# Patient Record
Sex: FEMALE | Race: BLACK OR AFRICAN AMERICAN | NOT HISPANIC OR LATINO | Employment: OTHER | URBAN - METROPOLITAN AREA
[De-identification: names, ages, dates, MRNs, and addresses within clinical notes are randomized per-mention and may not be internally consistent; named-entity substitution may affect disease eponyms.]

---

## 2017-01-26 DIAGNOSIS — F41.9 ANXIETY: ICD-10-CM

## 2017-01-26 RX ORDER — SERTRALINE HYDROCHLORIDE 25 MG/1
TABLET, FILM COATED ORAL
Qty: 30 TABLET | Refills: 9 | Status: SHIPPED | OUTPATIENT
Start: 2017-01-26 | End: 2017-05-11

## 2017-02-01 ENCOUNTER — TELEPHONE (OUTPATIENT)
Dept: NEUROLOGY | Facility: CLINIC | Age: 72
End: 2017-02-01

## 2017-02-01 NOTE — TELEPHONE ENCOUNTER
----- Message from Zoe Bray sent at 2/1/2017 11:51 AM CST -----  Contact: Amy  Medication is not working for patient, she would like someone to call her. donepezil (ARICEPT) 5 MG tablet    She was not sure of the strength of medication     Please call: [phone]

## 2017-02-01 NOTE — TELEPHONE ENCOUNTER
Spoke with patient's daughter. She stated that the patient took the Aricept 5mg for about 4 weeks and they took her off of it because it wasn't working and she had diarrhea with it.     Is there something else that she can take for her memory or does she need to make a follow up?

## 2017-02-02 RX ORDER — MEMANTINE HYDROCHLORIDE 5 MG-10 MG
KIT ORAL
Qty: 1 TABLET | Refills: 0 | Status: SHIPPED | OUTPATIENT
Start: 2017-02-02 | End: 2017-05-22

## 2017-02-02 NOTE — TELEPHONE ENCOUNTER
Patient's daughter notified of medication being sent to the pharmacy and they will call with an update in a few weeks.

## 2017-02-02 NOTE — TELEPHONE ENCOUNTER
I can order a Namenda starter pack.  She just needs to follow the instruction on the starter pack.  Side effects, could be constipation, dizziness, or confusion. Side effects are not common   Sent to [facility].  Please ask her to let us know if she tolerates medication so I can order maintenance dose after she finishes starter pack.

## 2017-02-09 DIAGNOSIS — N32.81 OVERACTIVE BLADDER: ICD-10-CM

## 2017-02-09 RX ORDER — OXYBUTYNIN CHLORIDE 5 MG/1
TABLET ORAL
Qty: 90 TABLET | Refills: 0 | Status: SHIPPED | OUTPATIENT
Start: 2017-02-09 | End: 2017-05-11 | Stop reason: SDUPTHER

## 2017-02-27 DIAGNOSIS — I10 ESSENTIAL HYPERTENSION: ICD-10-CM

## 2017-02-27 RX ORDER — CLONIDINE HYDROCHLORIDE 0.1 MG/1
0.2 TABLET ORAL NIGHTLY
Qty: 60 TABLET | Refills: 0 | Status: SHIPPED | OUTPATIENT
Start: 2017-02-27 | End: 2017-04-15 | Stop reason: SDUPTHER

## 2017-03-01 ENCOUNTER — HOSPITAL ENCOUNTER (OUTPATIENT)
Dept: RADIOLOGY | Facility: HOSPITAL | Age: 72
Discharge: HOME | End: 2017-03-01
Attending: INTERNAL MEDICINE
Payer: MEDICARE

## 2017-03-01 DIAGNOSIS — Z12.31 SCREENING MAMMOGRAM FOR HIGH-RISK PATIENT: ICD-10-CM

## 2017-03-01 PROCEDURE — 77067 SCR MAMMO BI INCL CAD: CPT | Mod: 26,,, | Performed by: RADIOLOGY

## 2017-03-01 PROCEDURE — 77063 BREAST TOMOSYNTHESIS BI: CPT | Mod: 26,,, | Performed by: RADIOLOGY

## 2017-03-01 PROCEDURE — 77067 SCR MAMMO BI INCL CAD: CPT | Mod: TC

## 2017-03-25 DIAGNOSIS — F41.9 ANXIETY: ICD-10-CM

## 2017-03-26 RX ORDER — ESCITALOPRAM OXALATE 10 MG/1
TABLET ORAL
Qty: 30 TABLET | Refills: 8 | Status: SHIPPED | OUTPATIENT
Start: 2017-03-26 | End: 2017-05-26 | Stop reason: SDUPTHER

## 2017-04-01 DIAGNOSIS — I63.9 CEREBRAL INFARCTION: ICD-10-CM

## 2017-04-02 RX ORDER — CLOPIDOGREL BISULFATE 75 MG/1
TABLET ORAL
Qty: 30 TABLET | Refills: 5 | Status: SHIPPED | OUTPATIENT
Start: 2017-04-02 | End: 2017-05-26 | Stop reason: SDUPTHER

## 2017-04-15 DIAGNOSIS — I10 ESSENTIAL HYPERTENSION: ICD-10-CM

## 2017-04-17 RX ORDER — CLONIDINE HYDROCHLORIDE 0.1 MG/1
TABLET ORAL
Qty: 60 TABLET | Refills: 9 | Status: SHIPPED | OUTPATIENT
Start: 2017-04-17 | End: 2017-05-26 | Stop reason: SDUPTHER

## 2017-05-06 ENCOUNTER — DOCUMENTATION ONLY (OUTPATIENT)
Dept: INTERNAL MEDICINE | Facility: CLINIC | Age: 72
End: 2017-05-06

## 2017-05-06 ENCOUNTER — TELEPHONE (OUTPATIENT)
Dept: INTERNAL MEDICINE | Facility: CLINIC | Age: 72
End: 2017-05-06

## 2017-05-06 NOTE — TELEPHONE ENCOUNTER
Patient has orders that were entered last visit to be done prior to her visit this Thursday. I do not see that she has scheduled her blood work. Please call patient to schedule prior to her visit.

## 2017-05-06 NOTE — PROGRESS NOTES
Pre-Visit Review & Summary:    72 y/o female with history of HTN, HPL, Pre-DM, S/P CVA 9/2013 (Left Basal Ganglia with residual RUE Weakness, Dysarthria, Aphasia) has a scheduled appt 5/11/17 for her Annual PE.    At last visit, 10/6/16, her daughter had noted some change in her mentation recently. She was sometimes unaware of her surroundings but always recognizes her family. The daughter denied change in speech, change in chronic dysarthria, or gait. No new motor weakness.     Since her last visit, 10/6/16, she was seen by Dr. ALAINA Oswald in Vascular Neurology 10/19/16 for follow up of these cognitive issues. Her family was also complaining of recurrent frontal headaches. She was referred to Dr. Rodriguez and Optometry. Patient did not return for follow up in 4/2017 as recommended and did not follow up with Optometry or H/AClinic with Dr. Rodriguez.     At last visit, 10/6/16, BP was 146/68 but patient had forgotten to take Clonidine 0.2 mg. Blood pressure according to daughter was well controlled at home.     At last visit, Zoloft 25 mg/day had been discontinued by the family because they felt anxiety was not improved. They requested an alternative medication and Lexapro 10 mg/day was added.        PMH:  1. CVA 9/2013           S/P Left Basal Ganglia CVA with Right-sided Hemiparesis, Dysarthria, and Aphasia           Plavix 75 mg/day    2. HTN           Amlodipine 5 mg BID             Clonidine 0.2 mg hs    3. DDD L/S with Mild Spinal Stenosis           ESI - 2011, and 8/2012    4. Vit D Deficiency           Cholecalciferol 2,000 units/day           Vit D Level (2/2016): 41    5. Chronic Hypokalemia           KCL 20 meq/day    6. Chronic Mild Anemia - Anemia of Chronic Disease          Normal Folate, MMA (7/2013)            Normal Fe studies, Ferritin, B12 (2012)    7. Overactive Bladder          Ditropan 5 mg once daily (decreased b/c Urinary Retention 4/2016)    8. Hyperlipidemia          Lipitor 80 mg hs    9. Chronic  Constipation          High Fiber Diet; Metamucil; Miralax    10. Pre-DM           Hgb A1c (2/2016) - 5.9    11. Small Right Meningioma    12. Family Hx of Breast CA    13. Anxiety            Lexapro 10 mg/day      Meds:          Amlodipine 5 mg BID          Atorvastatin 80 mg hs          Clonidine 0.2 mg hs          Cholecalciferol 2,000 units/day          Ditropan 5 mg daily          Norco 5-325 mg prn - rarely takes          KCL 20 meq/day          Lexapro 10 mg/day          Metamucil          Miralax          Plavix 75 mg/day          Sertraline 25 mg/day          Tramadol 50 mg prn - rarely takes      Allergies:          Lisinopril          Lovenox          PCN    Surgical Hx:          Hysterectomy          Bladder Sling          Right Rotator Cuff Repair          Hemorrhoidectomy          Breast Bx          (B) Cataract Extractions (10/2014 and 11/2014)    Social Hx:          Former Smoker - Quit 1990          ETOH: none          Low Salt diet                Preventive Health Screen and Recent Lab:          Annual PE: 1/8/2015          CBC (9/2016): normal          CMP (9/2016) - Glu-125          TSH (2/16) - 0.667          Lipids (2/2016) - Chol-153, TG-102, HDL-64, LDL-68          Vit D (2/2016) - 41          BMD (3/2016) - Osteopenia T Score (-)1.3          Mammogram (3/2017) - normal          Colonoscopy (2009) - reportedly normal          GYN Exam (10/2011) - S/P Hysterectomy          Eye Exam (3/2016)          Immunizations:                 Flu- 2/2016                 Tdp - 10/2016                 Pneumovax - 12/2013                 Prevnar 13 - 2/2016                 Zostavax - 7/2013          Other Lab:                 Hgb A 1c (2/2016): 5.9                 Anemia Workup (6/2012): normal S. Fe, Ferritin, B12, Folate                 MRI L/S (6/2012): DJD, mild Spinal Stenosis                 EMG - LE (8/2013): normal                 CXR (9/2013): normal                 2 D Echo (9/2013): diastolic  Dysfunction, EF 60%, mild LAE                 Carotid US (9/2013): < 39 % Carotid Artery Stenosis                 MRI Brain (9/2013): c/w Acute Left Basal Ganglia CVA                                                 Volume Loss c/w chronic microvascular disease                                                 Small right Meningioma above sphenoid sinus roof                 EEG (9/2013) - no focal site for Sz activity                 30 Day Event Monitor (9/2013): negative  IMP:  1. Annual PE  2. HTN  3. S/P CVA  4. HPL  5. Vit D Deficiency  6. Chronic Hypokalemia  7. Pre-DM  8. DDD L/S Spine  9. Overactive Bladder  10. Small R-Meningioma  11. Chronic Constipation  12. Anxiety  13. Chronic H/As    Plan:  1. MRI Brain (Chronic H/As, Hx of Meningioma)  2. Follow up with Dr. Oswald (Vascular Neurology)  3. Referral for eye exam

## 2017-05-09 ENCOUNTER — LAB VISIT (OUTPATIENT)
Dept: LAB | Facility: HOSPITAL | Age: 72
End: 2017-05-09
Attending: INTERNAL MEDICINE
Payer: MEDICARE

## 2017-05-09 DIAGNOSIS — R73.03 PRE-DIABETES: ICD-10-CM

## 2017-05-09 DIAGNOSIS — I10 ESSENTIAL HYPERTENSION: ICD-10-CM

## 2017-05-09 DIAGNOSIS — E78.5 HYPERLIPIDEMIA, UNSPECIFIED: ICD-10-CM

## 2017-05-09 DIAGNOSIS — E55.9 VITAMIN D DEFICIENCY DISEASE: ICD-10-CM

## 2017-05-09 LAB
25(OH)D3+25(OH)D2 SERPL-MCNC: 48 NG/ML
ALBUMIN SERPL BCP-MCNC: 3.4 G/DL
ALP SERPL-CCNC: 66 U/L
ALT SERPL W/O P-5'-P-CCNC: 11 U/L
ANION GAP SERPL CALC-SCNC: 12 MMOL/L
AST SERPL-CCNC: 20 U/L
BASOPHILS # BLD AUTO: 0.03 K/UL
BASOPHILS NFR BLD: 0.5 %
BILIRUB SERPL-MCNC: 0.7 MG/DL
BUN SERPL-MCNC: 16 MG/DL
CALCIUM SERPL-MCNC: 9.3 MG/DL
CHLORIDE SERPL-SCNC: 104 MMOL/L
CHOLEST/HDLC SERPL: 1.9 {RATIO}
CO2 SERPL-SCNC: 25 MMOL/L
CREAT SERPL-MCNC: 1 MG/DL
DIFFERENTIAL METHOD: ABNORMAL
EOSINOPHIL # BLD AUTO: 0.2 K/UL
EOSINOPHIL NFR BLD: 3.1 %
ERYTHROCYTE [DISTWIDTH] IN BLOOD BY AUTOMATED COUNT: 15.9 %
EST. GFR  (AFRICAN AMERICAN): >60 ML/MIN/1.73 M^2
EST. GFR  (NON AFRICAN AMERICAN): 56.4 ML/MIN/1.73 M^2
GLUCOSE SERPL-MCNC: 101 MG/DL
HCT VFR BLD AUTO: 36.9 %
HDL/CHOLESTEROL RATIO: 52.6 %
HDLC SERPL-MCNC: 152 MG/DL
HDLC SERPL-MCNC: 80 MG/DL
HGB BLD-MCNC: 11.8 G/DL
LDLC SERPL CALC-MCNC: 61.8 MG/DL
LYMPHOCYTES # BLD AUTO: 2.6 K/UL
LYMPHOCYTES NFR BLD: 40.3 %
MCH RBC QN AUTO: 27.7 PG
MCHC RBC AUTO-ENTMCNC: 32 %
MCV RBC AUTO: 87 FL
MONOCYTES # BLD AUTO: 0.5 K/UL
MONOCYTES NFR BLD: 7.1 %
NEUTROPHILS # BLD AUTO: 3.2 K/UL
NEUTROPHILS NFR BLD: 48.8 %
NONHDLC SERPL-MCNC: 72 MG/DL
PLATELET # BLD AUTO: 202 K/UL
PMV BLD AUTO: 11 FL
POTASSIUM SERPL-SCNC: 3.8 MMOL/L
PROT SERPL-MCNC: 7.6 G/DL
RBC # BLD AUTO: 4.26 M/UL
SODIUM SERPL-SCNC: 141 MMOL/L
TRIGL SERPL-MCNC: 51 MG/DL
WBC # BLD AUTO: 6.48 K/UL

## 2017-05-09 PROCEDURE — 83036 HEMOGLOBIN GLYCOSYLATED A1C: CPT

## 2017-05-09 PROCEDURE — 82306 VITAMIN D 25 HYDROXY: CPT

## 2017-05-09 PROCEDURE — 85025 COMPLETE CBC W/AUTO DIFF WBC: CPT

## 2017-05-09 PROCEDURE — 36415 COLL VENOUS BLD VENIPUNCTURE: CPT

## 2017-05-09 PROCEDURE — 80053 COMPREHEN METABOLIC PANEL: CPT

## 2017-05-09 PROCEDURE — 80061 LIPID PANEL: CPT

## 2017-05-10 LAB
ESTIMATED AVG GLUCOSE: 126 MG/DL
HBA1C MFR BLD HPLC: 6 %

## 2017-05-11 ENCOUNTER — OFFICE VISIT (OUTPATIENT)
Dept: INTERNAL MEDICINE | Facility: CLINIC | Age: 72
End: 2017-05-11
Payer: COMMERCIAL

## 2017-05-11 VITALS
HEART RATE: 62 BPM | HEIGHT: 66 IN | BODY MASS INDEX: 24.41 KG/M2 | WEIGHT: 151.88 LBS | DIASTOLIC BLOOD PRESSURE: 60 MMHG | SYSTOLIC BLOOD PRESSURE: 138 MMHG

## 2017-05-11 DIAGNOSIS — M51.36 DDD (DEGENERATIVE DISC DISEASE), LUMBAR: ICD-10-CM

## 2017-05-11 DIAGNOSIS — I69.320 APHASIA AS LATE EFFECT OF CEREBROVASCULAR ACCIDENT: ICD-10-CM

## 2017-05-11 DIAGNOSIS — I67.89 CEREBRAL MICROVASCULAR DISEASE: ICD-10-CM

## 2017-05-11 DIAGNOSIS — R73.03 PRE-DIABETES: ICD-10-CM

## 2017-05-11 DIAGNOSIS — E55.9 VITAMIN D DEFICIENCY DISEASE: ICD-10-CM

## 2017-05-11 DIAGNOSIS — F41.9 ANXIETY: ICD-10-CM

## 2017-05-11 DIAGNOSIS — E87.6 CHRONIC HYPOKALEMIA: ICD-10-CM

## 2017-05-11 DIAGNOSIS — Z00.00 ANNUAL PHYSICAL EXAM: Primary | ICD-10-CM

## 2017-05-11 DIAGNOSIS — I69.30 HISTORY OF CVA WITH RESIDUAL DEFICIT: ICD-10-CM

## 2017-05-11 DIAGNOSIS — H53.8 BLURRED VISION, BILATERAL: ICD-10-CM

## 2017-05-11 DIAGNOSIS — I10 ESSENTIAL HYPERTENSION: ICD-10-CM

## 2017-05-11 DIAGNOSIS — N32.81 OVERACTIVE BLADDER: ICD-10-CM

## 2017-05-11 DIAGNOSIS — D32.9 MENINGIOMA: ICD-10-CM

## 2017-05-11 DIAGNOSIS — G31.84 MILD COGNITIVE IMPAIRMENT: ICD-10-CM

## 2017-05-11 DIAGNOSIS — L60.8 ONYCHOMADESIS OF TOENAIL: ICD-10-CM

## 2017-05-11 DIAGNOSIS — E78.5 HYPERLIPIDEMIA, UNSPECIFIED HYPERLIPIDEMIA TYPE: ICD-10-CM

## 2017-05-11 DIAGNOSIS — R26.81 GAIT INSTABILITY: ICD-10-CM

## 2017-05-11 DIAGNOSIS — G89.29 CHRONIC INTRACTABLE HEADACHE, UNSPECIFIED HEADACHE TYPE: ICD-10-CM

## 2017-05-11 DIAGNOSIS — R51.9 CHRONIC INTRACTABLE HEADACHE, UNSPECIFIED HEADACHE TYPE: ICD-10-CM

## 2017-05-11 DIAGNOSIS — K59.09 OTHER CONSTIPATION: ICD-10-CM

## 2017-05-11 PROCEDURE — 99999 PR PBB SHADOW E&M-EST. PATIENT-LVL V: CPT | Mod: PBBFAC,,, | Performed by: INTERNAL MEDICINE

## 2017-05-11 PROCEDURE — 99397 PER PM REEVAL EST PAT 65+ YR: CPT | Mod: S$GLB,,, | Performed by: INTERNAL MEDICINE

## 2017-05-11 PROCEDURE — 99215 OFFICE O/P EST HI 40 MIN: CPT | Mod: PBBFAC | Performed by: INTERNAL MEDICINE

## 2017-05-11 RX ORDER — OXYBUTYNIN CHLORIDE 5 MG/1
5 TABLET ORAL DAILY
Qty: 90 TABLET | Refills: 3 | Status: SHIPPED | OUTPATIENT
Start: 2017-05-11 | End: 2017-08-08 | Stop reason: SDUPTHER

## 2017-05-11 NOTE — PROGRESS NOTES
Subjective:       Patient ID: Emilia Melgoza is a 71 y.o. female.    Chief Complaint: No chief complaint on file.    HPI Comments: 72 y/o female with history of HTN, HPL, Pre-DM, S/P CVA 9/2013 (Left Basal Ganglia with residual RUE Weakness, Dysarthria, Aphasia) comes in for her Annual PE.    At last visit, 10/6/16, her daughter had noted some changes in her mentation. She was sometimes unaware of her surroundings but always recognized her family. The daughter denied change in speech, change in chronic dysarthria, or gait. No new motor weakness.     Since her last visit, 10/6/16, she was seen by Dr. ALAINA Oswald in Vascular Neurology 10/19/16 for follow up of these cognitive issues. Her family was also complaining of recurrent frontal headaches. She was referred to Dr. Rodriguez and Optometry. Patient did not return for follow up with Dr. Oswald in 4/2017 as recommended and did not follow up with Optometry or H/A Clinic with Dr. Rodriguez. She notes daily chronic frontal throbbing headaches, lasting 2 hours and relieved with Abigail ASA. She denies associated nausea, vomiting. She has chronically blurred vision and has lost her glasses.    At last visit, 10/6/16, BP was 146/68 but patient had forgotten to take Clonidine 0.2 mg. Blood pressure at home according to daughter runs 119-150/60s. She occasionaly misses her BP medications.    At last visit, Zoloft 25 mg/day had been discontinued by the family because they felt anxiety was not improved. They requested an alternative medication and Lexapro 10 mg/day was added. She is doing well on this dose        PMH:  1. CVA 9/2013           S/P Left Basal Ganglia CVA with Right-sided Hemiparesis, Dysarthria, and Aphasia           Plavix 75 mg/day    2. HTN           Amlodipine 5 mg BID             Clonidine 0.2 mg hs    3. DDD L/S with Mild Spinal Stenosis           ESI - 2011, and 8/2012    4. Vit D Deficiency           Cholecalciferol 2,000 units/day    5. Chronic Hypokalemia            KCL 20 meq/day    6. Chronic Mild Anemia - Anemia of Chronic Disease          Normal Folate, MMA (7/2013)            Normal Fe studies, Ferritin, B12 (2012)    7. Overactive Bladder          Ditropan 5 mg once daily (decreased b/c Urinary Retention 4/2016)    8. Hyperlipidemia          Lipitor 80 mg hs    9. Chronic Constipation          High Fiber Diet; Metamucil; Miralax    10. Pre-DM    11. Small Right Meningioma    12. Family Hx of Breast CA    13. Anxiety            Lexapro 10 mg/day      Meds:          Amlodipine 5 mg BID          Atorvastatin 80 mg hs          Clonidine 0.2 mg hs          Cholecalciferol 1,000 units/day          Ditropan 5 mg daily          KCL 20 meq/day          Lexapro 10 mg/day          Metamucil daily          Miralax prn          Plavix 75 mg/day          Tramadol 50 mg prn - rarely takes       Allergies:          Lisinopril          Lovenox          PCN    Surgical Hx:          Hysterectomy          Bladder Sling          Right Rotator Cuff Repair          Hemorrhoidectomy          Breast Bx          (B) Cataract Extractions (10/2014 and 11/2014)    Social Hx:          Former Smoker - Quit 1990          ETOH: none          Low Salt diet                Preventive Health Screen and Recent Lab:          Annual PE: 5/11/17          CBC (5/2017): H/H-11.8/36.9          CMP (5/2017): alb-3.4          TSH (2/2016) - 0.667          Lipids (5/2017) - Chol-152, TG-51, HDL-80, LDL-61          Hgb A 1c (2/2016): 5.9                          (5/2017): 6.0          Vit D (5/2017) - 48          BMD (3/2016) - Osteopenia T Score (-)1.3          Mammogram (3/2017) - normal          Colonoscopy (2009) - reportedly normal          GYN Exam (10/2011) - S/P Hysterectomy          Eye Exam (3/2016)          Immunizations:                 Flu- 2/2016                 Tdp - 10/2016                 Pneumovax - 12/2013                 Prevnar 13 - 2/2016                 Zostavax - 7/2013           Other Lab:                 Anemia Workup (6/2012): normal S. Fe, Ferritin, B12, Folate                 MRI L/S (6/2012): DJD, mild Spinal Stenosis                 EMG - LE (8/2013): normal                 CXR (9/2013): normal                 2 D Echo (9/2013): diastolic Dysfunction, EF 60%, mild LAE                 Carotid US (9/2013): < 39 % Carotid Artery Stenosis                 MRI Brain (9/2013): c/w Acute Left Basal Ganglia CVA                                                 Volume Loss c/w chronic microvascular disease                                                 Small right Meningioma above sphenoid sinus roof                 EEG (9/2013) - no focal site for Sz activity                 30 Day Event Monitor (9/2013): negative        Review of Systems   Constitutional: Negative for appetite change and unexpected weight change.   HENT: Negative for trouble swallowing.    Eyes: Positive for visual disturbance.   Respiratory: Negative for cough, chest tightness and shortness of breath.    Cardiovascular: Negative for chest pain, palpitations and leg swelling.   Gastrointestinal: Negative for abdominal pain, blood in stool, constipation, diarrhea, nausea and vomiting.   Genitourinary: Negative for dysuria and hematuria.   Musculoskeletal: Negative.    Neurological: Positive for headaches. Negative for dizziness, syncope and light-headedness.   Hematological: Negative.    Psychiatric/Behavioral: Negative for dysphoric mood.       Objective:      Physical Exam   Constitutional: She is oriented to person, place, and time. She appears well-developed and well-nourished. No distress.   HENT:   Head: Normocephalic.   Right Ear: External ear normal.   Left Ear: External ear normal.   Mouth/Throat: Oropharynx is clear and moist.   Eyes: Conjunctivae are normal. No scleral icterus.   Neck: Neck supple. No JVD present. No thyromegaly present.   Cardiovascular: Normal rate and regular rhythm.  Exam reveals no gallop.    No  murmur heard.  Pulmonary/Chest: Effort normal and breath sounds normal. She has no wheezes. She has no rales.   Abdominal: Soft. She exhibits no mass. There is no tenderness.   Musculoskeletal: She exhibits no edema.   Lymphadenopathy:     She has no cervical adenopathy.   Neurological: She is alert and oriented to person, place, and time.   Dysarthria, aphasia, RUE weakness 4/5, right facial assymmetry   Skin: Skin is warm and dry. She is not diaphoretic.   Psychiatric: She has a normal mood and affect.       Assessment:   1. Annual PE  2. HTN  3. S/P CVA  4. HPL  5. Vit D Deficiency  6. Chronic Hypokalemia  7. Pre-DM  8. DDD L/S Spine  9. Overactive Bladder  10. Small R-Meningioma  11. Chronic Constipation  12. Anxiety  13. Chronic H/As    Plan:   1. MRI Brain (Chronic H/As, Hx of Meningioma)  2. Follow up with Dr. Oswald (Vascular Neurology)  3. Referral for Eye Exam, Dr. Rodriguez (Chronic Headaches as previously ordered by Dr. Oswald)  4. RTC in 6 months for follow up

## 2017-05-11 NOTE — MR AVS SNAPSHOT
[facility]  [address]  [facility] [address]  Phone: [phone]  Fax: [phone]                  Emilia Melgoza   2017 9:20 AM   Office Visit    Description:  Female : 1945   Provider:  Tessa Jefferson MD   Department:  [facility]           Reason for Visit     Annual Exam           Diagnoses this Visit        Comments    Annual physical exam    -  Primary     Overactive bladder         Vitamin D deficiency disease         Hyperlipidemia, unspecified hyperlipidemia type         Aphasia as late effect of cerebrovascular accident         Cerebral microvascular disease         Pre-diabetes         Essential hypertension         Meningioma         Other constipation         Gait instability         History of CVA with residual deficit         Chronic hypokalemia         Anxiety         DDD (degenerative disc disease), lumbar         Chronic intractable headache, unspecified headache type         Blurred vision, bilateral         Mild cognitive impairment         Onychomadesis of toenail                To Do List           Goals (5 Years of Data)     None      Follow-Up and Disposition     Return in about 6 months (around 2017) for for follow up.    Follow-up and Disposition History       These Medications        Disp Refills Start End    oxybutynin (DITROPAN) 5 MG Tab 90 tablet 3 2017     Take 1 tablet (5 mg total) by mouth once daily. - Oral    Pharmacy: [address] Ph #: [phone]         [facility]     [facility] On Call Nurse Care Line -  Assistance  Unless otherwise directed by your provider, please contact [facility], our nurse care line that is available for / assistance.     Registered nurses in the [facility] provide: appointment scheduling, clinical advisement, health education, and other advisory services.  Call: [phone]  (toll free)               Medications           Message regarding Medications     Verify the changes and/or additions to your medication regime listed below are the same as discussed with your clinician today.  If any of these changes or additions are incorrect, please notify your healthcare provider.        CHANGE how you are taking these medications     Start Taking Instead of    oxybutynin (DITROPAN) 5 MG Tab oxybutynin (DITROPAN) 5 MG Tab    Dosage:  Take 1 tablet (5 mg total) by mouth once daily. Dosage:  TAKE ONE TABLET BY MOUTH ONCE DAILY    Reason for Change:  Reorder       STOP taking these medications     sertraline (ZOLOFT) 25 MG tablet TAKE ONE TABLET BY MOUTH ONCE DAILY           Verify that the below list of medications is an accurate representation of the medications you are currently taking.  If none reported, the list may be blank. If incorrect, please contact your healthcare provider. Carry this list with you in case of emergency.           Current Medications     amlodipine (NORVASC) 5 MG tablet Take 1 tablet (5 mg total) by mouth 2 (two) times daily.    atorvastatin (LIPITOR) 80 MG tablet TAKE ONE TABLET BY MOUTH ONCE DAILY    cholecalciferol, vitamin D3, 1,000 unit capsule Take 1 capsule (1,000 Units total) by mouth once daily.    cloNIDine (CATAPRES) 0.1 MG tablet TAKE TWO TABLETS BY MOUTH ONCE DAILY IN THE EVENING    clopidogrel (PLAVIX) 75 mg tablet TAKE ONE TABLET BY MOUTH ONCE DAILY    escitalopram oxalate (LEXAPRO) 10 MG tablet TAKE ONE TABLET BY MOUTH ONCE DAILY    oxybutynin (DITROPAN) 5 MG Tab Take 1 tablet (5 mg total) by mouth once daily.    potassium chloride SA (K-DUR,KLOR-CON) 20 MEQ tablet Take 1 tablet (20 mEq total) by mouth 2 (two) times daily.    tramadol (ULTRAM) 50 mg tablet Take 1/2 tablet as needed for headache    memantine (NAMENDA TITRATION PACK) tablet pack Follow package directions.           Clinical Reference Information           Your Vitals Were     BP Pulse Height  "Weight BMI    138/60 62 5' 6" (1.676 m) 68.9 kg (151 lb 14.4 oz) 24.52 kg/m2      Blood Pressure          Most Recent Value    BP  138/60      Allergies as of 5/11/2017     Lovenox [Enoxaparin]    Lisinopril    Penicillins      Immunizations Administered on Date of Encounter - 5/11/2017     None      Orders Placed During Today's Visit      Normal Orders This Visit    Ambulatory consult to Podiatry     Ambulatory referral to Neurology     Ambulatory referral to Neurology     Ambulatory referral to Ophthalmology     Future Labs/Procedures Expected by Expires    MRI Brain Without Contrast  5/11/2017 5/11/2018      Language Assistance Services     ATTENTION: Language assistance services are available, free of charge. Please call [phone].      ATENCIÓN: Si delontela rola, tiene a lawrence disposición servicios gratuitos de asistencia lingüística. Llame al [phone].     CHÚ Ý: N?u b?n nói Ti?ng Vi?t, có các d?ch v? h? tr? ngôn ng? mi?n phí dành cho b?n. G?i s? [phone].         [facility] complies with applicable Federal civil rights laws and does not discriminate on the basis of race, color, national origin, age, disability, or sex.        "

## 2017-05-22 ENCOUNTER — OFFICE VISIT (OUTPATIENT)
Dept: NEUROLOGY | Facility: CLINIC | Age: 72
End: 2017-05-22
Payer: COMMERCIAL

## 2017-05-22 VITALS
WEIGHT: 153.25 LBS | DIASTOLIC BLOOD PRESSURE: 65 MMHG | HEART RATE: 73 BPM | SYSTOLIC BLOOD PRESSURE: 136 MMHG | BODY MASS INDEX: 24.63 KG/M2 | HEIGHT: 66 IN

## 2017-05-22 DIAGNOSIS — I67.2 CEREBRAL ARTERIOSCLEROSIS: ICD-10-CM

## 2017-05-22 DIAGNOSIS — G31.84 MCI (MILD COGNITIVE IMPAIRMENT): Primary | ICD-10-CM

## 2017-05-22 DIAGNOSIS — I69.920 APHASIA, LATE EFFECT OF CEREBROVASCULAR DISEASE: ICD-10-CM

## 2017-05-22 DIAGNOSIS — I69.351 HEMIPARESIS AFFECTING RIGHT SIDE AS LATE EFFECT OF CEREBROVASCULAR ACCIDENT: ICD-10-CM

## 2017-05-22 PROCEDURE — 99213 OFFICE O/P EST LOW 20 MIN: CPT | Mod: PBBFAC | Performed by: PSYCHIATRY & NEUROLOGY

## 2017-05-22 PROCEDURE — 99999 PR PBB SHADOW E&M-EST. PATIENT-LVL III: CPT | Mod: PBBFAC,,, | Performed by: PSYCHIATRY & NEUROLOGY

## 2017-05-22 PROCEDURE — 99215 OFFICE O/P EST HI 40 MIN: CPT | Mod: S$GLB,,, | Performed by: PSYCHIATRY & NEUROLOGY

## 2017-05-22 RX ORDER — MEMANTINE HYDROCHLORIDE 10 MG/1
10 TABLET ORAL DAILY
Qty: 30 TABLET | Refills: 5 | Status: SHIPPED | OUTPATIENT
Start: 2017-05-22 | End: 2017-05-29 | Stop reason: SDUPTHER

## 2017-05-22 NOTE — PROGRESS NOTES
Subjective:       Patient ID: Emilia Melgoza is a 71 y.o. female.    Chief Complaint:  Memory Loss      History of Present Illness  HPI   This is a 71-year-old female who was referred for evaluation of memory difficulties.  She was accompanied by her daughter.  It is of note that she has been seen for her memory difficulties by Anastasiya Oswald NP, at the stroke clinic.  She was last seen there in October 2016.  The patient has had a history of a stroke in September 2013 at which time she was noted to have right-sided weakness and dysarthria.  A couple of weeks after that admission she had recurrence of the same symptoms and was readmitted to the hospital.  She was discharged home with no change in medications and with improvement to her prior baseline.  She has been otherwise medically stable. The patient was started on Namenda which she never continued.    Since then there has been no further strokelike symptoms however she has had gradual development of cognitive issues.  When seen by the neurology clinic in October 2016, her family was managing her medications, finances and meal preparation.  The patient does live with her  and a niece.  In addition, another daughter lives next door.  The patient does not drive and has significant residual speech difficulty related to an expressive aphasia.  She has minimal residual right upper extremity weakness and clumsiness but is able to ambulate without assistance.  She is able to take care of her day-to-day needs including personal hygiene on her own.       Review of Systems  Review of Systems   HENT: Negative for hearing loss.    Eyes: Negative.  Negative for visual disturbance.   Respiratory: Negative.  Negative for shortness of breath.    Cardiovascular: Negative.  Negative for chest pain and palpitations.   Gastrointestinal: Negative.    Genitourinary: Negative.    Musculoskeletal: Negative.  Negative for back pain, gait problem and neck pain.   Skin:  Negative.    Neurological: Positive for speech difficulty, weakness and headaches. Negative for tremors, seizures, syncope and numbness.   Psychiatric/Behavioral: Positive for decreased concentration. Negative for confusion.       Objective:      Neurologic Exam     Mental Status   Oriented to person.   Oriented to place.   Disoriented to time. Disoriented to year and date. Oriented to month, day and season.   Follows 3 step commands.   Attention: normal. Concentration: normal.   Speech: (The patient has significant expressive aphasia but has no comprehension difficulties and is able to follow three-step commands without difficulty.  Memory examination is somewhat limited because of her aphasia.)  Level of consciousness: alert  Knowledge: good.   Able to name object. Able to read. Able to repeat. Normal comprehension.     Cranial Nerves     CN II   Visual fields full to confrontation.     CN III, IV, VI   Pupils are equal, round, and reactive to light.  Extraocular motions are normal.     CN V   Facial sensation intact.     CN VII   Right facial weakness: central (Flattening of the right nasolabial fold.)  Left facial weakness: none  Right taste: normal  Left taste: normal    CN VIII   CN VIII normal.     CN IX, X   CN IX normal.   CN X normal.     CN XI   CN XI normal.     CN XII   CN XII normal.     Motor Exam   Muscle bulk: normal  Overall muscle tone: normal    Strength   Strength 5/5 except as noted. Minimal weakness of the right  with minimal right pronator drift.  Recall was normal in the lower extremities though there is clumsiness of fine motor movements of the right lower extremity distally.     Sensory Exam   Light touch normal.   Proprioception normal.   Pinprick normal.     Gait, Coordination, and Reflexes     Gait  Gait: normal    Coordination   Romberg: negative  Finger to nose coordination: normal    Tremor   Resting tremor: absent  Intention tremor: absent  Action tremor: absent    Reflexes    Right brachioradialis: 2+  Left brachioradialis: 2+  Right biceps: 2+  Left biceps: 2+  Right triceps: 2+  Left triceps: 2+  Right patellar: 2+  Left patellar: 2+  Right achilles: 2+  Left achilles: 2+  Right plantar: normal  Left plantar: normalDTRs slightly brisker on the right as compared to the left       Physical Exam   Constitutional: She appears well-developed and well-nourished.   HENT:   Head: Normocephalic and atraumatic.   Eyes: EOM are normal. Pupils are equal, round, and reactive to light.   Neck: Normal range of motion. Neck supple. Carotid bruit is not present.   Neurological: She has a normal Finger-Nose-Finger Test and a normal Romberg Test. Gait normal.   Reflex Scores:       Tricep reflexes are 2+ on the right side and 2+ on the left side.       Bicep reflexes are 2+ on the right side and 2+ on the left side.       Brachioradialis reflexes are 2+ on the right side and 2+ on the left side.       Patellar reflexes are 2+ on the right side and 2+ on the left side.       Achilles reflexes are 2+ on the right side and 2+ on the left side.  Vitals reviewed.        Assessment:        1. MCI (mild cognitive impairment)    2. Cerebral arteriosclerosis    3. Hemiparesis affecting right side as late effect of cerebrovascular accident    4. Aphasia, late effect of cerebrovascular disease            Plan:          Discussed with daughter and patient.   Her memory difficulties are most likely a combination of residual effect from the stroke , primarily related to her aphasia and are most likely vascular in origin. Advised  restartng Nameda at 10 mg daily in the morning.   Control of vascular risk factors.   Follow-up in 6 months if stable.

## 2017-05-22 NOTE — PATIENT INSTRUCTIONS
Discussed with daughter and patient.   Her memory difficulties are most likely a combination of residual effect from the stroke , primarily related to her aphasia and are most likely vascular in origin. Advised  restartng Nameda at 10 mg daily in the morning.   Control of vascular risk factors.

## 2017-05-24 ENCOUNTER — OFFICE VISIT (OUTPATIENT)
Dept: PODIATRY | Facility: CLINIC | Age: 72
End: 2017-05-24
Payer: COMMERCIAL

## 2017-05-24 VITALS — WEIGHT: 153 LBS | HEIGHT: 66 IN | BODY MASS INDEX: 24.59 KG/M2

## 2017-05-24 DIAGNOSIS — M79.675 PAIN AROUND TOENAIL, LEFT FOOT: ICD-10-CM

## 2017-05-24 DIAGNOSIS — B35.1 ONYCHOMYCOSIS DUE TO DERMATOPHYTE: Primary | ICD-10-CM

## 2017-05-24 PROCEDURE — 99203 OFFICE O/P NEW LOW 30 MIN: CPT | Mod: S$GLB,,, | Performed by: PODIATRIST

## 2017-05-24 PROCEDURE — 99212 OFFICE O/P EST SF 10 MIN: CPT | Mod: PBBFAC,PO | Performed by: PODIATRIST

## 2017-05-24 PROCEDURE — 99999 PR PBB SHADOW E&M-EST. PATIENT-LVL II: CPT | Mod: PBBFAC,,, | Performed by: PODIATRIST

## 2017-05-24 NOTE — LETTER
May 24, 2017      Tessa Jefferson MD  [address]  [place] [address]           [facility]  [address]  [place] [address]  Phone: [phone]          Patient: Emilia Melgoza   MR Number: [id number]   YOB: 1945   Date of Visit: 5/24/2017       Dear Dr. Tessa Jefferson:    Thank you for referring Emilia Melgoza to me for evaluation. Attached you will find relevant portions of my assessment and plan of care.    If you have questions, please do not hesitate to call me. I look forward to following Emilia Melgoza along with you.    Sincerely,    Juan Jose uS, YAEL    Enclosure  CC:  No Recipients    If you would like to receive this communication electronically, please contact [email or web address] or [phone] to request more information on [facility] access.    For providers and/or their staff who would like to refer a patient to [facility], please contact us through our one-stop-shop provider referral line, [facility], at [phone].    If you feel you have received this communication in error or would no longer like to receive these types of communications, please e-mail [email or web address]

## 2017-05-24 NOTE — PROGRESS NOTES
Subjective:      Patient ID: Emilia Melgoza is a 71 y.o. female.    Chief Complaint: Nail Problem (nail fungus pain in second nail on left foot Pcp Dr. Jefferson 05/11/2017)    Emilia is a 71 y.o. female who presents to the clinic complaining of thick and discolored toenails on the left foot. Emilia is inquiring about treatment options. Difficult for her to trim herself due to pain and thickness of nail. Has never seen foot doctor before. Inquiring about having nails trimmed today. Left 2nd toe is the main one that is bothering her however all nails are difficult for her to trim due to previous history of cerebral microvascular disease and stroke. Present with her daughter today.     Past Medical History:   Diagnosis Date    Allergy     Asthma     DDD (degenerative disc disease), lumbar     Chronic LBP and intermittent RLE radicular pain x 10 yrs    Hypertension     Overactive bladder     Senile cataract, unspecified - Both Eyes 6/11/2013    Stroke        Past Surgical History:   Procedure Laterality Date    BREAST BIOPSY      CATARACT EXTRACTION W/  INTRAOCULAR LENS IMPLANT Right 10/13/2014    Dr Crystal    CATARACT EXTRACTION W/  INTRAOCULAR LENS IMPLANT Left 11/10/2014    Dr Crystal    HEMORRHOID SURGERY      HYSTERECTOMY      INCONTINENCE SURGERY      Right Rotator Cuff Repair         Family History   Problem Relation Age of Onset    Hypertension Mother     Breast cancer Mother     Dementia Mother     Colon cancer Father     Hypertension Father     Rhinitis Daughter     Rhinitis Daughter     No Known Problems Sister     No Known Problems Brother     No Known Problems Maternal Aunt     No Known Problems Maternal Uncle     No Known Problems Paternal Aunt     No Known Problems Paternal Uncle     No Known Problems Maternal Grandmother     No Known Problems Maternal Grandfather     No Known Problems Paternal Grandmother     No Known Problems Paternal Grandfather     Amblyopia Neg Hx      Blindness Neg Hx     Cancer Neg Hx     Cataracts Neg Hx     Diabetes Neg Hx     Glaucoma Neg Hx     Macular degeneration Neg Hx     Retinal detachment Neg Hx     Strabismus Neg Hx     Stroke Neg Hx     Thyroid disease Neg Hx        Social History     Social History    Marital status:      Spouse name: N/A    Number of children: N/A    Years of education: N/A     Social History Main Topics    Smoking status: Former Smoker     Quit date: 1/1/1992    Smokeless tobacco: Never Used    Alcohol use No    Drug use: No    Sexual activity: Not Currently     Other Topics Concern    None     Social History Narrative    Retired       Current Outpatient Prescriptions   Medication Sig Dispense Refill    amlodipine (NORVASC) 5 MG tablet Take 1 tablet (5 mg total) by mouth 2 (two) times daily. 180 tablet 3    atorvastatin (LIPITOR) 80 MG tablet TAKE ONE TABLET BY MOUTH ONCE DAILY 90 tablet 3    cholecalciferol, vitamin D3, 1,000 unit capsule Take 1 capsule (1,000 Units total) by mouth once daily. 90 capsule 3    cloNIDine (CATAPRES) 0.1 MG tablet TAKE TWO TABLETS BY MOUTH ONCE DAILY IN THE EVENING 60 tablet 9    clopidogrel (PLAVIX) 75 mg tablet TAKE ONE TABLET BY MOUTH ONCE DAILY 30 tablet 5    escitalopram oxalate (LEXAPRO) 10 MG tablet TAKE ONE TABLET BY MOUTH ONCE DAILY 30 tablet 8    memantine (NAMENDA) 10 MG Tab Take 1 tablet (10 mg total) by mouth once daily. 30 tablet 5    oxybutynin (DITROPAN) 5 MG Tab Take 1 tablet (5 mg total) by mouth once daily. 90 tablet 3    potassium chloride SA (K-DUR,KLOR-CON) 20 MEQ tablet Take 1 tablet (20 mEq total) by mouth 2 (two) times daily. 90 tablet 3    tramadol (ULTRAM) 50 mg tablet Take 1/2 tablet as needed for headache 30 tablet 0     No current facility-administered medications for this visit.        Review of patient's allergies indicates:   Allergen Reactions    Lovenox [enoxaparin] Other (See Comments)     Slurred speech per patient     Lisinopril Other (See Comments)     Cough    Penicillins            Review of Systems   Unable to perform ROS: mental status change           Objective:      Physical Exam   Constitutional: She is oriented to person, place, and time. She appears well-developed and well-nourished.   HENT:   Head: Normocephalic.   Cardiovascular: Intact distal pulses.    Pulses:       Dorsalis pedis pulses are 2+ on the right side, and 2+ on the left side.        Posterior tibial pulses are 1+ on the right side, and 1+ on the left side.   CRT < 3 sec to tips of toes. No edema noted to b/l LE. No vericosities noted to b/l LEs.      Pulmonary/Chest: No respiratory distress.   Musculoskeletal:   Gastrocnemius equinus noted to b/l ankles with decreased DF noted on exam. MMT 5/5 in DF/PF/Inv/Ev resistance with no reproduction of pain in any direction. Passive range of motion of ankle and pedal joints is painless. Adequate pedal joint ROM.   Mild pain with palpation of left 2nd digit toenail.      Neurological: She is alert and oriented to person, place, and time. She has normal strength.   Unable to assess protective or vibratory sensation however she is sensitive to direct touch.      Skin: Skin is warm, dry and intact. No erythema.   No open lesions, lacerations or wounds noted. L 2nd toenail is thickened, elongated, discolored yellow/brown with subungual debris and brittleness. Remaining nails are normotrophic but elongated to R 1-5 and L 1,3-5. Interdigital spaces clean, dry and intact b/l. No erythema noted to b/l foot. Skin texture thin, atrophic, dry. Pedal hair decerased. Skin normal temperature b/l foot.        Psychiatric: She has a normal mood and affect. Her behavior is normal. Judgment and thought content normal.   Vitals reviewed.            Assessment:       Encounter Diagnoses   Name Primary?    Onychomycosis due to dermatophyte - Left Foot Yes    Pain around toenail, left foot          Plan:       Emilia was seen today  for nail problem.    Diagnoses and all orders for this visit:    Onychomycosis due to dermatophyte - Left Foot    Pain around toenail, left foot      I counseled the patient on her conditions, their implications and medical management.        - Shoe inspection. Patient instructed on proper foot hygeine. We discussed wearing proper shoe gear, daily foot inspections, never walking without protective shoe gear, never putting sharp instruments to feet, routine podiatric nail visits every 2-3 months.      - With patient's permission, nails were aggressively reduced and debrided x 10 to their soft tissue attachment mechanically and with electric , removing all offending nail and debris. Patient relates relief following the procedure. She will continue to monitor the areas daily, inspect her feet, wear protective shoe gear when ambulatory, moisturizer to maintain skin integrity and follow in this office in approximately 2-3 months, sooner p.r.n.    - Discussed regular and routine moisturizer to skin of both feet to help improve dry skin. Advised to apply twice daily until resolution of symptoms. Avoid between toes.     RTC every 2-3 months for routine nail trimming under Proc B. Patients family member is aware that routine trimming of nails/calluses will not be covered service per insurance and patient will fall under Proc B if this service is desired. Patients daughter verbalized understanding of this. She will RTC every 2-3 months or as needed for Proc B.

## 2017-05-25 RX ORDER — AMLODIPINE BESYLATE 5 MG/1
TABLET ORAL
Qty: 180 TABLET | Refills: 0 | Status: SHIPPED | OUTPATIENT
Start: 2017-05-25 | End: 2017-08-08 | Stop reason: SDUPTHER

## 2017-05-26 ENCOUNTER — HOSPITAL ENCOUNTER (OUTPATIENT)
Dept: RADIOLOGY | Facility: HOSPITAL | Age: 72
Discharge: HOME | End: 2017-05-26
Attending: INTERNAL MEDICINE
Payer: COMMERCIAL

## 2017-05-26 DIAGNOSIS — D32.9 MENINGIOMA: ICD-10-CM

## 2017-05-26 DIAGNOSIS — H53.8 BLURRED VISION, BILATERAL: ICD-10-CM

## 2017-05-26 DIAGNOSIS — F41.9 ANXIETY: ICD-10-CM

## 2017-05-26 DIAGNOSIS — I69.320 APHASIA AS LATE EFFECT OF CEREBROVASCULAR ACCIDENT: ICD-10-CM

## 2017-05-26 DIAGNOSIS — I63.9 CEREBRAL INFARCTION: ICD-10-CM

## 2017-05-26 DIAGNOSIS — N32.81 OVERACTIVE BLADDER: ICD-10-CM

## 2017-05-26 DIAGNOSIS — G31.84 MILD COGNITIVE IMPAIRMENT: ICD-10-CM

## 2017-05-26 DIAGNOSIS — I10 ESSENTIAL HYPERTENSION: ICD-10-CM

## 2017-05-26 DIAGNOSIS — I67.89 CEREBRAL MICROVASCULAR DISEASE: ICD-10-CM

## 2017-05-26 PROCEDURE — 70551 MRI BRAIN STEM W/O DYE: CPT | Mod: TC

## 2017-05-26 PROCEDURE — 70551 MRI BRAIN STEM W/O DYE: CPT | Mod: 26,,, | Performed by: RADIOLOGY

## 2017-05-26 RX ORDER — CLONIDINE HYDROCHLORIDE 0.1 MG/1
TABLET ORAL
Qty: 180 TABLET | Refills: 0 | Status: SHIPPED | OUTPATIENT
Start: 2017-05-26 | End: 2018-04-05 | Stop reason: SDUPTHER

## 2017-05-26 RX ORDER — CLOPIDOGREL BISULFATE 75 MG/1
75 TABLET ORAL DAILY
Qty: 90 TABLET | Refills: 0 | Status: SHIPPED | OUTPATIENT
Start: 2017-05-26 | End: 2017-12-01

## 2017-05-26 RX ORDER — ESCITALOPRAM OXALATE 10 MG/1
10 TABLET ORAL DAILY
Qty: 90 TABLET | Refills: 0 | Status: SHIPPED | OUTPATIENT
Start: 2017-05-26 | End: 2018-04-11 | Stop reason: SDUPTHER

## 2017-05-29 DIAGNOSIS — G31.84 MCI (MILD COGNITIVE IMPAIRMENT): ICD-10-CM

## 2017-05-29 RX ORDER — MEMANTINE HYDROCHLORIDE 10 MG/1
10 TABLET ORAL DAILY
Qty: 90 TABLET | Refills: 1 | Status: SHIPPED | OUTPATIENT
Start: 2017-05-29 | End: 2017-12-01

## 2017-08-08 DIAGNOSIS — N32.81 OVERACTIVE BLADDER: ICD-10-CM

## 2017-08-08 RX ORDER — AMLODIPINE BESYLATE 5 MG/1
TABLET ORAL
Qty: 180 TABLET | Refills: 3 | Status: SHIPPED | OUTPATIENT
Start: 2017-08-08 | End: 2018-07-05 | Stop reason: SDUPTHER

## 2017-08-08 RX ORDER — OXYBUTYNIN CHLORIDE 5 MG/1
5 TABLET ORAL DAILY
Qty: 90 TABLET | Refills: 3 | Status: SHIPPED | OUTPATIENT
Start: 2017-08-08 | End: 2017-09-26 | Stop reason: SDUPTHER

## 2017-08-08 NOTE — TELEPHONE ENCOUNTER
----- Message from Melanie Sánchez sent at 8/8/2017  9:52 AM CDT -----  Contact: self/[phone]  Patient called in regards needing refill for oxybutynin (DITROPAN) 5 MG Tab. Please call and advise. Please call and advise.         Thank you!!!

## 2017-08-08 NOTE — TELEPHONE ENCOUNTER
Patient's daughter is requesting a refill. States they have been having to up her dose of Ditropan. She's been having a lot more accidents lately. Please advise they would like to increase the does.

## 2017-08-08 NOTE — TELEPHONE ENCOUNTER
I do not think it is a good idea to increase her Ditropan dose as she had a problem with urinary retention with the higher dose. If she would like, we can refer her to Uro GYN for evaluation and recommendations.

## 2017-08-09 NOTE — TELEPHONE ENCOUNTER
Spoke to patients daughter advised of Dr. Jefferson's recommendations. They'd like to see the uro gyn.

## 2017-09-01 ENCOUNTER — OFFICE VISIT (OUTPATIENT)
Dept: PODIATRY | Facility: CLINIC | Age: 72
End: 2017-09-01
Payer: MEDICARE

## 2017-09-01 VITALS — WEIGHT: 153 LBS | BODY MASS INDEX: 24.59 KG/M2 | HEIGHT: 66 IN

## 2017-09-01 DIAGNOSIS — Z41.1 ENCOUNTER FOR COSMETIC PROCEDURE: ICD-10-CM

## 2017-09-01 DIAGNOSIS — B35.1 ONYCHOMYCOSIS DUE TO DERMATOPHYTE: Primary | ICD-10-CM

## 2017-09-01 PROCEDURE — 17999 UNLISTD PX SKN MUC MEMB SUBQ: CPT | Mod: CSM,,, | Performed by: PODIATRIST

## 2017-09-01 PROCEDURE — 99499 UNLISTED E&M SERVICE: CPT | Mod: CSM,,, | Performed by: PODIATRIST

## 2017-09-01 PROCEDURE — 99212 OFFICE O/P EST SF 10 MIN: CPT | Mod: PBBFAC,PO | Performed by: PODIATRIST

## 2017-09-01 PROCEDURE — 99999 PR PBB SHADOW E&M-EST. PATIENT-LVL II: CPT | Mod: PBBFAC,,, | Performed by: PODIATRIST

## 2017-09-01 NOTE — PROGRESS NOTES
Patient presents to the clinic for non-covered routine foot care. Patient is not a high risk foot care patient. Patient understands this is not typically a covered service and patient is aware of responsibility of payment. Pedal pulses are palpable. No know risk factors requiring routine foot care. Nails are elongated and thickened on feet. Diagnosis is onychauxis. Nails were reduced and trimmed bilaterally. Patient tolerated well.     RTC 2-3 months for Proc B , sooner PRN

## 2017-09-15 ENCOUNTER — OFFICE VISIT (OUTPATIENT)
Dept: FAMILY MEDICINE | Facility: CLINIC | Age: 72
End: 2017-09-15
Payer: COMMERCIAL

## 2017-09-15 VITALS
BODY MASS INDEX: 24.59 KG/M2 | OXYGEN SATURATION: 97 % | DIASTOLIC BLOOD PRESSURE: 60 MMHG | HEIGHT: 66 IN | WEIGHT: 153 LBS | SYSTOLIC BLOOD PRESSURE: 120 MMHG | HEART RATE: 85 BPM | TEMPERATURE: 99 F

## 2017-09-15 DIAGNOSIS — H10.11 ALLERGIC CONJUNCTIVITIS, RIGHT: Primary | ICD-10-CM

## 2017-09-15 DIAGNOSIS — E78.5 HYPERLIPIDEMIA, UNSPECIFIED HYPERLIPIDEMIA TYPE: ICD-10-CM

## 2017-09-15 DIAGNOSIS — I69.351 HEMIPARESIS AFFECTING RIGHT SIDE AS LATE EFFECT OF CEREBROVASCULAR ACCIDENT: ICD-10-CM

## 2017-09-15 PROCEDURE — 99214 OFFICE O/P EST MOD 30 MIN: CPT | Mod: PBBFAC,PO | Performed by: NURSE PRACTITIONER

## 2017-09-15 PROCEDURE — 99999 PR PBB SHADOW E&M-EST. PATIENT-LVL IV: CPT | Mod: PBBFAC,,, | Performed by: NURSE PRACTITIONER

## 2017-09-15 PROCEDURE — 1125F AMNT PAIN NOTED PAIN PRSNT: CPT | Mod: S$GLB,,, | Performed by: NURSE PRACTITIONER

## 2017-09-15 PROCEDURE — 3074F SYST BP LT 130 MM HG: CPT | Mod: S$GLB,,, | Performed by: NURSE PRACTITIONER

## 2017-09-15 PROCEDURE — 3078F DIAST BP <80 MM HG: CPT | Mod: S$GLB,,, | Performed by: NURSE PRACTITIONER

## 2017-09-15 PROCEDURE — 1159F MED LIST DOCD IN RCRD: CPT | Mod: S$GLB,,, | Performed by: NURSE PRACTITIONER

## 2017-09-15 PROCEDURE — 99214 OFFICE O/P EST MOD 30 MIN: CPT | Mod: S$GLB,,, | Performed by: NURSE PRACTITIONER

## 2017-09-15 NOTE — PROGRESS NOTES
Subjective:       Patient ID: Emilia Melgoza is a 72 y.o. female.    Chief Complaint: Eye Pain (right eye, started this morning)    72-year-old female presents to the clinic today with her sister with complaint of irritation to right eye, redness to right eye with discomfort.  She states that started this morning.  She denies any known drainage.  She denies any difficulty with vision.  She states that right eye is very sensitive to light.  She denies any fever, chills, sore throat, sinus congestion, ear pain, coughing, wheezing, shortness breath, abdominal pain, nausea, vomiting, or diarrhea.  She is aphasic with some right upper sided hemiparesis due to previous stroke.      Past Medical History:   Diagnosis Date    Allergy     Asthma     DDD (degenerative disc disease), lumbar     Chronic LBP and intermittent RLE radicular pain x 10 yrs    Hypertension     Overactive bladder     Senile cataract, unspecified - Both Eyes 6/11/2013    Stroke      Past Surgical History:   Procedure Laterality Date    BREAST BIOPSY      CATARACT EXTRACTION W/  INTRAOCULAR LENS IMPLANT Right 10/13/2014    Dr Crystal    CATARACT EXTRACTION W/  INTRAOCULAR LENS IMPLANT Left 11/10/2014    Dr Crystal    HEMORRHOID SURGERY      HYSTERECTOMY      INCONTINENCE SURGERY      Right Rotator Cuff Repair        reports that she quit smoking about 25 years ago. She has never used smokeless tobacco. She reports that she does not drink alcohol or use drugs.  Review of Systems   Constitutional: Negative for chills and fever.   HENT: Negative for congestion, ear discharge, ear pain, postnasal drip, rhinorrhea, sinus pain, sinus pressure, sneezing and sore throat.    Eyes: Positive for pain, redness and itching. Negative for discharge and visual disturbance.   Respiratory: Negative for cough, shortness of breath and wheezing.    Cardiovascular: Negative for chest pain, palpitations and leg swelling.   Gastrointestinal: Negative for abdominal  pain, diarrhea, nausea and vomiting.   Musculoskeletal: Negative for gait problem.       Objective:      Physical Exam   Constitutional: She is oriented to person, place, and time. She appears well-developed and well-nourished. No distress.   HENT:   Head: Normocephalic and atraumatic.   Right Ear: External ear normal.   Left Ear: External ear normal.   Nose: Nose normal.   Mouth/Throat: Oropharynx is clear and moist.   Eyes: EOM are normal. Pupils are equal, round, and reactive to light. Right eye exhibits no discharge. Left eye exhibits no discharge. No scleral icterus.   Some redness noted to right conjunctiva of right eye no drainage noted    Neck: Normal range of motion. Neck supple.   Cardiovascular: Normal rate, regular rhythm and normal heart sounds.  Exam reveals no gallop and no friction rub.    No murmur heard.  Pulmonary/Chest: Effort normal and breath sounds normal. No respiratory distress. She has no wheezes. She has no rales.   Abdominal: Soft. Bowel sounds are normal. There is no tenderness.   Musculoskeletal: Normal range of motion. She exhibits no edema.   Lymphadenopathy:     She has no cervical adenopathy.   Neurological: She is alert and oriented to person, place, and time.   Skin: Skin is warm and dry. She is not diaphoretic.   Psychiatric: She has a normal mood and affect.       Assessment:       1. Allergic conjunctivitis, right    2. Hyperlipidemia, unspecified hyperlipidemia type    3. Hemiparesis affecting right side as late effect of cerebrovascular accident        Plan:         Allergic conjunctivitis, right  - Zaditor or Alaway as needed    Hyperlipidemia, unspecified hyperlipidemia type  - The current medical regimen is effective;  continue present plan and medications.    Hemiparesis affecting right side as late effect of cerebrovascular accident  - stable wants to restart speech therapy will discuss with her PCP

## 2017-09-26 ENCOUNTER — OFFICE VISIT (OUTPATIENT)
Dept: UROLOGY | Facility: CLINIC | Age: 72
End: 2017-09-26
Payer: COMMERCIAL

## 2017-09-26 VITALS — BODY MASS INDEX: 26.04 KG/M2 | HEIGHT: 65 IN | WEIGHT: 156.31 LBS

## 2017-09-26 DIAGNOSIS — R35.1 NOCTURIA MORE THAN TWICE PER NIGHT: ICD-10-CM

## 2017-09-26 DIAGNOSIS — R33.9 INCOMPLETE EMPTYING OF BLADDER: ICD-10-CM

## 2017-09-26 DIAGNOSIS — N32.81 OVERACTIVE BLADDER: Primary | ICD-10-CM

## 2017-09-26 PROCEDURE — 99999 PR PBB SHADOW E&M-EST. PATIENT-LVL III: CPT | Mod: PBBFAC,,, | Performed by: UROLOGY

## 2017-09-26 PROCEDURE — 1126F AMNT PAIN NOTED NONE PRSNT: CPT | Mod: S$GLB,,, | Performed by: UROLOGY

## 2017-09-26 PROCEDURE — 99213 OFFICE O/P EST LOW 20 MIN: CPT | Mod: PBBFAC | Performed by: UROLOGY

## 2017-09-26 PROCEDURE — 99213 OFFICE O/P EST LOW 20 MIN: CPT | Mod: S$GLB,,, | Performed by: UROLOGY

## 2017-09-26 PROCEDURE — 1159F MED LIST DOCD IN RCRD: CPT | Mod: S$GLB,,, | Performed by: UROLOGY

## 2017-09-26 RX ORDER — OXYBUTYNIN CHLORIDE 5 MG/1
10 TABLET ORAL 2 TIMES DAILY
Qty: 360 TABLET | Refills: 3 | Status: SHIPPED | OUTPATIENT
Start: 2017-09-26 | End: 2018-06-04 | Stop reason: SDUPTHER

## 2017-09-26 NOTE — PROGRESS NOTES
Subjective:       Patient ID: Emilia Melgoza is a 72 y.o. female who was last seen in this office Visit date not found    Chief Complaint:   Chief Complaint   Patient presents with    Follow-up     pt here today for overactive bladder        Overactive Bladder  She is currently taking Oxybutynin 10 mg BID.  She has had episodes of urinary retention in the past.  Her frequency was well controlled with Oxybutynin 5 mg BID but then she started leaking again so they increased it to 10 mg BID.  She seems to be better but she runs out of the medication.  She denies hematuria, dysuria, or fever.  She had a stroke in 2014.    ACTIVE MEDICAL ISSUES:  Patient Active Problem List   Diagnosis    DDD (degenerative disc disease), lumbar    Overactive bladder    Vitamin D deficiency disease    Hyperlipidemia    Cerebral microvascular disease    Aphasia, late effect of cerebrovascular disease    Pre-diabetes    Essential hypertension    Meningioma    Constipation    Gait instability    Hemiparesis affecting right side as late effect of cerebrovascular accident    Chronic hypokalemia    Anxiety    MCI (mild cognitive impairment)       ALLERGIES AND MEDICATIONS: updated and reviewed.  Review of patient's allergies indicates:   Allergen Reactions    Lovenox [enoxaparin] Other (See Comments)     Slurred speech per patient    Lisinopril Other (See Comments)     Cough    Penicillins      Current Outpatient Prescriptions   Medication Sig    amlodipine (NORVASC) 5 MG tablet TAKE ONE TABLET BY MOUTH TWICE DAILY    atorvastatin (LIPITOR) 80 MG tablet TAKE ONE TABLET BY MOUTH ONCE DAILY    cholecalciferol, vitamin D3, 1,000 unit capsule Take 1 capsule (1,000 Units total) by mouth once daily.    cloNIDine (CATAPRES) 0.1 MG tablet TAKE TWO TABLETS BY MOUTH ONCE DAILY IN THE EVENING    clopidogrel (PLAVIX) 75 mg tablet Take 1 tablet (75 mg total) by mouth once daily.    escitalopram oxalate (LEXAPRO) 10 MG tablet  "Take 1 tablet (10 mg total) by mouth once daily.    memantine (NAMENDA) 10 MG Tab Take 1 tablet (10 mg total) by mouth once daily.    oxybutynin (DITROPAN) 5 MG Tab Take 2 tablets (10 mg total) by mouth 2 (two) times daily.    potassium chloride SA (K-DUR,KLOR-CON) 20 MEQ tablet Take 1 tablet (20 mEq total) by mouth 2 (two) times daily.    tramadol (ULTRAM) 50 mg tablet Take 1/2 tablet as needed for headache     No current facility-administered medications for this visit.        Review of Systems   Constitutional: Negative for activity change, fatigue, fever and unexpected weight change.   Eyes: Negative for redness and visual disturbance.   Respiratory: Negative for chest tightness and shortness of breath.    Cardiovascular: Negative for chest pain and leg swelling.   Gastrointestinal: Negative for abdominal distention, abdominal pain, constipation, diarrhea, nausea and vomiting.   Genitourinary: Positive for frequency and urgency. Negative for difficulty urinating, dysuria, flank pain, hematuria, pelvic pain and vaginal bleeding.   Musculoskeletal: Negative for arthralgias and joint swelling.   Neurological: Negative for dizziness, weakness and headaches.   Psychiatric/Behavioral: Negative for confusion. The patient is not nervous/anxious.    All other systems reviewed and are negative.      Objective:      Vitals:    09/26/17 1434   Weight: 70.9 kg (156 lb 4.9 oz)   Height: 5' 5" (1.651 m)     Physical Exam   Nursing note and vitals reviewed.  Constitutional: She is oriented to person, place, and time. She appears well-developed.   HENT:   Head: Normocephalic.   Eyes: Conjunctivae are normal.   Neck: Normal range of motion. No tracheal deviation present. No thyromegaly present.   Cardiovascular: Normal rate, normal heart sounds and normal pulses.    Pulmonary/Chest: Effort normal and breath sounds normal. No respiratory distress. She has no wheezes.   Abdominal: Soft. She exhibits no distension and no mass. " There is no hepatosplenomegaly. There is no tenderness. There is no rebound, no guarding and no CVA tenderness. No hernia.   Musculoskeletal: Normal range of motion. She exhibits no edema or tenderness.   Lymphadenopathy:     She has no cervical adenopathy.   Neurological: She is alert and oriented to person, place, and time.   Skin: Skin is warm and dry. No rash noted. No erythema.     Psychiatric: She has a normal mood and affect. Her behavior is normal. Judgment and thought content normal.       Urine dipstick shows negative for all components.  Micro exam: negative for WBC's or RBC's.    Assessment:       1. Overactive bladder    2. Nocturia more than twice per night    3. Incomplete emptying of bladder          Plan:       1. Overactive bladder  She may need urodynamics  - oxybutynin (DITROPAN) 5 MG Tab; Take 2 tablets (10 mg total) by mouth 2 (two) times daily.  Dispense: 360 tablet; Refill: 3  - US Retroperitoneal Complete (Kidney and; Future    2. Nocturia more than twice per night    - US Retroperitoneal Complete (Kidney and; Future    3. Incomplete emptying of bladder  Check BARBARA            Return in about 6 weeks (around 11/7/2017) for Follow up, Review X-ray.

## 2017-10-13 RX ORDER — ATORVASTATIN CALCIUM 80 MG/1
TABLET, FILM COATED ORAL
Qty: 90 TABLET | Refills: 2 | Status: SHIPPED | OUTPATIENT
Start: 2017-10-13 | End: 2018-07-05 | Stop reason: SDUPTHER

## 2017-11-05 DIAGNOSIS — I63.9 CEREBRAL INFARCTION: ICD-10-CM

## 2017-11-06 RX ORDER — CLOPIDOGREL BISULFATE 75 MG/1
TABLET ORAL
Qty: 90 TABLET | Refills: 0 | Status: SHIPPED | OUTPATIENT
Start: 2017-11-06 | End: 2017-12-01

## 2017-11-06 NOTE — TELEPHONE ENCOUNTER
This is a Dr. Jefferson patient, I okayed her prescription one time.  She needs to establish with a new doctor, please contact her to let him know.  Thank you

## 2017-11-07 ENCOUNTER — HOSPITAL ENCOUNTER (OUTPATIENT)
Dept: RADIOLOGY | Facility: HOSPITAL | Age: 72
Discharge: HOME | End: 2017-11-07
Attending: UROLOGY
Payer: MEDICARE

## 2017-11-07 DIAGNOSIS — N32.81 OVERACTIVE BLADDER: ICD-10-CM

## 2017-11-07 DIAGNOSIS — R35.1 NOCTURIA MORE THAN TWICE PER NIGHT: ICD-10-CM

## 2017-11-07 PROCEDURE — 76770 US EXAM ABDO BACK WALL COMP: CPT | Mod: 26,,, | Performed by: RADIOLOGY

## 2017-11-07 PROCEDURE — 76770 US EXAM ABDO BACK WALL COMP: CPT | Mod: TC

## 2017-11-09 ENCOUNTER — HOSPITAL ENCOUNTER (EMERGENCY)
Facility: OTHER | Age: 72
Discharge: HOME | End: 2017-11-09
Attending: EMERGENCY MEDICINE
Payer: MEDICARE

## 2017-11-09 VITALS
HEIGHT: 65 IN | SYSTOLIC BLOOD PRESSURE: 151 MMHG | WEIGHT: 150 LBS | RESPIRATION RATE: 16 BRPM | OXYGEN SATURATION: 98 % | BODY MASS INDEX: 24.99 KG/M2 | DIASTOLIC BLOOD PRESSURE: 61 MMHG | TEMPERATURE: 98 F | HEART RATE: 69 BPM

## 2017-11-09 DIAGNOSIS — K59.00 CONSTIPATION: ICD-10-CM

## 2017-11-09 LAB
BILIRUBIN, POC UA: NEGATIVE
BLOOD, POC UA: NEGATIVE
CLARITY, POC UA: ABNORMAL
COLOR, POC UA: ABNORMAL
GLUCOSE, POC UA: NEGATIVE
KETONES, POC UA: NEGATIVE
LEUKOCYTE EST, POC UA: NEGATIVE
NITRITE, POC UA: NEGATIVE
PH UR STRIP: 5.5 [PH]
PROTEIN, POC UA: NEGATIVE
SPECIFIC GRAVITY, POC UA: 1.01
UROBILINOGEN, POC UA: 0.2 E.U./DL

## 2017-11-09 PROCEDURE — 81003 URINALYSIS AUTO W/O SCOPE: CPT

## 2017-11-09 PROCEDURE — 96372 THER/PROPH/DIAG INJ SC/IM: CPT

## 2017-11-09 PROCEDURE — 99283 EMERGENCY DEPT VISIT LOW MDM: CPT | Mod: 25

## 2017-11-09 PROCEDURE — 63600175 PHARM REV CODE 636 W HCPCS: Performed by: EMERGENCY MEDICINE

## 2017-11-09 RX ORDER — LACTULOSE 10 G/15ML
30 SOLUTION ORAL DAILY
Qty: 1350 ML | Refills: 0 | Status: SHIPPED | OUTPATIENT
Start: 2017-11-09 | End: 2017-11-19

## 2017-11-09 RX ORDER — DICYCLOMINE HYDROCHLORIDE 10 MG/ML
20 INJECTION INTRAMUSCULAR
Status: COMPLETED | OUTPATIENT
Start: 2017-11-09 | End: 2017-11-09

## 2017-11-09 RX ADMIN — DICYCLOMINE HYDROCHLORIDE 20 MG: 20 INJECTION, SOLUTION INTRAMUSCULAR at 07:11

## 2017-11-09 NOTE — ED PROVIDER NOTES
"Encounter Date: 11/9/2017       History     Chief Complaint   Patient presents with    Abdominal Pain     Family states, " She is having abdominal pain since yesterday."     Chief complaint: Abdominal pain  Patient was brought in by her family member who says that the patient has been complaining of abdominal pain and thinks she is constipated.  This been ongoing since yesterday.  Patient has had a stroke and is unable to answer questions.  She has not been vomiting.      The history is provided by a relative. The history is limited by the condition of the patient (Stroke with dysarthria).     Review of patient's allergies indicates:   Allergen Reactions    Lovenox [enoxaparin] Other (See Comments)     Slurred speech per patient    Lisinopril Other (See Comments)     Cough    Penicillins      Past Medical History:   Diagnosis Date    Allergy     Asthma     DDD (degenerative disc disease), lumbar     Chronic LBP and intermittent RLE radicular pain x 10 yrs    Hypertension     Overactive bladder     Senile cataract, unspecified - Both Eyes 6/11/2013    Stroke      Past Surgical History:   Procedure Laterality Date    BREAST BIOPSY      CATARACT EXTRACTION W/  INTRAOCULAR LENS IMPLANT Right 10/13/2014    Dr Crystal    CATARACT EXTRACTION W/  INTRAOCULAR LENS IMPLANT Left 11/10/2014    Dr Crystal    HEMORRHOID SURGERY      HYSTERECTOMY      INCONTINENCE SURGERY      Right Rotator Cuff Repair       Family History   Problem Relation Age of Onset    Hypertension Mother     Breast cancer Mother     Dementia Mother     Colon cancer Father     Hypertension Father     Rhinitis Daughter     Rhinitis Daughter     No Known Problems Sister     No Known Problems Brother     No Known Problems Maternal Aunt     No Known Problems Maternal Uncle     No Known Problems Paternal Aunt     No Known Problems Paternal Uncle     No Known Problems Maternal Grandmother     No Known Problems Maternal Grandfather     No " Known Problems Paternal Grandmother     No Known Problems Paternal Grandfather     Amblyopia Neg Hx     Blindness Neg Hx     Cancer Neg Hx     Cataracts Neg Hx     Diabetes Neg Hx     Glaucoma Neg Hx     Macular degeneration Neg Hx     Retinal detachment Neg Hx     Strabismus Neg Hx     Stroke Neg Hx     Thyroid disease Neg Hx      Social History   Substance Use Topics    Smoking status: Former Smoker     Quit date: 1/1/1992    Smokeless tobacco: Never Used    Alcohol use No     Review of Systems   Reason unable to perform ROS: Severe dysarthria secondary to a stroke.       Physical Exam     Initial Vitals [11/09/17 1650]   BP Pulse Resp Temp SpO2   (!) 157/72 78 16 98.8 °F (37.1 °C) 99 %      MAP       100.33         Physical Exam    Nursing note and vitals reviewed.  Constitutional: She appears well-developed and well-nourished.   HENT:   Head: Normocephalic and atraumatic.   Eyes: Conjunctivae and EOM are normal. Pupils are equal, round, and reactive to light.   Neck: Normal range of motion. Neck supple.   Cardiovascular: Normal rate and regular rhythm.   Pulmonary/Chest: Breath sounds normal.   Abdominal: Soft. Bowel sounds are normal. There is no tenderness. There is no rebound and no guarding.   Musculoskeletal: Normal range of motion.   Neurological: She is alert.   Right-sided weakness   Skin: Skin is warm and dry.   Psychiatric: She has a normal mood and affect.         ED Course   Procedures  Labs Reviewed   POCT URINALYSIS W/O SCOPE             Medical Decision Making:   Initial Assessment:   72-year-old who complains of abdominal pain.  Patient's family member says that she thinks that she is constipated.  Patient's abdomen is soft and nontender and she is in no distress  ED Management:  Urinalysis and abdominal x-ray will be done.  Urinalysis is negative for infection.  Patient does have a moderate amount of gas and stool without evidence of obstruction.  She was treated with Bentyl IM in  the ED and will be discharged on lactulose.  She appears well                   ED Course      Clinical Impression:   The encounter diagnosis was Constipation.                           Doreen Rodgers MD  11/09/17 1913

## 2017-11-10 NOTE — ED NOTES
In and out cath completed per FRANCY Rodgers. Sterile technique observed. Aprox 100cc clear yellow urine returned and u/a collected

## 2017-11-10 NOTE — ED NOTES
Assumed care of pt at this time, report received from Kip Stephenson. All questions answered. Pt lying in position of comfort in room, denies distress. Bedside monitor on for safety, will continue to monitor

## 2017-11-22 ENCOUNTER — OFFICE VISIT (OUTPATIENT)
Dept: NEUROLOGY | Facility: CLINIC | Age: 72
End: 2017-11-22
Payer: MEDICARE

## 2017-11-22 VITALS
BODY MASS INDEX: 27.29 KG/M2 | HEIGHT: 65 IN | WEIGHT: 163.81 LBS | HEART RATE: 72 BPM | DIASTOLIC BLOOD PRESSURE: 62 MMHG | SYSTOLIC BLOOD PRESSURE: 133 MMHG

## 2017-11-22 DIAGNOSIS — R73.03 PRE-DIABETES: ICD-10-CM

## 2017-11-22 DIAGNOSIS — I69.351 HEMIPARESIS AFFECTING RIGHT SIDE AS LATE EFFECT OF CEREBROVASCULAR ACCIDENT: ICD-10-CM

## 2017-11-22 DIAGNOSIS — G31.84 MCI (MILD COGNITIVE IMPAIRMENT): ICD-10-CM

## 2017-11-22 DIAGNOSIS — I69.920 APHASIA, LATE EFFECT OF CEREBROVASCULAR DISEASE: Primary | ICD-10-CM

## 2017-11-22 DIAGNOSIS — I10 ESSENTIAL HYPERTENSION: ICD-10-CM

## 2017-11-22 DIAGNOSIS — I67.89 CEREBRAL MICROVASCULAR DISEASE: ICD-10-CM

## 2017-11-22 PROCEDURE — 99213 OFFICE O/P EST LOW 20 MIN: CPT | Mod: PBBFAC | Performed by: PSYCHIATRY & NEUROLOGY

## 2017-11-22 PROCEDURE — 99999 PR PBB SHADOW E&M-EST. PATIENT-LVL III: CPT | Mod: PBBFAC,,, | Performed by: PSYCHIATRY & NEUROLOGY

## 2017-11-22 PROCEDURE — 99214 OFFICE O/P EST MOD 30 MIN: CPT | Mod: S$GLB,,, | Performed by: PSYCHIATRY & NEUROLOGY

## 2017-11-22 RX ORDER — LACTULOSE 10 G/15ML
SOLUTION ORAL; RECTAL
Status: ON HOLD | COMMUNITY
Start: 2017-11-09 | End: 2020-01-04 | Stop reason: HOSPADM

## 2017-11-22 NOTE — PATIENT INSTRUCTIONS
Discussed with daughter and patient.   Her memory difficulties are most likely a combination of residual effect from the stroke , primarily related to her aphasia and are most likely vascular in origin.  Continue 10 mg daily in the morning.   Control of vascular risk factors.  Advised reading aloud and writing exercises that would overall her language problems.  She can do this with her grandkids.

## 2017-11-22 NOTE — PROGRESS NOTES
Subjective:       Patient ID: Emilia Melgoza is a 72 y.o. female.    Chief Complaint:  Memory Loss      History of Present Illness  HPI  This is a 72-year-old female who was referred for evaluation of memory difficulties.  She was accompanied by her daughter.  It is of note that she has been seen for her memory difficulties by Anastasiya Oswald NP, at the stroke clinic.  The patient has had a history of a stroke in September 2013 at which time she was noted to have right-sided weakness and dysarthria.  A couple of weeks after that admission she had recurrence of the same symptoms and was readmitted to the hospital.  She was discharged home with no change in medications and with improvement to her prior baseline.  She has been otherwise medically stable.      Since then there has been no further strokelike symptoms however she has had gradual development of cognitive issues.  When seen by the neurology clinic in October 2016, her family was managing her medications, finances and meal preparation.  The patient does live with her  and a niece.  In addition, another daughter lives next door.  The patient does not drive and has significant residual speech difficulty related to an expressive aphasia.  She has minimal residual right upper extremity clumsiness but is able to ambulate without assistance.  She is able to take care of her day-to-day needs including personal hygiene on her own.  She is presently on Namenda 10 mg daily in the morning.  Overall she has been stable with no progression.       Review of Systems  Review of Systems   HENT: Negative for hearing loss.    Eyes: Negative.  Negative for visual disturbance.   Respiratory: Negative.  Negative for shortness of breath.    Cardiovascular: Negative.  Negative for chest pain and palpitations.   Gastrointestinal: Negative.    Genitourinary: Negative.    Musculoskeletal: Negative.  Negative for back pain, gait problem and neck pain.   Skin: Negative.     Neurological: Positive for speech difficulty, weakness and headaches. Negative for tremors, seizures, syncope and numbness.   Psychiatric/Behavioral: Positive for decreased concentration. Negative for confusion.       Objective:      Neurologic Exam     Mental Status   Oriented to person.   Oriented to place.   Disoriented to time. Disoriented to year and date. Oriented to month, day and season.   Follows 3 step commands.   Attention: normal. Concentration: normal.   Speech: (The patient has significant expressive aphasia but has no comprehension difficulties and is able to follow three-step commands without difficulty.  Memory examination is somewhat limited because of her aphasia.)  Level of consciousness: alert  Knowledge: good.   Able to name object. Able to read. Able to repeat. Normal comprehension.     Cranial Nerves     CN II   Visual fields full to confrontation.     CN III, IV, VI   Pupils are equal, round, and reactive to light.  Extraocular motions are normal.     CN V   Facial sensation intact.     CN VII   Right facial weakness: central (Flattening of the right nasolabial fold.)  Left facial weakness: none  Right taste: normal  Left taste: normal    CN VIII   CN VIII normal.     CN IX, X   CN IX normal.   CN X normal.     CN XI   CN XI normal.     CN XII   CN XII normal.     Motor Exam   Muscle bulk: normal  Overall muscle tone: normal    Strength   Strength 5/5 except as noted. Minimal weakness of the right  with minimal right pronator drift.  Recall was normal in the lower extremities though there is clumsiness of fine motor movements of the right lower extremity distally.     Sensory Exam   Light touch normal.   Proprioception normal.   Pinprick normal.     Gait, Coordination, and Reflexes     Gait  Gait: normal    Coordination   Romberg: negative  Finger to nose coordination: normal    Tremor   Resting tremor: absent  Intention tremor: absent  Action tremor: absent    Reflexes   Right  brachioradialis: 2+  Left brachioradialis: 2+  Right biceps: 2+  Left biceps: 2+  Right triceps: 2+  Left triceps: 2+  Right patellar: 2+  Left patellar: 2+  Right achilles: 2+  Left achilles: 2+  Right plantar: normal  Left plantar: normalDTRs slightly brisker on the right as compared to the left       Physical Exam   Constitutional: She appears well-developed and well-nourished.   HENT:   Head: Normocephalic and atraumatic.   Eyes: EOM are normal. Pupils are equal, round, and reactive to light.   Neck: Normal range of motion. Neck supple. Carotid bruit is not present.   Neurological: She has a normal Finger-Nose-Finger Test and a normal Romberg Test. Gait normal.   Reflex Scores:       Tricep reflexes are 2+ on the right side and 2+ on the left side.       Bicep reflexes are 2+ on the right side and 2+ on the left side.       Brachioradialis reflexes are 2+ on the right side and 2+ on the left side.       Patellar reflexes are 2+ on the right side and 2+ on the left side.       Achilles reflexes are 2+ on the right side and 2+ on the left side.  Vitals reviewed.        Assessment:        1. Aphasia, late effect of cerebrovascular disease    2. Hemiparesis affecting right side as late effect of cerebrovascular accident    3. MCI (mild cognitive impairment)    4. Essential hypertension    5. Pre-diabetes    6. Cerebral microvascular disease            Plan:          Discussed with daughter and patient.   Her memory difficulties are most likely a combination of residual effect from the stroke , primarily related to her aphasia and are most likely vascular in origin.  Continue 10 mg daily in the morning.   Control of vascular risk factors.  Advised reading aloud and writing exercises that would overall her language problems.  She can do this with her grandkids.  Follow-up in 6 months if stable.

## 2017-11-30 ENCOUNTER — OFFICE VISIT (OUTPATIENT)
Dept: UROLOGY | Facility: CLINIC | Age: 72
End: 2017-11-30
Payer: COMMERCIAL

## 2017-11-30 VITALS
HEART RATE: 62 BPM | SYSTOLIC BLOOD PRESSURE: 122 MMHG | HEIGHT: 65 IN | WEIGHT: 163 LBS | DIASTOLIC BLOOD PRESSURE: 72 MMHG | BODY MASS INDEX: 27.16 KG/M2

## 2017-11-30 DIAGNOSIS — R39.15 URINARY URGENCY: ICD-10-CM

## 2017-11-30 DIAGNOSIS — R35.1 NOCTURIA MORE THAN TWICE PER NIGHT: ICD-10-CM

## 2017-11-30 DIAGNOSIS — R35.0 URINARY FREQUENCY: Primary | ICD-10-CM

## 2017-11-30 LAB
BILIRUB SERPL-MCNC: NORMAL MG/DL
BLOOD URINE, POC: NORMAL
COLOR, POC UA: YELLOW
GLUCOSE UR QL STRIP: NORMAL
KETONES UR QL STRIP: NORMAL
LEUKOCYTE ESTERASE URINE, POC: NORMAL
NITRITE, POC UA: NORMAL
PH, POC UA: 6
PROTEIN, POC: NORMAL
SPECIFIC GRAVITY, POC UA: 1000
UROBILINOGEN, POC UA: NORMAL

## 2017-11-30 PROCEDURE — 81001 URINALYSIS AUTO W/SCOPE: CPT | Mod: PBBFAC | Performed by: UROLOGY

## 2017-11-30 PROCEDURE — 99213 OFFICE O/P EST LOW 20 MIN: CPT | Mod: S$GLB,,, | Performed by: UROLOGY

## 2017-11-30 PROCEDURE — 99213 OFFICE O/P EST LOW 20 MIN: CPT | Mod: PBBFAC | Performed by: UROLOGY

## 2017-11-30 PROCEDURE — 99999 PR PBB SHADOW E&M-EST. PATIENT-LVL III: CPT | Mod: PBBFAC,,, | Performed by: UROLOGY

## 2017-11-30 NOTE — PROGRESS NOTES
Subjective:       Patient ID: Emilia Melgoza is a 72 y.o. female who was last seen in this office 9/26/2017    Chief Complaint:   Chief Complaint   Patient presents with    Follow-up     6 week f/u with ultrasound        Overactive Bladder  She is currently taking Oxybutynin 10 mg BID.  She has had episodes of urinary retention in the past.  Her frequency was well controlled with Oxybutynin 5 mg BID but then she started leaking again so they increased it to 10 mg BID.  She seems to be better but she runs out of the medication.  She denies hematuria, dysuria, or fever.  She had a stroke in 2014.    She is back with an ultrasound to ensure she is emptying okay with 10 mg BID.    ACTIVE MEDICAL ISSUES:  Patient Active Problem List   Diagnosis    DDD (degenerative disc disease), lumbar    Overactive bladder    Vitamin D deficiency disease    Hyperlipidemia    Cerebral microvascular disease    Aphasia, late effect of cerebrovascular disease    Pre-diabetes    Essential hypertension    Meningioma    Constipation    Gait instability    Hemiparesis affecting right side as late effect of cerebrovascular accident    Chronic hypokalemia    Anxiety    MCI (mild cognitive impairment)       ALLERGIES AND MEDICATIONS: updated and reviewed.  Review of patient's allergies indicates:   Allergen Reactions    Lovenox [enoxaparin] Other (See Comments)     Slurred speech per patient    Lisinopril Other (See Comments)     Cough    Penicillins      Current Outpatient Prescriptions   Medication Sig    amlodipine (NORVASC) 5 MG tablet TAKE ONE TABLET BY MOUTH TWICE DAILY    atorvastatin (LIPITOR) 80 MG tablet TAKE ONE TABLET BY MOUTH ONCE DAILY    cholecalciferol, vitamin D3, 1,000 unit capsule Take 1 capsule (1,000 Units total) by mouth once daily.    cloNIDine (CATAPRES) 0.1 MG tablet TAKE TWO TABLETS BY MOUTH ONCE DAILY IN THE EVENING    clopidogrel (PLAVIX) 75 mg tablet Take 1 tablet (75 mg total) by mouth  "once daily.    clopidogrel (PLAVIX) 75 mg tablet TAKE ONE TABLET BY MOUTH ONCE DAILY    escitalopram oxalate (LEXAPRO) 10 MG tablet Take 1 tablet (10 mg total) by mouth once daily.    GENERLAC 10 gram/15 mL solution     oxybutynin (DITROPAN) 5 MG Tab Take 2 tablets (10 mg total) by mouth 2 (two) times daily.    potassium chloride SA (K-DUR,KLOR-CON) 20 MEQ tablet Take 1 tablet (20 mEq total) by mouth 2 (two) times daily.    tramadol (ULTRAM) 50 mg tablet Take 1/2 tablet as needed for headache    memantine (NAMENDA) 10 MG Tab Take 1 tablet (10 mg total) by mouth once daily.     No current facility-administered medications for this visit.        Review of Systems   Constitutional: Negative for activity change, fatigue, fever and unexpected weight change.   Eyes: Negative for redness and visual disturbance.   Respiratory: Negative for chest tightness and shortness of breath.    Cardiovascular: Negative for chest pain and leg swelling.   Gastrointestinal: Negative for abdominal distention, abdominal pain, constipation, diarrhea, nausea and vomiting.   Genitourinary: Negative for difficulty urinating, dysuria, flank pain, frequency, hematuria, pelvic pain, urgency and vaginal bleeding.   Musculoskeletal: Negative for arthralgias and joint swelling.   Neurological: Negative for dizziness, weakness and headaches.   Psychiatric/Behavioral: Negative for confusion. The patient is not nervous/anxious.    All other systems reviewed and are negative.      Objective:      Vitals:    11/30/17 1158   BP: 122/72   Pulse: 62   Weight: 73.9 kg (163 lb)   Height: 5' 5" (1.651 m)     Physical Exam   Nursing note and vitals reviewed.  Constitutional: She is oriented to person, place, and time. She appears well-developed.   HENT:   Head: Normocephalic.   Eyes: Conjunctivae are normal.   Neck: Normal range of motion. No tracheal deviation present. No thyromegaly present.   Cardiovascular: Normal rate, normal heart sounds and normal " pulses.    Pulmonary/Chest: Effort normal and breath sounds normal. No respiratory distress. She has no wheezes.   Abdominal: Soft. She exhibits no distension and no mass. There is no hepatosplenomegaly. There is no tenderness. There is no rebound, no guarding and no CVA tenderness. No hernia.   Musculoskeletal: Normal range of motion. She exhibits no edema or tenderness.   Lymphadenopathy:     She has no cervical adenopathy.   Neurological: She is alert and oriented to person, place, and time.   Skin: Skin is warm and dry. No rash noted. No erythema.     Psychiatric: She has a normal mood and affect. Her behavior is normal. Judgment and thought content normal.       Urine dipstick shows negative for all components.  Micro exam: negative for WBC's or RBC's.      US Retroperitoneal Complete (Kidney and   Status: Final result   MyChart Results Release     MyChart Status: Active Results Release   PACS Images     Show images for US Retroperitoneal Complete (Kidney and   Reviewed By Cathy Becker MD on 11/7/2017 14:40   External Result Report     External Result Report   Narrative     Time of Procedure: 11/07/17 13:16:41  Accession # [id number]    Reason for study: Overactive bladder     Comparison: None.    Technique: Routine renal ultrasound was performed.    Findings: The kidneys are normal in size measuring 9.7 cm on the right and 9.2 cm on the left. There is good corticomedullary differentiation and normal cortical thickness. No solid renal masses, nephrolithiasis, or hydronephrosis.   Resistive indices are normal and as follow: 0.77 on the right and 0.75 on the left. The urinary bladder demonstrates pre-void bladder volume of 54 mL's.  Patient unable to void despite multiple attempts..   Impression         * No focal abnormality.      Electronically signed by: Dr. Stanley Ruiz MD  Date: 11/07/17  Time: 14:35           Assessment:       1. Urinary frequency    2. Nocturia more than twice per night    3.  Urinary urgency          Plan:       1. Urinary frequency    - POCT urinalysis, dipstick or tablet reag    2. Nocturia more than twice per night      3. Urinary urgency  I will get her a new PCP since her other one retired.    - Ambulatory Referral to Internal Medicine            Return in about 6 months (around 5/30/2018) for Follow up.

## 2017-12-01 ENCOUNTER — LAB VISIT (OUTPATIENT)
Dept: LAB | Facility: HOSPITAL | Age: 72
End: 2017-12-01
Attending: FAMILY MEDICINE
Payer: MEDICARE

## 2017-12-01 ENCOUNTER — OFFICE VISIT (OUTPATIENT)
Dept: FAMILY MEDICINE | Facility: CLINIC | Age: 72
End: 2017-12-01
Payer: MEDICARE

## 2017-12-01 ENCOUNTER — OFFICE VISIT (OUTPATIENT)
Dept: PODIATRY | Facility: CLINIC | Age: 72
End: 2017-12-01
Payer: MEDICARE

## 2017-12-01 VITALS
TEMPERATURE: 98 F | DIASTOLIC BLOOD PRESSURE: 68 MMHG | RESPIRATION RATE: 17 BRPM | OXYGEN SATURATION: 98 % | BODY MASS INDEX: 27.22 KG/M2 | HEIGHT: 65 IN | WEIGHT: 163.38 LBS | HEART RATE: 68 BPM | SYSTOLIC BLOOD PRESSURE: 124 MMHG

## 2017-12-01 DIAGNOSIS — Z23 FLU VACCINE NEED: ICD-10-CM

## 2017-12-01 DIAGNOSIS — L60.3 ONYCHODYSTROPHY: Primary | ICD-10-CM

## 2017-12-01 DIAGNOSIS — R73.03 PRE-DIABETES: ICD-10-CM

## 2017-12-01 DIAGNOSIS — I69.351 HEMIPARESIS AFFECTING RIGHT SIDE AS LATE EFFECT OF CEREBROVASCULAR ACCIDENT: ICD-10-CM

## 2017-12-01 DIAGNOSIS — I10 ESSENTIAL HYPERTENSION: Primary | ICD-10-CM

## 2017-12-01 DIAGNOSIS — G31.84 MCI (MILD COGNITIVE IMPAIRMENT): ICD-10-CM

## 2017-12-01 DIAGNOSIS — I69.920 APHASIA, LATE EFFECT OF CEREBROVASCULAR DISEASE: ICD-10-CM

## 2017-12-01 DIAGNOSIS — E55.9 VITAMIN D DEFICIENCY DISEASE: ICD-10-CM

## 2017-12-01 DIAGNOSIS — M51.36 DDD (DEGENERATIVE DISC DISEASE), LUMBAR: ICD-10-CM

## 2017-12-01 DIAGNOSIS — N32.81 OVERACTIVE BLADDER: ICD-10-CM

## 2017-12-01 DIAGNOSIS — E87.6 CHRONIC HYPOKALEMIA: ICD-10-CM

## 2017-12-01 LAB
ESTIMATED AVG GLUCOSE: 117 MG/DL
HBA1C MFR BLD HPLC: 5.7 %

## 2017-12-01 PROCEDURE — 99213 OFFICE O/P EST LOW 20 MIN: CPT | Mod: PBBFAC,PN,25 | Performed by: FAMILY MEDICINE

## 2017-12-01 PROCEDURE — G0008 ADMIN INFLUENZA VIRUS VAC: HCPCS | Mod: PBBFAC,PN

## 2017-12-01 PROCEDURE — 36415 COLL VENOUS BLD VENIPUNCTURE: CPT | Mod: PN

## 2017-12-01 PROCEDURE — 99215 OFFICE O/P EST HI 40 MIN: CPT | Mod: 25,S$GLB,, | Performed by: FAMILY MEDICINE

## 2017-12-01 PROCEDURE — 99999 PR PBB SHADOW E&M-EST. PATIENT-LVL III: CPT | Mod: PBBFAC,,, | Performed by: FAMILY MEDICINE

## 2017-12-01 PROCEDURE — 99499 UNLISTED E&M SERVICE: CPT | Mod: CSM,,, | Performed by: PODIATRIST

## 2017-12-01 PROCEDURE — 83036 HEMOGLOBIN GLYCOSYLATED A1C: CPT

## 2017-12-01 PROCEDURE — 17999 UNLISTD PX SKN MUC MEMB SUBQ: CPT | Mod: CSM,,, | Performed by: PODIATRIST

## 2017-12-01 NOTE — PATIENT INSTRUCTIONS
Understanding Carbohydrates    A car needs the right type of fuel to run. And you need the right kind of food to function. To keep your energy level up, your body needs food that has carbohydrates. But carbohydrates raise blood sugar levels higher and faster than other kinds of food. Your dietitian will work with you to figure out the amount of carbohydrates you need.  Starches  Starches are found in grains, some vegetables, and beans. Grain products include bread, pasta, cereal, and tortillas. Starchy vegetables include potatoes, peas, corn, lima beans, yams, and squash. Kidney beans, reina beans, black beans, garbanzo beans, and lentils also contain starches.  Sugars  Sugars are found naturally in many foods. Or sugar can be added. Foods that contain natural sugar include fruits and fruit juices, dairy products, honey, and molasses. Added sugars are found in most desserts, processed foods, candy, regular soda, and fruit drinks. These are very helpful for treating low blood sugar, or hypoglycemia. They provide sugar quickly. Try to keep at least 15 to 20 grams of these simple sugars with you at all times. Eat this if you begin to have low blood sugar symptoms.  Fiber  Fiber comes from plant foods. Most fiber isnt digested by the body. Instead of raising blood sugar levels like other carbohydrates, it actually stops blood sugar from rising too fast. Fiber is found in fruits, vegetables, whole grains, beans, peas, and many nuts.  Carb counting  Keep track of the amount of carbohydrates you eat. This can help you keep the right balance of physical activity and medicine. The amount of carbohydrates needed will vary for each person. It depends on many things such as your health, the medicines you take, and how active you are. Your healthcare team will help you figure out the right amount of carbohydrates for you. You may start with around 45 to 60 grams of carbohydrate per meal, depending on your situation. Carb  counting is a system that helps you keep track of the carbohydrates you eat at each meal.  Carbohydrates come from a variety of foods. These include grains, starchy vegetables, fruit, milk, beans, and snack foods. You can either count carbohydrate grams or carbohydrate servings. When you count carbohydrate servings, 1 carbohydrate serving = 15 grams of carbohydrates.  Here are some examples of foods containing about 15 grams of carbohydrates (1 serving of carbohydrates):  · 1/2 cup of canned or frozen fruit  · A small piece of fresh fruit (4 ounces)  · 1 slice of bread  · 1/2 cup of oatmeal  · 1/3 cup of rice  · 4 to 6 crackers  · 1/2 English muffin  · 1/2 cup of black beans  · 1/4 of a large baked potato (3 ounces)  · 2/3 cup of plain fat-free yogurt  · 1 cup of soup  · 1/2 cup of casserole  · 6 chicken nuggets  · 2-inch-square brownie or cake without frosting  · 2 small cookies  · 1/2 cup of ice cream or sherbet  Carb counting is easier when food labels are available. Look at the label to see how many grams of total carbohydrates the food contains. Then you can figure out how much you should eat.  Two very important lines to look at on the label are the serving size and the total carbohydrate amount. Here are some tips for using food labels to count your carbohydrate intake:  · Check the serving size. The information on the label is based on that serving size. If you eat more than the listed serving size, you may have to double or triple the other information on the label.   · Check the total grams of carbohydrates. Total carbohydrate from the label includes sugar, starch, and fiber. Be sure to use the total carbohydrate number and not sugar alone.  · Know how many grams of carbohydrates you can have.  Be familiar with the matching portion sizes.  · Compare labels. Compare the labels of different products, looking at serving sizes and total carbohydrates to find the products that work best for you.   · Don't  forget protein and fat. With all the focus on carb counting, it might be easy to forget protein and fat in your meals. Don't forget to include sources of protein and healthy fat to balance your meals.  Its also important to be consistent with the amount and time you eat when taking a fixed dose of diabetes medicine. Work with your healthcare provider or dietitian if you need additional help. He or she can help you keep track of your carbohydrate intake. He or she can also help you figure out how many grams of carbohydrates you should have.  Date Last Reviewed: 7/1/2016  © 9593-8615 [employer] [address] [place] [address], [place] [address]. All rights reserved. This information is not intended as a substitute for professional medical care. Always follow your healthcare professional's instructions.

## 2017-12-01 NOTE — PROGRESS NOTES
Patient presents to the clinic for non-covered routine foot care. Patient is not a high risk foot care patient. Patient understands this is not typically a covered service and patient is aware of responsibility of payment. Pedal pulses are palpable. No know risk factors requiring routine foot care. Nails are elongated and thickened on feet. Diagnosis is onychodystrophy. Nails were reduced and trimmed bilaterally. Patient tolerated well.     RTC 2-3 months for Proc B , sooner PRN

## 2017-12-01 NOTE — PROGRESS NOTES
Chief Complaint   Patient presents with    Establish Care       HPI    Emilia Melgoza is 72 y.o. female. The primary encounter diagnosis was Essential hypertension. Diagnoses of Overactive bladder, Chronic hypokalemia, Vitamin D deficiency disease, Pre-diabetes, DDD (degenerative disc disease), lumbar, Aphasia, late effect of cerebrovascular disease, Hemiparesis affecting right side as late effect of cerebrovascular accident, MCI (mild cognitive impairment), and Flu vaccine need were also pertinent to this visit.    72 year old female with multiple medical diagnosis as documented above.  Patient previously seeing Dr. Tessa Jefferson. Patient's daughter accompanies her and gives information regarding medications and medical issues.    Patient has history of CVA with hemiparesis and aphasia.  She has developed some cognitive impairment as a result.  Patient's daughter are her caregivers.  They deny behavioral issues related to this.  She has mild right sided weakness, but no disruption to most of her daily activities.    Patient and daughter were previously unaware of Pre-Diabetes diagnosis.  She does admit to eating sweets frequently. They acknowledge that this can be improved.      Otherwise she is compliant with all medications for control of her HTN, HLD, Hypokalemia, and OAB.  She does admit to chronic low back pain due to DDD that has caused some constipation in the past.  She was recently prescribed Lactulose that has resolved this issue.    Review of Systems   Constitutional: Negative for activity change.   HENT: Negative for congestion.    Respiratory: Negative for shortness of breath.    Cardiovascular: Negative for chest pain.   Gastrointestinal: Negative for abdominal pain.   Genitourinary: Negative for dysuria.   Musculoskeletal: Positive for back pain and gait problem.   Skin: Negative for rash.   Neurological: Positive for speech difficulty and weakness. Negative for dizziness.    Psychiatric/Behavioral: Negative for agitation, dysphoric mood and suicidal ideas.   All other systems reviewed and are negative.      Past Medical History:   Diagnosis Date    Allergy     Asthma     DDD (degenerative disc disease), lumbar     Chronic LBP and intermittent RLE radicular pain x 10 yrs    Hypertension     Overactive bladder     Senile cataract, unspecified - Both Eyes 6/11/2013    Stroke      Past Surgical History:   Procedure Laterality Date    BREAST BIOPSY      CATARACT EXTRACTION W/  INTRAOCULAR LENS IMPLANT Right 10/13/2014    Dr Crystal    CATARACT EXTRACTION W/  INTRAOCULAR LENS IMPLANT Left 11/10/2014    Dr Crystal    HEMORRHOID SURGERY      HYSTERECTOMY      INCONTINENCE SURGERY      Right Rotator Cuff Repair       Family History   Problem Relation Age of Onset    Hypertension Mother     Breast cancer Mother     Dementia Mother     Colon cancer Father     Hypertension Father     Rhinitis Daughter     Rhinitis Daughter     No Known Problems Sister     No Known Problems Brother     No Known Problems Maternal Aunt     No Known Problems Maternal Uncle     No Known Problems Paternal Aunt     No Known Problems Paternal Uncle     No Known Problems Maternal Grandmother     No Known Problems Maternal Grandfather     No Known Problems Paternal Grandmother     No Known Problems Paternal Grandfather     Amblyopia Neg Hx     Blindness Neg Hx     Cancer Neg Hx     Cataracts Neg Hx     Diabetes Neg Hx     Glaucoma Neg Hx     Macular degeneration Neg Hx     Retinal detachment Neg Hx     Strabismus Neg Hx     Stroke Neg Hx     Thyroid disease Neg Hx       reports that she quit smoking about 25 years ago. She has never used smokeless tobacco. She reports that she does not drink alcohol or use drugs.  Review of patient's allergies indicates:   Allergen Reactions    Lovenox [enoxaparin] Other (See Comments)     Slurred speech per patient    Lisinopril Other (See Comments)  "    Cough    Penicillins          Current Outpatient Prescriptions:     amlodipine (NORVASC) 5 MG tablet, TAKE ONE TABLET BY MOUTH TWICE DAILY, Disp: 180 tablet, Rfl: 3    atorvastatin (LIPITOR) 80 MG tablet, TAKE ONE TABLET BY MOUTH ONCE DAILY, Disp: 90 tablet, Rfl: 2    cholecalciferol, vitamin D3, 1,000 unit capsule, Take 1 capsule (1,000 Units total) by mouth once daily., Disp: 90 capsule, Rfl: 3    cloNIDine (CATAPRES) 0.1 MG tablet, TAKE TWO TABLETS BY MOUTH ONCE DAILY IN THE EVENING, Disp: 180 tablet, Rfl: 0    escitalopram oxalate (LEXAPRO) 10 MG tablet, Take 1 tablet (10 mg total) by mouth once daily., Disp: 90 tablet, Rfl: 0    GENERLAC 10 gram/15 mL solution, , Disp: , Rfl:     memantine (NAMENDA) 10 MG Tab, Take 1 tablet (10 mg total) by mouth once daily., Disp: 90 tablet, Rfl: 1    oxybutynin (DITROPAN) 5 MG Tab, Take 2 tablets (10 mg total) by mouth 2 (two) times daily., Disp: 360 tablet, Rfl: 3    potassium chloride SA (K-DUR,KLOR-CON) 20 MEQ tablet, Take 1 tablet (20 mEq total) by mouth 2 (two) times daily., Disp: 90 tablet, Rfl: 3    tramadol (ULTRAM) 50 mg tablet, Take 1/2 tablet as needed for headache, Disp: 30 tablet, Rfl: 0        Blood pressure 124/68, pulse 68, temperature 97.6 °F (36.4 °C), temperature source Oral, resp. rate 17, height 5' 5" (1.651 m), weight 74.1 kg (163 lb 5.8 oz), SpO2 98 %.    Physical Exam   Constitutional: She is oriented to person, place, and time. Vital signs are normal. She appears well-developed.   HENT:   Right Ear: Hearing normal.   Left Ear: Hearing normal.   Mouth/Throat: Normal dentition.   Cardiovascular: Normal heart sounds and intact distal pulses.    Pulmonary/Chest: Effort normal and breath sounds normal.   Abdominal: Soft. Bowel sounds are normal. There is no tenderness.   Musculoskeletal:   Antalgic gait. No decreased ROM at all 4 major joints. Right upper extremity held adducted and flexed at elbow. No contracute.   Neurological: She is " oriented to person, place, and time. She has normal strength. No sensory deficit.   Skin: Skin is intact. No rash noted.   Psychiatric: She has a normal mood and affect. Cognition and memory are impaired. She is noncommunicative.       Office Visit on 11/30/2017   Component Date Value Ref Range Status    Color, UA 11/30/2017 yellow   Final    Spec Grav UA 11/30/2017 1000   Final    pH, UA 11/30/2017 6   Final    WBC, UA 11/30/2017 neg   Final    Nitrite, UA 11/30/2017 neg   Final    Protein 11/30/2017 neg   Final    Glucose, UA 11/30/2017 neg   Final    Ketones, UA 11/30/2017 neg   Final    Urobilinogen, UA 11/30/2017 neg   Final    Bilirubin 11/30/2017 neg   Final    Blood, UA 11/30/2017 neg   Final   Admission on 11/09/2017, Discharged on 11/09/2017   Component Date Value Ref Range Status    Glucose, UA 11/09/2017 Negative*  Final    Bilirubin, UA 11/09/2017 Negative*  Final    Ketones, UA 11/09/2017 Negative*  Final    Spec Grav UA 11/09/2017 1.010   Final    Blood, UA 11/09/2017 Negative*  Final    PH, UA 11/09/2017 5.5   Final    Protein, UA 11/09/2017 Negative*  Final    Urobilinogen, UA 11/09/2017 0.2  E.U./dL Final    Nitrite, UA 11/09/2017 Negative*  Final    Leukocytes, UA 11/09/2017 Negative*  Final    Color, UA 11/09/2017 Light yellow   Final    Clarity, UA 11/09/2017 Slightly Cloudy   Final   ]    ASSESSMENT:    1. Essential hypertension    2. Overactive bladder    3. Chronic hypokalemia    4. Vitamin D deficiency disease    5. Pre-diabetes    6. DDD (degenerative disc disease), lumbar    7. Aphasia, late effect of cerebrovascular disease    8. Hemiparesis affecting right side as late effect of cerebrovascular accident    9. MCI (mild cognitive impairment)    10. Flu vaccine need        Emilia was seen today for establish care.    Diagnoses and all orders for this visit:    Essential hypertension   -Stable. Continue current medication regimen.  No refills needed at this  time.    Overactive bladder   -Stable.  Followed by Urology.  Continue Oxybutynin.      Chronic hypokalemia   -Stable.  No refills needed on oral potassium. Maintain regular diet, minimizing amount of carbohydrate servings.    Vitamin D deficiency disease   -Stable.  Continue maintenance vitamin D.  Previous vitamin D level within normal limits.    Pre-diabetes  -     Hemoglobin A1c; Future  - Stable. Previous A1c 6%.  Patient and daughter counseled on dietary changes and different types of carbohydrates.  - Obtain repeat A1c today and q 6 months.    DDD (degenerative disc disease), lumbar   -Stable. On Tramadol.  Patient and daughter counseled on bowel regimen to prevent constipation.   -Stool softeners daily. Miralax or Generlac as needed for constipation for 3 or more days.    Aphasia, late effect of cerebrovascular disease   -Stable.  Previously evaluated by speech therapy.  No swallowing difficulty.     Hemiparesis affecting right side as late effect of cerebrovascular accident   -Stable.  Previously evaluated by physical therapy.  No change in ability to perform daily activities.    MCI (mild cognitive impairment)   -Worsening.  No behavorial changes noted, however memory has continued to decrease.   -Patient and daughter counseled on Vascular Dementia.  Continue Lexapro and Namenda.    Flu vaccine need  -     Influenza - High Dose (65+) (PF) (IM)      A total of 40 minutes was spent with the patient during this encounter. More than 50% of the encounter was spent counseling the patient regarding treatment options, expected outcomes, and coordination of care.      FOLLOW UP: Return in about 6 months (around 6/1/2018) for Physical with Labs.

## 2017-12-01 NOTE — PROGRESS NOTES
(9:05 A) - Influenza  Vaccine was given IM in the left deltoid. Tolerated well.instructed to remain in the clinic for 15 mins after admin for obervation.

## 2018-04-03 DIAGNOSIS — I63.9 CEREBRAL INFARCTION: ICD-10-CM

## 2018-04-03 DIAGNOSIS — F41.9 ANXIETY: ICD-10-CM

## 2018-04-03 DIAGNOSIS — G31.84 MCI (MILD COGNITIVE IMPAIRMENT): ICD-10-CM

## 2018-04-03 DIAGNOSIS — I10 ESSENTIAL HYPERTENSION: ICD-10-CM

## 2018-04-04 ENCOUNTER — TELEPHONE (OUTPATIENT)
Dept: ALLERGY | Facility: CLINIC | Age: 73
End: 2018-04-04

## 2018-04-04 RX ORDER — MEMANTINE HYDROCHLORIDE 10 MG/1
TABLET ORAL
Qty: 90 TABLET | Refills: 1 | Status: SHIPPED | OUTPATIENT
Start: 2018-04-04 | End: 2018-07-05 | Stop reason: SDUPTHER

## 2018-04-04 RX ORDER — ESCITALOPRAM OXALATE 10 MG/1
TABLET ORAL
Qty: 90 TABLET | Refills: 0 | OUTPATIENT
Start: 2018-04-04

## 2018-04-04 RX ORDER — CLONIDINE HYDROCHLORIDE 0.1 MG/1
TABLET ORAL
Qty: 180 TABLET | Refills: 0 | OUTPATIENT
Start: 2018-04-04

## 2018-04-04 NOTE — TELEPHONE ENCOUNTER
spoke with pt's daughter and scheduled a Same Day appt she understood that this appt would only be for medication refills and she would have to schedule a different appt for pt to [facility] with a new PCP

## 2018-04-04 NOTE — TELEPHONE ENCOUNTER
----- Message from Berna Alcazar sent at 4/4/2018 12:28 PM CDT -----  Contact: [facility] [phone]  Pharmacy is calling to clarify an RX.  RX name:  cloNIDine (CATAPRES) 0.1 MG tablet  What do they need to clarify:  Why this medication was denied.  Comments:

## 2018-04-05 ENCOUNTER — OFFICE VISIT (OUTPATIENT)
Dept: INTERNAL MEDICINE | Facility: CLINIC | Age: 73
End: 2018-04-05
Payer: MEDICARE

## 2018-04-05 ENCOUNTER — TELEPHONE (OUTPATIENT)
Dept: INTERNAL MEDICINE | Facility: CLINIC | Age: 73
End: 2018-04-05

## 2018-04-05 VITALS
SYSTOLIC BLOOD PRESSURE: 118 MMHG | WEIGHT: 172.81 LBS | HEART RATE: 69 BPM | BODY MASS INDEX: 27.77 KG/M2 | HEIGHT: 66 IN | DIASTOLIC BLOOD PRESSURE: 50 MMHG | OXYGEN SATURATION: 99 %

## 2018-04-05 DIAGNOSIS — I10 ESSENTIAL HYPERTENSION: ICD-10-CM

## 2018-04-05 PROCEDURE — 99215 OFFICE O/P EST HI 40 MIN: CPT | Mod: PBBFAC | Performed by: NURSE PRACTITIONER

## 2018-04-05 PROCEDURE — 99213 OFFICE O/P EST LOW 20 MIN: CPT | Mod: S$GLB,,, | Performed by: NURSE PRACTITIONER

## 2018-04-05 PROCEDURE — 99999 PR PBB SHADOW E&M-EST. PATIENT-LVL V: CPT | Mod: PBBFAC,,, | Performed by: NURSE PRACTITIONER

## 2018-04-05 RX ORDER — CLONIDINE HYDROCHLORIDE 0.1 MG/1
TABLET ORAL
Qty: 180 TABLET | Refills: 0 | Status: SHIPPED | OUTPATIENT
Start: 2018-04-05 | End: 2018-07-05 | Stop reason: SDUPTHER

## 2018-04-05 NOTE — PATIENT INSTRUCTIONS
Follow up with Dr Dudley in June for physical and labs as discussed in December    Continue current medications

## 2018-04-05 NOTE — TELEPHONE ENCOUNTER
----- Message from Jeremías Solomon sent at 4/5/2018 10:12 AM CDT -----  Contact: Olga/[facility]/[phone]  Type: Rx    Name of medication(s): escitalopram oxalate (LEXAPRO) 10 MG tablet,cloNIDine (CATAPRES) 0.1 MG tablet,clopidogrel (PLAVIX) 75 mg tablet     Is this a refill? New rx? refill    Who prescribed medication?     Pharmacy Name, Phone, & Location: [address] [place]    Comments:  Pharmacy is calling to get a refill on the pt's medications above. Please advise.        Thanks

## 2018-04-05 NOTE — TELEPHONE ENCOUNTER
----- Message from Jeremías Solomon sent at 4/5/2018 10:12 AM CDT -----  Contact: Olga/[facility]/[phone]  Type: Rx    Name of medication(s): escitalopram oxalate (LEXAPRO) 10 MG tablet,cloNIDine (CATAPRES) 0.1 MG tablet,clopidogrel (PLAVIX) 75 mg tablet     Is this a refill? New rx? refill    Who prescribed medication?     Pharmacy Name, Phone, & Location: [address] [place]    Comments:  Pharmacy is calling to get a refill on the pt's medications above. Please advise.        Thanks

## 2018-04-05 NOTE — PROGRESS NOTES
Subjective:       Patient ID: Emilia Melgoza is a 72 y.o. female.    Chief Complaint: Medication Refill    Pt here with daughter for urgent appointment.  Pt is aphasic 2/2 CVA.  Daughter states that her mom needs a RF on Clonidine.  No other complaints or concerns.       Medication Refill   Pertinent negatives include no chest pain or coughing.     Past Medical History:   Diagnosis Date    Allergy     Asthma     DDD (degenerative disc disease), lumbar     Chronic LBP and intermittent RLE radicular pain x 10 yrs    Hypertension     Overactive bladder     Senile cataract, unspecified - Both Eyes 6/11/2013    Stroke      Past Surgical History:   Procedure Laterality Date    BREAST BIOPSY      CATARACT EXTRACTION W/  INTRAOCULAR LENS IMPLANT Right 10/13/2014    Dr Crystal    CATARACT EXTRACTION W/  INTRAOCULAR LENS IMPLANT Left 11/10/2014    Dr Crystal    HEMORRHOID SURGERY      HYSTERECTOMY      INCONTINENCE SURGERY      Right Rotator Cuff Repair       Social History     Social History Narrative    Retired     Family History   Problem Relation Age of Onset    Hypertension Mother     Breast cancer Mother     Dementia Mother     Colon cancer Father     Hypertension Father     Rhinitis Daughter     Rhinitis Daughter     No Known Problems Sister     No Known Problems Brother     No Known Problems Maternal Aunt     No Known Problems Maternal Uncle     No Known Problems Paternal Aunt     No Known Problems Paternal Uncle     No Known Problems Maternal Grandmother     No Known Problems Maternal Grandfather     No Known Problems Paternal Grandmother     No Known Problems Paternal Grandfather     Amblyopia Neg Hx     Blindness Neg Hx     Cancer Neg Hx     Cataracts Neg Hx     Diabetes Neg Hx     Glaucoma Neg Hx     Macular degeneration Neg Hx     Retinal detachment Neg Hx     Strabismus Neg Hx     Stroke Neg Hx     Thyroid disease Neg Hx      Vitals:    04/05/18 1008 04/05/18 1041   BP:  "(!) 118/50    Pulse: 64 69   SpO2: (!) 90% 99%   Weight: 78.4 kg (172 lb 13.5 oz)    Height: 5' 6" (1.676 m)    PainSc: 0-No pain      Outpatient Encounter Prescriptions as of 4/5/2018   Medication Sig Dispense Refill    amlodipine (NORVASC) 5 MG tablet TAKE ONE TABLET BY MOUTH TWICE DAILY 180 tablet 3    atorvastatin (LIPITOR) 80 MG tablet TAKE ONE TABLET BY MOUTH ONCE DAILY 90 tablet 2    cholecalciferol, vitamin D3, 1,000 unit capsule Take 1 capsule (1,000 Units total) by mouth once daily. 90 capsule 3    cloNIDine (CATAPRES) 0.1 MG tablet TAKE TWO TABLETS BY MOUTH ONCE DAILY IN THE EVENING 180 tablet 0    escitalopram oxalate (LEXAPRO) 10 MG tablet Take 1 tablet (10 mg total) by mouth once daily. 90 tablet 0    GENERLAC 10 gram/15 mL solution       memantine (NAMENDA) 10 MG Tab TAKE ONE TABLET BY MOUTH ONCE DAILY 90 tablet 1    oxybutynin (DITROPAN) 5 MG Tab Take 2 tablets (10 mg total) by mouth 2 (two) times daily. 360 tablet 3    potassium chloride SA (K-DUR,KLOR-CON) 20 MEQ tablet Take 1 tablet (20 mEq total) by mouth 2 (two) times daily. 90 tablet 3    tramadol (ULTRAM) 50 mg tablet Take 1/2 tablet as needed for headache 30 tablet 0    [DISCONTINUED] cloNIDine (CATAPRES) 0.1 MG tablet TAKE TWO TABLETS BY MOUTH ONCE DAILY IN THE EVENING 180 tablet 0     No facility-administered encounter medications on file as of 4/5/2018.      Wt Readings from Last 3 Encounters:   04/05/18 78.4 kg (172 lb 13.5 oz)   12/01/17 74.1 kg (163 lb 5.8 oz)   11/30/17 73.9 kg (163 lb)     Last 3 sets of Vitals    Vitals - 1 value per visit 11/30/2017 12/1/2017 4/5/2018   SYSTOLIC 122 124 118   DIASTOLIC 72 68 50   PULSE 62 68 69   TEMPERATURE - 97.6 -   RESPIRATIONS - 17 -   SPO2 - 98 99   Weight (lb) 163 163.36 172.84   Weight (kg) 73.936 74.1 78.4   HEIGHT 5' 5" 5' 5" 5' 6"   BODY MASS INDEX 27.12 27.18 27.9   VISIT REPORT - - -   Pain Score  0 0 0   Some recent data might be hidden     No results found for: CBC  Review " of Systems   Constitutional: Negative for activity change and appetite change.   Respiratory: Negative for cough and shortness of breath.    Cardiovascular: Negative for chest pain.   Psychiatric/Behavioral: Negative for sleep disturbance.       Objective:      Physical Exam   Constitutional: She appears well-developed and well-nourished. No distress.   Eyes: No scleral icterus.   Cardiovascular: Normal rate and regular rhythm.    Pulmonary/Chest: Effort normal and breath sounds normal. No respiratory distress. She has no wheezes. She has no rales. She exhibits no tenderness.   Neurological: She is alert.   Skin: Skin is warm and dry. Capillary refill takes less than 2 seconds. No rash noted. She is not diaphoretic. No erythema. No pallor.   Nursing note and vitals reviewed.      Assessment:       1. Essential hypertension        Plan:       Emilia was seen today for medication refill.    Diagnoses and all orders for this visit:    Essential hypertension  -     cloNIDine (CATAPRES) 0.1 MG tablet; TAKE TWO TABLETS BY MOUTH ONCE DAILY IN THE EVENING      Patient Instructions   Follow up with Dr Dudley in June for physical and labs as discussed in December    Continue current medications

## 2018-04-06 RX ORDER — CLOPIDOGREL BISULFATE 75 MG/1
TABLET ORAL
Qty: 90 TABLET | Refills: 0 | OUTPATIENT
Start: 2018-04-06

## 2018-04-11 ENCOUNTER — TELEPHONE (OUTPATIENT)
Dept: FAMILY MEDICINE | Facility: CLINIC | Age: 73
End: 2018-04-11

## 2018-04-11 DIAGNOSIS — F41.9 ANXIETY: ICD-10-CM

## 2018-04-11 RX ORDER — ESCITALOPRAM OXALATE 10 MG/1
TABLET ORAL
Qty: 30 TABLET | Refills: 0 | Status: SHIPPED | OUTPATIENT
Start: 2018-04-11 | End: 2018-07-05 | Stop reason: SDUPTHER

## 2018-04-11 NOTE — TELEPHONE ENCOUNTER
Please contact patient and inform that she needs to establish care with a PCP to receive further medication refills.  She is not an established patient with me.

## 2018-05-16 DIAGNOSIS — I63.9 CEREBRAL INFARCTION: ICD-10-CM

## 2018-05-17 RX ORDER — CLOPIDOGREL BISULFATE 75 MG/1
TABLET ORAL
Qty: 90 TABLET | Refills: 0 | Status: SHIPPED | OUTPATIENT
Start: 2018-05-17 | End: 2018-08-20 | Stop reason: SDUPTHER

## 2018-05-17 NOTE — TELEPHONE ENCOUNTER
This is a Dr. Jefferson patient, I okayed her prescription one time.  She needs to establish with a new doctor, please contact her to let her know.  Thank you

## 2018-05-22 ENCOUNTER — OFFICE VISIT (OUTPATIENT)
Dept: NEUROLOGY | Facility: CLINIC | Age: 73
End: 2018-05-22
Payer: COMMERCIAL

## 2018-05-22 VITALS
WEIGHT: 174.19 LBS | HEART RATE: 89 BPM | HEIGHT: 66 IN | BODY MASS INDEX: 27.99 KG/M2 | DIASTOLIC BLOOD PRESSURE: 72 MMHG | SYSTOLIC BLOOD PRESSURE: 133 MMHG

## 2018-05-22 DIAGNOSIS — I67.89 CEREBRAL MICROVASCULAR DISEASE: ICD-10-CM

## 2018-05-22 DIAGNOSIS — I69.351 HEMIPARESIS AFFECTING RIGHT SIDE AS LATE EFFECT OF CEREBROVASCULAR ACCIDENT: ICD-10-CM

## 2018-05-22 DIAGNOSIS — E78.5 HYPERLIPIDEMIA, UNSPECIFIED HYPERLIPIDEMIA TYPE: ICD-10-CM

## 2018-05-22 DIAGNOSIS — I69.920 APHASIA, LATE EFFECT OF CEREBROVASCULAR DISEASE: Primary | ICD-10-CM

## 2018-05-22 DIAGNOSIS — R73.03 PRE-DIABETES: ICD-10-CM

## 2018-05-22 DIAGNOSIS — I10 ESSENTIAL HYPERTENSION: ICD-10-CM

## 2018-05-22 DIAGNOSIS — G31.84 MCI (MILD COGNITIVE IMPAIRMENT): ICD-10-CM

## 2018-05-22 PROCEDURE — 99214 OFFICE O/P EST MOD 30 MIN: CPT | Mod: S$GLB,,, | Performed by: PSYCHIATRY & NEUROLOGY

## 2018-05-22 PROCEDURE — 99213 OFFICE O/P EST LOW 20 MIN: CPT | Mod: PBBFAC | Performed by: PSYCHIATRY & NEUROLOGY

## 2018-05-22 PROCEDURE — 99999 PR PBB SHADOW E&M-EST. PATIENT-LVL III: CPT | Mod: PBBFAC,,, | Performed by: PSYCHIATRY & NEUROLOGY

## 2018-05-22 NOTE — PROGRESS NOTES
Subjective:       Patient ID: Emilia Melgoza is a 72 y.o. female.    Chief Complaint:  Aphasia      History of Present Illness  HPI  This is a 72-year-old female who was referred for evaluation of memory difficulties.  She was accompanied by her daughter.  It is of note that she has been seen for her memory difficulties by Anastasiya Owsald NP, at the stroke clinic.  The patient has had a history of a stroke in September 2013 at which time she was noted to have right-sided weakness and dysarthria.  A couple of weeks after that admission she had recurrence of the same symptoms and was readmitted to the hospital.  She was discharged home with no change in medications and with improvement to her prior baseline.  She has been otherwise medically stable.      Since then there has been no further strokelike symptoms however she has had gradual development of cognitive issues.  When seen by the neurology clinic in October 2016, her family was managing her medications, finances and meal preparation.  The patient does live with her  and a niece.  In addition, another daughter lives next door.  The patient does not drive and has significant residual speech difficulty related to an expressive aphasia.  She has minimal residual right upper extremity clumsiness but is able to ambulate without assistance.  She is able to take care of her day-to-day needs including personal hygiene on her own.  She is presently on Namenda 10 mg daily in the morning.  Overall she has been stable with no progression.       Review of Systems  Review of Systems   HENT: Negative for hearing loss.    Eyes: Negative.  Negative for visual disturbance.   Respiratory: Negative.  Negative for shortness of breath.    Cardiovascular: Negative.  Negative for chest pain and palpitations.   Gastrointestinal: Negative.    Genitourinary: Negative.    Musculoskeletal: Negative.  Negative for back pain, gait problem and neck pain.   Skin: Negative.     Neurological: Positive for speech difficulty, weakness and headaches. Negative for tremors, seizures, syncope and numbness.   Psychiatric/Behavioral: Positive for decreased concentration. Negative for confusion.       Objective:      Neurologic Exam     Mental Status   Oriented to person.   Oriented to place.   Disoriented to time. Disoriented to year and date. Oriented to month, day and season.   Follows 3 step commands.   Attention: normal. Concentration: normal.   Speech: (The patient has significant expressive aphasia but has no comprehension difficulties and is able to follow three-step commands without difficulty.  Memory examination is somewhat limited because of her aphasia.)  Level of consciousness: alert  Knowledge: good.   Able to name object. Able to read. Able to repeat. Normal comprehension.     Cranial Nerves     CN II   Visual fields full to confrontation.     CN III, IV, VI   Pupils are equal, round, and reactive to light.  Extraocular motions are normal.     CN V   Facial sensation intact.     CN VII   Right facial weakness: central (Flattening of the right nasolabial fold.)  Left facial weakness: none  Right taste: normal  Left taste: normal    CN VIII   CN VIII normal.     CN IX, X   CN IX normal.   CN X normal.     CN XI   CN XI normal.     CN XII   CN XII normal.     Motor Exam   Muscle bulk: normal  Overall muscle tone: normal    Strength   Strength 5/5 except as noted. Minimal weakness of the right  with minimal right pronator drift.  Recall was normal in the lower extremities though there is clumsiness of fine motor movements of the right lower extremity distally.     Sensory Exam   Light touch normal.   Proprioception normal.   Pinprick normal.     Gait, Coordination, and Reflexes     Gait  Gait: normal    Coordination   Romberg: negative  Finger to nose coordination: normal    Tremor   Resting tremor: absent  Intention tremor: absent  Action tremor: absent    Reflexes   Right  brachioradialis: 2+  Left brachioradialis: 2+  Right biceps: 2+  Left biceps: 2+  Right triceps: 2+  Left triceps: 2+  Right patellar: 2+  Left patellar: 2+  Right achilles: 2+  Left achilles: 2+  Right plantar: normal  Left plantar: normalDTRs slightly brisker on the right as compared to the left       Physical Exam   Constitutional: She appears well-developed and well-nourished.   HENT:   Head: Normocephalic and atraumatic.   Eyes: EOM are normal. Pupils are equal, round, and reactive to light.   Neck: Normal range of motion. Neck supple. Carotid bruit is not present.   Neurological: She has a normal Finger-Nose-Finger Test and a normal Romberg Test. Gait normal.   Reflex Scores:       Tricep reflexes are 2+ on the right side and 2+ on the left side.       Bicep reflexes are 2+ on the right side and 2+ on the left side.       Brachioradialis reflexes are 2+ on the right side and 2+ on the left side.       Patellar reflexes are 2+ on the right side and 2+ on the left side.       Achilles reflexes are 2+ on the right side and 2+ on the left side.  Vitals reviewed.        Assessment:        1. Aphasia, late effect of cerebrovascular disease    2. MCI (mild cognitive impairment)    3. Cerebral microvascular disease    4. Hemiparesis affecting right side as late effect of cerebrovascular accident    5. Essential hypertension    6. Pre-diabetes    7. Hyperlipidemia, unspecified hyperlipidemia type            Plan:          Discussed with daughter and patient.   Her memory difficulties are most likely a combination of residual effect from the stroke , primarily related to her aphasia and are most likely vascular in origin.  Continue Namenda 10 mg daily in the morning.  She has been doing well with no significant progression and has been speaking a little better. She now ambulates without assistance. Control of vascular risk factors.  Advised reading aloud and writing exercises that would overall her language problems.   She can do this with her grandkids.  Follow-up in year if stable.

## 2018-06-04 ENCOUNTER — OFFICE VISIT (OUTPATIENT)
Dept: UROLOGY | Facility: CLINIC | Age: 73
End: 2018-06-04
Payer: MEDICARE

## 2018-06-04 VITALS
SYSTOLIC BLOOD PRESSURE: 120 MMHG | HEART RATE: 68 BPM | HEIGHT: 66 IN | BODY MASS INDEX: 27.77 KG/M2 | WEIGHT: 172.81 LBS | DIASTOLIC BLOOD PRESSURE: 70 MMHG

## 2018-06-04 DIAGNOSIS — R35.1 NOCTURIA MORE THAN TWICE PER NIGHT: ICD-10-CM

## 2018-06-04 DIAGNOSIS — N32.81 OVERACTIVE BLADDER: Primary | ICD-10-CM

## 2018-06-04 DIAGNOSIS — R35.0 URINARY FREQUENCY: ICD-10-CM

## 2018-06-04 LAB
BILIRUB SERPL-MCNC: NORMAL MG/DL
BLOOD URINE, POC: NORMAL
COLOR, POC UA: YELLOW
GLUCOSE UR QL STRIP: NORMAL
KETONES UR QL STRIP: NORMAL
LEUKOCYTE ESTERASE URINE, POC: NORMAL
NITRITE, POC UA: NORMAL
PH, POC UA: 6
PROTEIN, POC: NORMAL
SPECIFIC GRAVITY, POC UA: 1030
UROBILINOGEN, POC UA: NORMAL

## 2018-06-04 PROCEDURE — 81001 URINALYSIS AUTO W/SCOPE: CPT | Mod: PBBFAC | Performed by: UROLOGY

## 2018-06-04 PROCEDURE — 99999 PR PBB SHADOW E&M-EST. PATIENT-LVL III: CPT | Mod: PBBFAC,,, | Performed by: UROLOGY

## 2018-06-04 PROCEDURE — 99213 OFFICE O/P EST LOW 20 MIN: CPT | Mod: S$GLB,,, | Performed by: UROLOGY

## 2018-06-04 PROCEDURE — 99213 OFFICE O/P EST LOW 20 MIN: CPT | Mod: PBBFAC | Performed by: UROLOGY

## 2018-06-04 RX ORDER — OXYBUTYNIN CHLORIDE 5 MG/1
10 TABLET ORAL 2 TIMES DAILY
Qty: 360 TABLET | Refills: 3 | Status: SHIPPED | OUTPATIENT
Start: 2018-06-04 | End: 2019-01-31 | Stop reason: SDUPTHER

## 2018-06-04 NOTE — PROGRESS NOTES
Subjective:       Patient ID: Emilia Melogza is a 72 y.o. female who was last seen in this office Visit date not found    Chief Complaint:   Chief Complaint   Patient presents with    Urinary Frequency     6 month f/u        Overactive Bladder  She is currently taking Oxybutynin 10 mg BID.  She has had episodes of urinary retention in the past.  Her frequency was well controlled with Oxybutynin 10 mg BID.  he denies hematuria, dysuria, or fever.  She had a stroke in 2014.    An ultrasound varified she is emptying okay with 10 mg BID.  ACTIVE MEDICAL ISSUES:  Patient Active Problem List   Diagnosis    DDD (degenerative disc disease), lumbar    Overactive bladder    Vitamin D deficiency disease    Hyperlipidemia    Cerebral microvascular disease    Aphasia, late effect of cerebrovascular disease    Pre-diabetes    Essential hypertension    Meningioma    Constipation    Gait instability    Hemiparesis affecting right side as late effect of cerebrovascular accident    Chronic hypokalemia    Anxiety    MCI (mild cognitive impairment)       ALLERGIES AND MEDICATIONS: updated and reviewed.  Review of patient's allergies indicates:   Allergen Reactions    Lovenox [enoxaparin] Other (See Comments)     Slurred speech per patient    Lisinopril Other (See Comments)     Cough    Penicillins      Current Outpatient Prescriptions   Medication Sig    amlodipine (NORVASC) 5 MG tablet TAKE ONE TABLET BY MOUTH TWICE DAILY    atorvastatin (LIPITOR) 80 MG tablet TAKE ONE TABLET BY MOUTH ONCE DAILY    cholecalciferol, vitamin D3, 1,000 unit capsule Take 1 capsule (1,000 Units total) by mouth once daily.    cloNIDine (CATAPRES) 0.1 MG tablet TAKE TWO TABLETS BY MOUTH ONCE DAILY IN THE EVENING    clopidogrel (PLAVIX) 75 mg tablet TAKE ONE TABLET BY MOUTH ONCE DAILY    escitalopram oxalate (LEXAPRO) 10 MG tablet TAKE ONE TABLET BY MOUTH ONCE DAILY    GENERLAC 10 gram/15 mL solution     memantine (NAMENDA) 10  "MG Tab TAKE ONE TABLET BY MOUTH ONCE DAILY    oxybutynin (DITROPAN) 5 MG Tab Take 2 tablets (10 mg total) by mouth 2 (two) times daily.    potassium chloride SA (K-DUR,KLOR-CON) 20 MEQ tablet Take 1 tablet (20 mEq total) by mouth 2 (two) times daily.    tramadol (ULTRAM) 50 mg tablet Take 1/2 tablet as needed for headache     No current facility-administered medications for this visit.        Review of Systems   Constitutional: Negative for activity change, fatigue, fever and unexpected weight change.   Eyes: Negative for redness and visual disturbance.   Respiratory: Negative for chest tightness and shortness of breath.    Cardiovascular: Negative for chest pain and leg swelling.   Gastrointestinal: Negative for abdominal distention, abdominal pain, constipation, diarrhea, nausea and vomiting.   Genitourinary: Negative for difficulty urinating, dysuria, flank pain, frequency, hematuria, pelvic pain, urgency and vaginal bleeding.   Musculoskeletal: Negative for arthralgias and joint swelling.   Neurological: Negative for dizziness, weakness and headaches.   Psychiatric/Behavioral: Negative for confusion. The patient is not nervous/anxious.    All other systems reviewed and are negative.      Objective:      Vitals:    06/04/18 1610   BP: 120/70   Pulse: 68   Weight: 78.4 kg (172 lb 13.5 oz)   Height: 5' 6" (1.676 m)     Physical Exam   Nursing note and vitals reviewed.  Constitutional: She is oriented to person, place, and time. She appears well-developed.   HENT:   Head: Normocephalic.   Eyes: Conjunctivae are normal.   Neck: Normal range of motion. No tracheal deviation present. No thyromegaly present.   Cardiovascular: Normal rate, normal heart sounds and normal pulses.    Pulmonary/Chest: Effort normal and breath sounds normal. No respiratory distress. She has no wheezes.   Abdominal: Soft. She exhibits no distension and no mass. There is no hepatosplenomegaly. There is no tenderness. There is no rebound, no " guarding and no CVA tenderness. No hernia.   Musculoskeletal: Normal range of motion. She exhibits no edema or tenderness.   Lymphadenopathy:     She has no cervical adenopathy.   Neurological: She is alert and oriented to person, place, and time.   Skin: Skin is warm and dry. No rash noted. No erythema.     Psychiatric: She has a normal mood and affect. Her behavior is normal. Judgment and thought content normal.       Urine dipstick shows negative for all components.  Micro exam: negative for WBC's or RBC's.    Assessment:       1. Overactive bladder    2. Nocturia more than twice per night    3. Urinary frequency          Plan:       1. Overactive bladder    - oxybutynin (DITROPAN) 5 MG Tab; Take 2 tablets (10 mg total) by mouth 2 (two) times daily.  Dispense: 360 tablet; Refill: 3  - POCT urinalysis, dipstick or tablet reag    2. Nocturia more than twice per night  Limit evening fluids    3. Urinary frequency  As above            Follow-up in about 1 year (around 6/4/2019) for Follow up.

## 2018-06-14 ENCOUNTER — TELEPHONE (OUTPATIENT)
Dept: INTERNAL MEDICINE | Facility: CLINIC | Age: 73
End: 2018-06-14

## 2018-06-14 DIAGNOSIS — Z12.31 ENCOUNTER FOR SCREENING MAMMOGRAM FOR BREAST CANCER: Primary | ICD-10-CM

## 2018-06-14 NOTE — TELEPHONE ENCOUNTER
LVM instructing patient to call the clinic to schedule her mammogram. Orders are placed.    *Patient needs mammogram scheduled

## 2018-06-14 NOTE — TELEPHONE ENCOUNTER
----- Message from Berna Dominguez sent at 6/14/2018  1:27 PM CDT -----  Contact: daughter  Pt daughter calling to schedule a mammogram. Please call [phone].

## 2018-07-05 DIAGNOSIS — G31.84 MCI (MILD COGNITIVE IMPAIRMENT): ICD-10-CM

## 2018-07-05 DIAGNOSIS — F41.9 ANXIETY: ICD-10-CM

## 2018-07-05 DIAGNOSIS — I10 ESSENTIAL HYPERTENSION: ICD-10-CM

## 2018-07-05 RX ORDER — ESCITALOPRAM OXALATE 10 MG/1
10 TABLET ORAL DAILY
Qty: 90 TABLET | Refills: 1 | Status: SHIPPED | OUTPATIENT
Start: 2018-07-05 | End: 2019-01-31 | Stop reason: SDUPTHER

## 2018-07-05 RX ORDER — MEMANTINE HYDROCHLORIDE 10 MG/1
10 TABLET ORAL DAILY
Qty: 90 TABLET | Refills: 1 | Status: SHIPPED | OUTPATIENT
Start: 2018-07-05 | End: 2019-01-31 | Stop reason: SDUPTHER

## 2018-07-05 RX ORDER — CLONIDINE HYDROCHLORIDE 0.1 MG/1
TABLET ORAL
Qty: 180 TABLET | Refills: 1 | Status: SHIPPED | OUTPATIENT
Start: 2018-07-05 | End: 2019-01-31 | Stop reason: SDUPTHER

## 2018-07-05 RX ORDER — AMLODIPINE BESYLATE 5 MG/1
TABLET ORAL
Qty: 180 TABLET | Refills: 3 | Status: SHIPPED | OUTPATIENT
Start: 2018-07-05 | End: 2019-01-31 | Stop reason: SDUPTHER

## 2018-07-05 RX ORDER — ATORVASTATIN CALCIUM 80 MG/1
80 TABLET, FILM COATED ORAL DAILY
Qty: 90 TABLET | Refills: 3 | Status: SHIPPED | OUTPATIENT
Start: 2018-07-05 | End: 2019-01-31 | Stop reason: SDUPTHER

## 2018-07-05 NOTE — TELEPHONE ENCOUNTER
----- Message from Berna Dominguez sent at 7/5/2018  9:44 AM CDT -----  Contact: daughter/[phone]  cloNIDine (CATAPRES) 0.1 MG tablet  memantine (NAMENDA) 10 MG Tab  atorvastatin (LIPITOR) 80 MG tablet  amlodipine (NORVASC) 5 MG tablet  escitalopram oxalate (LEXAPRO) 10 MG tablet    Roger calling to request a refill of all pt medications to be sent to [facility]. Please call [phone].    LOV 12/1/2017  NOV 7/13/2018

## 2018-07-06 ENCOUNTER — HOSPITAL ENCOUNTER (EMERGENCY)
Facility: HOSPITAL | Age: 73
Discharge: HOME | End: 2018-07-06
Attending: INTERNAL MEDICINE
Payer: COMMERCIAL

## 2018-07-06 VITALS
HEART RATE: 62 BPM | HEIGHT: 66 IN | RESPIRATION RATE: 16 BRPM | TEMPERATURE: 99 F | SYSTOLIC BLOOD PRESSURE: 146 MMHG | DIASTOLIC BLOOD PRESSURE: 72 MMHG | OXYGEN SATURATION: 99 % | WEIGHT: 170 LBS | BODY MASS INDEX: 27.32 KG/M2

## 2018-07-06 DIAGNOSIS — R09.1 PLEURISY: Primary | ICD-10-CM

## 2018-07-06 DIAGNOSIS — R07.9 CHEST PAIN: ICD-10-CM

## 2018-07-06 LAB
ALBUMIN SERPL-MCNC: 3.2 G/DL (ref 3.3–5.5)
ALP SERPL-CCNC: 80 U/L (ref 42–141)
BILIRUB SERPL-MCNC: 0.5 MG/DL (ref 0.2–1.6)
BUN SERPL-MCNC: 13 MG/DL (ref 7–22)
CALCIUM SERPL-MCNC: 9.6 MG/DL (ref 8–10.3)
CHLORIDE SERPL-SCNC: 102 MMOL/L (ref 98–108)
CREAT SERPL-MCNC: 1.1 MG/DL (ref 0.6–1.2)
GLUCOSE SERPL-MCNC: 100 MG/DL (ref 73–118)
POC ALT (SGPT): 23 U/L (ref 10–47)
POC AST (SGOT): 39 U/L (ref 11–38)
POC CARDIAC TROPONIN I: 0 NG/ML
POC PTINR: 1.1 (ref 0.9–1.2)
POC PTWBT: 12.9 SEC (ref 9.7–14.3)
POC TCO2: 29 MMOL/L (ref 18–33)
POTASSIUM BLD-SCNC: 4.3 MMOL/L (ref 3.6–5.1)
PROTEIN, POC: 8 G/DL (ref 6.4–8.1)
SAMPLE: NORMAL
SAMPLE: NORMAL
SODIUM BLD-SCNC: 147 MMOL/L (ref 128–145)

## 2018-07-06 PROCEDURE — 85610 PROTHROMBIN TIME: CPT

## 2018-07-06 PROCEDURE — 25000003 PHARM REV CODE 250: Performed by: INTERNAL MEDICINE

## 2018-07-06 PROCEDURE — 99284 EMERGENCY DEPT VISIT MOD MDM: CPT | Mod: 25

## 2018-07-06 PROCEDURE — 93010 ELECTROCARDIOGRAM REPORT: CPT | Mod: ,,, | Performed by: INTERNAL MEDICINE

## 2018-07-06 PROCEDURE — 84484 ASSAY OF TROPONIN QUANT: CPT

## 2018-07-06 PROCEDURE — 93005 ELECTROCARDIOGRAM TRACING: CPT

## 2018-07-06 PROCEDURE — 80053 COMPREHEN METABOLIC PANEL: CPT

## 2018-07-06 PROCEDURE — 85025 COMPLETE CBC W/AUTO DIFF WBC: CPT

## 2018-07-06 RX ORDER — ASPIRIN 325 MG
325 TABLET ORAL
Status: COMPLETED | OUTPATIENT
Start: 2018-07-06 | End: 2018-07-06

## 2018-07-06 RX ORDER — IBUPROFEN 800 MG/1
800 TABLET ORAL EVERY 8 HOURS PRN
Qty: 30 TABLET | Refills: 0 | Status: SHIPPED | OUTPATIENT
Start: 2018-07-06 | End: 2018-07-06 | Stop reason: CLARIF

## 2018-07-06 RX ADMIN — ASPIRIN 325 MG ORAL TABLET 325 MG: 325 PILL ORAL at 08:07

## 2018-07-07 NOTE — ED PROVIDER NOTES
Encounter Date: 7/6/2018    SCRIBE #1 NOTE: I, Vika Madrigal, am scribing for, and in the presence of,  Dr. Mitchell. I have scribed the entire note.       History     Chief Complaint   Patient presents with    Chest Pain     pt c/o midsternal chest pain x2 days,      This is a 72 y.o female who presents with mid sternal chest pain for 3 days. Relative notes she had a stroke in 2014. HPI is limited due to patient's condition.       The history is provided by the patient and a relative. The history is limited by the condition of the patient.     Review of patient's allergies indicates:   Allergen Reactions    Lovenox [enoxaparin] Other (See Comments)     Slurred speech per patient    Lisinopril Other (See Comments)     Cough    Penicillins      Past Medical History:   Diagnosis Date    Allergy     Asthma     DDD (degenerative disc disease), lumbar     Chronic LBP and intermittent RLE radicular pain x 10 yrs    Hypertension     Overactive bladder     Senile cataract, unspecified - Both Eyes 6/11/2013    Stroke      Past Surgical History:   Procedure Laterality Date    BREAST BIOPSY      CATARACT EXTRACTION W/  INTRAOCULAR LENS IMPLANT Right 10/13/2014    Dr Crystal    CATARACT EXTRACTION W/  INTRAOCULAR LENS IMPLANT Left 11/10/2014    Dr Crystal    HEMORRHOID SURGERY      HYSTERECTOMY      INCONTINENCE SURGERY      Right Rotator Cuff Repair       Family History   Problem Relation Age of Onset    Hypertension Mother     Breast cancer Mother     Dementia Mother     Colon cancer Father     Hypertension Father     Rhinitis Daughter     Rhinitis Daughter     No Known Problems Sister     No Known Problems Brother     No Known Problems Maternal Aunt     No Known Problems Maternal Uncle     No Known Problems Paternal Aunt     No Known Problems Paternal Uncle     No Known Problems Maternal Grandmother     No Known Problems Maternal Grandfather     No Known Problems Paternal Grandmother     No Known  Problems Paternal Grandfather     Amblyopia Neg Hx     Blindness Neg Hx     Cancer Neg Hx     Cataracts Neg Hx     Diabetes Neg Hx     Glaucoma Neg Hx     Macular degeneration Neg Hx     Retinal detachment Neg Hx     Strabismus Neg Hx     Stroke Neg Hx     Thyroid disease Neg Hx      Social History   Substance Use Topics    Smoking status: Former Smoker     Quit date: 1/1/1992    Smokeless tobacco: Never Used    Alcohol use No     Review of Systems   Unable to perform ROS: Acuity of condition   Cardiovascular: Positive for chest pain.   All other systems reviewed and are negative.      Physical Exam     Initial Vitals   BP Pulse Resp Temp SpO2   07/06/18 1931 07/06/18 1925 07/06/18 1925 07/06/18 1925 07/06/18 1925   (!) 146/62 62 16 98.5 °F (36.9 °C) 99 %      MAP       --                Physical Exam    Nursing note and vitals reviewed.  Constitutional: She appears well-developed and well-nourished. She is not diaphoretic. No distress.   HENT:   Head: Normocephalic and atraumatic.   Eyes: Conjunctivae and EOM are normal. Pupils are equal, round, and reactive to light.   Cardiovascular: Normal rate, regular rhythm, normal heart sounds and intact distal pulses. Exam reveals no gallop, no friction rub and no decreased pulses.    No murmur heard.  Pulses:       Carotid pulses are 2+ on the right side, and 2+ on the left side.       Dorsalis pedis pulses are 2+ on the right side, and 2+ on the left side.   Pulmonary/Chest: Breath sounds normal. No respiratory distress. She has no wheezes. She has no rhonchi. She has no rales. She exhibits no tenderness.   Abdominal: Soft. Bowel sounds are normal. She exhibits no distension.   Neurological: She is alert and oriented to person, place, and time. She has normal strength.   5/5 strength for all upper and lower extremities. right upper extremity contraction. Right facial muscle weakness.   Skin: Skin is warm and dry.         ED Course   Procedures  Labs Reviewed    POCT CMP - Abnormal; Notable for the following:        Result Value    Albumin, POC 3.2 (*)     AST (SGOT), POC 39 (*)     POC Sodium 147 (*)     All other components within normal limits   TROPONIN ISTAT   POCT CBC   POCT CMP   POCT PROTIME-INR   POCT TROPONIN   ISTAT PROCEDURE          Imaging Results          X-Ray Chest PA And Lateral (Final result)  Result time 07/06/18 20:04:44    Final result by Soy Carmona MD (07/06/18 20:04:44)                 Impression:      No detrimental change or radiographic acute intrathoracic process seen.      Electronically signed by: Soy Carmona MD  Date:    07/06/2018  Time:    20:04             Narrative:    EXAMINATION:  XR CHEST PA AND LATERAL    CLINICAL HISTORY:  Chest Pain;    TECHNIQUE:  PA and lateral views of the chest were performed.    COMPARISON:  Chest radiograph 09/07/2013    FINDINGS:  The cardiomediastinal silhouette and hilar regions are unchanged.  Stable prominence of the right paratracheal stripe.  The bilateral lungs are symmetrically well expanded without large consolidation or new focal opacity.  No pleural effusion or pneumothorax.  Included osseous structures appear stable without acute or destructive process seen.                                 Medical Decision Making:   Clinical Tests:   Lab Tests: Ordered and Reviewed  Radiological Study: Ordered and Reviewed  Medical Tests: Ordered and Reviewed            Scribe Attestation:   Scribe #1: I performed the above scribed service and the documentation accurately describes the services I performed. I attest to the accuracy of the note.        This document was produced by a scribe under my direction and in my presence. I agree with the content of the note and have made any necessary edits.     Dr. Mitchell    07/18/2018 5:41 AM          Clinical Impression:     1. Pleurisy    2. Chest pain      Disposition:   Disposition: Discharged  Condition: Stable                        Johnny Mitchell,  MD  07/18/18 0541

## 2018-07-13 ENCOUNTER — OFFICE VISIT (OUTPATIENT)
Dept: FAMILY MEDICINE | Facility: CLINIC | Age: 73
End: 2018-07-13
Payer: COMMERCIAL

## 2018-07-13 VITALS
HEIGHT: 66 IN | BODY MASS INDEX: 28.16 KG/M2 | WEIGHT: 175.25 LBS | HEART RATE: 69 BPM | SYSTOLIC BLOOD PRESSURE: 124 MMHG | DIASTOLIC BLOOD PRESSURE: 76 MMHG | OXYGEN SATURATION: 98 % | TEMPERATURE: 98 F

## 2018-07-13 DIAGNOSIS — I69.351 HEMIPARESIS AFFECTING RIGHT SIDE AS LATE EFFECT OF CEREBROVASCULAR ACCIDENT: Primary | ICD-10-CM

## 2018-07-13 DIAGNOSIS — I69.920 APHASIA, LATE EFFECT OF CEREBROVASCULAR DISEASE: ICD-10-CM

## 2018-07-13 DIAGNOSIS — G31.84 MCI (MILD COGNITIVE IMPAIRMENT): ICD-10-CM

## 2018-07-13 PROCEDURE — 99214 OFFICE O/P EST MOD 30 MIN: CPT | Mod: S$GLB,,, | Performed by: FAMILY MEDICINE

## 2018-07-13 PROCEDURE — 99999 PR PBB SHADOW E&M-EST. PATIENT-LVL V: CPT | Mod: PBBFAC,,, | Performed by: FAMILY MEDICINE

## 2018-07-13 PROCEDURE — 99215 OFFICE O/P EST HI 40 MIN: CPT | Mod: PBBFAC,PN | Performed by: FAMILY MEDICINE

## 2018-07-13 RX ORDER — IBUPROFEN 800 MG/1
TABLET ORAL
Status: ON HOLD | COMMUNITY
Start: 2018-07-07 | End: 2020-01-04 | Stop reason: HOSPADM

## 2018-07-13 NOTE — PROGRESS NOTES
Chief Complaint   Patient presents with    Follow-up     referral to PT       HPI    Emilia Melgoza is 72 y.o. female. The primary encounter diagnosis was Hemiparesis affecting right side as late effect of cerebrovascular accident. Diagnoses of MCI (mild cognitive impairment) and Aphasia, late effect of cerebrovascular disease were also pertinent to this visit.    72-year-old female with Aphasia and hemiparesis comes to clinic accompanied by her daughter who would like to discuss her mother's function.  Patient's daughter reports recent visit with neurologist who informed that patient's condition is likely due to cognitive deficit due to age as well as previous CVA.    Patient's daughter request referral to physical therapy and speech therapy.  She also inquires about resources regarding date/time care for mother.  There have been no behavioral changes observed by the family.    Review of Systems   Constitutional: Positive for unexpected weight change (Weight gain due to inactivity). Negative for activity change, chills, diaphoresis, fatigue and fever.   HENT: Negative for sore throat, trouble swallowing and voice change.    Respiratory: Negative for shortness of breath.    Cardiovascular: Negative for chest pain.   Musculoskeletal: Positive for arthralgias and gait problem. Negative for joint swelling.   Neurological: Positive for facial asymmetry ( chronic.  No changes) and speech difficulty. Negative for seizures and syncope.   Psychiatric/Behavioral: Positive for dysphoric mood. Negative for agitation, behavioral problems, decreased concentration, self-injury, sleep disturbance and suicidal ideas. The patient is not nervous/anxious.            Current Outpatient Prescriptions:     amLODIPine (NORVASC) 5 MG tablet, TAKE ONE TABLET BY MOUTH TWICE DAILY, Disp: 180 tablet, Rfl: 3    cloNIDine (CATAPRES) 0.1 MG tablet, TAKE TWO TABLETS BY MOUTH ONCE DAILY IN THE EVENING, Disp: 180 tablet, Rfl: 1     "clopidogrel (PLAVIX) 75 mg tablet, TAKE ONE TABLET BY MOUTH ONCE DAILY, Disp: 90 tablet, Rfl: 0    escitalopram oxalate (LEXAPRO) 10 MG tablet, Take 1 tablet (10 mg total) by mouth once daily., Disp: 90 tablet, Rfl: 1    ibuprofen (ADVIL,MOTRIN) 800 MG tablet, , Disp: , Rfl:     memantine (NAMENDA) 10 MG Tab, Take 1 tablet (10 mg total) by mouth once daily., Disp: 90 tablet, Rfl: 1    oxybutynin (DITROPAN) 5 MG Tab, Take 2 tablets (10 mg total) by mouth 2 (two) times daily., Disp: 360 tablet, Rfl: 3    potassium chloride SA (K-DUR,KLOR-CON) 20 MEQ tablet, Take 1 tablet (20 mEq total) by mouth 2 (two) times daily., Disp: 90 tablet, Rfl: 3    tramadol (ULTRAM) 50 mg tablet, Take 1/2 tablet as needed for headache, Disp: 30 tablet, Rfl: 0    atorvastatin (LIPITOR) 80 MG tablet, Take 1 tablet (80 mg total) by mouth once daily., Disp: 90 tablet, Rfl: 3    cholecalciferol, vitamin D3, 1,000 unit capsule, Take 1 capsule (1,000 Units total) by mouth once daily., Disp: 90 capsule, Rfl: 3    GENERLAC 10 gram/15 mL solution, , Disp: , Rfl:       Blood pressure 124/76, pulse 69, temperature 97.5 °F (36.4 °C), temperature source Oral, height 5' 6" (1.676 m), weight 79.5 kg (175 lb 4.3 oz), SpO2 98 %.    Physical Exam   Constitutional: Vital signs are normal. She appears well-developed and well-nourished. She does not appear ill. No distress.   Cardiovascular: Normal heart sounds.    No murmur heard.  Pulmonary/Chest: Effort normal and breath sounds normal.   Psychiatric: She has a normal mood and affect. Her behavior is normal. Her mood appears not anxious. Her affect is not blunt and not labile. She is not agitated, not withdrawn and not combative. She does not exhibit a depressed mood. She is noncommunicative (Due to expressive aphasia). She is attentive.       Admission on 07/06/2018, Discharged on 07/06/2018   Component Date Value Ref Range Status    POC PTWBT 07/06/2018 12.9  9.7 - 14.3 sec Final    POC PTINR " 07/06/2018 1.1  0.9 - 1.2 Final    Sample 07/06/2018 UNK   Final    Albumin, POC 07/06/2018 3.2* 3.3 - 5.5 g/dL Final    Alkaline Phosphatase, POC 07/06/2018 80  42 - 141 U/L Final    ALT (SGPT), POC 07/06/2018 23  10 - 47 U/L Final    AST (SGOT), POC 07/06/2018 39* 11 - 38 U/L Final    POC BUN 07/06/2018 13  7 - 22 mg/dL Final    Calcium, POC 07/06/2018 9.6  8.0 - 10.3 mg/dL Final    POC Chloride 07/06/2018 102  98 - 108 mmol/L Final    POC Creatinine 07/06/2018 1.1  0.6 - 1.2 mg/dL Final    POC Glucose 07/06/2018 100  73 - 118 mg/dL Final    POC Potassium 07/06/2018 4.3  3.6 - 5.1 mmol/L Final    POC Sodium 07/06/2018 147* 128 - 145 mmol/L Final    Bilirubin 07/06/2018 0.5  0.2 - 1.6 mg/dL Final    POC TCO2 07/06/2018 29  18 - 33 mmol/L Final    Protein 07/06/2018 8.0  6.4 - 8.1 g/dL Final    POC Cardiac Troponin I 07/06/2018 0.00  <0.09 ng/mL Final    Sample 07/06/2018 UNK   Final   Office Visit on 06/04/2018   Component Date Value Ref Range Status    Color, UA 06/04/2018 yellow   Final    Spec Grav UA 06/04/2018 1030   Final    pH, UA 06/04/2018 6   Final    WBC, UA 06/04/2018 neg   Final    Nitrite, UA 06/04/2018 neg   Final    Protein 06/04/2018 neg   Final    Glucose, UA 06/04/2018 neg   Final    Ketones, UA 06/04/2018 neg   Final    Urobilinogen, UA 06/04/2018 neg   Final    Bilirubin 06/04/2018 neg   Final    Blood, UA 06/04/2018 neg   Final   ]    Assessment:    1. Hemiparesis affecting right side as late effect of cerebrovascular accident    2. MCI (mild cognitive impairment)    3. Aphasia, late effect of cerebrovascular disease          Emilia was seen today for follow-up.    Diagnoses and all orders for this visit:    Hemiparesis affecting right side as late effect of cerebrovascular accident  -     Ambulatory Referral to Occupational Therapy  -     Ambulatory referral to Social Work  - stable.  Referral placed to occupational therapy for reassessment of right  hemiparesis.  - social work referral placed for daytime care resources and other assistance.    MCI (mild cognitive impairment)  -     Ambulatory Referral to Occupational Therapy  -     Ambulatory referral to Social Work  -     Ambulatory referral to Speech Therapy  - stable.  Neurology documentation reviewed with patient and family.  Occupational therapy and speech therapy placed.  Social work referral placed for assistance with resources.  - discussed with patient and family expected stages of cognitive impairment due to age.  Educational resources regarding dementia/Alzheimer's provided (Kartik Giron)    Aphasia, late effect of cerebrovascular disease  -     Ambulatory referral to Social Work  -     Ambulatory referral to Speech Therapy  - stable.  Speech therapy referral placed.    Other orders  -     Cancel: Ambulatory Referral to Physical/Occupational Therapy    A total of 35 minutes was spent with the patient during this encounter. More than 50% of the encounter was spent counseling the patient regarding treatment options, expected outcomes, and coordination of care.        FOLLOW UP: Follow-up in about 3 months (around 10/13/2018) for Follow up.

## 2018-07-13 NOTE — PATIENT INSTRUCTIONS
For Caregivers: Self-Care After Stroke  After a stroke, people can regain a sense of power by helping to take care of themselves. Many can learn ways to manage a lack of bowel and bladder control. And using impaired arms and legs to bathe and dress helps regain muscle strength. In fact, daily use of affected hands often helps bring back function. As the function improves, the person should get as much encouragement as possible to use the affected side as part of an effective recovery.     A long-handled grasper and Velcro shoe closures make dressing easier. An occupational therapist can help your loved one master the technique.   Bladder and bowel problems  A stroke may affect your loved ones bladder and bowel control. The problem may seem worse if he or she cant walk to the bathroom alone or ask for help. Nurses can help you both find a way to manage the problem. They may suggest:  · Taking the person to the toilet at set times. You might try every 2 hours to 3 hours.  · Providing a bedside commode for use at night.  · Using absorbent briefs.  · Refraining from drinking fluids or alcohol late in the evening.  Bathing and dressing  With the help of occupational therapists, people who have had a stroke can learn new ways to bathe and dress. Your loved one may be taught to:  · Test water temperature with a hand or foot that was not affected by the stroke.  · Use grab bars, a shower seat, a hand-held shower, and a long-handled brush.  · Dress while sitting, starting with the affected side or limb.  · Wear shirts with snaps or Velcro closures. Use zippers with loops attached to them. Sweat pants or skirts with elastic waistbands may also be suggested.  Date Last Reviewed: 6/15/2015  © 2151-2546 [employer]. [address] [place] [address], [place] [address]. All rights reserved. This information is not intended as a substitute for professional medical care. Always follow your healthcare professional's  instructions.        Stroke: Resources and Support     In some cases, health care providers can make home visits.    After being released from rehab, your loved one may need ongoing therapy or nursing care. Talk with a  or  about planning for care and local sources of support.  Planning for home care  · A nurse may come and check his or her blood pressure.  · A physical therapist may help with exercises. The therapist will often show him or her and family members certain exercises that can be done without supervision.  · Speech and occupational therapists can help the whole family communicate and handle tasks of daily living better.  Adult day care  You may be afraid to leave your loved one alone. Adult day care facilities can provide supervision if you need time away during the day. They also give your loved one a chance to be with other people.  Other resources  You can also check your phone book and the internet for other resources. Try the following listings:  · Churches and synagogues  · Recreation centers  · Adult   ·   · Support groups  · Online stroke support communities  · National Stroke Association  497.200.1179   · American Stroke Association  358.201.5258   · [employer]  [phone]   Date Last Reviewed: 6/8/2015  © 8674-1899 [employer]. [address] [place] [address], [place] [address]. All rights reserved. This information is not intended as a substitute for professional medical care. Always follow your healthcare professional's instructions.

## 2018-07-27 ENCOUNTER — CLINICAL SUPPORT (OUTPATIENT)
Dept: REHABILITATION | Facility: HOSPITAL | Age: 73
End: 2018-07-27
Attending: FAMILY MEDICINE
Payer: MEDICARE

## 2018-07-27 DIAGNOSIS — R27.8 COORDINATION IMPAIRMENT: ICD-10-CM

## 2018-07-27 DIAGNOSIS — I69.998 WEAKNESS AS LATE EFFECT OF CEREBROVASCULAR ACCIDENT (CVA): ICD-10-CM

## 2018-07-27 DIAGNOSIS — R53.1 WEAKNESS AS LATE EFFECT OF CEREBROVASCULAR ACCIDENT (CVA): ICD-10-CM

## 2018-07-27 PROCEDURE — G8988 SELF CARE GOAL STATUS: HCPCS | Mod: CK,PN

## 2018-07-27 PROCEDURE — G8987 SELF CARE CURRENT STATUS: HCPCS | Mod: CK,PN

## 2018-07-27 PROCEDURE — 97166 OT EVAL MOD COMPLEX 45 MIN: CPT | Mod: PN

## 2018-07-27 PROCEDURE — 97140 MANUAL THERAPY 1/> REGIONS: CPT | Mod: PN

## 2018-07-27 NOTE — PLAN OF CARE
"  Occupational Therapy Evaluation - Hand, Wrist, and/or Elbow Condition     Date: 2018  Name: Emilia Melgoza  YOB: 1945  Chart Number: [id number]  Referring Physician: Raisa Dduley MD  Diagnosis:   1. Weakness as late effect of cerebrovascular accident (CVA)     2. Coordination impairment       ICD 10:   I69.351 (ICD-10-CM) - Hemiparesis affecting right side as late effect of cerebrovascular accident   G31.84 (ICD-10-CM) - MCI (mild cognitive impairment)       Involved Side: right  Hand Dominance: ambidextrous   Date of Onset:   Date of Injury/Surgery:  RTC repair right shoulder   Surgical Procedure: No recent surgery's   Mechanism of Injury: Stroke  Additional Info: Pt is accompanied by daughter who provided PMH  Date of Return to MD:   Past Medical History/Comorbidities:   Past Medical History:   Diagnosis Date    Allergy     Asthma     DDD (degenerative disc disease), lumbar     Chronic LBP and intermittent RLE radicular pain x 10 yrs    Hypertension     Overactive bladder     Senile cataract, unspecified - Both Eyes 2013    Stroke        Prior Functional Status: Prior to stroke full. Current status limited  Occupation: retired  Employer: retired   Job Duties/Responsibilities: NA  Current Work Status: NA  Home Environment: Pt lives with  she has five children who all take care of mother  Leisure/Social Activities: Playing computer, going outside   Cultural/Spiritual: no barriers  Barriers to Learning: significant aphasia, gait difficulty, praxis   Hearing/Vision Loss: [facility]     Visit #: . Visits  on 18.    Subjective   "Pt indicates she wants to work on the right arm"  Pain Report (severity and location)  Current: 0 out of 10  With activity: 0 out of 10   Report of Functional Deficits/Chief Complaints: Pt reports no pain    Objective   Observation: pt has guarded posture with right UE  Elbow ROM. Measured in degrees.  Date 2018   "    Left Right     Elbow Ext/Flex WNL WNL         Wrist ROM. Measured in degrees.  Date 7/27/2018 7/27/2018      Left Right     Supination/Pronation 85/85 60/80     Wrist ext/flex WNL 15/65     Wrist RD/UD WNL No AROM available          Hand ROM. Pt has reasonable fine motor skills in right hand. She was able to maintain control of a pen on paper after setup assistance.    In hand manipulation skills such as translation and oppositions to digits 2-5 are not present. Dynamic tripod present with control at shoulder level    9 hole peg 2 min with support form left hand     Strength (Dyanmometer) and Pinch Strength (Pinch Gauge)  Measured in pounds.  Date 7/27/2018 7/27/2018          Left Right     Rung II 55 40     Arzate Pinch 14 9     3pt Pinch 11 6     2pt Pinch 8 6         Sensation: grossly intact      Manual Muscle Test  Date 7/27/2018 7/27/2018      Left Right     Wrist Extension  5/5 4+/5     Wrist Flexion 5/5 5/5     Radial Deviation 5/5 4+/5     Ulnar Deviation 5/5 4+/5     Supination 5/5 4+/5     Pronation 5/5 5/5     EPL 5/5 5/5     FLP 5/5 5/5     OP 5/5 5/5     APL 5/5 5/5     Elbow Flexion/Flexion 5/5 5/5           Range of Motion: AROM    Range of Motion Right Shoulder:                                                    AROM/PROM Left right   Date:     Shoulder Flexion  (175) 155 90   Shoulder Abduction (175) 140 60   Shoulder Extension (60) 45 NT   Shoulder External Rotation   @ 90 Abd  (90) 55 40   Shoulder Internal Rotation  (spine level) 45 NT   Horiz. Shoulder Adduction (45) 40 NT                       ROM Comments:  GH stiffness and poor scapular mobility + favoring left shoulder    STRENGTH:    right   Date:    Scaption 4-/5   Biceps 4/5   Deltoid 4+/5   Flexion 4/5   Extension NT   Abduction -4/5   ER 4/5   IR 4+/5   Scapular Depression   4/5   Scapular Retraction  4/5   Scapular elevation 4+/5   Pectoral Strength  4/5        Cervical Mobility/Strength Grossly intact  Scapular mobility poor:  hypertrophied in upper trap. Difficulty with scapular retraction/protraction on right. Elevation fair. No winging indicating shortened serratus and rhomboids.    Functional Limitation Reporting   Tool: FOTO  Category: self care  Visit DATE SCORE Current  G-Code Goal  G-Code   Intake 7/27/2018 41/100 CK 42 CK   5TH       10TH       Discharge            Treatment   PROM stretching to GH joint in supine on high low table to pt patricia.   Towel slide stretch to GH joint hand to Loosen GH joint into flexion  Issued and reviewed the above treatment to be completed by patient as HEP 3-5x/day.     Charges   Start Time: 8:15  End Time: 9:05  Total Time: 50  Initial eval-93909 moderate   Fluidotherapy-54018    Paraffin-56583    Moist Hot Pack-37015    Ultrasound-46780    Therapeutic Exercise-43233    Therapeutic Activity-15221    Manual Therapy-97140 X 8 min       Assessment   Patient is a 72 y.o. year old female with a diagnosis of CVA with right side affected. Limitations affecting independence with the patient's normal, daily routine include taking care of her , performing overhead tasks above 90 degrees with both hands to include taking jars from the tops of cabinets.       Profile and History Assessment of Occupational Performance Level of Clinical Decision Making Complexity Score   Occupational Profile:   Emilia Melgoza is a 72 y.o. female who lives with their spouse and is currently home-maker.     Emilia Melgoza has difficulty with  grooming and dressing  housework/household chores  affecting his/her daily functional abilities. His/her main goal for therapy is get more use out of right arm.     Comorbidities:   Please see PMH    Medical and Therapy History Review:   Expanded               Performance Deficits    Physical:  Joint Mobility  Joint Stability  Edema  Control of Voluntary Movement   Strength  Pinch Strength  Proprioception Functions  Muscle Tone  Postural Control    Cognitive:  No  Deficits    Psychosocial:    Routines  Family Support     Clinical Decision Making:  Low    Assessment Process:  Problem-Focused Assessments    Body Structures:  Related to movement    Body Functions:  Musculoskeletal Functions  Movement Functions  Skin Functions  Sensory Functions  Neuromuscular Functions  Cardiovascular Functions  Mental Functions  Voice/Speech Functions    Modification/Need for Assistance:  Yes to demonstrate activities visually     Intervention Selection:   Several,   Treatment Options       low  Based on PMHX, co morbidities , data from assessments and functional level of assistance required with task and clinical presentation directly impacting function.       Goals       Goals to be met in 4 weeks: (8/27/18)  1) Initiate Hep   2) Pt to increase AROM of wrist with supination and pronation and GH flexion and abduction by 5-10 degrees by 4 weeks.  3) Pt to increase / pinch strength by 5 pounds by 4 weeks.  4) Pt to decrease pain to less than or equal to 3/10 by 4 weeks.   5) Patient will be able to achieve less than or equal to 30% on the FOTO, demonstrating overall improved functional ability with upper extremity.      Goals to be met by discharge:  1) Independent with HEP  2) Pt to increase AROM of wrist with supination and pronation and GH flexion and abduction by 20 degrees by 4 weeks.  3) Pt to increase strength by 10 pounds or WNL as compared to RUE by d/c.    4) Patient will be able to achieve less than or equal to 20% on the FOTO, demonstrating overall improved functional ability with upper extremity.       Plan     Initiate skilled occupational therapy services 1x/week for 8-12 weeks from 7/27/2018 to 9/27/18.    Treatment interventions to include:  Heat modalities (49291 or 22925 or 96946)  Cold modalities (39781)  Pain management modalities (43483)  Scar management (46787 or 46617)  Edema control techniques (23791)  Manual therapy (50921)  Splinting (pending L code or 55064 or  32632)  Therapeutic exercises (53084)  Therapeutic activities (06155)  ADL training (42843 or 38212 or 92901)  Work simulation/Work conditioning (44539 or 39508)  Astym and/or IASTM (54526)  Strengthening and Endurance training (04849 or 39910 or 65994 or 36048)  Kinesiotaping (87423)  HEP instruction (38319)   NMES 26452 or (13913)  Patient/Caregiver instruction (28284)    Therapist: ANGEL Feliz, OTR/L     I certify the need for these services furnished under this plan of treatment and while under my care    ____________________________________                         __________________  Physician/Referring Practitioner                                               Date of Signature

## 2018-08-01 ENCOUNTER — HOSPITAL ENCOUNTER (OUTPATIENT)
Dept: RADIOLOGY | Facility: HOSPITAL | Age: 73
Discharge: HOME | End: 2018-08-01
Attending: FAMILY MEDICINE
Payer: COMMERCIAL

## 2018-08-01 ENCOUNTER — CLINICAL SUPPORT (OUTPATIENT)
Dept: REHABILITATION | Facility: HOSPITAL | Age: 73
End: 2018-08-01
Attending: FAMILY MEDICINE
Payer: MEDICARE

## 2018-08-01 DIAGNOSIS — R47.01 BROCA'S APHASIA: ICD-10-CM

## 2018-08-01 DIAGNOSIS — Z12.31 ENCOUNTER FOR SCREENING MAMMOGRAM FOR BREAST CANCER: ICD-10-CM

## 2018-08-01 PROCEDURE — 77063 BREAST TOMOSYNTHESIS BI: CPT | Mod: 26,,, | Performed by: RADIOLOGY

## 2018-08-01 PROCEDURE — G9160 LANG COMP GOAL STATUS: HCPCS | Mod: CI,PN

## 2018-08-01 PROCEDURE — G9159 LANG COMP CURRENT STATUS: HCPCS | Mod: CJ,PN

## 2018-08-01 PROCEDURE — 92523 SPEECH SOUND LANG COMPREHEN: CPT | Mod: PN

## 2018-08-01 PROCEDURE — 77067 SCR MAMMO BI INCL CAD: CPT | Mod: TC,PO

## 2018-08-01 PROCEDURE — 77067 SCR MAMMO BI INCL CAD: CPT | Mod: 26,,, | Performed by: RADIOLOGY

## 2018-08-01 NOTE — PLAN OF CARE
"Outpatient Neurological Rehabilitation  Speech and Language Therapy Evaluation    Date: 8/1/2018   Start Time:  1415  Stop Time:  1500    Name: Emilia Melgoza   MRN: [id number]    Therapy Diagnosis:   Encounter Diagnosis   Name Primary?    Broca's aphasia      Physician: Raisa Dudley MD  Physician Orders: ST eval and viral  Medical Diagnosis from Referral: CVA    Visit #/Visits authorized: 1/ 20  Date of Evaluation:  08/01/2018  Insurance Authorization Period: 07/13/2018-12/31/2018  Plan of Care Expiration:    08/01/2018-09/26/2018     Procedure Min.   Speech/Language Evaluation  45   Total Minutes: 45  Total Untimed Units: 1  Charges Billed/# of units: 1    Precautions:Standard and Fall  Subjective   Date of Onset: 1700-3520 per pt's daughter  History of Current Condition:   Pt's daughter reports her mother had 2 strokes back in [id number]. She stated the first one just made her "speech slurred" and "the second one made it worse." She stated she has severe expressive language impairments, however, her comprehension is good. Her daughter reported stated "some things come out really good and others we can't understand her." Her daughter also stated one of the pt's doctors mentioned an onset of dementia beginning.  Past Medical History: Emilia Melgoza  has a past medical history of Allergy; Asthma; DDD (degenerative disc disease), lumbar; Hypertension; Overactive bladder; Senile cataract, unspecified - Both Eyes (6/11/2013); and Stroke.  Emilia Melgoza  has a past surgical history that includes Hysterectomy; Incontinence surgery; Right Rotator Cuff Repair; Hemorrhoid surgery; Cataract extraction w/  intraocular lens implant (Right, 10/13/2014); Cataract extraction w/  intraocular lens implant (Left, 11/10/2014); Oophorectomy; and Breast biopsy (Left).  Medical Hx and Allergies:  Emilia has a current medication list which includes the following prescription(s): amlodipine, atorvastatin, cholecalciferol " (vitamin d3), clonidine, clopidogrel, escitalopram oxalate, generlac, ibuprofen, memantine, oxybutynin, potassium chloride sa, and tramadol.   Review of patient's allergies indicates:   Allergen Reactions    Lovenox [enoxaparin] Other (See Comments)     Slurred speech per patient    Lisinopril Other (See Comments)     Cough    Penicillins      Imaging: MRI scan and X-ray X-Ray 07/06/2018: The cardiomediastinal silhouette and hilar regions are unchanged.  Stable prominence of the right paratracheal stripe.  The bilateral lungs are symmetrically well expanded without large consolidation or new focal opacity.  No pleural effusion or pneumothorax.  Included osseous structures appear stable without acute or destructive process seen.  MRI /5/11/2017: There is no restricted diffusion to indicate acute infarct.    Periventricular white matter hyperintensity identified with areas of previous infarction distribution LEFT middle cerebral artery, deep LEFT periventricular white matter region, and RIGHT frontal region, appearance of which is not changed from 2013.  Baseline underlying chronic microvascular ischemic change also identified.  There is no hemorrhage or extra-axial collection.  The ventricles are normal in size and configuration.  The pituitary, brainstem, and cerebellum are unremarkable.  The major intracranial flow voids are patent.  MRI 09/10/2013: 9/10 Repeat MRI BRAIN: Acute infarction within the left corona radiata and basal ganglia; Age-appropriate generalized cerebral volume loss with moderate chronic microvascular ischemic disease; Homogenously enhancing extra-axial dural based lesion over the roof of the sphenoid sinus, most consistent with a small meningioma.    Prior Therapy:  OP ST in 2013 with [facility] per pt's daughter  Social History:  Pt lives with their family  Prior Level of Function: Independent  Current Level of Function: Assistance -  With ADLs  Pain:    "0/10  Pain Location / Description: n/a  Nutrition:  Regular diet, thin liquids  Patient Therapy Goals:  "speech"  Objective   Western Aphasia Battery - Revised (WAB-R) was administered to evaluate the patient's receptive and expressive language function.The purpose stated in the manual for the WAB-R is to determine the presence, severity, and type of aphasia; measure the patient's level of performance to provide a baseline for detecting change over time; provide a comprehensive assessment of the patients language assests and deficits in order to guide treatment and management; infer the location and etiology of the lesion causing the aphasia.  The following results were revealed:     Spontaneous Speech Score: 10 / 20  Auditory Comprehension Score: 9.15 / 10  Repetition Score:   7.3 /10  Naming and Word Finding Score: 7.2/ 10  Aphasia Quotient (AQ):   67.3 / 100  Aphasia Classification: Mild Broca's Aphasia    Description: Ms. Akers receptive language skills are mildly impaired c/b difficulty with complex y/n questions and moderate-complex commands. Pt was able to answer simple-moderate level y/n questions and follow simple commands with 100% acc. She completed auditory word recognition tasks with 97% acc (58/60). Pt's expressive language skills are moderately impaired. She completed repetition task with 73% acc. She was able to repeat monosyllabic words with no paraphasias, however, at the phrase and sentence level an increase in phonemic paraphasias were present. Pt completed confrontational naming with 85% acc. She requires minimal cues (phonemic), however, exhibited phonemic paraphasias which she would self-correct majority of the time (fife for knife). Ms. Melgoza demonstrated the ability to name 3 items in a concrete category given mod cues and 1 minute. She was able to complete closed ended sentences with 100% acc. Responsive speech tasks were completed with 80% acc. During spontaneous speech, pt was noted " to demonstrate dysarthria c/b by slurred speech, slow speech, and inconsistent errors. She also exhibits possible characteristics of apraxia of speech such as, inconsistent errors, mild facial grimacing, and halting speech. Phonemic paraphasias were consistent in Ms. Nieto's speech, however, she was aware of her mistakes and able to self-correct at times. Limited intelligible spontaneous speech was noted. Intelligibility increased when repeating SLP.    Functional Communication Measure (FCM):   Severity Modifier for Medicare G-Code:  Spoken Language Comprehension  Current status: FCM:  LEVEL 5: The individual is able to understand communication in structured conversations with both familiar and unfamiliar communication partners. The individual occasionally requires minimal cueing to understand more complex sentences/messages. The individual occasionally initiates the use of compensatory strategies when encountering difficulty.   -  CJ CJ at least 20% < 40% impaired, limited or restricted  Projected status:  FCM:   LEVEL 6: The individual is able to understand communication in most activities, but some limitations in comprehension are still apparent in vocational, avocational, and social activities. The individual rarely requires minimal cueing to understand complex sentences. The individual usually uses compensatory strategies when encountering difficulty.   -  CI CI at least 1% but less than 20% impaired, limited or restricted  Discharge status:  FCM:   n/a      Spoken Language Expression  Current status: FCM:  LEVEL 3 The communication partner must assume responsibility for structuring thecommunication exchange, and with consistent and moderate cueing, the individual canproduce words and phrases that are appropriate and meaningful in context.   -  CL CL at least 60% < 80% impaired, limited or restricted  Projected status:  FCM:   LEVEL 4: The individual is successfully able to initiate  communication using spoken language in simple, structured conversations in routine daily activities with familiar communication partners. The individual usually requires moderate cueing, but is able to demonstrate use of simple sentences (i.e., semantics, syntax, and morphology) and rarely uses complex sentences/messages.   -  CK CK at least 40% < 60% impaired, limited or restricted  Discharge status:  FCM:  n/a      Assessment   Emilia presents to [facility] s/p medical diagnosis of  CVA in 2013.  Demonstrates impairments including limitations as described in the problem list. she presents with mild broca's aphasia c/b mild receptive language impairments and moderate expressive language impairments. Positive prognostic factors include family support. Negative prognostic factors include length of time since incident.Barriers to progress include none at this time.  Patient will benefit from skilled, outpatient neurological rehabilitation speech therapy.    Rehab Potential: fair  Pt's spiritual, cultural and educational needs considered and pt agreeable to plan of care and goals.    Short Term Goals (4 weeks):   1. Pt will answer complex y/n questions with 90% acc ind'ly.  2. Pt will complete moderate level commands with 90% acc given min cues.   3. Pt will repeat 3-4 word phrases with 90% acc given min cues.   4. Pt will recall 3/4 motor speech strategies ind'ly.  5. Pt will participate in the Combined Aphasia and Apraxia of Speech Treatment (CAAST) with 3 verbs per session to improve verbal fluency and speech intelligibility.   6. Pt will name 10 items in a concrete category given 1 minute and min cues.  7. Pt will answer questions following a visual prompt with 80% acc given mod cues.   8. Pt will complete assessment of reading and writing with WAB-R Part 2.     Long Term Goals (8 weeks):   1. Pt will develop functional cognitive-linguistic based skills and utilize compensatory  strategies to communicate wants and needs effectively to different conversational partners, maintain safety and participate socially in functional living environment.      Plan   Plan of Care Certification: 8/1/2018  to 09/26/2018  Recommended Treatment Plan:  Patient will participate in the [facility] neurological rehabilitation program for speech therapy 2 times per week to address her  Communication and Motor Speech deficits, to educate patient and their family, and to participate in a home exercise program.    Other Recommendations: Follow up with PCP      Therapist's Name:   XOCHITL Blanco, CCC-SLP  Speech-Language Pathologist  Date: 8/1/2018

## 2018-08-02 PROBLEM — R47.01 BROCA'S APHASIA: Status: ACTIVE | Noted: 2018-08-02

## 2018-08-03 ENCOUNTER — CLINICAL SUPPORT (OUTPATIENT)
Dept: REHABILITATION | Facility: HOSPITAL | Age: 73
End: 2018-08-03
Attending: FAMILY MEDICINE
Payer: MEDICARE

## 2018-08-03 PROCEDURE — 97112 NEUROMUSCULAR REEDUCATION: CPT | Mod: PN

## 2018-08-03 PROCEDURE — 97110 THERAPEUTIC EXERCISES: CPT | Mod: PN

## 2018-08-03 PROCEDURE — 97140 MANUAL THERAPY 1/> REGIONS: CPT | Mod: PN

## 2018-08-03 NOTE — PROGRESS NOTES
Occupational Therapy Daily Note - Hand, Wrist, and/or Elbow Condition      Date: 8/3/18  Name: Emilia Melgoza  YOB: 1945  Chart Number: [id number]  Referring Physician: Raisa Dudley MD  Diagnosis:   1. Weakness as late effect of cerebrovascular accident (CVA)      2. Coordination impairment         ICD 10:   I69.351 (ICD-10-CM) - Hemiparesis affecting right side as late effect of cerebrovascular accident   G31.84 (ICD-10-CM) - MCI (mild cognitive impairment)         Involved Side: right  Hand Dominance: ambidextrous   Date of Onset:   Date of Injury/Surgery:  RTC repair right shoulder   Surgical Procedure: No recent surgery's   Mechanism of Injury: Stroke  Additional Info: Pt is accompanied by daughter who provided PMH  Date of Return to MD:    Prior Functional Status: Prior to stroke full. Current status limited  Occupation: retired  Employer: retired   Job Duties/Responsibilities: NA  Current Work Status: NA  Home Environment: Pt lives with  she has five children who all take care of mother  Leisure/Social Activities: Playing computer, going outside   Cultural/Spiritual: no barriers  Barriers to Learning: significant aphasia, gait difficulty, praxis   Hearing/Vision Loss: WNL      Visit #: . Visits  on 18.     Subjective     Pt nods in confirmation that she has been doing her stretches     Pain Report (severity and location)  Current: 0 out of 10  With activity: 0 out of 10   Report of Functional Deficits/Chief Complaints: Pt reports no pain     Objective   Observation: pt has guarded posture with right UE      Treatment   PROM stretching to GH joint in supine on high low table to pt patricia.   Scapular mobilizations to patient patricia. Supine  Scapular retractions x 20 red thera band  Wand overhead 2 lb x 20    Towel slide stretch to GH joint hand to Loosen GH joint into flexion  Lifting 2 lb ball to hoop and drop with involved UE  Corner stretch x 20 second hold x  2  Red thera-band external rotation in seated, chest press seated, horizontal abduction adduction x 10 each     Charges   Start Time: 7:00 am   End Time: 8:40 am  Total Time: 40    Initial eval-64425    Fluidotherapy-42500     Paraffin-85386     Moist Hot Pack-97010 X 8    Ultrasound-26672     Therapeutic Exercise-97110  x17   Therapeutic Activity-82370     Manual Therapy-97140 X 15 min         Assessment   Patient is a 72 y.o. year old female with a diagnosis of CVA with right side affected. The patient continues to have hypertrophied upper trap. Body mechanics and cueing addressed with manual therapy and patient assist. Functional use of the left UE demonstrated ability to reach to eye level holding a 2 lb weight with good grasp and release pattern following cueing and demonstration of reverse tenodesis. Therapy services recommended to continue at this time.       Plan      Cont. skilled occupational therapy services 1x/week for 8-12 weeks from 7/27/2018 to 9/27/18.   ADD fine motor activities next session.        Therapist: ANGEL Feliz, OTR/L      I certify the need for these services furnished under this plan of treatment and while under my care

## 2018-08-15 ENCOUNTER — CLINICAL SUPPORT (OUTPATIENT)
Dept: REHABILITATION | Facility: HOSPITAL | Age: 73
End: 2018-08-15
Attending: FAMILY MEDICINE
Payer: MEDICARE

## 2018-08-15 DIAGNOSIS — R47.01 BROCA'S APHASIA: Primary | ICD-10-CM

## 2018-08-15 PROCEDURE — 92507 TX SP LANG VOICE COMM INDIV: CPT | Mod: PN

## 2018-08-15 NOTE — PROGRESS NOTES
"Outpatient Neurological Rehabilitation   Speech and Language Therapy Daily Note  Date:  8/15/2018     Start Time:  1104  Stop Time:  1145    Name: Emilia Melgoza   MRN: [id number]   Therapy Diagnosis: No diagnosis found.   Physician: Raisa Dudley MD  Physician Orders: ST eval and treat  Medical Diagnosis from Referral: CVA     Visit #/Visits authorized: 1/ 20  Date of Evaluation:  08/01/2018  Insurance Authorization Period: 07/13/2018-12/31/2018  Plan of Care Expiration:    08/01/2018-09/26/2018                Procedure Min.   Speech/Language Evaluation  41   Total Minutes: 41  Total Untimed Units: 1  Charges Billed/# of units: 1     Precautions:Standard and Fall    Subjective:   Pt reports: "I am doing fine."   She  was compliant to home exercise program.   Response to previous treatment: "fine fine"   Pain Scale:  0/10 on VAS currently.   Pain Location: n/a  Objective:     UNTIMED  Procedure Min.   Speech- Language- Voice Therapy    41   Total Minutes: 41  Total Timed Units: 0  Total Untimed Units: 1  Charges Billed/# of units: 1    Short Term Goals: (4 weeks) Current Progress:   1. Pt will answer complex y/n questions with 90% acc ind'ly. WAB-R reading and writing portions completed   2. Pt will complete moderate level commands with 90% acc given min cues.  WAB-R reading and writing portions completed   3. Pt will repeat 3-4 word phrases with 90% acc given min cues.  WAB-R reading and writing portions completed   4. Pt will recall 3/4 motor speech strategies ind'ly. WAB-R reading and writing portions completed   5. Pt will participate in the Combined Aphasia and Apraxia of Speech Treatment (CAAST) with 3 verbs per session to improve verbal fluency and speech intelligibility.  WAB-R reading and writing portions completed   6. Pt will name 10 items in a concrete category given 1 minute and min cues. WAB-R reading and writing portions completed   7. Pt will answer questions following a visual prompt with 80% " acc given mod cues.  WAB-R reading and writing portions completed   8. 8. Pt will complete assessment of reading and writing with WAB-R Part 2.  Pt completed WAB-R reading and writing portions today. She demonstrated mild reading deficits c/b difficultly with complex reading material. Her writing skills were judged to be severely impaired c/b limited legibility and difficulty with spelling and copying words. Pt became increasingly frustrated during writing tasks.        Patient Education/Response:   Pt educated on assessing reading and writing skills and POC. Pt verbalized understanding.    Written Home Exercises Provided: no.  Exercises were reviewed and Emilia was able to demonstrate them prior to the end of the session.  Emilia demonstrated fair  understanding of the education provided.     See EMR under Patient Instructions for exercises provided at next session..  Assessment:   Emilia is progressing well towards her goals.  Pt completed WAB-R reading and writing portions today. She demonstrated mild reading deficits c/b difficultly with complex reading material. Her writing skills were judged to be severely impaired c/b limited legibility and difficulty with spelling and copying words. Pt became increasingly frustrated during writing tasks. Current goals remain appropriate. Goals to be updated as necessary.   Pt prognosis is Fair. Pt will continue to benefit from skilled outpatient speech and language therapy to address the deficits listed in the problem list on initial evaluation, provide pt/family education and to maximize pt's level of independence in the home and community environment.     Medical necessity is demonstrated by the following IMPAIRMENTS:  Utilize compensatory strategies to communicate wants and needs effectively to different conversational partners, maintain safety and participate socially in functional living environment.   Barriers to Therapy: transportation  Pt's spiritual, cultural and  educational needs considered and pt agreeable to plan of care and goals.    Functional Communication Measure (FCM):   Severity Modifier for Medicare G-Code:  Spoken Language Comprehension  Current status: FCM:  LEVEL 5: The individual is able to understand communication in structured conversations with both familiar and unfamiliar communication partners. The individual occasionally requires minimal cueing to understand more complex sentences/messages. The individual occasionally initiates the use of compensatory strategies when encountering difficulty.   -  CJ CJ at least 20% < 40% impaired, limited or restricted  Projected status:  FCM:   LEVEL 6: The individual is able to understand communication in most activities, but some limitations in comprehension are still apparent in vocational, avocational, and social activities. The individual rarely requires minimal cueing to understand complex sentences. The individual usually uses compensatory strategies when encountering difficulty.   -  CI CI at least 1% but less than 20% impaired, limited or restricted  Discharge status:  FCM:   n/a        Spoken Language Expression  Current status: FCM:  LEVEL 3 The communication partner must assume responsibility for structuring thecommunication exchange, and with consistent and moderate cueing, the individual canproduce words and phrases that are appropriate and meaningful in context.   -  CL CL at least 60% < 80% impaired, limited or restricted  Projected status:  FCM:   LEVEL 4: The individual is successfully able to initiate communication using spoken language in simple, structured conversations in routine daily activities with familiar communication partners. The individual usually requires moderate cueing, but is able to demonstrate use of simple sentences (i.e., semantics, syntax, and morphology) and rarely uses complex sentences/messages.   -  CK CK at least 40% < 60% impaired, limited or  restricted  Discharge status:  FCM:  n/a  Plan:   Continue POC with focus on expressive and receptive language skills.    SONU Blanco., CCC-SLP  Speech-Language Pathologist  8/15/2018

## 2018-08-17 ENCOUNTER — CLINICAL SUPPORT (OUTPATIENT)
Dept: REHABILITATION | Facility: HOSPITAL | Age: 73
End: 2018-08-17
Attending: FAMILY MEDICINE
Payer: MEDICARE

## 2018-08-17 DIAGNOSIS — R47.01 BROCA'S APHASIA: ICD-10-CM

## 2018-08-17 PROCEDURE — 97140 MANUAL THERAPY 1/> REGIONS: CPT | Mod: PN

## 2018-08-17 PROCEDURE — 97110 THERAPEUTIC EXERCISES: CPT | Mod: PN,59

## 2018-08-17 PROCEDURE — 92507 TX SP LANG VOICE COMM INDIV: CPT | Mod: PN

## 2018-08-17 NOTE — PROGRESS NOTES
Occupational Therapy Daily Note - Hand, Wrist, and/or Elbow Condition      Date: 2018    Name: Emilia Melgoza  YOB: 1945  Chart Number: [id number]  Referring Physician: Raisa Dudley MD  Diagnosis:   1. Weakness as late effect of cerebrovascular accident (CVA)      2. Coordination impairment         ICD 10:   I69.351 (ICD-10-CM) - Hemiparesis affecting right side as late effect of cerebrovascular accident   G31.84 (ICD-10-CM) - MCI (mild cognitive impairment)         Involved Side: right  Hand Dominance: ambidextrous   Date of Onset:   Date of Injury/Surgery:  RTC repair right shoulder   Surgical Procedure: No recent surgery's   Mechanism of Injury: Stroke  Additional Info: Pt is accompanied by daughter who provided PMH  Date of Return to MD:    Prior Functional Status: Prior to stroke full. Current status limited  Occupation: retired  Employer: retired   Job Duties/Responsibilities: NA  Current Work Status: NA  Home Environment: Pt lives with  she has five children who all take care of mother  Leisure/Social Activities: Playing computer, going outside   Cultural/Spiritual: no barriers  Barriers to Learning: significant aphasia, gait difficulty, praxis   Hearing/Vision Loss: WNL      Visit #: . Visits  on 18.     Subjective     Pt nods to confirm she is feeling well    Pain Report (severity and location)  Current: 0 out of 10  With activity: 0 out of 10   Report of Functional Deficits/Chief Complaints: Pt reports no pain     Objective   Observation: pt has guarded posture with right UE      Treatment   PROM stretching to GH joint in supine on high low table to pt patricia.   Side lying external rotation against gravity. +scapular mobilizations Grade 2  Scapular mobilizations to patient patricia. Supine  Scapular retractions x 20 red thera band  Wand overhead 2 lb x 20    Towel slide stretch to GH joint hand to Loosen GH joint into flexion  Lifting 2 lb ball to hoop  and drop with involved UE  Corner stretch x 20 second hold x 2  Arm extensions with green thera-band  Reaching overhead into shelf x 20 with weighs 2-5 lbs   Big red ball rolling on table for GH flexion stretch x 3min  Red thera-band external rotation in seated, chest press seated, horizontal abduction adduction x 10 each   Charges   Start Time: 10:30 am   End Time: 11:10 am  Total Time: 40 group     Initial eval-[id number]    Fluidotherapy-[id number]     Paraffin-[id number]     Moist Hot Pack-[id number]    Ultrasound-18926     Therapeutic Exercise-97110  x25   Therapeutic Activity-20343     Manual Therapy-97140 X 15 min         Assessment   Patient is a 72 y.o. year old female with a diagnosis of CVA with right side affected. Pt remains protracted and elevated in the scapulae. Mobilization cont. To be well patricia. Overall pt has good functionall reach but continues to require cueing to utilize right. Therapy services recommended to continue at this time.       Plan      Cont. skilled occupational therapy services 1x/week for 8-12 weeks from 7/27/2018 to 9/27/18.   ADD fine motor activities next session.        Therapist: ANGEL Feliz, OTR/L      I certify the need for these services furnished under this plan of treatment and while under my care

## 2018-08-17 NOTE — PROGRESS NOTES
"Outpatient Neurological Rehabilitation   Speech and Language Therapy Daily Note  Date:  8/17/2018     Start Time:  905  Stop Time:  945     Name: Emilia Melgoza   MRN: [id number]   Therapy Diagnosis:   Encounter Diagnosis   Name Primary?    Broca's aphasia       Physician: Raisa Dudley MD  Physician Orders: ST eval and treat  Medical Diagnosis from Referral: CVA     Visit #/Visits authorized: 3/ 20  Date of Evaluation:  08/01/2018  Insurance Authorization Period: 07/13/2018-12/31/2018  Plan of Care Expiration:    08/01/2018-09/26/2018  Precautions:Standard and Fall    Subjective:   Pt reports: "yes indeed". Pt reports that she sleeps all day long  She  was compliant to home exercise program.   Response to previous treatment: "oh lord"   Pain Scale:  0/10 on VAS currently.   Pain Location: n/a  Objective:   UNTIMED  Procedure Min.   Speech- Language- Voice Therapy    40   Total Minutes: 40  Total Timed Units: 0  Total Untimed Units: 1  Charges Billed/# of units: 1    Short Term Goals: (4 weeks) Current Progress:   1. Pt will answer complex y/n questions with 90% acc ind'ly. 3 unit y/n with 80% acc with post it notes with yes and no visible, 90% acc using post it notes and with phonemic cues.    2. Pt will complete moderate level commands with 90% acc given min cues.  2 unit commands with 100% acc indly  3 unit commands with 100% acc indly   Multiunit commands with 70% acc indly - particular difficulty with if/then commands.    3. Pt will repeat 3-4 word phrases with 90% acc given min cues.  Pt repeated 3-4 word phrases with 80% acc  Given consistent cues to "open mouth wide" for improved intelligibility.     4. Pt will recall 3/4 motor speech strategies ind'ly. Introduced "open mouth" strategy for improved articulation. Will reviewed additional strategies next session.    5. Pt will participate in the Combined Aphasia and Apraxia of Speech Treatment (CAAST) with 3 verbs per session to improve verbal fluency " and speech intelligibility.  See below   6. Pt will name 10 items in a concrete category given 1 minute and min cues. Not formally addressed     7. Pt will answer questions following a visual prompt with 80% acc given mod cues.  Not formally addressed       Combined Aphasia and Apraxia of Speech Treatment (CAAST) Protocol: target verb:  is  · Step 1: pt described action picture using a 6 word utterance  · Step 2: pt identified appropriate part of sentence of generated utterance for 0/6 words in utterance  · Step 3: pt answered wh- question to expand original utterance with maximum verbal cues producing a 11 word utterance.  · Step 4: SLP reinforced patient's production from Step 3 and printed response in sentence frame  · Step 5: Pt repeated combined production of Step 1 and 3 with errors; errors were not corrected with visual cues on written stimuli; and repeated sentence x3 with maximum integral stimulation  · Step 6: patient described action picture using a 4 word utterance.     Combined Aphasia and Apraxia of Speech Treatment (CAAST) Protocol: target verb:  Vacuum   · Step 1: pt described action picture using a 3 word utterance  · Step 2: pt identified appropriate part of sentence of generated utterance for 1/3 words in utterance  · Step 3: pt answered wh- question to expand original utterance with moderate verbal cues producing a 8 word utterance.  · Step 4: SLP reinforced patient's production from Step 3 and printed response in sentence frame  · Step 5: Pt repeated combined production of Step 1 and 3 with errors; errors were not corrected with visual cues on written stimuli; and repeated sentence x3 with moderate integral stimulation     Combined Aphasia and Apraxia of Speech Treatment (CAAST) Protocol: target verb:  looked  · Step 1: pt described action picture using a 5 word utterance  · Step 2: pt identified appropriate part of sentence of generated utterance for 5/5 words in utterance  · Step 3: pt  "answered wh- question to expand original utterance with moderate verbal cues producing a 6 word utterance.  · Step 4: SLP reinforced patient's production from Step 3 and printed response in sentence frame  · Step 5: Pt repeated combined production of Step 1 and 3 with errors; errors were not corrected with visual cues on written stimuli; and repeated sentence x3 with minimum integral stimulation  · Step 6: patient described action picture using a 5 word utterance.      Goals Met:   8. Pt will complete assessment of reading and writing with WAB-R Part 2. Goal Met 8/15/18 / Discontinue   Patient Education/Response:   "open mouth" strategy for motor speech reviewed. Pt verbalized understanding.    Written Home Exercises Provided: no.  Assessment:   Emilia is progressing well towards her goals.  Pt intelligibility increased with cues to "open mouth". Current goals remain appropriate. Goals to be updated as necessary.   Pt prognosis is Fair. Pt will continue to benefit from skilled outpatient speech and language therapy to address the deficits listed in the problem list on initial evaluation, provide pt/family education and to maximize pt's level of independence in the home and community environment.     Medical necessity is demonstrated by the following IMPAIRMENTS:  Utilize compensatory strategies to communicate wants and needs effectively to different conversational partners, maintain safety and participate socially in functional living environment.   Barriers to Therapy: transportation  Pt's spiritual, cultural and educational needs considered and pt agreeable to plan of care and goals.    Functional Communication Measure (FCM):   Severity Modifier for Medicare G-Code:  Spoken Language Comprehension  Current status: FCM:  LEVEL 5: The individual is able to understand communication in structured conversations with both familiar and unfamiliar communication partners. The individual occasionally requires minimal cueing " to understand more complex sentences/messages. The individual occasionally initiates the use of compensatory strategies when encountering difficulty.   -  CJ CJ at least 20% < 40% impaired, limited or restricted  Projected status:  FCM:   LEVEL 6: The individual is able to understand communication in most activities, but some limitations in comprehension are still apparent in vocational, avocational, and social activities. The individual rarely requires minimal cueing to understand complex sentences. The individual usually uses compensatory strategies when encountering difficulty.   -  CI CI at least 1% but less than 20% impaired, limited or restricted  Discharge status:  FCM:   n/a        Spoken Language Expression  Current status: FCM:  LEVEL 3 The communication partner must assume responsibility for structuring thecommunication exchange, and with consistent and moderate cueing, the individual canproduce words and phrases that are appropriate and meaningful in context.   -  CL CL at least 60% < 80% impaired, limited or restricted  Projected status:  FCM:   LEVEL 4: The individual is successfully able to initiate communication using spoken language in simple, structured conversations in routine daily activities with familiar communication partners. The individual usually requires moderate cueing, but is able to demonstrate use of simple sentences (i.e., semantics, syntax, and morphology) and rarely uses complex sentences/messages.   -  CK CK at least 40% < 60% impaired, limited or restricted  Discharge status:  FCM:  n/a  Plan:   Continue POC with focus on expressive and receptive language skills.    ELLEN Caruso, CCC-SLP  Speech Language Pathologist   8/17/2018

## 2018-08-20 DIAGNOSIS — I63.9 CEREBRAL INFARCTION: ICD-10-CM

## 2018-08-20 RX ORDER — CLOPIDOGREL BISULFATE 75 MG/1
TABLET ORAL
Qty: 90 TABLET | Refills: 3 | Status: SHIPPED | OUTPATIENT
Start: 2018-08-20 | End: 2019-01-31 | Stop reason: SDUPTHER

## 2018-08-22 ENCOUNTER — CLINICAL SUPPORT (OUTPATIENT)
Dept: REHABILITATION | Facility: HOSPITAL | Age: 73
End: 2018-08-22
Attending: FAMILY MEDICINE
Payer: MEDICARE

## 2018-08-22 DIAGNOSIS — R47.01 BROCA'S APHASIA: Primary | ICD-10-CM

## 2018-08-22 PROCEDURE — 92507 TX SP LANG VOICE COMM INDIV: CPT | Mod: PN
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
## 2018-08-23 NOTE — PROGRESS NOTES
"Outpatient [facility]rological Rehabilitation   Speech and Language Therapy Daily Note  Date:  8/24/2018     Start Time:  1035  Stop Time:  1115     Name: Emilia Melgoza   MRN: [id number]   Therapy Diagnosis:   Encounter Diagnosis   Name Primary?    Broca's aphasia       Physician: Raisa Dudley MD  Physician Orders: ST eval and treat  Medical Diagnosis from Referral: CVA     Visit #/Visits authorized: 5/ 20  Visits cancelled: 1   Visits no show: 0   Date of Evaluation:  08/01/2018  Insurance Authorization Period: 07/13/2018-12/31/2018  Plan of Care Expiration:    08/01/2018-09/26/2018  Precautions:Standard and Fall    Subjective:   Pt reports: "absolutely nothing"   Response to previous treatment: "yes indeed"   Pain Scale:  0/10 on VAS currently.   Pain Location: n/a  Objective:   UNTIMED  Procedure Min.   Speech- Language- Voice Therapy    40   Total Minutes: 40  Total Timed Units: 0  Total Untimed Units: 1  Charges Billed/# of units: 1    Short Term Goals: (4 weeks) Current Progress:   1. Pt will answer complex y/n questions with 90% acc ind'ly. 3 unit y/n with 90% acc with post it notes with yes and no visible METx2 3 unit   2. Pt will complete moderate levl commands with 90% acc given min cues.  Pt followed commands with 70% acc indly, 90% acc  Given verbal cues   3. Pt will repeat 3-4 word phrases with 90% acc given min cues.  Not formally addressed     4. Pt will recall 3/4 motor speech strategies ind'ly. Introduced "open mouth" strategy for improved articulation.     Pt used motor speech strategies to read phrases with 44% acc indly, 100% acc given a model and verbal cues    5. Pt will participate in the Combined Aphasia and Apraxia of Speech Treatment (CAAST) with 3 verbs per session to improve verbal fluency and speech intelligibility.  Not formally addressed     6. Pt will name 10 items in a concrete category given 1 minute and min cues. 6 items given max cues    7. Pt will answer questions following a " "visual prompt with 80% acc given mod cues.  Not formally addressed        Goals Met:   8. Pt will complete assessment of reading and writing with WAB-R Part 2. Goal Met 8/15/18 / Discontinue   Patient Education/Response:   "open mouth" strategy for motor speech reviewed again with pt with other motor speech strategies. Pt verbalized understanding.    Written Home Exercises Provided: no.  Assessment:   Emilia is progressing well towards her goals.  Pt intelligibility increased with cues to "open mouth".increased utterance length in conversation when given increased time to answer.  Current goals remain appropriate. Goals to be updated as necessary.   Pt prognosis is Fair. Pt will continue to benefit from skilled outpatient speech and language therapy to address the deficits listed in the problem list on initial evaluation, provide pt/family education and to maximize pt's level of independence in the home and community environment.     Medical necessity is demonstrated by the following IMPAIRMENTS:  Utilize compensatory strategies to communicate wants and needs effectively to different conversational partners, maintain safety and participate socially in functional living environment.   Barriers to Therapy: transportation  Pt's spiritual, cultural and educational needs considered and pt agreeable to plan of care and goals.  Plan:   Continue POC with focus on expressive and receptive language skills.    ELLEN Caruso, CCC-SLP  Speech Language Pathologist   8/24/2018     "

## 2018-08-24 ENCOUNTER — CLINICAL SUPPORT (OUTPATIENT)
Dept: REHABILITATION | Facility: HOSPITAL | Age: 73
End: 2018-08-24
Attending: FAMILY MEDICINE
Payer: MEDICARE

## 2018-08-24 DIAGNOSIS — R47.01 BROCA'S APHASIA: ICD-10-CM

## 2018-08-24 PROCEDURE — 92507 TX SP LANG VOICE COMM INDIV: CPT | Mod: PN

## 2018-08-28 ENCOUNTER — TELEPHONE (OUTPATIENT)
Dept: FAMILY MEDICINE | Facility: CLINIC | Age: 73
End: 2018-08-28

## 2018-08-28 NOTE — TELEPHONE ENCOUNTER
----- Message from Raisa Dudley MD sent at 8/27/2018 11:55 AM CDT -----  Please contact patient and inform that her mammogram was normal.  She will repeat her screening mammogram in 1 year.

## 2018-08-28 NOTE — TELEPHONE ENCOUNTER
Spoke to patients daughter Julissa, relayed message from provider, daughter verbalized understanding states she will inform patient.

## 2018-08-31 ENCOUNTER — CLINICAL SUPPORT (OUTPATIENT)
Dept: REHABILITATION | Facility: HOSPITAL | Age: 73
End: 2018-08-31
Attending: FAMILY MEDICINE
Payer: MEDICARE

## 2018-08-31 DIAGNOSIS — R47.01 BROCA'S APHASIA: ICD-10-CM

## 2018-08-31 PROCEDURE — 92507 TX SP LANG VOICE COMM INDIV: CPT | Mod: PN

## 2018-08-31 PROCEDURE — 97110 THERAPEUTIC EXERCISES: CPT | Mod: PN

## 2018-08-31 PROCEDURE — 97140 MANUAL THERAPY 1/> REGIONS: CPT | Mod: PN

## 2018-08-31 PROCEDURE — 97010 HOT OR COLD PACKS THERAPY: CPT | Mod: PN

## 2018-08-31 NOTE — PROGRESS NOTES
"Outpatient Neurological Rehabilitation   Speech and Language Therapy Daily Note  Date:  8/31/2018     Start Time:  1052   Stop Time:  1116     Name: Emilia Melgoza   MRN: [id number]   Therapy Diagnosis:   Encounter Diagnosis   Name Primary?    Broca's aphasia       Physician: Raisa Dudley MD  Physician Orders: ST eval and treat  Medical Diagnosis from Referral: CVA     Visit #/Visits authorized: 6/ 20  Visits cancelled: 1   Visits no show: 0   Date of Evaluation:  08/01/2018  Insurance Authorization Period: 07/13/2018-12/31/2018  Plan of Care Expiration:    08/01/2018-09/26/2018  Precautions:Standard and Fall    Subjective:   Pt reports: with cue to speak in sentences "I did nothin". Pt arrived 20 minutes late to OT and began ST late.  Response to previous treatment: "yes indeed"   Pain Scale:  0/10 on VAS currently.   Pain Location: n/a  Objective:   UNTIMED  Procedure Min.   Speech- Language- Voice Therapy    24   Total Minutes: 24  Total Timed Units: 0  Total Untimed Units: 1  Charges Billed/# of units: 1    Short Term Goals: (4 weeks) Current Progress:   1. Pt will answer complex y/n questions with 90% acc ind'ly. 3 unit y/n with 90% acc indly METx3 3 unit  Pt answered multiunit commands with 90% acc indly.    2. Pt will complete moderate levl commands with 90% acc given min cues.  Pt followed 3 unit commands with pictures with 50% acc indly, 60% acc given a repetition, 75% acc givn a verbal cues and a repetition   3. Pt will repeat 3-4 word phrases with 90% acc given min cues.  100% acc indly.   4. Pt will recall 3/4 motor speech strategies ind'ly. Not formally addressed     5. Pt will participate in the Combined Aphasia and Apraxia of Speech Treatment (CAAST) with 3 verbs per session to improve verbal fluency and speech intelligibility.  Not formally addressed     6. Pt will name 10 items in a concrete category given 1 minute and min cues. 1 item indly, 6 items given max verbal cues   7. Pt will answer " "questions following a visual prompt with 80% acc given mod cues.  Not formally addressed        Goals Met:   8. Pt will complete assessment of reading and writing with WAB-R Part 2. Goal Met 8/15/18 / Discontinue   Patient Education/Response:   Pt performance and progress reviewed.. Pt verbalized understanding.    Written Home Exercises Provided: no.  Assessment:   Emilia is progressing well towards her goals. Pt with longer utterances following cues to "take a deep breath" and "speak in sentences"  Current goals remain appropriate. Goals to be updated as necessary.   Pt prognosis is Fair. Pt will continue to benefit from skilled outpatient speech and language therapy to address the deficits listed in the problem list on initial evaluation, provide pt/family education and to maximize pt's level of independence in the home and community environment.     Medical necessity is demonstrated by the following IMPAIRMENTS:  Utilize compensatory strategies to communicate wants and needs effectively to different conversational partners, maintain safety and participate socially in functional living environment.   Barriers to Therapy: transportation  Pt's spiritual, cultural and educational needs considered and pt agreeable to plan of care and goals.  Plan:   Continue POC with focus on expressive and receptive language skills.    ELLEN Caruso, CCC-SLP  Speech Language Pathologist   8/31/2018   "

## 2018-09-05 ENCOUNTER — CLINICAL SUPPORT (OUTPATIENT)
Dept: REHABILITATION | Facility: HOSPITAL | Age: 73
End: 2018-09-05
Attending: FAMILY MEDICINE
Payer: MEDICARE

## 2018-09-05 DIAGNOSIS — R27.8 COORDINATION IMPAIRMENT: ICD-10-CM

## 2018-09-05 DIAGNOSIS — I69.998 WEAKNESS AS LATE EFFECT OF CEREBROVASCULAR ACCIDENT (CVA): ICD-10-CM

## 2018-09-05 DIAGNOSIS — R53.1 WEAKNESS AS LATE EFFECT OF CEREBROVASCULAR ACCIDENT (CVA): ICD-10-CM

## 2018-09-05 DIAGNOSIS — R47.01 BROCA'S APHASIA: Primary | ICD-10-CM

## 2018-09-05 PROCEDURE — G8984 CARRY CURRENT STATUS: HCPCS | Mod: CL,PN

## 2018-09-05 PROCEDURE — 92507 TX SP LANG VOICE COMM INDIV: CPT | Mod: PN

## 2018-09-05 PROCEDURE — G8985 CARRY GOAL STATUS: HCPCS | Mod: CL,PN

## 2018-09-05 PROCEDURE — 97112 NEUROMUSCULAR REEDUCATION: CPT | Mod: PN

## 2018-09-05 NOTE — PROGRESS NOTES
"Outpatient Neurological Rehabilitation   Speech and Language Therapy Daily Note  Date:  9/5/2018     Start Time:  1015   Stop Time:  1100     Name: Emilia Melgoza   MRN: [id number]   Therapy Diagnosis:   No diagnosis found.   Physician: Raisa Dudley MD  Physician Orders: ST eval and treat  Medical Diagnosis from Referral: CVA     Visit #/Visits authorized: 7/ 20  Visits cancelled: 1   Visits no show: 0   Date of Evaluation:  08/01/2018  Insurance Authorization Period: 07/13/2018-12/31/2018  Plan of Care Expiration:    08/01/2018-09/26/2018  Precautions:Standard and Fall    Subjective:   Pt reports: feeling well today and no pain.  Response to previous treatment: "yes indeed"   Pain Scale:  0/10 on VAS currently.   Pain Location: n/a  Objective:   UNTIMED  Procedure Min.   Speech- Language- Voice Therapy    45   Total Minutes: 45  Total Timed Units: 0  Total Untimed Units: 1  Charges Billed/# of units: 1    Short Term Goals: (4 weeks) Current Progress:   1. Pt will answer complex y/n questions with 90% acc ind'ly. Pt answered multi-unit y/n questions with 80% acc independently and with 90% acc given mod cues.   2. Pt will complete moderate levl commands with 90% acc given min cues.  Pt followed multi-unit complex commands with 60% acc independently and with 80% acc given mod cues.    Pt followed 3 unit complex commands with 50% acc independently and with 100% given min cues.   3. Pt will repeat 3-4 word phrases with 90% acc given min cues.  5-7 word sentences: 80% acc independently and 100% acc given min cues.   4. Pt will recall 3/4 motor speech strategies ind'ly. Not formally addressed     5. Pt will participate in the Combined Aphasia and Apraxia of Speech Treatment (CAAST) with 3 verbs per session to improve verbal fluency and speech intelligibility.  Not formally addressed     6. Pt will name 10 items in a concrete category given 1 minute and min cues. Named 4 animals given a minute and additional 5 given " max verbal cues.    Named 2 foods given a minute and additional 6 given max verbal cues.   7. Pt will answer questions following a visual prompt with 80% acc given mod cues.  Not formally addressed        Goals Met:   8. Pt will complete assessment of reading and writing with WAB-R Part 2. Goal Met 8/15/18 / Discontinue   Patient Education/Response:   Pt performance, strategies, and progress reviewed. Pt verbalized understanding and agreement that speech strategies were helping.    Written Home Exercises Provided: no.  Assessment:   Emilia is progressing well towards her goals. She showed improvement in her ability to name items in a category given a minute as well as her word fluency when repeating 5-7 word sentences. Current goals remain appropriate. Goals to be updated as necessary.   Pt prognosis is Fair. Pt will continue to benefit from skilled outpatient speech and language therapy to address the deficits listed in the problem list on initial evaluation, provide pt/family education and to maximize pt's level of independence in the home and community environment.     Medical necessity is demonstrated by the following IMPAIRMENTS:  Utilize compensatory strategies to communicate wants and needs effectively to different conversational partners, maintain safety and participate socially in functional living environment.   Barriers to Therapy: transportation  Pt's spiritual, cultural and educational needs considered and pt agreeable to plan of care and goals.  Plan:   Continue POC with focus on expressive and receptive language skills.    QUIQUE Leon.   Clinician  Speech-Language Pathology  09/05/2018

## 2018-09-05 NOTE — PROGRESS NOTES
"Outpatient Neurological Rehabilitation   Speech and Language Therapy Daily Note  Date:  9/5/2018     Start Time:  1052   Stop Time:  1116     Name: Emilia Melgoza   MRN: [id number]   Therapy Diagnosis:   No diagnosis found.   Physician: Raisa Dudley MD  Physician Orders: ST eval and treat  Medical Diagnosis from Referral: CVA     Visit #/Visits authorized: 6/ 20  Visits cancelled: 1   Visits no show: 0   Date of Evaluation:  08/01/2018  Insurance Authorization Period: 07/13/2018-12/31/2018  Plan of Care Expiration:    08/01/2018-09/26/2018  Precautions:Standard and Fall    Subjective:   Pt reports: with cue to speak in sentences "I did nothin". Pt arrived 20 minutes late to OT and began ST late.  Response to previous treatment: "yes indeed"   Pain Scale:  0/10 on VAS currently.   Pain Location: n/a  Objective:   UNTIMED  Procedure Min.   Speech- Language- Voice Therapy    24   Total Minutes: 24  Total Timed Units: 0  Total Untimed Units: 1  Charges Billed/# of units: 1    Short Term Goals: (4 weeks) Current Progress:   1. Pt will answer complex y/n questions with 90% acc ind'ly. 3 unit y/n with 90% acc indly METx3 3 unit  Pt answered multiunit commands with 90% acc indly.    2. Pt will complete moderate levl commands with 90% acc given min cues.  Pt followed 3 unit commands with pictures with 50% acc indly, 60% acc given a repetition, 75% acc givn a verbal cues and a repetition   3. Pt will repeat 3-4 word phrases with 90% acc given min cues.  100% acc indly.   4. Pt will recall 3/4 motor speech strategies ind'ly. Not formally addressed     5. Pt will participate in the Combined Aphasia and Apraxia of Speech Treatment (CAAST) with 3 verbs per session to improve verbal fluency and speech intelligibility.  Not formally addressed     6. Pt will name 10 items in a concrete category given 1 minute and min cues. 1 item indly, 6 items given max verbal cues   7. Pt will answer questions following a visual prompt with " "80% acc given mod cues.  Not formally addressed        Goals Met:   8. Pt will complete assessment of reading and writing with WAB-R Part 2. Goal Met 8/15/18 / Discontinue   Patient Education/Response:   Pt performance and progress reviewed.. Pt verbalized understanding.    Written Home Exercises Provided: no.  Assessment:   Emilia is progressing well towards her goals. Pt with longer utterances following cues to "take a deep breath" and "speak in sentences"  Current goals remain appropriate. Goals to be updated as necessary.   Pt prognosis is Fair. Pt will continue to benefit from skilled outpatient speech and language therapy to address the deficits listed in the problem list on initial evaluation, provide pt/family education and to maximize pt's level of independence in the home and community environment.     Medical necessity is demonstrated by the following IMPAIRMENTS:  Utilize compensatory strategies to communicate wants and needs effectively to different conversational partners, maintain safety and participate socially in functional living environment.   Barriers to Therapy: transportation  Pt's spiritual, cultural and educational needs considered and pt agreeable to plan of care and goals.  Plan:   Continue POC with focus on expressive and receptive language skills.    ELLEN Caruso, CCC-SLP  Speech Language Pathologist   9/5/2018   "

## 2018-09-05 NOTE — PROGRESS NOTES
Occupational Therapy Daily Note - Hand, Wrist, and/or Elbow Condition      Date: 2018    Name: Emilia Melgoza  mrnr: [id number]  Referring Physician: Raisa Dudley MD  Diagnosis:   1. Weakness as late effect of cerebrovascular accident (CVA)      2. Coordination impairment         Start Time: 11:00 am   End Time: 11:45 am  Total Time: 45     ICD 10:   I69.351 (ICD-10-CM) - Hemiparesis affecting right side as late effect of cerebrovascular accident   G31.84 (ICD-10-CM) - MCI (mild cognitive impairment)     Involved Side: right  Hand Dominance: ambidextrous   Date of Onset:   Date of Injury/Surgery:  RTC repair right shoulder      Visit #: . Visits  on 18.     Subjective     Pt was not expecting to receive occupational therapy today after being stopped when she completed her speech therapy.    Pain Report (severity and location)  Current: 0 out of 10  With activity: 0 out of 10   Report of Functional Deficits/Chief Complaints: Pt reports no pain     Objective       Treatment   Stretching of right wrist prior to wgt bearing onto right hand.  Pt engaged in reaching with LUE with on/off wgt bearing onto right hand (half ball) x 26 reps  Supine active flexion and h. Adduction x 10 reps  Pt engaged in supine holding of ball for flexion x 10 reps  Supine holding ball tricep curls x 10 reps- pt with difficulty isolating elbow motion  Pt engaged in gross motor coordination with BUE using ball for bounce and catch x 10 reps    Pt performed catching of ball when bounced to her x 15 reps intermittent tip catch with right hand instead of hand  Pt attempting dribbling with her right hand with fair control.        Assessment   Modified G-Code goals  FOTO  handling  Current Score  = 25 or 75% impaired   Goal at Discharge Score 35 or 65% impaired     Score interpretation is as follows:     TEST SCORE  Modifier  Impairment Limitation Restriction    0%  CH  0 % impaired, limited or  restricted    1-19%  CI  @ least 1% but less than 20% impaired, limited or restricted    20-39%  CJ  @ least 20%<40% impaired, limited or restricted    40-59%  CK  @ least 40%<60% impaired, limited or restricted    60-79%  CL  @ least 60% <80% impaired, limited or restricted    80-99%  CM  @ least 80%<100% impaired limited or restricted    100%  CN  100% impaired, limited or restricted       Pt with difficulty with motor coordination of RUE during gross motor tasks. .Pt will continue to benefit from skilled OT intervention. Medical necessity is demonstrated by: Pt continues to be limited in functional and leisurely pursuits. Pain limits patients participation in ADL's. Pt is not able to carryout necessary vocational tasks. Patient continues to requires cues and skilled supervision to complete HEP    Plan      Cont. skilled occupational therapy services 1x/week for 8-12 weeks from 7/27/2018 to 9/27/18.   ADD fine motor activities next session.        Therapist: ARMANDO Jasso

## 2018-09-11 NOTE — PROGRESS NOTES
Occupational Therapy Daily Note - Hand, Wrist, and/or Elbow Condition      Date: 2018    Name: Emilia Melgoza  mrnr: [id number]  Referring Physician: Raisa Dudley MD  Diagnosis:   1. Weakness as late effect of cerebrovascular accident (CVA)      2. Coordination impairment         Start Time: 9:00 am   End Time: 10:00 am  Total Time: 60    ICD 10:   I69.351 (ICD-10-CM) - Hemiparesis affecting right side as late effect of cerebrovascular accident   G31.84 (ICD-10-CM) - MCI (mild cognitive impairment)     Involved Side: right  Hand Dominance: ambidextrous   Date of Onset:   Date of Injury/Surgery:  RTC repair right shoulder      Visit #: . Visits  on 18.     Subjective     Pt expressing without words that she is not having any pain.     Pain Report (severity and location)  Current: 0 out of 10  With activity: 0 out of 10   Report of Functional Deficits/Chief Complaints: Pt reports no pain     Objective       Treatment     Re-assessment as follows:     Strength (Dyanmometer) and Pinch Strength (Pinch Gauge)  Measured in pounds.  Date 2018             Left Right Right       Rung II 55 40 35      Arzate Pinch 14 9  7     3pt Pinch 11 6 9      2pt Pinch 8 6  6        Pt was engaged in sci fit with BUE - pt expressing difficulty with task, directional changes provided by therapist throughout  Pt performed UE stretching using large therapy ball in seated position x 10 reps x 2 sets  Weight bearing on large therapy ball x 10 reps holding 5 seconds x 2 sets  Stretching of right wrist x 10 reps  Arm elevation in supine holding knobby ball in hands x 10 reps-good control of ball  Supine dowel flexion performed x 10 reps  Supine active flexion  x 10 reps   strengthening with theraband flex bar- bending bar x 10 reps, twisting bar x 10 reps    Education:  Pt was provided with written instructions for supine dowel flexion,  work for squeezing soaked washcloth,  and folding of clothes/towel.  Pt was able to demonstrate exercises in clinic         Assessment     Pt with slight improvement in RUE during symmetric activities.  Pt with less difficulty with grasp and release during cup stacking.  Pt is not engaging in many activities at home.       Goals to be met in 4 weeks: (8/27/18)  1) Initiate Hep - met  2) Pt to increase AROM of wrist with supination and pronation and GH flexion and abduction by 5-10 degrees by 4 weeks.  3) Pt to increase / pinch strength by 5 pounds by 4 weeks - not met.  4) Pt to decrease pain to less than or equal to 3/10 by 4 weeks.- met pt has no pain  5) Patient will be able to achieve less than or equal to 30% on the FOTO, demonstrating overall improved functional ability with upper extremity- not met     Goals to be met by discharge:(modified)  1) Independent with HEP  2) Pt to increase AROM of wrist with supination and pronation and GH flexion and abduction by 20 degrees by 4 weeks.  3) Pt to increase strength by 10 pounds or WNL as compared to RUE by d/c.    4) Patient will be able to achieve less than or equal to 65% on the FOTO, demonstrating overall improved functional ability with upper extremity.        Modified G-Code goals  FOTO  handling  Current Score  = 25 or 75% impaired   Goal at Discharge Score 35 or 65% impaired     Score interpretation is as follows:     TEST SCORE  Modifier  Impairment Limitation Restriction    0%  CH  0 % impaired, limited or restricted    1-19%  CI  @ least 1% but less than 20% impaired, limited or restricted    20-39%  CJ  @ least 20%<40% impaired, limited or restricted    40-59%  CK  @ least 40%<60% impaired, limited or restricted    60-79%  CL  @ least 60% <80% impaired, limited or restricted    80-99%  CM  @ least 80%<100% impaired limited or restricted    100%  CN  100% impaired, limited or restricted     Pt will continue to benefit from skilled OT intervention. Medical necessity is  demonstrated by: Pt continues to be limited in functional and leisurely pursuits. Pain limits patients participation in ADL's. Pt is not able to carryout necessary vocational tasks. Patient continues to requires cues and skilled supervision to complete HEP      Plan      Cont. skilled occupational therapy services 1x/week for 8-12 weeks from 7/27/2018 to 9/27/18.

## 2018-09-12 ENCOUNTER — CLINICAL SUPPORT (OUTPATIENT)
Dept: REHABILITATION | Facility: HOSPITAL | Age: 73
End: 2018-09-12
Attending: FAMILY MEDICINE
Payer: MEDICARE

## 2018-09-12 DIAGNOSIS — R47.01 BROCA'S APHASIA: Primary | ICD-10-CM

## 2018-09-12 DIAGNOSIS — R53.1 WEAKNESS AS LATE EFFECT OF CEREBROVASCULAR ACCIDENT (CVA): ICD-10-CM

## 2018-09-12 DIAGNOSIS — R27.8 COORDINATION IMPAIRMENT: ICD-10-CM

## 2018-09-12 DIAGNOSIS — I69.998 WEAKNESS AS LATE EFFECT OF CEREBROVASCULAR ACCIDENT (CVA): ICD-10-CM

## 2018-09-12 PROCEDURE — 92507 TX SP LANG VOICE COMM INDIV: CPT | Mod: PN

## 2018-09-12 PROCEDURE — 97112 NEUROMUSCULAR REEDUCATION: CPT | Mod: PN,59

## 2018-09-12 NOTE — PATIENT INSTRUCTIONS
ROM: Flexion - Wand (Supine)        Lie on back holding wand. Raise arms over head.   Repeat _10___ times per set. Do __2__ sets per session. Do __1__ sessions per day.     https://orth.[employer]/928     Copyright © I. All rights reserved.     SQUEEZE WATER FROM A WASHCLOTH OR TOWEL OUT WITH RIGHT HAND X 10 REPS IN THE MORNING AND THE EVENING.       FOLD TOWELS AND CLOTHES USING YOUR RIGHT AND LEFT HANDS EVERY DAY

## 2018-09-12 NOTE — PROGRESS NOTES
"Outpatient [facility]   Speech and Language Therapy Daily Note  Date:  9/12/2018     Start Time:  1018  Stop Time:  1158    Name: Emilia Melgoza   MRN: [id number]   Therapy Diagnosis:   Encounter Diagnosis   Name Primary?    Broca's aphasia Yes      Physician: Raisa Dudley MD  Physician Orders: ST eval and treat  Medical Diagnosis from Referral: CVA     Visit #/Visits authorized: 8/ 20  Visits cancelled: 1   Visits no show: 0   Date of Evaluation:  08/01/2018  Insurance Authorization Period: 07/13/2018-12/31/2018  Plan of Care Expiration:    08/01/2018-09/26/2018  Precautions:Standard and Fall    Subjective:   Pt reports: feeling well today and in no pain.  Response to previous treatment: "I like it and its helping."  Pain Scale:  0/10 on VAS currently.   Pain Location: n/a  Objective:   UNTIMED  Procedure Min.   Speech- Language- Voice Therapy   40   Total Minutes: 40  Total Timed Units: 0  Total Untimed Units: 1  Charges Billed/# of units: 1    Short Term Goals: (4 weeks) Current Progress:   1. Pt will answer complex y/n questions with 90% acc ind'ly. Pt answered complex y/n questions with 80% acc indly.    2. Pt will complete moderate level 3 unit commands with 90% acc given min cues.  Not assessed during today's session.   3. Pt will repeat 5-7 word sentences with 90% acc given min cues.  Pt repeated 5-7 word sentences with 73% acc indly and 80% acc given min cues.    4. Pt will recall 3/4 motor speech strategies ind'ly. Reviewed SOS strategies with pt.    5. Pt will participate in the Combined Aphasia and Apraxia of Speech Treatment (CAAST) with 3 verbs per session to improve verbal fluency and speech intelligibility.  Not assessed during today's session.   6. Pt will name 10 items in a concrete category given 1 minute and min cues. Named 1 job given 1 minute and an additional 6 given max verbal cues.    Named 3 fruits given 1 minute and an additional 3 given max verbal cues.     7. Pt " "will answer questions following a visual prompt with 80% acc given mod cues.  Not assessed during today's session      Goals Met:   8. Pt will complete assessment of reading and writing with WAB-R Part 2. Goal Met 8/15/18 / Discontinue     Patient Education/Response:   Pt performance, strategies, and progress reviewed. Pt verbalized understanding and agreement that speech strategies were helping.    Written Home Exercises Provided: no.  Assessment:   Emilia is progressing well towards her goals. She showed improvement in her word fluency when repeating 5-7 word sentences. Pt self-monitored her speech and utilized SOS strategies during therapy. Current goals remain appropriate. Goals to be updated as necessary.   Pt prognosis is Fair. Pt will continue to benefit from skilled outpatient speech and language therapy to address the deficits listed in the problem list on initial evaluation, provide pt/family education and to maximize pt's level of independence in the home and community environment.     Medical necessity is demonstrated by the following IMPAIRMENTS:  Utilize compensatory strategies to communicate wants and needs effectively to different conversational partners, maintain safety and participate socially in functional living environment.     Barriers to Therapy: transportation  Pt's spiritual, cultural and educational needs considered and pt agreeable to plan of care and goals.  Plan:   Continue POC with focus on expressive and receptive language skills.    QUIQUE Leon.   Clinician  Speech-Language Pathology  09/12/2018    "I SONU Blanco., CCC-SLP certify that I was present in the room directing the student and service delivery and guiding them using my skilled judgement. As the co-signing therapist, I have reviewed the student's documentation, and am responsible for the treatment, assessment and plan."     XOCHITL Blanco, CCC-SLP  Speech-Language " Pathologist  09/12/2018

## 2018-09-14 ENCOUNTER — DOCUMENTATION ONLY (OUTPATIENT)
Dept: REHABILITATION | Facility: HOSPITAL | Age: 73
End: 2018-09-14

## 2018-09-19 ENCOUNTER — CLINICAL SUPPORT (OUTPATIENT)
Dept: REHABILITATION | Facility: HOSPITAL | Age: 73
End: 2018-09-19
Attending: FAMILY MEDICINE
Payer: MEDICARE

## 2018-09-19 DIAGNOSIS — R47.01 BROCA'S APHASIA: Primary | ICD-10-CM

## 2018-09-19 DIAGNOSIS — R53.1 WEAKNESS AS LATE EFFECT OF CEREBROVASCULAR ACCIDENT (CVA): ICD-10-CM

## 2018-09-19 DIAGNOSIS — I69.998 WEAKNESS AS LATE EFFECT OF CEREBROVASCULAR ACCIDENT (CVA): ICD-10-CM

## 2018-09-19 DIAGNOSIS — R27.8 COORDINATION IMPAIRMENT: ICD-10-CM

## 2018-09-19 PROCEDURE — 97110 THERAPEUTIC EXERCISES: CPT | Mod: PN

## 2018-09-19 NOTE — PROGRESS NOTES
Occupational Therapy Daily Note - Hand, Wrist, and/or Elbow Condition      Date: 2018    Name: Emilia Melgoza  mrnr: [id number]  Referring Physician: Raisa Dudley MD  Diagnosis:   1. Weakness as late effect of cerebrovascular accident (CVA)      2. Coordination impairment         Start Time:10:55  am   End Time: 11:35 am  Total Time: 40  Therapeutic ex x 40 min  ICD 10:   I69.351 (ICD-10-CM) - Hemiparesis affecting right side as late effect of cerebrovascular accident   G31.84 (ICD-10-CM) - MCI (mild cognitive impairment)     Involved Side: right  Hand Dominance: ambidextrous   Date of Onset:   Date of Injury/Surgery:  RTC repair right shoulder      Visit #: . Visits  on 18.     Subjective     Pt expressing without words that she is not having any pain.     Pain Report (severity and location)  Current: 0 out of 10  With activity: 0 out of 10   Report of Functional Deficits/Chief Complaints: Pt reports no pain     Objective       Treatment     Pt was engaged in sci fit with BUE - pt expressing difficulty with task, directional changes provided by therapist throughout  Weight bearing on half ball following a brief stretching of right wrist and fingers with composite extension.  Pt performed UE stretching using large therapy ball in seated position x 10 reps x 2 sets  Reaching with right arm from left lower quadrant to right posterior quadrant - pt with significant extension of back to reach initially, when prompted performed more trunk rotation to achieve reach- pt reported this was a difficulty task  Supine 2# dowel chest press x 10 reps  Supine 2# dowel h. Abd/adduction x 10 reps  Supine dowel flexion performed x 10 reps    Education:  Pt demonstrated her supine dowel flexion for her homework as well as wringing of washcloth and folding towels.     Assessment     Pt with reduced trunk motion during reach which improved with prompting.  Pt able to recall 2 of 3 exercises for HEP  without prompting.     Pt will continue to benefit from skilled OT intervention. Medical necessity is demonstrated by: Pt continues to be limited in functional and leisurely pursuits. Pain limits patients participation in ADL's. Pt is not able to carryout necessary vocational tasks. Patient continues to requires cues and skilled supervision to complete HEP      Plan      Cont. skilled occupational therapy services 1x/week for 8-12 weeks from 7/27/2018 to 9/27/18.

## 2018-09-24 ENCOUNTER — CLINICAL SUPPORT (OUTPATIENT)
Dept: REHABILITATION | Facility: HOSPITAL | Age: 73
End: 2018-09-24
Attending: FAMILY MEDICINE
Payer: MEDICARE

## 2018-09-24 DIAGNOSIS — R53.1 WEAKNESS AS LATE EFFECT OF CEREBROVASCULAR ACCIDENT (CVA): ICD-10-CM

## 2018-09-24 DIAGNOSIS — R47.01 BROCA'S APHASIA: Primary | ICD-10-CM

## 2018-09-24 DIAGNOSIS — I69.998 WEAKNESS AS LATE EFFECT OF CEREBROVASCULAR ACCIDENT (CVA): ICD-10-CM

## 2018-09-24 DIAGNOSIS — R27.8 COORDINATION IMPAIRMENT: ICD-10-CM

## 2018-09-24 PROCEDURE — 97110 THERAPEUTIC EXERCISES: CPT | Mod: PN

## 2018-09-24 NOTE — PROGRESS NOTES
Occupational Therapy Daily Note - Hand, Wrist, and/or Elbow Condition      Date: 2018    Name: Emilia Melgoza  mrnr: [id number]  Referring Physician: Raisa Dudley MD  Diagnosis:   1. Weakness as late effect of cerebrovascular accident (CVA)      2. Coordination impairment      3. Broca's aphasia    Start Time: 11:00   am   End Time: 12:05 pm  Total Time: 55 min  Therapeutic ex x 55 min    Involved Side: right  Hand Dominance: ambidextrous   Date of Onset:   Date of Injury/Surgery:  RTC repair right shoulder      Visit #:  20. Visits  on 18.     Subjective     Pt expressing without words that she is not having any pain. Pt expressed her youngest daughter brought her to therapy- was confirmed she was her older daughter.     Pain Report (severity and location)  Current: 0 out of 10  With activity: 0 out of 10   Report of Functional Deficits/Chief Complaints: Pt reports no pain     Objective       Treatment     Pt was engaged in sci fit with BUE - pt expressing difficulty with task, directional changes provided by therapist throughout duration  Weight bearing attempted in prone position on mat although pt unable to hold self on elbows due to back discomfort. Pt transitioned to supine position from prone  rolling to right sidelying.  Pt performed wgt bearing on right forearm and elbow pushing into slight extension holding x 5 seconds x 10 reps.  Emilia attempted to communicate something re: her left thigh.  Pt engaged in 3# dowel chest press and flexion x 2 sets of 10 reps each  Red clip 3pt pinch x 3 sets of 10 reps  Red clip key pinch x 3 sets of 10 reps  ERgo gripper with 1 red and green band x 3 sets of 10 reps  Reaching with right arm from left anterior quadrant to  right posterior quadrant -with improved balance and reach with RUE.  Pt then reached from right posterior to floor all performed x 13 reps.  Pt engaged in tricep curls( Matrix) with 20# wgt x 2 sets of 10  reps      Education:  Pt 's daughter educated re: activities she should be engaging in at home.  She expressed understanding.     Assessment     Pt with improvement in right arm reach and performance of resistive exercises..     Pt will continue to benefit from skilled OT intervention. Medical necessity is demonstrated by: Pt continues to be limited in functional and leisurely pursuits. Pain limits patients participation in ADL's. Pt is not able to carryout necessary vocational tasks. Patient continues to requires cues and skilled supervision to complete HEP      Plan      Cont. skilled occupational therapy services 1x/week for 8-12 weeks from 7/27/2018 to 9/27/18.

## 2018-09-25 NOTE — PROGRESS NOTES
"Outpatient Neurological Rehabilitation   Speech and Language Therapy Daily Note  Date:  9/26/2018     Start Time:  1013  Stop Time:  1100    Name: Emilia Melgoza   MRN: [id number]   Therapy Diagnosis:   No diagnosis found.   Physician: Raisa Dudley MD  Physician Orders: ST eval and treat  Medical Diagnosis from Referral: CVA      Visit #/Visits authorized: 9/ 20  Visits cancelled: 1   Visits no show: 0   Date of Evaluation:  08/01/2018  Insurance Authorization Period: 07/13/2018-12/31/2018  Plan of Care Expiration:    08/01/2018-09/26/2018  Precautions:Standard and Fall    Subjective:   Pt reports: feeling very good today.  Response to previous treatment: "I like it and its helping."  Pain Scale:  0/10 on VAS currently.   Pain Location: n/a  Objective:   UNTIMED  Procedure Min.   Speech- Language- Voice Therapy   47   Total Minutes: 47  Total Timed Units: 0  Total Untimed Units: 1  Charges Billed/# of units: 1    Short Term Goals: (4 weeks) Current Progress:   1. Pt will answer complex y/n questions with 90% acc ind'ly. Pt answered multi-unit y/n questions with 100% ind'ly.    Met x2   2. Pt will complete moderate level 3 unit commands with 90% acc given min cues.  Not assessed during today's session.   3. Pt will repeat 5-7 word sentences with 90% acc given min cues.  Pt repeated 5-7 word sentences with 56% acc indly and 86% acc given min verbal cues.    4. Pt will recall 2/3 motor speech strategies ind'ly. Reintroduced SOS strategies to pt because she did not remember them.     At the end of the session, pt recalled 2/3 strategies given mod cues.   5. Pt will participate in the Combined Aphasia and Apraxia of Speech Treatment (CAAST) with 3 verbs per session to improve verbal fluency and speech intelligibility.  See below.   6. Pt will name 10 items in a concrete category given 1 minute and min cues. Pt named 3 items located in the bathroom ind'ly given 1 minute and an additional 7 given mod verbal cues. "   7. Pt will answer questions following a visual prompt with 80% acc given mod cues.  Not assessed during today's session     Combined Aphasia and Apraxia of Speech Treatment (CAAST) Protocol: target verb:  Giving  · Step 1: pt described action picture using a 3 word utterance  · Step 2: pt identified appropriate part of sentence of generated utterance for 3/3 words in utterance  · Step 3: pt answered wh- question to expand original utterance with moderate verbal cues producing a 4 word utterance.  · Step 4: SLP reinforced patient's production from Step 3 and printed response in sentence frame  · Step 5: Pt repeated combined production of Step 1 and 3 with errors; errors were corrected with visual cues on written stimuli; and repeated sentence x3 with moderate artic-kinematic cues  · Step 6: patient described action picture using a 4 word utterance.        Combined Aphasia and Apraxia of Speech Treatment (CAAST) Protocol: target verb:  carrying  · Step 1: pt described action picture using a 4 word utterance  · Step 2: pt identified appropriate part of sentence of generated utterance for 3/3 words in utterance  · Step 3: pt answered wh- question to expand original utterance with minimum verbal cues producing a 5 word utterance.  · Step 4: SLP reinforced patient's production from Step 3 and printed response in sentence frame  · Step 5: Pt repeated combined production of Step 1 and 3 with errors; errors were corrected with visual cues on written stimuli; and repeated sentence x3 with moderate artic-kinematic cues  · Step 6: patient described action picture using a 5 word utterance.     Combined Aphasia and Apraxia of Speech Treatment (CAAST) Protocol: target verb:  bowling  · Step 1: pt described action picture using a 5 word utterance  · Step 2: pt identified appropriate part of sentence of generated utterance for 3/3 words in utterance  · Step 3: pt answered wh- question to expand original utterance with minimum  verbal cues producing a 6 word utterance.  · Step 4: SLP reinforced patient's production from Step 3 and printed response in sentence frame  · Step 5: Pt repeated combined production of Step 1 and 3 with errors; errors were corrected with visual cues on written stimuli; and repeated sentence x3 with minimum artic-kinematic cues  · Step 6: patient described action picture using a 6 word utterance.     Goals Met:   8. Pt will complete assessment of reading and writing with WAB-R Part 2. Goal Met 8/15/18 / Discontinue     Patient Education/Response:   Pt performance, strategies, and progress reviewed. Pt verbalized understanding and agreement that speech strategies were helping.    Written Home Exercises Provided: no.  Assessment:   Emilia is progressing well towards her goals. She showed improvement in her word fluency when repeating 5-7 word sentences. Pt self-monitored her speech and utilized SOS strategies during therapy. OME exercises were performed x1 during the session to help with pt's jaw ROM. Current goals remain appropriate. Goals to be updated as necessary.   Pt prognosis is Fair. Pt will continue to benefit from skilled outpatient speech and language therapy to address the deficits listed in the problem list on initial evaluation, provide pt/family education and to maximize pt's level of independence in the home and community environment.     Functional Communication Measure (FCM):   Severity Modifier for Medicare G-Code:  Spoken Language Comprehension  Current status: FCM:  LEVEL 5: The individual is able to understand communication in structured conversations with both familiar and unfamiliar communication partners. The individual occasionally requires minimal cueing to understand more complex sentences/messages. The individual occasionally initiates the use of compensatory strategies when encountering difficulty.   -  CJ CJ at least 20% < 40% impaired, limited or restricted  Projected status:  FCM:  "  LEVEL 6: The individual is able to understand communication in most activities, but some limitations in comprehension are still apparent in vocational, avocational, and social activities. The individual rarely requires minimal cueing to understand complex sentences. The individual usually uses compensatory strategies when encountering difficulty.   -  CI CI at least 1% but less than 20% impaired, limited or restricted  Discharge status:  FCM:   n/a        Spoken Language Expression  Current status: FCM:  LEVEL 3 The communication partner must assume responsibility for structuring thecommunication exchange, and with consistent and moderate cueing, the individual canproduce words and phrases that are appropriate and meaningful in context.   -  CL CL at least 60% < 80% impaired, limited or restricted  Projected status:  FCM:   LEVEL 4: The individual is successfully able to initiate communication using spoken language in simple, structured conversations in routine daily activities with familiar communication partners. The individual usually requires moderate cueing, but is able to demonstrate use of simple sentences (i.e., semantics, syntax, and morphology) and rarely uses complex sentences/messages.   -  CK CK at least 40% < 60% impaired, limited or restricted  Discharge status:  FCM:  n/a    Medical necessity is demonstrated by the following IMPAIRMENTS:  Utilize compensatory strategies to communicate wants and needs effectively to different conversational partners, maintain safety and participate socially in functional living environment.     Barriers to Therapy: transportation  Pt's spiritual, cultural and educational needs considered and pt agreeable to plan of care and goals.  Plan:   Continue POC with focus on expressive and receptive language skills.    QUIQUE Leon.   Clinician  Speech-Language Pathology  09/25/2018    "XOCHITL Zamarripa, CCC-SLP " "certify that I was present in the room directing the student and service delivery and guiding them using my skilled judgement. As the co-signing therapist, I have reviewed the student's documentation, and am responsible for the treatment, assessment and plan."     SONU Blanco., CCC-SLP  Speech-Language Pathologist  09/25/2018  "

## 2018-09-26 ENCOUNTER — CLINICAL SUPPORT (OUTPATIENT)
Dept: REHABILITATION | Facility: HOSPITAL | Age: 73
End: 2018-09-26
Attending: FAMILY MEDICINE
Payer: MEDICARE

## 2018-09-26 DIAGNOSIS — R47.01 BROCA'S APHASIA: Primary | ICD-10-CM

## 2018-09-26 PROCEDURE — 92507 TX SP LANG VOICE COMM INDIV: CPT | Mod: PN

## 2018-09-26 NOTE — PLAN OF CARE
Outpatient Neurological Rehabilitation Therapy  Updated POC     Date: 9/26/2018   Name: Emilia Melgoza  Clinic Number: [id number]    Therapy Diagnosis:   Encounter Diagnosis   Name Primary?    Broca's aphasia Yes     Physician: Raisa Dudley MD    Physician: Raisa Dudley MD  Physician Orders: ST eval and treat  Medical Diagnosis from Referral: CVA      Visit #/Visits authorized: 9/ 20  Visits cancelled: 1   Visits no show: 0   Date of Evaluation:  08/01/2018  Insurance Authorization Period: 07/13/2018-12/31/2018  Plan of Care Expiration:    08/01/2018-09/26/2018  New POC Certification Period:  09/26/2018-11/21/2018  G-CODE   9 /10    Precautions:Standard and Fall         Subjective     Update: Pt has improved in her ability to express herself. She continues to demonstrate characteristics of apraxia of speech including groping and inconsistent  consonant productions. When prompted or given cues, pt is able to utilize motor speech strategies to improve speech intelligibility. Her receptive language skills have also improved since beginning therapy.     Objective     Update: see follow up note dated 9/26/2018    Assessment     Update: Emilia Melgoza presents to [facility] s/p medical diagnosis of  CVA. Demonstrates impairments including limitations as described in the problem list. Positive prognostic factors include family support. Negative prognostic factors include length of time since CVA. She presents with broca's aphasia c/b mdoerate-severely impaired expressive language skills and mildly impaired receptive language skills. She also presents with apraxia of speech c/b facial groping and inconsistent  consonant productions.   Barriers to progress include trasnportation. Patient will benefit from skilled, outpatient neurological rehabilitation speech therapy.    Functional Communication Measure (FCM):   Severity Modifier for Medicare G-Code:  Spoken Language  Comprehension  Current status: FCM:  LEVEL 5: The individual is able to understand communication in structured conversations with both familiar and unfamiliar communication partners. The individual occasionally requires minimal cueing to understand more complex sentences/messages. The individual occasionally initiates the use of compensatory strategies when encountering difficulty.   -  CJ CJ at least 20% < 40% impaired, limited or restricted  Projected status:  FCM:   LEVEL 6: The individual is able to understand communication in most activities, but some limitations in comprehension are still apparent in vocational, avocational, and social activities. The individual rarely requires minimal cueing to understand complex sentences. The individual usually uses compensatory strategies when encountering difficulty.   -  CI CI at least 1% but less than 20% impaired, limited or restricted  Discharge status:  FCM:   n/a        Spoken Language Expression  Current status: FCM:  LEVEL 3 The communication partner must assume responsibility for structuring thecommunication exchange, and with consistent and moderate cueing, the individual canproduce words and phrases that are appropriate and meaningful in context.   -  CL CL at least 60% < 80% impaired, limited or restricted  Projected status:  FCM:   LEVEL 4: The individual is successfully able to initiate communication using spoken language in simple, structured conversations in routine daily activities with familiar communication partners. The individual usually requires moderate cueing, but is able to demonstrate use of simple sentences (i.e., semantics, syntax, and morphology) and rarely uses complex sentences/messages.   -  CK CK at least 40% < 60% impaired, limited or restricted  Discharge status:  FCM:  n/a  Rehab Potential: fair     Education: POC, role of SLP, motor speech strategies, scheduling/ cancellation policy and insurance limitations  was  discussed with pt. Patient and family members expressed understanding.     Previous Short Term Goals Status: 4 weeks  Short Term Goals: (4 weeks)   1. Pt will answer complex y/n questions with 90% acc ind'ly. Goal Not Met, Continue   2. Pt will complete moderate level 3 unit commands with 90% acc given min cues. Goal Not Met, Continue   3. Pt will repeat 5-7 word sentences with 90% acc given min cues. Goal Not Met, Continue   4. Pt will recall 2/3 motor speech strategies ind'ly. Goal Not Met, Continue   5. Pt will participate in the Combined Aphasia and Apraxia of Speech Treatment (CAAST) with 3 verbs per session to improve verbal fluency and speech intelligibility. Goal Not Met, Continue   6. Pt will name 10 items in a concrete category given 1 minute and min cues.Goal Not Met, Continue   7. Pt will answer questions following a visual prompt with 80% acc given mod cues. Goal Not Met, Continue         New Short Term Goals: 4 weeks  Short Term Goals: (4 weeks)   1. Pt will answer complex y/n questions with 90% acc ind'ly.   2. Pt will complete moderate level 3 unit commands with 90% acc given min cues.    3. Pt will repeat 5-7 word sentences with 90% acc given min cues.    4. Pt will recall 2/3 motor speech strategies ind'ly.   5. Pt will participate in the Combined Aphasia and Apraxia of Speech Treatment (CAAST) with 3 verbs per session to improve verbal fluency and speech intelligibility.    6. Pt will name 10 items in a concrete category given 1 minute and min cues.   7. Pt will answer questions following a visual prompt with 80% acc given mod cues.      Long Term Goals (8 weeks):   1. Pt will develop functional cognitive-linguistic based skills and utilize compensatory strategies to communicate wants and needs effectively to different conversational partners, maintain safety and participate socially in functional living environment.      Goals Previously Met:  None met at this time, however, per daily notes pt is  progressing towards goals.      Reasons for Recertification of Therapy: Pt to communicate wants and needs effectively to different conversational partners, maintain safety and participate socially in functional living environment.       Plan     Updated Certification Period: 9/26/2018 to 11/21/2018  Recommended Treatment Plan: Patient will participate in the [facility] neurological rehabilitation program for speech therapy 1 times per week to address {her  Communication and Motor Speech deficits, to educate patient and their family, and to participate in a home exercise program.     Other recommendations: Follow-up with PCP     Therapist's Name:  XOCHITL Blanco, CCC-SLP  Speech-Language Pathologist  9/26/2018      I CERTIFY THE NEED FOR THESE SERVICES FURNISHED UNDER THIS PLAN OF TREATMENT AND WHILE UNDER MY CARE    Physician's comments:     Physician's Name:

## 2018-09-28 ENCOUNTER — CLINICAL SUPPORT (OUTPATIENT)
Dept: REHABILITATION | Facility: HOSPITAL | Age: 73
End: 2018-09-28
Attending: FAMILY MEDICINE
Payer: MEDICARE

## 2018-09-28 DIAGNOSIS — R53.1 WEAKNESS AS LATE EFFECT OF CEREBROVASCULAR ACCIDENT (CVA): Primary | ICD-10-CM

## 2018-09-28 DIAGNOSIS — R27.8 COORDINATION IMPAIRMENT: ICD-10-CM

## 2018-09-28 DIAGNOSIS — I69.998 WEAKNESS AS LATE EFFECT OF CEREBROVASCULAR ACCIDENT (CVA): Primary | ICD-10-CM

## 2018-09-28 PROCEDURE — G8986 CARRY D/C STATUS: HCPCS | Mod: CK,PN

## 2018-09-28 PROCEDURE — G8985 CARRY GOAL STATUS: HCPCS | Mod: CL,PN

## 2018-09-28 PROCEDURE — 97110 THERAPEUTIC EXERCISES: CPT | Mod: PN

## 2018-09-28 NOTE — PROGRESS NOTES
Occupational Therapy progress note d/c summary      Date: 2018    Name: Emilia Melgoza  mrnr: [id number]  Referring Physician: Raisa Dudley MD  Diagnosis:   1. Weakness as late effect of cerebrovascular accident (CVA)      2. Coordination impairment         Start Time: 10:10 am   End Time: 11:10 am  Total Time: 60  Therapeutic ex 60 min  ICD 10:   I69.351 (ICD-10-CM) - Hemiparesis affecting right side as late effect of cerebrovascular accident   G31.84 (ICD-10-CM) - MCI (mild cognitive impairment)     Involved Side: right  Hand Dominance: ambidextrous   Date of Onset:   Date of Injury/Surgery:  RTC repair right shoulder      Visit #:10 of 20. Visits  on 18.     Subjective     Pt expressing without words that she is not having any pain.     Pain Report (severity and location)  Current: 0 out of 10  With activity: 0 out of 10   Report of Functional Deficits/Chief Complaints: Pt reports no pain     Objective       Treatment     Pt was engaged in sci fit with BUE - pt expressing difficulty with task, directional changes provided by therapist throughout    Re-assessment as follows:     Strength (Dyanmometer) and Pinch Strength (Pinch Gauge)  Measured in pounds.  Date 2018        Left Right Right   Right     Rung II 55 40 35  35    Arzate Pinch 14 9  7 10    3pt Pinch 11 6 9   7   2pt Pinch 8 6  6 7      AROM/PROM Left right   Date:    18   Shoulder Flexion  (175) 155 90                         95   Shoulder Abduction (175) 140 60                          70       Date 2018      Left Right     Supination/Pronation 85/85 60/80  70/85   Wrist ext/flex WNL 15/65 30/50    Wrist RD/UD WNL No AROM available         Supine 2# dowel flexion performed x 2 sets of 10 reps  Supine 2# dowel chest press x 2 sets of 10 reps  Supine active flexion  x 10 reps     strengthening with theraband flex bar- bending  bar x 10 reps, twisting bar x 10 reps    Education:  Pt was provided with written instructions for supine dowel flexion,  work for squeezing soaked washcloth, and folding of clothes/towel.  Pt was able to demonstrate exercises in clinic         Patient is a 73 y.o. female referred to Occupational Therapy by  with a referral for eval and treat.  Patient received initial evaluation on 7/27/18 and returned for 9 treatment sessions. Patient had 1 missed visits. Patient's treatment included facilitation of RUE AROM, education in HEP,  and pinch strengthening and family education on patients ability.      Assessment     Pt with changes in AROM as noted above.  Pt capable of engaging in functional tasks around the house with folding clothes, light cleaning.  Pt chooses not to engage in theses tasks refusing to listen to her children.  She has made minimal changes in AROM and strength as noted over 8 weeks.    Goals to be met in 4 weeks: (8/27/18)  1) Initiate Hep - met  2) Pt to increase AROM of wrist with supination and pronation and GH flexion and abduction by 5-10 degrees by 4 weeks- met.  3) Pt to increase / pinch strength by 5 pounds by 4 weeks - not met.  4) Pt to decrease pain to less than or equal to 3/10 by 4 weeks.- met   5) Patient will be able to achieve less than or equal to 30% on the FOTO, demonstrating overall improved functional ability with upper extremity- not met     Goals to be met by discharge:(modified)  1) Independent with HEP- met  2) Pt to increase AROM of wrist with supination and pronation and GH flexion and abduction by 20 degrees by 4 weeks - not met  3) Pt to increase strength by 10 pounds or WNL as compared to RUE by d/c - not met.    4) Patient will be able to achieve less than or equal to 65% on the FOTO, demonstrating overall improved functional ability with upper extremity- met.        Modified G-Code goals  FOTO  handling  Discharge status  = 47 or 53% impaired    Goal at Discharge Score 35 or 65% impaired     Score interpretation is as follows:     TEST SCORE  Modifier  Impairment Limitation Restriction    0%  CH  0 % impaired, limited or restricted    1-19%  CI  @ least 1% but less than 20% impaired, limited or restricted    20-39%  CJ  @ least 20%<40% impaired, limited or restricted    40-59%  CK  @ least 40%<60% impaired, limited or restricted    60-79%  CL  @ least 60% <80% impaired, limited or restricted    80-99%  CM  @ least 80%<100% impaired limited or restricted    100%  CN  100% impaired, limited or restricted         Plan      Pt will be d/c from formal occupational therapy secondary to minimal changes and expiration of plan of care. Family instructed to contact therapist with any questions that may arise.

## 2018-10-09 NOTE — PROGRESS NOTES
"Outpatient Neurological Rehabilitation   Speech and Language Therapy Daily Note  Date:  10/10/2018     Start Time:  0905  Stop Time:  0935    Name: Emilia Melgoza   MRN: [id number]   Therapy Diagnosis:   Encounter Diagnosis   Name Primary?    Broca's aphasia       Physician: Raisa Dudley MD  Physician Orders: ST eval and treat  Medical Diagnosis from Referral: CVA      Visit #/Visits authorized: 10/ 20  Visits cancelled: 1   Visits no show: 0   Date of Evaluation:  08/01/2018  Insurance Authorization Period: 07/13/2018-12/31/2018  Plan of Care Expiration:    08/01/2018-09/26/2018  Precautions:Standard and Fall    Subjective:   Pt reports: She is doing ok today. Pt late to session by 19 minutes due to traffic.   Response to previous treatment: "yes indeed"  Pain Scale:  0/10 on VAS currently.   Pain Location: n/a  Objective:   UNTIMED  Procedure Min.   Speech- Language- Voice Therapy   30   Total Minutes: 30  Total Timed Units: 0  Total Untimed Units: 1  Charges Billed/# of units: 1    Short Term Goals: (4 weeks) Current Progress:   1. Pt will answer complex y/n questions with 90% acc ind'ly. Pt answered multi-unit y/n comparison questions with 70% IND (required use of post it notes); up to 90% with modA.     Met x2 previously.   2. Pt will complete moderate level 3 unit commands with 90% acc given min cues.  Not assessed during today's session.   3. Pt will repeat 5-7 word sentences with 90% acc given min cues.  Pt repeated 5-7 word sentences with 40% acc indly and 60% acc given min verbal cues.     Pt benefited from use of mirror and visual syllabic word presentation to increase inteligibility (ie sp-eech there-a-py). Groping for /w/ sounds noted.   4. Pt will recall 2/3 motor speech strategies ind'ly. Reinforced SOS strategies as she could not recall them when discussed..     At the end of the session, pt recalled 1/3 strategies IND ("open my mouth").   5. Pt will participate in the Combined Aphasia and " Apraxia of Speech Treatment (CAAST) with 3 verbs per session to improve verbal fluency and speech intelligibility.  Not formally assessed today.   6. Pt will name 10 items in a concrete category given 1 minute and min cues. Pt named 2 items located in the kitchen ind'ly given 1 minute and an additional 10 given mod verbal cues.   7. Pt will answer questions following a visual prompt with 80% acc given mod cues.  Not assessed during today's session       Goals Met:   8. Pt will complete assessment of reading and writing with WAB-R Part 2. Goal Met 8/15/18 / Discontinue     Patient Education/Response:   Reinforced motor speech strategies for improved carryover into conversation. Pt verbalized understanding and agreement that speech strategies were helping.    Written Home Exercises Provided: no. Pt stated she still has items to work on at home.   Assessment:   Emilia is progressing well towards her goals. Decreased accuracy across goals today with decreased intelligibility in single words and sentence repetition. Pt had to frequently clarify herself during session despite consistent cues to utilize motor speech strategies. She demonstrated good awareness of clear vs unintelligible production of several words during tasks. Current goals remain appropriate. Goals to be updated as necessary.   Pt prognosis is Fair. Pt will continue to benefit from skilled outpatient speech and language therapy to address the deficits listed in the problem list on initial evaluation, provide pt/family education and to maximize pt's level of independence in the home and community environment.     Functional Communication Measure (FCM):   Severity Modifier for Medicare G-Code:  Spoken Language Comprehension  Current status: FCM:  LEVEL 5: The individual is able to understand communication in structured conversations with both familiar and unfamiliar communication partners. The individual occasionally requires minimal cueing to understand  more complex sentences/messages. The individual occasionally initiates the use of compensatory strategies when encountering difficulty.   -  CJ CJ at least 20% < 40% impaired, limited or restricted  Projected status:  FCM:   LEVEL 6: The individual is able to understand communication in most activities, but some limitations in comprehension are still apparent in vocational, avocational, and social activities. The individual rarely requires minimal cueing to understand complex sentences. The individual usually uses compensatory strategies when encountering difficulty.   -  CI CI at least 1% but less than 20% impaired, limited or restricted  Discharge status:  FCM:   n/a        Spoken Language Expression  Current status: FCM:  LEVEL 3 The communication partner must assume responsibility for structuring thecommunication exchange, and with consistent and moderate cueing, the individual canproduce words and phrases that are appropriate and meaningful in context.   -  CL CL at least 60% < 80% impaired, limited or restricted  Projected status:  FCM:   LEVEL 4: The individual is successfully able to initiate communication using spoken language in simple, structured conversations in routine daily activities with familiar communication partners. The individual usually requires moderate cueing, but is able to demonstrate use of simple sentences (i.e., semantics, syntax, and morphology) and rarely uses complex sentences/messages.   -  CK CK at least 40% < 60% impaired, limited or restricted  Discharge status:  FCM:  n/a    Medical necessity is demonstrated by the following IMPAIRMENTS:  Utilize compensatory strategies to communicate wants and needs effectively to different conversational partners, maintain safety and participate socially in functional living environment.     Barriers to Therapy: transportation  Pt's spiritual, cultural and educational needs considered and pt agreeable to plan of care and  goals.  Plan:   Continue POC with focus on motor speech.    Lolita Zacarias M.S. CCC-SLP  Speech Language Pathologist     10/10/2018

## 2018-10-10 ENCOUNTER — CLINICAL SUPPORT (OUTPATIENT)
Dept: REHABILITATION | Facility: HOSPITAL | Age: 73
End: 2018-10-10
Attending: FAMILY MEDICINE
Payer: MEDICARE

## 2018-10-10 DIAGNOSIS — R47.01 BROCA'S APHASIA: ICD-10-CM

## 2018-10-10 PROCEDURE — G9160 LANG COMP GOAL STATUS: HCPCS | Mod: CI,PN

## 2018-10-10 PROCEDURE — G9159 LANG COMP CURRENT STATUS: HCPCS | Mod: CJ,PN

## 2018-10-10 PROCEDURE — 92507 TX SP LANG VOICE COMM INDIV: CPT | Mod: PN

## 2018-10-12 ENCOUNTER — OUTPATIENT CASE MANAGEMENT (OUTPATIENT)
Dept: ADMINISTRATIVE | Facility: OTHER | Age: 73
End: 2018-10-12

## 2018-10-12 ENCOUNTER — OFFICE VISIT (OUTPATIENT)
Dept: FAMILY MEDICINE | Facility: CLINIC | Age: 73
End: 2018-10-12
Payer: MEDICARE

## 2018-10-12 ENCOUNTER — TELEPHONE (OUTPATIENT)
Dept: FAMILY MEDICINE | Facility: CLINIC | Age: 73
End: 2018-10-12

## 2018-10-12 VITALS
HEART RATE: 76 BPM | BODY MASS INDEX: 28.29 KG/M2 | OXYGEN SATURATION: 98 % | SYSTOLIC BLOOD PRESSURE: 118 MMHG | HEIGHT: 66 IN | RESPIRATION RATE: 16 BRPM | TEMPERATURE: 98 F | DIASTOLIC BLOOD PRESSURE: 70 MMHG

## 2018-10-12 DIAGNOSIS — I69.920 APHASIA, LATE EFFECT OF CEREBROVASCULAR DISEASE: Primary | ICD-10-CM

## 2018-10-12 DIAGNOSIS — R47.01 BROCA'S APHASIA: ICD-10-CM

## 2018-10-12 DIAGNOSIS — G31.84 MCI (MILD COGNITIVE IMPAIRMENT): ICD-10-CM

## 2018-10-12 DIAGNOSIS — I69.351 HEMIPARESIS AFFECTING RIGHT SIDE AS LATE EFFECT OF CEREBROVASCULAR ACCIDENT: ICD-10-CM

## 2018-10-12 DIAGNOSIS — I10 ESSENTIAL HYPERTENSION: ICD-10-CM

## 2018-10-12 DIAGNOSIS — L60.3 DYSTROPHIC NAIL: ICD-10-CM

## 2018-10-12 DIAGNOSIS — R73.03 PRE-DIABETES: ICD-10-CM

## 2018-10-12 PROCEDURE — 99999 PR PBB SHADOW E&M-EST. PATIENT-LVL IV: CPT | Mod: PBBFAC,,, | Performed by: FAMILY MEDICINE

## 2018-10-12 PROCEDURE — 99214 OFFICE O/P EST MOD 30 MIN: CPT | Mod: PBBFAC,PN | Performed by: FAMILY MEDICINE

## 2018-10-12 PROCEDURE — 99213 OFFICE O/P EST LOW 20 MIN: CPT | Mod: S$PBB,,, | Performed by: FAMILY MEDICINE

## 2018-10-12 NOTE — PROGRESS NOTES
Thank you for the referral.  Patient has been assigned to Kenia Choudhary LMSW for low risk screening for Outpatient Case Management.     Reason for referral: Aphasia, late effect of cerebrovascular disease  Hemiparesis affecting right side as late effect of cerebrovascular accident  MCI (mild cognitive impairment)  Broca's aphasia  Please assist patient's family with daytime resources for family. Needs social work evaluation.  Please contact [facility] at ext. [phone] with any questions.    Thank you,    Carmen Slaughter, [facility]  Outpatient Care Mgmt.  [phone]

## 2018-10-12 NOTE — PATIENT INSTRUCTIONS
For Caregivers: Improving Communication with Dementia Patients  Dementia makes it harder for your loved one to understand and be understood. This can be troubling for both of you. Remember, these problems are not your loved ones fault. Theyre due to the disease. These tips can help you find ways to cope.    Keep it simple  The best way to talk to your loved one is using simple words and short sentences. Be sure you have his or her attention. Stick to one subject at a time. And use a gentle, positive tone of voice. Timing is also important. Giving information for activities that are hours away may be a waste of time and energy.  Instead of saying: Its cold outside, so be sure to put on your coat and hat.  Try: Please put on your coat. Now put on your hat.   Be patient  People with dementia often lose their short-term memory. This means they may ask the same question over and over. Although it can be frustrating, do your best to remain calm. Try changing the subject or getting your loved one to focus on a new task. Or, try to understand why the question is being asked. For example, your loved one may worry about missing an appointment or being left behind.  Instead of saying: Phuong already told you--the appointment is at 2 oclock!  Try: Dont worry. Im going with you.   Use distraction  Your loved one may try to do things that are inappropriate or unsafe. At these times, a good tactic is using distraction. Try getting your loved one to focus on something new. Your loved one may then forget what he or she was doing in the first place.  Instead of saying: What are you doing? You cant go out now!  Try: Would you help me with dinner?   Try statements, not questions  Your loved one may not want to do certain activities. Instead of arguing, phrase requests as statements rather than questions. Using visual cues, such as holding a towel when its time for a bath, can also help.  Instead of saying: Do you  want to take your bath now?  Try: Its time for your bath. Let me help you get ready.   Avoid arguing about reality  People with dementia may not be able to separate past from present. If this happens, dont insist on your version of reality. It may be best to change the subject or play along to avoid added stress. Refrain from using harsh words or angry gestures. Your loved one may not understand you. But he or she can still be upset by your emotional cues. And if your loved one becomes very upset, stay calm. Try to redirect his or her attention to another activity.  Instead of saying: You cant call your father. Hes been dead for years!  Try: Lets look at some pictures of him.   Coping with changes in behavior and personality  People with dementia may have moments when they seem perfectly normal. At other times, they may act suspicious, angry, or afraid. They can also become agitated, or see things that arent there. If this happens, try to be understanding. For them, the world can be a very stressful place. However, if these changes are sudden, severe, or create safety issues, talk to a doctor. You should also mention any signs of depression. These include withdrawal, loss of interest in activities or food, extreme tiredness, and crying.  Date Last Reviewed: 7/1/2016  © 8716-4466 [employer]. [address] [place] [address], [place] [address]. All rights reserved. This information is not intended as a substitute for professional medical care. Always follow your healthcare professional's instructions.

## 2018-10-12 NOTE — PROGRESS NOTES
- Subjective


Encounter Start Date: 06/08/18


Encounter Start Time: 09:00


-: old records requested/rev





Patient seen and examined for mary.  No overnight events





- Objective


Resuscitation Status: 


 











Resuscitation Status           FULL:Full Resuscitation














MAR Reviewed: Yes


Vital Signs & Weight: 


 Vital Signs (12 hours)











  Temp Pulse Resp BP Pulse Ox


 


 06/08/18 07:09   70  19   92 L


 


 06/08/18 04:00  98.3 F    


 


 06/08/18 02:00    16  


 


 06/08/18 01:08   68   


 


 06/08/18 01:07      94 L


 


 06/08/18 00:00  98.2 F   18  


 


 06/07/18 23:05   72   101/45 L 








 Weight











Admit Weight                   600 lb


 


Weight                         376 lb 1.738 oz











 Most Recent Monitor Data











Heart Rate from ECG            73


 


NIBP                           101/49


 


NIBP BP-Mean                   60


 


Respiration from ECG           19


 


SpO2                           92














I&O: 


 











 06/07/18 06/08/18 06/09/18





 06:59 06:59 06:59


 


Intake Total 2169 1923 


 


Output Total 1480 1740 


 


Balance 689 183 











Result Diagrams: 


 06/08/18 04:40





 06/08/18 04:40


EKG Reviewed by me: Yes (nsr)





Phys Exam





- Physical Examination


Constitutional: NAD


HEENT: PERRLA, moist MMs, sclera anicteric


Neck: no JVD, supple


trach+


Respiratory: no wheezing, no rales, no rhonchi


Cardiovascular: RRR, no significant murmur, no rub


Gastrointestinal: soft, non-tender, no distention, positive bowel sounds


PEG+


chronic lymphoedeam


Lymphatic: no nodes


Psychiatric: normal affect


Skin: no rash, normal turgor





Dx/Plan


(1) Acute respiratory failure with hypoxia and hypercapnia


Code(s): J96.01 - ACUTE RESPIRATORY FAILURE WITH HYPOXIA; J96.02 - ACUTE 

RESPIRATORY FAILURE WITH HYPERCAPNIA   Status: Acute   Comment: s/p trach/PEG   





(2) Thrombocytopenia


Code(s): D69.6 - THROMBOCYTOPENIA, UNSPECIFIED   Status: Acute   





(3) UTI (urinary tract infection)


Status: Acute   





(4) Anemia, normocytic normochromic


Code(s): D64.9 - ANEMIA, UNSPECIFIED   Status: Chronic   





(5) Chronic a-fib


Code(s): I48.2 - CHRONIC ATRIAL FIBRILLATION   Status: Chronic   Comment: on 

anticoag/[place]. Now paroxysmal   





(6) Chronic diastolic CHF (congestive heart failure), NYHA class 3


Code(s): I50.32 - CHRONIC DIASTOLIC (CONGESTIVE) HEART FAILURE   Status: 

Chronic   Comment: Stable. Diuretics initially held 2/2 azotemia. Will be given 

PRN.   





(7) Morbid obesity with BMI of 60.0-69.9, adult


Code(s): E66.01 - MORBID (SEVERE) OBESITY DUE TO EXCESS CALORIES; Z68.44 - BODY 

MASS INDEX (BMI) 60.0-69.9, ADULT   Status: Chronic   





(8) MARY (obstructive sleep apnea)


Code(s): G47.33 - OBSTRUCTIVE SLEEP APNEA (ADULT) (PEDIATRIC)   Status: Chronic

   





(9) Physical deconditioning


Code(s): R53.81 - OTHER MALAISE   Status: Chronic   





(10) Pickwickian syndrome


Code(s): E66.2 - MORBID (SEVERE) OBESITY WITH ALVEOLAR HYPOVENTILATION   Status

: Chronic   Comment: s/p trach collar.    





- Plan


cont current plan of care





* if pulmonary ok, will consider transfer to imcu vs medical floor


* discharge for this pt is challenging.


* will need to use cpap


* once she is able to show ambulation, then we can consider discharge to home 

as she is not having funding.


* medication reviewed as below


* symptomatic treatment








Review of Systems





- Review of Systems


Constitutional: negative: fever, chills, sweats, weakness, malaise, other


Eyes: negative: Pain, Vision Change, Conjunctivae Inflammation, Eyelid 

Inflammation, Redness, Other


ENT: negative: Ear Pain, Ear Discharge, Nose Pain, Nose Discharge, Nose 

Congestion, Mouth Pain, Mouth Swelling, Throat Pain, Throat Swelling, Other


Respiratory: negative: Cough, Dry, Shortness of Breath, Hemoptysis, SOB with 

Excertion, Pleuritic Pain, Sputum, Wheezing


Cardiovascular: negative: chest pain, palpitations, orthopnea, paroxysmal 

nocturnal dyspnea, edema, light headedness, other


Gastrointestinal: negative: Nausea, Vomiting, Abdominal Pain, Diarrhea, 

Constipation, Melena, Hematochezia, Other


Genitourinary: negative: Dysuria, Frequency, Incontinence, Hematuria, Retention

, Other





- Medications/Allergies


Allergies/Adverse Reactions: 


 Allergies











Allergy/AdvReac Type Severity Reaction Status Date / Time


 


No Known Drug Allergies Allergy   Verified 05/06/18 14:16











Medications: 


 Current Medications





Acetaminophen (Tylenol Elixir)  1,000 mg PER TUBE Q6H PRN


   PRN Reason: Headache/Fever or Pain


   Last Admin: 06/07/18 20:58 Dose:  1,000 mg


Albuterol/Ipratropium (Duoneb)  3 ml NEB [id number] [facility]


   Last Admin: 06/08/18 07:09 Dose:  3 ml


Amiodarone HCl (Cordarone)  200 mg PO DAILY [facility]


   Last Admin: 06/08/18 09:59 Dose:  200 mg


Lipase/Protease/Amylase (Creon Dr 00013)  1 cap FS .PER PROTOCOL PRN


   PRN Reason: TUBE OCCLUSION PROTOCOL


Apixaban (Eliquis)  5 mg PO BID [facility]


   Last Admin: 06/07/18 20:48 Dose:  5 mg


Aspirin (Aspirin Chewable)  81 mg PO DAILY [facility]


   Last Admin: 06/08/18 09:59 Dose:  81 mg


Bupropion HCl (Wellbutrin)  150 mg PER TUBE BID [facility]


   Last Admin: 06/07/18 20:48 Dose:  150 mg


Clonidine (Catapres)  0.1 mg PO Q4H PRN


   PRN Reason: Systolic BP > 180


   Last Admin: 05/18/18 21:05 Dose:  0.1 mg


Furosemide (Lasix)  40 mg SLOW IVP 0600 [facility]


   Last Admin: 06/08/18 06:13 Dose:  40 mg


Furosemide (Lasix)  40 mg SLOW IVP 1700 [facility]


   Stop: 06/08/18 19:00


Hydralazine HCl (Apresoline)  10 mg SLOW IVP Q4H PRN


   PRN Reason: Systolic BP > 180


   Last Admin: 05/12/18 17:06 Dose:  10 mg


Dextrose/Water (D5w)  1,000 mls @ 50 mls/hr IV .Q20H [facility]


   Last Admin: 06/08/18 03:22 Dose:  1,000 mls


Ceftriaxone Sodium 2 gm/ (Sodium Chloride)  100 mls @ 200 mls/hr IVPB 0900 [facility]


   Last Admin: 06/08/18 09:59 Dose:  100 mls


Losartan Potassium (Cozaar)  25 mg PO DAILY [facility]


   Last Admin: 06/08/18 09:59 Dose:  25 mg


Ondansetron HCl (Zofran Odt)  4 mg PO Q6H PRN


   PRN Reason: Nausea/Vomiting


   Last Admin: 06/01/18 09:09 Dose:  4 mg


Ondansetron HCl (Zofran)  4 mg IVP Q6H PRN


   PRN Reason: Nausea/Vomiting


Pantoprazole Sodium (Protonix)  40 mg IVP DAILY [facility]


   Last Admin: 06/08/18 10:00 Dose:  40 mg


Potassium Chloride (Klor-Con)  20 meq PO QAM-WM [facility]


   Last Admin: 06/08/18 09:59 Dose:  20 meq


Sodium Bicarbonate (Bicarbonate, Sodium)  650 mg PER TUBE .PER PROTOCOL PRN


   PRN Reason: ENTERAL TUBE OCCLUSION


Sodium Chloride (Flush - Normal Saline)  10 ml IVF Q12HR [facility]


   Last Admin: 06/08/18 10:01 Dose:  10 ml


Sodium Chloride (Flush - Normal Saline)  10 ml IVF PRN PRN


   PRN Reason: Saline Flush


   Last Admin: 05/12/18 08:28 Dose:  10 ml This LMSW received a referral on the above patient.   Reason for referral:Aphasia, late effect of cerebrovascular disease   Hemiparesis affecting right side as late effect of cerebrovascular accident   MCI (mild cognitive impairment)   Broca's aphasia   Please assist patient's family with daytime resources for family. Needs social work evaluation.   Name of the community resource that was provided:[facility], [facility] , [facility], [facility], [facility], [facility] Financial Assistance  Resource given to: Patient Caregiver/Daughter Carmen via [place] Mail and Telephone      This LMSW completed assessment with patient Caregiver/Daughter Carmen. Caregiver reports patient resides with her. Caregiver reports family assist patient with ADL's, however seeking additional assistance with personal care. LMSW referred patient to [facility] to assist with personal care services. LMSW advised caregiver there is a wait list for services. Caregiver voiced understanding,.  Caregiver reports patient does wander, and is seeking additional daytime services. LMSW referred patient to the [facility] sponsored by [facility]. LMSW also mailed caregiver additional Adult Day Care Center in the area. Caregiver denied patient needs assistance with food, shelter and medication, however needs assistance with medical. LMSW mailed patient a financial assistance application to assist with outstanding balance. Caregiver reports family assist patient with transportation to and from appointments. Caregiver uncertain if patient has a POA, but will check into it. LMSW mailed all resources and provided her contact information for any additional needs.

## 2018-10-12 NOTE — PROGRESS NOTES
Chief Complaint   Patient presents with    Follow-up     3 months        HPI    Emilia Melgoza is 73 y.o. female. The primary encounter diagnosis was Aphasia, late effect of cerebrovascular disease. Diagnoses of Hemiparesis affecting right side as late effect of cerebrovascular accident, Essential hypertension, Pre-diabetes, and Dystrophic nail were also pertinent to this visit.    73 year old female with Aphasia and HTN comes to clinic for 3 month follow up.  Patient is accompanied by her daughter who gives all information regarding status of medical conditions.    Patient's daughter notes some mild improvement in speech and moderate improvements in use of left upper extremity and mobility.  They have not received information regarding /senior activities from social work.  Patients daughter also mentions dark thickened nail.  She reports trimming the nails and keeping the skin clean and moisturized.    Review of Systems   Unable to perform ROS: Dementia           Current Outpatient Medications:     amLODIPine (NORVASC) 5 MG tablet, TAKE ONE TABLET BY MOUTH TWICE DAILY, Disp: 180 tablet, Rfl: 3    atorvastatin (LIPITOR) 80 MG tablet, Take 1 tablet (80 mg total) by mouth once daily., Disp: 90 tablet, Rfl: 3    cholecalciferol, vitamin D3, 1,000 unit capsule, Take 1 capsule (1,000 Units total) by mouth once daily., Disp: 90 capsule, Rfl: 3    cloNIDine (CATAPRES) 0.1 MG tablet, TAKE TWO TABLETS BY MOUTH ONCE DAILY IN THE EVENING, Disp: 180 tablet, Rfl: 1    clopidogrel (PLAVIX) 75 mg tablet, TAKE 1 TABLET BY MOUTH ONCE DAILY, Disp: 90 tablet, Rfl: 3    escitalopram oxalate (LEXAPRO) 10 MG tablet, Take 1 tablet (10 mg total) by mouth once daily., Disp: 90 tablet, Rfl: 1    GENERLAC 10 gram/15 mL solution, , Disp: , Rfl:     ibuprofen (ADVIL,MOTRIN) 800 MG tablet, , Disp: , Rfl:     memantine (NAMENDA) 10 MG Tab, Take 1 tablet (10 mg total) by mouth once daily., Disp: 90 tablet, Rfl: 1     "oxybutynin (DITROPAN) 5 MG Tab, Take 2 tablets (10 mg total) by mouth 2 (two) times daily., Disp: 360 tablet, Rfl: 3    potassium chloride SA (K-DUR,KLOR-CON) 20 MEQ tablet, Take 1 tablet (20 mEq total) by mouth 2 (two) times daily., Disp: 90 tablet, Rfl: 3    tramadol (ULTRAM) 50 mg tablet, Take 1/2 tablet as needed for headache, Disp: 30 tablet, Rfl: 0      Blood pressure 118/70, pulse 76, temperature 98.2 °F (36.8 °C), temperature source Oral, resp. rate 16, height 5' 6" (1.676 m), SpO2 98 %.    Physical Exam   Constitutional: Vital signs are normal. She appears well-developed.   Cardiovascular: Normal heart sounds.   No murmur heard.  Pulmonary/Chest: Effort normal and breath sounds normal.   Psychiatric: She has a normal mood and affect. Her behavior is normal.       No visits with results within 3 Month(s) from this visit.   Latest known visit with results is:   Admission on 07/06/2018, Discharged on 07/06/2018   Component Date Value Ref Range Status    POC PTWBT 07/06/2018 12.9  9.7 - 14.3 sec Final    POC PTINR 07/06/2018 1.1  0.9 - 1.2 Final    Sample 07/06/2018 UNK   Final    Albumin, POC 07/06/2018 3.2* 3.3 - 5.5 g/dL Final    Alkaline Phosphatase, POC 07/06/2018 80  42 - 141 U/L Final    ALT (SGPT), POC 07/06/2018 23  10 - 47 U/L Final    AST (SGOT), POC 07/06/2018 39* 11 - 38 U/L Final    POC BUN 07/06/2018 13  7 - 22 mg/dL Final    Calcium, POC 07/06/2018 9.6  8.0 - 10.3 mg/dL Final    POC Chloride 07/06/2018 102  98 - 108 mmol/L Final    POC Creatinine 07/06/2018 1.1  0.6 - 1.2 mg/dL Final    POC Glucose 07/06/2018 100  73 - 118 mg/dL Final    POC Potassium 07/06/2018 4.3  3.6 - 5.1 mmol/L Final    POC Sodium 07/06/2018 147* 128 - 145 mmol/L Final    Bilirubin 07/06/2018 0.5  0.2 - 1.6 mg/dL Final    POC TCO2 07/06/2018 29  18 - 33 mmol/L Final    Protein 07/06/2018 8.0  6.4 - 8.1 g/dL Final    POC Cardiac Troponin I 07/06/2018 0.00  <0.09 ng/mL Final    Sample 07/06/2018 UNK   " Final   ]    Assessment:    1. Aphasia, late effect of cerebrovascular disease    2. Hemiparesis affecting right side as late effect of cerebrovascular accident    3. Essential hypertension    4. Pre-diabetes    5. Dystrophic nail          Emilia was seen today for follow-up.    Diagnoses and all orders for this visit:    Aphasia, late effect of cerebrovascular disease  -     Ambulatory referral to Social Work  - Improved. Referral to [facility] sent to assist family in obtaining resources.  - Continue speech therapy due to some improvement.    Hemiparesis affecting right side as late effect of cerebrovascular accident  -     Ambulatory referral to Social Work  - Improved. Referral to [facility] sent to assist family in obtaining resources.  - Continue PT/OT due to improvement in functional status, continue to monitor.    Essential hypertension  -     Comprehensive metabolic panel; Future  -     Lipid panel; Future  - Stable. Obtain labs for disease monitoring.    Pre-diabetes  -     Hemoglobin A1c; Future  - Stable. Obtain labs for disease monitoring.    Dystrophic nail  -     Ambulatory referral to Podiatry  - New problem. Referral to Podiatry for routine nail care.          FOLLOW UP: Follow-up in about 6 months (around 4/12/2019) for Follow up.

## 2018-10-17 ENCOUNTER — TELEPHONE (OUTPATIENT)
Dept: FAMILY MEDICINE | Facility: CLINIC | Age: 73
End: 2018-10-17

## 2018-10-17 NOTE — LETTER
October 17, 2018    Emilia Mendez Abad  [address] [facility] [address]             [address] [address]  Phone: [phone]  Fax: [phone] Dear Mrs. Melgoza:    Sorry we were unable to contact you to schedule your Podiatry appointment. Please give the referral department a call at [phone].        If you have any questions or concerns, please don't hesitate to call.    Sincerely,        Brianna Noble MA

## 2018-10-23 NOTE — PROGRESS NOTES
"Outpatient Neurological Rehabilitation   Speech and Language Therapy Daily Note  Date:  10/24/2018     Start Time:  0845  Stop Time:  0930    Name: Emilia Melgoza   MRN: [id number]   Therapy Diagnosis:   No diagnosis found.   Physician: Raisa Dudley MD  Physician Orders: ST eval and treat  Medical Diagnosis from Referral: CVA      Visit #/Visits authorized: 11/ 20  Visits cancelled: 1   Visits no show: 0   Date of Evaluation:  08/01/2018  Insurance Authorization Period: 07/13/2018-12/31/2018  Plan of Care Expiration:    08/01/2018-09/26/2018  Precautions:Standard and Fall    Subjective:   Pt reports: She is doing just fine today.   Response to previous treatment: "yes indeed"  Pain Scale:  0/10 on VAS currently.   Pain Location: n/a  Objective:   UNTIMED  Procedure Min.   Speech- Language- Voice Therapy   45   Total Minutes: 45  Total Timed Units: 0  Total Untimed Units: 1  Charges Billed/# of units: 1    Short Term Goals: (4 weeks) Current Progress:   1. Pt will answer complex y/n questions with 90% acc ind'ly. Pt answered multi-unit y/n comparison questions with 40% IND (required use of post it notes) and 86% acc given max verbal cues.     2. Pt will complete moderate level 3 unit commands with 90% acc given min cues.  Pt completed level 3 unit commands with 50% acc IND and 80% acc given mod verbal and visual cues   3. Pt will repeat 5-7 word sentences with 90% acc given min cues.  Pt repeated 5-7 word sentences with 45% acc IND and 75% acc given max verbal cues.   4. Pt will recall 3/4 motor speech strategies ind'ly. Pt recalled 1/4 strategies IND ("slow")   5. Pt will participate in the Combined Aphasia and Apraxia of Speech Treatment (CAAST) with 3 verbs per session to improve verbal fluency and speech intelligibility.  See below   6. Pt will name 10 items in a concrete category given 1 minute and min cues. Pt named 6 items in a grocery store given max verbal cues.   7. Pt will answer questions following " a visual prompt with 80% acc given mod cues.  Pt answered questions following a visual prompt with 42% acc IND and 71% acc given max verbal and visual cues.       Combined Aphasia and Apraxia of Speech Treatment (CAAST) Protocol: target verb:  falling  · Step 1: pt described action picture using a 6 word utterance  · Step 2: pt identified appropriate part of sentence of generated utterance for 0/6 words in utterance  · Step 3: pt answered wh- question to expand original utterance with maximum verbal cues producing a 10 word utterance.  · Step 4: SLP reinforced patient's production from Step 3 and printed response in sentence frame  · Step 5: Pt repeated combined production of Step 1 and 3 with errors; errors were not corrected with visual cues on written stimuli; and repeated sentence x3 with moderate artic-kinematic cues  · Step 6: patient described action picture using a 10 word utterance.     Combined Aphasia and Apraxia of Speech Treatment (CAAST) Protocol: target verb:  hitting  · Step 1: pt described action picture using a 5 word utterance  · Step 2: pt identified appropriate part of sentence of generated utterance for 3/5 words in utterance  · Step 3: pt answered wh- question to expand original utterance with minimum verbal cues producing a 6 word utterance.  · Step 4: SLP reinforced patient's production from Step 3 and printed response in sentence frame  · Step 5: Pt repeated combined production of Step 1 and 3 with errors; errors were not corrected with visual cues on written stimuli; and repeated sentence x3 with moderate artic-kinematic cues  · Step 6: patient described action picture using a 6 word utterance.     Combined Aphasia and Apraxia of Speech Treatment (CAAST) Protocol: target verb:  painting  · Step 1: pt described action picture using a 5 word utterance  · Step 2: pt identified appropriate part of sentence of generated utterance for 2/5 words in utterance  · Step 3: pt answered wh- question to  expand original utterance with minimum verbal cues producing a 6 word utterance.  · Step 4: SLP reinforced patient's production from Step 3 and printed response in sentence frame  · Step 5: Pt repeated combined production of Step 1 and 3 with errors; errors were not corrected with visual cues on written stimuli; and repeated sentence x3 with moderate artic-kinematic cues  · Step 6: patient described action picture using a 6 word utterance.     Goals Met:   8. Pt will complete assessment of reading and writing with WAB-R Part 2. Goal Met 8/15/18 / Discontinue     Patient Education/Response:   Reinforced motor speech strategies for improved carryover into conversation. Pt verbalized understanding and agreement that speech strategies were helping.    Written Home Exercises Provided: no.  Assessment:   Emilia is progressing well towards her goals. Decreased accuracy in speech intelligibility in conversations; however, she demonstrated good awareness of clear vs unintelligible production of several words during tasks. Current goals remain appropriate. Goals to be updated as necessary.   Pt prognosis is Fair. Pt will continue to benefit from skilled outpatient speech and language therapy to address the deficits listed in the problem list on initial evaluation, provide pt/family education and to maximize pt's level of independence in the home and community environment.     Functional Communication Measure (FCM):   Severity Modifier for Medicare G-Code:  Spoken Language Comprehension  Current status: FCM:  LEVEL 5: The individual is able to understand communication in structured conversations with both familiar and unfamiliar communication partners. The individual occasionally requires minimal cueing to understand more complex sentences/messages. The individual occasionally initiates the use of compensatory strategies when encountering difficulty.   -  CJ CJ at least 20% < 40% impaired, limited or restricted  Projected  status:  FCM:   LEVEL 6: The individual is able to understand communication in most activities, but some limitations in comprehension are still apparent in vocational, avocational, and social activities. The individual rarely requires minimal cueing to understand complex sentences. The individual usually uses compensatory strategies when encountering difficulty.   -  CI CI at least 1% but less than 20% impaired, limited or restricted  Discharge status:  FCM:   n/a        Spoken Language Expression  Current status: FCM:  LEVEL 3 The communication partner must assume responsibility for structuring thecommunication exchange, and with consistent and moderate cueing, the individual canproduce words and phrases that are appropriate and meaningful in context.   -  CL CL at least 60% < 80% impaired, limited or restricted  Projected status:  FCM:   LEVEL 4: The individual is successfully able to initiate communication using spoken language in simple, structured conversations in routine daily activities with familiar communication partners. The individual usually requires moderate cueing, but is able to demonstrate use of simple sentences (i.e., semantics, syntax, and morphology) and rarely uses complex sentences/messages.   -  CK CK at least 40% < 60% impaired, limited or restricted  Discharge status:  FCM:  n/a    Medical necessity is demonstrated by the following IMPAIRMENTS:  Utilize compensatory strategies to communicate wants and needs effectively to different conversational partners, maintain safety and participate socially in functional living environment.     Barriers to Therapy: transportation  Pt's spiritual, cultural and educational needs considered and pt agreeable to plan of care and goals.  Plan:   Continue POC with focus on motor speech.    JEF Leon   Clinician  Speech-Language Pathology  10/23/2018

## 2018-10-24 ENCOUNTER — CLINICAL SUPPORT (OUTPATIENT)
Dept: REHABILITATION | Facility: HOSPITAL | Age: 73
End: 2018-10-24
Attending: FAMILY MEDICINE
Payer: MEDICARE

## 2018-10-24 DIAGNOSIS — R47.01 BROCA'S APHASIA: Primary | ICD-10-CM

## 2018-10-24 PROCEDURE — 92507 TX SP LANG VOICE COMM INDIV: CPT | Mod: PN

## 2018-10-30 NOTE — PROGRESS NOTES
"Outpatient Neurological Rehabilitation   Speech and Language Therapy Daily Note  Date:  10/31/2018     Start Time:  0840  Stop Time:  0925    Name: Emilia Melgoza   MRN: [id number]   Therapy Diagnosis:   No diagnosis found.   Physician: Raisa Dudley MD  Physician Orders: ST eval and treat  Medical Diagnosis from Referral: CVA      Visit #/Visits authorized: 12/ 20  Visits cancelled: 1   Visits no show: 0   Date of Evaluation:  08/01/2018  Insurance Authorization Period: 07/13/2018-12/31/2018  Plan of Care Expiration:    08/01/2018-09/26/2018, 09/26/2018-11/21/2018  Precautions:Standard and Fall    Subjective:   Pt reports: She is doing just fine today.   Response to previous treatment: "yes indeed"  Pain Scale:  0/10 on VAS currently.   Pain Location: n/a  Objective:   UNTIMED  Procedure Min.   Speech- Language- Voice Therapy   45   Total Minutes: 45  Total Timed Units: 0  Total Untimed Units: 1  Charges Billed/# of units: 1    Short Term Goals: (4 weeks) Current Progress:   1. Pt will answer complex y/n questions with 90% acc ind'ly. Pt answered multi-unit y/n comparison questions with 70% IND (required use of post it notes) and 80% acc given mod verbal cues.     2. Pt will complete moderate level 3 unit commands with 90% acc given min cues.  Pt completed level 3 unit commands with 60% acc IND and 80% acc given mod verbal cues.   3. Pt will repeat 5-7 word sentences with 90% acc given min cues.  Pt repeated 5-7 word sentences with 45% acc IND and 80% acc given max verbal cues.   4. Pt will recall 3/4 motor speech strategies ind'ly. Pt recalled 1/4 strategies IND ("slow") and 3/4 given mod verbal cues.   5. Pt will participate in the Combined Aphasia and Apraxia of Speech Treatment (CAAST) with 3 verbs per session to improve verbal fluency and speech intelligibility.  See below   6. Pt will name 10 items in a concrete category given 1 minute and min cues. Pt named 7 items in a concrete category (fruits) given " max verbal cues.  Pt named 5 items in a concrete category (vegetables) given max verbal cues.   7. Pt will answer questions following a visual prompt with 80% acc given mod cues.  Pt answered questions following a visual prompt with 42% acc IND and 85% acc given picture stimuli.     Combined Aphasia and Apraxia of Speech Treatment (CAAST) Protocol: target verb:  playing  Step 1: pt described action picture using a 2 word utterance  Step 2: pt identified appropriate part of sentence of generated utterance for 3/3 words in utterance  Step 3: pt answered wh- question to expand original utterance with minimum verbal cues producing a 5 word utterance.  Step 4: SLP reinforced patient's production from Step 3 and printed response in sentence frame  Step 5: Pt repeated combined production of Step 1 and 3 with errors; errors were not corrected with visual cues on written stimuli; and repeated sentence x3 with minimum artic-kinematic cues  Step 6: patient described action picture using a 5 word utterance.   ·     Combined Aphasia and Apraxia of Speech Treatment (CAAST) Protocol: target verb:  playing  · Step 1: pt described action picture using a 5 word utterance  · Step 2: pt identified appropriate part of sentence of generated utterance for 3/3 words in utterance  · Step 3: pt answered wh- question to expand original utterance with minimum verbal cues producing a 5 word utterance.  · Step 4: SLP reinforced patient's production from Step 3 and printed response in sentence frame  · Step 5: Pt repeated combined production of Step 1 and 3 with errors; errors were not corrected with visual cues on written stimuli; and repeated sentence x3 with moderate artic-kinematic cues  · Step 6: patient described action picture using a 5 word utterance.     Combined Aphasia and Apraxia of Speech Treatment (CAAST) Protocol: target verb:  giving  · Step 1: pt described action picture using a 2 word utterance  · Step 2: pt identified  appropriate part of sentence of generated utterance for 2/3 words in utterance  · Step 3: pt answered wh- question to expand original utterance with minimum verbal cues producing a 5 word utterance.  · Step 4: SLP reinforced patient's production from Step 3 and printed response in sentence frame  · Step 5: Pt repeated combined production of Step 1 and 3 with errors; errors were not corrected with visual cues on written stimuli; and repeated sentence x3 with moderate artic-kinematic cues  · Step 6: patient described action picture using a 5 word utterance.     Goals Met:   8. Pt will complete assessment of reading and writing with WAB-R Part 2. Goal Met 8/15/18 / Discontinue     Patient Education/Response:   Reinforced motor speech strategies for improved carryover into conversation. Pt verbalized understanding and agreement that speech strategies were helping.    Written Home Exercises Provided: no.  Assessment:   Emilia is progressing well towards her goals. . Current goals remain appropriate. Goals to be updated as necessary.   Pt prognosis is Fair. Pt will continue to benefit from skilled outpatient speech and language therapy to address the deficits listed in the problem list on initial evaluation, provide pt/family education and to maximize pt's level of independence in the home and community environment.     Functional Communication Measure (FCM):   Severity Modifier for Medicare G-Code:  Spoken Language Comprehension  Current status: FCM:  LEVEL 5: The individual is able to understand communication in structured conversations with both familiar and unfamiliar communication partners. The individual occasionally requires minimal cueing to understand more complex sentences/messages. The individual occasionally initiates the use of compensatory strategies when encountering difficulty.   -  CJ CJ at least 20% < 40% impaired, limited or restricted  Projected status:  FCM:   LEVEL 6: The individual is able to  "understand communication in most activities, but some limitations in comprehension are still apparent in vocational, avocational, and social activities. The individual rarely requires minimal cueing to understand complex sentences. The individual usually uses compensatory strategies when encountering difficulty.   -  CI CI at least 1% but less than 20% impaired, limited or restricted  Discharge status:  FCM:   n/a        Spoken Language Expression  Current status: FCM:  LEVEL 3 The communication partner must assume responsibility for structuring thecommunication exchange, and with consistent and moderate cueing, the individual canproduce words and phrases that are appropriate and meaningful in context.   -  CL CL at least 60% < 80% impaired, limited or restricted  Projected status:  FCM:   LEVEL 4: The individual is successfully able to initiate communication using spoken language in simple, structured conversations in routine daily activities with familiar communication partners. The individual usually requires moderate cueing, but is able to demonstrate use of simple sentences (i.e., semantics, syntax, and morphology) and rarely uses complex sentences/messages.   -  CK CK at least 40% < 60% impaired, limited or restricted  Discharge status:  FCM:  n/a    Medical necessity is demonstrated by the following IMPAIRMENTS:  Utilize compensatory strategies to communicate wants and needs effectively to different conversational partners, maintain safety and participate socially in functional living environment.     Barriers to Therapy: transportation  Pt's spiritual, cultural and educational needs considered and pt agreeable to plan of care and goals.  Plan:   Continue POC with focus on motor speech.    QUIQUE Leon.   Clinician  Speech-Language Pathology  10/30/2018    "I SONU Blanco., CCC-SLP certify that I was present in the room directing the student and " "service delivery and guiding them using my skilled judgement. As the co-signing therapist, I have reviewed the student's documentation, and am responsible for the treatment, assessment and plan."     SONU Blanco., CCC-SLP  Speech-Language Pathologist  11/01/2018    "

## 2018-10-31 ENCOUNTER — CLINICAL SUPPORT (OUTPATIENT)
Dept: REHABILITATION | Facility: HOSPITAL | Age: 73
End: 2018-10-31
Attending: FAMILY MEDICINE
Payer: MEDICARE

## 2018-10-31 DIAGNOSIS — R47.01 BROCA'S APHASIA: Primary | ICD-10-CM

## 2018-10-31 PROCEDURE — 92507 TX SP LANG VOICE COMM INDIV: CPT | Mod: PN

## 2018-11-05 NOTE — PROGRESS NOTES
"Outpatient Neurological Rehabilitation   Speech and Language Therapy Daily Note  Date:  11/7/2018     Start Time:  1020  Stop Time:  1100    Name: Emilia Melgoza   MRN: [id number]   Therapy Diagnosis:   No diagnosis found.   Physician: Raisa Dudley MD  Physician Orders: ST eval and treat  Medical Diagnosis from Referral: CVA      Visit #/Visits authorized: 13/ 20  Visits cancelled: 1   Visits no show: 0   Date of Evaluation:  08/01/2018  Insurance Authorization Period: 07/13/2018-12/31/2018  Plan of Care Expiration:    08/01/2018-09/26/2018, 09/26/2018-11/21/2018  Precautions:Standard and Fall    Subjective:   Pt reports: She is doing OK even though she was sick all night.   Response to previous treatment: "it was good"  Pain Scale:  0/10 on VAS currently.   Pain Location: n/a  Objective:   UNTIMED  Procedure Min.   Speech- Language- Voice Therapy   40   Total Minutes: 40  Total Timed Units: 0  Total Untimed Units: 1  Charges Billed/# of units: 1    Short Term Goals: (4 weeks) Current Progress:   1. Pt will answer complex y/n questions with 90% acc ind'ly. Pt answered multi-unit y/n comparison questions with 53% IND (required use of post it notes) and 83% acc given max verbal cues.     2. Pt will complete moderate level 3 unit commands with 90% acc given min cues.  Pt completed level 3 unit commands with 33% acc IND and 67% acc given max verbal cues.   3. Pt will repeat 5-7 word sentences with 90% acc given min cues.  Pt repeated 5-7 word sentences with 55% acc IND and 00% acc given max verbal cues.   4. Pt will recall 3/4 motor speech strategies ind'ly.  Pt recalled 2/4 strategies IND and 3/4 given mod verbal cues.   5. Pt will participate in the Combined Aphasia and Apraxia of Speech Treatment (CAAST) with 3 verbs per session to improve verbal fluency and speech intelligibility.  Not assessed today   6. Pt will name 10 items in a concrete category given 1 minute and min cues. Pt named 2 items in a concrete " category (aniamls) an additional 9 given additional time and max verbal cues.   7. Pt will answer questions following a visual prompt with 80% acc given mod cues.  Not assessed tday     Goals Met:   8. Pt will complete assessment of reading and writing with WAB-R Part 2. Goal Met 8/15/18 / Discontinue     Patient Education/Response:   Reinforced motor speech strategies for improved carryover into conversation. Pt verbalized understanding and agreement that speech strategies were helping.    Written Home Exercises Provided: no.  Assessment:   Emilia is progressing well towards her goals. . Current goals remain appropriate. Goals to be updated as necessary.   Pt prognosis is Fair. Pt will continue to benefit from skilled outpatient speech and language therapy to address the deficits listed in the problem list on initial evaluation, provide pt/family education and to maximize pt's level of independence in the home and community environment.     Functional Communication Measure (FCM):   Severity Modifier for Medicare G-Code:  Spoken Language Comprehension  Current status: FCM:  LEVEL 5: The individual is able to understand communication in structured conversations with both familiar and unfamiliar communication partners. The individual occasionally requires minimal cueing to understand more complex sentences/messages. The individual occasionally initiates the use of compensatory strategies when encountering difficulty.   -  CJ CJ at least 20% < 40% impaired, limited or restricted  Projected status:  FCM:   LEVEL 6: The individual is able to understand communication in most activities, but some limitations in comprehension are still apparent in vocational, avocational, and social activities. The individual rarely requires minimal cueing to understand complex sentences. The individual usually uses compensatory strategies when encountering difficulty.   -  CI CI at least 1% but less than 20% impaired, limited or  "restricted  Discharge status:  FCM:   n/a        Spoken Language Expression  Current status: FCM:  LEVEL 3 The communication partner must assume responsibility for structuring thecommunication exchange, and with consistent and moderate cueing, the individual canproduce words and phrases that are appropriate and meaningful in context.   -  CL CL at least 60% < 80% impaired, limited or restricted  Projected status:  FCM:   LEVEL 4: The individual is successfully able to initiate communication using spoken language in simple, structured conversations in routine daily activities with familiar communication partners. The individual usually requires moderate cueing, but is able to demonstrate use of simple sentences (i.e., semantics, syntax, and morphology) and rarely uses complex sentences/messages.   -  CK CK at least 40% < 60% impaired, limited or restricted  Discharge status:  FCM:  n/a    Medical necessity is demonstrated by the following IMPAIRMENTS:  Utilize compensatory strategies to communicate wants and needs effectively to different conversational partners, maintain safety and participate socially in functional living environment.     Barriers to Therapy: transportation  Pt's spiritual, cultural and educational needs considered and pt agreeable to plan of care and goals.  Plan:   Continue POC with focus on motor speech.    QUIQUE Leon.   Clinician  Speech-Language Pathology  11/05/2018    "I SONU Blanco., CCC-SLP certify that I was present in the room directing the student and service delivery and guiding them using my skilled judgement. As the co-signing therapist, I have reviewed the student's documentation, and am responsible for the treatment, assessment and plan."     XOCHITL Blanco, CCC-SLP  Speech-Language Pathologist  11/05/2018  "

## 2018-11-07 ENCOUNTER — CLINICAL SUPPORT (OUTPATIENT)
Dept: REHABILITATION | Facility: HOSPITAL | Age: 73
End: 2018-11-07
Attending: FAMILY MEDICINE
Payer: MEDICARE

## 2018-11-07 DIAGNOSIS — R47.01 BROCA'S APHASIA: Primary | ICD-10-CM

## 2018-11-07 PROCEDURE — 92507 TX SP LANG VOICE COMM INDIV: CPT | Mod: PN

## 2018-11-14 ENCOUNTER — CLINICAL SUPPORT (OUTPATIENT)
Dept: REHABILITATION | Facility: HOSPITAL | Age: 73
End: 2018-11-14
Attending: FAMILY MEDICINE
Payer: MEDICARE

## 2018-11-14 DIAGNOSIS — R47.01 BROCA'S APHASIA: Primary | ICD-10-CM

## 2018-11-14 PROCEDURE — 92507 TX SP LANG VOICE COMM INDIV: CPT | Mod: PN

## 2018-11-14 NOTE — PROGRESS NOTES
"Outpatient Neurological Rehabilitation   Speech and Language Therapy Daily Note  Date:  11/14/2018     Start Time:  1015  Stop Time:  1100    Name: Emilia Melgoza   MRN: [id number]   Therapy Diagnosis:   No diagnosis found.   Physician: Raisa Dudley MD  Physician Orders: ST eval and treat  Medical Diagnosis from Referral: CVA      Visit #/Visits authorized: 14/ 20  Visits cancelled: 1   Visits no show: 0   Date of Evaluation:  08/01/2018  Insurance Authorization Period: 07/13/2018-12/31/2018  Plan of Care Expiration:    08/01/2018-09/26/2018, 09/26/2018-11/21/2018  Precautions:Standard and Fall    Subjective:   Pt reports: she is feeling well today and is enjoying the cold weather. Pt's daughter was present during today's session.  Response to previous treatment: "it was good"  Pain Scale:  0/10 on VAS currently.   Pain Location: n/a  Objective:   UNTIMED  Procedure Min.   Speech- Language- Voice Therapy   40   Total Minutes: 40  Total Timed Units: 0  Total Untimed Units: 1  Charges Billed/# of units: 1    Short Term Goals: (4 weeks) Current Progress:   1. Pt will answer complex y/n questions with 90% acc ind'ly. Not assessed today   2. Pt will complete moderate level 3 unit commands with 90% acc given min cues.  Not assessed today   3. Pt will repeat 5-7 word sentences with 90% acc given min cues.  Pt repeated 5-7 word sentences with 70% acc IND and 90% acc given mod verbal cues.   4. Pt will recall 3/4 motor speech strategies ind'ly.  Pt recalled 3/4 motor speech strategies IND.    Met x1   5. Pt will participate in the Combined Aphasia and Apraxia of Speech Treatment (CAAST) with 3 verbs per session to improve verbal fluency and speech intelligibility.  See below   6. Pt will name 10 items in a concrete category given 1 minute and min cues. Pt named 6 items in a concrete category (colors) an additional 4 given additional time and mod verbal cues.    Pt named 6 zoo animals given mod verbal cues.   7. Pt will " answer questions following a visual prompt with 80% acc given mod cues.  Not assessed today     Note: Pt named LARK object pictures with 60% acc IND and 100% acc given verbal and phonemic cues.     Goals Met:   8. Pt will complete assessment of reading and writing with WAB-R Part 2. Goal Met 8/15/18 / Discontinue     Patient Education/Response:   Reinforced motor speech strategies for improved carryover into conversation. Pt verbalized understanding and agreement that speech strategies were helping.    Written Home Exercises Provided: no.  Assessment:   Emilia is progressing well towards her goals. Pt showed increased accuracy in motor speech skills today (could be due to pt's daughter being present during the session). Current goals remain appropriate. Goals to be updated as necessary.   Pt prognosis is Fair. Pt will continue to benefit from skilled outpatient speech and language therapy to address the deficits listed in the problem list on initial evaluation, provide pt/family education and to maximize pt's level of independence in the home and community environment.     Functional Communication Measure (FCM):   Severity Modifier for Medicare G-Code:  Spoken Language Comprehension  Current status: FCM:  LEVEL 5: The individual is able to understand communication in structured conversations with both familiar and unfamiliar communication partners. The individual occasionally requires minimal cueing to understand more complex sentences/messages. The individual occasionally initiates the use of compensatory strategies when encountering difficulty.   -  CJ CJ at least 20% < 40% impaired, limited or restricted  Projected status:  FCM:   LEVEL 6: The individual is able to understand communication in most activities, but some limitations in comprehension are still apparent in vocational, avocational, and social activities. The individual rarely requires minimal cueing to understand complex sentences. The individual  "usually uses compensatory strategies when encountering difficulty.   -  CI CI at least 1% but less than 20% impaired, limited or restricted  Discharge status:  FCM:   n/a        Spoken Language Expression  Current status: FCM:  LEVEL 3 The communication partner must assume responsibility for structuring thecommunication exchange, and with consistent and moderate cueing, the individual canproduce words and phrases that are appropriate and meaningful in context.   -  CL CL at least 60% < 80% impaired, limited or restricted  Projected status:  FCM:   LEVEL 4: The individual is successfully able to initiate communication using spoken language in simple, structured conversations in routine daily activities with familiar communication partners. The individual usually requires moderate cueing, but is able to demonstrate use of simple sentences (i.e., semantics, syntax, and morphology) and rarely uses complex sentences/messages.   -  CK CK at least 40% < 60% impaired, limited or restricted  Discharge status:  FCM:  n/a    Medical necessity is demonstrated by the following IMPAIRMENTS:  Utilize compensatory strategies to communicate wants and needs effectively to different conversational partners, maintain safety and participate socially in functional living environment.     Barriers to Therapy: transportation  Pt's spiritual, cultural and educational needs considered and pt agreeable to plan of care and goals.  Plan:   Continue POC with focus on motor speech.    QUIQUE Leon.   Clinician  Speech-Language Pathology  11/14/2018    "XOCHITL Zamarripa, CCC-SLP certify that I was present in the room directing the student and service delivery and guiding them using my skilled judgement. As the co-signing therapist, I have reviewed the student's documentation, and am responsible for the treatment, assessment and plan."     SONU Blanco., " CCC-SLP  Speech-Language Pathologist  11/14/2018

## 2018-11-21 ENCOUNTER — CLINICAL SUPPORT (OUTPATIENT)
Dept: REHABILITATION | Facility: HOSPITAL | Age: 73
End: 2018-11-21
Attending: FAMILY MEDICINE
Payer: MEDICARE

## 2018-11-21 DIAGNOSIS — R47.01 BROCA'S APHASIA: Primary | ICD-10-CM

## 2018-11-21 PROCEDURE — 92507 TX SP LANG VOICE COMM INDIV: CPT | Mod: PN

## 2018-11-21 NOTE — PROGRESS NOTES
"Outpatient Neurological Rehabilitation   Speech and Language Therapy Daily Note  Date:  11/21/2018     Start Time:  1010  Stop Time:  1050    Name: Emilia Melgoza   MRN: [id number]   Therapy Diagnosis:   No diagnosis found.   Physician: Raisa Dudley MD  Physician Orders: ST eval and treat  Medical Diagnosis from Referral: CVA      Visit #/Visits authorized: 15/ 20  Visits cancelled: 1   Visits no show: 0   Date of Evaluation:  08/01/2018  Insurance Authorization Period: 07/13/2018-12/31/2018  Plan of Care Expiration:    08/01/2018-09/26/2018, 09/26/2018-11/21/2018  Precautions:Standard and Fall    Subjective:   Pt reports: she is doing well. Pt's  attended session with her today. He stated he thinks she has been using the stroke as a "crutch" and has become lazy at home due to it.   Response to previous treatment: "fine"  Pain Scale:  0/10 on VAS currently.   Pain Location: n/a  Objective:   UNTIMED  Procedure Min.   Speech- Language- Voice Therapy   40   Total Minutes: 40  Total Timed Units: 0  Total Untimed Units: 1  Charges Billed/# of units: 1    Short Term Goals: (4 weeks) Current Progress:   1. Pt will answer complex y/n questions with 90% acc ind'ly. 90% acc ind'ly   2. Pt will complete moderate level 3 unit commands with 90% acc given min cues.  75% acc ind'ly   3. Pt will repeat 5-7 word sentences with 90% acc given min cues.  Pt repeated 5-7 word sentences with 80% acc IND and 90% acc given mod verbal cues.   4. Pt will recall 3/4 motor speech strategies ind'ly.  Pt recalled 2/4 motor speech strategies IND and additional 1 given verbal cues       5. Pt will participate in the Combined Aphasia and Apraxia of Speech Treatment (CAAST) with 3 verbs per session to improve verbal fluency and speech intelligibility.  -Not assessed during today's session.   6. Pt will name 10 items in a concrete category given 1 minute and min cues. -Not assessed during today's session.     7. Pt will answer questions " "following a visual prompt with 80% acc given mod cues.  Not assessed today     Note: Pt named LARK object pictures with 67% acc IND and 100% acc given verbal and phonemic cues.     Goals Met:   8. Pt will complete assessment of reading and writing with WAB-R Part 2. Goal Met 8/15/18 / Discontinue     Patient Education/Response:   Reinforced motor speech strategies for improved carryover into conversation. Pt verbalized understanding and agreement that speech strategies were helping.    Written Home Exercises Provided: no.  Assessment:   Emilia is progressing well towards her goals. Pt continues to show progress in motor speech skills. She is improving in using "overarticulation" motor speech strategy. Pt's  was present for session today. Current goals remain appropriate. Goals to be updated as necessary.   Pt prognosis is Fair. Pt will continue to benefit from skilled outpatient speech and language therapy to address the deficits listed in the problem list on initial evaluation, provide pt/family education and to maximize pt's level of independence in the home and community environment.     Functional Communication Measure (FCM):   Severity Modifier for Medicare G-Code:  Spoken Language Comprehension  Current status: FCM:  LEVEL 5: The individual is able to understand communication in structured conversations with both familiar and unfamiliar communication partners. The individual occasionally requires minimal cueing to understand more complex sentences/messages. The individual occasionally initiates the use of compensatory strategies when encountering difficulty.   -  CJ CJ at least 20% < 40% impaired, limited or restricted  Projected status:  FCM:   LEVEL 6: The individual is able to understand communication in most activities, but some limitations in comprehension are still apparent in vocational, avocational, and social activities. The individual rarely requires minimal cueing to understand complex " sentences. The individual usually uses compensatory strategies when encountering difficulty.   -  CI CI at least 1% but less than 20% impaired, limited or restricted  Discharge status:  FCM:   n/a        Spoken Language Expression  Current status: FCM:  LEVEL 3 The communication partner must assume responsibility for structuring thecommunication exchange, and with consistent and moderate cueing, the individual canproduce words and phrases that are appropriate and meaningful in context.   -  CL CL at least 60% < 80% impaired, limited or restricted  Projected status:  FCM:   LEVEL 4: The individual is successfully able to initiate communication using spoken language in simple, structured conversations in routine daily activities with familiar communication partners. The individual usually requires moderate cueing, but is able to demonstrate use of simple sentences (i.e., semantics, syntax, and morphology) and rarely uses complex sentences/messages.   -  CK CK at least 40% < 60% impaired, limited or restricted  Discharge status:  FCM:  n/a    Medical necessity is demonstrated by the following IMPAIRMENTS:  Utilize compensatory strategies to communicate wants and needs effectively to different conversational partners, maintain safety and participate socially in functional living environment.     Barriers to Therapy: transportation  Pt's spiritual, cultural and educational needs considered and pt agreeable to plan of care and goals.  Plan:   Continue POC with focus on motor speech.      SONU Blanco., CCC-SLP  Speech-Language Pathologist  11/21/2018

## 2018-11-27 NOTE — PROGRESS NOTES
"Outpatient [facility]rological Rehabilitation   Speech and Language Therapy Daily Note  Date:  11/28/2018     Start Time:  1015  Stop Time:  1055    Name: Emilia Melgoza   MRN: [id number]   Therapy Diagnosis:   Encounter Diagnoses   Name Primary?    Broca's aphasia Yes    Dysarthria       Physician: Raisa Dudley MD  Physician Orders: ST eval and treat  Medical Diagnosis from Referral: CVA      Visit #/Visits authorized: 16/ 20  Visits cancelled: 1   Visits no show: 0   Date of Evaluation:  08/01/2018  Insurance Authorization Period: 07/13/2018-12/31/2018  Plan of Care Expiration:    08/01/2018-09/26/2018, 09/26/2018-11/21/2018, 11/27/2018-01/02/2019  Precautions:Standard and Fall    Subjective:   Pt reports: she is doing well and had a "beautiful thanksgiving."   Response to previous treatment: "I did do good"  Pain Scale:  0/10 on VAS currently.   Pain Location: n/a  Objective:   UNTIMED  Procedure Min.   Speech- Language- Voice Therapy   40   Total Minutes: 40  Total Timed Units: 0  Total Untimed Units: 1  Charges Billed/# of units: 1    Short Term Goals: (4 weeks) Current Progress:   1. Pt will answer complex y/n questions with 90% acc ind'ly.  Goal Not Met, Continue Pt answered complex y/n questions with 73% acc ind'ly, 100% acc given Jun.   2. Pt will complete moderate level 3 unit commands with 90% acc given min cues. Goal Not Met, Continue Pt completed mod level 3 unit commands with 83% acc IND, 100% acc given Jun.   3. Pt will repeat 5-7 word sentences with 90% acc given min cues. Goal Not Met, Continue Pt repeated 5-7 word sentences with 60% acc IND and 80% acc given mod verbal cues.   4. Pt will recall 3/4 motor speech strategies ind'ly. Goal Not Met, Continue Pt recalled 1/4 motor speech strategies IND and additional 2 given verbal cues.   5. Pt will participate in the Combined Aphasia and Apraxia of Speech Treatment (CAAST) with 3 verbs per session to improve verbal fluency and speech intelligibility. " Goal Not Met, Continue -Not assessed during today's session.   6. Pt will name 10 items in a concrete category given 1 minute and min cues. Goal Not Met, Continue Pt named 11 items in a concrete category (things we wear) given max verbal cues and additional time.   7. Pt will answer questions following a visual prompt with 80% acc given mod cues. Goal Not Met, Continue Pt answered questions following a visual prompt with 47% acc IND, 82% acc given max verbal and visual cues.     Goals Met:   8. Pt will complete assessment of reading and writing with WAB-R Part 2. Goal Met 8/15/18 / Discontinue     Patient Education/Response:   Reinforced motor speech strategies for improved carryover into conversation. Pt verbalized understanding and agreement that speech strategies were helping.    Written Home Exercises Provided: no.  Assessment:   Emilia is progressing well towards her goals. Pt continues to show progress in motor speech skills.Decreased accuracy in answering questions following a visual prompt and ability to use SOS strategies in conversation. Current goals remain appropriate. Goals to be updated as necessary.   Pt prognosis is Fair. Pt will continue to benefit from skilled outpatient speech and language therapy to address the deficits listed in the problem list on initial evaluation, provide pt/family education and to maximize pt's level of independence in the home and community environment.     Functional Communication Measure (FCM):   Severity Modifier for Medicare G-Code:  Spoken Language Comprehension  Current status: FCM:  LEVEL 5: The individual is able to understand communication in structured conversations with both familiar and unfamiliar communication partners. The individual occasionally requires minimal cueing to understand more complex sentences/messages. The individual occasionally initiates the use of compensatory strategies when encountering difficulty.   -  CJ CJ at least 20% < 40%  "impaired, limited or restricted  Projected status:  FCM:   LEVEL 6: The individual is able to understand communication in most activities, but some limitations in comprehension are still apparent in vocational, avocational, and social activities. The individual rarely requires minimal cueing to understand complex sentences. The individual usually uses compensatory strategies when encountering difficulty.   -  CI CI at least 1% but less than 20% impaired, limited or restricted  Discharge status:  FCM:   n/a        Spoken Language Expression  Current status: FCM:  LEVEL 3 The communication partner must assume responsibility for structuring thecommunication exchange, and with consistent and moderate cueing, the individual canproduce words and phrases that are appropriate and meaningful in context.   -  CL CL at least 60% < 80% impaired, limited or restricted  Projected status:  FCM:   LEVEL 4: The individual is successfully able to initiate communication using spoken language in simple, structured conversations in routine daily activities with familiar communication partners. The individual usually requires moderate cueing, but is able to demonstrate use of simple sentences (i.e., semantics, syntax, and morphology) and rarely uses complex sentences/messages.   -  CK CK at least 40% < 60% impaired, limited or restricted  Discharge status:  FCM:  n/a    Medical necessity is demonstrated by the following IMPAIRMENTS:  Utilize compensatory strategies to communicate wants and needs effectively to different conversational partners, maintain safety and participate socially in functional living environment.     Barriers to Therapy: transportation  Pt's spiritual, cultural and educational needs considered and pt agreeable to plan of care and goals.  Plan:   Continue POC with focus on motor speech.      QUIQUE Leon.   Clinician  Speech-Language Pathology  12/05/2018    "CAROLYN Ward " "SONU Rebolledo., CCC-SLP certify that I was present in the room directing the student and service delivery and guiding them using my skilled judgement. As the co-signing therapist, I have reviewed the student's documentation, and am responsible for the treatment, assessment and plan."     SONU Blanco., CCC-SLP  Speech-Language Pathologist  12/05/2018    "

## 2018-11-28 ENCOUNTER — CLINICAL SUPPORT (OUTPATIENT)
Dept: REHABILITATION | Facility: HOSPITAL | Age: 73
End: 2018-11-28
Attending: FAMILY MEDICINE
Payer: MEDICARE

## 2018-11-28 DIAGNOSIS — R47.1 DYSARTHRIA: ICD-10-CM

## 2018-11-28 DIAGNOSIS — R47.01 BROCA'S APHASIA: Primary | ICD-10-CM

## 2018-11-28 PROCEDURE — 92507 TX SP LANG VOICE COMM INDIV: CPT | Mod: PN

## 2018-11-28 NOTE — PLAN OF CARE
Outpatient Neurological Rehabilitation Therapy  Updated POC     Date: 11/28/2018   Name: Emilia Melgoza  Clinic Number: [id number]    Therapy Diagnosis:   Encounter Diagnoses   Name Primary?    Broca's aphasia Yes    Dysarthria      Physician: Raisa Dudley MD    Physician Orders: ST eval and treat  Medical Diagnosis from Referral: CVA      Visit #/Visits authorized: 16/ 20  Visits cancelled: 1   Visits no show: 0   Date of Evaluation:  08/01/2018  Insurance Authorization Period: 07/13/2018-12/31/2018  Plan of Care Expiration:    08/01/2018-09/26/2018, 09/26/2018-11/21/2018, 11/27/2018-01/02/2019  Precautions:Standard and Fall    New POC Certification Period:  11/28/2018-01/02/2019  G-CODE   16 /10    Precautions:Standard and Fall         Subjective     Update: Pt's overall speech clarity is much improved when utilizing motor speech strategies (SOS). Her family stated they have seen an improvement on her speech when utilizing strategies, however, they have to coax her to do so.     Objective     Update: see follow up note dated 11/28/2018    Assessment     Update: Emilia Melgoza presents to [facility] s/p medical diagnosis of  CVA. Demonstrates impairments including limitations as described in the problem list. Positive prognostic factors include family support. Negative prognostic factors include time since CVA. She presents with moderate dysarthria c/b slurred speech, imprecise consonant production and poor speech clarity..  Barriers to progress include transportation. Patient will benefit from skilled, outpatient neurological rehabilitation speech therapy.    Functional Communication Measure (FCM):   Severity Modifier for Medicare G-Code:  Spoken Language Comprehension  Current status: FCM:  LEVEL 5: The individual is able to understand communication in structured conversations with both familiar and unfamiliar communication partners. The individual occasionally requires  minimal cueing to understand more complex sentences/messages. The individual occasionally initiates the use of compensatory strategies when encountering difficulty.   -  CJ CJ at least 20% < 40% impaired, limited or restricted  Projected status:  FCM:   LEVEL 6: The individual is able to understand communication in most activities, but some limitations in comprehension are still apparent in vocational, avocational, and social activities. The individual rarely requires minimal cueing to understand complex sentences. The individual usually uses compensatory strategies when encountering difficulty.   -  CI CI at least 1% but less than 20% impaired, limited or restricted  Discharge status:  FCM:   n/a        Spoken Language Expression  Current status: FCM:  LEVEL 3 The communication partner must assume responsibility for structuring thecommunication exchange, and with consistent and moderate cueing, the individual canproduce words and phrases that are appropriate and meaningful in context.   -  CL CL at least 60% < 80% impaired, limited or restricted  Projected status:  FCM:   LEVEL 4: The individual is successfully able to initiate communication using spoken language in simple, structured conversations in routine daily activities with familiar communication partners. The individual usually requires moderate cueing, but is able to demonstrate use of simple sentences (i.e., semantics, syntax, and morphology) and rarely uses complex sentences/messages.   -  CK CK at least 40% < 60% impaired, limited or restricted  Discharge status:  FCM:  n/a    Rehab Potential: fair     Education: POC, role of SLP, motor speech strategies, scheduling/ cancellation policy and insurance limitations  was discussed with pt. Patient and family members expressed understanding.     Previous Short Term Goals Status: 4 weeks  Short Term Goals: (4 weeks) Current Progress:   1. Pt will answer complex y/n questions with 90% acc  ind'ly.  Goal Not Met, Continue Pt answered complex y/n questions with 73% acc ind'ly, 100% acc given Jun.   2. Pt will complete moderate level 3 unit commands with 90% acc given min cues. Goal Not Met, Continue Pt completed mod level 3 unit commands with 83% acc IND, 100% acc given Jun.   3. Pt will repeat 5-7 word sentences with 90% acc given min cues. Goal Not Met, Continue Pt repeated 5-7 word sentences with 60% acc IND and 80% acc given mod verbal cues.   4. Pt will recall 3/4 motor speech strategies ind'ly. Goal Not Met, Continue Pt recalled 1/4 motor speech strategies IND and additional 2 given verbal cues.   5. Pt will participate in the Combined Aphasia and Apraxia of Speech Treatment (CAAST) with 3 verbs per session to improve verbal fluency and speech intelligibility. Goal Not Met, Continue -Not assessed during today's session.   6. Pt will name 10 items in a concrete category given 1 minute and min cues. Goal Not Met, Continue Pt named 11 items in a concrete category (things we wear) given max verbal cues and additional time.   7. Pt will answer questions following a visual prompt with 80% acc given mod cues. Goal Not Met, Continue Pt answered questions following a visual prompt with 47% acc IND, 82% acc given max verbal and visual cues.        New Short Term Goals: 4 weeks  Short Term Goals: (4 weeks)   1. Pt will answer complex y/n questions with 90% acc ind'ly.     2. Pt will complete moderate level 3 unit commands with 90% acc given min cues.    3. Pt will repeat 5-7 word sentences with 90% acc given min cues.    4. Pt will recall 3/4 motor speech strategies ind'ly.    5. Pt will participate in the Combined Aphasia and Apraxia of Speech Treatment (CAAST) with 3 verbs per session to improve verbal fluency and speech intelligibility.    6. Pt will name 10 items in a concrete category given 1 minute and min cues.    7. Pt will answer questions following a visual prompt with 80% acc given mod cues.         Long Term Goal Status:  8 weeks  1. Pt will develop functional cognitive-linguistic based skills and utilize compensatory strategies to communicate wants and needs effectively to different conversational partners, maintain safety and participate socially in functional living environment.    Goals Previously Met:  None met see daily noted for progress with goals     Reasons for Recertification of Therapy: to continue ti improve speech clarity and understanding of language to function in every day living environments.     Plan     Updated Certification Period: 11/28/2018 to 12/19/2018  Recommended Treatment Plan: Patient will participate in the [facility] neurological rehabilitation program for speech therapy 1 times per week to address her  Communication and Motor Speech deficits, to educate patient and their family, and to participate in a home exercise program.     Other recommendations: Follow up with PCP     Therapist's Name:  XOCHITL Blanco, CCC-SLP  Speech-Language Pathologist  11/28/2018      I CERTIFY THE NEED FOR THESE SERVICES FURNISHED UNDER THIS PLAN OF TREATMENT AND WHILE UNDER MY CARE    Physician's comments:     Physician's Name:

## 2018-12-12 ENCOUNTER — CLINICAL SUPPORT (OUTPATIENT)
Dept: REHABILITATION | Facility: HOSPITAL | Age: 73
End: 2018-12-12
Attending: FAMILY MEDICINE
Payer: MEDICARE

## 2018-12-12 DIAGNOSIS — R47.01 BROCA'S APHASIA: ICD-10-CM

## 2018-12-12 DIAGNOSIS — R47.1 DYSARTHRIA: Primary | ICD-10-CM

## 2018-12-12 PROCEDURE — 92507 TX SP LANG VOICE COMM INDIV: CPT | Mod: PN

## 2018-12-12 NOTE — PROGRESS NOTES
"Outpatient Neurological Rehabilitation   Speech and Language Therapy Daily Note  Date:  12/12/2018     Start Time:  1015  Stop Time:  1155    Name: Emilia Melgoza   MRN: [id number]   Therapy Diagnosis:   Encounter Diagnoses   Name Primary?    Dysarthria Yes    Broca's aphasia       Physician: Raisa Dudley MD  Physician Orders: ST eval and treat  Medical Diagnosis from Referral: CVA      Visit #/Visits authorized: 17/ 20  Visits cancelled: 1   Visits no show: 0   Date of Evaluation:  08/01/2018  Insurance Authorization Period: 07/13/2018-12/31/2018  Plan of Care Expiration:    08/01/2018-09/26/2018, 09/26/2018-11/21/2018, 11/27/2018-01/02/2019  Precautions:Standard and Fall    Subjective:   Pt reports: " I was helping my  do work" When SLP asked about missing last week.  Response to previous treatment: "I did do good"  Pain Scale:  0/10 on VAS currently.   Pain Location: n/a  Objective:   UNTIMED  Procedure Min.   Speech- Language- Voice Therapy   40   Total Minutes: 40  Total Timed Units: 0  Total Untimed Units: 1  Charges Billed/# of units: 1    Short Term Goals: (4 weeks) Current Progress:   1. Pt will answer complex y/n questions with 90% acc ind'ly.  Goal Not Met, Continue Pt answered complex y/n questions with 90% acc ind'ly, 100% acc given Jun. Met x1   2. Pt will complete moderate level 3 unit commands with 90% acc given min cues. Goal Not Met, Continue Pt completed mod level 3 unit commands with 80% acc IND, 100% acc given Jun.    Complex level 3 unit commands 50% acc ind'ly, 80% acc given max cues   3. Pt will repeat 5-7 word sentences with 90% acc given min cues. Goal Not Met, Continue Pt repeated 5-7 word sentences with 53% acc given visual prompt of mirror and 100% acc given max verbal cues.   4. Pt will recall 3/4 motor speech strategies ind'ly. Goal Not Met, Continue Pt recalled 2/4 motor speech strategies IND and additional 2 given verbal cues.   5. Pt will participate in the Combined " Aphasia and Apraxia of Speech Treatment (CAAST) with 3 verbs per session to improve verbal fluency and speech intelligibility. Goal Not Met, Continue -Not assessed during today's session.   6. Pt will name 10 items in a concrete category given 1 minute and min cues. Goal Not Met, Continue -Not assessed during today's session.     7. Pt will answer questions following a visual prompt with 80% acc given mod cues. Goal Not Met, Continue -Not assessed during today's session.       Goals Met:   8. Pt will complete assessment of reading and writing with WAB-R Part 2. Goal Met 8/15/18 / Discontinue     Patient Education/Response:   Reinforced motor speech strategies for improved carryover into conversation. Pt verbalized understanding and agreement that speech strategies were helping.    Written Home Exercises Provided: no.  Assessment:   Emilia is progressing well towards her goals. Pt improved speech clarity and utilization of over-articulation strategy when mirror provided. Decreased accuracy in answering questions following a visual prompt and ability to use SOS strategies in conversation. Current goals remain appropriate. Goals to be updated as necessary.   Pt prognosis is Fair. Pt will continue to benefit from skilled outpatient speech and language therapy to address the deficits listed in the problem list on initial evaluation, provide pt/family education and to maximize pt's level of independence in the home and community environment.     Functional Communication Measure (FCM):   Severity Modifier for Medicare G-Code:  Spoken Language Comprehension  Current status: FCM:  LEVEL 5: The individual is able to understand communication in structured conversations with both familiar and unfamiliar communication partners. The individual occasionally requires minimal cueing to understand more complex sentences/messages. The individual occasionally initiates the use of compensatory strategies when encountering difficulty.    -  CJ CJ at least 20% < 40% impaired, limited or restricted  Projected status:  FCM:   LEVEL 6: The individual is able to understand communication in most activities, but some limitations in comprehension are still apparent in vocational, avocational, and social activities. The individual rarely requires minimal cueing to understand complex sentences. The individual usually uses compensatory strategies when encountering difficulty.   -  CI CI at least 1% but less than 20% impaired, limited or restricted  Discharge status:  FCM:   n/a        Spoken Language Expression  Current status: FCM:  LEVEL 3 The communication partner must assume responsibility for structuring thecommunication exchange, and with consistent and moderate cueing, the individual canproduce words and phrases that are appropriate and meaningful in context.   -  CL CL at least 60% < 80% impaired, limited or restricted  Projected status:  FCM:   LEVEL 4: The individual is successfully able to initiate communication using spoken language in simple, structured conversations in routine daily activities with familiar communication partners. The individual usually requires moderate cueing, but is able to demonstrate use of simple sentences (i.e., semantics, syntax, and morphology) and rarely uses complex sentences/messages.   -  CK CK at least 40% < 60% impaired, limited or restricted  Discharge status:  FCM:  n/a    Medical necessity is demonstrated by the following IMPAIRMENTS:  Utilize compensatory strategies to communicate wants and needs effectively to different conversational partners, maintain safety and participate socially in functional living environment.     Barriers to Therapy: transportation  Pt's spiritual, cultural and educational needs considered and pt agreeable to plan of care and goals.  Plan:   Continue POC with focus on motor speech.        SONU Blanco., CCC-SLP  Speech-Language  Pathologist  12/12/2018

## 2018-12-19 ENCOUNTER — CLINICAL SUPPORT (OUTPATIENT)
Dept: REHABILITATION | Facility: HOSPITAL | Age: 73
End: 2018-12-19
Attending: FAMILY MEDICINE
Payer: MEDICARE

## 2018-12-19 DIAGNOSIS — R47.01 BROCA'S APHASIA: Primary | ICD-10-CM

## 2018-12-19 DIAGNOSIS — R47.1 DYSARTHRIA: ICD-10-CM

## 2018-12-19 PROCEDURE — 92507 TX SP LANG VOICE COMM INDIV: CPT | Mod: PN

## 2018-12-19 NOTE — PROGRESS NOTES
"Outpatient Neurological Rehabilitation   Speech and Language Therapy Daily Note  Date:  12/19/2018     Start Time:  0955  Stop Time:  1040    Name: Emilia Melgoza   MRN: [id number]   Therapy Diagnosis:   Encounter Diagnoses   Name Primary?    Broca's aphasia Yes    Dysarthria       Physician: Raisa Dudley MD  Physician Orders: ST eval and treat  Medical Diagnosis from Referral: CVA      Visit #/Visits authorized: 18/ 20  Visits cancelled: 1   Visits no show: 0   Date of Evaluation:  08/01/2018  Insurance Authorization Period: 07/13/2018-12/31/2018  Plan of Care Expiration:    08/01/2018-09/26/2018, 09/26/2018-11/21/2018, 11/27/2018-01/02/2019  Precautions:Standard and Fall    Subjective:   Pt reports: nothing new has been going on. She reports family is saying her speech is getting better.   Response to previous treatment: "I did do good"  Pain Scale:  0/10 on VAS currently.   Pain Location: n/a  Objective:   UNTIMED  Procedure Min.   Speech- Language- Voice Therapy   40   Total Minutes: 40  Total Timed Units: 0  Total Untimed Units: 1  Charges Billed/# of units: 1    Short Term Goals: (4 weeks) Current Progress:   1. Pt will answer complex y/n questions with 90% acc ind'ly.   Pt answered complex y/n questions with 90% acc ind'ly, 100% acc given Jun. Met x2 Goal Met, Discontinue    2. Pt will complete moderate level 3 unit commands with 90% acc given min cues. Goal Not Met, Continue Pt completed mod level 3 unit commands with 90% acc IND, 100% acc given Jun. Met x1    Complex level 3 unit commands 50% acc ind'ly, 80% acc given max cues   3. Pt will repeat 5-7 word sentences with 90% acc given min cues. Goal Not Met, Continue Pt repeated 5-7 word sentences with 55% acc given visual prompt of mirror and 100% acc given max verbal cues.   4. Pt will recall 3/4 motor speech strategies ind'ly. Goal Not Met, Continue Pt recalled 2/4 motor speech strategies IND and additional 2 given verbal cues.   5. Pt will " participate in the Combined Aphasia and Apraxia of Speech Treatment (CAAST) with 3 verbs per session to improve verbal fluency and speech intelligibility. Goal Not Met, Continue -Not assessed during today's session.   6. Pt will name 10 items in a concrete category given 1 minute and min cues. Goal Not Met, Continue -Not assessed during today's session.     7. Pt will answer questions following a visual prompt with 80% acc given mod cues. Goal Not Met, Continue -Not assessed during today's session.       Goals Met:   8. Pt will complete assessment of reading and writing with WAB-R Part 2. Goal Met 8/15/18 / Discontinue   1. Pt will answer complex y/n questions with 90% acc ind'ly.    Patient Education/Response:   Reinforced motor speech strategies for improved carryover into conversation. Pt verbalized understanding and agreement that speech strategies were helping.    Written Home Exercises Provided: no.  Assessment:   Emilia is progressing well towards her goals. Pt continues to require max prompts to utilize motor speech strategies. Pt and her daughter were educated that she has 2 visits remaining before d/c. Both verbalized understanding. Current goals remain appropriate. Goals to be updated as necessary.   Pt prognosis is Fair. Pt will continue to benefit from skilled outpatient speech and language therapy to address the deficits listed in the problem list on initial evaluation, provide pt/family education and to maximize pt's level of independence in the home and community environment.     Functional Communication Measure (FCM):   Severity Modifier for Medicare G-Code:  Spoken Language Comprehension  Current status: FCM:  LEVEL 5: The individual is able to understand communication in structured conversations with both familiar and unfamiliar communication partners. The individual occasionally requires minimal cueing to understand more complex sentences/messages. The individual occasionally initiates the use of  compensatory strategies when encountering difficulty.   -  CJ CJ at least 20% < 40% impaired, limited or restricted  Projected status:  FCM:   LEVEL 6: The individual is able to understand communication in most activities, but some limitations in comprehension are still apparent in vocational, avocational, and social activities. The individual rarely requires minimal cueing to understand complex sentences. The individual usually uses compensatory strategies when encountering difficulty.   -  CI CI at least 1% but less than 20% impaired, limited or restricted  Discharge status:  FCM:   n/a        Spoken Language Expression  Current status: FCM:  LEVEL 3 The communication partner must assume responsibility for structuring thecommunication exchange, and with consistent and moderate cueing, the individual canproduce words and phrases that are appropriate and meaningful in context.   -  CL CL at least 60% < 80% impaired, limited or restricted  Projected status:  FCM:   LEVEL 4: The individual is successfully able to initiate communication using spoken language in simple, structured conversations in routine daily activities with familiar communication partners. The individual usually requires moderate cueing, but is able to demonstrate use of simple sentences (i.e., semantics, syntax, and morphology) and rarely uses complex sentences/messages.   -  CK CK at least 40% < 60% impaired, limited or restricted  Discharge status:  FCM:  n/a    Medical necessity is demonstrated by the following IMPAIRMENTS:  Utilize compensatory strategies to communicate wants and needs effectively to different conversational partners, maintain safety and participate socially in functional living environment.     Barriers to Therapy: transportation  Pt's spiritual, cultural and educational needs considered and pt agreeable to plan of care and goals.  Plan:   Continue POC with focus on motor speech.        Shant Ward  XOCHITL Rebolledo, [facility]-SLP  Speech-Language Pathologist  12/19/2018

## 2018-12-26 ENCOUNTER — CLINICAL SUPPORT (OUTPATIENT)
Dept: REHABILITATION | Facility: HOSPITAL | Age: 73
End: 2018-12-26
Attending: FAMILY MEDICINE
Payer: MEDICARE

## 2018-12-26 DIAGNOSIS — R47.01 BROCA'S APHASIA: ICD-10-CM

## 2018-12-26 DIAGNOSIS — R47.1 DYSARTHRIA: ICD-10-CM

## 2018-12-26 PROCEDURE — 92507 TX SP LANG VOICE COMM INDIV: CPT | Mod: PN

## 2018-12-26 NOTE — PROGRESS NOTES
"Outpatient Neurological Rehabilitation   Speech and Language Therapy Daily Note  Date:  12/26/2018     Start Time:  1315  Stop Time:  1358    Name: Emiila Melgoza   MRN: [id number]   Therapy Diagnosis:   No diagnosis found.   Physician: Raisa Dudley MD  Physician Orders: ST eval and treat  Medical Diagnosis from Referral: CVA      Visit #/Visits authorized: 19/ 20  Visits cancelled: 1   Visits no show: 0   Date of Evaluation:  08/01/2018  Insurance Authorization Period: 07/13/2018-12/31/2018  Plan of Care Expiration:    08/01/2018-09/26/2018, 09/26/2018-11/21/2018, 11/27/2018-01/02/2019  Precautions:Standard and Fall    Subjective:   Pt reports: She is doing well  Response to previous treatment: "Yes indeed"  Pain Scale:  0/10 on VAS currently.   Pain Location: n/a  Objective:   UNTIMED  Procedure Min.   Speech- Language- Voice Therapy   43   Total Minutes: 43  Total Timed Units: 0  Total Untimed Units: 1  Charges Billed/# of units: 1    Short Term Goals: (4 weeks) Current Progress:   2. Pt will complete moderate level 3 unit commands with 90% acc given min cues. Goal Not Met, Continue Pt completed mod level 3 unit commands with 100% acc IND. Met x2    Complex level 3 unit commands 80% acc ind'ly, 100% acc given mod cues   3. Pt will repeat 5-7 word sentences with 90% acc given min cues. Goal Not Met, Continue Pt repeated 5-7 word sentences with 55% acc given visual prompt of mirror and 70% acc given max verbal cues.   4. Pt will recall 3/4 motor speech strategies ind'ly. Goal Not Met, Continue Pt recalled 0/4 motor speech strategies IND and up to 2 given verbal cues.   5. Pt will participate in the Combined Aphasia and Apraxia of Speech Treatment (CAAST) with 3 verbs per session to improve verbal fluency and speech intelligibility. Goal Not Met, Continue -Not assessed during today's session.   6. Pt will name 10 items in a concrete category given 1 minute and min cues. Goal Not Met, Continue Pt named 0 items " "ind'ly (things in a school and fruit) and up to 10 with maxA     7. Pt will answer questions following a visual prompt with 80% acc given mod cues. Goal Not Met, Continue Pt answered "wh" questions about a 10+ word sentence with 60% acc ind'ly and up to 80% with one repetition.        Goals Met:   8. Pt will complete assessment of reading and writing with WAB-R Part 2. Goal Met 8/15/18 / Discontinue   1. Pt will answer complex y/n questions with 90% acc ind'ly.    Patient Education/Response:   Reinforced motor speech strategies for improved carryover into conversation. Pt verbalized understanding and agreement that speech strategies were helping.    Written Home Exercises Provided: no.  Assessment:   Emilia is progressing well towards her goals. She demonstrates use of clear speech strategies in structured tasks with mod cues, but no carryover into conversation resulting in unintelligible speech.  Current goals remain appropriate. Goals to be updated as necessary.   Pt prognosis is Fair. Pt will continue to benefit from skilled outpatient speech and language therapy to address the deficits listed in the problem list on initial evaluation, provide pt/family education and to maximize pt's level of independence in the home and community environment.     Functional Communication Measure (FCM):   Severity Modifier for Medicare G-Code:  Spoken Language Comprehension  Current status: FCM:  LEVEL 5: The individual is able to understand communication in structured conversations with both familiar and unfamiliar communication partners. The individual occasionally requires minimal cueing to understand more complex sentences/messages. The individual occasionally initiates the use of compensatory strategies when encountering difficulty.   -  CJ CJ at least 20% < 40% impaired, limited or restricted  Projected status:  FCM:   LEVEL 6: The individual is able to understand communication in most activities, but some limitations " in comprehension are still apparent in vocational, avocational, and social activities. The individual rarely requires minimal cueing to understand complex sentences. The individual usually uses compensatory strategies when encountering difficulty.   -  CI CI at least 1% but less than 20% impaired, limited or restricted  Discharge status:  FCM:   n/a        Spoken Language Expression  Current status: FCM:  LEVEL 3 The communication partner must assume responsibility for structuring thecommunication exchange, and with consistent and moderate cueing, the individual canproduce words and phrases that are appropriate and meaningful in context.   -  CL CL at least 60% < 80% impaired, limited or restricted  Projected status:  FCM:   LEVEL 4: The individual is successfully able to initiate communication using spoken language in simple, structured conversations in routine daily activities with familiar communication partners. The individual usually requires moderate cueing, but is able to demonstrate use of simple sentences (i.e., semantics, syntax, and morphology) and rarely uses complex sentences/messages.   -  CK CK at least 40% < 60% impaired, limited or restricted  Discharge status:  FCM:  n/a    Medical necessity is demonstrated by the following IMPAIRMENTS:  Utilize compensatory strategies to communicate wants and needs effectively to different conversational partners, maintain safety and participate socially in functional living environment.     Barriers to Therapy: transportation  Pt's spiritual, cultural and educational needs considered and pt agreeable to plan of care and goals.  Plan:   Continue POC with focus on motor speech.    Lolita Zacarias M.S. CCC-SLP  Speech Language Pathologist   12/26/2018

## 2019-01-02 ENCOUNTER — CLINICAL SUPPORT (OUTPATIENT)
Dept: REHABILITATION | Facility: HOSPITAL | Age: 74
End: 2019-01-02
Attending: FAMILY MEDICINE
Payer: MEDICARE

## 2019-01-02 DIAGNOSIS — R47.01 BROCA'S APHASIA: ICD-10-CM

## 2019-01-02 DIAGNOSIS — R47.1 DYSARTHRIA: Primary | ICD-10-CM

## 2019-01-02 PROCEDURE — 92507 TX SP LANG VOICE COMM INDIV: CPT | Mod: PN

## 2019-01-02 NOTE — PROGRESS NOTES
"Outpatient Neurological Rehabilitation   Speech and Language Therapy Daily Note  Date:  1/2/2019     Start Time:  0930  Stop Time:  1015    Name: Emilia Melgoza   MRN: [id number]   Therapy Diagnosis:   Encounter Diagnoses   Name Primary?    Dysarthria Yes    Broca's aphasia       Physician: Raisa Dudley MD  Physician Orders: ST eval and treat  Medical Diagnosis from Referral: CVA      Visit #/Visits authorized: 20/ 20  Visits cancelled: 1   Visits no show: 0   Date of Evaluation:  08/01/2018  Insurance Authorization Period: 07/13/2018-12/31/2018  Plan of Care Expiration:    08/01/2018-09/26/2018, 09/26/2018-11/21/2018, 11/27/2018-01/02/2019  Precautions:Standard and Fall    Subjective:   Pt reports: she had a good holiday.  Response to previous treatment: "Yes indeed"  Pain Scale:  0/10 on VAS currently.   Pain Location: n/a  Objective:   UNTIMED  Procedure Min.   Speech- Language- Voice Therapy   45   Total Minutes: 45  Total Timed Units: 0  Total Untimed Units: 1  Charges Billed/# of units: 1    Short Term Goals: (4 weeks) Current Progress:   2. Pt will complete moderate level 3 unit commands with 90% acc given min cues.  Goal Met 12/26/2018   3. Pt will repeat 5-7 word sentences with 90% acc given min cues. Goal Not Met, Discontinue Reassessment of WAB completed today   4. Pt will recall 3/4 motor speech strategies ind'ly. Goal Not Met, Discontinue Reassessment of WAB completed today   5. Pt will participate in the Combined Aphasia and Apraxia of Speech Treatment (CAAST) with 3 verbs per session to improve verbal fluency and speech intelligibility. Goal Not Met, Discontinue Reassessment of WAB completed today   6. Pt will name 10 items in a concrete category given 1 minute and min cues. Goal Not Met, Discontinue Reassessment of WAB completed today     7. Pt will answer questions following a visual prompt with 80% acc given mod cues. Goal Not Met, Disontinue Reassessment of WAB completed today     Western " Aphasia Battery - Revised (WAB-R) was administered to evaluate the patient's receptive and expressive language function. The following results were revealed:    Initial  Discharge   Spontaneous Speech Score 10 13   Auditory Comprehension Score 9.15 8.65   Repetition Score 7.3 9.1   Naming and Word Finding Score 7.2 7.5   Aphasia Quotient (AQ) 67.3 74.5   Aphasia Classification Mild Broca's Aphasia Transcortical Motor Aphasia       Pt with noted improvement sin repetition and spontaneous speech skills compared to initial evaluation. Pt's auditory comprehension skills were declined since intial visit. Difficulty with complex commands were noted. Severe word finding deficits persists along with slurred speech and imprecise consonant production.      Goals Met:   8. Pt will complete assessment of reading and writing with WAB-R Part 2. Goal Met 8/15/18 / Discontinue   1. Pt will answer complex y/n questions with 90% acc ind'ly.  2. Pt will complete moderate level 3 unit commands with 90% acc given min cues.     Patient Education/Response:   Reinforced motor speech strategies for improved carryover into conversation. Pt verbalized understanding and agreement that speech strategies were helping.    Written Home Exercises Provided: no.  Assessment:   Pt to be d/c from skilled ST services at this time. Please reconsult if change in patient status occurs.     Functional Communication Measure (FCM):   Severity Modifier for Medicare G-Code:  Spoken Language Comprehension  Current status: FCM:  LEVEL 5: The individual is able to understand communication in structured conversations with both familiar and unfamiliar communication partners. The individual occasionally requires minimal cueing to understand more complex sentences/messages. The individual occasionally initiates the use of compensatory strategies when encountering difficulty.   -  CJ CJ at least 20% < 40% impaired, limited or restricted  Projected status:  FCM:    LEVEL 6: The individual is able to understand communication in most activities, but some limitations in comprehension are still apparent in vocational, avocational, and social activities. The individual rarely requires minimal cueing to understand complex sentences. The individual usually uses compensatory strategies when encountering difficulty.   -  CI CI at least 1% but less than 20% impaired, limited or restricted  Discharge status:  FCM:    LEVEL 5: The individual is able to understand communication in structured conversations with both familiar and unfamiliar communication partners. The individual occasionally requires minimal cueing to understand more complex sentences/messages. The individual occasionally initiates the use of compensatory strategies when encountering difficulty.   -  CJ CJ at least 20% < 40% impaired, limited or restricted     Spoken Language Expression  Current status: FCM:  LEVEL 3 The communication partner must assume responsibility for structuring thecommunication exchange, and with consistent and moderate cueing, the individual canproduce words and phrases that are appropriate and meaningful in context.   -  CL CL at least 60% < 80% impaired, limited or restricted  Projected status:  FCM:   LEVEL 4: The individual is successfully able to initiate communication using spoken language in simple, structured conversations in routine daily activities with familiar communication partners. The individual usually requires moderate cueing, but is able to demonstrate use of simple sentences (i.e., semantics, syntax, and morphology) and rarely uses complex sentences/messages.   -  CK CK at least 40% < 60% impaired, limited or restricted  Discharge status:  FCM:  LEVEL 4: The individual is successfully able to initiate communication using spoken language in simple, structured conversations in routine daily activities with familiar communication partners. The individual usually  requires moderate cueing, but is able to demonstrate use of simple sentences (i.e., semantics, syntax, and morphology) and rarely uses complex sentences/messages.   -  CK CK at least 40% < 60% impaired, limited or restricted      Barriers to Therapy: transportation  Pt's spiritual, cultural and educational needs considered and pt agreeable to plan of care and goals.  Plan:   Continue POC with focus on motor speech.    SONU Blanco., CCC-SLP  Speech-Language Pathologist  01/02/2019

## 2019-01-02 NOTE — PLAN OF CARE
Outpatient Therapy Discharge Summary     Name: Emilia Mendez Cotton  Clinic Number: [id number]    Therapy Diagnosis:   Encounter Diagnoses   Name Primary?    Dysarthria Yes    Broca's aphasia      Physician: Raisa Dudley MD    Physician Orders: ST eval and treat  Medical Diagnosis: CVA  Evaluation Date: 08/01/2018      Date of Last visit: 01/02/2019  Total Visits Received: 20  Cancelled Visits: 1  No Show Visits: 0    Assessment    Goals:   Short Term Goals: (4 weeks)   2. Pt will complete moderate level 3 unit commands with 90% acc given min cues. Goal Met 12/26/2018   3. Pt will repeat 5-7 word sentences with 90% acc given min cues. Goal Not Met, Discontinue   4. Pt will recall 3/4 motor speech strategies ind'ly. Goal Not Met, Discontinue   5. Pt will participate in the Combined Aphasia and Apraxia of Speech Treatment (CAAST) with 3 verbs per session to improve verbal fluency and speech intelligibility. Goal Not Met, Discontinue   6. Pt will name 10 items in a concrete category given 1 minute and min cues. Goal Not Met, Discontinue   7. Pt will answer questions following a visual prompt with 80% acc given mod cues. Goal Not Met, Disontinue         Discharge reason: Patient has reached the maximum rehab potential for the present time    Plan   This patient is discharged from Speech Therapy    XOCHITL Blanco, CCC-SLP  Speech-Language Pathologist  01/02/2019

## 2019-01-31 ENCOUNTER — OFFICE VISIT (OUTPATIENT)
Dept: FAMILY MEDICINE | Facility: CLINIC | Age: 74
End: 2019-01-31
Payer: MEDICARE

## 2019-01-31 VITALS
WEIGHT: 175.94 LBS | HEART RATE: 76 BPM | DIASTOLIC BLOOD PRESSURE: 66 MMHG | SYSTOLIC BLOOD PRESSURE: 120 MMHG | TEMPERATURE: 98 F | BODY MASS INDEX: 28.27 KG/M2 | HEIGHT: 66 IN | OXYGEN SATURATION: 98 %

## 2019-01-31 DIAGNOSIS — G31.84 MCI (MILD COGNITIVE IMPAIRMENT): ICD-10-CM

## 2019-01-31 DIAGNOSIS — I10 ESSENTIAL HYPERTENSION: ICD-10-CM

## 2019-01-31 DIAGNOSIS — I63.9 CEREBRAL INFARCTION: ICD-10-CM

## 2019-01-31 DIAGNOSIS — F41.9 ANXIETY: ICD-10-CM

## 2019-01-31 DIAGNOSIS — N30.00 ACUTE CYSTITIS WITHOUT HEMATURIA: Primary | ICD-10-CM

## 2019-01-31 DIAGNOSIS — J06.9 UPPER RESPIRATORY TRACT INFECTION, UNSPECIFIED TYPE: ICD-10-CM

## 2019-01-31 DIAGNOSIS — N32.81 OVERACTIVE BLADDER: ICD-10-CM

## 2019-01-31 LAB
BILIRUB SERPL-MCNC: NEGATIVE MG/DL
BLOOD URINE, POC: NEGATIVE
COLOR, POC UA: YELLOW
GLUCOSE UR QL STRIP: NORMAL
KETONES UR QL STRIP: NEGATIVE
LEUKOCYTE ESTERASE URINE, POC: NORMAL
NITRITE, POC UA: NEGATIVE
PH, POC UA: 5
PROTEIN, POC: NORMAL
SPECIFIC GRAVITY, POC UA: 1.02
UROBILINOGEN, POC UA: NORMAL

## 2019-01-31 PROCEDURE — 87088 URINE BACTERIA CULTURE: CPT

## 2019-01-31 PROCEDURE — 87086 URINE CULTURE/COLONY COUNT: CPT

## 2019-01-31 PROCEDURE — 81002 URINALYSIS NONAUTO W/O SCOPE: CPT | Mod: PBBFAC,PN | Performed by: FAMILY MEDICINE

## 2019-01-31 PROCEDURE — 99214 PR OFFICE/OUTPT VISIT, EST, LEVL IV, 30-39 MIN: ICD-10-PCS | Mod: S$PBB,,, | Performed by: FAMILY MEDICINE

## 2019-01-31 PROCEDURE — 99214 OFFICE O/P EST MOD 30 MIN: CPT | Mod: S$PBB,,, | Performed by: FAMILY MEDICINE

## 2019-01-31 PROCEDURE — 87186 SC STD MICRODIL/AGAR DIL: CPT

## 2019-01-31 PROCEDURE — 99213 OFFICE O/P EST LOW 20 MIN: CPT | Mod: PBBFAC,PN | Performed by: FAMILY MEDICINE

## 2019-01-31 PROCEDURE — 87077 CULTURE AEROBIC IDENTIFY: CPT

## 2019-01-31 PROCEDURE — 99999 PR PBB SHADOW E&M-EST. PATIENT-LVL III: ICD-10-PCS | Mod: PBBFAC,,, | Performed by: FAMILY MEDICINE

## 2019-01-31 PROCEDURE — 99999 PR PBB SHADOW E&M-EST. PATIENT-LVL III: CPT | Mod: PBBFAC,,, | Performed by: FAMILY MEDICINE

## 2019-01-31 RX ORDER — ATORVASTATIN CALCIUM 80 MG/1
80 TABLET, FILM COATED ORAL DAILY
Qty: 90 TABLET | Refills: 3 | Status: CANCELLED | OUTPATIENT
Start: 2019-01-31

## 2019-01-31 RX ORDER — AMLODIPINE BESYLATE 5 MG/1
TABLET ORAL
Qty: 180 TABLET | Refills: 3 | Status: CANCELLED | OUTPATIENT
Start: 2019-01-31

## 2019-01-31 RX ORDER — BENZONATATE 100 MG/1
100 CAPSULE ORAL 3 TIMES DAILY PRN
Qty: 30 CAPSULE | Refills: 0 | Status: SHIPPED | OUTPATIENT
Start: 2019-01-31 | End: 2019-02-10

## 2019-01-31 RX ORDER — ESCITALOPRAM OXALATE 10 MG/1
10 TABLET ORAL DAILY
Qty: 90 TABLET | Refills: 1 | Status: SHIPPED | OUTPATIENT
Start: 2019-01-31 | End: 2019-09-10 | Stop reason: SDUPTHER

## 2019-01-31 RX ORDER — AMLODIPINE BESYLATE 5 MG/1
TABLET ORAL
Qty: 180 TABLET | Refills: 3 | Status: SHIPPED | OUTPATIENT
Start: 2019-01-31 | End: 2019-09-10 | Stop reason: SDUPTHER

## 2019-01-31 RX ORDER — CLOPIDOGREL BISULFATE 75 MG/1
75 TABLET ORAL DAILY
Qty: 90 TABLET | Refills: 3 | Status: SHIPPED | OUTPATIENT
Start: 2019-01-31 | End: 2019-09-10 | Stop reason: SDUPTHER

## 2019-01-31 RX ORDER — CLONIDINE HYDROCHLORIDE 0.1 MG/1
TABLET ORAL
Qty: 180 TABLET | Refills: 1 | Status: SHIPPED | OUTPATIENT
Start: 2019-01-31 | End: 2019-08-19 | Stop reason: SDUPTHER

## 2019-01-31 RX ORDER — FLUTICASONE PROPIONATE 50 MCG
1 SPRAY, SUSPENSION (ML) NASAL DAILY
Qty: 1 BOTTLE | Refills: 0 | Status: SHIPPED | OUTPATIENT
Start: 2019-01-31 | End: 2019-09-10 | Stop reason: SDUPTHER

## 2019-01-31 RX ORDER — MINERAL OIL
180 ENEMA (ML) RECTAL DAILY
Qty: 30 TABLET | Refills: 0 | Status: ON HOLD | OUTPATIENT
Start: 2019-01-31 | End: 2020-01-04 | Stop reason: HOSPADM

## 2019-01-31 RX ORDER — CLONIDINE HYDROCHLORIDE 0.1 MG/1
TABLET ORAL
Qty: 180 TABLET | Refills: 1 | Status: CANCELLED | OUTPATIENT
Start: 2019-01-31

## 2019-01-31 RX ORDER — MEMANTINE HYDROCHLORIDE 10 MG/1
10 TABLET ORAL DAILY
Qty: 90 TABLET | Refills: 1 | Status: SHIPPED | OUTPATIENT
Start: 2019-01-31 | End: 2019-09-10 | Stop reason: SDUPTHER

## 2019-01-31 RX ORDER — OXYBUTYNIN CHLORIDE 5 MG/1
10 TABLET ORAL 2 TIMES DAILY
Qty: 360 TABLET | Refills: 3 | Status: CANCELLED | OUTPATIENT
Start: 2019-01-31 | End: 2020-01-26

## 2019-01-31 RX ORDER — FLUCONAZOLE 150 MG/1
150 TABLET ORAL DAILY
Qty: 1 TABLET | Refills: 0 | Status: SHIPPED | OUTPATIENT
Start: 2019-01-31 | End: 2019-02-01

## 2019-01-31 RX ORDER — ATORVASTATIN CALCIUM 80 MG/1
80 TABLET, FILM COATED ORAL DAILY
Qty: 90 TABLET | Refills: 3 | Status: SHIPPED | OUTPATIENT
Start: 2019-01-31 | End: 2019-09-10 | Stop reason: SDUPTHER

## 2019-01-31 RX ORDER — SULFAMETHOXAZOLE AND TRIMETHOPRIM 800; 160 MG/1; MG/1
1 TABLET ORAL 2 TIMES DAILY
Qty: 10 TABLET | Refills: 0 | Status: SHIPPED | OUTPATIENT
Start: 2019-01-31 | End: 2019-02-05

## 2019-01-31 RX ORDER — OXYBUTYNIN CHLORIDE 5 MG/1
10 TABLET ORAL 2 TIMES DAILY
Qty: 360 TABLET | Refills: 3 | Status: SHIPPED | OUTPATIENT
Start: 2019-01-31 | End: 2019-09-10 | Stop reason: SDUPTHER

## 2019-01-31 NOTE — PROGRESS NOTES
Subjective:       Patient ID: Emilia Melgoza is a 73 y.o. female.    Chief Complaint: Headache; Cough; Nasal Congestion; and Sore Throat    HPI   72 yo female presents for multiple complaints. Patient has aphasia 2/2 to previous CVA. Hx was giving by her daughter. Patient may have a bladder infection. She has increased urgency and frequency. Urine has an odor as well. Denies any f/c or AMS.    Her other complaint is of a uri x 3 days. Endorses congestion, cough, and sore throat. Has not tried any medications yet.    Pt needs her refills as well.    Review of Systems   Constitutional: Negative.    HENT: Positive for congestion, postnasal drip, rhinorrhea and sore throat.    Respiratory: Positive for cough.    Cardiovascular: Negative.    Gastrointestinal: Negative.    Genitourinary: Negative.    Musculoskeletal: Negative.    Neurological: Negative.    Psychiatric/Behavioral: Negative.         Past Medical History:   Diagnosis Date    Allergy     Asthma     DDD (degenerative disc disease), lumbar     Chronic LBP and intermittent RLE radicular pain x 10 yrs    Hypertension     Overactive bladder     Senile cataract, unspecified - Both Eyes 6/11/2013    Stroke      Past Surgical History:   Procedure Laterality Date    BREAST BIOPSY Left     CATARACT EXTRACTION W/  INTRAOCULAR LENS IMPLANT Right 10/13/2014    Dr Crystal    CATARACT EXTRACTION W/  INTRAOCULAR LENS IMPLANT Left 11/10/2014    Dr Crystal    HEMORRHOID SURGERY      HYSTERECTOMY      INCONTINENCE SURGERY      INSERTION-INTRAOCULAR LENS (IOL) Left 11/10/2014    [facility]    INSERTION-INTRAOCULAR LENS (IOL) Right 10/13/2014    [facility]    OOPHORECTOMY      PHACOEMULSIFICATION-ASPIRATION-CATARACT Left 11/10/2014    [facility]    PHACOEMULSIFICATION-ASPIRATION-CATARACT Right 10/13/2014    [facility]    Right Rotator Cuff  Repair       Family History   Problem Relation Age of Onset    Hypertension Mother     Breast cancer Mother     Dementia Mother     Colon cancer Father     Hypertension Father     Rhinitis Daughter     Rhinitis Daughter     No Known Problems Sister     No Known Problems Brother     No Known Problems Maternal Aunt     No Known Problems Maternal Uncle     No Known Problems Paternal Aunt     No Known Problems Paternal Uncle     No Known Problems Maternal Grandmother     No Known Problems Maternal Grandfather     No Known Problems Paternal Grandmother     No Known Problems Paternal Grandfather     Amblyopia Neg Hx     Blindness Neg Hx     Cancer Neg Hx     Cataracts Neg Hx     Diabetes Neg Hx     Glaucoma Neg Hx     Macular degeneration Neg Hx     Retinal detachment Neg Hx     Strabismus Neg Hx     Stroke Neg Hx     Thyroid disease Neg Hx      Social History     Socioeconomic History    Marital status:      Spouse name: None    Number of children: None    Years of education: None    Highest education level: None   Social Needs    Financial resource strain: None    Food insecurity - worry: None    Food insecurity - inability: None    Transportation needs - medical: None    Transportation needs - non-medical: None   Occupational History    None   Tobacco Use    Smoking status: Former Smoker     Last attempt to quit: 1992     Years since quittin.1    Smokeless tobacco: Never Used   Substance and Sexual Activity    Alcohol use: No     Alcohol/week: 0.0 oz    Drug use: No    Sexual activity: Not Currently   Other Topics Concern    None   Social History Narrative    Retired        Objective:      Vitals:    19 1023   BP: 120/66   Pulse: 76   Temp: 98.2 °F (36.8 °C)     Physical Exam   Constitutional: She is oriented to person, place, and time. No distress.   HENT:   Head: Normocephalic and atraumatic.   Nose: Rhinorrhea present. Right sinus exhibits frontal  sinus tenderness. Left sinus exhibits frontal sinus tenderness.   Mouth/Throat: Posterior oropharyngeal erythema present.   Eyes: Conjunctivae are normal. Pupils are equal, round, and reactive to light.   Neck: Neck supple.   Cardiovascular: Normal rate, regular rhythm and normal heart sounds. Exam reveals no gallop and no friction rub.   No murmur heard.  Pulmonary/Chest: Effort normal and breath sounds normal. She has no wheezes. She has no rales.   Abdominal: Soft. Bowel sounds are normal. There is no tenderness.   Lymphadenopathy:     She has no cervical adenopathy.   Neurological: She is alert and oriented to person, place, and time.   Skin: Skin is warm and dry.   Psychiatric: She has a normal mood and affect. Her behavior is normal. Judgment and thought content normal.        Assessment:       1. Acute cystitis without hematuria    2. Upper respiratory tract infection, unspecified type    3. Essential hypertension    4. Overactive bladder    5. Cerebral infarction    6. Anxiety    7. MCI (mild cognitive impairment)        Plan:       Acute cystitis without hematuria  - Dip shows leuk and protein  -     POCT URINE DIPSTICK WITHOUT MICROSCOPE  -     Urine culture  -     sulfamethoxazole-trimethoprim 800-160mg (BACTRIM DS) 800-160 mg Tab; Take 1 tablet by mouth 2 (two) times daily. for 5 days  Dispense: 10 tablet; Refill: 0  -     fluconazole (DIFLUCAN) 150 MG Tab; Take 1 tablet (150 mg total) by mouth once daily. for 1 day  Dispense: 1 tablet; Refill: 0    Upper respiratory tract infection, unspecified type  -     fluticasone (FLONASE) 50 mcg/actuation nasal spray; 1 spray (50 mcg total) by Each Nare route once daily.  Dispense: 1 Bottle; Refill: 0  -     fexofenadine (ALLEGRA) 180 MG tablet; Take 1 tablet (180 mg total) by mouth once daily.  Dispense: 30 tablet; Refill: 0  -     benzonatate (TESSALON) 100 MG capsule; Take 1 capsule (100 mg total) by mouth 3 (three) times daily as needed.  Dispense: 30 capsule;  Refill: 0    Essential hypertension  -     amLODIPine (NORVASC) 5 MG tablet; TAKE ONE TABLET BY MOUTH TWICE DAILY  Dispense: 180 tablet; Refill: 3  -     cloNIDine (CATAPRES) 0.1 MG tablet; TAKE TWO TABLETS BY MOUTH ONCE DAILY IN THE EVENING  Dispense: 180 tablet; Refill: 1    Overactive bladder  -     oxybutynin (DITROPAN) 5 MG Tab; Take 2 tablets (10 mg total) by mouth 2 (two) times daily.  Dispense: 360 tablet; Refill: 3    Cerebral infarction  -     atorvastatin (LIPITOR) 80 MG tablet; Take 1 tablet (80 mg total) by mouth once daily.  Dispense: 90 tablet; Refill: 3  -     clopidogrel (PLAVIX) 75 mg tablet; Take 1 tablet (75 mg total) by mouth once daily.  Dispense: 90 tablet; Refill: 3    Anxiety  -     escitalopram oxalate (LEXAPRO) 10 MG tablet; Take 1 tablet (10 mg total) by mouth once daily.  Dispense: 90 tablet; Refill: 1    MCI (mild cognitive impairment)  -     memantine (NAMENDA) 10 MG Tab; Take 1 tablet (10 mg total) by mouth once daily.  Dispense: 90 tablet; Refill: 1      Follow-up if symptoms worsen or fail to improve.            Royce Doshi MD  1/31/2019 11:30 AM

## 2019-02-02 LAB — BACTERIA UR CULT: NORMAL

## 2019-06-27 ENCOUNTER — OFFICE VISIT (OUTPATIENT)
Dept: URGENT CARE | Facility: CLINIC | Age: 74
End: 2019-06-27
Payer: MEDICARE

## 2019-06-27 VITALS
RESPIRATION RATE: 18 BRPM | OXYGEN SATURATION: 100 % | DIASTOLIC BLOOD PRESSURE: 67 MMHG | SYSTOLIC BLOOD PRESSURE: 121 MMHG | TEMPERATURE: 98 F | HEART RATE: 72 BPM

## 2019-06-27 DIAGNOSIS — R05.9 COUGH: ICD-10-CM

## 2019-06-27 DIAGNOSIS — K59.00 CONSTIPATION, UNSPECIFIED CONSTIPATION TYPE: ICD-10-CM

## 2019-06-27 DIAGNOSIS — J01.00 ACUTE MAXILLARY SINUSITIS, RECURRENCE NOT SPECIFIED: Primary | ICD-10-CM

## 2019-06-27 PROCEDURE — 99214 OFFICE O/P EST MOD 30 MIN: CPT | Mod: S$GLB,,, | Performed by: PHYSICIAN ASSISTANT

## 2019-06-27 PROCEDURE — 99214 PR OFFICE/OUTPT VISIT, EST, LEVL IV, 30-39 MIN: ICD-10-PCS | Mod: S$GLB,,, | Performed by: PHYSICIAN ASSISTANT

## 2019-06-27 RX ORDER — AZELASTINE 1 MG/ML
1 SPRAY, METERED NASAL 2 TIMES DAILY
Qty: 30 ML | Refills: 0 | Status: ON HOLD | OUTPATIENT
Start: 2019-06-27 | End: 2020-01-04 | Stop reason: HOSPADM

## 2019-06-27 RX ORDER — BENZONATATE 200 MG/1
200 CAPSULE ORAL 3 TIMES DAILY PRN
Qty: 30 CAPSULE | Refills: 0 | Status: SHIPPED | OUTPATIENT
Start: 2019-06-27 | End: 2019-07-07

## 2019-06-27 RX ORDER — DOXYCYCLINE 100 MG/1
100 CAPSULE ORAL 2 TIMES DAILY
Qty: 14 CAPSULE | Refills: 0 | Status: SHIPPED | OUTPATIENT
Start: 2019-06-27 | End: 2019-07-04

## 2019-06-27 RX ORDER — POLYETHYLENE GLYCOL 3350 17 G/17G
17 POWDER, FOR SOLUTION ORAL DAILY
Qty: 85 G | Refills: 0 | Status: SHIPPED | OUTPATIENT
Start: 2019-06-27 | End: 2019-07-02

## 2019-06-27 NOTE — PATIENT INSTRUCTIONS
- Rest.    - Drink plenty of fluids.    - Tylenol or Ibuprofen as directed as needed for fever/pain. Avoid tylenol if you have a history of liver disease. Do not take ibuprofen if you have a history of GI bleeding, kidney disease, or if you take blood thinners.     - You can take over-the-counter claritin, zyrtec, allegra, or xyzal as directed. These are antihistamines that can help with runny nose, nasal congestion, sneezing, and helps to dry up post-nasal drip, which usually causes sore throat and cough.   - If you do NOT have high blood pressure, you may use a decongestant form (D)  of this medication or if you do not take the D form, you can take sudafed  (pseudoephedrine) over the counter, which is a decongestant.    - You can use Flonase (fluticasone) nasal spray as directed for sinus congestion and postnasal drip. This is a steroid nasal spray that works locally over time to decrease the inflammation in your nose/sinuses and help with allergic symptoms. This is not an quick- relief spray like afrin, but it works well if used daily.  Discontinue if you develop nose bleed  - use nasal saline prior to Flonase.    - Use Ocean Spray Nasal Saline 1-3 puffs each nostril every 2-3 hours then blow out onto tissue. This is to irrigate the nasal passage way to clear the sinus openings. Use until sinus problem resolved.    -azelastine spray is a nasal antihistamine spray that helps with sinus issues and post nasal drip.     - you can take plain Mucinex (guaifenesin) 1200 mg twice a day to help loosen mucous    - Take the tessalon (benzonatate) as needed as prescribed for cough     - warm tea with honey can help with cough. Honey is a natural cough suppressant.    - you can take the prescribed polyethylene glycol to regulate bowel pattern, you must drink lots of water with this medication, which will help with constipation.    - Follow up with your PCP or specialty clinic as directed in the next 1-2 weeks if not improved  or as needed.  You can call [phone] to schedule an appointment with the appropriate provider.      - Go to the ER if you develop new or worsening symptoms.     - You must understand that you have received an Urgent Care treatment only and that you may be released before all of your medical problems are known or treated.   - You, the patient, will arrange for follow up care as instructed.   - If your condition worsens or fails to improve we recommend that you receive another evaluation at the ER immediately or contact your PCP to discuss your concerns or return here.           Constipation (Adult)  Constipation means that you have bowel movements that are less frequent than usual. Stools often become very hard and difficult to pass.  Constipation is very common. At some point in life it affects almost everyone. Since everyone's bowel habits are different, what is constipation to one person may not be to another. Your healthcare provider may do tests to diagnose constipation. It depends on what he or she finds when evaluating you.    Symptoms of constipation include:  · Abdominal pain  · Bloating  · Vomiting  · Painful bowel movements  · Itching, swelling, bleeding, or pain around the anus  Causes  Constipation can have many causes. These include:  · Diet low in fiber  · Too much dairy  · Not drinking enough liquids  · Lack of exercise or physical activity. This is especially true for older adults.  · Changes in lifestyle or daily routine, including pregnancy, aging, work, and travel  · Frequent use or misuse of laxatives  · Ignoring the urge to have a bowel movement or delaying it until later  · Medicines, such as certain prescription pain medicines, iron supplements, antacids, certain antidepressants, and calcium supplements  · Diseases like irritable bowel syndrome, bowel obstructions, stroke, diabetes, thyroid disease, Parkinson disease, hemorrhoids, and colon cancer  Complications  Potential complications  of constipation can include:  · Hemorrhoids  · Rectal bleeding from hemorrhoids or anal fissures (skin tears)  · Hernias  · Dependency on laxatives  · Chronic constipation  · Fecal impaction  · Bowel obstruction or perforation  Home care  All treatment should be done after talking with your healthcare provider. This is especially true if you have another medical problems, are taking prescription medicines, or are an older adult. Treatment most often involves lifestyle changes. You may also need medicines. Your healthcare provider will tell you which will work best for you. Follow the advice below to help avoid this problem in the future.  Lifestyle changes  These lifestyle changes can help prevent constipation:  · Diet. Eat a high-fiber diet, with fresh fruit and vegetables, and reduce dairy intake, meats, and processed foods  · Fluids. It's important to get enough fluids each day. Drink plenty of water when you eat more fiber. If you are on diet that limits the amount of fluid you can have, talk about this with your healthcare provider.  · Regular exercise. Check with your healthcare provider first.  Medications  Take any medicines as directed. Some laxatives are safe to use only every now and then. Others can be taken on a regular basis. Talk with your doctor or pharmacist if you have questions.  Prescription pain medicines can cause constipation. If you are taking this kind of medicine, ask your healthcare provider if you should also take a stool softener.  Medicines you may take to treat constipation include:  · Fiber supplements  · Stool softeners  · Laxatives  · Enemas  · Rectal suppositories  Follow-up care  Follow up with your healthcare provider if symptoms don't get better in the next few days. You may need to have more tests or see a specialist.  Call 911  Call 911 if any of these occur:  · Trouble breathing  · Stiff, rigid abdomen that is severely painful to touch  · Confusion  · Fainting or loss of  consciousness  · Rapid heart rate  · Chest pain  When to seek medical advice  Call your healthcare provider right away if any of these occur:  · Fever over 100.4°F (38°C)  · Failure to resume normal bowel movements  · Pain in your abdomen or back gets worse  · Nausea or vomiting  · Swelling in your abdomen  · Blood in the stool  · Black, tarry stool  · Involuntary weight loss  · Weakness  Date Last Reviewed: 12/30/2015 © 2000-2017 [employer]. [address] [place] [address], [place] [address]. All rights reserved. This information is not intended as a substitute for professional medical care. Always follow your healthcare professional's instructions.        Acute Sinusitis    Acute sinusitis is irritation and swelling of the sinuses. It is usually caused by a viral infection after a common cold. Your doctor can help you find relief.  What is acute sinusitis?  Sinuses are air-filled spaces in the skull behind the face. They are kept moist and clean by a lining of mucosa. Things such as pollen, smoke, and chemical fumes can irritate the mucosa. It can then swell up. As a response to irritation, the mucosa makes more mucus and other fluids. Tiny hairlike cilia cover the mucosa. Cilia help carry mucus toward the opening of the sinus. Too much mucus may cause the cilia to stop working. This blocks the sinus opening. A buildup of fluid in the sinuses then causes pain and pressure. It can also encourage bacteria to grow in the sinuses.  Common symptoms of acute sinusitis  You may have:  · Facial soreness pain  · Headache  · Fever  · Fluid draining in the back of the throat (postnasal drip)  · Congestion  · Drainage that is thick and colored, instead of clear  · Cough  Diagnosing acute sinusitis  Your doctor will ask about your symptoms and health history. He or she will look at your ear, nose, and throat. You usually won't need to have X-rays taken.    The doctor may take a sample of mucus to check for bacteria. If  you have sinusitis that keeps coming back, you may need imaging tests such as X-rays or CAT scans. This will help your doctor check for a structural problem that may be causing the infection.  Treating acute sinusitis  Treatment is aimed at unblocking the sinus opening and helping the cilia work again. You may need to take antihistamine and decongestant medicine. These can reduce inflammation and decrease the amount of fluid your sinuses make. If you have a bacterial infection, you will need to take antibiotic medicine for 10 to 14 days. Take this medicine until it is gone, even if you feel better.  Date Last Reviewed: 10/1/2016  © 8260-6757 [employer] [address] [place] [address], [place] [address]. All rights reserved. This information is not intended as a substitute for professional medical care. Always follow your healthcare professional's instructions.

## 2019-06-27 NOTE — PROGRESS NOTES
Subjective:       Patient ID: Emilia Melgoza is a 73 y.o. female.    Vitals:  temperature is 98.3 °F (36.8 °C). Her blood pressure is 121/67 and her pulse is 72. Her respiration is 18 and oxygen saturation is 100%.     Chief Complaint: Sinus Problem    Patient presents for worsening sinus pressure, sinus pain, congestion for the past week and a half.  Associated symptoms include postnasal drip with cough.  Patient is accompanied today with her daughter.  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  Her daughter so states that she thinks that she is constipated because she thinks that her stomach has been hurting.  She states that she suffers with constipation chronically, patient cannot express when her last bowel movement was.  Daughter states that patient has been feeling sick and more run down because of the URI symptoms.    Sinus Problem   This is a new problem. The current episode started in the past 7 days. The problem has been gradually worsening since onset. Associated symptoms include congestion, coughing and sinus pressure. Pertinent negatives include no chills, diaphoresis, ear pain, headaches, neck pain, shortness of breath or sore throat. Past treatments include nothing. The treatment provided no relief.       Constitution: Negative for chills, sweating, fatigue and fever.   HENT: Positive for congestion, sinus pain and sinus pressure. Negative for ear pain, postnasal drip, sore throat, trouble swallowing and voice change.    Neck: Negative for neck pain, neck stiffness and painful lymph nodes.   Cardiovascular: Negative for chest pain, palpitations and sob on exertion.   Eyes: Negative for eye redness.   Respiratory: Positive for cough. Negative for chest tightness, sputum production, bloody sputum, COPD, shortness of breath, stridor, wheezing and asthma.    Gastrointestinal: Positive for abdominal pain and constipation. Negative for abdominal trauma, abdominal  bloating, nausea, vomiting, diarrhea, rectal bleeding and rectal pain.   Musculoskeletal: Negative for muscle ache.   Skin: Negative for rash.   Allergic/Immunologic: Negative for seasonal allergies and asthma.   Neurological: Positive for speech difficulty (Chronic). Negative for dizziness, history of vertigo, light-headedness, passing out, facial drooping, loss of balance, headaches, history of migraines, disorientation, altered mental status, loss of consciousness, numbness, tingling, seizures and tremors.   Hematologic/Lymphatic: Negative for swollen lymph nodes.   Psychiatric/Behavioral: Negative for altered mental status and disorientation.       Objective:      Physical Exam   Constitutional: She is oriented to person, place, and time. She appears well-developed and well-nourished. She is cooperative.  Non-toxic appearance. She does not appear ill. No distress.   Patient sitting comfortably in no acute distress.  Nontoxic appearing   HENT:   Head: Normocephalic and atraumatic.   Right Ear: Hearing, tympanic membrane, external ear and ear canal normal.   Left Ear: Hearing, tympanic membrane, external ear and ear canal normal.   Nose: Mucosal edema present. No rhinorrhea or nasal deformity. No epistaxis. Right sinus exhibits maxillary sinus tenderness. Right sinus exhibits no frontal sinus tenderness. Left sinus exhibits maxillary sinus tenderness. Left sinus exhibits no frontal sinus tenderness.   Mouth/Throat: Uvula is midline, oropharynx is clear and moist and mucous membranes are normal. No trismus in the jaw. Abnormal dentition. No uvula swelling. No posterior oropharyngeal erythema. Tonsils are 1+ on the right. Tonsils are 1+ on the left. No tonsillar exudate.   Eyes: Conjunctivae and lids are normal. No scleral icterus.   Sclera clear bilat   Neck: Trachea normal, full passive range of motion without pain and phonation normal. Neck supple.   Cardiovascular: Normal rate, regular rhythm, normal heart  sounds, intact distal pulses and normal pulses.   Pulmonary/Chest: Effort normal and breath sounds normal. No accessory muscle usage or stridor. No tachypnea and no bradypnea. No respiratory distress. She has no decreased breath sounds. She has no wheezes. She has no rhonchi. She has no rales.   Abdominal: Soft. Normal appearance and bowel sounds are normal. She exhibits no distension, no abdominal bruit, no pulsatile midline mass and no mass. There is no tenderness. There is no rigidity, no rebound, no guarding, no CVA tenderness, no tenderness at McBurney's point and negative Schafer's sign.   Soft, nontender without rigidity or guarding.   Musculoskeletal: Normal range of motion. She exhibits no edema or deformity.   Lymphadenopathy:        Head (right side): No submandibular, no preauricular and no posterior auricular adenopathy present.        Head (left side): No submandibular, no preauricular and no posterior auricular adenopathy present.     She has no cervical adenopathy.   Neurological: She is alert and oriented to person, place, and time. She has normal strength. She displays no tremor. She exhibits normal muscle tone. She displays no seizure activity. Coordination and gait normal.   Difficult to assess, residual deficits from past CVA and marked cerebral microvascular disease. Marked aphasia on exam.    Skin: Skin is warm, dry and intact. She is not diaphoretic. No pallor.   Psychiatric: She has a normal mood and affect. Her speech is normal and behavior is normal. Judgment and thought content normal. Cognition and memory are normal.   Nursing note and vitals reviewed.      Assessment:       1. Acute maxillary sinusitis, recurrence not specified    2. Cough    3. Constipation, unspecified constipation type        Plan:         Acute maxillary sinusitis, recurrence not specified  -     azelastine (ASTELIN) 137 mcg (0.1 %) nasal spray; 1 spray (137 mcg total) by Nasal route 2 (two) times daily.  Dispense:  30 mL; Refill: 0  -     doxycycline (VIBRAMYCIN) 100 MG Cap; Take 1 capsule (100 mg total) by mouth 2 (two) times daily. for 7 days  Dispense: 14 capsule; Refill: 0    Cough  -     benzonatate (TESSALON) 200 MG capsule; Take 1 capsule (200 mg total) by mouth 3 (three) times daily as needed for Cough.  Dispense: 30 capsule; Refill: 0    Constipation, unspecified constipation type  -     polyethylene glycol (GLYCOLAX) 17 gram/dose powder; Take 17 g by mouth once daily. for 5 days  Dispense: 85 g; Refill: 0      Patient Instructions   - Rest.    - Drink plenty of fluids.    - Tylenol or Ibuprofen as directed as needed for fever/pain. Avoid tylenol if you have a history of liver disease. Do not take ibuprofen if you have a history of GI bleeding, kidney disease, or if you take blood thinners.     - You can take over-the-counter claritin, zyrtec, allegra, or xyzal as directed. These are antihistamines that can help with runny nose, nasal congestion, sneezing, and helps to dry up post-nasal drip, which usually causes sore throat and cough.   - If you do NOT have high blood pressure, you may use a decongestant form (D)  of this medication or if you do not take the D form, you can take sudafed  (pseudoephedrine) over the counter, which is a decongestant.    - You can use Flonase (fluticasone) nasal spray as directed for sinus congestion and postnasal drip. This is a steroid nasal spray that works locally over time to decrease the inflammation in your nose/sinuses and help with allergic symptoms. This is not an quick- relief spray like afrin, but it works well if used daily.  Discontinue if you develop nose bleed  - use nasal saline prior to Flonase.    - Use Ocean Spray Nasal Saline 1-3 puffs each nostril every 2-3 hours then blow out onto tissue. This is to irrigate the nasal passage way to clear the sinus openings. Use until sinus problem resolved.    -azelastine spray is a nasal antihistamine spray that helps with sinus  issues and post nasal drip.     - you can take plain Mucinex (guaifenesin) 1200 mg twice a day to help loosen mucous    - Take the tessalon (benzonatate) as needed as prescribed for cough     - warm tea with honey can help with cough. Honey is a natural cough suppressant.    - you can take the prescribed polyethylene glycol to regulate bowel pattern, you must drink lots of water with this medication, which will help with constipation.    - Follow up with your PCP or specialty clinic as directed in the next 1-2 weeks if not improved or as needed.  You can call [phone] to schedule an appointment with the appropriate provider.      - Go to the ER if you develop new or worsening symptoms.     - You must understand that you have received an Urgent Care treatment only and that you may be released before all of your medical problems are known or treated.   - You, the patient, will arrange for follow up care as instructed.   - If your condition worsens or fails to improve we recommend that you receive another evaluation at the ER immediately or contact your PCP to discuss your concerns or return here.           Constipation (Adult)  Constipation means that you have bowel movements that are less frequent than usual. Stools often become very hard and difficult to pass.  Constipation is very common. At some point in life it affects almost everyone. Since everyone's bowel habits are different, what is constipation to one person may not be to another. Your healthcare provider may do tests to diagnose constipation. It depends on what he or she finds when evaluating you.    Symptoms of constipation include:  · Abdominal pain  · Bloating  · Vomiting  · Painful bowel movements  · Itching, swelling, bleeding, or pain around the anus  Causes  Constipation can have many causes. These include:  · Diet low in fiber  · Too much dairy  · Not drinking enough liquids  · Lack of exercise or physical activity. This is especially true  for older adults.  · Changes in lifestyle or daily routine, including pregnancy, aging, work, and travel  · Frequent use or misuse of laxatives  · Ignoring the urge to have a bowel movement or delaying it until later  · Medicines, such as certain prescription pain medicines, iron supplements, antacids, certain antidepressants, and calcium supplements  · Diseases like irritable bowel syndrome, bowel obstructions, stroke, diabetes, thyroid disease, Parkinson disease, hemorrhoids, and colon cancer  Complications  Potential complications of constipation can include:  · Hemorrhoids  · Rectal bleeding from hemorrhoids or anal fissures (skin tears)  · Hernias  · Dependency on laxatives  · Chronic constipation  · Fecal impaction  · Bowel obstruction or perforation  Home care  All treatment should be done after talking with your healthcare provider. This is especially true if you have another medical problems, are taking prescription medicines, or are an older adult. Treatment most often involves lifestyle changes. You may also need medicines. Your healthcare provider will tell you which will work best for you. Follow the advice below to help avoid this problem in the future.  Lifestyle changes  These lifestyle changes can help prevent constipation:  · Diet. Eat a high-fiber diet, with fresh fruit and vegetables, and reduce dairy intake, meats, and processed foods  · Fluids. It's important to get enough fluids each day. Drink plenty of water when you eat more fiber. If you are on diet that limits the amount of fluid you can have, talk about this with your healthcare provider.  · Regular exercise. Check with your healthcare provider first.  Medications  Take any medicines as directed. Some laxatives are safe to use only every now and then. Others can be taken on a regular basis. Talk with your doctor or pharmacist if you have questions.  Prescription pain medicines can cause constipation. If you are taking this kind of  medicine, ask your healthcare provider if you should also take a stool softener.  Medicines you may take to treat constipation include:  · Fiber supplements  · Stool softeners  · Laxatives  · Enemas  · Rectal suppositories  Follow-up care  Follow up with your healthcare provider if symptoms don't get better in the next few days. You may need to have more tests or see a specialist.  Call 911  Call 911 if any of these occur:  · Trouble breathing  · Stiff, rigid abdomen that is severely painful to touch  · Confusion  · Fainting or loss of consciousness  · Rapid heart rate  · Chest pain  When to seek medical advice  Call your healthcare provider right away if any of these occur:  · Fever over 100.4°F (38°C)  · Failure to resume normal bowel movements  · Pain in your abdomen or back gets worse  · Nausea or vomiting  · Swelling in your abdomen  · Blood in the stool  · Black, tarry stool  · Involuntary weight loss  · Weakness  Date Last Reviewed: 12/30/2015  © 5861-3177 [email or web address]. [address] [place] [address], [place] [address]. All rights reserved. This information is not intended as a substitute for professional medical care. Always follow your healthcare professional's instructions.        Acute Sinusitis    Acute sinusitis is irritation and swelling of the sinuses. It is usually caused by a viral infection after a common cold. Your doctor can help you find relief.  What is acute sinusitis?  Sinuses are air-filled spaces in the skull behind the face. They are kept moist and clean by a lining of mucosa. Things such as pollen, smoke, and chemical fumes can irritate the mucosa. It can then swell up. As a response to irritation, the mucosa makes more mucus and other fluids. Tiny hairlike cilia cover the mucosa. Cilia help carry mucus toward the opening of the sinus. Too much mucus may cause the cilia to stop working. This blocks the sinus opening. A buildup of fluid in the sinuses then causes pain and pressure. It  can also encourage bacteria to grow in the sinuses.  Common symptoms of acute sinusitis  You may have:  · Facial soreness pain  · Headache  · Fever  · Fluid draining in the back of the throat (postnasal drip)  · Congestion  · Drainage that is thick and colored, instead of clear  · Cough  Diagnosing acute sinusitis  Your doctor will ask about your symptoms and health history. He or she will look at your ear, nose, and throat. You usually won't need to have X-rays taken.    The doctor may take a sample of mucus to check for bacteria. If you have sinusitis that keeps coming back, you may need imaging tests such as X-rays or CAT scans. This will help your doctor check for a structural problem that may be causing the infection.  Treating acute sinusitis  Treatment is aimed at unblocking the sinus opening and helping the cilia work again. You may need to take antihistamine and decongestant medicine. These can reduce inflammation and decrease the amount of fluid your sinuses make. If you have a bacterial infection, you will need to take antibiotic medicine for 10 to 14 days. Take this medicine until it is gone, even if you feel better.  Date Last Reviewed: 10/1/2016  © 9393-3055 [employer]. [address] [place] [address], [place] [address]. All rights reserved. This information is not intended as a substitute for professional medical care. Always follow your healthcare professional's instructions.

## 2019-08-19 DIAGNOSIS — I10 ESSENTIAL HYPERTENSION: ICD-10-CM

## 2019-08-21 RX ORDER — CLONIDINE HYDROCHLORIDE 0.1 MG/1
TABLET ORAL
Qty: 180 TABLET | Refills: 1 | Status: SHIPPED | OUTPATIENT
Start: 2019-08-21 | End: 2019-09-10 | Stop reason: SDUPTHER

## 2019-09-10 ENCOUNTER — OFFICE VISIT (OUTPATIENT)
Dept: FAMILY MEDICINE | Facility: CLINIC | Age: 74
End: 2019-09-10
Payer: MEDICARE

## 2019-09-10 VITALS
SYSTOLIC BLOOD PRESSURE: 124 MMHG | TEMPERATURE: 99 F | OXYGEN SATURATION: 96 % | HEIGHT: 66 IN | BODY MASS INDEX: 26.93 KG/M2 | WEIGHT: 167.56 LBS | RESPIRATION RATE: 17 BRPM | DIASTOLIC BLOOD PRESSURE: 60 MMHG | HEART RATE: 75 BPM

## 2019-09-10 DIAGNOSIS — R73.03 PRE-DIABETES: ICD-10-CM

## 2019-09-10 DIAGNOSIS — I69.920 APHASIA, LATE EFFECT OF CEREBROVASCULAR DISEASE: Primary | ICD-10-CM

## 2019-09-10 DIAGNOSIS — F41.9 ANXIETY: ICD-10-CM

## 2019-09-10 DIAGNOSIS — I10 ESSENTIAL HYPERTENSION: ICD-10-CM

## 2019-09-10 DIAGNOSIS — R47.01 BROCA'S APHASIA: ICD-10-CM

## 2019-09-10 DIAGNOSIS — I63.9 CEREBRAL INFARCTION, UNSPECIFIED MECHANISM: ICD-10-CM

## 2019-09-10 DIAGNOSIS — R53.83 FATIGUE, UNSPECIFIED TYPE: ICD-10-CM

## 2019-09-10 DIAGNOSIS — E78.5 HYPERLIPIDEMIA, UNSPECIFIED HYPERLIPIDEMIA TYPE: ICD-10-CM

## 2019-09-10 DIAGNOSIS — Z00.00 HEALTHCARE MAINTENANCE: ICD-10-CM

## 2019-09-10 DIAGNOSIS — J06.9 UPPER RESPIRATORY TRACT INFECTION, UNSPECIFIED TYPE: ICD-10-CM

## 2019-09-10 DIAGNOSIS — D32.9 MENINGIOMA: ICD-10-CM

## 2019-09-10 DIAGNOSIS — Z12.31 ENCOUNTER FOR SCREENING MAMMOGRAM FOR MALIGNANT NEOPLASM OF BREAST: ICD-10-CM

## 2019-09-10 DIAGNOSIS — N32.81 OVERACTIVE BLADDER: ICD-10-CM

## 2019-09-10 DIAGNOSIS — I69.351 HEMIPARESIS AFFECTING RIGHT SIDE AS LATE EFFECT OF CEREBROVASCULAR ACCIDENT: ICD-10-CM

## 2019-09-10 DIAGNOSIS — G31.84 MCI (MILD COGNITIVE IMPAIRMENT): ICD-10-CM

## 2019-09-10 PROCEDURE — 99214 PR OFFICE/OUTPT VISIT, EST, LEVL IV, 30-39 MIN: ICD-10-PCS | Mod: S$PBB,,, | Performed by: INTERNAL MEDICINE

## 2019-09-10 PROCEDURE — 99214 OFFICE O/P EST MOD 30 MIN: CPT | Mod: S$PBB,,, | Performed by: INTERNAL MEDICINE

## 2019-09-10 PROCEDURE — 99214 OFFICE O/P EST MOD 30 MIN: CPT | Mod: PBBFAC,PN | Performed by: INTERNAL MEDICINE

## 2019-09-10 PROCEDURE — 99999 PR PBB SHADOW E&M-EST. PATIENT-LVL IV: ICD-10-PCS | Mod: PBBFAC,,, | Performed by: INTERNAL MEDICINE

## 2019-09-10 PROCEDURE — 99999 PR PBB SHADOW E&M-EST. PATIENT-LVL IV: CPT | Mod: PBBFAC,,, | Performed by: INTERNAL MEDICINE

## 2019-09-10 RX ORDER — MEMANTINE HYDROCHLORIDE 10 MG/1
10 TABLET ORAL DAILY
Qty: 90 TABLET | Refills: 1 | Status: SHIPPED | OUTPATIENT
Start: 2019-09-10 | End: 2019-11-05

## 2019-09-10 RX ORDER — CLONIDINE HYDROCHLORIDE 0.1 MG/1
TABLET ORAL
Qty: 180 TABLET | Refills: 1 | Status: ON HOLD | OUTPATIENT
Start: 2019-09-10 | End: 2020-01-04 | Stop reason: HOSPADM

## 2019-09-10 RX ORDER — ATORVASTATIN CALCIUM 80 MG/1
80 TABLET, FILM COATED ORAL DAILY
Qty: 90 TABLET | Refills: 3 | Status: ON HOLD | OUTPATIENT
Start: 2019-09-10 | End: 2020-01-04 | Stop reason: HOSPADM

## 2019-09-10 RX ORDER — OXYBUTYNIN CHLORIDE 5 MG/1
10 TABLET ORAL 2 TIMES DAILY
Qty: 360 TABLET | Refills: 3 | Status: SHIPPED | OUTPATIENT
Start: 2019-09-10 | End: 2019-12-15

## 2019-09-10 RX ORDER — CEPHALEXIN 500 MG/1
CAPSULE ORAL
COMMUNITY
Start: 2019-08-09 | End: 2019-12-15

## 2019-09-10 RX ORDER — POLYETHYLENE GLYCOL 3350 17 G/17G
POWDER, FOR SOLUTION ORAL
Refills: 0 | Status: ON HOLD | COMMUNITY
Start: 2019-06-27 | End: 2020-01-04 | Stop reason: HOSPADM

## 2019-09-10 RX ORDER — ESCITALOPRAM OXALATE 10 MG/1
10 TABLET ORAL DAILY
Qty: 90 TABLET | Refills: 1 | Status: SHIPPED | OUTPATIENT
Start: 2019-09-10 | End: 2019-12-15

## 2019-09-10 RX ORDER — FLUTICASONE PROPIONATE 50 MCG
1 SPRAY, SUSPENSION (ML) NASAL DAILY
Qty: 1 BOTTLE | Refills: 0 | Status: ON HOLD | OUTPATIENT
Start: 2019-09-10 | End: 2020-01-04 | Stop reason: HOSPADM

## 2019-09-10 RX ORDER — AMLODIPINE BESYLATE 5 MG/1
TABLET ORAL
Qty: 180 TABLET | Refills: 3 | Status: SHIPPED | OUTPATIENT
Start: 2019-09-10 | End: 2019-12-15

## 2019-09-10 RX ORDER — CLOPIDOGREL BISULFATE 75 MG/1
75 TABLET ORAL DAILY
Qty: 90 TABLET | Refills: 3 | Status: ON HOLD | OUTPATIENT
Start: 2019-09-10 | End: 2020-01-04 | Stop reason: HOSPADM

## 2019-09-10 NOTE — PROGRESS NOTES
HISTORY OF PRESENT ILLNESS:  Emilia Melgoza is a 74 y.o. female with aphasia and right hemiparesis 2/2 prior stroke, HTN, lumbar DDD who presents to the clinic today for Establish Care    Daughter Ryan is here with patient today.    Patient is overall without significant complaints today.    Dysarthria, h/o CVA  Did speech therapy in the last year and some improvement.  No new complaints  She had been seeing Dr. Gaston Haines with last visit in May 2018.    Overactive bladder  Follows with Dr. Becker.  Doing ok with current meds.  Due for follow up with last visit June 2018.    PAST MEDICAL HISTORY:  Past Medical History:   Diagnosis Date    Allergy     Asthma     DDD (degenerative disc disease), lumbar     Chronic LBP and intermittent RLE radicular pain x 10 yrs    Hypertension     Overactive bladder     Senile cataract, unspecified - Both Eyes 6/11/2013    Stroke        PAST SURGICAL HISTORY:  Past Surgical History:   Procedure Laterality Date    BREAST BIOPSY Left     CATARACT EXTRACTION W/  INTRAOCULAR LENS IMPLANT Right 10/13/2014    Dr Crystal    CATARACT EXTRACTION W/  INTRAOCULAR LENS IMPLANT Left 11/10/2014    Dr Crystal    HEMORRHOID SURGERY      HYSTERECTOMY      INCONTINENCE SURGERY      INSERTION-INTRAOCULAR LENS (IOL) Left 11/10/2014    [facility]    INSERTION-INTRAOCULAR LENS (IOL) Right 10/13/2014    [facility]    OOPHORECTOMY      PHACOEMULSIFICATION-ASPIRATION-CATARACT Left 11/10/2014    [facility]    PHACOEMULSIFICATION-ASPIRATION-CATARACT Right 10/13/2014    [facility]    Right Rotator Cuff Repair         SOCIAL HISTORY:  Social History     Socioeconomic History    Marital status:      Spouse name: Not on file    Number of children: Not on file    Years of education: Not on file    Highest education level: Not on file   Occupational History     Not on file   Social Needs    Financial resource strain: Not on file    Food insecurity:     Worry: Not on file     Inability: Not on file    Transportation needs:     Medical: Not on file     Non-medical: Not on file   Tobacco Use    Smoking status: Former Smoker     Last attempt to quit: 1992     Years since quittin.7    Smokeless tobacco: Never Used   Substance and Sexual Activity    Alcohol use: No     Alcohol/week: 0.0 oz    Drug use: No    Sexual activity: Not Currently   Lifestyle    Physical activity:     Days per week: Not on file     Minutes per session: Not on file    Stress: Not on file   Relationships    Social connections:     Talks on phone: Not on file     Gets together: Not on file     Attends Gnosticism service: Not on file     Active member of club or organization: Not on file     Attends meetings of clubs or organizations: Not on file     Relationship status: Not on file   Other Topics Concern    Not on file   Social History Narrative    Retired       FAMILY HISTORY:  Family History   Problem Relation Age of Onset    Hypertension Mother     Breast cancer Mother     Dementia Mother     Colon cancer Father     Hypertension Father     Rhinitis Daughter     Rhinitis Daughter     No Known Problems Sister     No Known Problems Brother     No Known Problems Maternal Aunt     No Known Problems Maternal Uncle     No Known Problems Paternal Aunt     No Known Problems Paternal Uncle     No Known Problems Maternal Grandmother     No Known Problems Maternal Grandfather     No Known Problems Paternal Grandmother     No Known Problems Paternal Grandfather     Amblyopia Neg Hx     Blindness Neg Hx     Cancer Neg Hx     Cataracts Neg Hx     Diabetes Neg Hx     Glaucoma Neg Hx     Macular degeneration Neg Hx     Retinal detachment Neg Hx     Strabismus Neg Hx     Stroke Neg Hx     Thyroid disease Neg Hx        ALLERGIES AND MEDICATIONS: updated and  reviewed.  Review of patient's allergies indicates:   Allergen Reactions    Lovenox [enoxaparin] Other (See Comments)     Slurred speech per patient    Lisinopril Other (See Comments)     Cough    Penicillins      Medication List with Changes/Refills   Current Medications    AMLODIPINE (NORVASC) 5 MG TABLET    TAKE ONE TABLET BY MOUTH TWICE DAILY    ATORVASTATIN (LIPITOR) 80 MG TABLET    Take 1 tablet (80 mg total) by mouth once daily.    AZELASTINE (ASTELIN) 137 MCG (0.1 %) NASAL SPRAY    1 spray (137 mcg total) by Nasal route 2 (two) times daily.    CHOLECALCIFEROL, VITAMIN D3, 1,000 UNIT CAPSULE    Take 1 capsule (1,000 Units total) by mouth once daily.    CLONIDINE (CATAPRES) 0.1 MG TABLET    TAKE 2 TABLETS BY MOUTH ONCE DAILY IN THE EVENING    CLOPIDOGREL (PLAVIX) 75 MG TABLET    Take 1 tablet (75 mg total) by mouth once daily.    ESCITALOPRAM OXALATE (LEXAPRO) 10 MG TABLET    Take 1 tablet (10 mg total) by mouth once daily.    FEXOFENADINE (ALLEGRA) 180 MG TABLET    Take 1 tablet (180 mg total) by mouth once daily.    FLUTICASONE (FLONASE) 50 MCG/ACTUATION NASAL SPRAY    1 spray (50 mcg total) by Each Nare route once daily.    GENERLAC 10 GRAM/15 ML SOLUTION        IBUPROFEN (ADVIL,MOTRIN) 800 MG TABLET        MEMANTINE (NAMENDA) 10 MG TAB    Take 1 tablet (10 mg total) by mouth once daily.    OXYBUTYNIN (DITROPAN) 5 MG TAB    Take 2 tablets (10 mg total) by mouth 2 (two) times daily.    POTASSIUM CHLORIDE SA (K-DUR,KLOR-CON) 20 MEQ TABLET    Take 1 tablet (20 mEq total) by mouth 2 (two) times daily.    TRAMADOL (ULTRAM) 50 MG TABLET    Take 1/2 tablet as needed for headache          CARE TEAM:  Patient Care Team:  Jerson Price MD as PCP - General (Internal Medicine)  MATILDE Ambriz (Inactive) as PCP - MSSP Attributed         REVIEW OF SYSTEMS:  Review of Systems   Constitutional: Negative for fatigue and fever.   HENT: Negative for congestion and postnasal drip.    Eyes: Negative for  photophobia and visual disturbance.   Respiratory: Negative for cough and shortness of breath.    Cardiovascular: Negative for chest pain and palpitations.   Gastrointestinal: Negative for nausea and vomiting.   Musculoskeletal: Negative for back pain and neck pain.   Neurological: Positive for speech difficulty. Negative for headaches.   Psychiatric/Behavioral: Negative for dysphoric mood and sleep disturbance.         PHYSICAL EXAM:   Vitals:    09/10/19 1013   BP: 124/60   Pulse: 75   Resp: 17   Temp: 98.6 °F (37 °C)             Body mass index is 27.04 kg/m².     General appearance - alert, well appearing, and in no distress and overweight  Mental status - normal mood, behavior, speech, dress, motor activity, and thought processes  Eyes - sclera anicteric, left eye normal, right eye normal  Chest - clear to auscultation, no wheezes, rales or rhonchi, symmetric air entry  Heart - normal rate and regular rhythm  Musculoskeletal - no joint tenderness, deformity or swelling  Extremities - peripheral pulses normal, no pedal edema, no clubbing or cyanosis  Skin - normal coloration and turgor, no rashes, no suspicious skin lesions noted      ASSESSMENT AND PLAN:  1. Aphasia, late effect of cerebrovascular disease  2. Broca's aphasia  3. Cerebral infarction, unspecified mechanism  - Ambulatory referral to Neurology  - clopidogrel (PLAVIX) 75 mg tablet; Take 1 tablet (75 mg total) by mouth once daily.  Dispense: 90 tablet; Refill: 3  - atorvastatin (LIPITOR) 80 MG tablet; Take 1 tablet (80 mg total) by mouth once daily.  Dispense: 90 tablet; Refill: 3    4. MCI (mild cognitive impairment)  - memantine (NAMENDA) 10 MG Tab; Take 1 tablet (10 mg total) by mouth once daily.  Dispense: 90 tablet; Refill: 1    5. Meningioma  - no acute issues, follow up neurology    6. Hemiparesis affecting right side as late effect of cerebrovascular accident  - Ambulatory referral to Neurology    7. Hyperlipidemia, unspecified hyperlipidemia  type  - Lipid panel; Future     8. Essential hypertension  - cloNIDine (CATAPRES) 0.1 MG tablet; TAKE 2 TABLETS BY MOUTH ONCE DAILY IN THE EVENING  Dispense: 180 tablet; Refill: 1  - amLODIPine (NORVASC) 5 MG tablet; TAKE ONE TABLET BY MOUTH TWICE DAILY  Dispense: 180 tablet; Refill: 3    9. Pre-diabetes  - Hemoglobin A1c; Future    10. Healthcare maintenance  - Mammo Digital Screening Bilat; Future  - Hemoglobin A1c; Future  - Comprehensive metabolic panel; Future  - Lipid panel; Future  - TSH; Future  - Ambulatory referral to Neurology  - Case request GI: COLONOSCOPY    11. Encounter for screening mammogram for malignant neoplasm of breast   - Mammo Digital Screening Bilat; Future    12. Fatigue, unspecified type   - TSH; Future    13. Overactive bladder  - oxybutynin (DITROPAN) 5 MG Tab; Take 2 tablets (10 mg total) by mouth 2 (two) times daily.  Dispense: 360 tablet; Refill: 3    14. Upper respiratory tract infection, unspecified type  - fluticasone propionate (FLONASE) 50 mcg/actuation nasal spray; 1 spray (50 mcg total) by Each Nostril route once daily.  Dispense: 1 Bottle; Refill: 0    15. Anxiety  - escitalopram oxalate (LEXAPRO) 10 MG tablet; Take 1 tablet (10 mg total) by mouth once daily.  Dispense: 90 tablet; Refill: 1         Follow up 3 months or sooner as needed.

## 2019-09-14 ENCOUNTER — LAB VISIT (OUTPATIENT)
Dept: LAB | Facility: HOSPITAL | Age: 74
End: 2019-09-14
Attending: INTERNAL MEDICINE
Payer: MEDICARE

## 2019-09-14 DIAGNOSIS — R73.03 PRE-DIABETES: ICD-10-CM

## 2019-09-14 DIAGNOSIS — R53.83 FATIGUE, UNSPECIFIED TYPE: ICD-10-CM

## 2019-09-14 DIAGNOSIS — Z00.00 HEALTHCARE MAINTENANCE: ICD-10-CM

## 2019-09-14 DIAGNOSIS — E78.5 HYPERLIPIDEMIA, UNSPECIFIED HYPERLIPIDEMIA TYPE: ICD-10-CM

## 2019-09-14 LAB
ALBUMIN SERPL BCP-MCNC: 3.7 G/DL (ref 3.5–5.2)
ALP SERPL-CCNC: 95 U/L (ref 55–135)
ALT SERPL W/O P-5'-P-CCNC: 13 U/L (ref 10–44)
ANION GAP SERPL CALC-SCNC: 11 MMOL/L (ref 8–16)
AST SERPL-CCNC: 20 U/L (ref 10–40)
BILIRUB SERPL-MCNC: 0.6 MG/DL (ref 0.1–1)
BUN SERPL-MCNC: 17 MG/DL (ref 8–23)
CALCIUM SERPL-MCNC: 10 MG/DL (ref 8.7–10.5)
CHLORIDE SERPL-SCNC: 105 MMOL/L (ref 95–110)
CHOLEST SERPL-MCNC: 164 MG/DL (ref 120–199)
CHOLEST/HDLC SERPL: 2.2 {RATIO} (ref 2–5)
CO2 SERPL-SCNC: 26 MMOL/L (ref 23–29)
CREAT SERPL-MCNC: 1.1 MG/DL (ref 0.5–1.4)
EST. GFR  (AFRICAN AMERICAN): 57 ML/MIN/1.73 M^2
EST. GFR  (NON AFRICAN AMERICAN): 49 ML/MIN/1.73 M^2
ESTIMATED AVG GLUCOSE: 120 MG/DL (ref 68–131)
GLUCOSE SERPL-MCNC: 118 MG/DL (ref 70–110)
HBA1C MFR BLD HPLC: 5.8 % (ref 4–5.6)
HDLC SERPL-MCNC: 73 MG/DL (ref 40–75)
HDLC SERPL: 44.5 % (ref 20–50)
LDLC SERPL CALC-MCNC: 72 MG/DL (ref 63–159)
NONHDLC SERPL-MCNC: 91 MG/DL
POTASSIUM SERPL-SCNC: 4.7 MMOL/L (ref 3.5–5.1)
PROT SERPL-MCNC: 8.3 G/DL (ref 6–8.4)
SODIUM SERPL-SCNC: 142 MMOL/L (ref 136–145)
TRIGL SERPL-MCNC: 95 MG/DL (ref 30–150)
TSH SERPL DL<=0.005 MIU/L-ACNC: 0.77 UIU/ML (ref 0.4–4)

## 2019-09-14 PROCEDURE — 84443 ASSAY THYROID STIM HORMONE: CPT

## 2019-09-14 PROCEDURE — 80053 COMPREHEN METABOLIC PANEL: CPT

## 2019-09-14 PROCEDURE — 83036 HEMOGLOBIN GLYCOSYLATED A1C: CPT

## 2019-09-14 PROCEDURE — 36415 COLL VENOUS BLD VENIPUNCTURE: CPT

## 2019-09-14 PROCEDURE — 80061 LIPID PANEL: CPT

## 2019-09-18 ENCOUNTER — TELEPHONE (OUTPATIENT)
Dept: ADMINISTRATIVE | Facility: HOSPITAL | Age: 74
End: 2019-09-18

## 2019-09-18 NOTE — TELEPHONE ENCOUNTER
Patient's sister has answered both attempts, yesterday and today. Patient's sister states she notified patient to call us. I will mail a letter to contact the clinic to schedule her Neuro referral

## 2019-09-23 DIAGNOSIS — Z12.11 COLON CANCER SCREENING: Primary | ICD-10-CM

## 2019-09-25 ENCOUNTER — HOSPITAL ENCOUNTER (OUTPATIENT)
Dept: RADIOLOGY | Facility: HOSPITAL | Age: 74
Discharge: HOME | End: 2019-09-25
Attending: INTERNAL MEDICINE
Payer: MEDICARE

## 2019-09-25 VITALS — BODY MASS INDEX: 26.84 KG/M2 | HEIGHT: 66 IN | WEIGHT: 167 LBS

## 2019-09-25 DIAGNOSIS — Z12.31 ENCOUNTER FOR SCREENING MAMMOGRAM FOR MALIGNANT NEOPLASM OF BREAST: ICD-10-CM

## 2019-09-25 DIAGNOSIS — Z00.00 HEALTHCARE MAINTENANCE: ICD-10-CM

## 2019-09-25 PROCEDURE — 77063 BREAST TOMOSYNTHESIS BI: CPT | Mod: 26,,, | Performed by: RADIOLOGY

## 2019-09-25 PROCEDURE — 77067 SCR MAMMO BI INCL CAD: CPT | Mod: 26,,, | Performed by: RADIOLOGY

## 2019-09-25 PROCEDURE — 77067 SCR MAMMO BI INCL CAD: CPT | Mod: TC

## 2019-09-25 PROCEDURE — 77067 MAMMO DIGITAL SCREENING BILAT WITH TOMOSYNTHESIS_CAD: ICD-10-PCS | Mod: 26,,, | Performed by: RADIOLOGY

## 2019-09-25 PROCEDURE — 77063 MAMMO DIGITAL SCREENING BILAT WITH TOMOSYNTHESIS_CAD: ICD-10-PCS | Mod: 26,,, | Performed by: RADIOLOGY

## 2019-10-31 ENCOUNTER — PATIENT OUTREACH (OUTPATIENT)
Dept: ADMINISTRATIVE | Facility: OTHER | Age: 74
End: 2019-10-31

## 2019-11-05 ENCOUNTER — OFFICE VISIT (OUTPATIENT)
Dept: NEUROLOGY | Facility: CLINIC | Age: 74
End: 2019-11-05
Payer: MEDICARE

## 2019-11-05 VITALS
HEIGHT: 66 IN | BODY MASS INDEX: 25.66 KG/M2 | HEART RATE: 106 BPM | SYSTOLIC BLOOD PRESSURE: 131 MMHG | WEIGHT: 159.63 LBS | DIASTOLIC BLOOD PRESSURE: 60 MMHG

## 2019-11-05 DIAGNOSIS — Z86.018 HISTORY OF MENINGIOMA: ICD-10-CM

## 2019-11-05 DIAGNOSIS — Z86.73 HISTORY OF STROKE: Primary | ICD-10-CM

## 2019-11-05 DIAGNOSIS — R47.01 APHASIA: ICD-10-CM

## 2019-11-05 PROCEDURE — 99215 OFFICE O/P EST HI 40 MIN: CPT | Mod: S$PBB,,, | Performed by: PSYCHIATRY & NEUROLOGY

## 2019-11-05 PROCEDURE — 99999 PR PBB SHADOW E&M-EST. PATIENT-LVL IV: ICD-10-PCS | Mod: PBBFAC,,, | Performed by: PSYCHIATRY & NEUROLOGY

## 2019-11-05 PROCEDURE — 99215 PR OFFICE/OUTPT VISIT, EST, LEVL V, 40-54 MIN: ICD-10-PCS | Mod: S$PBB,,, | Performed by: PSYCHIATRY & NEUROLOGY

## 2019-11-05 PROCEDURE — 99999 PR PBB SHADOW E&M-EST. PATIENT-LVL IV: CPT | Mod: PBBFAC,,, | Performed by: PSYCHIATRY & NEUROLOGY

## 2019-11-05 PROCEDURE — 99214 OFFICE O/P EST MOD 30 MIN: CPT | Mod: PBBFAC | Performed by: PSYCHIATRY & NEUROLOGY

## 2019-11-05 NOTE — PROGRESS NOTES
"Neurology Consult Note    Chief Complaint: follow up for history of stroke, previously following with Dr. Haines    HPI:   Emilia Melgoza is a 74 y.o. female with medical conditions as outlined below who presents to establish care for history of stroke. She is accompanied to the visit by her daughter who aided in giving history. Her daughter states in 2013, patient had a stroke which resulted in aphasia and right sided weakness. Her daughter states prior to that, her mother had no problems with memory. She states she takes namenda 10mg at bedtime approximately 3 times a week, because she feels patient acts "weird" on it. She does not feel this had made a difference in her comprehension or memory. Patient needs help with dressing herself due to weakness. She is able to feed herself. She perseverates, but can say one to two words. She can follow some simple commands. She needs help with most all other ADLs. She had an MRI brain in 2013 which showed a small extradural meningioma. Patient's daughter denies patient having a history of seizures or episodes of loss of consciousness. She is on plavix and lipitor for stroke prevention. Daughter has no further complaints.    Past Medical History:  Past Medical History:   Diagnosis Date    Allergy     Asthma     DDD (degenerative disc disease), lumbar     Chronic LBP and intermittent RLE radicular pain x 10 yrs    Hypertension     Overactive bladder     Senile cataract, unspecified - Both Eyes 6/11/2013    Stroke        Past Surgical History:  Past Surgical History:   Procedure Laterality Date    BREAST BIOPSY Left     CATARACT EXTRACTION W/  INTRAOCULAR LENS IMPLANT Right 10/13/2014    Dr Crystal    CATARACT EXTRACTION W/  INTRAOCULAR LENS IMPLANT Left 11/10/2014    Dr Crystal    HEMORRHOID SURGERY      HYSTERECTOMY      INCONTINENCE SURGERY      OOPHORECTOMY      Right Rotator Cuff Repair         Social History:  Social History     Socioeconomic History    " Marital status:      Spouse name: Not on file    Number of children: Not on file    Years of education: Not on file    Highest education level: Not on file   Occupational History    Not on file   Social Needs    Financial resource strain: Not on file    Food insecurity:     Worry: Not on file     Inability: Not on file    Transportation needs:     Medical: Not on file     Non-medical: Not on file   Tobacco Use    Smoking status: Former Smoker     Last attempt to quit: 1992     Years since quittin.8    Smokeless tobacco: Never Used   Substance and Sexual Activity    Alcohol use: No     Alcohol/week: 0.0 standard drinks    Drug use: No    Sexual activity: Not Currently   Lifestyle    Physical activity:     Days per week: Not on file     Minutes per session: Not on file    Stress: Not on file   Relationships    Social connections:     Talks on phone: Not on file     Gets together: Not on file     Attends [employer] service: Not on file     Active member of club or organization: Not on file     Attends meetings of clubs or organizations: Not on file     Relationship status: Not on file   Other Topics Concern    Not on file   Social History Narrative    Retired       Family History:  Family History   Problem Relation Age of Onset    Hypertension Mother     Breast cancer Mother     Dementia Mother     Colon cancer Father     Hypertension Father     Rhinitis Daughter     Rhinitis Daughter     No Known Problems Sister     No Known Problems Brother     No Known Problems Maternal Aunt     No Known Problems Maternal Uncle     No Known Problems Paternal Aunt     No Known Problems Paternal Uncle     No Known Problems Maternal Grandmother     No Known Problems Maternal Grandfather     No Known Problems Paternal Grandmother     No Known Problems Paternal Grandfather     Amblyopia Neg Hx     Blindness Neg Hx     Cancer Neg Hx     Cataracts Neg Hx     Diabetes Neg Hx      Glaucoma Neg Hx     Macular degeneration Neg Hx     Retinal detachment Neg Hx     Strabismus Neg Hx     Stroke Neg Hx     Thyroid disease Neg Hx        Medications:  Current Outpatient Medications   Medication Sig Dispense Refill    amLODIPine (NORVASC) 5 MG tablet TAKE ONE TABLET BY MOUTH TWICE DAILY 180 tablet 3    atorvastatin (LIPITOR) 80 MG tablet Take 1 tablet (80 mg total) by mouth once daily. 90 tablet 3    cephALEXin (KEFLEX) 500 MG capsule       cholecalciferol, vitamin D3, 1,000 unit capsule Take 1 capsule (1,000 Units total) by mouth once daily. 90 capsule 3    cloNIDine (CATAPRES) 0.1 MG tablet TAKE 2 TABLETS BY MOUTH ONCE DAILY IN THE EVENING 180 tablet 1    clopidogrel (PLAVIX) 75 mg tablet Take 1 tablet (75 mg total) by mouth once daily. 90 tablet 3    escitalopram oxalate (LEXAPRO) 10 MG tablet Take 1 tablet (10 mg total) by mouth once daily. 90 tablet 1    fluticasone propionate (FLONASE) 50 mcg/actuation nasal spray 1 spray (50 mcg total) by Each Nostril route once daily. 1 Bottle 0    GENERLAC 10 gram/15 mL solution       ibuprofen (ADVIL,MOTRIN) 800 MG tablet       memantine (NAMENDA) 10 MG Tab Take 1 tablet (10 mg total) by mouth once daily. 90 tablet 1    oxybutynin (DITROPAN) 5 MG Tab Take 2 tablets (10 mg total) by mouth 2 (two) times daily. 360 tablet 3    polyethylene glycol (GLYCOLAX) 17 gram PwPk DISSOLVE 17 GRAMS OF POWDER (1 PACK) IN WATER & DRINK ONCE DAILY FOR 5 DAYS  0    potassium chloride SA (K-DUR,KLOR-CON) 20 MEQ tablet Take 1 tablet (20 mEq total) by mouth 2 (two) times daily. 90 tablet 3    tramadol (ULTRAM) 50 mg tablet Take 1/2 tablet as needed for headache 30 tablet 0    azelastine (ASTELIN) 137 mcg (0.1 %) nasal spray 1 spray (137 mcg total) by Nasal route 2 (two) times daily. 30 mL 0    fexofenadine (ALLEGRA) 180 MG tablet Take 1 tablet (180 mg total) by mouth once daily. 30 tablet 0     No current facility-administered medications for this visit.         Allergies:  Review of patient's allergies indicates:   Allergen Reactions    Enoxaparin Other (See Comments) and Hives     Slurred speech per patient  Slurred speech per patient    Lisinopril Other (See Comments)     Cough    Penicillins Rash       ROS:  A 12 point review of system was negative aside from pertinent positives and negatives as outlined above.    Physical Exam  Vitals:    11/05/19 1101   BP: 131/60   Pulse: 106       General: well nourished, well developed  Eyes: no scleral icterus   Nose: nasal turbinates intact  Neck: supple, ROM intact  Skin: no rash or ecchymosis  Joints: no swelling or erythema  Cardiac: regular rate and rhythm  Lungs: clear to auscultation bilaterally    Neuro:  Mental status: awake, alert and oriented to person only, she is aware we are in a hospital, she perseverates and is able to follow some simple commands, some simple naming intact, says one to two words  CN: PERRL, EOMI, VFF, V1-V3 sensation intact, right facial droop, hearing grossly intact, tongue midline  Motor:   RUE 4/5  RLE 4/5  LUE 5/5  LLE 5/5      Reflexes: 3+ right ankle jerk, 3+ right knee jerk, 3+ right biceps, otherwise 2+ throughout, fatigable clonus on right, no clonus on left  Sensory: intact to light touch throughout  Coordination: patient unable to follow command for this  Gait: steady    Prior Imaging/Labs:  Reviewed      Assessment and Plan:    74 y.o. female with hx of meningioma on MRI brain in 2013 and history of LMCA infarct in 2013 with residual mixed aphasia and right hemiparesis. It is unclear if patient has an overlying mild dementia as comprehension was also affected by stroke. As patient has not had benefit with namenda and daughter feels she is not tolerating this well, I have advised her to stop namenda.    1. History of meningioma  - MRI Brain W WO Contrast in 3 months to reeval size  - Creatinine, serum; Future    2. History of stroke  - Ambulatory Referral to Speech Therapy  C/w  plavix and statin  Patient and daughter were made aware of the importance of good glucose, cholesterol and blood pressure control for stroke prevention.  Patient and daughter were educated on signs and symptoms of stroke and were advised to go to the ED immediately if patient experiences any.    3. Aphasia  - Ambulatory Referral to Speech Therapy            Patient and daughter were advised to notify me for worsening symptoms. I will see patient back in 3 months after MRI or sooner if necessary.     Thank you for this consultation.    Belgica Lucas DO  [facility]  [address] [facility] [address]  [place] [id number] [phone]

## 2019-11-20 ENCOUNTER — DOCUMENTATION ONLY (OUTPATIENT)
Dept: NEUROLOGY | Facility: CLINIC | Age: 74
End: 2019-11-20

## 2019-11-20 NOTE — PROGRESS NOTES
From: Jerson Price MD  Sent: 11/19/2019   4:28 PM CST  To: Belgica Lucas, DO    Just wanted to check with you on our mutual patient Ms Emilia Melgoza.  She is due for colonoscopy - given her prior h/o stroke, can we hold her Plavix for 5 days prior to the procedure?    Thanks for your help.    Jerson Price MD        If she can be switched to aspirin 81mg daily instead for those 5 days, that would be ideal.  However, if not, I think it is okay to hold for the procedure as long as you explain to her and her family that there is a small risk of stroke when holding the plavix and they are in agreement.    Thanks,  Belgica Lucas DO

## 2019-12-06 RX ORDER — CIPROFLOXACIN 250 MG/1
250 TABLET, FILM COATED ORAL EVERY 12 HOURS
Refills: 0 | COMMUNITY
Start: 2019-09-21 | End: 2019-12-10 | Stop reason: ALTCHOICE

## 2019-12-10 ENCOUNTER — OFFICE VISIT (OUTPATIENT)
Dept: FAMILY MEDICINE | Facility: CLINIC | Age: 74
End: 2019-12-10
Payer: MEDICARE

## 2019-12-10 ENCOUNTER — PATIENT MESSAGE (OUTPATIENT)
Dept: FAMILY MEDICINE | Facility: CLINIC | Age: 74
End: 2019-12-10

## 2019-12-10 ENCOUNTER — TELEPHONE (OUTPATIENT)
Dept: FAMILY MEDICINE | Facility: CLINIC | Age: 74
End: 2019-12-10

## 2019-12-10 VITALS
WEIGHT: 149.69 LBS | BODY MASS INDEX: 24.06 KG/M2 | HEIGHT: 66 IN | RESPIRATION RATE: 17 BRPM | SYSTOLIC BLOOD PRESSURE: 120 MMHG | TEMPERATURE: 99 F | OXYGEN SATURATION: 96 % | HEART RATE: 113 BPM | DIASTOLIC BLOOD PRESSURE: 60 MMHG

## 2019-12-10 DIAGNOSIS — I10 ESSENTIAL HYPERTENSION: ICD-10-CM

## 2019-12-10 DIAGNOSIS — E78.5 HYPERLIPIDEMIA, UNSPECIFIED HYPERLIPIDEMIA TYPE: ICD-10-CM

## 2019-12-10 DIAGNOSIS — Z78.0 ASYMPTOMATIC MENOPAUSAL STATE: ICD-10-CM

## 2019-12-10 DIAGNOSIS — R53.83 LETHARGY: ICD-10-CM

## 2019-12-10 DIAGNOSIS — M25.511 RIGHT SHOULDER PAIN, UNSPECIFIED CHRONICITY: ICD-10-CM

## 2019-12-10 DIAGNOSIS — R47.01 BROCA'S APHASIA: ICD-10-CM

## 2019-12-10 DIAGNOSIS — I69.351 HEMIPARESIS AFFECTING RIGHT SIDE AS LATE EFFECT OF CEREBROVASCULAR ACCIDENT: ICD-10-CM

## 2019-12-10 DIAGNOSIS — R73.03 PRE-DIABETES: ICD-10-CM

## 2019-12-10 DIAGNOSIS — I69.920 APHASIA, LATE EFFECT OF CEREBROVASCULAR DISEASE: ICD-10-CM

## 2019-12-10 DIAGNOSIS — N30.00 ACUTE CYSTITIS WITHOUT HEMATURIA: Primary | ICD-10-CM

## 2019-12-10 DIAGNOSIS — E55.9 VITAMIN D DEFICIENCY: ICD-10-CM

## 2019-12-10 DIAGNOSIS — Z00.00 HEALTHCARE MAINTENANCE: ICD-10-CM

## 2019-12-10 DIAGNOSIS — R00.0 TACHYCARDIA: ICD-10-CM

## 2019-12-10 DIAGNOSIS — M51.36 DDD (DEGENERATIVE DISC DISEASE), LUMBAR: ICD-10-CM

## 2019-12-10 DIAGNOSIS — Z74.09 MOBILITY IMPAIRED: ICD-10-CM

## 2019-12-10 DIAGNOSIS — G31.84 MCI (MILD COGNITIVE IMPAIRMENT): ICD-10-CM

## 2019-12-10 LAB
BILIRUB SERPL-MCNC: NORMAL MG/DL
BLOOD URINE, POC: NORMAL
COLOR, POC UA: YELLOW
GLUCOSE UR QL STRIP: NORMAL
KETONES UR QL STRIP: NORMAL
LEUKOCYTE ESTERASE URINE, POC: NORMAL
NITRITE, POC UA: NEGATIVE
PH, POC UA: 5
PROTEIN, POC: NEGATIVE
SPECIFIC GRAVITY, POC UA: 1.03
UROBILINOGEN, POC UA: NORMAL

## 2019-12-10 PROCEDURE — 1125F PR PAIN SEVERITY QUANTIFIED, PAIN PRESENT: ICD-10-PCS | Mod: ,,, | Performed by: INTERNAL MEDICINE

## 2019-12-10 PROCEDURE — 99214 PR OFFICE/OUTPT VISIT, EST, LEVL IV, 30-39 MIN: ICD-10-PCS | Mod: S$PBB,,, | Performed by: INTERNAL MEDICINE

## 2019-12-10 PROCEDURE — 87086 URINE CULTURE/COLONY COUNT: CPT

## 2019-12-10 PROCEDURE — 99214 OFFICE O/P EST MOD 30 MIN: CPT | Mod: PBBFAC,PN | Performed by: INTERNAL MEDICINE

## 2019-12-10 PROCEDURE — 99999 PR PBB SHADOW E&M-EST. PATIENT-LVL IV: CPT | Mod: PBBFAC,,, | Performed by: INTERNAL MEDICINE

## 2019-12-10 PROCEDURE — 1159F MED LIST DOCD IN RCRD: CPT | Mod: ,,, | Performed by: INTERNAL MEDICINE

## 2019-12-10 PROCEDURE — 99214 OFFICE O/P EST MOD 30 MIN: CPT | Mod: S$PBB,,, | Performed by: INTERNAL MEDICINE

## 2019-12-10 PROCEDURE — 1159F PR MEDICATION LIST DOCUMENTED IN MEDICAL RECORD: ICD-10-PCS | Mod: ,,, | Performed by: INTERNAL MEDICINE

## 2019-12-10 PROCEDURE — 87088 URINE BACTERIA CULTURE: CPT

## 2019-12-10 PROCEDURE — 1125F AMNT PAIN NOTED PAIN PRSNT: CPT | Mod: ,,, | Performed by: INTERNAL MEDICINE

## 2019-12-10 PROCEDURE — 99999 PR PBB SHADOW E&M-EST. PATIENT-LVL IV: ICD-10-PCS | Mod: PBBFAC,,, | Performed by: INTERNAL MEDICINE

## 2019-12-10 PROCEDURE — 87077 CULTURE AEROBIC IDENTIFY: CPT

## 2019-12-10 PROCEDURE — 81001 URINALYSIS AUTO W/SCOPE: CPT | Mod: PBBFAC,PN | Performed by: INTERNAL MEDICINE

## 2019-12-10 PROCEDURE — 87186 SC STD MICRODIL/AGAR DIL: CPT

## 2019-12-10 RX ORDER — NITROFURANTOIN 25; 75 MG/1; MG/1
100 CAPSULE ORAL 2 TIMES DAILY
Qty: 10 CAPSULE | Refills: 0 | Status: SHIPPED | OUTPATIENT
Start: 2019-12-10 | End: 2019-12-15

## 2019-12-10 NOTE — TELEPHONE ENCOUNTER
I spoke to the pt daughter and advised of message below. She voices understanding and will  prescription.

## 2019-12-10 NOTE — PROGRESS NOTES
HISTORY OF PRESENT ILLNESS:  Emilia Melgoza is a 74 y.o. female who presents to the clinic today for Follow-up (3 month); Fall; and Sinus congestion    LOV 9/10/19.  Seen with daughter today.     with her in the day time.  Daughter notes in last 2-3 weeks, she does not want to get out of the bed, requiring more help with basic acitvities like getting up from the toilet.  Note that her right arm hurts.  She had a fall last night and hit her right side of body and head on a coffee table.  No LOC during episode.    Taking Zyrtec for sinus congestion.    PAST MEDICAL HISTORY:  Past Medical History:   Diagnosis Date    Allergy     Asthma     DDD (degenerative disc disease), lumbar     Chronic LBP and intermittent RLE radicular pain x 10 yrs    Hypertension     Overactive bladder     Senile cataract, unspecified - Both Eyes 2013    Stroke        PAST SURGICAL HISTORY:  Past Surgical History:   Procedure Laterality Date    BREAST BIOPSY Left     CATARACT EXTRACTION W/  INTRAOCULAR LENS IMPLANT Right 10/13/2014    Dr Crystal    CATARACT EXTRACTION W/  INTRAOCULAR LENS IMPLANT Left 11/10/2014    Dr Crystal    HEMORRHOID SURGERY      HYSTERECTOMY      INCONTINENCE SURGERY      OOPHORECTOMY      Right Rotator Cuff Repair         SOCIAL HISTORY:  Social History     Socioeconomic History    Marital status:      Spouse name: Not on file    Number of children: Not on file    Years of education: Not on file    Highest education level: Not on file   Occupational History    Not on file   Social Needs    Financial resource strain: Not on file    Food insecurity:     Worry: Not on file     Inability: Not on file    Transportation needs:     Medical: Not on file     Non-medical: Not on file   Tobacco Use    Smoking status: Former Smoker     Last attempt to quit: 1992     Years since quittin.9    Smokeless tobacco: Never Used   Substance and Sexual Activity    Alcohol use: No      Alcohol/week: 0.0 standard drinks    Drug use: No    Sexual activity: Not Currently   Lifestyle    Physical activity:     Days per week: Not on file     Minutes per session: Not on file    Stress: Not on file   Relationships    Social connections:     Talks on phone: Not on file     Gets together: Not on file     Attends [employer] service: Not on file     Active member of club or organization: Not on file     Attends meetings of clubs or organizations: Not on file     Relationship status: Not on file   Other Topics Concern    Not on file   Social History Narrative    Retired       FAMILY HISTORY:  Family History   Problem Relation Age of Onset    Hypertension Mother     Breast cancer Mother     Dementia Mother     Colon cancer Father     Hypertension Father     Rhinitis Daughter     Rhinitis Daughter     No Known Problems Sister     No Known Problems Brother     No Known Problems Maternal Aunt     No Known Problems Maternal Uncle     No Known Problems Paternal Aunt     No Known Problems Paternal Uncle     No Known Problems Maternal Grandmother     No Known Problems Maternal Grandfather     No Known Problems Paternal Grandmother     No Known Problems Paternal Grandfather     Amblyopia Neg Hx     Blindness Neg Hx     Cancer Neg Hx     Cataracts Neg Hx     Diabetes Neg Hx     Glaucoma Neg Hx     Macular degeneration Neg Hx     Retinal detachment Neg Hx     Strabismus Neg Hx     Stroke Neg Hx     Thyroid disease Neg Hx        ALLERGIES AND MEDICATIONS: updated and reviewed.  Review of patient's allergies indicates:   Allergen Reactions    Enoxaparin Other (See Comments) and Hives     Slurred speech per patient  Slurred speech per patient    Lisinopril Other (See Comments)     Cough    Penicillins Rash     Medication List with Changes/Refills   Current Medications    AMLODIPINE (NORVASC) 5 MG TABLET    TAKE ONE TABLET BY MOUTH TWICE DAILY    ATORVASTATIN (LIPITOR) 80 MG TABLET    Take  1 tablet (80 mg total) by mouth once daily.    AZELASTINE (ASTELIN) 137 MCG (0.1 %) NASAL SPRAY    1 spray (137 mcg total) by Nasal route 2 (two) times daily.    CEPHALEXIN (KEFLEX) 500 MG CAPSULE        CHOLECALCIFEROL, VITAMIN D3, 1,000 UNIT CAPSULE    Take 1 capsule (1,000 Units total) by mouth once daily.    CIPROFLOXACIN HCL (CIPRO) 250 MG TABLET    Take 250 mg by mouth every 12 (twelve) hours. FOR 3 DAYS    CLONIDINE (CATAPRES) 0.1 MG TABLET    TAKE 2 TABLETS BY MOUTH ONCE DAILY IN THE EVENING    CLOPIDOGREL (PLAVIX) 75 MG TABLET    Take 1 tablet (75 mg total) by mouth once daily.    ESCITALOPRAM OXALATE (LEXAPRO) 10 MG TABLET    Take 1 tablet (10 mg total) by mouth once daily.    FEXOFENADINE (ALLEGRA) 180 MG TABLET    Take 1 tablet (180 mg total) by mouth once daily.    FLUTICASONE PROPIONATE (FLONASE) 50 MCG/ACTUATION NASAL SPRAY    1 spray (50 mcg total) by Each Nostril route once daily.    GENERLAC 10 GRAM/15 ML SOLUTION        IBUPROFEN (ADVIL,MOTRIN) 800 MG TABLET        OXYBUTYNIN (DITROPAN) 5 MG TAB    Take 2 tablets (10 mg total) by mouth 2 (two) times daily.    POLYETHYLENE GLYCOL (GLYCOLAX) 17 GRAM PWPK    DISSOLVE 17 GRAMS OF POWDER (1 PACK) IN WATER & DRINK ONCE DAILY FOR 5 DAYS    POTASSIUM CHLORIDE SA (K-DUR,KLOR-CON) 20 MEQ TABLET    Take 1 tablet (20 mEq total) by mouth 2 (two) times daily.    TRAMADOL (ULTRAM) 50 MG TABLET    Take 1/2 tablet as needed for headache          CARE TEAM:  Patient Care Team:  Jerson Price MD as PCP - General (Internal Medicine)  MATILDE Ambriz as PCP - [facility] [place]  Abe Franco MA as Care Coordinator         REVIEW OF SYSTEMS:  Review of Systems   Constitutional: Positive for fatigue. Negative for chills and fever.   HENT: Positive for congestion and postnasal drip.    Eyes: Negative for photophobia and visual disturbance.   Respiratory: Negative for cough and shortness of breath.    Cardiovascular: Negative for chest pain and  palpitations.   Gastrointestinal: Negative for abdominal pain, nausea and vomiting.   Genitourinary: Negative for dysuria and frequency.   Musculoskeletal: Positive for gait problem. Arthralgias: right shoulder pain.   Neurological: Negative for syncope, light-headedness and headaches.   Psychiatric/Behavioral: Negative for dysphoric mood. The patient is not nervous/anxious.          PHYSICAL EXAM:   Vitals:    12/10/19 1327   BP: 120/60   Pulse: (!) 113   Resp: 17   Temp: 98.6 °F (37 °C)             Body mass index is 24.16 kg/m².     General appearance - alert, well appearing, and in no distress and normal appearing weight  Mental status - normal mood, behavior, speech, dress, motor activity, and thought processes  Eyes - sclera anicteric, left eye normal, right eye normal  Ears - bilateral TM's and external ear canals normal  Mouth - mucous membranes moist, pharynx normal without lesions  Chest - clear to auscultation, no wheezes, rales or rhonchi, symmetric air entry  Heart - normal rate and regular rhythm, no murmurs noted  Abdomen - soft, nontender, nondistended, no masses or organomegaly  no CVA tenderness; no ecchymoses to abdominal wall; noted of left flank increased fat distribution compared to right  Extremities - no pedal edema noted, intact peripheral pulses      ASSESSMENT AND PLAN:  1. Acute cystitis without hematuria  - nitrofurantoin, macrocrystal-monohydrate, (MACROBID) 100 MG capsule; Take 1 capsule (100 mg total) by mouth 2 (two) times daily. for 5 days  Dispense: 10 capsule; Refill: 0    2. Aphasia, late effect of cerebrovascular disease  - Ambulatory referral to Home Health    3. Hemiparesis affecting right side as late effect of cerebrovascular accident  - Ambulatory referral to Home Health    4. MCI (mild cognitive impairment)  - Ambulatory referral to Home Health    5. Broca's aphasia  - Ambulatory referral to Home Health    6. DDD (degenerative disc disease), lumbar  - Ambulatory referral  to Home Health    7. Hyperlipidemia, unspecified hyperlipidemia type  - Fecal Immunochemical Test (iFOBT); Future  - Hemoglobin A1c; Future  - Lipid panel; Future  - Comprehensive metabolic panel; Future  - DXA Bone Density Spine And Hip; Future    8. Essential hypertension  - Fecal Immunochemical Test (iFOBT); Future  - Hemoglobin A1c; Future  - Lipid panel; Future  - Comprehensive metabolic panel; Future  - DXA Bone Density Spine And Hip; Future    9. Pre-diabetes  - Fecal Immunochemical Test (iFOBT); Future  - Hemoglobin A1c; Future  - Lipid panel; Future  - Comprehensive metabolic panel; Future  - DXA Bone Density Spine And Hip; Future    10. Right shoulder pain, unspecified chronicity  - X-ray Shoulder 2 or More Views Right; Future    11. Lethargy  - POCT URINE DIPSTICK WITH MICROSCOPE, AUTOMATED  - Urine culture    12. Tachycardia  - EKG 12-lead    13. Mobility impaired  - Ambulatory referral to Home Health    14. Vitamin D deficiency  - Vitamin D; Future    15. Asymptomatic menopausal state   - DXA Bone Density Spine And Hip; Future    16. Healthcare maintenance  - Fecal Immunochemical Test (iFOBT); Future  - Hemoglobin A1c; Future  - Lipid panel; Future  - Comprehensive metabolic panel; Future  - DXA Bone Density Spine And Hip; Future           Follow up 3 months or sooner as needed.

## 2019-12-10 NOTE — TELEPHONE ENCOUNTER
----- Message from Jerson Price MD sent at 12/10/2019  3:10 PM CST -----  Please call patient or daughter to inform that evidence of white cells in urine - plan to treat for possible urinary tract infection with nitrofurantoin twice daily for 5 days.  Urine culture sent and prescription sent to pharmacy - start tonight.

## 2019-12-13 LAB — BACTERIA UR CULT: ABNORMAL

## 2019-12-16 PROBLEM — Z00.00 HEALTHCARE MAINTENANCE: Status: RESOLVED | Noted: 2019-09-10 | Resolved: 2019-12-16

## 2019-12-17 ENCOUNTER — HOSPITAL ENCOUNTER (INPATIENT)
Facility: HOSPITAL | Age: 74
LOS: 17 days | Discharge: NURSING, REHAB OR LONG-TERM CARE | DRG: 004 | End: 2020-01-04
Attending: EMERGENCY MEDICINE | Admitting: HOSPITALIST
Payer: MEDICARE

## 2019-12-17 ENCOUNTER — OFFICE VISIT (OUTPATIENT)
Dept: FAMILY MEDICINE | Facility: CLINIC | Age: 74
DRG: 004 | End: 2019-12-17
Payer: MEDICARE

## 2019-12-17 VITALS
HEART RATE: 64 BPM | DIASTOLIC BLOOD PRESSURE: 80 MMHG | RESPIRATION RATE: 17 BRPM | OXYGEN SATURATION: 98 % | BODY MASS INDEX: 24.05 KG/M2 | TEMPERATURE: 98 F | SYSTOLIC BLOOD PRESSURE: 140 MMHG | HEIGHT: 66 IN

## 2019-12-17 DIAGNOSIS — J96.01 ACUTE RESPIRATORY FAILURE WITH HYPOXIA: ICD-10-CM

## 2019-12-17 DIAGNOSIS — E86.0 DEHYDRATION: ICD-10-CM

## 2019-12-17 DIAGNOSIS — R13.10 DYSPHAGIA, UNSPECIFIED TYPE: Primary | ICD-10-CM

## 2019-12-17 DIAGNOSIS — R13.10 DYSPHAGIA: ICD-10-CM

## 2019-12-17 DIAGNOSIS — R57.9 SHOCK: ICD-10-CM

## 2019-12-17 DIAGNOSIS — I67.89 CEREBRAL MICROVASCULAR DISEASE: ICD-10-CM

## 2019-12-17 DIAGNOSIS — I63.539 ACUTE ISCHEMIC MULTIFOCAL POSTERIOR CIRCULATION STROKE, UNSPECIFIED LATERALITY: ICD-10-CM

## 2019-12-17 DIAGNOSIS — E87.0 HYPERNATREMIA: ICD-10-CM

## 2019-12-17 DIAGNOSIS — Z51.5 PALLIATIVE CARE ENCOUNTER: ICD-10-CM

## 2019-12-17 DIAGNOSIS — I63.9 STROKE: ICD-10-CM

## 2019-12-17 DIAGNOSIS — I69.920 APHASIA, LATE EFFECT OF CEREBROVASCULAR DISEASE: ICD-10-CM

## 2019-12-17 DIAGNOSIS — R79.89 ELEVATED TROPONIN: ICD-10-CM

## 2019-12-17 DIAGNOSIS — Z74.09 MOBILITY IMPAIRED: ICD-10-CM

## 2019-12-17 DIAGNOSIS — G93.40 ACUTE ENCEPHALOPATHY: ICD-10-CM

## 2019-12-17 DIAGNOSIS — R41.82 ALTERED MENTAL STATUS, UNSPECIFIED ALTERED MENTAL STATUS TYPE: Primary | ICD-10-CM

## 2019-12-17 DIAGNOSIS — E87.6 HYPOKALEMIA: ICD-10-CM

## 2019-12-17 PROBLEM — R47.01 BROCA'S APHASIA: Chronic | Status: ACTIVE | Noted: 2018-08-02

## 2019-12-17 LAB
ALBUMIN SERPL BCP-MCNC: 3.9 G/DL (ref 3.5–5.2)
ALP SERPL-CCNC: 75 U/L (ref 55–135)
ALT SERPL W/O P-5'-P-CCNC: 47 U/L (ref 10–44)
ANION GAP SERPL CALC-SCNC: 15 MMOL/L (ref 8–16)
AST SERPL-CCNC: 91 U/L (ref 10–40)
BACTERIA #/AREA URNS HPF: ABNORMAL /HPF
BASOPHILS # BLD AUTO: 0.01 K/UL (ref 0–0.2)
BASOPHILS NFR BLD: 0.1 % (ref 0–1.9)
BILIRUB SERPL-MCNC: 1.1 MG/DL (ref 0.1–1)
BILIRUB UR QL STRIP: NEGATIVE
BNP SERPL-MCNC: 17 PG/ML (ref 0–99)
BUN SERPL-MCNC: 21 MG/DL (ref 8–23)
CALCIUM SERPL-MCNC: 10.4 MG/DL (ref 8.7–10.5)
CHLORIDE SERPL-SCNC: 106 MMOL/L (ref 95–110)
CK SERPL-CCNC: 535 U/L (ref 20–180)
CLARITY UR: CLEAR
CO2 SERPL-SCNC: 27 MMOL/L (ref 23–29)
COLOR UR: YELLOW
CREAT SERPL-MCNC: 1.1 MG/DL (ref 0.5–1.4)
DIFFERENTIAL METHOD: ABNORMAL
EOSINOPHIL # BLD AUTO: 0 K/UL (ref 0–0.5)
EOSINOPHIL NFR BLD: 0 % (ref 0–8)
ERYTHROCYTE [DISTWIDTH] IN BLOOD BY AUTOMATED COUNT: 19 % (ref 11.5–14.5)
EST. GFR  (AFRICAN AMERICAN): 57 ML/MIN/1.73 M^2
EST. GFR  (NON AFRICAN AMERICAN): 49 ML/MIN/1.73 M^2
GLUCOSE SERPL-MCNC: 119 MG/DL (ref 70–110)
GLUCOSE UR QL STRIP: NEGATIVE
HCT VFR BLD AUTO: 34.3 % (ref 37–48.5)
HGB BLD-MCNC: 10.9 G/DL (ref 12–16)
HGB UR QL STRIP: ABNORMAL
HYALINE CASTS #/AREA URNS LPF: 4 /LPF
IMM GRANULOCYTES # BLD AUTO: 0.06 K/UL (ref 0–0.04)
IMM GRANULOCYTES NFR BLD AUTO: 0.6 % (ref 0–0.5)
KETONES UR QL STRIP: ABNORMAL
LEUKOCYTE ESTERASE UR QL STRIP: NEGATIVE
LYMPHOCYTES # BLD AUTO: 0.6 K/UL (ref 1–4.8)
LYMPHOCYTES NFR BLD: 5.6 % (ref 18–48)
MCH RBC QN AUTO: 28.2 PG (ref 27–31)
MCHC RBC AUTO-ENTMCNC: 31.8 G/DL (ref 32–36)
MCV RBC AUTO: 89 FL (ref 82–98)
MICROSCOPIC COMMENT: ABNORMAL
MONOCYTES # BLD AUTO: 0.6 K/UL (ref 0.3–1)
MONOCYTES NFR BLD: 5.3 % (ref 4–15)
NEUTROPHILS # BLD AUTO: 9.2 K/UL (ref 1.8–7.7)
NEUTROPHILS NFR BLD: 88.4 % (ref 38–73)
NITRITE UR QL STRIP: NEGATIVE
NRBC BLD-RTO: 0 /100 WBC
PH UR STRIP: 5 [PH] (ref 5–8)
PLATELET # BLD AUTO: 272 K/UL (ref 150–350)
PMV BLD AUTO: 12.1 FL (ref 9.2–12.9)
POTASSIUM SERPL-SCNC: 3 MMOL/L (ref 3.5–5.1)
PROT SERPL-MCNC: 8.6 G/DL (ref 6–8.4)
PROT UR QL STRIP: NEGATIVE
RBC # BLD AUTO: 3.86 M/UL (ref 4–5.4)
RBC #/AREA URNS HPF: 3 /HPF (ref 0–4)
SODIUM SERPL-SCNC: 148 MMOL/L (ref 136–145)
SP GR UR STRIP: 1.01 (ref 1–1.03)
TROPONIN I SERPL DL<=0.01 NG/ML-MCNC: 0.06 NG/ML (ref 0–0.03)
URN SPEC COLLECT METH UR: ABNORMAL
UROBILINOGEN UR STRIP-ACNC: NEGATIVE EU/DL
WBC # BLD AUTO: 10.36 K/UL (ref 3.9–12.7)
WBC #/AREA URNS HPF: 12 /HPF (ref 0–5)
WBC CASTS #/AREA URNS LPF: 2 /LPF

## 2019-12-17 PROCEDURE — 25500020 PHARM REV CODE 255: Performed by: EMERGENCY MEDICINE

## 2019-12-17 PROCEDURE — G0378 HOSPITAL OBSERVATION PER HR: HCPCS

## 2019-12-17 PROCEDURE — P9612 CATHETERIZE FOR URINE SPEC: HCPCS

## 2019-12-17 PROCEDURE — 96361 HYDRATE IV INFUSION ADD-ON: CPT

## 2019-12-17 PROCEDURE — 87077 CULTURE AEROBIC IDENTIFY: CPT

## 2019-12-17 PROCEDURE — 25000003 PHARM REV CODE 250: Performed by: EMERGENCY MEDICINE

## 2019-12-17 PROCEDURE — 87086 URINE CULTURE/COLONY COUNT: CPT

## 2019-12-17 PROCEDURE — 63600175 PHARM REV CODE 636 W HCPCS: Performed by: EMERGENCY MEDICINE

## 2019-12-17 PROCEDURE — 99214 OFFICE O/P EST MOD 30 MIN: CPT | Mod: PBBFAC,25,PN | Performed by: INTERNAL MEDICINE

## 2019-12-17 PROCEDURE — 87088 URINE BACTERIA CULTURE: CPT

## 2019-12-17 PROCEDURE — 99214 OFFICE O/P EST MOD 30 MIN: CPT | Mod: S$PBB,,, | Performed by: INTERNAL MEDICINE

## 2019-12-17 PROCEDURE — 82550 ASSAY OF CK (CPK): CPT

## 2019-12-17 PROCEDURE — 99214 PR OFFICE/OUTPT VISIT, EST, LEVL IV, 30-39 MIN: ICD-10-PCS | Mod: S$PBB,,, | Performed by: INTERNAL MEDICINE

## 2019-12-17 PROCEDURE — 87186 SC STD MICRODIL/AGAR DIL: CPT

## 2019-12-17 PROCEDURE — 81000 URINALYSIS NONAUTO W/SCOPE: CPT

## 2019-12-17 PROCEDURE — 99999 PR PBB SHADOW E&M-EST. PATIENT-LVL IV: ICD-10-PCS | Mod: PBBFAC,,, | Performed by: INTERNAL MEDICINE

## 2019-12-17 PROCEDURE — 96375 TX/PRO/DX INJ NEW DRUG ADDON: CPT

## 2019-12-17 PROCEDURE — 85025 COMPLETE CBC W/AUTO DIFF WBC: CPT

## 2019-12-17 PROCEDURE — 80053 COMPREHEN METABOLIC PANEL: CPT

## 2019-12-17 PROCEDURE — 83880 ASSAY OF NATRIURETIC PEPTIDE: CPT

## 2019-12-17 PROCEDURE — 96365 THER/PROPH/DIAG IV INF INIT: CPT

## 2019-12-17 PROCEDURE — 93010 ELECTROCARDIOGRAM REPORT: CPT | Mod: ,,, | Performed by: INTERNAL MEDICINE

## 2019-12-17 PROCEDURE — 99999 PR PBB SHADOW E&M-EST. PATIENT-LVL IV: CPT | Mod: PBBFAC,,, | Performed by: INTERNAL MEDICINE

## 2019-12-17 PROCEDURE — 99285 EMERGENCY DEPT VISIT HI MDM: CPT | Mod: 25,27

## 2019-12-17 PROCEDURE — 93010 EKG 12-LEAD: ICD-10-PCS | Mod: ,,, | Performed by: INTERNAL MEDICINE

## 2019-12-17 PROCEDURE — 96367 TX/PROPH/DG ADDL SEQ IV INF: CPT

## 2019-12-17 PROCEDURE — 84484 ASSAY OF TROPONIN QUANT: CPT

## 2019-12-17 PROCEDURE — 93005 ELECTROCARDIOGRAM TRACING: CPT

## 2019-12-17 RX ORDER — ESCITALOPRAM OXALATE 10 MG/1
10 TABLET ORAL DAILY
Status: DISCONTINUED | OUTPATIENT
Start: 2019-12-18 | End: 2019-12-26

## 2019-12-17 RX ORDER — HALOPERIDOL 5 MG/ML
2.5 INJECTION INTRAMUSCULAR
Status: COMPLETED | OUTPATIENT
Start: 2019-12-17 | End: 2019-12-17

## 2019-12-17 RX ORDER — HALOPERIDOL 5 MG/ML
1 INJECTION INTRAMUSCULAR EVERY 6 HOURS PRN
Status: COMPLETED | OUTPATIENT
Start: 2019-12-17 | End: 2019-12-21

## 2019-12-17 RX ORDER — SODIUM CHLORIDE 0.9 % (FLUSH) 0.9 %
10 SYRINGE (ML) INJECTION
Status: DISCONTINUED | OUTPATIENT
Start: 2019-12-17 | End: 2020-01-04 | Stop reason: HOSPADM

## 2019-12-17 RX ORDER — MAGNESIUM SULFATE 1 G/100ML
1 INJECTION INTRAVENOUS
Status: COMPLETED | OUTPATIENT
Start: 2019-12-17 | End: 2019-12-17

## 2019-12-17 RX ORDER — DEXTROSE MONOHYDRATE, SODIUM CHLORIDE, AND POTASSIUM CHLORIDE 50; 1.49; 4.5 G/1000ML; G/1000ML; G/1000ML
INJECTION, SOLUTION INTRAVENOUS CONTINUOUS
Status: DISCONTINUED | OUTPATIENT
Start: 2019-12-17 | End: 2019-12-20

## 2019-12-17 RX ORDER — CLONIDINE 0.1 MG/24H
1 PATCH, EXTENDED RELEASE TRANSDERMAL
Status: DISCONTINUED | OUTPATIENT
Start: 2019-12-18 | End: 2019-12-18

## 2019-12-17 RX ORDER — MEMANTINE HYDROCHLORIDE 10 MG/1
10 TABLET ORAL DAILY
Refills: 1 | Status: ON HOLD | COMMUNITY
Start: 2019-12-10 | End: 2020-01-04 | Stop reason: HOSPADM

## 2019-12-17 RX ORDER — POTASSIUM CHLORIDE 7.45 MG/ML
10 INJECTION INTRAVENOUS ONCE
Status: COMPLETED | OUTPATIENT
Start: 2019-12-17 | End: 2019-12-17

## 2019-12-17 RX ORDER — MEMANTINE HYDROCHLORIDE 5 MG/1
10 TABLET ORAL DAILY
Status: DISCONTINUED | OUTPATIENT
Start: 2019-12-18 | End: 2019-12-26

## 2019-12-17 RX ORDER — LABETALOL HYDROCHLORIDE 5 MG/ML
10 INJECTION, SOLUTION INTRAVENOUS
Status: COMPLETED | OUTPATIENT
Start: 2019-12-17 | End: 2019-12-17

## 2019-12-17 RX ORDER — CLINDAMYCIN HYDROCHLORIDE 150 MG/1
CAPSULE ORAL
Refills: 0 | Status: ON HOLD | COMMUNITY
Start: 2019-12-12 | End: 2020-01-04 | Stop reason: HOSPADM

## 2019-12-17 RX ORDER — SODIUM CHLORIDE 9 MG/ML
1000 INJECTION, SOLUTION INTRAVENOUS
Status: COMPLETED | OUTPATIENT
Start: 2019-12-17 | End: 2019-12-18

## 2019-12-17 RX ADMIN — HALOPERIDOL LACTATE 2.5 MG: 5 INJECTION, SOLUTION INTRAMUSCULAR at 07:12

## 2019-12-17 RX ADMIN — SODIUM CHLORIDE 1000 ML: 0.9 INJECTION, SOLUTION INTRAVENOUS at 10:12

## 2019-12-17 RX ADMIN — POTASSIUM CHLORIDE 10 MEQ: 7.46 INJECTION, SOLUTION INTRAVENOUS at 05:12

## 2019-12-17 RX ADMIN — SODIUM CHLORIDE 1000 ML: 0.9 INJECTION, SOLUTION INTRAVENOUS at 05:12

## 2019-12-17 RX ADMIN — LABETALOL HYDROCHLORIDE 10 MG: 5 INJECTION INTRAVENOUS at 05:12

## 2019-12-17 RX ADMIN — CEFTRIAXONE 1 G: 1 INJECTION, SOLUTION INTRAVENOUS at 08:12

## 2019-12-17 RX ADMIN — SODIUM CHLORIDE 1000 ML: 0.9 INJECTION, SOLUTION INTRAVENOUS at 04:12

## 2019-12-17 RX ADMIN — IOHEXOL 70 ML: 350 INJECTION, SOLUTION INTRAVENOUS at 10:12

## 2019-12-17 RX ADMIN — MAGNESIUM SULFATE 1 G: 1 INJECTION INTRAVENOUS at 07:12

## 2019-12-17 NOTE — ED PROVIDER NOTES
Encounter Date: 12/17/2019  SORT:   75 y/o female with history of CVA and aphasia brought by daughter for evaluation of 1 week history of decreased PO intake. Denies vomiting. Reports the pt is holding saliva currently and seems as if she cannot get anything down. Was treated with Abx for UTI which she is not currently taking and she is not taking her other medications either. SIMEON Merida PA-C   SCRIBE #1 NOTE: I, Susan Guevara, am scribing for, and in the presence of,  Enrrique Faulkner MD. I have scribed the following portions of the note - Other sections scribed: HPI,ROS,PE.       History     Chief Complaint   Patient presents with    dx utit, not eating, drinking     pt dx with uti from pcp, daughter states pt not eating, drinking, not taking abx; was seen at pcp today and send here     CC: Decreased po intake     HPI:  74 y.o. female with a PMHx of CVA and aphasia presenting with daughter for evaluation of decreased PO intake for one week. Daughter reports pt was diagnosed with UTI one week ago by primary care physician and was treated with antibiotics. Daughter notes pt refuses to take antibiotics or any other medications. Daughter reports associated tremors today. Daughter denies fever. Daughter notes pt will fall every time she gets off bed. Daughter denies prior history of similar symptoms.     The history is provided by a relative. History limited by: patient is non verbal  No  was used.     Review of patient's allergies indicates:   Allergen Reactions    Enoxaparin Other (See Comments) and Hives     Slurred speech per patient  Slurred speech per patient    Lisinopril Other (See Comments)     Cough    Penicillins Rash     Past Medical History:   Diagnosis Date    Allergy     Asthma     DDD (degenerative disc disease), lumbar     Chronic LBP and intermittent RLE radicular pain x 10 yrs    Hypertension     Overactive bladder     Senile cataract, unspecified - Both Eyes 6/11/2013     Stroke      Past Surgical History:   Procedure Laterality Date    BREAST BIOPSY Left     CATARACT EXTRACTION W/  INTRAOCULAR LENS IMPLANT Right 10/13/2014    Dr Crystal    CATARACT EXTRACTION W/  INTRAOCULAR LENS IMPLANT Left 11/10/2014    Dr Crystal    HEMORRHOID SURGERY      HYSTERECTOMY      INCONTINENCE SURGERY      OOPHORECTOMY      Right Rotator Cuff Repair       Family History   Problem Relation Age of Onset    Hypertension Mother     Breast cancer Mother     Dementia Mother     Colon cancer Father     Hypertension Father     Rhinitis Daughter     Rhinitis Daughter     No Known Problems Sister     No Known Problems Brother     No Known Problems Maternal Aunt     No Known Problems Maternal Uncle     No Known Problems Paternal Aunt     No Known Problems Paternal Uncle     No Known Problems Maternal Grandmother     No Known Problems Maternal Grandfather     No Known Problems Paternal Grandmother     No Known Problems Paternal Grandfather     Amblyopia Neg Hx     Blindness Neg Hx     Cancer Neg Hx     Cataracts Neg Hx     Diabetes Neg Hx     Glaucoma Neg Hx     Macular degeneration Neg Hx     Retinal detachment Neg Hx     Strabismus Neg Hx     Stroke Neg Hx     Thyroid disease Neg Hx      Social History     Tobacco Use    Smoking status: Former Smoker     Last attempt to quit: 1992     Years since quittin.9    Smokeless tobacco: Never Used   Substance Use Topics    Alcohol use: No     Alcohol/week: 0.0 standard drinks    Drug use: No     Review of Systems   Unable to perform ROS: Patient nonverbal   Constitutional: Positive for activity change and appetite change. Negative for fever.   Neurological: Positive for tremors.       Physical Exam     Initial Vitals   BP Pulse Resp Temp SpO2   19 1509 19 1509 19 1509 19 1802 19 1509   (!) 145/77 (!) 117 20 98.1 °F (36.7 °C) 100 %      MAP       --                Physical Exam    Vitals  reviewed.  Constitutional: She appears well-developed. She appears ill. She appears distressed.   Ill apparent.    HENT:   Head: Normocephalic and atraumatic.   Right Ear: External ear normal.   Left Ear: External ear normal.   Tacky mucous membranes.   Eyes: EOM are normal. Pupils are equal, round, and reactive to light.   Neck: Normal range of motion. Neck supple.   Cardiovascular: Regular rhythm, normal heart sounds and intact distal pulses.   Pulmonary/Chest: Breath sounds normal. No respiratory distress. She has no wheezes. She has no rhonchi. She has no rales. She exhibits no tenderness.   Abdominal: Soft. Bowel sounds are normal. There is no tenderness.   Musculoskeletal: Normal range of motion. She exhibits no edema or tenderness.   Neurological: She is alert.   Confused. Intermittently following commands.   Skin: Skin is warm.         ED Course   Procedures  Labs Reviewed   CBC W/ AUTO DIFFERENTIAL - Abnormal; Notable for the following components:       Result Value    RBC 3.86 (*)     Hemoglobin 10.9 (*)     Hematocrit 34.3 (*)     Mean Corpuscular Hemoglobin Conc 31.8 (*)     RDW 19.0 (*)     Immature Granulocytes 0.6 (*)     Gran # (ANC) 9.2 (*)     Immature Grans (Abs) 0.06 (*)     Lymph # 0.6 (*)     Gran% 88.4 (*)     Lymph% 5.6 (*)     All other components within normal limits   COMPREHENSIVE METABOLIC PANEL - Abnormal; Notable for the following components:    Sodium 148 (*)     Potassium 3.0 (*)     Glucose 119 (*)     Total Protein 8.6 (*)     Total Bilirubin 1.1 (*)     AST 91 (*)     ALT 47 (*)     eGFR if  57 (*)     eGFR if non  49 (*)     All other components within normal limits   TROPONIN I - Abnormal; Notable for the following components:    Troponin I 0.059 (*)     All other components within normal limits   CK - Abnormal; Notable for the following components:     (*)     All other components within normal limits   URINALYSIS, REFLEX TO URINE CULTURE  - Abnormal; Notable for the following components:    Ketones, UA 1+ (*)     Occult Blood UA 1+ (*)     All other components within normal limits    Narrative:     Preferred Collection Type->Urine, Clean Catch   URINALYSIS MICROSCOPIC - Abnormal; Notable for the following components:    WBC, UA 12 (*)     Hyaline Casts, UA 4 (*)     Wbc Casts, Ua 2 (*)     All other components within normal limits    Narrative:     Preferred Collection Type->Urine, Clean Catch   CULTURE, URINE   B-TYPE NATRIURETIC PEPTIDE   TROPONIN I     EKG Readings: (Independently Interpreted)   Initial Reading: No STEMI. Previous EKG: Compared with most recent EKG Ectopy: No Ectopy. Clinical Impression: Sinus Tachycardia   No ischemic changes noted when compared to prior, nonspecific ST/T-wave changes noted.       Imaging Results          CT Soft Tissue Neck With Contrast (Final result)  Result time 12/17/19 23:01:50    Final result by Petey Tirado MD (12/17/19 23:01:50)                 Impression:      Asymmetric soft tissue swelling and edema involving the right anterior mandibular region which could reflect infectious process or cellulitis.  No organized focal fluid collections or rim enhancing abscess seen.      Electronically signed by: Petey Tirado MD  Date:    12/17/2019  Time:    23:01             Narrative:    EXAMINATION:  CT SOFT TISSUE NECK WITH CONTRAST    CLINICAL HISTORY:  Sore throat/stridor, epiglottis or tonsillitis suspected;recent dental extraction, trismus, concern for soft tissue infection;    TECHNIQUE:  Low dose axial images as well as sagittal and coronal reconstructions were performed from the skull base to the clavicles following the intravenous administration of 75 mL of Omnipaque 350.    COMPARISON:  None    FINDINGS:  Multiple missing and broken dentition are seen as well as multiple dental caries.  Asymmetric soft tissue swelling and edema is seen involving the right anterior mandibular region.  No organized  focal fluid collection or rim enhancing abscess seen.  No evidence of peritonsillar abscess or retropharyngeal abscess.  Epiglottis is normal in thickness.  No evidence of prevertebral soft tissue swelling.    Visualized portion of the brain shows no significant abnormalities.  Visualized paranasal sinuses and mastoid air cells are essentially clear.  No acute osseous abnormality identified.    Parotid glands and submandibular glands are normal and symmetric in size.  Several small hypodense subcentimeter thyroid nodules and calcifications are seen.  Airway is patent.  Visualized lung apices are clear.                               X-Ray Chest AP Portable (Final result)  Result time 12/17/19 17:26:03    Final result by Petey Tirado MD (12/17/19 17:26:03)                 Impression:      No acute cardiopulmonary process identified.      Electronically signed by: Petey Tirado MD  Date:    12/17/2019  Time:    17:26             Narrative:    EXAMINATION:  XR CHEST AP PORTABLE    CLINICAL HISTORY:  Dysphagia, unspecified    TECHNIQUE:  Single frontal view of the chest was performed.    COMPARISON:  12/15/2019.    FINDINGS:  Patient is rotated toward the right.  Cardiac silhouette is stable in size.  Lungs are symmetrically expanded.  No evidence of focal consolidative process, pneumothorax, or significant effusion.  No acute osseous abnormality identified.                               CT Head Without Contrast (Final result)  Result time 12/17/19 16:14:06    Final result by Soy Carmona MD (12/17/19 16:14:06)                 Impression:      No acute large vascular territory infarct or intracranial hemorrhage identified.    Grossly stable chronic findings as above.      Electronically signed by: Soy Carmona MD  Date:    12/17/2019  Time:    16:14             Narrative:    EXAMINATION:  CT HEAD WITHOUT CONTRAST    CLINICAL HISTORY:  Confusion/delirium, altered LOC, unexplained;    TECHNIQUE:  Low dose axial CT  images obtained throughout the head without intravenous contrast. Sagittal and coronal reconstructions were performed.    COMPARISON:  Head CT most recent 12/15/2019 and MRI brain 05/26/2017    FINDINGS:  Patient is rotated and tilted within the scanner.  Patient motion with beam hardening and streak artifact initially limited evaluation which resolved with repeat scanning.    Intracranial compartment:    Generalized cerebral volume loss.  Grossly stable distribution of patchy hypoattenuation of the bilateral subcortical and periventricular white matter consistent with chronic small vessel ischemic change.  Posterior left frontal lobe encephalomalacia extending to the corona radiata, anterior limb internal capsule and basal ganglia and thalamus on the left, suggesting left MCA territory remote infarct, unchanged.  No definite new focal areas of abnormal parenchymal attenuation.  Skull base atherosclerotic vascular calcifications noted.  No extra-axial blood or fluid collections.    No parenchymal mass, hemorrhage, edema or acute major vascular distribution infarct.    Skull/extracranial contents (limited evaluation): No fracture. Mastoid air cells and paranasal sinuses are essentially clear.                                 Medical Decision Making:   Clinical Tests:   Lab Tests: Ordered and Reviewed  Radiological Study: Ordered and Reviewed  Medical Tests: Reviewed and Ordered            Scribe Attestation:   Scribe #1: I performed the above scribed service and the documentation accurately describes the services I performed. I attest to the accuracy of the note.                 MDM:    74 y.o.female with PMHx as noted above presents with altered mental status, inability to tolerate PO, confusion. Physical exam remarkable for ill appearing, mildly distressed female, with tacky mucous membranes, abdomen soft, nt/nd, intermittenly following commands, not responding appropriately, alert, not oriented, moving BUE with  ease.  ED workup remarkable for CT head - unremarkable, CT neck showing soft tissue prominence of right anterior mandibular region reflecting infectious process/cellulitis, EKG - sinus tachycardia, no ischemic changes compared to prior, no STEMI, CMP: Na - 148, AST/ALT - 91/47, K - 3.0, t.bili - 1.1, trop - 0.059, CPK - 535, CBC: Hgb - 10.9, u/a - WBC - 12, no bacteria present, trop - 0.059, CXR - unremarkable.  Pt presentation consistent with altered mental status, after discussion with the observation team will add in CT neck with recent history of dental extraction and patient not able to tolerate oral antibiotics.  Patient with possible partially treated urinary tract infection, UC pending, will give 1 dose of IV Rocephin here.  Tachycardia resolving after 2 L IV fluids, started on maintenance IV fluids.  Given small dose of Haldol for agitation while in ED with improvement thereafter.  Discussed with patient and family further about observation in the hospital overnight with the potential for further workup for her reported dysphagia.  GI and speech as well as palliative care consult placed.  At this time given patient's history, physical exam, and ED workup do not suspect acute CVA/TIA, ICH, HTN emergency, ARF/JOEL, intoxication, MI/ACS, aortic dissection, or any further malignant cause.  Family and patient in understanding of plan.  Patient transferred to floor improved and stable.          Clinical Impression:       ICD-10-CM ICD-9-CM   1. Altered mental status, unspecified altered mental status type R41.82 780.97   2. Dysphagia R13.10 787.20   3. Hypernatremia E87.0 276.0   4. Hypokalemia E87.6 276.8   5. Elevated troponin R79.89 790.6   6. Dehydration E86.0 276.51             I, Enrrique Faulkner M.D., personally performed the services described in this documentation. All medical record entries made by the scribe were at my direction and in my presence. I have reviewed the chart and agree that the record  reflects my personal performance and is accurate and complete.                   Enrrique Faulkner MD  12/17/19 [id number]

## 2019-12-17 NOTE — ED TRIAGE NOTES
Pt presents to ED with daughter c/o AMS. Daughter states pt is unable to eat or drink and has not been taking her medications. Pt is nonverbal at this time, moaning, unable to answer questions. Generalized weakness. Recently dx with UTI and given abx, daughter states pt has not been taking abx. Per daughter, pt is verbal and ambulatory at baseline.

## 2019-12-17 NOTE — PROGRESS NOTES
HISTORY OF PRESENT ILLNESS:  Emilia Melgoza is a 74 y.o. female who presents to the clinic today for Follow-up and Dental Pain    Seen in ED 12/15/19 for difficulty swallowing.    Dental problem  Going to [facility] on [address] - daughter reports planning to have extractions.    Difficulty swallowing  Daughter reports trouble administering medicines.  She has not taken any meds since leaving ED on 12/15/19.  Has not taken her nitrofurantoin for UTI that was prescribed from last week's appt on 12/10/19.  Urine culture showed growth of E. Coli.  Daughter was given rx for liquid nitrofurantoin but has not been able to find pharmacy to fill it yet.  Daughter reports she is not eating or drinking much of anything since Thursday of last week.    PAST MEDICAL HISTORY:  Past Medical History:   Diagnosis Date    Allergy     Asthma     DDD (degenerative disc disease), lumbar     Chronic LBP and intermittent RLE radicular pain x 10 yrs    Hypertension     Overactive bladder     Senile cataract, unspecified - Both Eyes 6/11/2013    Stroke        PAST SURGICAL HISTORY:  Past Surgical History:   Procedure Laterality Date    BREAST BIOPSY Left     CATARACT EXTRACTION W/  INTRAOCULAR LENS IMPLANT Right 10/13/2014    Dr Crystal    CATARACT EXTRACTION W/  INTRAOCULAR LENS IMPLANT Left 11/10/2014    Dr Crystal    HEMORRHOID SURGERY      HYSTERECTOMY      INCONTINENCE SURGERY      OOPHORECTOMY      Right Rotator Cuff Repair         SOCIAL HISTORY:  Social History     Socioeconomic History    Marital status:      Spouse name: Not on file    Number of children: Not on file    Years of education: Not on file    Highest education level: Not on file   Occupational History    Not on file   Social Needs    Financial resource strain: Not on file    Food insecurity:     Worry: Not on file     Inability: Not on file    Transportation needs:     Medical: Not on file     Non-medical: Not on file   Tobacco  Use    Smoking status: Former Smoker     Last attempt to quit: 1992     Years since quittin.9    Smokeless tobacco: Never Used   Substance and Sexual Activity    Alcohol use: No     Alcohol/week: 0.0 standard drinks    Drug use: No    Sexual activity: Not Currently   Lifestyle    Physical activity:     Days per week: Not on file     Minutes per session: Not on file    Stress: Not on file   Relationships    Social connections:     Talks on phone: Not on file     Gets together: Not on file     Attends [employer] service: Not on file     Active member of club or organization: Not on file     Attends meetings of clubs or organizations: Not on file     Relationship status: Not on file   Other Topics Concern    Not on file   Social History Narrative    Retired       FAMILY HISTORY:  Family History   Problem Relation Age of Onset    Hypertension Mother     Breast cancer Mother     Dementia Mother     Colon cancer Father     Hypertension Father     Rhinitis Daughter     Rhinitis Daughter     No Known Problems Sister     No Known Problems Brother     No Known Problems Maternal Aunt     No Known Problems Maternal Uncle     No Known Problems Paternal Aunt     No Known Problems Paternal Uncle     No Known Problems Maternal Grandmother     No Known Problems Maternal Grandfather     No Known Problems Paternal Grandmother     No Known Problems Paternal Grandfather     Amblyopia Neg Hx     Blindness Neg Hx     Cancer Neg Hx     Cataracts Neg Hx     Diabetes Neg Hx     Glaucoma Neg Hx     Macular degeneration Neg Hx     Retinal detachment Neg Hx     Strabismus Neg Hx     Stroke Neg Hx     Thyroid disease Neg Hx        ALLERGIES AND MEDICATIONS: updated and reviewed.  Review of patient's allergies indicates:   Allergen Reactions    Enoxaparin Other (See Comments) and Hives     Slurred speech per patient  Slurred speech per patient    Lisinopril Other (See Comments)     Cough     Penicillins Rash     Medication List with Changes/Refills   Current Medications    AMLODIPINE (NORVASC) 1 MG/ML SUSP    Take 5 mLs (5 mg total) by mouth once daily.    ATORVASTATIN (LIPITOR) 80 MG TABLET    Take 1 tablet (80 mg total) by mouth once daily.    AZELASTINE (ASTELIN) 137 MCG (0.1 %) NASAL SPRAY    1 spray (137 mcg total) by Nasal route 2 (two) times daily.    CHOLECALCIFEROL, VITAMIN D3, 1,000 UNIT CAPSULE    Take 1 capsule (1,000 Units total) by mouth once daily.    CLONIDINE (CATAPRES) 0.1 MG TABLET    TAKE 2 TABLETS BY MOUTH ONCE DAILY IN THE EVENING    CLOPIDOGREL (PLAVIX) 75 MG TABLET    Take 1 tablet (75 mg total) by mouth once daily.    ESCITALOPRAM OXALATE (LEXAPRO) 5 MG/5 ML SOLN    Take 10 mLs (10 mg total) by mouth once daily.    FEXOFENADINE (ALLEGRA) 180 MG TABLET    Take 1 tablet (180 mg total) by mouth once daily.    FLUTICASONE PROPIONATE (FLONASE) 50 MCG/ACTUATION NASAL SPRAY    1 spray (50 mcg total) by Each Nostril route once daily.    GENERLAC 10 GRAM/15 ML SOLUTION        IBUPROFEN (ADVIL,MOTRIN) 800 MG TABLET        NITROFURANTOIN (FURADANTIN) 25 MG/5 ML SUSP    Take 20 mLs (100 mg total) by mouth every 12 (twelve) hours. for 5 days    OXYBUTYNIN (DITROPAN) 5 MG/5 ML SYRUP    Take 10 mLs (10 mg total) by mouth 2 (two) times daily.    POLYETHYLENE GLYCOL (GLYCOLAX) 17 GRAM PWPK    DISSOLVE 17 GRAMS OF POWDER (1 PACK) IN WATER & DRINK ONCE DAILY FOR 5 DAYS    TRAMADOL (ULTRAM) 50 MG TABLET    Take 1/2 tablet as needed for headache          CARE TEAM:  Patient Care Team:  Jerson Priec MD as PCP - General (Internal Medicine)  [facility]  Abe Franco MA as Care Coordinator         REVIEW OF SYSTEMS:  Review of Systems   Unable to perform ROS: Other   Patient with aphasia      PHYSICAL EXAM:   Vitals:    12/17/19 1404   BP: (!) 140/80   Pulse: 64   Resp: 17   Temp: 98.2 °F (36.8 °C)             Body mass index is 24.05 kg/m².     General  appearance - alert, well appearing, and in no distress and normal appearing weight  Mental status - normal mood, behavior, speech, dress, motor activity, and thought processes  Eyes - sclera anicteric, left eye normal, right eye normal  Mouth - dental hygiene poor  Chest - clear to auscultation, no wheezes, rales or rhonchi, symmetric air entry  Heart - normal rate and regular rhythm  Extremities - no pedal edema noted, intact peripheral pulses      ASSESSMENT AND PLAN:  1. Dysphagia, unspecified type  - Ambulatory referral to Speech Therapy  - Fl Modified Barium Swallow Speech; Future  - SLP video swallow; Future  - SUBSEQUENT HOME HEALTH ORDERS    2. Aphasia, late effect of cerebrovascular disease  - SUBSEQUENT HOME HEALTH ORDERS    3. Cerebral microvascular disease  - SUBSEQUENT HOME HEALTH ORDERS    4. Mobility impaired  - SUBSEQUENT HOME HEALTH ORDERS    5. Dehydration  - Comprehensive metabolic panel; Future     Concern for no PO intake for several days, including medications (antibiotics for UTI, blood pressure medication and Plavix).  Concern of alteration from patient's normal baseline despite h/o prior stroke - needs hospitalization to coordinate a work up as well as administer necessary medications.  Possibly UTI may be contributing to her current status vs dehydration or other metabolic abnormalities.  May need repeat MRI imaging of the brain and consider SLP evaluation while hospitalized.  Patient has Home Health arranged to be initiated beginning of January.    I spoke with ED staff and notified them the daughter will be bringing the patient for evaluation of admission to hospital for above care.      Follow up 2 weeks or sooner as needed.

## 2019-12-18 PROBLEM — K12.2 CELLULITIS OF MOUTH: Status: ACTIVE | Noted: 2019-12-18

## 2019-12-18 PROBLEM — K83.8 COMMON BILE DUCT DILATATION: Status: ACTIVE | Noted: 2019-12-18

## 2019-12-18 LAB
ALBUMIN SERPL BCP-MCNC: 3.4 G/DL (ref 3.5–5.2)
ALP SERPL-CCNC: 73 U/L (ref 55–135)
ALT SERPL W/O P-5'-P-CCNC: 41 U/L (ref 10–44)
AMMONIA PLAS-SCNC: 27 UMOL/L (ref 10–50)
ANION GAP SERPL CALC-SCNC: 12 MMOL/L (ref 8–16)
ANION GAP SERPL CALC-SCNC: 12 MMOL/L (ref 8–16)
AST SERPL-CCNC: 96 U/L (ref 10–40)
BASOPHILS # BLD AUTO: 0 K/UL (ref 0–0.2)
BASOPHILS NFR BLD: 0 % (ref 0–1.9)
BILIRUB SERPL-MCNC: 0.8 MG/DL (ref 0.1–1)
BUN SERPL-MCNC: 13 MG/DL (ref 8–23)
BUN SERPL-MCNC: 15 MG/DL (ref 8–23)
CALCIUM SERPL-MCNC: 9.2 MG/DL (ref 8.7–10.5)
CALCIUM SERPL-MCNC: 9.4 MG/DL (ref 8.7–10.5)
CHLORIDE SERPL-SCNC: 112 MMOL/L (ref 95–110)
CHLORIDE SERPL-SCNC: 112 MMOL/L (ref 95–110)
CO2 SERPL-SCNC: 25 MMOL/L (ref 23–29)
CO2 SERPL-SCNC: 25 MMOL/L (ref 23–29)
CREAT SERPL-MCNC: 0.8 MG/DL (ref 0.5–1.4)
CREAT SERPL-MCNC: 0.8 MG/DL (ref 0.5–1.4)
DIFFERENTIAL METHOD: ABNORMAL
EOSINOPHIL # BLD AUTO: 0 K/UL (ref 0–0.5)
EOSINOPHIL NFR BLD: 0.1 % (ref 0–8)
ERYTHROCYTE [DISTWIDTH] IN BLOOD BY AUTOMATED COUNT: 18.8 % (ref 11.5–14.5)
EST. GFR  (AFRICAN AMERICAN): >60 ML/MIN/1.73 M^2
EST. GFR  (AFRICAN AMERICAN): >60 ML/MIN/1.73 M^2
EST. GFR  (NON AFRICAN AMERICAN): >60 ML/MIN/1.73 M^2
EST. GFR  (NON AFRICAN AMERICAN): >60 ML/MIN/1.73 M^2
GLUCOSE SERPL-MCNC: 109 MG/DL (ref 70–110)
GLUCOSE SERPL-MCNC: 112 MG/DL (ref 70–110)
HCT VFR BLD AUTO: 31.5 % (ref 37–48.5)
HGB BLD-MCNC: 10 G/DL (ref 12–16)
IMM GRANULOCYTES # BLD AUTO: 0.06 K/UL (ref 0–0.04)
IMM GRANULOCYTES NFR BLD AUTO: 0.6 % (ref 0–0.5)
LYMPHOCYTES # BLD AUTO: 1.2 K/UL (ref 1–4.8)
LYMPHOCYTES NFR BLD: 11.8 % (ref 18–48)
MCH RBC QN AUTO: 28.1 PG (ref 27–31)
MCHC RBC AUTO-ENTMCNC: 31.7 G/DL (ref 32–36)
MCV RBC AUTO: 89 FL (ref 82–98)
MONOCYTES # BLD AUTO: 0.9 K/UL (ref 0.3–1)
MONOCYTES NFR BLD: 8.5 % (ref 4–15)
NEUTROPHILS # BLD AUTO: 8 K/UL (ref 1.8–7.7)
NEUTROPHILS NFR BLD: 79 % (ref 38–73)
NRBC BLD-RTO: 0 /100 WBC
PLATELET # BLD AUTO: 226 K/UL (ref 150–350)
PMV BLD AUTO: 12.2 FL (ref 9.2–12.9)
POCT GLUCOSE: 109 MG/DL (ref 70–110)
POTASSIUM SERPL-SCNC: 3.2 MMOL/L (ref 3.5–5.1)
POTASSIUM SERPL-SCNC: 3.3 MMOL/L (ref 3.5–5.1)
PROT SERPL-MCNC: 7.6 G/DL (ref 6–8.4)
RBC # BLD AUTO: 3.56 M/UL (ref 4–5.4)
SODIUM SERPL-SCNC: 149 MMOL/L (ref 136–145)
SODIUM SERPL-SCNC: 149 MMOL/L (ref 136–145)
TROPONIN I SERPL DL<=0.01 NG/ML-MCNC: 0.08 NG/ML (ref 0–0.03)
TROPONIN I SERPL DL<=0.01 NG/ML-MCNC: 0.09 NG/ML (ref 0–0.03)
WBC # BLD AUTO: 10.16 K/UL (ref 3.9–12.7)

## 2019-12-18 PROCEDURE — 82140 ASSAY OF AMMONIA: CPT

## 2019-12-18 PROCEDURE — 99222 1ST HOSP IP/OBS MODERATE 55: CPT | Mod: ,,, | Performed by: NURSE PRACTITIONER

## 2019-12-18 PROCEDURE — 25500020 PHARM REV CODE 255: Performed by: HOSPITALIST

## 2019-12-18 PROCEDURE — 84484 ASSAY OF TROPONIN QUANT: CPT

## 2019-12-18 PROCEDURE — 96366 THER/PROPH/DIAG IV INF ADDON: CPT

## 2019-12-18 PROCEDURE — 92610 EVALUATE SWALLOWING FUNCTION: CPT

## 2019-12-18 PROCEDURE — 80053 COMPREHEN METABOLIC PANEL: CPT

## 2019-12-18 PROCEDURE — 36415 COLL VENOUS BLD VENIPUNCTURE: CPT

## 2019-12-18 PROCEDURE — 11000001 HC ACUTE MED/SURG PRIVATE ROOM

## 2019-12-18 PROCEDURE — 99222 PR INITIAL HOSPITAL CARE,LEVL II: ICD-10-PCS | Mod: ,,, | Performed by: NURSE PRACTITIONER

## 2019-12-18 PROCEDURE — 63600175 PHARM REV CODE 636 W HCPCS: Performed by: NURSE PRACTITIONER

## 2019-12-18 PROCEDURE — 80048 BASIC METABOLIC PNL TOTAL CA: CPT

## 2019-12-18 PROCEDURE — 25000003 PHARM REV CODE 250: Performed by: INTERNAL MEDICINE

## 2019-12-18 PROCEDURE — G8996 SWALLOW CURRENT STATUS: HCPCS | Mod: CN

## 2019-12-18 PROCEDURE — 85025 COMPLETE CBC W/AUTO DIFF WBC: CPT

## 2019-12-18 PROCEDURE — 96368 THER/DIAG CONCURRENT INF: CPT

## 2019-12-18 PROCEDURE — 97535 SELF CARE MNGMENT TRAINING: CPT

## 2019-12-18 PROCEDURE — 96367 TX/PROPH/DG ADDL SEQ IV INF: CPT

## 2019-12-18 PROCEDURE — 25000003 PHARM REV CODE 250: Performed by: HOSPITALIST

## 2019-12-18 PROCEDURE — 84484 ASSAY OF TROPONIN QUANT: CPT | Mod: 91

## 2019-12-18 RX ORDER — CLONIDINE 0.2 MG/24H
1 PATCH, EXTENDED RELEASE TRANSDERMAL
Status: DISCONTINUED | OUTPATIENT
Start: 2019-12-18 | End: 2019-12-19

## 2019-12-18 RX ORDER — LEVOFLOXACIN 5 MG/ML
750 INJECTION, SOLUTION INTRAVENOUS
Status: DISCONTINUED | OUTPATIENT
Start: 2019-12-18 | End: 2019-12-25

## 2019-12-18 RX ORDER — CLONIDINE 0.1 MG/24H
1 PATCH, EXTENDED RELEASE TRANSDERMAL
Status: DISCONTINUED | OUTPATIENT
Start: 2019-12-18 | End: 2019-12-18

## 2019-12-18 RX ORDER — CLINDAMYCIN PHOSPHATE 150 MG/ML
600 INJECTION, SOLUTION INTRAVENOUS
Status: DISCONTINUED | OUTPATIENT
Start: 2019-12-18 | End: 2019-12-18

## 2019-12-18 RX ORDER — HYDRALAZINE HYDROCHLORIDE 20 MG/ML
5 INJECTION INTRAMUSCULAR; INTRAVENOUS EVERY 12 HOURS PRN
Status: DISCONTINUED | OUTPATIENT
Start: 2019-12-18 | End: 2019-12-19

## 2019-12-18 RX ORDER — CIPROFLOXACIN 2 MG/ML
400 INJECTION, SOLUTION INTRAVENOUS
Status: DISCONTINUED | OUTPATIENT
Start: 2019-12-18 | End: 2019-12-18

## 2019-12-18 RX ORDER — POTASSIUM CHLORIDE 7.45 MG/ML
10 INJECTION INTRAVENOUS
Status: COMPLETED | OUTPATIENT
Start: 2019-12-18 | End: 2019-12-18

## 2019-12-18 RX ORDER — CLINDAMYCIN PHOSPHATE 600 MG/50ML
600 INJECTION, SOLUTION INTRAVENOUS
Status: DISCONTINUED | OUTPATIENT
Start: 2019-12-18 | End: 2019-12-25

## 2019-12-18 RX ADMIN — LEVOFLOXACIN 750 MG: 750 INJECTION, SOLUTION INTRAVENOUS at 06:12

## 2019-12-18 RX ADMIN — IOHEXOL 80 ML: 350 INJECTION, SOLUTION INTRAVENOUS at 04:12

## 2019-12-18 RX ADMIN — CLONIDINE 1 PATCH: 0.1 PATCH TRANSDERMAL at 12:12

## 2019-12-18 RX ADMIN — POTASSIUM CHLORIDE 10 MEQ: 7.46 INJECTION, SOLUTION INTRAVENOUS at 08:12

## 2019-12-18 RX ADMIN — CIPROFLOXACIN 400 MG: 2 INJECTION, SOLUTION INTRAVENOUS at 10:12

## 2019-12-18 RX ADMIN — POTASSIUM CHLORIDE, DEXTROSE MONOHYDRATE AND SODIUM CHLORIDE: 150; 5; 450 INJECTION, SOLUTION INTRAVENOUS at 12:12

## 2019-12-18 RX ADMIN — POTASSIUM CHLORIDE, DEXTROSE MONOHYDRATE AND SODIUM CHLORIDE: 150; 5; 450 INJECTION, SOLUTION INTRAVENOUS at 02:12

## 2019-12-18 RX ADMIN — POTASSIUM CHLORIDE 10 MEQ: 7.46 INJECTION, SOLUTION INTRAVENOUS at 09:12

## 2019-12-18 NOTE — HPI
74 y.o. 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  She is also not taking her medications.  She appears to be more confused than usual.  Had a recent tooth extraction by the dentist and was unable to take prescribed clindamycin.  Daughter reports patient has indicated mouth pain. Also recently prescribed Macrobid for UTI and was unable to complete.  Daughter denies that the patient has had a fever.  History further limited secondary to the patient's condition.  In the ED, she was found to be tachycardic with hypernatremia, hypokalemia, mild elevation of CPK and troponin.  Urinalysis, CT head, chest x-ray, and EKG were all unremarkable for any acute abnormality.  She appears clinically dehydrated.  Placed in observation for further evaluation and treatment.

## 2019-12-18 NOTE — ASSESSMENT & PLAN NOTE
Likely secondary to malnutrition and dehydration from poor p.o. intake, continue supportive care with IV fluids.  -SLP consult  -GI consult

## 2019-12-18 NOTE — H&P
[facility] Hospital Medicine  History & Physical    Patient Name: Emilia Melgoza  MRN: [id number]  Admission Date: 12/17/2019  Attending Physician: Enrrique Faulkner MD   Primary Care Provider: Jerson Price MD         Patient information was obtained from patient, relative(s), past medical records and ER records.     Subjective:     Principal Problem:Acute encephalopathy    Chief Complaint:   Chief Complaint   Patient presents with    dx utit, not eating, drinking     pt dx with uti from pcp, daughter states pt not eating, drinking, not taking abx; was seen at pcp today and send here        HPI: 74 y.o. 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  She is also not taking her medications.  She appears to be more confused than usual.  Had a recent tooth extraction by the dentist and was unable to take prescribed clindamycin.  Daughter reports patient has indicated mouth pain. Also recently prescribed Macrobid for UTI and was unable to complete.  Daughter denies that the patient has had a fever.  History further limited secondary to the patient's condition.  In the ED, she was found to be tachycardic with hypernatremia, hypokalemia, mild elevation of CPK and troponin.  Urinalysis, CT head, chest x-ray, and EKG were all unremarkable for any acute abnormality.  She appears clinically dehydrated.  Placed in observation for further evaluation and treatment.    Past Medical History:   Diagnosis Date    Allergy     Asthma     DDD (degenerative disc disease), lumbar     Chronic LBP and intermittent RLE radicular pain x 10 yrs    Hypertension     Overactive bladder      Senile cataract, unspecified - Both Eyes 6/11/2013    Stroke        Past Surgical History:   Procedure Laterality Date    BREAST BIOPSY Left     CATARACT EXTRACTION W/  INTRAOCULAR LENS IMPLANT Right 10/13/2014    Dr Crystal    CATARACT EXTRACTION W/  INTRAOCULAR LENS IMPLANT Left 11/10/2014    Dr Crystal    HEMORRHOID SURGERY      HYSTERECTOMY      INCONTINENCE SURGERY      OOPHORECTOMY      Right Rotator Cuff Repair         Review of patient's allergies indicates:   Allergen Reactions    Enoxaparin Other (See Comments) and Hives     Slurred speech per patient  Slurred speech per patient    Lisinopril Other (See Comments)     Cough    Penicillins Rash       No current facility-administered medications on file prior to encounter.      Current Outpatient Medications on File Prior to Encounter   Medication Sig    amlodipine (NORVASC) 1 mg/mL Susp Take 5 mLs (5 mg total) by mouth once daily.    atorvastatin (LIPITOR) 80 MG tablet Take 1 tablet (80 mg total) by mouth once daily.    azelastine (ASTELIN) 137 mcg (0.1 %) nasal spray 1 spray (137 mcg total) by Nasal route 2 (two) times daily.    cholecalciferol, vitamin D3, 1,000 unit capsule Take 1 capsule (1,000 Units total) by mouth once daily.    clindamycin (CLEOCIN) 150 MG capsule TAKE 1 CAPSULE BY MOUTH EVERY 6 HOURS UNTIL COMPLETE    cloNIDine (CATAPRES) 0.1 MG tablet TAKE 2 TABLETS BY MOUTH ONCE DAILY IN THE EVENING    clopidogrel (PLAVIX) 75 mg tablet Take 1 tablet (75 mg total) by mouth once daily.    escitalopram oxalate (LEXAPRO) 5 mg/5 mL Soln Take 10 mLs (10 mg total) by mouth once daily.    fexofenadine (ALLEGRA) 180 MG tablet Take 1 tablet (180 mg total) by mouth once daily.    fluticasone propionate (FLONASE) 50 mcg/actuation nasal spray 1 spray (50 mcg total) by Each Nostril route once daily.    GENERLAC 10 gram/15 mL solution     ibuprofen (ADVIL,MOTRIN) 800 MG tablet     memantine (NAMENDA) 10 MG Tab Take 10 mg by mouth once  daily.    nitrofurantoin (FURADANTIN) 25 mg/5 mL Susp Take 20 mLs (100 mg total) by mouth every 12 (twelve) hours. for 5 days    oxybutynin (DITROPAN) 5 mg/5 mL syrup Take 10 mLs (10 mg total) by mouth 2 (two) times daily.    polyethylene glycol (GLYCOLAX) 17 gram PwPk DISSOLVE 17 GRAMS OF POWDER (1 PACK) IN WATER & DRINK ONCE DAILY FOR 5 DAYS    tramadol (ULTRAM) 50 mg tablet Take 1/2 tablet as needed for headache     Family History     Problem Relation (Age of Onset)    Breast cancer Mother    Colon cancer Father    Dementia Mother    Hypertension Mother, Father    No Known Problems Sister, Brother, Maternal Aunt, Maternal Uncle, Paternal Aunt, Paternal Uncle, Maternal Grandmother, Maternal Grandfather, Paternal Grandmother, Paternal Grandfather    Rhinitis Daughter, Daughter        Tobacco Use    Smoking status: Former Smoker     Last attempt to quit: 1992     Years since quittin.9    Smokeless tobacco: Never Used   Substance and Sexual Activity    Alcohol use: No     Alcohol/week: 0.0 standard drinks    Drug use: No    Sexual activity: Not Currently     Review of Systems   Unable to perform ROS: Mental status change   Constitutional: Positive for activity change, appetite change and fatigue. Negative for fever.   HENT: Positive for dental problem and trouble swallowing.    Respiratory: Negative for cough.    Cardiovascular: Negative for leg swelling.   Gastrointestinal: Negative for diarrhea, nausea and vomiting.   Skin: Negative for pallor, rash and wound.   Neurological: Positive for weakness.   Psychiatric/Behavioral: Positive for confusion and decreased concentration.     Objective:     Vital Signs (Most Recent):  Temp: 98 °F (36.7 °C) (19)  Pulse: 101 (19)  Resp: 20 (19)  BP: (!) 170/88 (19)  SpO2: 98 % (19) Vital Signs (24h Range):  Temp:  [98 °F (36.7 °C)-98.2 °F (36.8 °C)] 98 °F (36.7 °C)  Pulse:  [] 101  Resp:  [15-20]  20  SpO2:  [97 %-100 %] 98 %  BP: (140-195)/(72-94) 170/88     Weight: 67.6 kg (149 lb)  Body mass index is 24.79 kg/m².    Physical Exam   Constitutional: She appears well-developed and well-nourished. No distress.   HENT:   Head: Normocephalic and atraumatic.   Right Ear: External ear normal.   Left Ear: External ear normal.   Nose: Nose normal.   Mouth/Throat: Oropharynx is clear and moist. Mucous membranes are dry.   Uncooperative with examination of mouth.  Limited exam reveals poor dentition.   Eyes: Pupils are equal, round, and reactive to light. Conjunctivae and EOM are normal.   Neck: Normal range of motion. Neck supple.   Cardiovascular: Regular rhythm and intact distal pulses. Tachycardia present.   Pulmonary/Chest: Effort normal and breath sounds normal. No respiratory distress. She has no wheezes.   Abdominal: Soft. Bowel sounds are normal. She exhibits no distension. There is no tenderness.   No palpable hepatomegaly or splenomegaly    Musculoskeletal: Normal range of motion. She exhibits no edema or tenderness.   Neurological: She is alert. GCS eye subscore is 4. GCS verbal subscore is 1. GCS motor subscore is 6.   Skin: Skin is warm and dry.   Psychiatric: Thought content normal. Her mood appears anxious. She is noncommunicative.   Nursing note and vitals reviewed.        CRANIAL NERVES     CN III, IV, VI   Pupils are equal, round, and reactive to light.  Extraocular motions are normal.        Significant Labs: All pertinent labs within the past 24 hours have been reviewed.    Significant Imaging: I have reviewed all pertinent imaging results/findings within the past 24 hours.    Assessment/Plan:     * Acute encephalopathy  Likely secondary to malnutrition and dehydration from poor p.o. intake, continue supportive care with IV fluids.  -SLP consult  -GI consult    Hypokalemia  Likely secondary to poor p.o. intake, unable to tolerate p.o., check Mag, replace IV, BMP in a.m..    Hypernatremia  Likely  secondary to hypovolemia, continue IV fluids.  BMP in a.m.    Mobility impaired  Roughly at baseline, no acute issue    Broca's aphasia  Nonverbal at baseline    Hemiparesis affecting right side as late effect of cerebrovascular accident  At baseline, no acute issue    Essential hypertension  Unable to tolerate p.o. medications at this time, monitor blood pressure, adjust as needed.    Hyperlipidemia  Unable to take p.o. medications at this time, hold statin      VTE Risk Mitigation (From admission, onward)    None        Francisco Tee Jr., APRN, [facility]-BC  Hospitalist - Department of Hospital Medicine  [facility] [address] [place] [address]  Office #: [phone]; Pager #: [phone]

## 2019-12-18 NOTE — PT/OT/SLP EVAL
Speech Language Pathology Evaluation  Bedside Swallow    Patient Name:  Emilia Melgoza   MRN:  [id number]  Admitting Diagnosis: Acute encephalopathy    Recommendations:                 General Recommendations:  Dysphagia therapy and Speech language evaluation  Diet recommendations:  NPO, NPO   Aspiration Precautions: Alternate means of nutrition/hydration and Frequent oral care    General Precautions: Standard, aspiration, aphasia, fall, NPO  Communication strategies:  yes/no questions only    History:     Past Medical History:   Diagnosis Date    Allergy     DDD (degenerative disc disease), lumbar     Chronic LBP and intermittent RLE radicular pain x 10 yrs    Hypertension     Overactive bladder     Senile cataract, unspecified - Both Eyes 6/11/2013    Stroke     right sided weakness       Past Surgical History:   Procedure Laterality Date    bladder mesh      BREAST BIOPSY Left     CATARACT EXTRACTION W/  INTRAOCULAR LENS IMPLANT Right 10/13/2014    Dr Crystal    CATARACT EXTRACTION W/  INTRAOCULAR LENS IMPLANT Left 11/10/2014    Dr Crystal    HEMORRHOID SURGERY      HYSTERECTOMY      INCONTINENCE SURGERY      OOPHORECTOMY      Right Rotator Cuff Repair         Social History: Patient lives with daughter.    Chest X-Rays: 12/17/19 Lungs are symmetrically expanded.  No evidence of focal consolidative process, pneumothorax, or significant effusion.  No acute osseous abnormality identified.    Prior diet: soft foods, thin liquids, recent hx of pt refusal to eat and take meds per daughter report    Subjective     Pt's sister present for swallow eval. Spoke with pt's daughter on phone who reported pt has been refusing to eat and take meds recently. Daughter also stated pt needs to have teeth extracted but will require general anesthesia in order to be still and compliant with procedure.    Patient goals: Pt unable to state. Pt primarily non-verbal, with occasional unintelligible  chon.    Pain/Comfort:  · Pain Rating 1: 0/10    Objective:     Oral Musculature Evaluation  · Oral Musculature: unable to assess due to poor participation/comprehension  · Dentition: scattered dentition, teeth in poor condition  · Secretion Management: problems swallowing secretions, oral holding, right corner drooling  · Oral Musculature Comments: Pt refused to open mouth for oral mech exam. Per chart, pt's dentition are in poor condition    Bedside Swallow Eval:   Pt repositioned in bed prior to introducing PO trials.  Consistencies Assessed:  · Thin liquids via spoon x2 trials     Oral Phase:   · Anterior loss from right corner  · Drooling  · Poor oral acceptance    Pharyngeal Phase:   · No swallow initiation observed    Compensatory Strategies  · None    Treatment: ST will provide ongoing assessment of pt's swallow  Education provided to both daughter and sister regarding swallow eval results and aspiration precautions.    Assessment:     Emilia Melgoza is a 74 y.o. female with a dx of Acute encephalopathy. Pt with poor oral acceptance, inability to swallow own secretions, and absent swallow response to thin liquids. Rec: NPO and non-oral nutrition.    Goals:   Multidisciplinary Problems     SLP Goals        Problem: SLP Goal    Goal Priority Disciplines Outcome   SLP Goal    Medium SLP Ongoing, Progressing   Description:  ST. Pt will participate in ongoing swallow eval to determine least restrictive diet without overt s/s of aspiration.                    Plan:     · Patient to be seen:  3 x/week, 4 x/week   · Plan of Care expires:     · Plan of Care reviewed with:  patient, sibling, daughter   · SLP Follow-Up:     yes     Discharge recommendations:  other (see comments)(TBD)   Barriers to Discharge:  Level of Skilled Assistance Needed     Time Tracking:     SLP Treatment Date:   19  Speech Start Time:  1120  Speech Stop Time:  1144     Speech Total Time (min):  24 min    Billable Minutes:  Eval Swallow and Oral Function 14 min  and Seld Care/Home Management Training 10 min    Jodie Samuel CCC-SLP  12/18/2019

## 2019-12-18 NOTE — PROGRESS NOTES
12/17/19 2356   Vital Signs   BP (!) 186/82     Patients blood pressure noted elevated as seen above. Dr. Simon notified. Nurse asked whether the order for clonidine patch could be changed to now in which MD stated the order would be changed. Will administer and monitor for effectiveness. Patient stable and will continue to be monitored.

## 2019-12-18 NOTE — SUBJECTIVE & OBJECTIVE
Past Medical History:   Diagnosis Date    Allergy     DDD (degenerative disc disease), lumbar     Chronic LBP and intermittent RLE radicular pain x 10 yrs    Hypertension     Overactive bladder     Senile cataract, unspecified - Both Eyes 6/11/2013    Stroke     right sided weakness       Past Surgical History:   Procedure Laterality Date    bladder mesh      BREAST BIOPSY Left     CATARACT EXTRACTION W/  INTRAOCULAR LENS IMPLANT Right 10/13/2014    Dr Crystal    CATARACT EXTRACTION W/  INTRAOCULAR LENS IMPLANT Left 11/10/2014    Dr Crystal    HEMORRHOID SURGERY      HYSTERECTOMY      INCONTINENCE SURGERY      OOPHORECTOMY      Right Rotator Cuff Repair         Review of patient's allergies indicates:   Allergen Reactions    Enoxaparin Other (See Comments) and Hives     Slurred speech per patient  Slurred speech per patient    Lisinopril Other (See Comments)     Cough    Penicillins Rash       No current facility-administered medications on file prior to encounter.      Current Outpatient Medications on File Prior to Encounter   Medication Sig    amlodipine (NORVASC) 1 mg/mL Susp Take 5 mLs (5 mg total) by mouth once daily.    atorvastatin (LIPITOR) 80 MG tablet Take 1 tablet (80 mg total) by mouth once daily.    azelastine (ASTELIN) 137 mcg (0.1 %) nasal spray 1 spray (137 mcg total) by Nasal route 2 (two) times daily.    cholecalciferol, vitamin D3, 1,000 unit capsule Take 1 capsule (1,000 Units total) by mouth once daily.    clindamycin (CLEOCIN) 150 MG capsule TAKE 1 CAPSULE BY MOUTH EVERY 6 HOURS UNTIL COMPLETE    cloNIDine (CATAPRES) 0.1 MG tablet TAKE 2 TABLETS BY MOUTH ONCE DAILY IN THE EVENING    clopidogrel (PLAVIX) 75 mg tablet Take 1 tablet (75 mg total) by mouth once daily.    escitalopram oxalate (LEXAPRO) 5 mg/5 mL Soln Take 10 mLs (10 mg total) by mouth once daily.    fexofenadine (ALLEGRA) 180 MG tablet Take 1 tablet (180 mg total) by mouth once daily.    fluticasone propionate  (FLONASE) 50 mcg/actuation nasal spray 1 spray (50 mcg total) by Each Nostril route once daily.    GENERLAC 10 gram/15 mL solution     ibuprofen (ADVIL,MOTRIN) 800 MG tablet     memantine (NAMENDA) 10 MG Tab Take 10 mg by mouth once daily.    nitrofurantoin (FURADANTIN) 25 mg/5 mL Susp Take 20 mLs (100 mg total) by mouth every 12 (twelve) hours. for 5 days    oxybutynin (DITROPAN) 5 mg/5 mL syrup Take 10 mLs (10 mg total) by mouth 2 (two) times daily.    polyethylene glycol (GLYCOLAX) 17 gram PwPk DISSOLVE 17 GRAMS OF POWDER (1 PACK) IN WATER & DRINK ONCE DAILY FOR 5 DAYS    tramadol (ULTRAM) 50 mg tablet Take 1/2 tablet as needed for headache     Family History     Problem Relation (Age of Onset)    Breast cancer Mother    Colon cancer Father    Dementia Mother    Hypertension Mother, Father    No Known Problems Sister, Brother, Maternal Aunt, Maternal Uncle, Paternal Aunt, Paternal Uncle, Maternal Grandmother, Maternal Grandfather, Paternal Grandmother, Paternal Grandfather    Rhinitis Daughter, Daughter        Tobacco Use    Smoking status: Former Smoker     Last attempt to quit: 1992     Years since quittin.9    Smokeless tobacco: Never Used   Substance and Sexual Activity    Alcohol use: No     Alcohol/week: 0.0 standard drinks    Drug use: No    Sexual activity: Not Currently     Review of Systems   Unable to perform ROS: Mental status change   Constitutional: Positive for activity change, appetite change and fatigue. Negative for fever.   HENT: Positive for dental problem and trouble swallowing.    Respiratory: Negative for cough.    Cardiovascular: Negative for leg swelling.   Gastrointestinal: Negative for diarrhea, nausea and vomiting.   Skin: Negative for pallor, rash and wound.   Neurological: Positive for weakness.   Psychiatric/Behavioral: Positive for confusion and decreased concentration.     Objective:     Vital Signs (Most Recent):  Temp: 98.4 °F (36.9 °C) (19  1133)  Pulse: 98 (12/18/19 1133)  Resp: 18 (12/18/19 1133)  BP: (!) 162/88 (12/18/19 1209)  SpO2: 99 % (12/18/19 1133) Vital Signs (24h Range):  Temp:  [98 °F (36.7 °C)-99.4 °F (37.4 °C)] 98.4 °F (36.9 °C)  Pulse:  [] 98  Resp:  [15-20] 18  SpO2:  [97 %-100 %] 99 %  BP: (153-195)/(72-94) 162/88     Weight: 67.7 kg (149 lb 4 oz)  Body mass index is 24.84 kg/m².    Physical Exam   Constitutional: She appears well-developed and well-nourished. No distress.   HENT:   Head: Normocephalic and atraumatic.   Right Ear: External ear normal.   Left Ear: External ear normal.   Mouth/Throat: Mucous membranes are dry.   Uncooperative with examination of mouth.  Limited exam reveals poor dentition.   Eyes:   L eye lazy drooping; unclear chronicity   Neck: Normal range of motion. Neck supple.   Cardiovascular: Regular rhythm and intact distal pulses. Tachycardia present.   Pulmonary/Chest: Effort normal and breath sounds normal. No respiratory distress. She has no wheezes.   Abdominal: Soft. Bowel sounds are normal. She exhibits no distension. There is no tenderness.   No palpable hepatomegaly or splenomegaly    Musculoskeletal: She exhibits no edema or tenderness.   R hand contracture   Neurological: She is alert. GCS eye subscore is 4. GCS verbal subscore is 1. GCS motor subscore is 6.   Awake, groaning non-verbal; anxious; restless   Skin: Skin is warm and dry.   Psychiatric: Thought content normal. Her mood appears anxious. She is noncommunicative.   Nursing note and vitals reviewed.          Significant Labs: All pertinent labs within the past 24 hours have been reviewed.    Significant Imaging: I have reviewed all pertinent imaging results/findings within the past 24 hours.

## 2019-12-18 NOTE — NURSING TRANSFER
Nursing Transfer Note      12/18/2019     Transfer From: ED To: 330B    Transfer via stretcher    Transfer with cardiac monitoring    Transported by ED tech    Medicines sent: none    Chart send with patient: Yes    Notified: daughters at bedside    Patient reassessed at 2350 on December 17, 2019    Upon arrival to floor patient pulled from stretcher to bed. Patient cleaned up and made comfortable in bed. Cardiac monitoring applied. Vital signs obtained and found in flow sheets with complete patient assessment. Skin dry and intact with a 20g LAC PIv noted saline locked. No non-verbal signs of pain and patient in NAD. Gastroenterology and Palliative care consulted. Plan to continue IV fluids and await further input from consulted providers in the am. Patient and daughters updated on plan of care and verbalized understanding. Call light in reach and daughter instructed to inform the nurse if anything is needed. Patient stable and will continue to be monitored.

## 2019-12-18 NOTE — ASSESSMENT & PLAN NOTE
Unable to tolerate p.o. medications at this time, monitor blood pressure, adjust as needed.    12/18/19  Failed SLP - have IV hydralazine prn SBP >170 while NPO  Reassess when taking orals

## 2019-12-18 NOTE — ASSESSMENT & PLAN NOTE
Likely secondary to poor p.o. intake, unable to tolerate p.o., check Mag, replace IV, BMP in a.m..

## 2019-12-18 NOTE — PLAN OF CARE
Problem: SLP Goal  Goal: SLP Goal  Description  ST. Pt will participate in ongoing swallow eval to determine least restrictive diet without overt s/s of aspiration.   Outcome: Ongoing, Progressing     19 ST; swallow eval completed. Pt unable to initiate swallow with liquids or pureed. Right anterior spillage observed, as well as holding contents in oral cavity. Rec: NPO. Pt unsafe for PO nutrition at this time. ST will follow pt and provide ongoing assessment of swallow.

## 2019-12-18 NOTE — NURSING
Notified Francisco Tee NP regarding pt blood pressure and stated that pt BP is stable as is, but will adjust patch

## 2019-12-18 NOTE — PLAN OF CARE
"TN met with pt and pt's family, sister, Flory, at bedside. TN explained her role in Care Management. Pt voiced understanding. TN inquired about HELP AT HOME. Pt's sister, Flory, stated that she will have daughter, Carmen, at home to help for support. TN voiced understanding. TN inquired about responsibilities when it comes to  MANAGING HER HEALTH at home and what it entails. Pt inquired about details. TN informed pt of RESPONSIBILITIES of:    1. Follow up appointments  2. Getting Prescriptions filled  3. Taking medications as prescribed.     Pt voiced understanding.  TN explained "My Health Packet" blue folder and the pink and green tabs that are on the folder as well. Pt voiced understanding.     Pt's pharmacy:   [facility] [address] [place] [address] [address] [facility] [address] [address]  Phone: [phone] Fax: [phone]    Pt's preference for appointments: no preference       12/18/19 1425   Discharge Assessment   Assessment Type Discharge Planning Assessment   Confirmed/corrected address and phone number on facesheet? Yes   Assessment information obtained from? Other  (Pt's sister, Flory)   Communicated expected length of stay with patient/caregiver no   Prior to hospitilization cognitive status: Unable to Assess  (Flory stated pt just stopped walking recently.)   Prior to hospitalization functional status: Needs Assistance;Assistive Equipment   Current cognitive status: Unable to Assess  (Acute encephalopathy)   Current Functional Status: Assistive Equipment;Needs Assistance   Lives With child(amaya), adult  (Daughter, Carmen)   Able to Return to Prior Arrangements other (see comments)  (To Be Determined)   Who are your caregiver(s) and their phone number(s)?   (Carmen Hathaway Amadou, [phone])   Patient's perception of discharge disposition other (comments)  (Unable to Assess.)   Readmission Within the Last 30 Days no previous admission in last 30 days   Patient currently " being followed by outpatient case management? No   Patient currently receives any other outside agency services? No   Equipment Currently Used at Home rollator  (pt wa using someone else's rollator)   Do you have any problems affording any of your prescribed medications? No   Is the patient taking medications as prescribed? yes   Does the patient have transportation home? Yes   Transportation Anticipated family or friend will provide   Does the patient receive services at the [facility]? No   Discharge Plan A Home with family   DME Needed Upon Discharge  other (see comments)  (TBD)   Patient/Family in Agreement with Plan unable to assess

## 2019-12-18 NOTE — CONSULTS
"Chief Complaint:  "She is confused." - nurse    HPI:  The patient is a 74 year old woman with a history of HTN, CVA with right sided weakness, aphasia, meningioma, gait instability, and anxiety presenting with altered mental status.  As she is aphasic, history was obtained through the chart and nurse.  The patient has apparently been altered recently in which she has not been able to eat or drink.  In the last few weeks, she had a tooth extraction and did not take the prescribed clindamycin.  She also did not complete macrobid for a urinary tract infection.  There does not appear to be a history of cirrhosis.  Further information is unable to be obtained.    Past Medical History:   Diagnosis Date    Allergy     DDD (degenerative disc disease), lumbar     Chronic LBP and intermittent RLE radicular pain x 10 yrs    Hypertension     Overactive bladder     Senile cataract, unspecified - Both Eyes 6/11/2013    Stroke     right sided weakness     Past Surgical History:   Procedure Laterality Date    bladder mesh      BREAST BIOPSY Left     CATARACT EXTRACTION W/  INTRAOCULAR LENS IMPLANT Right 10/13/2014    Dr Crystal    CATARACT EXTRACTION W/  INTRAOCULAR LENS IMPLANT Left 11/10/2014    Dr Crystal    HEMORRHOID SURGERY      HYSTERECTOMY      INCONTINENCE SURGERY      OOPHORECTOMY      Right Rotator Cuff Repair       Family History   Problem Relation Age of Onset    Hypertension Mother     Breast cancer Mother     Dementia Mother     Colon cancer Father     Hypertension Father     Rhinitis Daughter     Rhinitis Daughter     No Known Problems Sister     No Known Problems Brother     No Known Problems Maternal Aunt     No Known Problems Maternal Uncle     No Known Problems Paternal Aunt     No Known Problems Paternal Uncle     No Known Problems Maternal Grandmother     No Known Problems Maternal Grandfather     No Known Problems Paternal Grandmother     No Known Problems Paternal Grandfather     " Amblyopia Neg Hx     Blindness Neg Hx     Cancer Neg Hx     Cataracts Neg Hx     Diabetes Neg Hx     Glaucoma Neg Hx     Macular degeneration Neg Hx     Retinal detachment Neg Hx     Strabismus Neg Hx     Stroke Neg Hx     Thyroid disease Neg Hx      Social History     Socioeconomic History    Marital status:      Spouse name: Not on file    Number of children: Not on file    Years of education: Not on file    Highest education level: Not on file   Occupational History    Not on file   Social Needs    Financial resource strain: Not on file    Food insecurity:     Worry: Not on file     Inability: Not on file    Transportation needs:     Medical: Not on file     Non-medical: Not on file   Tobacco Use    Smoking status: Former Smoker     Last attempt to quit: 1992     Years since quittin.9    Smokeless tobacco: Never Used   Substance and Sexual Activity    Alcohol use: No     Alcohol/week: 0.0 standard drinks    Drug use: No    Sexual activity: Not Currently   Lifestyle    Physical activity:     Days per week: Not on file     Minutes per session: Not on file    Stress: Not on file   Relationships    Social connections:     Talks on phone: Not on file     Gets together: Not on file     Attends Taoism service: Not on file     Active member of club or organization: Not on file     Attends meetings of clubs or organizations: Not on file     Relationship status: Not on file   Other Topics Concern    Not on file   Social History Narrative    Retired      cloNIDine 0.1 mg/24 hr td ptwk  1 patch Transdermal Q7 Days    escitalopram oxalate  10 mg Oral Daily    memantine  10 mg Oral Daily     Review of patient's allergies indicates:   Allergen Reactions    Enoxaparin Other (See Comments) and Hives     Slurred speech per patient  Slurred speech per patient    Lisinopril Other (See Comments)     Cough    Penicillins Rash     ROS:  Unable to be obtained.    Vitals:    19  2231 12/17/19 2356 12/18/19 0040 12/18/19 0336   BP: (!) 178/78 (!) 186/82 (!) 176/80 (!) 168/73   BP Location:  Right arm Right arm Right arm   Patient Position:  Lying  Lying   Pulse: 96 99  103   Resp: 15 19  17   Temp:  99.4 °F (37.4 °C)  98.5 °F (36.9 °C)   TempSrc:  Axillary  Axillary   SpO2: 98% 99%  99%   Weight:  66.1 kg (145 lb 11.6 oz)  67.7 kg (149 lb 4 oz)   Height:         P.E.:  GEN: A x O x 3, NAD  SKIN: No jaundice  HEENT: EOMI, PERRL, anicteric sclera  CV: RRR, no M/R/G  Chest: CTA B  Abdomen: soft, NTND, normoactive BS  Ext: No C/C/E.  2+ dorsalis pedis pulses B  Neuro: No asterixes or tremors.  CN II-XII intact  Musculoskeletal: 5/5 strength bilaterally    Labs:  Recent Results (from the past 336 hour(s))   CBC auto differential    Collection Time: 12/18/19  3:50 AM   Result Value Ref Range    WBC 10.16 3.90 - 12.70 K/uL    Hemoglobin 10.0 (L) 12.0 - 16.0 g/dL    Hematocrit 31.5 (L) 37.0 - 48.5 %    Platelets 226 150 - 350 K/uL   CBC auto differential    Collection Time: 12/17/19  3:44 PM   Result Value Ref Range    WBC 10.36 3.90 - 12.70 K/uL    Hemoglobin 10.9 (L) 12.0 - 16.0 g/dL    Hematocrit 34.3 (L) 37.0 - 48.5 %    Platelets 272 150 - 350 K/uL   CBC auto differential    Collection Time: 12/15/19 11:49 AM   Result Value Ref Range    WBC 9.41 3.90 - 12.70 K/uL    Hemoglobin 10.8 (L) 12.0 - 16.0 g/dL    Hematocrit 34.0 (L) 37.0 - 48.5 %    Platelets 238 150 - 350 K/uL     CMP  Sodium   Date Value Ref Range Status   12/18/2019 149 (H) 136 - 145 mmol/L Final     Potassium   Date Value Ref Range Status   12/18/2019 3.3 (L) 3.5 - 5.1 mmol/L Final     Chloride   Date Value Ref Range Status   12/18/2019 112 (H) 95 - 110 mmol/L Final     CO2   Date Value Ref Range Status   12/18/2019 25 23 - 29 mmol/L Final     Glucose   Date Value Ref Range Status   12/18/2019 112 (H) 70 - 110 mg/dL Final     BUN, Bld   Date Value Ref Range Status   12/18/2019 15 8 - 23 mg/dL Final     Creatinine   Date Value Ref  Range Status   12/18/2019 0.8 0.5 - 1.4 mg/dL Final     Calcium   Date Value Ref Range Status   12/18/2019 9.2 8.7 - 10.5 mg/dL Final     Total Protein   Date Value Ref Range Status   12/18/2019 7.6 6.0 - 8.4 g/dL Final     Albumin   Date Value Ref Range Status   12/18/2019 3.4 (L) 3.5 - 5.2 g/dL Final     Total Bilirubin   Date Value Ref Range Status   12/18/2019 0.8 0.1 - 1.0 mg/dL Final     Comment:     For infants and newborns, interpretation of results should be based  on gestational age, weight and in agreement with clinical  observations.  Premature Infant recommended reference ranges:  Up to 24 hours.............<8.0 mg/dL  Up to 48 hours............<12.0 mg/dL  3-5 days..................<15.0 mg/dL  6-29 days.................<15.0 mg/dL       Alkaline Phosphatase   Date Value Ref Range Status   12/18/2019 73 55 - 135 U/L Final     AST   Date Value Ref Range Status   12/18/2019 96 (H) 10 - 40 U/L Final     ALT   Date Value Ref Range Status   12/18/2019 41 10 - 44 U/L Final     Anion Gap   Date Value Ref Range Status   12/18/2019 12 8 - 16 mmol/L Final     eGFR if    Date Value Ref Range Status   12/18/2019 >60 >60 mL/min/1.73 m^2 Final     eGFR if non    Date Value Ref Range Status   12/18/2019 >60 >60 mL/min/1.73 m^2 Final     Comment:     Calculation used to obtain the estimated glomerular filtration  rate (eGFR) is the CKD-EPI equation.          No results for input(s): PT, INR, APTT in the last 24 hours.    CT of Head:    No acute large vascular territory infarct or intracranial hemorrhage identified.    Grossly stable chronic findings as above.      CT of Soft Tissue of Neck:    Asymmetric soft tissue swelling and edema involving the right anterior mandibular region which could reflect infectious process or cellulitis.  No organized focal fluid collections or rim enhancing abscess seen.    A/P:  The patient is a 74 year old woman with a history of HTN, CVA with right  sided weakness, aphasia, meningioma, gait instability, and anxiety presenting with altered mental status.  1.  Altered Mental Status - the patient may be altered, however, this does not appear to be from hepatic encephalopathy as she does not have biochemical evidence of cirrhosis.  Her baseline status is unclear as she had a stroke with residual deficits.  Her daughter went home to sleep, so she has not been contacted.  An ammonia level and ultrasound will be checked.  She will be monitored briefly.    Thank you for this consult.

## 2019-12-18 NOTE — ASSESSMENT & PLAN NOTE
IV abx above will likely cover UTI - culture NGTD - monitor closely if positive will follow ID and sensitivity

## 2019-12-18 NOTE — HOSPITAL COURSE
Ms Melgoza presented with acute encephalopathy. Workup significant for soft tissue swelling and edema involving the right anterior mandibular region suspicious for cellulitis, hypernatremia (148) . Patient also noted to have significant dysphagia for which she was placed NPO due to high risk for aspiration. Also aphasic and significantly deconditioned requiring moderate assistance from physical and occupational therapists. Was initiated on empiric IV antibiotics. CT brain with no acute pathology, just old infarct to left frontal lobe extending to basal ganglia and meningioma (unchanged). CTA brain showed no vessel occlusion. NGT placed for enteral nutrition. Hypernatremia resolved. PT/OT/ST continued. Palliative care was consulted to address goals of care, mainly for PEG placement. Patient, by nodding yes or no, agreed with PEG tube placement. Gastroenterology consulted for PEG. Labwork showed elevated total bilirubin and AST >> ALT. INR also elevated and, per family, patient is not on anticoagulation at home, just clopidogrel for old stroke. Antiplatelets and chemical DVT prophylaxis held. Vitamin K given. INR corrected. Total bilirubin normalized and enzymes improved. Overnight 12/25-12/26, patient experienced hypoxia and worsening encephalopathy. Also reports of transient hypotension earlier that had resolved. Rapid response called. Placed on supplemental O2 but continued to decline therefore transferred to ICU and intubated. Patient was kept off sedation as she was minimally responsive. Neurology and pulmonology consulted. EEG obtained. Showed severe generalized slowing suggesting severe diffuse or multifocal cerebral dysfunction. No epileptiform activity or seizures appreciated. MRI of brain obtained. This showed extensive multifocal acute infarctions in bilateral parietooccipital lobes and other areas of the brain. Patient started on aspirin overnight. No statin as LDL 72 four months ago. Blood pressure  maintained, feeding continued via NGT, prophylactic heparin SQ, and ventilator management. Echocardiogram ordered. Showed diastolic dysfunction but preserved EF and no thrombus. No arrhythmias appreciated on cardiac monitoring. Patient would open eyes and move extremities but unsure if purposeful. Respiratory effort improved at small increments with each SBT but not enough to safely extubate, in addition to neuro deficits and inability to determine if she can manage her own secretions. Extended ventilator support expected. General surgery consulted for Trach/PEG and SW consulted for LTAC.     12/31- plan for Trach 1/2 1/2- trach placed, pt moving left arm more and requiring restraints; SW notified of LTAC dc planning; tighter BP control   await PT/OT     1/4- pt spiked fever, updated family that resp, urine and blood cultures were sent. Started on empiric abx. Pt has bed at [facility] and will plan to dc there today. Facility aware of fever and pending cultures. Pt resumed on all stroke prevention meds via peg tube that includes asa, plavix and statin.

## 2019-12-18 NOTE — PROGRESS NOTES
[facility] Medicine  Progress Note    Patient Name: Emilia Melgoza  MRN: [id number]  Patient Class: IP- Inpatient   Admission Date: 12/17/2019  Length of Stay: 0 days  Attending Physician: Ariana Sofia MD  Primary Care Provider: Jerson Price MD        Subjective:     Principal Problem:Acute encephalopathy        HPI:  74 y.o. 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  She is also not taking her medications.  She appears to be more confused than usual.  Had a recent tooth extraction by the dentist and was unable to take prescribed clindamycin.  Daughter reports patient has indicated mouth pain. Also recently prescribed Macrobid for UTI and was unable to complete.  Daughter denies that the patient has had a fever.  History further limited secondary to the patient's condition.  In the ED, she was found to be tachycardic with hypernatremia, hypokalemia, mild elevation of CPK and troponin.  Urinalysis, CT head, chest x-ray, and EKG were all unremarkable for any acute abnormality.  She appears clinically dehydrated.  Placed in observation for further evaluation and treatment.    Overview/Hospital Course:  74 y.o.  AAF with history of stroke (2013 - residual L sided paralysis/paresthesia and aphasia) and vascular dementia, as well as HTN. She presented with her daughter who reports that shortly after thanksgiving her mother who was usually ambulatory with assist, could eat her food and feed herself once food was prepped, stopped eating, started hoarding medications, and was less attentive. She gradually became weaker whereas she could not stand.  According to her daughter who is at the bedside she assumed that the change in mental status was due to tooth-ache bottom left and right therefore she scheduled dentist appointment. She had BL lower oral extraction on Thursday 12/12/19 was ordered clindamycin but has not been able to get the patient to take the medication. She denies fever, chills, melena, hematochezia, report of HA or CP, diaphoresis; sick contacts, long trips, or trauma.    Patient was found to have CXR no acute cardiopulmonary process, CT head showed no acute large infarct ---   Pertinient positives  -Abnormal urine WBC = 12, urine culture NGTD,   -CT soft tissue neck showed soft tissue swelling and edema involving the R anterior mandibular region suspicious for cellulitis  -US abdomen shows common bile duct dilated 1.2 cm - no intrahepatic biliary dilatation    Past Medical History:   Diagnosis Date    Allergy     DDD (degenerative disc disease), lumbar     Chronic LBP and intermittent RLE radicular pain x 10 yrs    Hypertension     Overactive bladder     Senile cataract, unspecified - Both Eyes 6/11/2013    Stroke     right sided weakness       Past Surgical History:   Procedure Laterality Date    bladder mesh      BREAST BIOPSY Left     CATARACT EXTRACTION W/  INTRAOCULAR LENS IMPLANT Right 10/13/2014    Dr Crystal    CATARACT EXTRACTION W/  INTRAOCULAR LENS IMPLANT Left 11/10/2014    Dr Crystal    HEMORRHOID SURGERY      HYSTERECTOMY      INCONTINENCE SURGERY      OOPHORECTOMY      Right Rotator Cuff Repair         Review of patient's allergies indicates:   Allergen Reactions    Enoxaparin Other (See Comments) and Hives     Slurred speech per patient  Slurred speech per patient    Lisinopril Other (See Comments)     Cough    Penicillins Rash       No current facility-administered medications on file prior to encounter.      Current Outpatient Medications on File Prior to Encounter   Medication Sig    amlodipine (NORVASC) 1 mg/mL  Susp Take 5 mLs (5 mg total) by mouth once daily.    atorvastatin (LIPITOR) 80 MG tablet Take 1 tablet (80 mg total) by mouth once daily.    azelastine (ASTELIN) 137 mcg (0.1 %) nasal spray 1 spray (137 mcg total) by Nasal route 2 (two) times daily.    cholecalciferol, vitamin D3, 1,000 unit capsule Take 1 capsule (1,000 Units total) by mouth once daily.    clindamycin (CLEOCIN) 150 MG capsule TAKE 1 CAPSULE BY MOUTH EVERY 6 HOURS UNTIL COMPLETE    cloNIDine (CATAPRES) 0.1 MG tablet TAKE 2 TABLETS BY MOUTH ONCE DAILY IN THE EVENING    clopidogrel (PLAVIX) 75 mg tablet Take 1 tablet (75 mg total) by mouth once daily.    escitalopram oxalate (LEXAPRO) 5 mg/5 mL Soln Take 10 mLs (10 mg total) by mouth once daily.    fexofenadine (ALLEGRA) 180 MG tablet Take 1 tablet (180 mg total) by mouth once daily.    fluticasone propionate (FLONASE) 50 mcg/actuation nasal spray 1 spray (50 mcg total) by Each Nostril route once daily.    GENERLAC 10 gram/15 mL solution     ibuprofen (ADVIL,MOTRIN) 800 MG tablet     memantine (NAMENDA) 10 MG Tab Take 10 mg by mouth once daily.    nitrofurantoin (FURADANTIN) 25 mg/5 mL Susp Take 20 mLs (100 mg total) by mouth every 12 (twelve) hours. for 5 days    oxybutynin (DITROPAN) 5 mg/5 mL syrup Take 10 mLs (10 mg total) by mouth 2 (two) times daily.    polyethylene glycol (GLYCOLAX) 17 gram PwPk DISSOLVE 17 GRAMS OF POWDER (1 PACK) IN WATER & DRINK ONCE DAILY FOR 5 DAYS    tramadol (ULTRAM) 50 mg tablet Take 1/2 tablet as needed for headache     Family History     Problem Relation (Age of Onset)    Breast cancer Mother    Colon cancer Father    Dementia Mother    Hypertension Mother, Father    No Known Problems Sister, Brother, Maternal Aunt, Maternal Uncle, Paternal Aunt, Paternal Uncle, Maternal Grandmother, Maternal Grandfather, Paternal Grandmother, Paternal Grandfather    Rhinitis Daughter, Daughter        Tobacco Use    Smoking status: Former Smoker     Last attempt to  quit: 1992     Years since quittin.9    Smokeless tobacco: Never Used   Substance and Sexual Activity    Alcohol use: No     Alcohol/week: 0.0 standard drinks    Drug use: No    Sexual activity: Not Currently     Review of Systems   Unable to perform ROS: Mental status change   Constitutional: Positive for activity change, appetite change and fatigue. Negative for fever.   HENT: Positive for dental problem and trouble swallowing.    Respiratory: Negative for cough.    Cardiovascular: Negative for leg swelling.   Gastrointestinal: Negative for diarrhea, nausea and vomiting.   Skin: Negative for pallor, rash and wound.   Neurological: Positive for weakness.   Psychiatric/Behavioral: Positive for confusion and decreased concentration.     Objective:     Vital Signs (Most Recent):  Temp: 98.4 °F (36.9 °C) (19 1133)  Pulse: 98 (19 1133)  Resp: 18 (19 1133)  BP: (!) 162/88 (19 1209)  SpO2: 99 % (19 1133) Vital Signs (24h Range):  Temp:  [98 °F (36.7 °C)-99.4 °F (37.4 °C)] 98.4 °F (36.9 °C)  Pulse:  [] 98  Resp:  [15-20] 18  SpO2:  [97 %-100 %] 99 %  BP: (153-195)/(72-94) 162/88     Weight: 67.7 kg (149 lb 4 oz)  Body mass index is 24.84 kg/m².    Physical Exam   Constitutional: She appears well-developed and well-nourished. No distress.   HENT:   Head: Normocephalic and atraumatic.   Right Ear: External ear normal.   Left Ear: External ear normal.   Mouth/Throat: Mucous membranes are dry.   Uncooperative with examination of mouth.  Limited exam reveals poor dentition.   Eyes:   L eye lazy drooping; unclear chronicity   Neck: Normal range of motion. Neck supple.   Cardiovascular: Regular rhythm and intact distal pulses. Tachycardia present.   Pulmonary/Chest: Effort normal and breath sounds normal. No respiratory distress. She has no wheezes.   Abdominal: Soft. Bowel sounds are normal. She exhibits no distension. There is no tenderness.   No palpable hepatomegaly or  splenomegaly    Musculoskeletal: She exhibits no edema or tenderness.   R hand contracture   Neurological: She is alert. GCS eye subscore is 4. GCS verbal subscore is 1. GCS motor subscore is 6.   Awake, groaning non-verbal; anxious; restless   Skin: Skin is warm and dry.   Psychiatric: Thought content normal. Her mood appears anxious. She is noncommunicative.   Nursing note and vitals reviewed.          Significant Labs: All pertinent labs within the past 24 hours have been reviewed.    Significant Imaging: I have reviewed all pertinent imaging results/findings within the past 24 hours.      Assessment/Plan:      * Acute encephalopathy  12/17/19  Likely secondary to malnutrition and dehydration from poor p.o. intake, continue supportive care with IV fluids.  -SLP consult  -GI consult    12/18/19  Patient with continued altered mental status with unclear etiology ---WORKUP IN PROGRESS - daughter at the bedside - baseline unclear has some underlying dementia and progressive dementia is in the DD; although probably contributory doubt it is the sole culprit of patient's symptoms due to acute decline in status --- daughter reports a more active ambulatory patient at baseline - she had Abnormal urine WBC = 12, urine culture NGTD, CT soft tissue neck showed soft tissue swelling and edema involving the R anterior mandibular region suspicious for cellulitis --US abdomen shows common bile duct dilated 1.2 cm - no intrahepatic biliary dilatation. GI consulted who did not think that the US abdomen finding was pertinent to patient's current altered mental status. Ammonia level normal  -CT soft tissue did show findings concerning for cellulitis at the site of the one of her oral extractions - will start antibiotics UpToDate recommends clindamycin plus Levaquin for PCN allergic patients  -Monitor closely  -seen by SLP who recommended NPO status for now  -IV abx above will likely cover UTI - culture NGTD - monitor  closely  -Neurology consulted  -CTa head to rule out stroke      Cellulitis of mouth  See #1 AMS  CT soft tissue neck showed soft tissue swelling and edema involving the R anterior mandibular region -findings concerning for cellulitis at the site of the one of her oral extractions - will start antibiotics UpToDate recommends clindamycin plus Levaquin for PCN allergic patients        Common bile duct dilatation  As shown on US - consulted and seen by GI who does not think this is the origin of the patient's AMS - ammonia level normal, - GI following      Acute cystitis without hematuria  IV abx above will likely cover UTI - culture NGTD - monitor closely if positive will follow ID and sensitivity    Essential hypertension  Unable to tolerate p.o. medications at this time, monitor blood pressure, adjust as needed.    12/18/19  Failed SLP - have IV hydralazine prn SBP >170 while NPO  Reassess when taking orals    Hypernatremia  Likely secondary to hypovolemia, continue IV fluids.  BMP in a.m.    12/18/19  Patient with hypernatremia; hypokalemia; natremia does not appear to be normalizing but ka has improved  Bmp now  Adjust fluids according to new BMP values  cmp in am    Hemiparesis affecting right side as late effect of cerebrovascular accident  At baseline, no acute issue    Hypokalemia  Likely secondary to poor p.o. intake, unable to tolerate p.o., check Mag, replace IV, BMP in a.m..    Mobility impaired  Roughly at baseline, no acute issue    Broca's aphasia  Nonverbal at baseline    Hyperlipidemia  Unable to take p.o. medications at this time, hold statin  Allergy to lovenox  VTE Risk Mitigation (From admission, onward)         Ordered     IP VTE HIGH RISK PATIENT  Once      12/17/19 2235     Place sequential compression device  Until discontinued      12/17/19 2235                      APRIL Rosado, FNP-C  Hospitalist - Department of Hospital Medicine  [facility]  [address],  [place] [address]  Office [phone]; Pager [phone]

## 2019-12-18 NOTE — CONSULTS
Met with patient. No family in room. She is alert and oriented. She is able to let me know she understands me,nods appropriately and able to follow commands. She's able to let me know she has 3 daughters by show of fingers and when asked if anyone has POA she nod yes.  She was able to let me know her mouth hurts.  She was able to let me know with nodding and trying to express verbally that she was feeding herself and getting around at home and that she felt she had good QOL.  I discussed her current status, and asked if she were not able to regain her swallowing and take nutrition by mouth would she ever consider a feeding tube surgically placed for her to take nutrition and she nods yes. This is not unreasonable if she is able to return to her previous functional status but will need to continue conversations and make sure patient has capacity to fully understand if this ever became an option.  I will also try to connect with her daughters to start conversations.     > 50% of 50 mins spent in face to face encounter, review of documentation, symptom mangement and coordination of care

## 2019-12-18 NOTE — ASSESSMENT & PLAN NOTE
As shown on US - consulted and seen by GI who does not think this is the origin of the patient's AMS - ammonia level normal, - GI following

## 2019-12-18 NOTE — ASSESSMENT & PLAN NOTE
See #1 AMS  CT soft tissue neck showed soft tissue swelling and edema involving the R anterior mandibular region -findings concerning for cellulitis at the site of the one of her oral extractions - will start antibiotics UpToDate recommends clindamycin plus Levaquin for PCN allergic patients

## 2019-12-18 NOTE — ASSESSMENT & PLAN NOTE
12/17/19  Likely secondary to malnutrition and dehydration from poor p.o. intake, continue supportive care with IV fluids.  -SLP consult  -GI consult    12/18/19  Patient with continued altered mental status with unclear etiology ---WORKUP IN PROGRESS - daughter at the bedside - baseline unclear has some underlying dementia and progressive dementia is in the DD; although probably contributory doubt it is the sole culprit of patient's symptoms due to acute decline in status --- daughter reports a more active ambulatory patient at baseline - she had Abnormal urine WBC = 12, urine culture NGTD, CT soft tissue neck showed soft tissue swelling and edema involving the R anterior mandibular region suspicious for cellulitis --US abdomen shows common bile duct dilated 1.2 cm - no intrahepatic biliary dilatation. GI consulted who did not think that the US abdomen finding was pertinent to patient's current altered mental status. Ammonia level normal  -CT soft tissue did show findings concerning for cellulitis at the site of the one of her oral extractions - will start antibiotics UpToDate recommends clindamycin plus Levaquin for PCN allergic patients  -Monitor closely  -seen by SLP who recommended NPO status for now  -IV abx above will likely cover UTI - culture NGTD - monitor closely  -Neurology consulted  -CTa head to rule out stroke

## 2019-12-18 NOTE — SUBJECTIVE & OBJECTIVE
Past Medical History:   Diagnosis Date    Allergy     Asthma     DDD (degenerative disc disease), lumbar     Chronic LBP and intermittent RLE radicular pain x 10 yrs    Hypertension     Overactive bladder     Senile cataract, unspecified - Both Eyes 6/11/2013    Stroke        Past Surgical History:   Procedure Laterality Date    BREAST BIOPSY Left     CATARACT EXTRACTION W/  INTRAOCULAR LENS IMPLANT Right 10/13/2014    Dr Crystal    CATARACT EXTRACTION W/  INTRAOCULAR LENS IMPLANT Left 11/10/2014    Dr Crystal    HEMORRHOID SURGERY      HYSTERECTOMY      INCONTINENCE SURGERY      OOPHORECTOMY      Right Rotator Cuff Repair         Review of patient's allergies indicates:   Allergen Reactions    Enoxaparin Other (See Comments) and Hives     Slurred speech per patient  Slurred speech per patient    Lisinopril Other (See Comments)     Cough    Penicillins Rash       No current facility-administered medications on file prior to encounter.      Current Outpatient Medications on File Prior to Encounter   Medication Sig    amlodipine (NORVASC) 1 mg/mL Susp Take 5 mLs (5 mg total) by mouth once daily.    atorvastatin (LIPITOR) 80 MG tablet Take 1 tablet (80 mg total) by mouth once daily.    azelastine (ASTELIN) 137 mcg (0.1 %) nasal spray 1 spray (137 mcg total) by Nasal route 2 (two) times daily.    cholecalciferol, vitamin D3, 1,000 unit capsule Take 1 capsule (1,000 Units total) by mouth once daily.    clindamycin (CLEOCIN) 150 MG capsule TAKE 1 CAPSULE BY MOUTH EVERY 6 HOURS UNTIL COMPLETE    cloNIDine (CATAPRES) 0.1 MG tablet TAKE 2 TABLETS BY MOUTH ONCE DAILY IN THE EVENING    clopidogrel (PLAVIX) 75 mg tablet Take 1 tablet (75 mg total) by mouth once daily.    escitalopram oxalate (LEXAPRO) 5 mg/5 mL Soln Take 10 mLs (10 mg total) by mouth once daily.    fexofenadine (ALLEGRA) 180 MG tablet Take 1 tablet (180 mg total) by mouth once daily.    fluticasone propionate (FLONASE) 50 mcg/actuation  nasal spray 1 spray (50 mcg total) by Each Nostril route once daily.    GENERLAC 10 gram/15 mL solution     ibuprofen (ADVIL,MOTRIN) 800 MG tablet     memantine (NAMENDA) 10 MG Tab Take 10 mg by mouth once daily.    nitrofurantoin (FURADANTIN) 25 mg/5 mL Susp Take 20 mLs (100 mg total) by mouth every 12 (twelve) hours. for 5 days    oxybutynin (DITROPAN) 5 mg/5 mL syrup Take 10 mLs (10 mg total) by mouth 2 (two) times daily.    polyethylene glycol (GLYCOLAX) 17 gram PwPk DISSOLVE 17 GRAMS OF POWDER (1 PACK) IN WATER & DRINK ONCE DAILY FOR 5 DAYS    tramadol (ULTRAM) 50 mg tablet Take 1/2 tablet as needed for headache     Family History     Problem Relation (Age of Onset)    Breast cancer Mother    Colon cancer Father    Dementia Mother    Hypertension Mother, Father    No Known Problems Sister, Brother, Maternal Aunt, Maternal Uncle, Paternal Aunt, Paternal Uncle, Maternal Grandmother, Maternal Grandfather, Paternal Grandmother, Paternal Grandfather    Rhinitis Daughter, Daughter        Tobacco Use    Smoking status: Former Smoker     Last attempt to quit: 1992     Years since quittin.9    Smokeless tobacco: Never Used   Substance and Sexual Activity    Alcohol use: No     Alcohol/week: 0.0 standard drinks    Drug use: No    Sexual activity: Not Currently     Review of Systems   Unable to perform ROS: Mental status change   Constitutional: Positive for activity change, appetite change and fatigue. Negative for fever.   HENT: Positive for dental problem and trouble swallowing.    Respiratory: Negative for cough.    Cardiovascular: Negative for leg swelling.   Gastrointestinal: Negative for diarrhea, nausea and vomiting.   Skin: Negative for pallor, rash and wound.   Neurological: Positive for weakness.   Psychiatric/Behavioral: Positive for confusion and decreased concentration.     Objective:     Vital Signs (Most Recent):  Temp: 98 °F (36.7 °C) (19)  Pulse: 101 (19)  Resp:  20 (12/17/19 2131)  BP: (!) 170/88 (12/17/19 2131)  SpO2: 98 % (12/17/19 2131) Vital Signs (24h Range):  Temp:  [98 °F (36.7 °C)-98.2 °F (36.8 °C)] 98 °F (36.7 °C)  Pulse:  [] 101  Resp:  [15-20] 20  SpO2:  [97 %-100 %] 98 %  BP: (140-195)/(72-94) 170/88     Weight: 67.6 kg (149 lb)  Body mass index is 24.79 kg/m².    Physical Exam   Constitutional: She appears well-developed and well-nourished. No distress.   HENT:   Head: Normocephalic and atraumatic.   Right Ear: External ear normal.   Left Ear: External ear normal.   Nose: Nose normal.   Mouth/Throat: Oropharynx is clear and moist. Mucous membranes are dry.   Uncooperative with examination of mouth.  Limited exam reveals poor dentition.   Eyes: Pupils are equal, round, and reactive to light. Conjunctivae and EOM are normal.   Neck: Normal range of motion. Neck supple.   Cardiovascular: Regular rhythm and intact distal pulses. Tachycardia present.   Pulmonary/Chest: Effort normal and breath sounds normal. No respiratory distress. She has no wheezes.   Abdominal: Soft. Bowel sounds are normal. She exhibits no distension. There is no tenderness.   No palpable hepatomegaly or splenomegaly    Musculoskeletal: Normal range of motion. She exhibits no edema or tenderness.   Neurological: She is alert. GCS eye subscore is 4. GCS verbal subscore is 1. GCS motor subscore is 6.   Skin: Skin is warm and dry.   Psychiatric: Thought content normal. Her mood appears anxious. She is noncommunicative.   Nursing note and vitals reviewed.        CRANIAL NERVES     CN III, IV, VI   Pupils are equal, round, and reactive to light.  Extraocular motions are normal.        Significant Labs: All pertinent labs within the past 24 hours have been reviewed.    Significant Imaging: I have reviewed all pertinent imaging results/findings within the past 24 hours.

## 2019-12-18 NOTE — PLAN OF CARE
Problem: Fall Injury Risk  Goal: Absence of Fall and Fall-Related Injury  Intervention: Identify and Manage Contributors to Fall Injury Risk  Flowsheets (Taken 12/18/2019 0550)  Self-Care Promotion: independence encouraged  Medication Review/Management: medications reviewed  Intervention: Promote Injury-Free Environment  Flowsheets (Taken 12/18/2019 0550)  Safety Promotion/Fall Prevention: bed alarm set; lighting adjusted; medications reviewed; room near unit station  Environmental Safety Modification: lighting adjusted; clutter free environment maintained; room organization consistent; room near unit station     Problem: Adult Inpatient Plan of Care  Goal: Plan of Care Review  Flowsheets (Taken 12/18/2019 0550)  Plan of Care Reviewed With: patient; daughter     Problem: Skin Injury Risk Increased  Goal: Skin Health and Integrity  Intervention: Optimize Skin Protection  Flowsheets (Taken 12/18/2019 0550)  Head of Bed (HOB): HOB at 30-45 degrees     Problem: Thought Process Alteration  Goal: Optimal Thought Clarity  Intervention: Minimize Safety Risk and Altered Thought  Flowsheets (Taken 12/18/2019 0550)  Reorientation Measures: calendar in view; clock in view  Sensory Stimulation Regulation: lighting decreased; music/television provided for relaxation  Safety/Security Measures: bed alarm set     Patient remained free of injury throughout the shift. BP noted elevated and treated with scheduled BP medications with improvement noted. No non-verbal signs of pain or distress noted. Neurology, Gastroenterology, and Palliative care consulted. Plan to monitor and manage blood pressure, continue IV fluids, and await further recommendation from the consulted providers. Patients daughters updated on plan of care and verbalized understanding. Call light in reach and bed alarm maintained for patient safety. Patient stable and will continue to be monitored.

## 2019-12-18 NOTE — ASSESSMENT & PLAN NOTE
Likely secondary to hypovolemia, continue IV fluids.  BMP in a.m.    12/18/19  Patient with hypernatremia; hypokalemia; natremia does not appear to be normalizing but ka has improved  Bmp now  Adjust fluids according to new BMP values  cmp in am

## 2019-12-19 PROBLEM — Z51.5 PALLIATIVE CARE ENCOUNTER: Status: ACTIVE | Noted: 2019-09-10

## 2019-12-19 PROBLEM — R62.7 FAILURE TO THRIVE IN ADULT: Status: ACTIVE | Noted: 2019-12-19

## 2019-12-19 LAB
ALBUMIN SERPL BCP-MCNC: 3.2 G/DL (ref 3.5–5.2)
ALP SERPL-CCNC: 62 U/L (ref 55–135)
ALT SERPL W/O P-5'-P-CCNC: 54 U/L (ref 10–44)
ANION GAP SERPL CALC-SCNC: 9 MMOL/L (ref 8–16)
AST SERPL-CCNC: 123 U/L (ref 10–40)
BASOPHILS # BLD AUTO: 0.01 K/UL (ref 0–0.2)
BASOPHILS NFR BLD: 0.1 % (ref 0–1.9)
BILIRUB SERPL-MCNC: 1.1 MG/DL (ref 0.1–1)
BUN SERPL-MCNC: 8 MG/DL (ref 8–23)
CALCIUM SERPL-MCNC: 9 MG/DL (ref 8.7–10.5)
CHLORIDE SERPL-SCNC: 107 MMOL/L (ref 95–110)
CO2 SERPL-SCNC: 28 MMOL/L (ref 23–29)
CREAT SERPL-MCNC: 0.7 MG/DL (ref 0.5–1.4)
DIFFERENTIAL METHOD: ABNORMAL
EOSINOPHIL # BLD AUTO: 0 K/UL (ref 0–0.5)
EOSINOPHIL NFR BLD: 0 % (ref 0–8)
ERYTHROCYTE [DISTWIDTH] IN BLOOD BY AUTOMATED COUNT: 18.6 % (ref 11.5–14.5)
EST. GFR  (AFRICAN AMERICAN): >60 ML/MIN/1.73 M^2
EST. GFR  (NON AFRICAN AMERICAN): >60 ML/MIN/1.73 M^2
GLUCOSE SERPL-MCNC: 95 MG/DL (ref 70–110)
HCT VFR BLD AUTO: 32.7 % (ref 37–48.5)
HGB BLD-MCNC: 10.3 G/DL (ref 12–16)
IMM GRANULOCYTES # BLD AUTO: 0.1 K/UL (ref 0–0.04)
IMM GRANULOCYTES NFR BLD AUTO: 1 % (ref 0–0.5)
LYMPHOCYTES # BLD AUTO: 1.1 K/UL (ref 1–4.8)
LYMPHOCYTES NFR BLD: 11.4 % (ref 18–48)
MAGNESIUM SERPL-MCNC: 1.6 MG/DL (ref 1.6–2.6)
MCH RBC QN AUTO: 27.6 PG (ref 27–31)
MCHC RBC AUTO-ENTMCNC: 31.5 G/DL (ref 32–36)
MCV RBC AUTO: 88 FL (ref 82–98)
MONOCYTES # BLD AUTO: 0.8 K/UL (ref 0.3–1)
MONOCYTES NFR BLD: 8.5 % (ref 4–15)
NEUTROPHILS # BLD AUTO: 7.8 K/UL (ref 1.8–7.7)
NEUTROPHILS NFR BLD: 79 % (ref 38–73)
NRBC BLD-RTO: 0 /100 WBC
PLATELET # BLD AUTO: 219 K/UL (ref 150–350)
PMV BLD AUTO: 11.6 FL (ref 9.2–12.9)
POTASSIUM SERPL-SCNC: 3.1 MMOL/L (ref 3.5–5.1)
PROT SERPL-MCNC: 7.2 G/DL (ref 6–8.4)
RBC # BLD AUTO: 3.73 M/UL (ref 4–5.4)
SODIUM SERPL-SCNC: 144 MMOL/L (ref 136–145)
WBC # BLD AUTO: 9.92 K/UL (ref 3.9–12.7)

## 2019-12-19 PROCEDURE — 83735 ASSAY OF MAGNESIUM: CPT

## 2019-12-19 PROCEDURE — 92526 ORAL FUNCTION THERAPY: CPT

## 2019-12-19 PROCEDURE — 80053 COMPREHEN METABOLIC PANEL: CPT

## 2019-12-19 PROCEDURE — S0077 INJECTION, CLINDAMYCIN PHOSP: HCPCS | Performed by: NURSE PRACTITIONER

## 2019-12-19 PROCEDURE — 63600175 PHARM REV CODE 636 W HCPCS: Performed by: NURSE PRACTITIONER

## 2019-12-19 PROCEDURE — 36415 COLL VENOUS BLD VENIPUNCTURE: CPT

## 2019-12-19 PROCEDURE — 85025 COMPLETE CBC W/AUTO DIFF WBC: CPT

## 2019-12-19 PROCEDURE — 25000003 PHARM REV CODE 250: Performed by: NURSE PRACTITIONER

## 2019-12-19 PROCEDURE — 63600175 PHARM REV CODE 636 W HCPCS: Performed by: FAMILY MEDICINE

## 2019-12-19 PROCEDURE — 25000003 PHARM REV CODE 250: Performed by: HOSPITALIST

## 2019-12-19 PROCEDURE — 11000001 HC ACUTE MED/SURG PRIVATE ROOM

## 2019-12-19 RX ORDER — HYDRALAZINE HYDROCHLORIDE 20 MG/ML
5 INJECTION INTRAMUSCULAR; INTRAVENOUS EVERY 6 HOURS PRN
Status: DISCONTINUED | OUTPATIENT
Start: 2019-12-19 | End: 2019-12-19

## 2019-12-19 RX ORDER — HYDRALAZINE HYDROCHLORIDE 20 MG/ML
5 INJECTION INTRAMUSCULAR; INTRAVENOUS EVERY 6 HOURS PRN
Status: DISCONTINUED | OUTPATIENT
Start: 2019-12-19 | End: 2019-12-26

## 2019-12-19 RX ORDER — CLONIDINE 0.3 MG/24H
1 PATCH, EXTENDED RELEASE TRANSDERMAL
Status: DISCONTINUED | OUTPATIENT
Start: 2019-12-20 | End: 2019-12-26

## 2019-12-19 RX ORDER — POTASSIUM CHLORIDE 7.45 MG/ML
10 INJECTION INTRAVENOUS
Status: COMPLETED | OUTPATIENT
Start: 2019-12-19 | End: 2019-12-19

## 2019-12-19 RX ADMIN — CLINDAMYCIN IN 5 PERCENT DEXTROSE 600 MG: 12 INJECTION, SOLUTION INTRAVENOUS at 12:12

## 2019-12-19 RX ADMIN — LEVOFLOXACIN 750 MG: 750 INJECTION, SOLUTION INTRAVENOUS at 10:12

## 2019-12-19 RX ADMIN — POTASSIUM CHLORIDE, DEXTROSE MONOHYDRATE AND SODIUM CHLORIDE: 150; 5; 450 INJECTION, SOLUTION INTRAVENOUS at 11:12

## 2019-12-19 RX ADMIN — POTASSIUM CHLORIDE 10 MEQ: 10 INJECTION, SOLUTION INTRAVENOUS at 04:12

## 2019-12-19 RX ADMIN — POTASSIUM CHLORIDE 10 MEQ: 10 INJECTION, SOLUTION INTRAVENOUS at 03:12

## 2019-12-19 RX ADMIN — CLINDAMYCIN IN 5 PERCENT DEXTROSE 600 MG: 12 INJECTION, SOLUTION INTRAVENOUS at 05:12

## 2019-12-19 RX ADMIN — CLINDAMYCIN IN 5 PERCENT DEXTROSE 600 MG: 12 INJECTION, SOLUTION INTRAVENOUS at 08:12

## 2019-12-19 RX ADMIN — POTASSIUM CHLORIDE, DEXTROSE MONOHYDRATE AND SODIUM CHLORIDE: 150; 5; 450 INJECTION, SOLUTION INTRAVENOUS at 05:12

## 2019-12-19 NOTE — PLAN OF CARE
SW supervisor attempted to meet with pt. and family at bedside this morning, but pt. Was alone in room.  Pt. Was AAOx1-2, s/p CVA.  Pt. exhibited garbled speech, and could not articulate verbally.    HERMINIO called pt's dtr: Julissa Choudhary ([phone]), this afternoon to obtain pt's case details, preferences, and help at home. Pt. Is  to , Andrés Melgoza, who reportedly has multiple medical issues and a R BKA.  reported her sister, Carmen Tobar, resides with pt's   and pt. Pt. reportedly needed assistance with care prior to this admission, as pt. has a previous CVA w/R hemiparesis which affected pt's ability to write and use he primary R hand. Pt. Was placed on Plavix, and recently changed PCP's, per .  HERMINIO and  discussed pt's current medical condition as it related to pt's ability to return home.   reported due to all five of pt's children working they would be unable to provide 24 hour care for pt. At home.  also reported her father requires assistance at home due to his multiple medical issues. HERMINIO verified pt. does not have a MPOA or LW currently. HERMINIO explained to  that she and her siblings needed to discuss pt's deteriorated medical condition, and possible need for SNF vs. senior care NHP post SNF.  Pt's dtr. stated she will speak with her siblings re:d/c planning.         12/19/19 5930   Discharge Assessment   Assessment Type Discharge Planning Assessment   Confirmed/corrected address and phone number on facesheet? Yes   Assessment information obtained from? Caregiver   Prior to hospitilization cognitive status: Alert/Oriented   Prior to hospitalization functional status: Needs Assistance   Current cognitive status: Unable to Assess   Current Functional Status: Completely Dependent   Lives With spouse;child(amaya), adult   Able to Return to Prior Arrangements other (see comments)   Is patient able to care for self after discharge? No   Who are your  caregiver(s) and their phone number(s)? Dtrs:Carmen Tobar ([phone]) lives w/pt. and    Patient's perception of discharge disposition other (comments)   Readmission Within the Last 30 Days no previous admission in last 30 days   Patient currently being followed by outpatient case management? No   Patient currently receives any other outside agency services? No   Equipment Currently Used at Home none   Do you have any problems affording any of your prescribed medications? No   Is the patient taking medications as prescribed? yes   Does the patient have transportation home? Yes   Transportation Anticipated family or friend will provide   Does the patient receive services at the [facility]? No   Discharge Plan A Home with family   Discharge Plan B New Nursing Home placement - [facility] care facility   Patient/Family in Agreement with Plan other (see comments)       Preferred Pharmacy:    [facility]   [address] [place] [address]. [phone]  [phone]

## 2019-12-19 NOTE — PLAN OF CARE
Problem: SLP Goal  Goal: SLP Goal  Description  ST. Pt will participate in ongoing swallow eval to determine least restrictive diet without overt s/s of aspiration.   Outcome: Ongoing, Not Progressing   Recommend patient remain NPO, excellent and frequent oral care, and consider alternative means of nutrition/hydration/medication. ST will continue to follow pending progress.   SONU Beck., CCC-SLP  2019

## 2019-12-19 NOTE — PLAN OF CARE
Problem: Fall Injury Risk  Goal: Absence of Fall and Fall-Related Injury  Outcome: Ongoing, Progressing     Problem: Infection  Goal: Infection Symptom Resolution  Outcome: Ongoing, Progressing     Problem: Adult Inpatient Plan of Care  Goal: Plan of Care Review  Outcome: Ongoing, Progressing  Goal: Patient-Specific Goal (Individualization)  Outcome: Ongoing, Progressing  Goal: Absence of Hospital-Acquired Illness or Injury  Outcome: Ongoing, Progressing  Goal: Optimal Comfort and Wellbeing  Outcome: Ongoing, Progressing  Goal: Readiness for Transition of Care  Outcome: Ongoing, Progressing  Goal: Rounds/Family Conference  Outcome: Ongoing, Progressing     Problem: Coping Ineffective  Goal: Effective Coping  Outcome: Ongoing, Progressing     Problem: Skin Injury Risk Increased  Goal: Skin Health and Integrity  Outcome: Ongoing, Progressing     Problem: Thought Process Alteration  Goal: Optimal Thought Clarity  Outcome: Ongoing, Progressing    Pt VS remained stable and free of falls this shift. Pt received IVF for hydration and dose of Cipro, and Levoflaxcin. Pt became AAOx1 and continued to attempt to get OOB throughout shift. Family updated on plan of care and pt rested comfortably at bedside

## 2019-12-19 NOTE — NURSING
1932 Patient arrived on the unit accompanied by family members. Introductions made and family orientated to the room, the bed control and the pillow speaker. Patient placed on tele box #[id number].Primary D51/2NS with 20meq K hanging. Secondary Levofloxacin 750mg infusing at 100ml/h. SCDs applied to BLE.   2000 K rider 10 meq up. Primary IV fluids D5 1/2NS with 20 meq K stop during K rider adm.  2100 K rider 10 meq up.  2200 Primary IV fluids D5 1/2NS with 20 meq K restarted.  0500 Patient restless attempting to get oob. Patient offered the chance to get up and sit in a recliner chair. Patient accepted. Obtained a recliner to assist the patient into. Pt partially assisted with sitting on the side of the bed. Patient then changed he mind and laid back down.  Family member left and the bed alarm was increased to level 2. Patient remained restless. Asked the patient if the SCDs were bothering her. She shook her head yes. Explained the reason for the SCDs and then removed them.   0600 Patient resting quietly in bed.

## 2019-12-19 NOTE — PT/OT/SLP PROGRESS
Speech Language Pathology Treatment    Patient Name:  Emilia Melgoza   MRN:  [id number]   W401/W401 A    Admitting Diagnosis: Acute encephalopathy    Recommendations:                 General Recommendations:  ongoing swallowing assessment  Diet recommendations:  NPO, Liquid Diet Level: NPO   Aspiration Precautions: Alternate means of nutrition/hydration, Frequent oral care and Strict aspiration precautions   General Precautions: Standard, aspiration, aphasia, fall  Communication strategies:  yes/no questions only, provide increased time to answer and go to room if call light pushed; Aphasia from old stroke    Subjective     Patient awake with daughter present in room.     Objective:     Has the patient been evaluated by SLP for swallowing?   Yes  Keep patient NPO? Yes   Current Respiratory Status: room air      Patient seen for an ongoing swallowing assessment. Patient noted to cough up and spit out mucous. SLP set up oral suctioning and oral care provided with oral swabs and mouthwash. Patient with hyperactive gag reflex with oral swabs when touching hard palate or tongue. No gagging noted with oral suctioning. Patient turning head away from oral swabs and po trials requiring encouragement to accept. SLP questioned patient if it is patient for her to swallow. Patient possibly responded 'yes indeed,' however, patient with baseline aphasia, dementia, and cognitive-linguistic impairments from old stroke. SLP attempted an ice chip trial x1. Ice chip and saliva noted to fall out of right side of oral cavity. Remaining liquid suctioning from oral cavity. No pharyngeal swallow elicited. Po trials discontinued. SLP provided family education on SLP recommendations, SLP role, s/s and risks of aspiration, possible consideration of alternative means of nutrition/hydration/medication. All questions addressed. Patient's daughter verbalized understanding of all discussed.     Assessment:     Emilia Melgoza is a 74 y.o.  female with an SLP diagnosis of Aphasia, Dysphagia and Cognitive-Linguistic Impairment.  ST will continue to follow pending progress and acceptance of po trials.     Goals:   Multidisciplinary Problems     SLP Goals        Problem: SLP Goal    Goal Priority Disciplines Outcome   SLP Goal    Medium SLP Ongoing, Not Progressing   Description:  ST. Pt will participate in ongoing swallow eval to determine least restrictive diet without overt s/s of aspiration.                    Plan:     · Patient to be seen:  3 x/week, 4 x/week   · Plan of Care expires:     · Plan of Care reviewed with:  patient, daughter   · SLP Follow-Up:  Yes       Discharge recommendations:  (pending progress)   Barriers to Discharge:  no means of nutrition/hydration/medication    Time Tracking:     SLP Treatment Date:   19  Speech Start Time:  805  Speech Stop Time:  828     Speech Total Time (min):  23 min    Billable Minutes: Treatment Swallowing Dysfunction 23    ELLEN Garcia, CCC-SLP   2019

## 2019-12-19 NOTE — PROGRESS NOTES
GI    US without evidence of cirrhosis. Plat count > 200.  NH3 nl.  Do not feel that her AMS is due to HE.      CBD 1.2cm on US with no evidence of cholelithiasis.  No leukocytosis.  Bili 1.1. Doubt this is of any clinical significance but will trend bilirubin briefly and consider MRCP if trending up.      PEG may be needed in near future.

## 2019-12-19 NOTE — SUBJECTIVE & OBJECTIVE
Interval History: daughter at bedside. Says patient is still not at baseline. Not ambulating, eating or able to communicate as she once was. Interested in rehab placement.     Review of Systems   Unable to perform ROS: Patient nonverbal     Objective:     Vital Signs (Most Recent):  Temp: 98.3 °F (36.8 °C) (12/19/19 1152)  Pulse: 101 (12/19/19 1152)  Resp: 18 (12/19/19 1152)  BP: (!) 188/90 (12/19/19 1152)  SpO2: 100 % (12/19/19 1152) Vital Signs (24h Range):  Temp:  [98.2 °F (36.8 °C)-100.3 °F (37.9 °C)] 98.3 °F (36.8 °C)  Pulse:  [] 101  Resp:  [16-20] 18  SpO2:  [98 %-100 %] 100 %  BP: (135-202)/(79-97) 188/90     Weight: 66 kg (145 lb 8.1 oz)  Body mass index is 24.21 kg/m².    Intake/Output Summary (Last 24 hours) at 12/19/2019 1447  Last data filed at 12/19/2019 0811  Gross per 24 hour   Intake 0 ml   Output --   Net 0 ml      Physical Exam   Constitutional: She appears well-developed and well-nourished. No distress.   HENT:   Head: Normocephalic and atraumatic.   Eyes: Conjunctivae are normal.   Neck: Neck supple.   Cardiovascular: Normal rate, regular rhythm and normal heart sounds.   Pulmonary/Chest: Effort normal.   Breath sounds mildly coarse bilaterally   Abdominal: Soft. Bowel sounds are normal. She exhibits no distension.   Musculoskeletal: She exhibits no edema.   Neurological: She is alert.   Nursing note and vitals reviewed.      Significant Labs:   BMP:   Recent Labs   Lab 12/19/19  0602   GLU 95      K 3.1*      CO2 28   BUN 8   CREATININE 0.7   CALCIUM 9.0   MG 1.6     CBC:   Recent Labs   Lab 12/17/19  1544 12/18/19  0350 12/19/19  0602   WBC 10.36 10.16 9.92   HGB 10.9* 10.0* 10.3*   HCT 34.3* 31.5* 32.7*    226 219       Significant Imaging: I have reviewed all pertinent imaging results/findings within the past 24 hours.

## 2019-12-19 NOTE — ASSESSMENT & PLAN NOTE
Uncontrolled as she is unable to take PO meds due to dysphagia/ failure to thrive. Will increase clonidine patch from 0.2 to 0.3 mg weekly. Continue IV hydralazine prn.

## 2019-12-20 LAB
ALBUMIN SERPL BCP-MCNC: 3.2 G/DL (ref 3.5–5.2)
ALP SERPL-CCNC: 61 U/L (ref 55–135)
ALT SERPL W/O P-5'-P-CCNC: 65 U/L (ref 10–44)
ANION GAP SERPL CALC-SCNC: 10 MMOL/L (ref 8–16)
AST SERPL-CCNC: 142 U/L (ref 10–40)
BACTERIA UR CULT: ABNORMAL
BASOPHILS # BLD AUTO: 0.01 K/UL (ref 0–0.2)
BASOPHILS NFR BLD: 0.1 % (ref 0–1.9)
BILIRUB SERPL-MCNC: 1.3 MG/DL (ref 0.1–1)
BUN SERPL-MCNC: 10 MG/DL (ref 8–23)
CALCIUM SERPL-MCNC: 8.7 MG/DL (ref 8.7–10.5)
CHLORIDE SERPL-SCNC: 102 MMOL/L (ref 95–110)
CO2 SERPL-SCNC: 26 MMOL/L (ref 23–29)
CREAT SERPL-MCNC: 0.7 MG/DL (ref 0.5–1.4)
DIFFERENTIAL METHOD: ABNORMAL
EOSINOPHIL # BLD AUTO: 0 K/UL (ref 0–0.5)
EOSINOPHIL NFR BLD: 0.1 % (ref 0–8)
ERYTHROCYTE [DISTWIDTH] IN BLOOD BY AUTOMATED COUNT: 18.4 % (ref 11.5–14.5)
EST. GFR  (AFRICAN AMERICAN): >60 ML/MIN/1.73 M^2
EST. GFR  (NON AFRICAN AMERICAN): >60 ML/MIN/1.73 M^2
GLUCOSE SERPL-MCNC: 91 MG/DL (ref 70–110)
HCT VFR BLD AUTO: 33.3 % (ref 37–48.5)
HGB BLD-MCNC: 11 G/DL (ref 12–16)
IMM GRANULOCYTES # BLD AUTO: 0.1 K/UL (ref 0–0.04)
IMM GRANULOCYTES NFR BLD AUTO: 1.3 % (ref 0–0.5)
LYMPHOCYTES # BLD AUTO: 1.1 K/UL (ref 1–4.8)
LYMPHOCYTES NFR BLD: 14.2 % (ref 18–48)
MCH RBC QN AUTO: 28.1 PG (ref 27–31)
MCHC RBC AUTO-ENTMCNC: 33 G/DL (ref 32–36)
MCV RBC AUTO: 85 FL (ref 82–98)
MONOCYTES # BLD AUTO: 0.7 K/UL (ref 0.3–1)
MONOCYTES NFR BLD: 8.4 % (ref 4–15)
NEUTROPHILS # BLD AUTO: 5.9 K/UL (ref 1.8–7.7)
NEUTROPHILS NFR BLD: 75.9 % (ref 38–73)
NRBC BLD-RTO: 0 /100 WBC
PLATELET # BLD AUTO: 183 K/UL (ref 150–350)
PMV BLD AUTO: 9.9 FL (ref 9.2–12.9)
POTASSIUM SERPL-SCNC: 2.8 MMOL/L (ref 3.5–5.1)
PROT SERPL-MCNC: 6.9 G/DL (ref 6–8.4)
RBC # BLD AUTO: 3.92 M/UL (ref 4–5.4)
SODIUM SERPL-SCNC: 138 MMOL/L (ref 136–145)
WBC # BLD AUTO: 7.76 K/UL (ref 3.9–12.7)

## 2019-12-20 PROCEDURE — 11000001 HC ACUTE MED/SURG PRIVATE ROOM

## 2019-12-20 PROCEDURE — 85025 COMPLETE CBC W/AUTO DIFF WBC: CPT

## 2019-12-20 PROCEDURE — 97535 SELF CARE MNGMENT TRAINING: CPT

## 2019-12-20 PROCEDURE — 99223 PR INITIAL HOSPITAL CARE,LEVL III: ICD-10-PCS | Mod: ,,, | Performed by: PSYCHIATRY & NEUROLOGY

## 2019-12-20 PROCEDURE — 25000003 PHARM REV CODE 250: Performed by: FAMILY MEDICINE

## 2019-12-20 PROCEDURE — S0077 INJECTION, CLINDAMYCIN PHOSP: HCPCS | Performed by: NURSE PRACTITIONER

## 2019-12-20 PROCEDURE — 63600175 PHARM REV CODE 636 W HCPCS: Performed by: HOSPITALIST

## 2019-12-20 PROCEDURE — 80053 COMPREHEN METABOLIC PANEL: CPT

## 2019-12-20 PROCEDURE — 36415 COLL VENOUS BLD VENIPUNCTURE: CPT

## 2019-12-20 PROCEDURE — 92526 ORAL FUNCTION THERAPY: CPT

## 2019-12-20 PROCEDURE — 25000003 PHARM REV CODE 250: Performed by: NURSE PRACTITIONER

## 2019-12-20 PROCEDURE — 99223 1ST HOSP IP/OBS HIGH 75: CPT | Mod: ,,, | Performed by: PSYCHIATRY & NEUROLOGY

## 2019-12-20 PROCEDURE — 63600175 PHARM REV CODE 636 W HCPCS: Performed by: FAMILY MEDICINE

## 2019-12-20 RX ORDER — HYDROXYZINE 50 MG/ML
50 INJECTION, SOLUTION INTRAMUSCULAR EVERY 6 HOURS PRN
Status: COMPLETED | OUTPATIENT
Start: 2019-12-20 | End: 2019-12-21

## 2019-12-20 RX ORDER — DEXTROSE MONOHYDRATE, SODIUM CHLORIDE, AND POTASSIUM CHLORIDE 50; 2.98; 4.5 G/1000ML; G/1000ML; G/1000ML
INJECTION, SOLUTION INTRAVENOUS CONTINUOUS
Status: DISCONTINUED | OUTPATIENT
Start: 2019-12-20 | End: 2019-12-23

## 2019-12-20 RX ORDER — POTASSIUM CHLORIDE 7.45 MG/ML
10 INJECTION INTRAVENOUS
Status: COMPLETED | OUTPATIENT
Start: 2019-12-20 | End: 2019-12-20

## 2019-12-20 RX ADMIN — DEXTROSE MONOHYDRATE, SODIUM CHLORIDE, AND POTASSIUM CHLORIDE: 50; 4.5; 2.98 INJECTION, SOLUTION INTRAVENOUS at 05:12

## 2019-12-20 RX ADMIN — POTASSIUM CHLORIDE 10 MEQ: 7.46 INJECTION, SOLUTION INTRAVENOUS at 06:12

## 2019-12-20 RX ADMIN — CLINDAMYCIN IN 5 PERCENT DEXTROSE 600 MG: 12 INJECTION, SOLUTION INTRAVENOUS at 04:12

## 2019-12-20 RX ADMIN — POTASSIUM CHLORIDE 10 MEQ: 7.46 INJECTION, SOLUTION INTRAVENOUS at 09:12

## 2019-12-20 RX ADMIN — POTASSIUM CHLORIDE 10 MEQ: 7.46 INJECTION, SOLUTION INTRAVENOUS at 05:12

## 2019-12-20 RX ADMIN — CLONIDINE 1 PATCH: 0.3 PATCH TRANSDERMAL at 09:12

## 2019-12-20 RX ADMIN — CLINDAMYCIN IN 5 PERCENT DEXTROSE 600 MG: 12 INJECTION, SOLUTION INTRAVENOUS at 09:12

## 2019-12-20 RX ADMIN — HALOPERIDOL LACTATE 1 MG: 5 INJECTION, SOLUTION INTRAMUSCULAR at 12:12

## 2019-12-20 RX ADMIN — CLINDAMYCIN IN 5 PERCENT DEXTROSE 600 MG: 12 INJECTION, SOLUTION INTRAVENOUS at 02:12

## 2019-12-20 RX ADMIN — POTASSIUM CHLORIDE 10 MEQ: 7.46 INJECTION, SOLUTION INTRAVENOUS at 08:12

## 2019-12-20 NOTE — ASSESSMENT & PLAN NOTE
12/17/19  Likely secondary to malnutrition and dehydration from poor p.o. intake, continue supportive care with IV fluids.  -SLP consult  -GI consult    12/18/19  Patient with continued altered mental status with unclear etiology ---WORKUP IN PROGRESS - daughter at the bedside - baseline unclear has some underlying dementia and progressive dementia is in the DD; although probably contributory doubt it is the sole culprit of patient's symptoms due to acute decline in status --- daughter reports a more active ambulatory patient at baseline - she had Abnormal urine WBC = 12, urine culture NGTD, CT soft tissue neck showed soft tissue swelling and edema involving the R anterior mandibular region suspicious for cellulitis --US abdomen shows common bile duct dilated 1.2 cm - no intrahepatic biliary dilatation. GI consulted who did not think that the US abdomen finding was pertinent to patient's current altered mental status. Ammonia level normal  -CT soft tissue did show findings concerning for cellulitis at the site of the one of her oral extractions - will start antibiotics UpToDate recommends clindamycin plus Levaquin for PCN allergic patients  -Monitor closely  -seen by SLP who recommended NPO status for now  -IV abx above will likely cover UTI - culture NGTD - monitor closely  -Neurology consulted  -CTa head to rule out stroke    12/19- work up benign. Awaiting neurology.

## 2019-12-20 NOTE — HPI
73 yo woman with pmhx of meningioma and L MCA infarct in 2013 which 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 At baseline, patient is able to say one to two words, but needs help with most all ADLs. No family currently at bedside. As per HPI, daughter reports patient being more confused than usual. She had been on antibiotics for UTI, but did not complete course as patient has not been eating or drinking for 2 weeks. She also had a recent tooth extraction and has indicated mouth pain. She was found to be hypernatremic, hypokalemic and tachycardic in the ED.

## 2019-12-20 NOTE — ASSESSMENT & PLAN NOTE
No change. Will need PEG and [facility] placement vs home with home health if no improvement in 24 to 48 hrs. Will await neurological work up and discuss options with family.

## 2019-12-20 NOTE — SUBJECTIVE & OBJECTIVE
Interval History: daughter at bedside. No change in patient status.     Review of Systems   Unable to perform ROS: Patient nonverbal     Objective:     Vital Signs (Most Recent):  Temp: 97.5 °F (36.4 °C) (12/20/19 1129)  Pulse: 96 (12/20/19 1129)  Resp: 17 (12/20/19 1129)  BP: (!) 152/81 (12/20/19 1129)  SpO2: 100 % (12/20/19 1129) Vital Signs (24h Range):  Temp:  [97.3 °F (36.3 °C)-98.8 °F (37.1 °C)] 97.5 °F (36.4 °C)  Pulse:  [] 96  Resp:  [16-20] 17  SpO2:  [97 %-100 %] 100 %  BP: (152-197)/(72-96) 152/81     Weight: 66 kg (145 lb 8.1 oz)  Body mass index is 24.21 kg/m².  No intake or output data in the 24 hours ending 12/20/19 1545   Physical Exam   Constitutional: She appears well-developed and well-nourished. No distress.   HENT:   Head: Normocephalic and atraumatic.   Eyes: Conjunctivae are normal.   Neck: Neck supple.   Cardiovascular: Normal rate, regular rhythm and normal heart sounds.   Pulmonary/Chest: Effort normal.   Breath sounds mildly coarse bilaterally   Abdominal: Soft. Bowel sounds are normal. She exhibits no distension.   Musculoskeletal: She exhibits no edema.   Neurological: She is alert.   Nursing note and vitals reviewed.      Significant Labs:   BMP:   Recent Labs   Lab 12/19/19  0602 12/20/19  0432   GLU 95 91    138   K 3.1* 2.8*    102   CO2 28 26   BUN 8 10   CREATININE 0.7 0.7   CALCIUM 9.0 8.7   MG 1.6  --      CBC:   Recent Labs   Lab 12/19/19  0602 12/20/19  0432   WBC 9.92 7.76   HGB 10.3* 11.0*   HCT 32.7* 33.3*    183       Significant Imaging: I have reviewed all pertinent imaging results/findings within the past 24 hours.

## 2019-12-20 NOTE — ASSESSMENT & PLAN NOTE
Unlikely  CT soft tissue neck showed soft tissue swelling and edema involving the R anterior mandibular region -findings concerning for cellulitis at the site of the one of her oral extractions - this is likely inflammation secondary to her recent oral procedure. We will complete her course of abx.

## 2019-12-20 NOTE — PROGRESS NOTES
[facility] Hospital Medicine  Progress Note    Patient Name: Emilia Melgoza  MRN: [id number]  Patient Class: IP- Inpatient   Admission Date: 12/17/2019  Length of Stay: 2 days  Attending Physician: Tamika Calvin MD  Primary Care Provider: Jerson Price MD      Subjective:     Principal Problem:Failure to thrive in adult      HPI:  74 y.o. 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  She is also not taking her medications.  She appears to be more confused than usual.  Had a recent tooth extraction by the dentist and was unable to take prescribed clindamycin.  Daughter reports patient has indicated mouth pain. Also recently prescribed Macrobid for UTI and was unable to complete.  Daughter denies that the patient has had a fever.  History further limited secondary to the patient's condition.  In the ED, she was found to be tachycardic with hypernatremia, hypokalemia, mild elevation of CPK and troponin.  Urinalysis, CT head, chest x-ray, and EKG were all unremarkable for any acute abnormality.  She appears clinically dehydrated.  Placed in observation for further evaluation and treatment.    Overview/Hospital Course:  74 y.o.  AAF with history of stroke (2013 - residual L sided paralysis/paresthesia and aphasia) and vascular dementia, as well as HTN. She presented with her daughter who reports that shortly after thanksgiving her mother who was usually ambulatory with assist, could eat her food and feed herself once food was prepped, stopped eating, started hoarding medications, and was less attentive. She gradually became weaker whereas she could not stand.  According to her daughter who is at the bedside she assumed that the change in mental status was due to tooth-ache bottom left and right therefore she scheduled dentist appointment. She had BL lower oral extraction on Thursday 12/12/19 was ordered clindamycin but has not been able to get the patient to take the medication. She denies fever, chills, melena, hematochezia, report of HA or CP, diaphoresis; sick contacts, long trips, or trauma.    Patient was found to have CXR no acute cardiopulmonary process, CT head showed no acute large infarct ---   Pertinient positives  -Abnormal urine WBC = 12, urine culture NGTD,   -CT soft tissue neck showed soft tissue swelling and edema involving the R anterior mandibular region suspicious for cellulitis  -US abdomen shows common bile duct dilated 1.2 cm - no intrahepatic biliary dilatation    Interval History: daughter at bedside. No change in patient status.     Review of Systems   Unable to perform ROS: Patient nonverbal     Objective:     Vital Signs (Most Recent):  Temp: 97.5 °F (36.4 °C) (12/20/19 1129)  Pulse: 96 (12/20/19 1129)  Resp: 17 (12/20/19 1129)  BP: (!) 152/81 (12/20/19 1129)  SpO2: 100 % (12/20/19 1129) Vital Signs (24h Range):  Temp:  [97.3 °F (36.3 °C)-98.8 °F (37.1 °C)] 97.5 °F (36.4 °C)  Pulse:  [] 96  Resp:  [16-20] 17  SpO2:  [97 %-100 %] 100 %  BP: (152-197)/(72-96) 152/81     Weight: 66 kg (145 lb 8.1 oz)  Body mass index is 24.21 kg/m².  No intake or output data in the 24 hours ending 12/20/19 1545   Physical Exam   Constitutional: She appears well-developed and well-nourished. No distress.   HENT:   Head: Normocephalic and atraumatic.   Eyes: Conjunctivae are normal.   Neck: Neck supple.   Cardiovascular: Normal rate, regular rhythm and normal heart sounds.   Pulmonary/Chest: Effort normal.   Breath sounds mildly coarse bilaterally   Abdominal: Soft. Bowel sounds are normal. She exhibits no distension.   Musculoskeletal: She exhibits no edema.    Neurological: She is alert.   Nursing note and vitals reviewed.      Significant Labs:   BMP:   Recent Labs   Lab 12/19/19  0602 12/20/19  0432   GLU 95 91    138   K 3.1* 2.8*    102   CO2 28 26   BUN 8 10   CREATININE 0.7 0.7   CALCIUM 9.0 8.7   MG 1.6  --      CBC:   Recent Labs   Lab 12/19/19  0602 12/20/19  0432   WBC 9.92 7.76   HGB 10.3* 11.0*   HCT 32.7* 33.3*    183       Significant Imaging: I have reviewed all pertinent imaging results/findings within the past 24 hours.      Assessment/Plan:      * Failure to thrive in adult  No change. Will need PEG and [facility] placement vs home with home health if no improvement in 24 to 48 hrs. Will await neurological work up and discuss options with family.      Common bile duct dilatation  Benign. She has been evaluated by GI.    Cellulitis of mouth  Unlikely. Complete course of Clindamycin.     Hypokalemia  Worsening.   Likely secondary to poor p.o. Intake.  Increased K+ in IVFs. Continue K+ riders.     Acute encephalopathy  Discussed with Dr. Umaña. Will get MRI and make sure her underlying electrolyte abnormalities are corrected. Further recs afterwards.     Mobility impaired   PT/ OT ordered.     Acute cystitis without hematuria  Secondary to E.Coli. Low colony count. Continue Levaquin.    Broca's aphasia  Nonverbal at baseline    Hemiparesis affecting right side as late effect of cerebrovascular accident  At baseline, no acute issue. Fall precautions.     Essential hypertension  Improved. Continue clonidine patch 0.3 mg weekly. Continue IV hydralazine prn.     Chronic diastolic congestive heart failure  Stable. Monitor for fluid overload.      VTE Risk Mitigation (From admission, onward)         Ordered     IP VTE HIGH RISK PATIENT  Once      12/17/19 2235     Place sequential compression device  Until discontinued      12/17/19 2235                Tamika Calvin MD  Department of Hospital Medicine   [facility]

## 2019-12-20 NOTE — SUBJECTIVE & OBJECTIVE
Past Medical History:   Diagnosis Date    Allergy     DDD (degenerative disc disease), lumbar     Chronic LBP and intermittent RLE radicular pain x 10 yrs    Hypertension     Overactive bladder     Senile cataract, unspecified - Both Eyes 6/11/2013    Stroke     right sided weakness       Past Surgical History:   Procedure Laterality Date    bladder mesh      BREAST BIOPSY Left     CATARACT EXTRACTION W/  INTRAOCULAR LENS IMPLANT Right 10/13/2014    Dr Crystal    CATARACT EXTRACTION W/  INTRAOCULAR LENS IMPLANT Left 11/10/2014    Dr Crystal    HEMORRHOID SURGERY      HYSTERECTOMY      INCONTINENCE SURGERY      OOPHORECTOMY      Right Rotator Cuff Repair         Review of patient's allergies indicates:   Allergen Reactions    Enoxaparin Other (See Comments) and Hives     Slurred speech per patient  Slurred speech per patient    Lisinopril Other (See Comments)     Cough    Penicillins Rash       Current Neurological Medications:     No current facility-administered medications on file prior to encounter.      Current Outpatient Medications on File Prior to Encounter   Medication Sig    amlodipine (NORVASC) 1 mg/mL Susp Take 5 mLs (5 mg total) by mouth once daily.    atorvastatin (LIPITOR) 80 MG tablet Take 1 tablet (80 mg total) by mouth once daily.    azelastine (ASTELIN) 137 mcg (0.1 %) nasal spray 1 spray (137 mcg total) by Nasal route 2 (two) times daily.    cholecalciferol, vitamin D3, 1,000 unit capsule Take 1 capsule (1,000 Units total) by mouth once daily.    clindamycin (CLEOCIN) 150 MG capsule TAKE 1 CAPSULE BY MOUTH EVERY 6 HOURS UNTIL COMPLETE    cloNIDine (CATAPRES) 0.1 MG tablet TAKE 2 TABLETS BY MOUTH ONCE DAILY IN THE EVENING    clopidogrel (PLAVIX) 75 mg tablet Take 1 tablet (75 mg total) by mouth once daily.    escitalopram oxalate (LEXAPRO) 5 mg/5 mL Soln Take 10 mLs (10 mg total) by mouth once daily.    fexofenadine (ALLEGRA) 180 MG tablet Take 1 tablet (180 mg total) by mouth  once daily.    fluticasone propionate (FLONASE) 50 mcg/actuation nasal spray 1 spray (50 mcg total) by Each Nostril route once daily.    GENERLAC 10 gram/15 mL solution     ibuprofen (ADVIL,MOTRIN) 800 MG tablet     memantine (NAMENDA) 10 MG Tab Take 10 mg by mouth once daily.    nitrofurantoin (FURADANTIN) 25 mg/5 mL Susp Take 20 mLs (100 mg total) by mouth every 12 (twelve) hours. for 5 days    oxybutynin (DITROPAN) 5 mg/5 mL syrup Take 10 mLs (10 mg total) by mouth 2 (two) times daily.    polyethylene glycol (GLYCOLAX) 17 gram PwPk DISSOLVE 17 GRAMS OF POWDER (1 PACK) IN WATER & DRINK ONCE DAILY FOR 5 DAYS    tramadol (ULTRAM) 50 mg tablet Take 1/2 tablet as needed for headache     Family History     Problem Relation (Age of Onset)    Breast cancer Mother    Colon cancer Father    Dementia Mother    Hypertension Mother, Father    No Known Problems Sister, Brother, Maternal Aunt, Maternal Uncle, Paternal Aunt, Paternal Uncle, Maternal Grandmother, Maternal Grandfather, Paternal Grandmother, Paternal Grandfather    Rhinitis Daughter, Daughter        Tobacco Use    Smoking status: Former Smoker     Last attempt to quit: 1992     Years since quittin.9    Smokeless tobacco: Never Used   Substance and Sexual Activity    Alcohol use: No     Alcohol/week: 0.0 standard drinks    Drug use: No    Sexual activity: Not Currently     Review of Systems - unable to perform as patient aphasic  Objective:     Vital Signs (Most Recent):  Temp: 97.5 °F (36.4 °C) (19 1129)  Pulse: 96 (19 1129)  Resp: 17 (19 1129)  BP: (!) 152/81 (19 1129)  SpO2: 100 % (19 1129) Vital Signs (24h Range):  Temp:  [97.3 °F (36.3 °C)-98.8 °F (37.1 °C)] 97.5 °F (36.4 °C)  Pulse:  [] 96  Resp:  [16-20] 17  SpO2:  [97 %-100 %] 100 %  BP: (152-197)/(72-96) 152/81     Weight: 66 kg (145 lb 8.1 oz)  Body mass index is 24.21 kg/m².    Physical Exam  Mental status: awake, alert, currently nonverbal,  follows some simple commands. Exam limited as patient unable to follow all commands  CN: PERRL, moves eyes in all directions, right facial droop  Motor:   RUE moves at least 3/5   RLE moves at least 2/5, externally rotated  LUE moves at least 4/5  LLE moves at least 3/5        Reflexes: 3+ right ankle jerk, 3+ right knee jerk, 3+ right biceps, otherwise 2+ throughout  Sensory: withdraws to painful stimuli x 4 extremities  Coordination: patient unable to follow command for this       Significant Labs: Reviewed    Significant Imaging: Reviewed

## 2019-12-20 NOTE — ASSESSMENT & PLAN NOTE
Discussed with Dr. Umaña. Will get MRI and make sure her underlying electrolyte abnormalities are corrected. Further recs afterwards.

## 2019-12-20 NOTE — PROGRESS NOTES
[facility] Hospital Medicine  Progress Note    Patient Name: Emilia Melgoza  MRN: [id number]  Patient Class: IP- Inpatient   Admission Date: 12/17/2019  Length of Stay: 1 days  Attending Physician: Tamika Calvin MD  Primary Care Provider: Jerson Price MD      Subjective:     Principal Problem:Failure to thrive in adult      HPI:  74 y.o. 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  She is also not taking her medications.  She appears to be more confused than usual.  Had a recent tooth extraction by the dentist and was unable to take prescribed clindamycin.  Daughter reports patient has indicated mouth pain. Also recently prescribed Macrobid for UTI and was unable to complete.  Daughter denies that the patient has had a fever.  History further limited secondary to the patient's condition.  In the ED, she was found to be tachycardic with hypernatremia, hypokalemia, mild elevation of CPK and troponin.  Urinalysis, CT head, chest x-ray, and EKG were all unremarkable for any acute abnormality.  She appears clinically dehydrated.  Placed in observation for further evaluation and treatment.    Overview/Hospital Course:  74 y.o.  AAF with history of stroke (2013 - residual L sided paralysis/paresthesia and aphasia) and vascular dementia, as well as HTN. She presented with her daughter who reports that shortly after thanksgiving her mother who was usually ambulatory with assist, could eat her food and feed herself once food was prepped, stopped eating, started hoarding medications, and was less attentive. She gradually became weaker whereas she could not stand.  According to her daughter who is at the bedside she assumed that the change in mental status was due to tooth-ache bottom left and right therefore she scheduled dentist appointment. She had BL lower oral extraction on Thursday 12/12/19 was ordered clindamycin but has not been able to get the patient to take the medication. She denies fever, chills, melena, hematochezia, report of HA or CP, diaphoresis; sick contacts, long trips, or trauma.    Patient was found to have CXR no acute cardiopulmonary process, CT head showed no acute large infarct ---   Pertinient positives  -Abnormal urine WBC = 12, urine culture NGTD,   -CT soft tissue neck showed soft tissue swelling and edema involving the R anterior mandibular region suspicious for cellulitis  -US abdomen shows common bile duct dilated 1.2 cm - no intrahepatic biliary dilatation    Interval History: daughter at bedside. Says patient is still not at baseline. Not ambulating, eating or able to communicate as she once was. Interested in rehab placement.     Review of Systems   Unable to perform ROS: Patient nonverbal     Objective:     Vital Signs (Most Recent):  Temp: 98.3 °F (36.8 °C) (12/19/19 1152)  Pulse: 101 (12/19/19 1152)  Resp: 18 (12/19/19 1152)  BP: (!) 188/90 (12/19/19 1152)  SpO2: 100 % (12/19/19 1152) Vital Signs (24h Range):  Temp:  [98.2 °F (36.8 °C)-100.3 °F (37.9 °C)] 98.3 °F (36.8 °C)  Pulse:  [] 101  Resp:  [16-20] 18  SpO2:  [98 %-100 %] 100 %  BP: (135-202)/(79-97) 188/90     Weight: 66 kg (145 lb 8.1 oz)  Body mass index is 24.21 kg/m².    Intake/Output Summary (Last 24 hours) at 12/19/2019 1447  Last data filed at 12/19/2019 0811  Gross per 24 hour   Intake 0 ml   Output --   Net 0 ml      Physical Exam   Constitutional: She appears well-developed and well-nourished. No distress.   HENT:   Head: Normocephalic and atraumatic.   Eyes: Conjunctivae are normal.   Neck: Neck supple.   Cardiovascular: Normal rate, regular rhythm and normal heart  sounds.   Pulmonary/Chest: Effort normal.   Breath sounds mildly coarse bilaterally   Abdominal: Soft. Bowel sounds are normal. She exhibits no distension.   Musculoskeletal: She exhibits no edema.   Neurological: She is alert.   Nursing note and vitals reviewed.      Significant Labs:   BMP:   Recent Labs   Lab 12/19/19  0602   GLU 95      K 3.1*      CO2 28   BUN 8   CREATININE 0.7   CALCIUM 9.0   MG 1.6     CBC:   Recent Labs   Lab 12/17/19  1544 12/18/19  0350 12/19/19  0602   WBC 10.36 10.16 9.92   HGB 10.9* 10.0* 10.3*   HCT 34.3* 31.5* 32.7*    226 219       Significant Imaging: I have reviewed all pertinent imaging results/findings within the past 24 hours.      Assessment/Plan:      * Failure to thrive in adult  CM consulted for SNF placment. Nutrition consulted for possible need of parenteral nutrition.      Common bile duct dilatation  Benign. She has been evaluated by GI.      Cellulitis of mouth  Unlikely  CT soft tissue neck showed soft tissue swelling and edema involving the R anterior mandibular region -findings concerning for cellulitis at the site of the one of her oral extractions - this is likely inflammation secondary to her recent oral procedure. We will complete her course of abx.       Hypokalemia  Likely secondary to poor p.o. Intake. IV replacement with IVFs and prn.       Acute encephalopathy  12/17/19  Likely secondary to malnutrition and dehydration from poor p.o. intake, continue supportive care with IV fluids.  -SLP consult  -GI consult    12/18/19  Patient with continued altered mental status with unclear etiology ---WORKUP IN PROGRESS - daughter at the bedside - baseline unclear has some underlying dementia and progressive dementia is in the DD; although probably contributory doubt it is the sole culprit of patient's symptoms due to acute decline in status --- daughter reports a more active ambulatory patient at baseline - she had Abnormal urine WBC = 12, urine  culture NGTD, CT soft tissue neck showed soft tissue swelling and edema involving the R anterior mandibular region suspicious for cellulitis --US abdomen shows common bile duct dilated 1.2 cm - no intrahepatic biliary dilatation. GI consulted who did not think that the US abdomen finding was pertinent to patient's current altered mental status. Ammonia level normal  -CT soft tissue did show findings concerning for cellulitis at the site of the one of her oral extractions - will start antibiotics UpToDate recommends clindamycin plus Levaquin for PCN allergic patients  -Monitor closely  -seen by SLP who recommended NPO status for now  -IV abx above will likely cover UTI - culture NGTD - monitor closely  -Neurology consulted  -CTa head to rule out stroke    12/19- work up benign. Awaiting neurology.    Mobility impaired  Family says she has declined. PT/ OT ordered.     Acute cystitis without hematuria  Secondary to E.Coli. Low colony count. F/u sensitivities.       Broca's aphasia  Nonverbal at baseline    Hemiparesis affecting right side as late effect of cerebrovascular accident  At baseline, no acute issue. Fall precautions.     Essential hypertension  Uncontrolled as she is unable to take PO meds due to dysphagia/ failure to thrive. Will increase clonidine patch from 0.2 to 0.3 mg weekly. Continue IV hydralazine prn.     Chronic diastolic congestive heart failure  Stable. Monitor for fluid overload.        VTE Risk Mitigation (From admission, onward)         Ordered     IP VTE HIGH RISK PATIENT  Once      12/17/19 2235     Place sequential compression device  Until discontinued      12/17/19 2235                Tamika Calvin MD  Department of Hospital Medicine   [facility]

## 2019-12-20 NOTE — ASSESSMENT & PLAN NOTE
C/w plavix and statin when patient able to swallow  Can give aspirin per rectum until patient able to swallow

## 2019-12-20 NOTE — ASSESSMENT & PLAN NOTE
Likely due to toxic metabolic encephalopathy  R/o infectious causes  Recommend MRI brain with and without contrast

## 2019-12-21 PROBLEM — K83.8 COMMON BILE DUCT DILATATION: Status: RESOLVED | Noted: 2019-12-18 | Resolved: 2019-12-21

## 2019-12-21 LAB
ALBUMIN SERPL BCP-MCNC: 3.1 G/DL (ref 3.5–5.2)
ALP SERPL-CCNC: 58 U/L (ref 55–135)
ALT SERPL W/O P-5'-P-CCNC: 70 U/L (ref 10–44)
ANION GAP SERPL CALC-SCNC: 11 MMOL/L (ref 8–16)
AST SERPL-CCNC: 153 U/L (ref 10–40)
BASOPHILS # BLD AUTO: 0.01 K/UL (ref 0–0.2)
BASOPHILS NFR BLD: 0.1 % (ref 0–1.9)
BILIRUB SERPL-MCNC: 1.2 MG/DL (ref 0.1–1)
BUN SERPL-MCNC: 14 MG/DL (ref 8–23)
CALCIUM SERPL-MCNC: 8.4 MG/DL (ref 8.7–10.5)
CHLORIDE SERPL-SCNC: 103 MMOL/L (ref 95–110)
CO2 SERPL-SCNC: 23 MMOL/L (ref 23–29)
CREAT SERPL-MCNC: 0.8 MG/DL (ref 0.5–1.4)
DIFFERENTIAL METHOD: ABNORMAL
EOSINOPHIL # BLD AUTO: 0 K/UL (ref 0–0.5)
EOSINOPHIL NFR BLD: 0 % (ref 0–8)
ERYTHROCYTE [DISTWIDTH] IN BLOOD BY AUTOMATED COUNT: 17.7 % (ref 11.5–14.5)
EST. GFR  (AFRICAN AMERICAN): >60 ML/MIN/1.73 M^2
EST. GFR  (NON AFRICAN AMERICAN): >60 ML/MIN/1.73 M^2
GLUCOSE SERPL-MCNC: 74 MG/DL (ref 70–110)
HCT VFR BLD AUTO: 30.9 % (ref 37–48.5)
HGB BLD-MCNC: 10.4 G/DL (ref 12–16)
IMM GRANULOCYTES # BLD AUTO: 0.09 K/UL (ref 0–0.04)
IMM GRANULOCYTES NFR BLD AUTO: 1 % (ref 0–0.5)
LYMPHOCYTES # BLD AUTO: 1.1 K/UL (ref 1–4.8)
LYMPHOCYTES NFR BLD: 12.8 % (ref 18–48)
MCH RBC QN AUTO: 28.3 PG (ref 27–31)
MCHC RBC AUTO-ENTMCNC: 33.7 G/DL (ref 32–36)
MCV RBC AUTO: 84 FL (ref 82–98)
MONOCYTES # BLD AUTO: 0.7 K/UL (ref 0.3–1)
MONOCYTES NFR BLD: 7.8 % (ref 4–15)
NEUTROPHILS # BLD AUTO: 6.9 K/UL (ref 1.8–7.7)
NEUTROPHILS NFR BLD: 78.3 % (ref 38–73)
NRBC BLD-RTO: 0 /100 WBC
PLATELET # BLD AUTO: 168 K/UL (ref 150–350)
PMV BLD AUTO: 11 FL (ref 9.2–12.9)
POTASSIUM SERPL-SCNC: 3.4 MMOL/L (ref 3.5–5.1)
PROT SERPL-MCNC: 6.5 G/DL (ref 6–8.4)
RBC # BLD AUTO: 3.68 M/UL (ref 4–5.4)
SODIUM SERPL-SCNC: 137 MMOL/L (ref 136–145)
WBC # BLD AUTO: 8.84 K/UL (ref 3.9–12.7)

## 2019-12-21 PROCEDURE — 11000001 HC ACUTE MED/SURG PRIVATE ROOM

## 2019-12-21 PROCEDURE — 99233 SBSQ HOSP IP/OBS HIGH 50: CPT | Mod: ,,, | Performed by: PSYCHIATRY & NEUROLOGY

## 2019-12-21 PROCEDURE — 63600175 PHARM REV CODE 636 W HCPCS: Performed by: HOSPITALIST

## 2019-12-21 PROCEDURE — 99233 PR SUBSEQUENT HOSPITAL CARE,LEVL III: ICD-10-PCS | Mod: ,,, | Performed by: PSYCHIATRY & NEUROLOGY

## 2019-12-21 PROCEDURE — 25000003 PHARM REV CODE 250: Performed by: FAMILY MEDICINE

## 2019-12-21 PROCEDURE — 63600175 PHARM REV CODE 636 W HCPCS: Performed by: FAMILY MEDICINE

## 2019-12-21 PROCEDURE — S0077 INJECTION, CLINDAMYCIN PHOSP: HCPCS | Performed by: NURSE PRACTITIONER

## 2019-12-21 PROCEDURE — 97167 OT EVAL HIGH COMPLEX 60 MIN: CPT

## 2019-12-21 PROCEDURE — 80053 COMPREHEN METABOLIC PANEL: CPT

## 2019-12-21 PROCEDURE — 97162 PT EVAL MOD COMPLEX 30 MIN: CPT

## 2019-12-21 PROCEDURE — 25000003 PHARM REV CODE 250: Performed by: NURSE PRACTITIONER

## 2019-12-21 PROCEDURE — 63600175 PHARM REV CODE 636 W HCPCS: Performed by: NURSE PRACTITIONER

## 2019-12-21 PROCEDURE — 85025 COMPLETE CBC W/AUTO DIFF WBC: CPT

## 2019-12-21 PROCEDURE — 36415 COLL VENOUS BLD VENIPUNCTURE: CPT

## 2019-12-21 RX ORDER — FLUCONAZOLE 2 MG/ML
100 INJECTION, SOLUTION INTRAVENOUS ONCE
Status: COMPLETED | OUTPATIENT
Start: 2019-12-21 | End: 2019-12-21

## 2019-12-21 RX ORDER — PANTOPRAZOLE SODIUM 40 MG/10ML
40 INJECTION, POWDER, LYOPHILIZED, FOR SOLUTION INTRAVENOUS DAILY
Status: DISCONTINUED | OUTPATIENT
Start: 2019-12-22 | End: 2019-12-26

## 2019-12-21 RX ORDER — MAGNESIUM SULFATE 1 G/100ML
1 INJECTION INTRAVENOUS ONCE
Status: COMPLETED | OUTPATIENT
Start: 2019-12-21 | End: 2019-12-21

## 2019-12-21 RX ORDER — METOPROLOL TARTRATE 1 MG/ML
2.5 INJECTION, SOLUTION INTRAVENOUS EVERY 6 HOURS
Status: DISCONTINUED | OUTPATIENT
Start: 2019-12-21 | End: 2019-12-23

## 2019-12-21 RX ORDER — FLUCONAZOLE 150 MG/1
150 TABLET ORAL
Status: DISCONTINUED | OUTPATIENT
Start: 2019-12-21 | End: 2019-12-26

## 2019-12-21 RX ORDER — DIPHENHYDRAMINE HYDROCHLORIDE 50 MG/ML
12.5 INJECTION INTRAMUSCULAR; INTRAVENOUS EVERY 6 HOURS PRN
Status: DISCONTINUED | OUTPATIENT
Start: 2019-12-21 | End: 2019-12-26

## 2019-12-21 RX ADMIN — HYDROXYZINE HYDROCHLORIDE 50 MG: 50 INJECTION, SOLUTION INTRAMUSCULAR at 12:12

## 2019-12-21 RX ADMIN — CLINDAMYCIN IN 5 PERCENT DEXTROSE 600 MG: 12 INJECTION, SOLUTION INTRAVENOUS at 09:12

## 2019-12-21 RX ADMIN — MAGNESIUM SULFATE 1 G: 1 INJECTION INTRAVENOUS at 02:12

## 2019-12-21 RX ADMIN — METOPROLOL TARTRATE 2.5 MG: 5 INJECTION, SOLUTION INTRAVENOUS at 07:12

## 2019-12-21 RX ADMIN — DEXTROSE MONOHYDRATE, SODIUM CHLORIDE, AND POTASSIUM CHLORIDE: 50; 4.5; 2.98 INJECTION, SOLUTION INTRAVENOUS at 10:12

## 2019-12-21 RX ADMIN — HALOPERIDOL LACTATE 1 MG: 5 INJECTION, SOLUTION INTRAMUSCULAR at 09:12

## 2019-12-21 RX ADMIN — DEXTROSE MONOHYDRATE, SODIUM CHLORIDE, AND POTASSIUM CHLORIDE: 50; 4.5; 2.98 INJECTION, SOLUTION INTRAVENOUS at 12:12

## 2019-12-21 RX ADMIN — CLINDAMYCIN IN 5 PERCENT DEXTROSE 600 MG: 12 INJECTION, SOLUTION INTRAVENOUS at 12:12

## 2019-12-21 RX ADMIN — FLUCONAZOLE 100 MG: 2 INJECTION INTRAVENOUS at 01:12

## 2019-12-21 RX ADMIN — DIPHENHYDRAMINE HYDROCHLORIDE 12.5 MG: 50 INJECTION, SOLUTION INTRAMUSCULAR; INTRAVENOUS at 04:12

## 2019-12-21 RX ADMIN — HYDROXYZINE HYDROCHLORIDE 50 MG: 50 INJECTION, SOLUTION INTRAMUSCULAR at 10:12

## 2019-12-21 RX ADMIN — DIPHENHYDRAMINE HYDROCHLORIDE 12.5 MG: 50 INJECTION, SOLUTION INTRAMUSCULAR; INTRAVENOUS at 11:12

## 2019-12-21 RX ADMIN — CLINDAMYCIN IN 5 PERCENT DEXTROSE 600 MG: 12 INJECTION, SOLUTION INTRAVENOUS at 04:12

## 2019-12-21 RX ADMIN — LEVOFLOXACIN 750 MG: 750 INJECTION, SOLUTION INTRAVENOUS at 03:12

## 2019-12-21 NOTE — PT/OT/SLP PROGRESS
Speech Language Pathology Treatment    Patient Name:  Emilia Melgoza   MRN:  [id number]  Admitting Diagnosis: Failure to thrive in adult    Recommendations:                 General Recommendations:  Dysphagia therapy  Diet recommendations:  NPO, Liquid Diet Level: NPO   Aspiration Precautions: Alternate means of nutrition/hydration, Frequent oral care and Strict aspiration  precautions   General Precautions: Strict, aspiration, aphasia, fall  Communication strategies:  yes/no questions only, provide increased time to answer and   go to room if call light pushed;     Subjective     Pt in bed with eyes closed. Her family was present at bedside. Sternum rub performed; pt was responsive and demonstrated meager participation.   Objective:     Has the patient been evaluated by SLP for swallowing?    Yes   Keep patient NPO? Yes   Current Respiratory Status: room air      Pt aroused with light sternum rub, HOB adjusted to approx 90. Pt's mouth was swabbed x2 with ice cold water and x1 with lemon glycerin to clear oral cavity of bacteria. Following oral swab use, pt had audible wet vocal quality. She was given verbal cue to swallow, but made no attempts to swallow secretions from swabs, suction provided to clear. Family educated on importance of providing pt support with oral hygiene to decrease bacteria in oral cavity.     ST recs continuing NPO at this time and perhaps consideration be given to alternate feeding method. ST will follow for diet progression as tolerated by pt.      Assessment:     Emilia Melgoza is a 74 y.o. female with a medical diagnosis of failure to thrive. She presents with Aphasia, Dysphagia and Cognitive-Linguistic Impairment.  ST will continue to follow pending progress and acceptance of po trials.     Goals:   Multidisciplinary Problems     SLP Goals        Problem: SLP Goal    Goal Priority Disciplines Outcome   SLP Goal    Medium SLP Ongoing, Not Progressing   Description:  ST. Pt will  participate in ongoing swallow eval to determine least restrictive diet without overt s/s of aspiration.                    Plan:     · Patient to be seen:  3 x/week, 4 x/week   · Plan of Care expires:     · Plan of Care reviewed with:  patient, daughter   · SLP Follow-Up:  Yes       Discharge recommendations:  (pending progress)   Barriers to Discharge: no means of nutrition/hydration/medication    Time Tracking:     SLP Treatment Date:   12/20/19  Speech Start Time:  1715  Speech Stop Time:  1735     Speech Total Time (min):  20 min    Billable Minutes: Treatment Swallowing Dysfunction 20min    Sasha Arzate CF-SLP  12/20/2019

## 2019-12-21 NOTE — PROGRESS NOTES
[facility] Hospital Medicine  Progress Note    Patient Name: Emilia Melgoza  MRN: [id number]  Patient Class: IP- Inpatient   Admission Date: 12/17/2019  Length of Stay: 3 days  Attending Physician: Tamika Calvin MD  Primary Care Provider: Jerson Price MD      Subjective:     Principal Problem:Failure to thrive in adult    HPI:  74 y.o. 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  She is also not taking her medications.  She appears to be more confused than usual.  Had a recent tooth extraction by the dentist and was unable to take prescribed clindamycin.  Daughter reports patient has indicated mouth pain. Also recently prescribed Macrobid for UTI and was unable to complete.  Daughter denies that the patient has had a fever.  History further limited secondary to the patient's condition.  In the ED, she was found to be tachycardic with hypernatremia, hypokalemia, mild elevation of CPK and troponin.  Urinalysis, CT head, chest x-ray, and EKG were all unremarkable for any acute abnormality.  She appears clinically dehydrated.  Placed in observation for further evaluation and treatment.    Overview/Hospital Course:  74 y.o.  AAF with history of stroke (2013 - residual L sided paralysis/paresthesia and aphasia) and vascular dementia, as well as HTN. She presented with her daughter who reports that shortly after thanksgiving her mother who was usually ambulatory with assist, could eat her food and feed herself once food was prepped, stopped eating, started hoarding medications, and was less attentive. She gradually became weaker whereas she could not stand.  According to her daughter who is at the bedside she assumed that the change in mental status was due to tooth-ache bottom left and right therefore she scheduled dentist appointment. She had BL lower oral extraction on Thursday 12/12/19 was ordered clindamycin but has not been able to get the patient to take the medication. She denies fever, chills, melena, hematochezia, report of HA or CP, diaphoresis; sick contacts, long trips, or trauma.    Patient was found to have CXR no acute cardiopulmonary process, CT head showed no acute large infarct ---   Pertinient positives  -Abnormal urine WBC = 12, urine culture NGTD,   -CT soft tissue neck showed soft tissue swelling and edema involving the R anterior mandibular region suspicious for cellulitis  -US abdomen shows common bile duct dilated 1.2 cm - no intrahepatic biliary dilatation    Interval History: Son, daughter and sister at bedside. Her daughter feels she is understanding her environment better. She started having itching last night.     Review of Systems   Unable to perform ROS: Patient nonverbal     Objective:     Vital Signs (Most Recent):  Temp: 98.6 °F (37 °C) (12/21/19 1237)  Pulse: 103 (12/21/19 1237)  Resp: 18 (12/21/19 1237)  BP: (!) 165/84 (12/21/19 1237)  SpO2: 97 % (12/21/19 1237) Vital Signs (24h Range):  Temp:  [98.3 °F (36.8 °C)-99.1 °F (37.3 °C)] 98.6 °F (37 °C)  Pulse:  [] 103  Resp:  [17-22] 18  SpO2:  [94 %-100 %] 97 %  BP: (145-181)/(78-84) 165/84     Weight: 66 kg (145 lb 8.1 oz)  Body mass index is 24.21 kg/m².    Intake/Output Summary (Last 24 hours) at 12/21/2019 1244  Last data filed at 12/21/2019 0445  Gross per 24 hour   Intake 700 ml   Output 1 ml   Net 699 ml      Physical Exam   Constitutional: She appears well-developed and well-nourished. No distress.   HENT:   Head: Normocephalic and atraumatic.   Eyes: Conjunctivae are normal.   Neck: Neck supple.   Cardiovascular: Normal rate, regular rhythm and normal heart sounds.    Pulmonary/Chest: Effort normal.   Abdominal: Soft. Bowel sounds are normal. She exhibits no distension.   Musculoskeletal: She exhibits no edema.   Neurological: She is alert.   Psychiatric: She is hyperactive. She is noncommunicative.   Nursing note and vitals reviewed.      Significant Labs:   BMP:   Recent Labs   Lab 12/21/19  0455   GLU 74      K 3.4*      CO2 23   BUN 14   CREATININE 0.8   CALCIUM 8.4*     CBC:   Recent Labs   Lab 12/20/19  0432 12/21/19  0455   WBC 7.76 8.84   HGB 11.0* 10.4*   HCT 33.3* 30.9*    168       Significant Imaging: I have reviewed all pertinent imaging results/findings within the past 24 hours.      Assessment/Plan:      * Failure to thrive in adult  No change. Will need PEG and prison placement vs home with home health if no improvement in 24 to 48 hrs. Will await neurological work up and discuss options with family. Will also get barium swallow to assess capacity prior to PEG placement. PPI also ordered.       Cellulitis of mouth  Unlikely. Complete course of Clindamycin day 4.     Hypokalemia  Improved. Continue K+ riders prn.     Acute encephalopathy  Discussed with Dr. Umaña. Will get CT head today. Still plan on getting MRI- likely Monday due to the weekend schedule. Further recs afterwards.     Mobility impaired   PT/ OT ordered.     Acute cystitis without hematuria  Secondary to E.Coli. Low colony count. Continue Levaquin day 4.    Broca's aphasia  Nonverbal at baseline    Hemiparesis affecting right side as late effect of cerebrovascular accident  At baseline, no acute issue. Fall precautions.     Essential hypertension  Improved, but uncontrolled. Continue clonidine patch 0.3 mg weekly. Will schedule IV Lopressor. Continue IV hydralazine prn.      Chronic diastolic congestive heart failure  Stable. Monitor for fluid overload.      VTE Risk Mitigation (From admission, onward)         Ordered     IP VTE HIGH RISK PATIENT  Once      12/17/19 4194      Place sequential compression device  Until discontinued      12/17/19 0461                      Tamika Calvin MD  Department of Hospital Medicine   [facility]

## 2019-12-21 NOTE — CONSULTS
"  [facility]  Adult Nutrition  Consult Note    SUMMARY     Recommendations    1. Diet advancement per SLP, Cardiac restriction    2. If unable to advance diet within 48 hrs rec NGT for TF vs. PEG for LT needs:  - Isosource 1.5 initiated @ 10  ml/hr and advanced 10 ml q 4 hrs to goal rate of 40 ml/hr.   -Fluid flushes: 175 ml QID.   -TF to provide: 1440 kcal, 65g pro, 733 ml fluid.   -For LT needs:  4 cans/day bolus.      Goals: Initiate nutrition within 72 hrs  Nutrition Goal Status: new  Communication of RD Recs: reviewed with RN(POC)    Reason for Assessment    Reason For Assessment: consult  Diagnosis: (FTT)  Relevant Medical History: CVA, aphasia  Interdisciplinary Rounds: did not attend    General Information Comments: Family reports pt with decreased appetite over past few months, but worsened over past 2 weeks. Pt noted to have mouth infection, now dx with toxic metabolic encephalopathy. NPO x 3 days; NPO per SLP. Neurology following. NFPE : adequate fat mass, age appropriate LBM losses. Team having discussion r/t PEG.     Nutrition Discharge Planning: Too soon to determine    Nutrition Risk Screen    Nutrition Risk Screen: dysphagia or difficulty swallowing    Nutrition/Diet History    Spiritual, Cultural Beliefs, Hindu Practices, Values that Affect Care: no(Jain)  Factors Affecting Nutritional Intake: NPO, difficulty/impaired swallowing    Anthropometrics    Temp: 98.3 °F (36.8 °C)  Height Method: Stated  Height: 5' 5" (165.1 cm)  Height (inches): 65 in  Weight Method: Bed Scale  Weight: 66 kg (145 lb 8.1 oz)  Weight (lb): 145.51 lb  Ideal Body Weight (IBW), Female: 125 lb  % Ideal Body Weight, Female (lb): 116.41 %  BMI (Calculated): 24.2  BMI Grade: 18.5-24.9 - normal  Weight Loss: unintentional  Usual Body Weight (UBW), k.8 kg(2019)  % Usual Body Weight: 87.25  % Weight Change From Usual Weight: -12.93 %       Lab/Procedures/Meds    Pertinent Labs Reviewed: " reviewed  Pertinent Labs Comments: k 2.8  Pertinent Medications Reviewed: reviewed  Pertinent Medications Comments: abx, IVF    Estimated/Assessed Needs    Weight Used For Calorie Calculations: 66 kg (145 lb 8.1 oz)  Energy Calorie Requirements (kcal): 1450 kcal (x 1.25)  Energy Need Method: Doddridge-St Jeor  Protein Requirements: 66-80g  Weight Used For Protein Calculations: 66 kg (145 lb 8.1 oz)     Estimated Fluid Requirement Method: RDA Method  RDA Method (mL): 1450       Nutrition Prescription Ordered    Current Diet Order: NPO    Evaluation of Received Nutrient/Fluid Intake    IV Fluid (mL): 100(ml/hr 5% dextrose)  I/O: reviewed  Energy Calories Required: not meeting needs  Protein Required: not meeting needs  Fluid Required: (per MD)    % Meal Intake: NPO    Nutrition Risk    Level of Risk/Frequency of Follow-up: (2 x week)     Assessment and Plan    Nutrition Problem  Inadequate energy intake    Related to (etiology):   Dysphagia    Signs and Symptoms (as evidenced by):   SLP rec NPO    Interventions  Collaboration with providers    Nutrition Diagnosis Status:   New           Monitor and Evaluation    Food and Nutrient Intake: energy intake  Food and Nutrient Adminstration: diet order, enteral and parenteral nutrition administration  Anthropometric Measurements: weight, weight change  Biochemical Data, Medical Tests and Procedures: electrolyte and renal panel  Nutrition-Focused Physical Findings: overall appearance     Malnutrition Assessment       Subcutaneous Fat Loss (Final Summary): well nourished  Muscle Loss Evaluation (Final Summary): well nourished    Severe Weight Loss (Malnutrition): greater than 7.5% in 3 months(13% in 3 months)    Nutrition Follow-Up    RD Follow-up?: Yes

## 2019-12-21 NOTE — NURSING
Hydroxyzine dose provided at this time after pharmacy just delivered to unit. Will assess med effectiveness. Patients diaper remains clean. Fall precautions maintained.

## 2019-12-21 NOTE — PLAN OF CARE
Attempted further evaluation of swallow function for identification of least restrictive diet. Unable to assess with solid consistencies or liquids.

## 2019-12-21 NOTE — NURSING
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 Resp rate even, unlabored on RA. VSS. Patient in NAD. 3rd KCL rider infusing at this time out of the 4 that was previously ordered. Patient turned to side. Patient and family updated on plan of care. 3/4 rails up. Bed alarm on. Call light in reach with fall precautions maintained.

## 2019-12-21 NOTE — ASSESSMENT & PLAN NOTE
Discussed with Dr. Umaña. Will get CT head today. Still plan on getting MRI- likely Monday due to the weekend schedule. Further recs afterwards.

## 2019-12-21 NOTE — ASSESSMENT & PLAN NOTE
Improved, but uncontrolled. Continue clonidine patch 0.3 mg weekly. Will schedule IV Lopressor. Continue IV hydralazine prn.

## 2019-12-21 NOTE — PLAN OF CARE
Problem: Fall Injury Risk  Goal: Absence of Fall and Fall-Related Injury  Outcome: Ongoing, Progressing     Problem: Infection  Goal: Infection Symptom Resolution  Outcome: Ongoing, Progressing     Problem: Adult Inpatient Plan of Care  Goal: Plan of Care Review  Outcome: Ongoing, Progressing  Goal: Patient-Specific Goal (Individualization)  Outcome: Ongoing, Progressing  Goal: Absence of Hospital-Acquired Illness or Injury  Outcome: Ongoing, Progressing  Goal: Optimal Comfort and Wellbeing  Outcome: Ongoing, Progressing  Goal: Readiness for Transition of Care  Outcome: Ongoing, Progressing  Goal: Rounds/Family Conference  Outcome: Ongoing, Progressing     Problem: Coping Ineffective  Goal: Effective Coping  Outcome: Ongoing, Progressing     Problem: Skin Injury Risk Increased  Goal: Skin Health and Integrity  Outcome: Ongoing, Progressing     Problem: Thought Process Alteration  Goal: Optimal Thought Clarity  Outcome: Ongoing, Progressing    Patient and family educated on plan of care early in shift to encourage participation in care. Bedside hourly rounding implemented to ensure patient safety, comfort, pain control/relief. Fall precautions maintained including 3/4 rails up, bed alarm on, call light and personal belongings within reach

## 2019-12-21 NOTE — SUBJECTIVE & OBJECTIVE
Interval History: Son, daughter and sister at bedside. Her daughter feels she is understanding her environment better. She started having itching last night.     Review of Systems   Unable to perform ROS: Patient nonverbal     Objective:     Vital Signs (Most Recent):  Temp: 98.6 °F (37 °C) (12/21/19 1237)  Pulse: 103 (12/21/19 1237)  Resp: 18 (12/21/19 1237)  BP: (!) 165/84 (12/21/19 1237)  SpO2: 97 % (12/21/19 1237) Vital Signs (24h Range):  Temp:  [98.3 °F (36.8 °C)-99.1 °F (37.3 °C)] 98.6 °F (37 °C)  Pulse:  [] 103  Resp:  [17-22] 18  SpO2:  [94 %-100 %] 97 %  BP: (145-181)/(78-84) 165/84     Weight: 66 kg (145 lb 8.1 oz)  Body mass index is 24.21 kg/m².    Intake/Output Summary (Last 24 hours) at 12/21/2019 1244  Last data filed at 12/21/2019 0445  Gross per 24 hour   Intake 700 ml   Output 1 ml   Net 699 ml      Physical Exam   Constitutional: She appears well-developed and well-nourished. No distress.   HENT:   Head: Normocephalic and atraumatic.   Eyes: Conjunctivae are normal.   Neck: Neck supple.   Cardiovascular: Normal rate, regular rhythm and normal heart sounds.   Pulmonary/Chest: Effort normal.   Abdominal: Soft. Bowel sounds are normal. She exhibits no distension.   Musculoskeletal: She exhibits no edema.   Neurological: She is alert.   Psychiatric: She is hyperactive. She is noncommunicative.   Nursing note and vitals reviewed.      Significant Labs:   BMP:   Recent Labs   Lab 12/21/19  0455   GLU 74      K 3.4*      CO2 23   BUN 14   CREATININE 0.8   CALCIUM 8.4*     CBC:   Recent Labs   Lab 12/20/19  0432 12/21/19  0455   WBC 7.76 8.84   HGB 11.0* 10.4*   HCT 33.3* 30.9*    168       Significant Imaging: I have reviewed all pertinent imaging results/findings within the past 24 hours.

## 2019-12-21 NOTE — PLAN OF CARE
Recommendations     1. Diet advancement per SLP, Cardiac restriction    2. If unable to advance diet within 48 hrs rec NGT for TF vs. PEG for LT needs:  - Isosource 1.5 initiated @ 10  ml/hr and advanced 10 ml q 4 hrs to goal rate of 40 ml/hr.   -Fluid flushes: 175 ml QID.   -TF to provide: 1440 kcal, 65g pro, 733 ml fluid.   -For LT needs:  4 cans/day bolus.       Goals: Initiate nutrition within 72 hrs  Nutrition Goal Status: new  Communication of RD Recs: reviewed with RN(POC)

## 2019-12-21 NOTE — PLAN OF CARE
Problem: Occupational Therapy Goal  Goal: Occupational Therapy Goal  Description  Goals to be met by: 01/04/20     Patient will increase functional independence with ADLs by performing:    UE Dressing with Maximum Assistance.  Grooming while seated with Moderate Assistance.  Sitting at edge of bed x10 minutes with Moderate Assistance.  Rolling to Bilateral with Moderate Assistance.   Supine to sit with Moderate Assistance.  Upper extremity exercise program x7 reps per handout, with assistance as needed.  Pt will visually scan for 3 out 5 ADL objects with 50% verbal cueing in order to increase active participation with ADLs.      Outcome: Ongoing, Progressing  Pt tolerated sitting EOB with total A for approx. 15 min. Despite max encouragement from many family members, pt was resistive to any ROM with max A to hold her L hand to avoid scratching herself.

## 2019-12-21 NOTE — PT/OT/SLP PROGRESS
Occupational Therapy      Patient Name:  Emilia Melgoza   MRN:  [id number]    Patient not seen today secondary to Other (Comment)(Pt off the floor for medical testing (CT scan)). Will follow-up later as able.    Marquita Virk OT  12/21/2019

## 2019-12-21 NOTE — PT/OT/SLP PROGRESS
Physical Therapy      Patient Name:  Emilia Melgoza   MRN:  [id number]    Patient not seen at this time for PT eval secondary to Unavailable (pt off unit for medical procedures). Will follow-up as able.    Camelia Gardner, PT

## 2019-12-21 NOTE — NURSING
Patient having new onset itching to bilateral legs. Dr Luna made aware and orders obtained for hydroxyzine. Also aware that patient pulled IV out and unable to restablish IV access after charge RN and patient's RN attempted at placement. Awaiting Kadie, nursing supervisor to attempt IV access

## 2019-12-21 NOTE — ASSESSMENT & PLAN NOTE
No change. Will need PEG and long term placement vs home with home health if no improvement in 24 to 48 hrs. Will await neurological work up and discuss options with family. Will also get barium swallow to assess capacity prior to PEG placement.

## 2019-12-21 NOTE — PT/OT/SLP EVAL
"Physical Therapy Evaluation    Patient Name:  Emilia Melgoza   MRN:  [id number]    Recommendations:     Discharge Recommendations:  nursing facility, skilled   Discharge Equipment Recommendations: wheelchair, bedside commode   Barriers to discharge home: Decreased caregiver support and Pt with decreased cognition and mobility.    Assessment:     Emilia Melgoza is a 74 y.o. female admitted with a medical diagnosis of Failure to thrive in adult.  She presents with the following impairments/functional limitations:  weakness, impaired self care skills, impaired functional mobilty, impaired balance, impaired cognition, decreased coordination, decreased lower extremity function, decreased upper extremity function, decreased safety awareness.    Rehab Prognosis: Fair; patient would benefit from acute skilled PT services to address these deficits and reach maximum level of function.    Recent Surgery: * No surgery found *      Plan:     During this hospitalization, patient to be seen 5 x/week to address the identified rehab impairments via gait training, therapeutic activities, therapeutic exercises and progress toward the following goals:    · Plan of Care Expires:  01/04/20    Subjective     Chief Complaint: N/A  Patient/Family Comments/goals: Family reported that pt is usually a funny lady, able to say "yes" and "no".    Pain/Comfort:  · Pain Rating 1: 0/10    Living Environment:  Pt lives with spouse (who has a BKA) and daughter (works) in a SS house with no concerns.   Prior to admission, patients level of function was ambulatory with rollator.  Pt was standing to take a shower.  Equipment used at home: rollator, shower chair.  Upon discharge, patient will have assistance from children.    Objective:     Communicated with nurse Yeung prior to session.  Patient found HOB elevated with peripheral IV, SCD, telemetry  upon PT entry to room.    General Precautions: Standard, fall, aphasia   Orthopedic " Precautions:N/A   Braces: N/A     Exams:  · Cognitive Exam:  Patient with h/o aphasia, unable to answer questions.  Pt not responding to her name being called.   · Gross Motor Coordination:  impaired  · Postural Exam:  Patient presented with the following abnormalities:    · -       No postural abnormalities identified  · Skin Integrity/Edema:      · -       Skin integrity: Visible skin intact  · -       Edema: None noted BLE  · BLE ROM: PROM WFL  · BLE Strength: unable to assess 2* pt with AMS and unable to follow commands; Pt keeping B ankle in PF with toes flexed in bed.      Functional Mobility:  Pt found alert with eyes opened with constant movement of LUE pulling on the gown and scratching her chest.  Family reported that pt has been constantly scratching herself, nurse Esperanza is present to administer IV benadryl.  Upon sitting EOB, pt tightly closed her eyes.  Multiple family members present and attempting to engage pt, however pt did not respond to their effort.  Pt unable to follow any simple commands to actively participate in therapy.    · Bed Mobility:     · Scooting: dependence and of 2 persons  · Bridging: dependence and of 2 persons  · Supine to Sit: dependence and of 2 persons  · Sit to Supine: dependence and of 2 persons  · Balance: Pt with poor static sit balance.  Pt was mod-max A to maintain sit balance, able to briefly maintain sit balance before LOB to the L side and requiring total assistance to bring trunk to midline.        Therapeutic Activities and Exercises:  Family educated on acute skilled PT services/goals.      AM-PAC 6 CLICK MOBILITY  Total Score:8     Patient left HOB elevated and BLE elevated on pillow with all lines intact, call button in reach, bed alarm on and nurse Esperanza and multiple family members present.  SCD placed.      GOALS:   Multidisciplinary Problems     Physical Therapy Goals        Problem: Physical Therapy Goal    Goal Priority Disciplines Outcome Goal Variances  Interventions   Physical Therapy Goal     PT, PT/OT Ongoing, Progressing     Description:  Goals to be met by: 20     Patient will increase functional independence with mobility by performin. Supine to sit with Moderate Assistance  2. Sit to supine with Moderate Assistance  3. Rolling to Left and Right with Moderate Assistance  4. Sitting at edge of bed x30 minutes with Minimal Assistance  5. Lower extremity exercise program x10 reps per handout, with assistance as needed                      History:     Past Medical History:   Diagnosis Date    Allergy     DDD (degenerative disc disease), lumbar     Chronic LBP and intermittent RLE radicular pain x 10 yrs    Hypertension     Overactive bladder     Senile cataract, unspecified - Both Eyes 2013    Stroke     right sided weakness       Past Surgical History:   Procedure Laterality Date    bladder mesh      BREAST BIOPSY Left     CATARACT EXTRACTION W/  INTRAOCULAR LENS IMPLANT Right 10/13/2014    Dr Crystal    CATARACT EXTRACTION W/  INTRAOCULAR LENS IMPLANT Left 11/10/2014    Dr Crystal    HEMORRHOID SURGERY      HYSTERECTOMY      INCONTINENCE SURGERY      OOPHORECTOMY      Right Rotator Cuff Repair         Time Tracking:     PT Received On: 19  PT Start Time: 1632     PT Stop Time: 1650  PT Total Time (min): 18 min     Billable Minutes: Evaluation 18 min co-eval with OT      Camelia Gardner, PT  2019

## 2019-12-21 NOTE — NURSING
Pharmacy made aware hydroxyzine dose remains not available for administration. Patient provided bedside bath and seems more comfortable after bath and lotion applied. Will provided prn dose once medication is available. Family member remains present at bedside

## 2019-12-21 NOTE — PT/OT/SLP EVAL
Occupational Therapy   Evaluation    Name: Emilia Melgoza  MRN: [id number]  Admitting Diagnosis:  Failure to thrive in adult      Recommendations:     Discharge Recommendations: nursing facility, skilled  Discharge Equipment Recommendations:  (TBD)  Barriers to discharge:  Decreased caregiver support    Assessment:     Emilia Melgoza is a 74 y.o. female with a medical diagnosis of Failure to thrive in adult. Performance deficits affecting function: weakness, impaired functional mobilty, impaired cognition, decreased safety awareness, impaired coordination, impaired endurance, impaired fine motor, impaired balance, decreased upper extremity function, abnormal tone, impaired self care skills, decreased lower extremity function, decreased ROM.      Pt tolerated sitting EOB with total A for approx. 15 min. Despite max encouragement from many family members, pt was resistive to any ROM with max A to hold her L hand to avoid scratching herself.     Rehab Prognosis: Fair; patient would benefit from acute skilled OT services to address these deficits and reach maximum level of function.       Plan:     Patient to be seen 5 x/week to address the above listed problems via self-care/home management, therapeutic activities, therapeutic exercises  · Plan of Care Expires: 01/04/20  · Plan of Care Reviewed with: patient, son, daughter, sibling    Subjective     Chief Complaint: pt non-verbal during session- did not appear to be in pain  Patient/Family Comments/goals: family reports constant scratching/movement of L arm; nurse present to administer Benadryl     Occupational Profile:  Living Environment: Pt lives with her  and daughter in a SS house with no steps at entry. Bathroom set-up: walk-in shower with shower chair.   Previous level of function: Pt was ambulatory with rollator with some assistance up until November; in December is when family reports that she has stopped eating and declined in health with  "inability to stand. Pt was able to feed herself with set-up, make her needs known to family/ say "yes" or "no," and  the shower to shower herself prior to December 2019.  Roles and Routines: loves to watch tv and sit on the porch outside. Per chart review, pt had 2 CVAs September and October 2013 and R rotator cuff tear. Pt received OP OT in 2016 and 2018.  Equipment Used at Home:  shower chair, rollator  Assistance upon Discharge: very limited: pt's  has R BKA with medical issues; pt has 5 adult children who all work    Pain/Comfort:  · Pain Rating 1: 0/10    Patients cultural, spiritual, Buddhism conflicts given the current situation: no    Objective:     Communicated with: nurse, Anjana, prior to session.  Patient found HOB elevated with SCD, peripheral IV upon OT entry to room.    General Precautions: Standard, aphasia, aspiration, fall, NPO   Orthopedic Precautions:N/A   Braces: N/A     Occupational Performance:    Bed Mobility:    · Patient completed Rolling/Turning to Left with  total assistance and 2 persons  · Patient completed Scooting/Bridging with total assistance and 2 persons  · Patient completed Supine to Sit with total assistance and 2 persons  · Patient completed Sit to Supine with total assistance and 2 persons    Functional Mobility/Transfers:  · Functional Mobility: Not appropriate to assess 2* poor sitting balance.     Activities of Daily Living:  · Upper Body Dressing: total assistance to adjust gown  · Lower Body Dressing: total assistance to maxwell sock     Cognitive/Visual Perceptual:  Cognitive/Psychosocial Skills:     -       Oriented to: pt unable to state her name and barely opened her eyes to her family's verbal cueing    -       Follows Commands/attention:poor participation- limited to no following commands  -       Communication: Broca's aphasia per chart review  -       Memory: Impaired STM, Impaired LTM and Poor immediate recall  -       Safety awareness/insight to " disability: impaired   -       Mood/Affect/Coping skills/emotional control: very tense with flexed limbs throughout session  Visual/Perceptual:      -unable to asses as pt sat with eyes closed most of session with limited participation      Physical Exam:  Balance:    -       seated: TOTAL A  Postural examination/scapula alignment:    -       Rounded shoulders  -       Forward head  -       Abnormal trunk flexion  Skin integrity: Visible skin intact  Edema:  no BUE edema noted  Sensation: unable to assess 2* limited participation  Dominant hand:    -       Right hand (per chart review)  Upper Extremity Range of Motion:     -       Right/Left Upper Extremity: pt was very tense throughout session, keeping arms in flexed position with inability to relax despite increased time and cueing. Some voluntary movement noted L>R UE  Upper Extremity Strength:    -       Right/Left Upper Extremity: unable to assess 2* pt not participatory with MMT; not participatory with sitting balance nor bed mobility    Strength:    -       Right Upper Extremity: able to maintain grasp to son's hand   -       Left Upper Extremity: able to maintain tight  onto OT hand to avoid scratching   Fine Motor Coordination:    -       Impaired  Left hand thumb/finger opposition skills  , Right hand thumb/finger opposition skills  , Left hand, manipulation of objects   and Right hand, manipulation of objects    Gross motor coordination:   impaired; per chart review, R side hemiparesis     AMPAC 6 Click ADL:  AMPAC Total Score: 6    Treatment & Education:  Pt and family educated on OT role/POC.    White board updated   Self-care tasks/functional mobility completed- assistance level noted above   All questions/concerns answered within OT scope of practice     Education:    Patient left HOB elevated with all lines intact, call button in reach, bed alarm on and nurseEsperanza, and many family members (daughter, daughter-in-law, son, and sister)  present    GOALS:   Multidisciplinary Problems     Occupational Therapy Goals        Problem: Occupational Therapy Goal    Goal Priority Disciplines Outcome Interventions   Occupational Therapy Goal     OT, PT/OT Ongoing, Progressing    Description:  Goals to be met by: 01/04/20     Patient will increase functional independence with ADLs by performing:    UE Dressing with Maximum Assistance.  Grooming while seated with Moderate Assistance.  Sitting at edge of bed x10 minutes with Moderate Assistance.  Rolling to Bilateral with Moderate Assistance.   Supine to sit with Moderate Assistance.  Upper extremity exercise program x7 reps per handout, with assistance as needed.  Pt will visually scan for 3 out 5 ADL objects with 50% verbal cueing in order to increase active participation with ADLs.                       History:     Past Medical History:   Diagnosis Date    Allergy     DDD (degenerative disc disease), lumbar     Chronic LBP and intermittent RLE radicular pain x 10 yrs    Hypertension     Overactive bladder     Senile cataract, unspecified - Both Eyes 6/11/2013    Stroke     right sided weakness       Past Surgical History:   Procedure Laterality Date    bladder mesh      BREAST BIOPSY Left     CATARACT EXTRACTION W/  INTRAOCULAR LENS IMPLANT Right 10/13/2014    Dr Crystal    CATARACT EXTRACTION W/  INTRAOCULAR LENS IMPLANT Left 11/10/2014    Dr Crystal    HEMORRHOID SURGERY      HYSTERECTOMY      INCONTINENCE SURGERY      OOPHORECTOMY      Right Rotator Cuff Repair         Time Tracking:     OT Date of Treatment: 12/21/19  OT Start Time: 1633  OT Stop Time: 1651  OT Total Time (min): 18 min    Billable Minutes:Evaluation 18 min (co-eval with PT)    Marquita Virk OT  12/21/2019

## 2019-12-21 NOTE — SUBJECTIVE & OBJECTIVE
Subjective:     Interval History: No acute overnight events. Spoke to family at bedside, they state patient stopped eating 2 weeks ago an is no longer talking. They state she would say a few words before. She has had a mixed aphasia affecting expression and comprehension since her stroke in 2013. Family feels that patient cannot swallow as she coughs up phlegm when they try to feed her.    Current Neurological Medications:     Current Facility-Administered Medications   Medication Dose Route Frequency Provider Last Rate Last Dose    clindamycin 600 MG/50 ML D5W 600 mg/50 mL IVPB 600 mg  600 mg Intravenous Q8H Deena Vizcaino,  mL/hr at 12/21/19 1250 600 mg at 12/21/19 1250    cloNIDine 0.3 mg/24 hr td ptwk 1 patch  1 patch Transdermal Q7 Days Tamika Calvin MD   1 patch at 12/20/19 0930    dextrose 5 % and 0.45 % NaCl with KCl 40 mEq infusion   Intravenous Continuous Tamika Calvin  mL/hr at 12/21/19 1245      escitalopram oxalate tablet 10 mg  10 mg Oral Daily Enrrique Faulkner MD        fluconazole in NaCl 100 MG/50 ML IV PGBK  100 mg Intravenous Once Tamika Calvin MD        fluconazole tablet 150 mg  150 mg Oral Q72H PRN Tamika Calvin MD        haloperidol lactate injection 1 mg  1 mg Intravenous Q6H PRN Francisco Tee Jr., NP   1 mg at 12/20/19 1213    hydrALAZINE injection 5 mg  5 mg Intravenous Q6H PRN Tamika Calvin MD        levoFLOXacin 750 mg/150 mL IVPB 750 mg  750 mg Intravenous Q24H Deena Vizcaino  mL/hr at 12/21/19 0317 750 mg at 12/21/19 0317    magnesium sulfate in dextrose IVPB (premix) 1 g  1 g Intravenous Once Tamika Calvin MD        memantine tablet 10 mg  10 mg Oral Daily Enrrique Faulkner MD        metoprolol injection 2.5 mg  2.5 mg Intravenous Q6H Tamika Calvin MD        sodium chloride 0.9% flush 10 mL  10 mL Intravenous PRN Enrrique Faulkner MD           Review of Systems - unable to obtain as patient currently  nonverbal  Objective:     Vital Signs (Most Recent):  Temp: 98.6 °F (37 °C) (12/21/19 1237)  Pulse: 103 (12/21/19 1237)  Resp: 18 (12/21/19 1237)  BP: (!) 165/84 (12/21/19 1237)  SpO2: 97 % (12/21/19 1237) Vital Signs (24h Range):  Temp:  [98.3 °F (36.8 °C)-99.1 °F (37.3 °C)] 98.6 °F (37 °C)  Pulse:  [] 103  Resp:  [17-22] 18  SpO2:  [94 %-100 %] 97 %  BP: (145-181)/(78-84) 165/84     Weight: 66 kg (145 lb 8.1 oz)  Body mass index is 24.21 kg/m².    Physical Exam  Mental status: awake, alert, currently nonverbal, currently not following commands. Exam limited as patient unable to follow commands  CN: PERRL, moves eyes in all directions, right facial droop  Motor:   Moves all extremities at least 3/5        Reflexes: 3+ right ankle jerk, 3+ right knee jerk, 3+ right biceps, otherwise 2+ throughout  Sensory: withdraws to painful stimuli x 4 extremities  Coordination: patient unable to follow command for this       Significant Labs: Reviewed    Significant Imaging: Reviewed

## 2019-12-21 NOTE — PLAN OF CARE
Problem: Physical Therapy Goal  Goal: Physical Therapy Goal  Description  Goals to be met by: 20     Patient will increase functional independence with mobility by performin. Supine to sit with Moderate Assistance  2. Sit to supine with Moderate Assistance  3. Rolling to Left and Right with Moderate Assistance  4. Sitting at edge of bed x30 minutes with Minimal Assistance  5. Lower extremity exercise program x10 reps per handout, with assistance as needed     Outcome: Ongoing, Progressing    Pt was dep of 2 persons for bed mobility, h/o CVA with aphasia.  Pt unable to follow simple commands at this time to actively participate in therapy.

## 2019-12-21 NOTE — PROGRESS NOTES
[facility] [place]  Neurology  Progress Note    Patient Name: Emilia Melgoza  MRN: [id number]  Admission Date: 12/17/2019  Hospital Length of Stay: 3 days  Code Status: Full Code   Attending Provider: Tamika Calvin MD  Primary Care Physician: Jerson Price MD   Principal Problem:Failure to thrive in adult      Subjective:     Interval History: No acute overnight events. Spoke to family at bedside, they state patient stopped eating 2 weeks ago an is no longer talking. They state she would say a few words before. She has had a mixed aphasia affecting expression and comprehension since her stroke in 2013. Family feels that patient cannot swallow as she coughs up phlegm when they try to feed her.    Current Neurological Medications:     Current Facility-Administered Medications   Medication Dose Route Frequency Provider Last Rate Last Dose    clindamycin 600 MG/50 ML D5W 600 mg/50 mL IVPB 600 mg  600 mg Intravenous Q8H NISH RosadoP 100 mL/hr at 12/21/19 1250 600 mg at 12/21/19 1250    cloNIDine 0.3 mg/24 hr td ptwk 1 patch  1 patch Transdermal Q7 Days Tamika Calvin MD   1 patch at 12/20/19 0930    dextrose 5 % and 0.45 % NaCl with KCl 40 mEq infusion   Intravenous Continuous Tamika Calvin  mL/hr at 12/21/19 1245      escitalopram oxalate tablet 10 mg  10 mg Oral Daily Enrrique Faulkner MD        fluconazole in NaCl 100 MG/50 ML IV PGBK  100 mg Intravenous Once Tamika Calvin MD        fluconazole tablet 150 mg  150 mg Oral Q72H PRN Tamika Calvin MD        haloperidol lactate injection 1 mg  1 mg Intravenous Q6H PRN Francisco Tee Jr., NP   1 mg at 12/20/19 1213    hydrALAZINE injection 5 mg  5 mg Intravenous Q6H PRN Tamkia Calvin MD        levoFLOXacin 750 mg/150 mL IVPB 750 mg  750 mg Intravenous Q24H NISH RosadoP 100 mL/hr at 12/21/19 0317 750 mg at 12/21/19 0317    magnesium sulfate in dextrose IVPB (premix) 1 g  1 g Intravenous Once  Tamika Calvin MD        memantine tablet 10 mg  10 mg Oral Daily Enrrique Faulkner MD        metoprolol injection 2.5 mg  2.5 mg Intravenous Q6H Tamika Calvin MD        sodium chloride 0.9% flush 10 mL  10 mL Intravenous PRN Enrrique Faulkner MD           Review of Systems - unable to obtain as patient currently nonverbal  Objective:     Vital Signs (Most Recent):  Temp: 98.6 °F (37 °C) (12/21/19 1237)  Pulse: 103 (12/21/19 1237)  Resp: 18 (12/21/19 1237)  BP: (!) 165/84 (12/21/19 1237)  SpO2: 97 % (12/21/19 1237) Vital Signs (24h Range):  Temp:  [98.3 °F (36.8 °C)-99.1 °F (37.3 °C)] 98.6 °F (37 °C)  Pulse:  [] 103  Resp:  [17-22] 18  SpO2:  [94 %-100 %] 97 %  BP: (145-181)/(78-84) 165/84     Weight: 66 kg (145 lb 8.1 oz)  Body mass index is 24.21 kg/m².    Physical Exam  Mental status: awake, alert, currently nonverbal, currently not following commands. Exam limited as patient unable to follow commands  CN: PERRL, moves eyes in all directions, right facial droop  Motor:   Moves all extremities at least 3/5        Reflexes: 3+ right ankle jerk, 3+ right knee jerk, 3+ right biceps, otherwise 2+ throughout  Sensory: withdraws to painful stimuli x 4 extremities  Coordination: patient unable to follow command for this       Significant Labs: Reviewed    Significant Imaging: Reviewed    Assessment and Plan:     Acute encephalopathy  As patient not following commands this morning, recommend CT head today. However, this could be due to waxing and waning mental status.  Likely due to toxic metabolic encephalopathy  R/o infectious causes  Recommend MRI brain with and without contrast  Family would like further testing for difficulty swallowing, I explained this could be due to prior stroke and dementia, but would consider MBS to r/o alternative causes    Hemiparesis affecting right side as late effect of cerebrovascular accident  C/w plavix and statin when patient able to swallow  Can give aspirin  per rectum until patient able to swallow      Plan discussed in detail with primary team  VTE Risk Mitigation (From admission, onward)         Ordered     IP VTE HIGH RISK PATIENT  Once      12/17/19 2235     Place sequential compression device  Until discontinued      12/17/19 2235                Belgica Lucas DO  Neurology  [facility]

## 2019-12-21 NOTE — ASSESSMENT & PLAN NOTE
As patient not following commands this morning, recommend CT head today. However, this could be due to waxing and waning mental status.  Likely due to toxic metabolic encephalopathy  R/o infectious causes  Recommend MRI brain with and without contrast  Family would like further testing for difficulty swallowing, I explained this could be due to prior stroke and dementia, but would consider MBS to r/o alternative causes

## 2019-12-22 PROBLEM — N17.9 AKI (ACUTE KIDNEY INJURY): Status: ACTIVE | Noted: 2019-12-22

## 2019-12-22 LAB
ALBUMIN SERPL BCP-MCNC: 2.9 G/DL (ref 3.5–5.2)
ALP SERPL-CCNC: 55 U/L (ref 55–135)
ALT SERPL W/O P-5'-P-CCNC: 68 U/L (ref 10–44)
ANION GAP SERPL CALC-SCNC: 6 MMOL/L (ref 8–16)
AST SERPL-CCNC: 152 U/L (ref 10–40)
BASOPHILS # BLD AUTO: 0.01 K/UL (ref 0–0.2)
BASOPHILS NFR BLD: 0.1 % (ref 0–1.9)
BILIRUB SERPL-MCNC: 1.1 MG/DL (ref 0.1–1)
BUN SERPL-MCNC: 11 MG/DL (ref 8–23)
CALCIUM SERPL-MCNC: 7.9 MG/DL (ref 8.7–10.5)
CHLORIDE SERPL-SCNC: 103 MMOL/L (ref 95–110)
CO2 SERPL-SCNC: 24 MMOL/L (ref 23–29)
CREAT SERPL-MCNC: 0.7 MG/DL (ref 0.5–1.4)
DIFFERENTIAL METHOD: ABNORMAL
EOSINOPHIL # BLD AUTO: 0 K/UL (ref 0–0.5)
EOSINOPHIL NFR BLD: 0.2 % (ref 0–8)
ERYTHROCYTE [DISTWIDTH] IN BLOOD BY AUTOMATED COUNT: 18.3 % (ref 11.5–14.5)
EST. GFR  (AFRICAN AMERICAN): >60 ML/MIN/1.73 M^2
EST. GFR  (NON AFRICAN AMERICAN): >60 ML/MIN/1.73 M^2
GLUCOSE SERPL-MCNC: 101 MG/DL (ref 70–110)
HCT VFR BLD AUTO: 29 % (ref 37–48.5)
HGB BLD-MCNC: 9.6 G/DL (ref 12–16)
IMM GRANULOCYTES # BLD AUTO: 0.09 K/UL (ref 0–0.04)
IMM GRANULOCYTES NFR BLD AUTO: 1.1 % (ref 0–0.5)
LYMPHOCYTES # BLD AUTO: 1.1 K/UL (ref 1–4.8)
LYMPHOCYTES NFR BLD: 12.4 % (ref 18–48)
MCH RBC QN AUTO: 28.6 PG (ref 27–31)
MCHC RBC AUTO-ENTMCNC: 33.1 G/DL (ref 32–36)
MCV RBC AUTO: 86 FL (ref 82–98)
MONOCYTES # BLD AUTO: 0.7 K/UL (ref 0.3–1)
MONOCYTES NFR BLD: 8.3 % (ref 4–15)
NEUTROPHILS # BLD AUTO: 6.6 K/UL (ref 1.8–7.7)
NEUTROPHILS NFR BLD: 77.9 % (ref 38–73)
NRBC BLD-RTO: 0 /100 WBC
PLATELET # BLD AUTO: 156 K/UL (ref 150–350)
PMV BLD AUTO: 11.1 FL (ref 9.2–12.9)
POTASSIUM SERPL-SCNC: 3.7 MMOL/L (ref 3.5–5.1)
PROT SERPL-MCNC: 6.2 G/DL (ref 6–8.4)
RBC # BLD AUTO: 3.36 M/UL (ref 4–5.4)
SODIUM SERPL-SCNC: 133 MMOL/L (ref 136–145)
WBC # BLD AUTO: 8.48 K/UL (ref 3.9–12.7)

## 2019-12-22 PROCEDURE — 63600175 PHARM REV CODE 636 W HCPCS: Performed by: NURSE PRACTITIONER

## 2019-12-22 PROCEDURE — 11000001 HC ACUTE MED/SURG PRIVATE ROOM

## 2019-12-22 PROCEDURE — 97535 SELF CARE MNGMENT TRAINING: CPT

## 2019-12-22 PROCEDURE — 25000003 PHARM REV CODE 250: Performed by: FAMILY MEDICINE

## 2019-12-22 PROCEDURE — 85025 COMPLETE CBC W/AUTO DIFF WBC: CPT

## 2019-12-22 PROCEDURE — 25000003 PHARM REV CODE 250: Performed by: NURSE PRACTITIONER

## 2019-12-22 PROCEDURE — 99232 SBSQ HOSP IP/OBS MODERATE 35: CPT | Mod: ,,, | Performed by: PSYCHIATRY & NEUROLOGY

## 2019-12-22 PROCEDURE — 63600175 PHARM REV CODE 636 W HCPCS: Performed by: FAMILY MEDICINE

## 2019-12-22 PROCEDURE — 80053 COMPREHEN METABOLIC PANEL: CPT

## 2019-12-22 PROCEDURE — 92610 EVALUATE SWALLOWING FUNCTION: CPT

## 2019-12-22 PROCEDURE — 99232 PR SUBSEQUENT HOSPITAL CARE,LEVL II: ICD-10-PCS | Mod: ,,, | Performed by: PSYCHIATRY & NEUROLOGY

## 2019-12-22 PROCEDURE — S0077 INJECTION, CLINDAMYCIN PHOSP: HCPCS | Performed by: NURSE PRACTITIONER

## 2019-12-22 PROCEDURE — 36415 COLL VENOUS BLD VENIPUNCTURE: CPT

## 2019-12-22 PROCEDURE — 63600175 PHARM REV CODE 636 W HCPCS: Performed by: HOSPITALIST

## 2019-12-22 PROCEDURE — 92507 TX SP LANG VOICE COMM INDIV: CPT

## 2019-12-22 PROCEDURE — C9113 INJ PANTOPRAZOLE SODIUM, VIA: HCPCS | Performed by: FAMILY MEDICINE

## 2019-12-22 RX ORDER — HYDROXYZINE 50 MG/ML
50 INJECTION, SOLUTION INTRAMUSCULAR EVERY 6 HOURS PRN
Status: COMPLETED | OUTPATIENT
Start: 2019-12-22 | End: 2019-12-22

## 2019-12-22 RX ADMIN — LEVOFLOXACIN 750 MG: 750 INJECTION, SOLUTION INTRAVENOUS at 03:12

## 2019-12-22 RX ADMIN — DIPHENHYDRAMINE HYDROCHLORIDE 12.5 MG: 50 INJECTION, SOLUTION INTRAMUSCULAR; INTRAVENOUS at 05:12

## 2019-12-22 RX ADMIN — DEXTROSE MONOHYDRATE, SODIUM CHLORIDE, AND POTASSIUM CHLORIDE: 50; 4.5; 2.98 INJECTION, SOLUTION INTRAVENOUS at 10:12

## 2019-12-22 RX ADMIN — DIPHENHYDRAMINE HYDROCHLORIDE 12.5 MG: 50 INJECTION, SOLUTION INTRAMUSCULAR; INTRAVENOUS at 08:12

## 2019-12-22 RX ADMIN — CLINDAMYCIN IN 5 PERCENT DEXTROSE 600 MG: 12 INJECTION, SOLUTION INTRAVENOUS at 01:12

## 2019-12-22 RX ADMIN — PANTOPRAZOLE SODIUM 40 MG: 40 INJECTION, POWDER, LYOPHILIZED, FOR SOLUTION INTRAVENOUS at 08:12

## 2019-12-22 RX ADMIN — METOPROLOL TARTRATE 2.5 MG: 5 INJECTION, SOLUTION INTRAVENOUS at 12:12

## 2019-12-22 RX ADMIN — DEXTROSE MONOHYDRATE, SODIUM CHLORIDE, AND POTASSIUM CHLORIDE: 50; 4.5; 2.98 INJECTION, SOLUTION INTRAVENOUS at 09:12

## 2019-12-22 RX ADMIN — CLINDAMYCIN IN 5 PERCENT DEXTROSE 600 MG: 12 INJECTION, SOLUTION INTRAVENOUS at 07:12

## 2019-12-22 RX ADMIN — HYDROXYZINE HYDROCHLORIDE 50 MG: 50 INJECTION, SOLUTION INTRAMUSCULAR at 05:12

## 2019-12-22 RX ADMIN — CLINDAMYCIN IN 5 PERCENT DEXTROSE 600 MG: 12 INJECTION, SOLUTION INTRAVENOUS at 03:12

## 2019-12-22 RX ADMIN — METOPROLOL TARTRATE 2.5 MG: 5 INJECTION, SOLUTION INTRAVENOUS at 05:12

## 2019-12-22 NOTE — PROGRESS NOTES
[facility] Hospital Medicine  Progress Note    Patient Name: Emilia Melgoza  MRN: [id number]  Patient Class: IP- Inpatient   Admission Date: 12/17/2019  Length of Stay: 4 days  Attending Physician: Tamika Calvin MD  Primary Care Provider: Jerson Price MD      Subjective:     Principal Problem:Failure to thrive in adult      HPI:  74 y.o. 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  She is also not taking her medications.  She appears to be more confused than usual.  Had a recent tooth extraction by the dentist and was unable to take prescribed clindamycin.  Daughter reports patient has indicated mouth pain. Also recently prescribed Macrobid for UTI and was unable to complete.  Daughter denies that the patient has had a fever.  History further limited secondary to the patient's condition.  In the ED, she was found to be tachycardic with hypernatremia, hypokalemia, mild elevation of CPK and troponin.  Urinalysis, CT head, chest x-ray, and EKG were all unremarkable for any acute abnormality.  She appears clinically dehydrated.  Placed in observation for further evaluation and treatment.    Overview/Hospital Course:  74 y.o.  AAF with history of stroke (2013 - residual L sided paralysis/paresthesia and aphasia) and vascular dementia, as well as HTN. She presented with her daughter who reports that shortly after thanksgiving her mother who was usually ambulatory with assist, could eat her food and feed herself once food was prepped, stopped eating, started hoarding medications, and was less attentive. She gradually became weaker whereas she could not stand.  According to her daughter who is at the bedside she assumed that the change in mental status was due to tooth-ache bottom left and right therefore she scheduled dentist appointment. She had BL lower oral extraction on Thursday 12/12/19 was ordered clindamycin but has not been able to get the patient to take the medication. She denies fever, chills, melena, hematochezia, report of HA or CP, diaphoresis; sick contacts, long trips, or trauma.    Patient was found to have CXR no acute cardiopulmonary process, CT head showed no acute large infarct ---   Pertinient positives  -Abnormal urine WBC = 12, urine culture NGTD,   -CT soft tissue neck showed soft tissue swelling and edema involving the R anterior mandibular region suspicious for cellulitis  -US abdomen shows common bile duct dilated 1.2 cm - no intrahepatic biliary dilatation    Interval History:  No acute events overnight. Daughter at bedside voices family would like proceed with PEG placement.     Review of Systems   Unable to perform ROS: Patient nonverbal     Objective:     Vital Signs (Most Recent):  Temp: 96.7 °F (35.9 °C) (12/22/19 0800)  Pulse: 85 (12/22/19 0839)  Resp: 18 (12/22/19 0800)  BP: 132/70 (12/22/19 0839)  SpO2: 99 % (12/22/19 0813) Vital Signs (24h Range):  Temp:  [96.7 °F (35.9 °C)-98.9 °F (37.2 °C)] 96.7 °F (35.9 °C)  Pulse:  [] 85  Resp:  [18] 18  SpO2:  [79 %-99 %] 99 %  BP: (132-174)/(70-87) 132/70     Weight: 66 kg (145 lb 8.1 oz)  Body mass index is 24.21 kg/m².    Intake/Output Summary (Last 24 hours) at 12/22/2019 1117  Last data filed at 12/21/2019 1900  Gross per 24 hour   Intake 1198.33 ml   Output --   Net 1198.33 ml      Physical Exam   Constitutional: She appears well-developed and well-nourished. No distress.   HENT:   Head: Normocephalic and atraumatic.   Eyes: Conjunctivae are normal.   Neck: Neck supple.   Cardiovascular: Normal rate, regular rhythm and normal heart sounds.   Pulmonary/Chest: Effort normal.    Abdominal: Soft. Bowel sounds are normal. She exhibits no distension.   Musculoskeletal: She exhibits no edema.   Neurological: She is alert.   Psychiatric: She is noncommunicative.   Nursing note and vitals reviewed.      Significant Labs:   BMP:   Recent Labs   Lab 12/22/19  0517      *   K 3.7      CO2 24   BUN 11   CREATININE 0.7   CALCIUM 7.9*     CBC:   Recent Labs   Lab 12/21/19  0455 12/22/19  0517   WBC 8.84 8.48   HGB 10.4* 9.6*   HCT 30.9* 29.0*    156       Significant Imaging: I have reviewed all pertinent imaging results/findings within the past 24 hours.      Assessment/Plan:      * Failure to thrive in adult  Will await neurological work up and discuss plan of care options with family. Bedside swallow will be done by nursing today with pudding to see if patient can tolerate as family member is eager to get some food in her today. Will get in touch with GI for PEG placement tomorrow.       Cellulitis of mouth  Unlikely. Complete course of Clindamycin day 5.     Hypokalemia  Resolved after IV magnesium supplementation given.     Acute encephalopathy  MRI tomorrow. Further recs afterwards.     Mobility impaired   PT/ OT to assess rehab potential.     Acute cystitis without hematuria  Secondary to E.Coli. Low colony count. Continue Levaquin day 5.    Broca's aphasia  Nonverbal at baseline    Hemiparesis affecting right side as late effect of cerebrovascular accident  At baseline, no acute issue. Fall precautions.     Essential hypertension  Stable. Continue clonidine patch 0.3 mg weekly and IV Lopressor 5 mg q6hr. Continue IV hydralazine prn.      Chronic diastolic congestive heart failure  Stable. Monitor for fluid overload.      VTE Risk Mitigation (From admission, onward)         Ordered     IP VTE HIGH RISK PATIENT  Once      12/17/19 2235     Place sequential compression device  Until discontinued      12/17/19 2235              Tamika Calvin MD  Department of  Hospital Medicine [facility]

## 2019-12-22 NOTE — PLAN OF CARE
Problem: Fall Injury Risk  Goal: Absence of Fall and Fall-Related Injury  Outcome: Ongoing, Progressing  Intervention: Identify and Manage Contributors to Fall Injury Risk  Flowsheets (Taken 12/22/2019 1637)  Self-Care Promotion: independence encouraged; BADL personal objects within reach; BADL personal routines maintained  Medication Review/Management: medications reviewed; high risk medications identified     Problem: Infection  Goal: Infection Symptom Resolution  Outcome: Ongoing, Progressing     Problem: Adult Inpatient Plan of Care  Goal: Plan of Care Review  Outcome: Ongoing, Progressing  Flowsheets (Taken 12/22/2019 1637)  Plan of Care Reviewed With: patient  Goal: Patient-Specific Goal (Individualization)  Outcome: Ongoing, Progressing  Goal: Absence of Hospital-Acquired Illness or Injury  Outcome: Ongoing, Progressing  Intervention: Identify and Manage Fall Risk  Flowsheets (Taken 12/22/2019 1637)  Safety Promotion/Fall Prevention: assistive device/personal item within reach; medications reviewed; side rails raised x 2; room near unit station; instructed to call staff for mobility; nonskid shoes/socks when out of bed  Goal: Optimal Comfort and Wellbeing  Outcome: Ongoing, Progressing  Goal: Readiness for Transition of Care  Outcome: Ongoing, Progressing  Goal: Rounds/Family Conference  Outcome: Ongoing, Progressing     Problem: Coping Ineffective  Goal: Effective Coping  Outcome: Ongoing, Progressing     Problem: Skin Injury Risk Increased  Goal: Skin Health and Integrity  Outcome: Ongoing, Progressing  Intervention: Optimize Skin Protection  Flowsheets (Taken 12/22/2019 1637)  Pressure Reduction Techniques: weight shift assistance provided  Head of Bed (HOB): HOB at 30-45 degrees     Problem: Thought Process Alteration  Goal: Optimal Thought Clarity  Outcome: Ongoing, Progressing     Problem: Fall Injury Risk  Goal: Absence of Fall and Fall-Related Injury  Outcome: Ongoing, Progressing  Intervention:  Identify and Manage Contributors to Fall Injury Risk  Flowsheets (Taken 12/22/2019 1637)  Self-Care Promotion: independence encouraged; BADL personal objects within reach; BADL personal routines maintained  Medication Review/Management: medications reviewed; high risk medications identified     Problem: Infection  Goal: Infection Symptom Resolution  Outcome: Ongoing, Progressing     Problem: Adult Inpatient Plan of Care  Goal: Plan of Care Review  Outcome: Ongoing, Progressing  Flowsheets (Taken 12/22/2019 1637)  Plan of Care Reviewed With: patient  Goal: Patient-Specific Goal (Individualization)  Outcome: Ongoing, Progressing  Goal: Absence of Hospital-Acquired Illness or Injury  Outcome: Ongoing, Progressing  Intervention: Identify and Manage Fall Risk  Flowsheets (Taken 12/22/2019 1637)  Safety Promotion/Fall Prevention: assistive device/personal item within reach; medications reviewed; side rails raised x 2; room near unit station; instructed to call staff for mobility; nonskid shoes/socks when out of bed  Goal: Optimal Comfort and Wellbeing  Outcome: Ongoing, Progressing  Goal: Readiness for Transition of Care  Outcome: Ongoing, Progressing  Goal: Rounds/Family Conference  Outcome: Ongoing, Progressing     Problem: Coping Ineffective  Goal: Effective Coping  Outcome: Ongoing, Progressing     Problem: Skin Injury Risk Increased  Goal: Skin Health and Integrity  Outcome: Ongoing, Progressing  Intervention: Optimize Skin Protection  Flowsheets (Taken 12/22/2019 1637)  Pressure Reduction Techniques: weight shift assistance provided  Head of Bed (HOB): HOB at 30-45 degrees     Problem: Thought Process Alteration  Goal: Optimal Thought Clarity  Outcome: Ongoing, Progressing     Problem: Fall Injury Risk  Goal: Absence of Fall and Fall-Related Injury  Outcome: Ongoing, Progressing  Intervention: Identify and Manage Contributors to Fall Injury Risk  Flowsheets (Taken 12/22/2019 1637)  Self-Care Promotion: independence  encouraged; BADL personal objects within reach; BADL personal routines maintained  Medication Review/Management: medications reviewed; high risk medications identified     Problem: Infection  Goal: Infection Symptom Resolution  Outcome: Ongoing, Progressing     Problem: Adult Inpatient Plan of Care  Goal: Plan of Care Review  Outcome: Ongoing, Progressing  Flowsheets (Taken 12/22/2019 1637)  Plan of Care Reviewed With: patient  Goal: Patient-Specific Goal (Individualization)  Outcome: Ongoing, Progressing  Goal: Absence of Hospital-Acquired Illness or Injury  Outcome: Ongoing, Progressing  Intervention: Identify and Manage Fall Risk  Flowsheets (Taken 12/22/2019 1637)  Safety Promotion/Fall Prevention: assistive device/personal item within reach; medications reviewed; side rails raised x 2; room near unit station; instructed to call staff for mobility; nonskid shoes/socks when out of bed  Goal: Optimal Comfort and Wellbeing  Outcome: Ongoing, Progressing  Goal: Readiness for Transition of Care  Outcome: Ongoing, Progressing  Goal: Rounds/Family Conference  Outcome: Ongoing, Progressing     Problem: Coping Ineffective  Goal: Effective Coping  Outcome: Ongoing, Progressing     Problem: Skin Injury Risk Increased  Goal: Skin Health and Integrity  Outcome: Ongoing, Progressing  Intervention: Optimize Skin Protection  Flowsheets (Taken 12/22/2019 1637)  Pressure Reduction Techniques: weight shift assistance provided  Head of Bed (HOB): HOB at 30-45 degrees     Problem: Thought Process Alteration  Goal: Optimal Thought Clarity  Outcome: Ongoing, Progressing     Problem: Fall Injury Risk  Goal: Absence of Fall and Fall-Related Injury  Outcome: Ongoing, Progressing  Intervention: Identify and Manage Contributors to Fall Injury Risk  Flowsheets (Taken 12/22/2019 1637)  Self-Care Promotion: independence encouraged; BADL personal objects within reach; BADL personal routines maintained  Medication Review/Management: medications  reviewed; high risk medications identified     Problem: Infection  Goal: Infection Symptom Resolution  Outcome: Ongoing, Progressing     Problem: Adult Inpatient Plan of Care  Goal: Plan of Care Review  Outcome: Ongoing, Progressing  Flowsheets (Taken 12/22/2019 1637)  Plan of Care Reviewed With: patient  Goal: Patient-Specific Goal (Individualization)  Outcome: Ongoing, Progressing  Goal: Absence of Hospital-Acquired Illness or Injury  Outcome: Ongoing, Progressing  Intervention: Identify and Manage Fall Risk  Flowsheets (Taken 12/22/2019 1637)  Safety Promotion/Fall Prevention: assistive device/personal item within reach; medications reviewed; side rails raised x 2; room near unit station; instructed to call staff for mobility; nonskid shoes/socks when out of bed  Goal: Optimal Comfort and Wellbeing  Outcome: Ongoing, Progressing  Goal: Readiness for Transition of Care  Outcome: Ongoing, Progressing  Goal: Rounds/Family Conference  Outcome: Ongoing, Progressing     Problem: Coping Ineffective  Goal: Effective Coping  Outcome: Ongoing, Progressing     Problem: Skin Injury Risk Increased  Goal: Skin Health and Integrity  Outcome: Ongoing, Progressing  Intervention: Optimize Skin Protection  Flowsheets (Taken 12/22/2019 1637)  Pressure Reduction Techniques: weight shift assistance provided  Head of Bed (HOB): HOB at 30-45 degrees     Problem: Thought Process Alteration  Goal: Optimal Thought Clarity  Outcome: Ongoing, Progressing     Problem: Fall Injury Risk  Goal: Absence of Fall and Fall-Related Injury  Outcome: Ongoing, Progressing  Intervention: Identify and Manage Contributors to Fall Injury Risk  Flowsheets (Taken 12/22/2019 1637)  Self-Care Promotion: independence encouraged; BADL personal objects within reach; BADL personal routines maintained  Medication Review/Management: medications reviewed; high risk medications identified     Problem: Infection  Goal: Infection Symptom Resolution  Outcome: Ongoing,  Progressing     Problem: Adult Inpatient Plan of Care  Goal: Plan of Care Review  Outcome: Ongoing, Progressing  Flowsheets (Taken 12/22/2019 1637)  Plan of Care Reviewed With: patient  Goal: Patient-Specific Goal (Individualization)  Outcome: Ongoing, Progressing  Goal: Absence of Hospital-Acquired Illness or Injury  Outcome: Ongoing, Progressing  Intervention: Identify and Manage Fall Risk  Flowsheets (Taken 12/22/2019 1637)  Safety Promotion/Fall Prevention: assistive device/personal item within reach; medications reviewed; side rails raised x 2; room near unit station; instructed to call staff for mobility; nonskid shoes/socks when out of bed  Goal: Optimal Comfort and Wellbeing  Outcome: Ongoing, Progressing  Goal: Readiness for Transition of Care  Outcome: Ongoing, Progressing  Goal: Rounds/Family Conference  Outcome: Ongoing, Progressing     Problem: Coping Ineffective  Goal: Effective Coping  Outcome: Ongoing, Progressing     Problem: Skin Injury Risk Increased  Goal: Skin Health and Integrity  Outcome: Ongoing, Progressing  Intervention: Optimize Skin Protection  Flowsheets (Taken 12/22/2019 1637)  Pressure Reduction Techniques: weight shift assistance provided  Head of Bed (HOB): HOB at 30-45 degrees     Problem: Thought Process Alteration  Goal: Optimal Thought Clarity  Outcome: Ongoing, Progressing   Iv abx remains in progress,total assist with adl's,incontinent of b/b,ion care done,reposition q2hrs,safety maintained,continue monitoring.

## 2019-12-22 NOTE — PLAN OF CARE
12/22/19 No significant improvement in swallow function, oral aversion and holding persist. ST RECS: NPO except ice for stimulation, consider alternate means of nutrition Francisca Myers, CCC-SLP

## 2019-12-22 NOTE — PLAN OF CARE
Problem: Fall Injury Risk  Goal: Absence of Fall and Fall-Related Injury  Outcome: Ongoing, Progressing  Intervention: Identify and Manage Contributors to Fall Injury Risk  Flowsheets (Taken 12/21/2019 1834)  Self-Care Promotion: independence encouraged; BADL personal objects within reach; BADL personal routines maintained  Medication Review/Management: medications reviewed; high risk medications identified     Problem: Infection  Goal: Infection Symptom Resolution  Outcome: Ongoing, Progressing     Problem: Adult Inpatient Plan of Care  Goal: Plan of Care Review  Outcome: Ongoing, Progressing  Flowsheets (Taken 12/21/2019 1834)  Plan of Care Reviewed With: patient; family  Goal: Patient-Specific Goal (Individualization)  Outcome: Ongoing, Progressing  Goal: Absence of Hospital-Acquired Illness or Injury  Outcome: Ongoing, Progressing  Intervention: Identify and Manage Fall Risk  Flowsheets (Taken 12/21/2019 1834)  Safety Promotion/Fall Prevention: assistive device/personal item within reach; side rails raised x 2; instructed to call staff for mobility; nonskid shoes/socks when out of bed; medications reviewed; high risk medications identified  Goal: Optimal Comfort and Wellbeing  Outcome: Ongoing, Progressing  Goal: Readiness for Transition of Care  Outcome: Ongoing, Progressing  Goal: Rounds/Family Conference  Outcome: Ongoing, Progressing     Problem: Coping Ineffective  Goal: Effective Coping  Outcome: Ongoing, Progressing     Problem: Skin Injury Risk Increased  Goal: Skin Health and Integrity  Outcome: Ongoing, Progressing     Problem: Thought Process Alteration  Goal: Optimal Thought Clarity  Outcome: Ongoing, Progressing   Iv abx remains in progress,no s/s adverse reaction noted,no s/s pain noted,total assist with adl's,safety maintained,continue monitoring.

## 2019-12-22 NOTE — PROGRESS NOTES
[facility] - [place], [place]  Neurology  Progress Note    Patient Name: Emilia Melgoza  MRN: [id number]  Admission Date: 12/17/2019  Hospital Length of Stay: 4 days  Code Status: Full Code   Attending Provider: Tamika Calvin MD  Primary Care Physician: Jerson Price MD   Principal Problem:Failure to thrive in adult      Subjective:     Interval History: No acute overnight events. CT head yesterday showed no acute findings    Current Neurological Medications:     Current Facility-Administered Medications   Medication Dose Route Frequency Provider Last Rate Last Dose    clindamycin 600 MG/50 ML D5W 600 mg/50 mL IVPB 600 mg  600 mg Intravenous Q8H NISH RosadoP 100 mL/hr at 12/22/19 0347 600 mg at 12/22/19 0347    cloNIDine 0.3 mg/24 hr td ptwk 1 patch  1 patch Transdermal Q7 Days Tamika Calvin MD   1 patch at 12/20/19 0930    dextrose 5 % and 0.45 % NaCl with KCl 40 mEq infusion   Intravenous Continuous Tamika Calvin  mL/hr at 12/22/19 0916      diphenhydrAMINE injection 12.5 mg  12.5 mg Intravenous Q6H PRN Tamika Calvin MD   12.5 mg at 12/22/19 0821    escitalopram oxalate tablet 10 mg  10 mg Oral Daily Enrrique Faulkner MD        fluconazole tablet 150 mg  150 mg Oral Q72H PRN Tamika Calvin MD        hydrALAZINE injection 5 mg  5 mg Intravenous Q6H PRN Tamika Calvin MD        levoFLOXacin 750 mg/150 mL IVPB 750 mg  750 mg Intravenous Q24H Deena Vizcaino  mL/hr at 12/22/19 0341 750 mg at 12/22/19 0341    memantine tablet 10 mg  10 mg Oral Daily Enrrique Faulkner MD        metoprolol injection 2.5 mg  2.5 mg Intravenous Q6H Tamika Calvin MD   2.5 mg at 12/22/19 0517    pantoprazole injection 40 mg  40 mg Intravenous Daily Tamika Calvin MD   40 mg at 12/22/19 0819    sodium chloride 0.9% flush 10 mL  10 mL Intravenous PRN Enrrique Faulkner MD           Review of Systems - unable to perform due to patient's mental status  Objective:      Vital Signs (Most Recent):  Temp: 96.7 °F (35.9 °C) (12/22/19 0800)  Pulse: 85 (12/22/19 0839)  Resp: 18 (12/22/19 0800)  BP: 132/70 (12/22/19 0839)  SpO2: 99 % (12/22/19 0813) Vital Signs (24h Range):  Temp:  [96.7 °F (35.9 °C)-98.9 °F (37.2 °C)] 96.7 °F (35.9 °C)  Pulse:  [] 85  Resp:  [18] 18  SpO2:  [79 %-99 %] 99 %  BP: (132-174)/(70-87) 132/70     Weight: 66 kg (145 lb 8.1 oz)  Body mass index is 24.21 kg/m².    Physical Exam  Mental status: awake, alert, currently nonverbal, currently not following commands. Exam limited as patient unable to follow commands  CN: PERRL, moves eyes in all directions, right facial droop  Motor:   Moves all extremities at least 3/5  Sensory: withdraws to painful stimuli x 4 extremities  Coordination: patient unable to follow command for this            Significant Labs: Reviewed    Significant Imaging: Reviewed    Assessment and Plan:     Acute encephalopathy  CT head showed no acute findings  Likely due to toxic metabolic encephalopathy  R/o infectious causes  Recommend MRI brain with and without contrast  Family would like further testing for difficulty swallowing, I explained this could be due to prior stroke and dementia, but would consider MBS to r/o alternative causes    Hemiparesis affecting right side as late effect of cerebrovascular accident  C/w plavix and statin when patient able to swallow  Can give aspirin per rectum until patient able to swallow        VTE Risk Mitigation (From admission, onward)         Ordered     IP VTE HIGH RISK PATIENT  Once      12/17/19 2235     Place sequential compression device  Until discontinued      12/17/19 2235                Belgica Lucas DO  Neurology  [facility]

## 2019-12-22 NOTE — ASSESSMENT & PLAN NOTE
CT head showed no acute findings  Likely due to toxic metabolic encephalopathy  R/o infectious causes  Recommend MRI brain with and without contrast  Family would like further testing for difficulty swallowing, I explained this could be due to prior stroke and dementia, but would consider MBS to r/o alternative causes

## 2019-12-22 NOTE — PLAN OF CARE
Problem: Fall Injury Risk  Goal: Absence of Fall and Fall-Related Injury  Outcome: Ongoing, Progressing     Problem: Infection  Goal: Infection Symptom Resolution  Outcome: Ongoing, Progressing     Problem: Adult Inpatient Plan of Care  Goal: Plan of Care Review  Outcome: Ongoing, Progressing  Goal: Patient-Specific Goal (Individualization)  Outcome: Ongoing, Progressing  Goal: Absence of Hospital-Acquired Illness or Injury  Outcome: Ongoing, Progressing  Goal: Optimal Comfort and Wellbeing  Outcome: Ongoing, Progressing  Goal: Readiness for Transition of Care  Outcome: Ongoing, Progressing  Goal: Rounds/Family Conference  Outcome: Ongoing, Progressing     Problem: Coping Ineffective  Goal: Effective Coping  Outcome: Ongoing, Progressing     Problem: Skin Injury Risk Increased  Goal: Skin Health and Integrity  Outcome: Ongoing, Progressing     Problem: Thought Process Alteration  Goal: Optimal Thought Clarity  Outcome: Ongoing, Progressing     Pt remained free from falls and injury during shift, IV abx administered per MD order, prn itch medication administered with mild relief, turned q 2, prn suction, family at bedside will continue to monitor.

## 2019-12-22 NOTE — ASSESSMENT & PLAN NOTE
Stable. Continue clonidine patch 0.3 mg weekly and IV Lopressor 5 mg q6hr. Continue IV hydralazine prn.

## 2019-12-22 NOTE — PROGRESS NOTES
Pt incontinent of urine, diaper soiled. Pt cleaned at this time. Positioned for comfort with turning wedge in use. Preventative sacral dressing remains CDI. Z-flex heel boots placed to BLE. No distress noted.

## 2019-12-22 NOTE — PT/OT/SLP EVAL
"Speech Language Pathology Evaluation  Bedside Swallow REASSESS    Patient Name:  Emilia Melgoza   MRN:  [id number]  Admitting Diagnosis: Failure to thrive in adult    Recommendations:                 General Recommendations:  Dysphagia therapy  Diet recommendations:  NPO, NPO except   Aspiration Precautions: Frequent oral care   General Precautions: Standard, aphasia, aspiration  Communication strategies:  yes/no questions only    History:     Past Medical History:   Diagnosis Date    Allergy     DDD (degenerative disc disease), lumbar     Chronic LBP and intermittent RLE radicular pain x 10 yrs    Hypertension     Overactive bladder     Senile cataract, unspecified - Both Eyes 6/11/2013    Stroke     right sided weakness       Past Surgical History:   Procedure Laterality Date    bladder mesh      BREAST BIOPSY Left     CATARACT EXTRACTION W/  INTRAOCULAR LENS IMPLANT Right 10/13/2014    Dr Crystal    CATARACT EXTRACTION W/  INTRAOCULAR LENS IMPLANT Left 11/10/2014    Dr Crystal    HEMORRHOID SURGERY      HYSTERECTOMY      INCONTINENCE SURGERY      OOPHORECTOMY      Right Rotator Cuff Repair         [facility] Course:  12/18 initial eval- "Emilia Melgoza is a 74 y.o. female with a dx of Acute encephalopathy. Pt with poor oral acceptance, inability to swallow own secretions, and absent swallow response to thin liquids. Rec: NPO and non-oral nutrition."  12/19- no significant change NPO  12/20- holding secretions NPO    Subjective   Family reporting they want PEG. Nursing and family with max encouragement for Pt to accept po.   Patient goals: per  family safe nutrition.     Pain/Comfort:  · Pain Rating 1: 0/10    Objective:     Oral Musculature Evaluation  · Oral Musculature: unable to assess due to poor participation/comprehension  · Dentition: scattered dentition, teeth in poor condition  · Secretion Management: problems swallowing secretions, oral holding, right corner drooling  · Oral Musculature " Comments: Pt refused to open mouth for oral [facility] exam. Per chart, pt's dentition are in poor condition    Bedside Swallow Eval:   Consistencies Assessed:  · Ice chips X2  · Puree attempted- Pt did not accept despite max tactile an verbal cuing.     Oral Phase:   · Oral aversion noted  · holding    Pharyngeal Phase:   · Pt not initiated pharyngeal phase of swallow    Compensatory Strategies  · None    Treatment: Pt opening eyes at times, gross oral aversion and holding     Assessment:     Emilia Melgoza is a 74 y.o. female with dx of Failure to thrive in adult she presents  with severe oral dysphagia c/b oral aversion and holding of bolus and decreased management of secretions.     Goals:   Multidisciplinary Problems     SLP Goals        Problem: SLP Goal    Goal Priority Disciplines Outcome   SLP Goal    Medium SLP Ongoing, Not Progressing   Description:  ST. Pt will participate in ongoing swallow eval to determine least restrictive diet without overt s/s of aspiration.                    Plan:     · Patient to be seen:  3 x/week, 4 x/week   · Plan of Care expires:     · Plan of Care reviewed with:  patient, daughter   · SLP Follow-Up:  Yes       Discharge recommendations:  (pending progress)   Barriers to Discharge:  Level of Skilled Assistance Needed .    Time Tracking:     SLP Treatment Date:   19  Speech Start Time:  1515  Speech Stop Time:  1540     Speech Total Time (min):  25 min    Billable Minutes: Treatment Swallowing Dysfunction 10 and [facility] Care/Home Management Training 15    Francisca Myers CCC-SLP  2019

## 2019-12-22 NOTE — ASSESSMENT & PLAN NOTE
Will await neurological work up and discuss plan of care options with family. Bedside swallow will be done by nursing today with pudding to see if patient can tolerate as family member is eager to get some food in her today. Will get in touch with GI for PEG placement tomorrow.

## 2019-12-22 NOTE — SUBJECTIVE & OBJECTIVE
Interval History:  No acute events overnight. Daughter at bedside voices family would like proceed with PEG placement.     Review of Systems   Unable to perform ROS: Patient nonverbal     Objective:     Vital Signs (Most Recent):  Temp: 96.7 °F (35.9 °C) (12/22/19 0800)  Pulse: 85 (12/22/19 0839)  Resp: 18 (12/22/19 0800)  BP: 132/70 (12/22/19 0839)  SpO2: 99 % (12/22/19 0813) Vital Signs (24h Range):  Temp:  [96.7 °F (35.9 °C)-98.9 °F (37.2 °C)] 96.7 °F (35.9 °C)  Pulse:  [] 85  Resp:  [18] 18  SpO2:  [79 %-99 %] 99 %  BP: (132-174)/(70-87) 132/70     Weight: 66 kg (145 lb 8.1 oz)  Body mass index is 24.21 kg/m².    Intake/Output Summary (Last 24 hours) at 12/22/2019 1117  Last data filed at 12/21/2019 1900  Gross per 24 hour   Intake 1198.33 ml   Output --   Net 1198.33 ml      Physical Exam   Constitutional: She appears well-developed and well-nourished. No distress.   HENT:   Head: Normocephalic and atraumatic.   Eyes: Conjunctivae are normal.   Neck: Neck supple.   Cardiovascular: Normal rate, regular rhythm and normal heart sounds.   Pulmonary/Chest: Effort normal.   Abdominal: Soft. Bowel sounds are normal. She exhibits no distension.   Musculoskeletal: She exhibits no edema.   Neurological: She is alert.   Psychiatric: She is noncommunicative.   Nursing note and vitals reviewed.      Significant Labs:   BMP:   Recent Labs   Lab 12/22/19  0517      *   K 3.7      CO2 24   BUN 11   CREATININE 0.7   CALCIUM 7.9*     CBC:   Recent Labs   Lab 12/21/19  0455 12/22/19  0517   WBC 8.84 8.48   HGB 10.4* 9.6*   HCT 30.9* 29.0*    156       Significant Imaging: I have reviewed all pertinent imaging results/findings within the past 24 hours.

## 2019-12-22 NOTE — SUBJECTIVE & OBJECTIVE
Subjective:     Interval History: No acute overnight events. CT head yesterday showed no acute findings    Current Neurological Medications:     Current Facility-Administered Medications   Medication Dose Route Frequency Provider Last Rate Last Dose    clindamycin 600 MG/50 ML D5W 600 mg/50 mL IVPB 600 mg  600 mg Intravenous Q8H Deena Vizcaino,  mL/hr at 12/22/19 0347 600 mg at 12/22/19 0347    cloNIDine 0.3 mg/24 hr td ptwk 1 patch  1 patch Transdermal Q7 Days Tamika Calvin MD   1 patch at 12/20/19 0930    dextrose 5 % and 0.45 % NaCl with KCl 40 mEq infusion   Intravenous Continuous Tamika Calvin  mL/hr at 12/22/19 0916      diphenhydrAMINE injection 12.5 mg  12.5 mg Intravenous Q6H PRN Tamika Calvin MD   12.5 mg at 12/22/19 0821    escitalopram oxalate tablet 10 mg  10 mg Oral Daily Enrrique Faulkner MD        fluconazole tablet 150 mg  150 mg Oral Q72H PRN Tamika Calvin MD        hydrALAZINE injection 5 mg  5 mg Intravenous Q6H PRN Tamika Calvin MD        levoFLOXacin 750 mg/150 mL IVPB 750 mg  750 mg Intravenous Q24H Deena Vizcaino,  mL/hr at 12/22/19 0341 750 mg at 12/22/19 0341    memantine tablet 10 mg  10 mg Oral Daily Enrrique Faulkner MD        metoprolol injection 2.5 mg  2.5 mg Intravenous Q6H Tamika Calvin MD   2.5 mg at 12/22/19 0517    pantoprazole injection 40 mg  40 mg Intravenous Daily Tamika Calvin MD   40 mg at 12/22/19 0819    sodium chloride 0.9% flush 10 mL  10 mL Intravenous PRN Enrrique Faulkner MD           Review of Systems - unable to perform due to patient's mental status  Objective:     Vital Signs (Most Recent):  Temp: 96.7 °F (35.9 °C) (12/22/19 0800)  Pulse: 85 (12/22/19 0839)  Resp: 18 (12/22/19 0800)  BP: 132/70 (12/22/19 0839)  SpO2: 99 % (12/22/19 0813) Vital Signs (24h Range):  Temp:  [96.7 °F (35.9 °C)-98.9 °F (37.2 °C)] 96.7 °F (35.9 °C)  Pulse:  [] 85  Resp:  [18] 18  SpO2:  [79 %-99 %]  99 %  BP: (132-174)/(70-87) 132/70     Weight: 66 kg (145 lb 8.1 oz)  Body mass index is 24.21 kg/m².    Physical Exam  Mental status: awake, alert, currently nonverbal, currently not following commands. Exam limited as patient unable to follow commands  CN: PERRL, moves eyes in all directions, right facial droop  Motor:   Moves all extremities at least 3/5  Sensory: withdraws to painful stimuli x 4 extremities  Coordination: patient unable to follow command for this            Significant Labs: Reviewed    Significant Imaging: Reviewed

## 2019-12-23 PROBLEM — E87.6 HYPOKALEMIA: Status: RESOLVED | Noted: 2019-12-17 | Resolved: 2019-12-23

## 2019-12-23 PROBLEM — N17.9 AKI (ACUTE KIDNEY INJURY): Status: RESOLVED | Noted: 2019-12-22 | Resolved: 2019-12-23

## 2019-12-23 LAB
ALBUMIN SERPL BCP-MCNC: 2.7 G/DL (ref 3.5–5.2)
ALP SERPL-CCNC: 58 U/L (ref 55–135)
ALT SERPL W/O P-5'-P-CCNC: 65 U/L (ref 10–44)
ANION GAP SERPL CALC-SCNC: 6 MMOL/L (ref 8–16)
AST SERPL-CCNC: 130 U/L (ref 10–40)
BASOPHILS # BLD AUTO: 0 K/UL (ref 0–0.2)
BASOPHILS NFR BLD: 0 % (ref 0–1.9)
BILIRUB SERPL-MCNC: 0.9 MG/DL (ref 0.1–1)
BUN SERPL-MCNC: 9 MG/DL (ref 8–23)
CALCIUM SERPL-MCNC: 7.8 MG/DL (ref 8.7–10.5)
CHLORIDE SERPL-SCNC: 104 MMOL/L (ref 95–110)
CO2 SERPL-SCNC: 21 MMOL/L (ref 23–29)
CREAT SERPL-MCNC: 0.7 MG/DL (ref 0.5–1.4)
DIFFERENTIAL METHOD: ABNORMAL
EOSINOPHIL # BLD AUTO: 0.1 K/UL (ref 0–0.5)
EOSINOPHIL NFR BLD: 1 % (ref 0–8)
ERYTHROCYTE [DISTWIDTH] IN BLOOD BY AUTOMATED COUNT: 18 % (ref 11.5–14.5)
EST. GFR  (AFRICAN AMERICAN): >60 ML/MIN/1.73 M^2
EST. GFR  (NON AFRICAN AMERICAN): >60 ML/MIN/1.73 M^2
GLUCOSE SERPL-MCNC: 79 MG/DL (ref 70–110)
HCT VFR BLD AUTO: 27.9 % (ref 37–48.5)
HGB BLD-MCNC: 9.2 G/DL (ref 12–16)
IMM GRANULOCYTES # BLD AUTO: 0.07 K/UL (ref 0–0.04)
IMM GRANULOCYTES NFR BLD AUTO: 1 % (ref 0–0.5)
LYMPHOCYTES # BLD AUTO: 1.5 K/UL (ref 1–4.8)
LYMPHOCYTES NFR BLD: 21.4 % (ref 18–48)
MCH RBC QN AUTO: 28 PG (ref 27–31)
MCHC RBC AUTO-ENTMCNC: 33 G/DL (ref 32–36)
MCV RBC AUTO: 85 FL (ref 82–98)
MONOCYTES # BLD AUTO: 0.7 K/UL (ref 0.3–1)
MONOCYTES NFR BLD: 10.8 % (ref 4–15)
NEUTROPHILS # BLD AUTO: 4.5 K/UL (ref 1.8–7.7)
NEUTROPHILS NFR BLD: 65.8 % (ref 38–73)
NRBC BLD-RTO: 0 /100 WBC
PLATELET # BLD AUTO: 126 K/UL (ref 150–350)
PMV BLD AUTO: ABNORMAL FL (ref 9.2–12.9)
POTASSIUM SERPL-SCNC: 4.1 MMOL/L (ref 3.5–5.1)
PROT SERPL-MCNC: 5.8 G/DL (ref 6–8.4)
RBC # BLD AUTO: 3.29 M/UL (ref 4–5.4)
SODIUM SERPL-SCNC: 131 MMOL/L (ref 136–145)
WBC # BLD AUTO: 6.83 K/UL (ref 3.9–12.7)

## 2019-12-23 PROCEDURE — 63600175 PHARM REV CODE 636 W HCPCS: Performed by: FAMILY MEDICINE

## 2019-12-23 PROCEDURE — 80053 COMPREHEN METABOLIC PANEL: CPT

## 2019-12-23 PROCEDURE — 25000003 PHARM REV CODE 250: Performed by: FAMILY MEDICINE

## 2019-12-23 PROCEDURE — C9113 INJ PANTOPRAZOLE SODIUM, VIA: HCPCS | Performed by: FAMILY MEDICINE

## 2019-12-23 PROCEDURE — 63600175 PHARM REV CODE 636 W HCPCS: Performed by: NURSE PRACTITIONER

## 2019-12-23 PROCEDURE — S0077 INJECTION, CLINDAMYCIN PHOSP: HCPCS | Performed by: NURSE PRACTITIONER

## 2019-12-23 PROCEDURE — 86580 TB INTRADERMAL TEST: CPT | Performed by: FAMILY MEDICINE

## 2019-12-23 PROCEDURE — 99233 PR SUBSEQUENT HOSPITAL CARE,LEVL III: ICD-10-PCS | Mod: ,,, | Performed by: NEUROLOGICAL SURGERY

## 2019-12-23 PROCEDURE — 99233 SBSQ HOSP IP/OBS HIGH 50: CPT | Mod: ,,, | Performed by: NEUROLOGICAL SURGERY

## 2019-12-23 PROCEDURE — 36415 COLL VENOUS BLD VENIPUNCTURE: CPT

## 2019-12-23 PROCEDURE — 11000001 HC ACUTE MED/SURG PRIVATE ROOM

## 2019-12-23 PROCEDURE — 30200315 PPD INTRADERMAL TEST REV CODE 302: Performed by: FAMILY MEDICINE

## 2019-12-23 PROCEDURE — 25000003 PHARM REV CODE 250: Performed by: NURSE PRACTITIONER

## 2019-12-23 PROCEDURE — 85025 COMPLETE CBC W/AUTO DIFF WBC: CPT

## 2019-12-23 PROCEDURE — 97530 THERAPEUTIC ACTIVITIES: CPT

## 2019-12-23 RX ORDER — METOPROLOL TARTRATE 1 MG/ML
2.5 INJECTION, SOLUTION INTRAVENOUS EVERY 6 HOURS
Status: DISCONTINUED | OUTPATIENT
Start: 2019-12-23 | End: 2019-12-25

## 2019-12-23 RX ADMIN — CLINDAMYCIN IN 5 PERCENT DEXTROSE 600 MG: 12 INJECTION, SOLUTION INTRAVENOUS at 12:12

## 2019-12-23 RX ADMIN — LEVOFLOXACIN 750 MG: 750 INJECTION, SOLUTION INTRAVENOUS at 02:12

## 2019-12-23 RX ADMIN — CLINDAMYCIN IN 5 PERCENT DEXTROSE 600 MG: 12 INJECTION, SOLUTION INTRAVENOUS at 04:12

## 2019-12-23 RX ADMIN — METOPROLOL TARTRATE 2.5 MG: 5 INJECTION, SOLUTION INTRAVENOUS at 08:12

## 2019-12-23 RX ADMIN — METOPROLOL TARTRATE 2.5 MG: 5 INJECTION, SOLUTION INTRAVENOUS at 01:12

## 2019-12-23 RX ADMIN — Medication 5 UNITS: at 03:12

## 2019-12-23 RX ADMIN — CLINDAMYCIN IN 5 PERCENT DEXTROSE 600 MG: 12 INJECTION, SOLUTION INTRAVENOUS at 09:12

## 2019-12-23 RX ADMIN — PANTOPRAZOLE SODIUM 40 MG: 40 INJECTION, POWDER, LYOPHILIZED, FOR SOLUTION INTRAVENOUS at 08:12

## 2019-12-23 RX ADMIN — METOPROLOL TARTRATE 2.5 MG: 5 INJECTION, SOLUTION INTRAVENOUS at 03:12

## 2019-12-23 NOTE — PLAN OF CARE
Recommendations     1. After PEG placement, recommend TF initiation of Isosource 1.5 @ 10 mL/hr; advance as tolerated to a goal rate of 40 mL/hr to provide 1440 cals, 65 g protein & 733 mL free fl. For maintenance fluid needs recommend 175 mL free water flush QID.  If bolus TF is desired, recommend 4 cans/day.      Goals: Initiate nutrition within 72 hrs  Nutrition Goal Status: progressing towards goal

## 2019-12-23 NOTE — PLAN OF CARE
Pt remained free from falls and injury during shift, medication administered per MD orders, oral care provided, turned q  2hrs, daughter at bedside, will continue to monitor.

## 2019-12-23 NOTE — PLAN OF CARE
Problem: Adult Inpatient Plan of Care  Goal: Plan of Care Review  Outcome: Ongoing, Progressing  Flowsheets (Taken 12/23/2019 1510)  Plan of Care Reviewed With: patient; daughter   Pt free from falls, injury or any further trauma throughout shift. Pt alert @ times, nonverbal. Continued medications as ordered. NPO. Planned for PEG placement 12/24, check PT/INR in AM. Zflex boots in use. Turn q2hr.  Pt in no distress. Will cont to monitor.

## 2019-12-23 NOTE — PLAN OF CARE
Problem: Occupational Therapy Goal  Goal: Occupational Therapy Goal  Description  Goals to be met by: 01/04/20     Patient will increase functional independence with ADLs by performing:    UE Dressing with Maximum Assistance.  Grooming while seated with Moderate Assistance.  Sitting at edge of bed x10 minutes with Moderate Assistance.  Rolling to Bilateral with Moderate Assistance.   Supine to sit with Moderate Assistance.  Upper extremity exercise program x7 reps per handout, with assistance as needed.  Pt will visually scan for 3 out 5 ADL objects with 50% verbal cueing in order to increase active participation with ADLs.      Outcome: Ongoing, Progressing    Session limited 2* decreased arousal and participation with UE AROM seated EOB. TOTAL Ax2 for bed mobility. Pt sat EOB for 20 min with TOTAL A, intermittently maintaining grasp with L hand onto side rail.

## 2019-12-23 NOTE — ASSESSMENT & PLAN NOTE
Will await neurological work up and discuss plan of care options with family. GI re consulted for PEG placement. Hopefully today.

## 2019-12-23 NOTE — PT/OT/SLP PROGRESS
Physical Therapy Treatment    Patient Name:  Emilia Melgoza   MRN:  [id number]    Recommendations:     Discharge Recommendations:  nursing facility, skilled   Discharge Equipment Recommendations: bedside commode, wheelchair   Barriers to discharge: Decreased caregiver support, decreased mobility and decreased cognition    Assessment:     Emilia Melgoza is a 74 y.o. female admitted with a medical diagnosis of Failure to thrive in adult.  She presents with the following impairments/functional limitations:  weakness, impaired endurance, impaired self care skills, impaired functional mobilty, impaired cognition, decreased safety awareness, impaired coordination, gait instability, decreased coordination, impaired balance, decreased lower extremity function, decreased ROM, decreased upper extremity function, impaired muscle length.  Pt continues to require increased assistance for BM and balance sitting EOB.  Pt requires Total A x2 for BM and Dep A to maintain sitting EOB.  Pt sat EOB for ~20 minutes.  Pt would continue to benefit from skilled PT services to address pt's deficits and improve current level of function.    Rehab Prognosis: Fair; patient would benefit from acute skilled PT services to address these deficits and reach maximum level of function.    Recent Surgery: Procedure(s) (LRB):  INSERTION, PEG TUBE (Left)      Plan:     During this hospitalization, patient to be seen 5 x/week to address the identified rehab impairments via gait training, therapeutic activities, therapeutic exercises and progress toward the following goals:    · Plan of Care Expires:  01/04/20    Subjective     Chief Complaint: pt limited verbally  Patient/Family Comments/goals: pt limited verbally  Pain/Comfort:  · Pain Rating 1: 0/10      Objective:     Communicated with pt's nurse, Sherry, prior to session.  Patient found right sidelying with peripheral IV, bed alarm, telemetry, pressure relief boots, SCD(4 rails up) upon PT entry  to room.     General Precautions: Standard, fall, aphasia, aspiration, NPO   Orthopedic Precautions:N/A   Braces: N/A     Functional Mobility:  · Bed Mobility:     · Rolling Left:  dependence  · Scooting: Total A and of 2 persons to scoot to EOB and HOB  · Supine to Sit: total assistance and of 2 persons  · Sit to Supine: total assistance and of 2 persons  · Balance: Dep A sitting EOB for ~20 minutes      AM-PAC 6 CLICK MOBILITY  Turning over in bed (including adjusting bedclothes, sheets and blankets)?: 2  Sitting down on and standing up from a chair with arms (e.g., wheelchair, bedside commode, etc.): 1  Moving from lying on back to sitting on the side of the bed?: 2  Moving to and from a bed to a chair (including a wheelchair)?: 1  Need to walk in hospital room?: 1  Climbing 3-5 steps with a railing?: 1  Basic Mobility Total Score: 8       Therapeutic Activities and Exercises:   Pt sat EOB for ~20 minutes with Dep A.  Pt falling asleep sitting EOB and unable to keep eyes open or head up despite max verbal/tactile cues.  PROM performed to BLEs sitting EOB in all available planes.  Daughter received handout for BLE PROM and educated on all exercises.  (Hip abduction/adduction, hip flexion/knee flexion, calf stretch)    Patient left L SL, pressure boots, and SCDs with all lines intact, call button in reach, bed alarm on, pt's nurse, Sherry, notified and daughter present..    GOALS:   Multidisciplinary Problems     Physical Therapy Goals        Problem: Physical Therapy Goal    Goal Priority Disciplines Outcome Goal Variances Interventions   Physical Therapy Goal     PT, PT/OT Ongoing, Progressing     Description:  Goals to be met by: 20     Patient will increase functional independence with mobility by performin. Supine to sit with Moderate Assistance  2. Sit to supine with Moderate Assistance  3. Rolling to Left and Right with Moderate Assistance  4. Sitting at edge of bed x30 minutes with Minimal  Assistance  5. Lower extremity exercise program x10 reps per handout, with assistance as needed                      Time Tracking:     PT Received On: 12/23/19  PT Start Time: 0934     PT Stop Time: 1003  PT Total Time (min): 29 min     Billable Minutes: Therapeutic Activity 14 (Co tx with OT)    Treatment Type: Treatment  PT/PTA: PTA     PTA Visit Number: 1     Norah Wright, PTA  12/23/2019

## 2019-12-23 NOTE — PLAN OF CARE
Problem: Physical Therapy Goal  Goal: Physical Therapy Goal  Description  Goals to be met by: 20     Patient will increase functional independence with mobility by performin. Supine to sit with Moderate Assistance  2. Sit to supine with Moderate Assistance  3. Rolling to Left and Right with Moderate Assistance  4. Sitting at edge of bed x30 minutes with Minimal Assistance  5. Lower extremity exercise program x10 reps per handout, with assistance as needed     Outcome: Ongoing, Progressing   Pt continues to require increased assistance for BM and balance sitting EOB.  Pt requires Total A x2 for BM and Dep A to maintain sitting EOB.  Pt sat EOB for ~20 minutes.  Pt would continue to benefit from skilled PT services to address pt's deficits and improve current level of function.

## 2019-12-23 NOTE — PT/OT/SLP PROGRESS
"Speech Language Pathology Treatment    Patient Name:  Emilia Melgoza   MRN:  [id number]  Admitting Diagnosis: Failure to thrive in adult    Recommendations:                 General Recommendations:  Dysphagia therapy  Diet recommendations:  NPO, Liquid Diet Level: NPO   Aspiration Precautions: ice for oral stim, good oral care  General Precautions: Standard, aphasia, aspiration  Communication strategies:  provide increased time to answer    Subjective   Pt remained nonverbal throughout. Daughter asking if she "failed" again  Patient goals: per daughter to get nutrition.     Pain/Comfort:  ·  0    Objective:     Has the patient been evaluated by SLP for swallowing?   Yes  Keep patient NPO? Yes(except ice)   Current Respiratory Status: room air      Pt with HOB raised soundly sleeping upon ST's entrance. Readily woke and made eye contact however lack of attention to bolus presentations via spoon persist. She did not accept spoon despite max tactile and verbal stimuli. 1/4 tsp thin liquids via spoon placed in oral cavity [place] 4 trials Pt initiated pharyngeal phase of swallow X2 and held bolus X2. Puree was used as labial stimulation, Pt did not attend. In consistent wet gurgly coughing throughout.     Assessment:     Emilia Melgoza is a 74 y.o. female with dx of Failure to thrive in adult she presents with severe oral dysphagia c/b lack of attention to bolus/bolus presentation. Swallow will only improve if mental status improves will defer to neuro for prognosis of mental status     Goals:   Multidisciplinary Problems     SLP Goals        Problem: SLP Goal    Goal Priority Disciplines Outcome   SLP Goal    Medium SLP Ongoing, Not Progressing   Description:  ST. Pt will participate in ongoing swallow eval to determine least restrictive diet without overt s/s of aspiration.                    Plan:     · Patient to be seen:  3 x/week, 4 x/week   · Plan of Care expires:     · Plan of Care reviewed with:  patient, " daughter   · SLP Follow-Up:  Yes       Discharge recommendations:  (pending progress)   Barriers to Discharge:  Level of Skilled Assistance Needed .    Time Tracking:     SLP Treatment Date:   12/22/19  Speech Start Time:  1515  Speech Stop Time:  1540     Speech Total Time (min):  25 min    Billable Minutes: Treatment Swallowing Dysfunction 25    Francisca Myers CCC-SLP  12/23/2019

## 2019-12-23 NOTE — PROGRESS NOTES
"[facility] [place]  [place] Note    SUMMARY       Recommendations    1. After PEG placement, recommend TF initiation of Isosource 1.5 @ 10 mL/hr; advance as tolerated to a goal rate of 40 mL/hr to provide 1440 cals, 65 g protein & 733 mL free fl. For maintenance fluid needs recommend 175 mL free water flush QID.  If bolus TF is desired, recommend 4 cans/day.     Goals: Initiate nutrition within 72 hrs  Nutrition Goal Status: progressing towards goal  Communication of RD Recs: (POC)    Reason for Assessment    Reason For Assessment: RD follow-up  Diagnosis: (FTT)  Relevant Medical History: CVA, vascular dementia  Interdisciplinary Rounds: did not attend    General Information Comments: Pt seen this AM asleep in bed. Family member present & reports they are in favor of PEG tube placement as pt has again failed swallow eval. Per GI notes, PEG to be placed tmrw. NFPE performed  & was wnl for her age despite recent significant wt loss  Nutrition Discharge Planning: Discharge on TF    Nutrition Risk Screen    Nutrition Risk Screen: dysphagia or difficulty swallowing    Nutrition/Diet History    Spiritual, Cultural Beliefs, Hoahaoism Practices, Values that Affect Care: no  Food Allergies: NKFA  Factors Affecting Nutritional Intake: NPO, difficulty/impaired swallowing    Anthropometrics    Temp: 98.1 °F (36.7 °C)  Height Method: Stated  Height: 5' 5" (165.1 cm)  Height (inches): 65 in  Weight Method: Bed Scale  Weight: 66 kg (145 lb 8.1 oz)  Weight (lb): 145.51 lb  Ideal Body Weight (IBW), Female: 125 lb  % Ideal Body Weight, Female (lb): 116.41 %  BMI (Calculated): 24.2  BMI Grade: 18.5-24.9 - normal  Weight Loss: unintentional  Usual Body Weight (UBW), k.8 kg(2019)  % Usual Body Weight: 87.25  % Weight Change From Usual Weight: -12.93 %       Lab/Procedures/Meds    Pertinent Labs Reviewed: reviewed  Pertinent Labs Comments: Na 131, CO2 21, Alb 2.7  Pertinent Medications Reviewed: " reviewed  Pertinent Medications Comments: pantoprazole    Estimated/Assessed Needs    Weight Used For Calorie Calculations: 66 kg (145 lb 8.1 oz)  Energy Calorie Requirements (kcal): 1450  Energy Need Method: Stanly-St Jeor(x 1.25 (PAL))     Protein Requirements: 66-79 g (1-1.2 g/kg)  Weight Used For Protein Calculations: 66 kg (145 lb 8.1 oz)    Estimated Fluid Requirement Method: RDA Method  RDA Method (mL): 1450         Nutrition Prescription Ordered    Current Diet Order: NPO    Evaluation of Received Nutrient/Fluid Intake    IV Fluid (mL): (IVF discontinued)  I/O: reviewed  Energy Calories Required: not meeting needs  Protein Required: not meeting needs  Fluid Required: (per MD)  Comments: LBM 12/17  % Intake of Estimated Energy Needs: 0 - 25 %  % Meal Intake: NPO    Nutrition Risk    Level of Risk/Frequency of Follow-up: (2 x week)     Assessment and Plan    Nutrition Problem  Inadequate energy intake     Related to (etiology):   Dysphagia     Signs and Symptoms (as evidenced by):   SLP rec NPO     Interventions  Collaboration with providers     Nutrition Diagnosis Status:   Continues       Monitor and Evaluation    Food and Nutrient Intake: energy intake, enteral nutrition intake  Food and Nutrient Adminstration: diet order, enteral and parenteral nutrition administration  Anthropometric Measurements: weight, weight change  Biochemical Data, Medical Tests and Procedures: electrolyte and renal panel  Nutrition-Focused Physical Findings: overall appearance     Malnutrition Assessment           Subcutaneous Fat Loss (Final Summary): well nourished  Muscle Loss Evaluation (Final Summary): well nourished    Severe Weight Loss (Malnutrition): greater than 7.5% in 3 months(13% in 3 months)    Nutrition Follow-Up    RD Follow-up?: Yes

## 2019-12-23 NOTE — PLAN OF CARE
HERMINIO met with patient's daughter Julissa to discuss discharge plan for patient. SW informed Julissa of recommendation for SNF placement. Julissa inquired from SW if patient would meet criteria for inpatient rehab. HERMINIO explained the difference between inpatient rehab and SNF, Julissa verbalized understanding. Julissa informed HERMINIO provided Julissa with a list of SNF facilities for medicare.[facility] nursing home compare list to assist with choosing preferences. HERMINIO will also provide Julissa with a list of inpatient rehabs. HERMINIO offered [facility] SN[facility] and inpatient rehab as preferences as well. Julissa informed HERMINIO she will have to speak with the rest of her family members before making a decision on facilities. SW explained she will need family preferences as soon as possible because patient will be ready to discharge once medically stable, Julissa verbalized understanding. HERMINIO contacted [facility] @ [phone] to complete LOCET, HERMINIO spoke to Julissa. HERMINIO faxed PASRR to [facility] @ [phone].         12/23/19 2820   Discharge Reassessment   Assessment Type Discharge Planning Reassessment   Provided patient/caregiver education on the expected discharge date and the discharge plan Yes   Do you have any problems affording any of your prescribed medications? No   Discharge Plan A Rehab   Discharge Plan B Skilled Nursing Facility   DME Needed Upon Discharge    (TBD)   Patient choice form signed by patient/caregiver Yes   Anticipated Discharge Disposition Rehab   Can the patient answer the patient profile reliably? No, cognitively impaired   How does the patient rate their overall health at the present time? Fair   Describe the patient's ability to walk at the present time. Walks with the help of equipment   How often would a person be available to care for the patient? Whenever needed   Post-Acute Status   Post-Acute Authorization Placement  (Rehab vs SNF)   Post-Acute Placement Status Awaiting Internal Medical Clearance   Patient choice form signed by patient/caregiver List from CMS  Compare

## 2019-12-23 NOTE — PROGRESS NOTES
[facility]  Gastroenterology  Progress Note    Patient Name: Emilia Melgoza  MRN: [id number]  Admission Date: 12/17/2019  Hospital Length of Stay: 5 days  Code Status: Full Code   Attending Provider: Tamika Calvin MD  Primary Care Physician: Jerson Price MD  Principal Problem: Failure to thrive in adult    Subjective:     CC= PEG tube placement     Interval History: GI re-consulted for PEG tube placement.  Patient continues to have altered mental status and has failed swallow evaluation with speech therapy who recommends NPO with alternative means for nutrition.      Review of systems:  Unable to obtain from patient due to clinical condition.      Objective:     Vital Signs (Most Recent):  Temp: 98.1 °F (36.7 °C) (12/23/19 1133)  Pulse: 77 (12/23/19 1133)  Resp: 16 (12/23/19 1133)  BP: (!) 142/65 (12/23/19 1133)  SpO2: 100 % (12/23/19 1133) Vital Signs (24h Range):  Temp:  [97.9 °F (36.6 °C)-98.8 °F (37.1 °C)] 98.1 °F (36.7 °C)  Pulse:  [69-95] 77  Resp:  [16-20] 16  SpO2:  [95 %-100 %] 100 %  BP: (123-169)/(59-87) 142/65     Physical examination:  GEN: Elderly AAF in no apparent distress   HENT: Normocephalic, anicteric sclera   Cardiovascular: Regular rate and rhythm. No murmurs appreciated.   Chest: Non-labored respirations. Breath sounds equal   Abdomen: Soft, NTND, normoactive BS  Psych: Appropriate mood and affect.   Extermities: No C/C/E. 2+ dorsalis pedis pulses bilaterally    Recent Labs   Lab 12/22/19  0517 12/23/19  0615   WBC 8.48 6.83   HGB 9.6* 9.2*   HCT 29.0* 27.9*    126*     Recent Labs   Lab 12/23/19  0615   GLU 79   CALCIUM 7.8*   ALBUMIN 2.7*   PROT 5.8*   *   K 4.1   CO2 21*      BUN 9   CREATININE 0.7   ALKPHOS 58   ALT 65*   *   BILITOT 0.9     Imaging:  CT head (12/21/19):  Impression:  No detrimental change or acute large vascular territory infarct or intracranial hemorrhage identified when compared to most recent exam of 12/18/2019.   Further evaluation/follow-up as warranted.  Grossly stable chronic findings as above.      Assessment:   74 year old female with a history of HTN, CVA with right sided weakness, aphasia, meningioma, gait instability, and anxiety presenting with altered mental status and failure to thrive.  Unclear etiology.  GI consulted for PEG tube placement.     Plan:   1.  EGD/PEG tube placement tomorrow.  2.  Anesthesia consultation.  3.  NPO.   4.  Check PT/INR in am.       Vera Waters PA-C  Gastroenterology  [facility]

## 2019-12-23 NOTE — SUBJECTIVE & OBJECTIVE
Interval History: No acute events overnight. She failed bedside swallow again yesterday. Daughter at bedside says to proceed with PEG placement.     Review of Systems   Unable to perform ROS: Patient nonverbal     Objective:     Vital Signs (Most Recent):  Temp: 98.1 °F (36.7 °C) (12/23/19 0747)  Pulse: 80 (12/23/19 0910)  Resp: 18 (12/23/19 0747)  BP: 123/76 (12/23/19 0910)  SpO2: 100 % (12/23/19 0747) Vital Signs (24h Range):  Temp:  [97.9 °F (36.6 °C)-98.8 °F (37.1 °C)] 98.1 °F (36.7 °C)  Pulse:  [69-95] 80  Resp:  [17-20] 18  SpO2:  [95 %-100 %] 100 %  BP: (123-169)/(59-87) 123/76     Weight: 66 kg (145 lb 8.1 oz)  Body mass index is 24.21 kg/m².    Intake/Output Summary (Last 24 hours) at 12/23/2019 0928  Last data filed at 12/23/2019 0800  Gross per 24 hour   Intake 1019.17 ml   Output --   Net 1019.17 ml      Physical Exam   Constitutional: She appears well-developed and well-nourished. No distress.   HENT:   Head: Normocephalic and atraumatic.   Eyes: Conjunctivae are normal.   Neck: Neck supple.   Cardiovascular: Normal rate, regular rhythm and normal heart sounds.   Pulmonary/Chest: Effort normal. No respiratory distress.   Coarse upper and lower respiratory breath sounds   Abdominal: Soft. Bowel sounds are normal. She exhibits no distension.   Musculoskeletal: She exhibits no edema.   Neurological: She is alert.   Psychiatric: She is noncommunicative.   Nursing note and vitals reviewed.      Significant Labs:   BMP:   Recent Labs   Lab 12/23/19  0615   GLU 79   *   K 4.1      CO2 21*   BUN 9   CREATININE 0.7   CALCIUM 7.8*     CBC:   Recent Labs   Lab 12/22/19  0517 12/23/19  0615   WBC 8.48 6.83   HGB 9.6* 9.2*   HCT 29.0* 27.9*    126*       Significant Imaging: I have reviewed all pertinent imaging results/findings within the past 24 hours.

## 2019-12-23 NOTE — PT/OT/SLP PROGRESS
"Occupational Therapy   Treatment    Name: Emilia Melgoza  MRN: [id number]  Admitting Diagnosis:  Failure to thrive in adult       Recommendations:     Discharge Recommendations: nursing facility, skilled  Discharge Equipment Recommendations:  (if d/c home, chandler lift, hospital bed, and wheelchair)  Barriers to discharge:  Decreased caregiver support    Assessment:     Emilia Melgoza is a 74 y.o. female with a medical diagnosis of Failure to thrive in adult. Performance deficits affecting function are weakness, impaired functional mobilty, impaired cognition, decreased safety awareness, impaired endurance, impaired fine motor, impaired balance, decreased upper extremity function, decreased lower extremity function, impaired self care skills, decreased ROM, decreased coordination.     Session limited 2* decreased arousal and participation with UE AROM seated EOB. TOTAL Ax2 for bed mobility. Pt sat EOB for 20 min with TOTAL A, intermittently maintaining grasp with L hand onto side rail.     Rehab Prognosis:  Fair; patient would benefit from acute skilled OT services to address these deficits and reach maximum level of function.       Plan:     Patient to be seen (3-5x/week) to address the above listed problems via self-care/home management, therapeutic activities, therapeutic exercises  · Plan of Care Expires: 01/04/20  · Plan of Care Reviewed with: patient, daughter    Subjective     Chief complaint: none by pt   Patient's/family comments/goals: daughter present, encouraging pt to increase arousal "she shuts her eyes when she doesn't want to do it"     Pt sat EOB with eyes closed 95% of the session- daughter present, encouraging pt to participate; dtr also played music that pt likes to increase arousal with no success. Once pt was assisted back to bed, pt opened her eyes before falling asleep.     Pain/Comfort:  · Pain Rating 1: 0/10    Objective:     Communicated with: nurseSherry, prior to session.  Patient " found HOB elevated with peripheral IV, bed alarm, pressure relief boots, SCD, telemetry upon OT entry to room.    General Precautions: Standard, aphasia, NPO, aspiration, fall   Orthopedic Precautions:N/A   Braces: N/A     Occupational Performance:     Bed Mobility:    · Patient completed Rolling/Turning to Left with  total assistance  · Patient completed Rolling/Turning to Right with total assistance  · Patient completed Scooting with total assistance and 2 persons  · Patient completed Bridging with total assistance and 2 persons  · Patient completed Supine to Sit with total assistance and 2 persons  · Patient completed Sit to Supine with total assistance and 2 persons     Functional Mobility/Transfers:  · Functional Mobility: NT d/t poor sitting balance     Activities of Daily Living:  · Upper Body Dressing: total assistance with donning back gown   · Attempted to help pt with oral suction sitting EOB, but pt was resistive; nurseSherry, notified.       [facility] 6 Click ADL: 6    Treatment & Education:  · Pt re-educated on OT role/POC.   · Importance of EOB activity with therapy   · Safety during bed mobility and sitting tolerance   · Pt tolerated sitting EOB with MAX/TOTAL A for 20 min- hand-over-hand assist with LUE to side rail, holding rail for ~1 min at a time on 3 attempts  · Attempted to position pt's hands at her side to assist with sitting balance, but pt poor at following commands 2* decreased arousal, attention, and participation  · Tactile and verbal cueing attempted to increase pt's arousal, along with music with no success.   · Sitting EOB, pt completed to following PROM:   · RUE 1x10:  · forearm pronation/supination   · elbow flexion/extension  · Attempted digits flexion/extension, but pt resistive; residual deficits to RUE from previous CVA   · LUE 1x10  · wrist flexion/extension  · Forearm supination/pronation  · Elbow flexion/extension   · Pt was resistive with shoulder ROM, allowing flexion to ~45  and stretch in ab/dduction  · Attempted digits and shoulder flexion/extension, but pt was resistive to this movement   · White board updated   · Self-care tasks/functional mobility completed- assistance level noted above   · Pt's daughter given BUE PROM exercises, OT discussion importance of hand placement and verbally cueing pt for increased participation by pt; 10-15 reps, 2x/day with HOB elevated as pt can tolerate   · All questions/concerns answered within OT scope of practice       Patient left HOB elevated with all lines intact, call button in reach, bed alarm on, nurse, Sherry, notified and daughter presentEducation:      GOALS:   Multidisciplinary Problems     Occupational Therapy Goals        Problem: Occupational Therapy Goal    Goal Priority Disciplines Outcome Interventions   Occupational Therapy Goal     OT, PT/OT Ongoing, Progressing    Description:  Goals to be met by: 01/04/20     Patient will increase functional independence with ADLs by performing:    UE Dressing with Maximum Assistance.  Grooming while seated with Moderate Assistance.  Sitting at edge of bed x10 minutes with Moderate Assistance.  Rolling to Bilateral with Moderate Assistance.   Supine to sit with Moderate Assistance.  Upper extremity exercise program x7 reps per handout, with assistance as needed.  Pt will visually scan for 3 out 5 ADL objects with 50% verbal cueing in order to increase active participation with ADLs.                       Time Tracking:     OT Date of Treatment: 12/23/19  OT Start Time: 0935  OT Stop Time: 1005  OT Total Time (min): 30 min    Billable Minutes:Therapeutic Activity 15 min   Total Time 30 min (co-treat with PTA)    Marquita Virk OT  12/23/2019

## 2019-12-23 NOTE — PROGRESS NOTES
Discussion with Dr Hennessy and family have made decision for PEG over weekend after discussions with physicians. Palliative will sign off for now. Please reconsult if any needs

## 2019-12-23 NOTE — PROGRESS NOTES
[facility] Hospital Medicine  Progress Note    Patient Name: Emilia Melgoza  MRN: [id number]  Patient Class: IP- Inpatient   Admission Date: 12/17/2019  Length of Stay: 5 days  Attending Physician: Tamika Calvin MD  Primary Care Provider: Jerson Price MD      Subjective:     Principal Problem:Failure to thrive in adult      HPI:  74 y.o. 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  She is also not taking her medications.  She appears to be more confused than usual.  Had a recent tooth extraction by the dentist and was unable to take prescribed clindamycin.  Daughter reports patient has indicated mouth pain. Also recently prescribed Macrobid for UTI and was unable to complete.  Daughter denies that the patient has had a fever.  History further limited secondary to the patient's condition.  In the ED, she was found to be tachycardic with hypernatremia, hypokalemia, mild elevation of CPK and troponin.  Urinalysis, CT head, chest x-ray, and EKG were all unremarkable for any acute abnormality.  She appears clinically dehydrated.  Placed in observation for further evaluation and treatment.    Overview/Hospital Course:  74 y.o.  AAF with history of stroke (2013 - residual L sided paralysis/paresthesia and aphasia) and vascular dementia, as well as HTN. She presented with her daughter who reports that shortly after thanksgiving her mother who was usually ambulatory with assist, could eat her food and feed herself once food was prepped, stopped eating, started hoarding medications, and was less attentive. She gradually became weaker whereas she could not stand.  According to her daughter who is at the bedside she assumed that the change in mental status was due to tooth-ache bottom left and right therefore she scheduled dentist appointment. She had BL lower oral extraction on Thursday 12/12/19 was ordered clindamycin but has not been able to get the patient to take the medication. She denies fever, chills, melena, hematochezia, report of HA or CP, diaphoresis; sick contacts, long trips, or trauma.    Patient was found to have CXR no acute cardiopulmonary process, CT head showed no acute large infarct ---   Pertinient positives  -Abnormal urine WBC = 12, urine culture NGTD,   -CT soft tissue neck showed soft tissue swelling and edema involving the R anterior mandibular region suspicious for cellulitis  -US abdomen shows common bile duct dilated 1.2 cm - no intrahepatic biliary dilatation    Interval History: No acute events overnight. She failed bedside swallow again yesterday. Daughter at bedside says to proceed with PEG placement.     Review of Systems   Unable to perform ROS: Patient nonverbal     Objective:     Vital Signs (Most Recent):  Temp: 98.1 °F (36.7 °C) (12/23/19 0747)  Pulse: 80 (12/23/19 0910)  Resp: 18 (12/23/19 0747)  BP: 123/76 (12/23/19 0910)  SpO2: 100 % (12/23/19 0747) Vital Signs (24h Range):  Temp:  [97.9 °F (36.6 °C)-98.8 °F (37.1 °C)] 98.1 °F (36.7 °C)  Pulse:  [69-95] 80  Resp:  [17-20] 18  SpO2:  [95 %-100 %] 100 %  BP: (123-169)/(59-87) 123/76     Weight: 66 kg (145 lb 8.1 oz)  Body mass index is 24.21 kg/m².    Intake/Output Summary (Last 24 hours) at 12/23/2019 0928  Last data filed at 12/23/2019 0800  Gross per 24 hour   Intake 1019.17 ml   Output --   Net 1019.17 ml      Physical Exam   Constitutional: She appears well-developed and well-nourished. No distress.   HENT:   Head: Normocephalic and atraumatic.   Eyes: Conjunctivae are normal.   Neck: Neck supple.   Cardiovascular: Normal rate, regular rhythm and normal heart sounds.    Pulmonary/Chest: Effort normal. No respiratory distress.   Coarse upper and lower respiratory breath sounds   Abdominal: Soft. Bowel sounds are normal. She exhibits no distension.   Musculoskeletal: She exhibits no edema.   Neurological: She is alert.   Psychiatric: She is noncommunicative.   Nursing note and vitals reviewed.      Significant Labs:   BMP:   Recent Labs   Lab 12/23/19 0615   GLU 79   *   K 4.1      CO2 21*   BUN 9   CREATININE 0.7   CALCIUM 7.8*     CBC:   Recent Labs   Lab 12/22/19  0517 12/23/19 0615   WBC 8.48 6.83   HGB 9.6* 9.2*   HCT 29.0* 27.9*    126*       Significant Imaging: I have reviewed all pertinent imaging results/findings within the past 24 hours.      Assessment/Plan:      * Failure to thrive in adult  Will await neurological work up and discuss plan of care options with family. GI re consulted for PEG placement. Hopefully today.      Cellulitis of mouth  Unlikely. Complete course of Clindamycin day 6.       Hypernatremia  Resolved. DC D5 as sodium continues to decrease.     Acute encephalopathy  MRI without acute findings. Further recs per Neuro.     Mobility impaired  Pt unable to follow any simple commands to actively participate in therapy.      Acute cystitis without hematuria  Secondary to E.Coli. Low colony count. Continue Levaquin day 6.    Broca's aphasia  Nonverbal at baseline    Hemiparesis affecting right side as late effect of cerebrovascular accident  At baseline, no acute issue. Fall precautions.     Essential hypertension  Stable. Continue clonidine patch 0.3 mg weekly and IV Lopressor 5 mg q6hr. Continue IV hydralazine prn.      Chronic diastolic congestive heart failure  Stable. Monitor for fluid overload.      VTE Risk Mitigation (From admission, onward)         Ordered     IP VTE HIGH RISK PATIENT  Once      12/17/19 2235     Place sequential compression device  Until discontinued      12/17/19 2235              Tamika Calvin  MD  Department of Hospital Medicine   [facility]

## 2019-12-23 NOTE — NURSING
Dr. Calvin notified of coarse breath sounds and pt has not had a BM since 12/17, no new orders given @ this time. Will cont to monitor.

## 2019-12-23 NOTE — UM SECONDARY REVIEW
[facility]    PA - Medical Necessity Issue  74 y.o.  AAF with history of stroke (2013 - residual L sided paralysis/paresthesia and aphasia) and vascular dementia, as well as HTN. She presented with her daughter who reports that shortly after thanksgiving her mother who was usually ambulatory with assist, could eat her food and feed herself once food was prepped, stopped eating, started hoarding medications, and was less attentive. She gradually became weaker whereas she could not stand. According to her daughter who is at the bedside she assumed that the change in mental status was due to tooth-ache bottom left and right therefore she scheduled dentist appointment. She had BL lower oral extraction on Thursday 12/12/19 was ordered clindamycin but has not been able to get the patient to take the medication. She denies fever, chills, melena, hematochezia, report of HA or CP, diaphoresis; sick contacts, long trips, or trauma.     Patient was found to have CXR no acute cardiopulmonary process, CT head showed no acute large infarct ---   Pertinient positives  -Abnormal urine WBC = 12, urine culture NGTD,   -CT soft tissue neck showed soft tissue swelling and edema involving the R anterior mandibular region suspicious for cellulitis  -US abdomen shows common bile duct dilated 1.2 cm - no intrahepatic biliary dilatation     Interval History: No acute events overnight. She failed bedside swallow again yesterday. Daughter at bedside says to proceed with PEG placement  {JUAN KURTZ Review Outcome:[id number]}

## 2019-12-24 PROBLEM — E87.20 METABOLIC ACIDOSIS: Status: ACTIVE | Noted: 2019-12-24

## 2019-12-24 PROBLEM — R79.1 ELEVATED INR: Status: ACTIVE | Noted: 2019-12-24

## 2019-12-24 LAB
ALBUMIN SERPL BCP-MCNC: 2.8 G/DL (ref 3.5–5.2)
ALP SERPL-CCNC: 51 U/L (ref 55–135)
ALT SERPL W/O P-5'-P-CCNC: 64 U/L (ref 10–44)
ANION GAP SERPL CALC-SCNC: 11 MMOL/L (ref 8–16)
AST SERPL-CCNC: 119 U/L (ref 10–40)
BASOPHILS # BLD AUTO: 0.01 K/UL (ref 0–0.2)
BASOPHILS NFR BLD: 0.2 % (ref 0–1.9)
BILIRUB SERPL-MCNC: 0.8 MG/DL (ref 0.1–1)
BUN SERPL-MCNC: 10 MG/DL (ref 8–23)
CALCIUM SERPL-MCNC: 8.1 MG/DL (ref 8.7–10.5)
CHLORIDE SERPL-SCNC: 103 MMOL/L (ref 95–110)
CO2 SERPL-SCNC: 18 MMOL/L (ref 23–29)
CREAT SERPL-MCNC: 0.8 MG/DL (ref 0.5–1.4)
DIFFERENTIAL METHOD: ABNORMAL
EOSINOPHIL # BLD AUTO: 0 K/UL (ref 0–0.5)
EOSINOPHIL NFR BLD: 0.7 % (ref 0–8)
ERYTHROCYTE [DISTWIDTH] IN BLOOD BY AUTOMATED COUNT: 17.7 % (ref 11.5–14.5)
EST. GFR  (AFRICAN AMERICAN): >60 ML/MIN/1.73 M^2
EST. GFR  (NON AFRICAN AMERICAN): >60 ML/MIN/1.73 M^2
GLUCOSE SERPL-MCNC: 70 MG/DL (ref 70–110)
HCT VFR BLD AUTO: 26.5 % (ref 37–48.5)
HGB BLD-MCNC: 8.8 G/DL (ref 12–16)
IMM GRANULOCYTES # BLD AUTO: 0.07 K/UL (ref 0–0.04)
IMM GRANULOCYTES NFR BLD AUTO: 1.3 % (ref 0–0.5)
INR PPP: 3.7 (ref 0.8–1.2)
INR PPP: 3.9 (ref 0.8–1.2)
LYMPHOCYTES # BLD AUTO: 1.1 K/UL (ref 1–4.8)
LYMPHOCYTES NFR BLD: 20.4 % (ref 18–48)
MCH RBC QN AUTO: 27.8 PG (ref 27–31)
MCHC RBC AUTO-ENTMCNC: 33.2 G/DL (ref 32–36)
MCV RBC AUTO: 84 FL (ref 82–98)
MONOCYTES # BLD AUTO: 0.5 K/UL (ref 0.3–1)
MONOCYTES NFR BLD: 9.7 % (ref 4–15)
NEUTROPHILS # BLD AUTO: 3.6 K/UL (ref 1.8–7.7)
NEUTROPHILS NFR BLD: 67.7 % (ref 38–73)
NRBC BLD-RTO: 0 /100 WBC
PLATELET # BLD AUTO: 120 K/UL (ref 150–350)
PMV BLD AUTO: 11.5 FL (ref 9.2–12.9)
POTASSIUM SERPL-SCNC: 3.5 MMOL/L (ref 3.5–5.1)
PROT SERPL-MCNC: 6 G/DL (ref 6–8.4)
PROTHROMBIN TIME: 39.1 SEC (ref 9–12.5)
PROTHROMBIN TIME: 40.4 SEC (ref 9–12.5)
RBC # BLD AUTO: 3.16 M/UL (ref 4–5.4)
SODIUM SERPL-SCNC: 132 MMOL/L (ref 136–145)
WBC # BLD AUTO: 5.38 K/UL (ref 3.9–12.7)

## 2019-12-24 PROCEDURE — 36415 COLL VENOUS BLD VENIPUNCTURE: CPT

## 2019-12-24 PROCEDURE — 25000003 PHARM REV CODE 250: Performed by: NURSE PRACTITIONER

## 2019-12-24 PROCEDURE — 63600175 PHARM REV CODE 636 W HCPCS: Performed by: FAMILY MEDICINE

## 2019-12-24 PROCEDURE — 85610 PROTHROMBIN TIME: CPT

## 2019-12-24 PROCEDURE — 94761 N-INVAS EAR/PLS OXIMETRY MLT: CPT

## 2019-12-24 PROCEDURE — 97530 THERAPEUTIC ACTIVITIES: CPT

## 2019-12-24 PROCEDURE — 63600175 PHARM REV CODE 636 W HCPCS: Performed by: NURSE PRACTITIONER

## 2019-12-24 PROCEDURE — S0077 INJECTION, CLINDAMYCIN PHOSP: HCPCS | Performed by: NURSE PRACTITIONER

## 2019-12-24 PROCEDURE — C9113 INJ PANTOPRAZOLE SODIUM, VIA: HCPCS | Performed by: FAMILY MEDICINE

## 2019-12-24 PROCEDURE — 11000001 HC ACUTE MED/SURG PRIVATE ROOM

## 2019-12-24 PROCEDURE — 80053 COMPREHEN METABOLIC PANEL: CPT

## 2019-12-24 PROCEDURE — 85025 COMPLETE CBC W/AUTO DIFF WBC: CPT

## 2019-12-24 PROCEDURE — 85610 PROTHROMBIN TIME: CPT | Mod: 91

## 2019-12-24 PROCEDURE — 97110 THERAPEUTIC EXERCISES: CPT

## 2019-12-24 PROCEDURE — 25000003 PHARM REV CODE 250: Performed by: FAMILY MEDICINE

## 2019-12-24 RX ORDER — FUROSEMIDE 10 MG/ML
20 INJECTION INTRAMUSCULAR; INTRAVENOUS ONCE
Status: COMPLETED | OUTPATIENT
Start: 2019-12-24 | End: 2019-12-24

## 2019-12-24 RX ADMIN — SODIUM BICARBONATE: 84 INJECTION, SOLUTION INTRAVENOUS at 05:12

## 2019-12-24 RX ADMIN — FUROSEMIDE 20 MG: 10 INJECTION, SOLUTION INTRAMUSCULAR; INTRAVENOUS at 05:12

## 2019-12-24 RX ADMIN — PANTOPRAZOLE SODIUM 40 MG: 40 INJECTION, POWDER, LYOPHILIZED, FOR SOLUTION INTRAVENOUS at 10:12

## 2019-12-24 RX ADMIN — METOPROLOL TARTRATE 2.5 MG: 5 INJECTION, SOLUTION INTRAVENOUS at 06:12

## 2019-12-24 RX ADMIN — CLINDAMYCIN IN 5 PERCENT DEXTROSE 600 MG: 12 INJECTION, SOLUTION INTRAVENOUS at 05:12

## 2019-12-24 RX ADMIN — METOPROLOL TARTRATE 2.5 MG: 5 INJECTION, SOLUTION INTRAVENOUS at 07:12

## 2019-12-24 RX ADMIN — CLINDAMYCIN IN 5 PERCENT DEXTROSE 600 MG: 12 INJECTION, SOLUTION INTRAVENOUS at 01:12

## 2019-12-24 RX ADMIN — METOPROLOL TARTRATE 2.5 MG: 5 INJECTION, SOLUTION INTRAVENOUS at 12:12

## 2019-12-24 RX ADMIN — PHYTONADIONE 1 MG: 1 INJECTION, EMULSION INTRAMUSCULAR; INTRAVENOUS; SUBCUTANEOUS at 05:12

## 2019-12-24 RX ADMIN — LEVOFLOXACIN 750 MG: 750 INJECTION, SOLUTION INTRAVENOUS at 05:12

## 2019-12-24 RX ADMIN — METOPROLOL TARTRATE 2.5 MG: 5 INJECTION, SOLUTION INTRAVENOUS at 01:12

## 2019-12-24 RX ADMIN — CLINDAMYCIN IN 5 PERCENT DEXTROSE 600 MG: 12 INJECTION, SOLUTION INTRAVENOUS at 09:12

## 2019-12-24 NOTE — PT/OT/SLP PROGRESS
Physical Therapy Treatment    Patient Name:  Emilia Melgoza   MRN:  [id number]    Recommendations:     Discharge Recommendations:  nursing facility, skilled   Discharge Equipment Recommendations: bedside commode, wheelchair   Barriers to discharge: Decreased caregiver support, decreased mobility, decreased cognition, increased assistance for BM and sitting EOB    Assessment:     Emilia Melgoza is a 74 y.o. female admitted with a medical diagnosis of Failure to thrive in adult.  She presents with the following impairments/functional limitations:  weakness, impaired endurance, impaired self care skills, impaired functional mobilty, impaired cognition, decreased safety awareness, impaired coordination, decreased coordination, impaired balance, gait instability, decreased lower extremity function, decreased ROM, decreased upper extremity function, impaired muscle length.  Pt continues to require total A x2 for BM and total A for sitting EOB.  However, improved tolerance with holding head up while sitting EOB today.  Pt able to shake head yes x2 attempts to answer yes/no questions. Pt sat EOB for ~30 minutes with Total A. Pt reached to hold HR while sitting EOB x1 trial requiring max verbal/tactile cues. Per chart, pt unable to receive PEG placement due to increased INR.  Hopefully, once pt is able to improve nutrition pt's functional status may improve. Demonstrates decreased trunk control, self care, functional mobility, balance, strength and cognition.  Pt would continue to benefit from skilled PT services to address pt's deficits and improve current level of function.    Rehab Prognosis: Fair; patient would benefit from acute skilled PT services to address these deficits and reach maximum level of function.    Recent Surgery: Procedure(s) (LRB):  INSERTION, PEG TUBE (Left)      Plan:     During this hospitalization, patient to be seen 5 x/week to address the identified rehab impairments via gait training,  therapeutic activities, therapeutic exercises and progress toward the following goals:    · Plan of Care Expires:  01/04/20    Subjective     Chief Complaint: limited verbally  Patient/Family Comments/goals: Pt limited verbally  Pain/Comfort:  · Pain Rating 1: 0/10      Objective:     Communicated with pt's nurse, Johnny, prior to session.  Patient found left sidelying with peripheral IV, bed alarm, SCD, telemetry, pressure relief boots upon PT entry to room.     General Precautions: Standard, aphasia, NPO, aspiration, fall   Orthopedic Precautions:N/A   Braces: N/A     Functional Mobility:  · Bed Mobility:     · Rolling Right: maximal assistance and of 2 persons  · Supine to Sit: total assistance and of 2 persons  · Sit to Supine: total assistance and of 2 persons  · Balance: poor      AM-PAC 6 CLICK MOBILITY  Turning over in bed (including adjusting bedclothes, sheets and blankets)?: 2  Sitting down on and standing up from a chair with arms (e.g., wheelchair, bedside commode, etc.): 1  Moving from lying on back to sitting on the side of the bed?: 2  Moving to and from a bed to a chair (including a wheelchair)?: 1  Need to walk in hospital room?: 1  Climbing 3-5 steps with a railing?: 1  Basic Mobility Total Score: 8       Therapeutic Activities and Exercises:       PROM performed to BLEs sitting EOB in all available planes.  Pt able to shake head yes x2 attempts to answer yes/no questions. Pt sat EOB for ~30 minutes with Total A. Pt reached to hold HR while sitting EOB x1 trial requiring max verbal/tactile cues. Per chart, pt unable to receive PEG placement due to increased INR.  Hopefully, once pt is able to improve nutrition pt's functional status may improve.    Patient left R sidelying, SCDs, pressure boots with all lines intact, call button in reach, bed alarm on, pt's nurse, Donna, notified and daughters present..    GOALS:   Multidisciplinary Problems     Physical Therapy Goals        Problem: Physical  Therapy Goal    Goal Priority Disciplines Outcome Goal Variances Interventions   Physical Therapy Goal     PT, PT/OT Ongoing, Progressing     Description:  Goals to be met by: 20     Patient will increase functional independence with mobility by performin. Supine to sit with Moderate Assistance  2. Sit to supine with Moderate Assistance  3. Rolling to Left and Right with Moderate Assistance  4. Sitting at edge of bed x30 minutes with Minimal Assistance  5. Lower extremity exercise program x10 reps per handout, with assistance as needed                      Time Tracking:     PT Received On: 19  PT Start Time: 941     PT Stop Time:   PT Total Time (min): 45 min  (Cotx with LAMBERT)    Billable Minutes: Therapeutic Activity 23    Treatment Type: Treatment  PT/PTA: PTA     PTA Visit Number: 2     Norah Wright PTA  2019

## 2019-12-24 NOTE — ASSESSMENT & PLAN NOTE
MRI showed old meningioma and some edema, but may be chronic. Unknown if this is contributory to mental status.

## 2019-12-24 NOTE — SUBJECTIVE & OBJECTIVE
Subjective:     Interval History: She has been slightly better according to her daughter who is at the bedside. Ms. Melgoza has been slightly more alert and has been able to interact with her more than in the previous days. She has not been able to get back to her baseline.      Current Neurological Medications:     Current Facility-Administered Medications   Medication Dose Route Frequency Provider Last Rate Last Dose    clindamycin 600 MG/50 ML D5W 600 mg/50 mL IVPB 600 mg  600 mg Intravenous Q8H Deena Vizcaino,  mL/hr at 12/23/19 2158 600 mg at 12/23/19 2158    cloNIDine 0.3 mg/24 hr td ptwk 1 patch  1 patch Transdermal Q7 Days Tamika Calvin MD   1 patch at 12/20/19 0930    diphenhydrAMINE injection 12.5 mg  12.5 mg Intravenous Q6H PRN Tamika Calvin MD   12.5 mg at 12/22/19 1752    escitalopram oxalate tablet 10 mg  10 mg Oral Daily Enrrique Faulkner MD        fluconazole tablet 150 mg  150 mg Oral Q72H PRN Tamika Calvin MD        hydrALAZINE injection 5 mg  5 mg Intravenous Q6H PRN Tamika Calvin MD        levoFLOXacin 750 mg/150 mL IVPB 750 mg  750 mg Intravenous Q24H Deena Vizcaino,  mL/hr at 12/23/19 0221 750 mg at 12/23/19 0221    memantine tablet 10 mg  10 mg Oral Daily Enrrique Faulkner MD        metoprolol injection 2.5 mg  2.5 mg Intravenous Q6H Tamika Calvin MD   2.5 mg at 12/23/19 1547    pantoprazole injection 40 mg  40 mg Intravenous Daily Tamika Calvin MD   40 mg at 12/23/19 0857    sodium chloride 0.9% flush 10 mL  10 mL Intravenous PRN Enrrique Faulkner MD           Review of Systems   Unable to perform ROS: Mental status change     Objective:     Vital Signs (Most Recent):  Temp: 97.7 °F (36.5 °C) (12/23/19 1956)  Pulse: 82 (12/23/19 1956)  Resp: 18 (12/23/19 1956)  BP: (!) 173/78 (12/23/19 1956)  SpO2: 98 % (12/23/19 1956) Vital Signs (24h Range):  Temp:  [97.5 °F (36.4 °C)-98.8 °F (37.1 °C)] 97.7 °F (36.5 °C)  Pulse:  [69-95]  82  Resp:  [16-18] 18  SpO2:  [95 %-100 %] 98 %  BP: (123-173)/() 173/78     Weight: 66 kg (145 lb 8.1 oz)  Body mass index is 24.21 kg/m².    Physical Exam   Constitutional: She appears well-developed and well-nourished.   HENT:   Head: Normocephalic and atraumatic.   Eyes: Pupils are equal, round, and reactive to light. EOM are normal.   Neck: Normal range of motion.   Cardiovascular: Normal rate and intact distal pulses.   Pulmonary/Chest: Effort normal. No apnea. No respiratory distress.   Musculoskeletal: Normal range of motion.   Neurological: She is alert. She has normal strength.   Reflex Scores:       Tricep reflexes are 2+ on the right side and 2+ on the left side.       Bicep reflexes are 2+ on the right side and 2+ on the left side.       Brachioradialis reflexes are 2+ on the right side and 2+ on the left side.       Patellar reflexes are 2+ on the right side and 2+ on the left side.       Achilles reflexes are 2+ on the right side and 2+ on the left side.  Skin: Skin is warm and dry.   Psychiatric: She has a normal mood and affect. Her speech is normal and behavior is normal. Thought content normal.   Vitals reviewed.      NEUROLOGICAL EXAMINATION:     MENTAL STATUS   Speech: speech is normal     CRANIAL NERVES     CN III, IV, VI   Pupils are equal, round, and reactive to light.  Extraocular motions are normal.     MOTOR EXAM     Strength   Strength 5/5 throughout.     REFLEXES     Reflexes   Right brachioradialis: 2+  Left brachioradialis: 2+  Right biceps: 2+  Left biceps: 2+  Right triceps: 2+  Left triceps: 2+  Right patellar: 2+  Left patellar: 2+  Right achilles: 2+  Left achilles: 2+      Significant Labs: All pertinent lab results from the past 24 hours have been reviewed.    Significant Imaging: I have reviewed and interpreted all pertinent imaging results/findings within the past 24 hours.

## 2019-12-24 NOTE — PROGRESS NOTES
[facility]  Neurology  Progress Note    Patient Name: Emilia Melgoza  MRN: [id number]  Admission Date: 12/17/2019  Hospital Length of Stay: 5 days  Code Status: Full Code   Attending Provider: Tamika Calvin MD  Primary Care Physician: Jerson Price MD   Principal Problem:Failure to thrive in adult      Subjective:     Interval History: She has been slightly better according to her daughter who is at the bedside. Ms. Melgoza has been slightly more alert and has been able to interact with her more than in the previous days. She has not been able to get back to her baseline.      Current Neurological Medications:     Current Facility-Administered Medications   Medication Dose Route Frequency Provider Last Rate Last Dose    clindamycin 600 MG/50 ML D5W 600 mg/50 mL IVPB 600 mg  600 mg Intravenous Q8H NISH RosadoP 100 mL/hr at 12/23/19 2158 600 mg at 12/23/19 2158    cloNIDine 0.3 mg/24 hr td ptwk 1 patch  1 patch Transdermal Q7 Days Tamika Calvin MD   1 patch at 12/20/19 0930    diphenhydrAMINE injection 12.5 mg  12.5 mg Intravenous Q6H PRN Tamika Calvin MD   12.5 mg at 12/22/19 1752    escitalopram oxalate tablet 10 mg  10 mg Oral Daily Enrrique Faulkner MD        fluconazole tablet 150 mg  150 mg Oral Q72H PRN Tamika Calvin MD        hydrALAZINE injection 5 mg  5 mg Intravenous Q6H PRN Tamika Calvin MD        levoFLOXacin 750 mg/150 mL IVPB 750 mg  750 mg Intravenous Q24H NISH RosadoP 100 mL/hr at 12/23/19 0221 750 mg at 12/23/19 0221    memantine tablet 10 mg  10 mg Oral Daily Enrrique Faulkner MD        metoprolol injection 2.5 mg  2.5 mg Intravenous Q6H Tamika Calvin MD   2.5 mg at 12/23/19 1547    pantoprazole injection 40 mg  40 mg Intravenous Daily Tamika Calvin MD   40 mg at 12/23/19 0857    sodium chloride 0.9% flush 10 mL  10 mL Intravenous PRN Enrrique Faulkner MD           Review of Systems   Unable to perform ROS:  Mental status change     Objective:     Vital Signs (Most Recent):  Temp: 97.7 °F (36.5 °C) (12/23/19 1956)  Pulse: 82 (12/23/19 1956)  Resp: 18 (12/23/19 1956)  BP: (!) 173/78 (12/23/19 1956)  SpO2: 98 % (12/23/19 1956) Vital Signs (24h Range):  Temp:  [97.5 °F (36.4 °C)-98.8 °F (37.1 °C)] 97.7 °F (36.5 °C)  Pulse:  [69-95] 82  Resp:  [16-18] 18  SpO2:  [95 %-100 %] 98 %  BP: (123-173)/() 173/78     Weight: 66 kg (145 lb 8.1 oz)  Body mass index is 24.21 kg/m².    Physical Exam   Constitutional: She appears well-developed and well-nourished.   HENT:   Head: Normocephalic and atraumatic.   Eyes: Pupils are equal, round, and reactive to light. EOM are normal.   Neck: Normal range of motion.   Cardiovascular: Normal rate and intact distal pulses.   Pulmonary/Chest: Effort normal. No apnea. No respiratory distress.   Musculoskeletal: Normal range of motion.   Neurological: She is alert. She has normal strength.   Reflex Scores:       Tricep reflexes are 2+ on the right side and 2+ on the left side.       Bicep reflexes are 2+ on the right side and 2+ on the left side.       Brachioradialis reflexes are 2+ on the right side and 2+ on the left side.       Patellar reflexes are 2+ on the right side and 2+ on the left side.       Achilles reflexes are 2+ on the right side and 2+ on the left side.  Skin: Skin is warm and dry.   Psychiatric: She has a normal mood and affect. Her speech is normal and behavior is normal. Thought content normal.   Vitals reviewed.      NEUROLOGICAL EXAMINATION:     MENTAL STATUS   Speech: speech is normal     CRANIAL NERVES     CN III, IV, VI   Pupils are equal, round, and reactive to light.  Extraocular motions are normal.     MOTOR EXAM     Strength   Strength 5/5 throughout.     REFLEXES     Reflexes   Right brachioradialis: 2+  Left brachioradialis: 2+  Right biceps: 2+  Left biceps: 2+  Right triceps: 2+  Left triceps: 2+  Right patellar: 2+  Left patellar: 2+  Right achilles:  2+  Left achilles: 2+      Significant Labs: All pertinent lab results from the past 24 hours have been reviewed.    Significant Imaging: I have reviewed and interpreted all pertinent imaging results/findings within the past 24 hours.    Assessment and Plan:     Acute encephalopathy  MRI showed old meningioma and some edema, but may be chronic. Unknown if this is contributory to mental status.    Hemiparesis affecting right side as late effect of cerebrovascular accident  C/w plavix and statin when patient able to swallow  Can give aspirin per rectum until patient able to swallow        VTE Risk Mitigation (From admission, onward)         Ordered     IP VTE HIGH RISK PATIENT  Once      12/17/19 2235     Place sequential compression device  Until discontinued      12/17/19 2235                Cordell Bunch MD  Neurology  [facility] [place]

## 2019-12-24 NOTE — SUBJECTIVE & OBJECTIVE
Interval History: No acute events overnight. EGD cancelled for elevated INR. Family thinks patient has abdominal pain.     Review of Systems   Unable to perform ROS: Patient nonverbal     Objective:     Vital Signs (Most Recent):  Temp: 97.8 °F (36.6 °C) (12/24/19 1122)  Pulse: 84 (12/24/19 1356)  Resp: 17 (12/24/19 1122)  BP: 128/62 (12/24/19 1356)  SpO2: 96 % (12/24/19 1356) Vital Signs (24h Range):  Temp:  [97.5 °F (36.4 °C)-98.1 °F (36.7 °C)] 97.8 °F (36.6 °C)  Pulse:  [75-91] 84  Resp:  [16-20] 17  SpO2:  [90 %-98 %] 96 %  BP: (128-173)/() 128/62     Weight: 66 kg (145 lb 8.1 oz)  Body mass index is 24.21 kg/m².    Intake/Output Summary (Last 24 hours) at 12/24/2019 1532  Last data filed at 12/24/2019 1221  Gross per 24 hour   Intake 0 ml   Output --   Net 0 ml      Physical Exam   Constitutional: She appears well-developed and well-nourished. No distress.   HENT:   Head: Normocephalic and atraumatic.   Eyes: Conjunctivae are normal.   Neck: Neck supple.   Cardiovascular: Normal rate, regular rhythm and normal heart sounds.   Pulmonary/Chest: Effort normal. No respiratory distress.   Coarse upper and lower respiratory breath sounds   Abdominal: Soft. Bowel sounds are normal. She exhibits no distension.   Musculoskeletal: She exhibits no edema.   Neurological: She is alert.   Psychiatric: She is noncommunicative.   Nursing note and vitals reviewed.      Significant Labs:   BMP:   Recent Labs   Lab 12/24/19  0509   GLU 70   *   K 3.5      CO2 18*   BUN 10   CREATININE 0.8   CALCIUM 8.1*     CBC:   Recent Labs   Lab 12/23/19  0615 12/24/19  0509   WBC 6.83 5.38   HGB 9.2* 8.8*   HCT 27.9* 26.5*   * 120*       Significant Imaging: I have reviewed all pertinent imaging results/findings within the past 24 hours.

## 2019-12-24 NOTE — PROGRESS NOTES
[facility]  Gastroenterology  Progress Note    Patient Name: Eimlia Melgoza  MRN: [id number]  Admission Date: 12/17/2019  Hospital Length of Stay: 6 days  Code Status: Full Code   Attending Provider: Tamika Calvin MD  Primary Care Physician: Jerson Price MD  Principal Problem: Failure to thrive in adult    Subjective:     CC= can swallow    Interval History:  74-year-old patient with a failed swallow recommendation, recommendation is for NPO with PEG placement.  Unfortunately the INR this morning is 3.7., does prohibiting PEG placement    Review of systems:  General: Negative for fevers, chills.  Cardiovascular:  Negative for chest pain, shortness of breath     Objective:     Vital Signs (Most Recent):  Temp: 98.1 °F (36.7 °C) (12/24/19 0724)  Pulse: 91 (12/24/19 0724)  Resp: 16 (12/24/19 0724)  BP: 133/63 (12/24/19 0724)  SpO2: 98 % (12/24/19 0724) Vital Signs (24h Range):  Temp:  [97.5 °F (36.4 °C)-98.1 °F (36.7 °C)] 98.1 °F (36.7 °C)  Pulse:  [69-91] 91  Resp:  [16-20] 16  SpO2:  [90 %-100 %] 98 %  BP: (123-173)/() 133/63     Physical examination:  GEN: Well developed, well nourished in no apparent distress   HENT: Normocephalic, anicteric sclera   Cardiovascular: Regular rate and rhythm. No murmurs appreciated.   Chest: Non-labored respirations. Breath sounds equal   Abdomen: Soft, NTND, normoactive BS  Psych: Appropriate mood and affect.   Extermities: No C/C/E. 2+ dorsalis pedis pulses bilaterally  Skin: No new visible or palpable lesions.     Labs, INR 3.7      Assessment:   74 year old female with a history of HTN, CVA with right sided weakness, aphasia, meningioma, gait instability, and anxiety presenting with altered mental status and failure to thrive.  Unclear etiology.  GI consulted for PEG tube placement , unfortunately INR is 3.7 today    Plan:     Will need to reschedule EGD and PEG tube placement.  INR will need to be under 2 for consideration, preferably  1.5    Lamin Rosado Ii, MD  Gastroenterology  [facility]

## 2019-12-24 NOTE — PLAN OF CARE
Pt remained free from falls and injury during shift, medication administered per MD orders, Turned Q 2hours, oral care provided, NPO maintained, IV abx administered per Order, Tele box 1338, daughter at bedside will continue to monitor.

## 2019-12-24 NOTE — PT/OT/SLP PROGRESS
Occupational Therapy   Treatment    Name: Emilia Melgoza  MRN: [id number]  Admitting Diagnosis:  Failure to thrive in adult       Recommendations:     Discharge Recommendations: nursing facility, skilled  Discharge Equipment Recommendations:  (if d/c home, chandler lift, hospital bed, and wheelchair)  Barriers to discharge:       Assessment:     Emilia Melgoza is a 74 y.o. female with a medical diagnosis of Failure to thrive in adult.  She presents with the following performance deficits affecting function: weakness, impaired endurance, impaired self care skills, impaired functional mobilty, gait instability, impaired balance, impaired cognition, decreased coordination, decreased lower extremity function, decreased upper extremity function, decreased safety awareness, pain, decreased ROM, impaired fine motor, impaired coordination, impaired muscle length. Pt tolerated sitting EOB ~ 30 mins with total assist for balance, able to tolerate BUE PROM with less resistive behavior today . Pt unable to receive PEG today, due to increased INR. Pt will benefit from continued OT services to address functional deficits and return to PLOF.    Rehab Prognosis:  Fair; patient would benefit from acute skilled OT services to address these deficits and reach maximum level of function.       Plan:     Patient to be seen (3-5x/week) to address the above listed problems via self-care/home management, therapeutic activities, therapeutic exercises  · Plan of Care Expires: 01/04/20  · Plan of Care Reviewed with: patient, daughter    Subjective   Pt essentially non-verbal during session.   Pain/Comfort:  · Pain Rating 1: other (see comments) Pt nodded yes to pain with RUE PROM.    Objective:     Communicated with: nurse prior to session.  Patient found HOB elevated with peripheral IV, bed alarm, SCD, telemetry, pressure relief boots, and family present upon OT entry to room.    General Precautions: Standard, fall, NPO, aspiration, aphasia    Orthopedic Precautions:N/A   Braces: N/A     Occupational Performance:     Bed Mobility:    · Patient completed Rolling/Turning to Right with maximal assistance and 2 persons (2 trials)  · Patient completed Scooting/Bridging with total assistance and 2 persons  · Patient completed Supine to Sit with total assistance and 2 persons  · Patient completed Sit to Supine with 2 persons     Activities of Daily Living:  · Upper Body Dressing: total assistance to maxwell/doff back gown  · Lower Body Dressing: dependence to maxwell/doff back gown    [facility] 6 Click ADL: 6    Treatment & Education:  -Pt tolerated sitting EOB ~ 30 minutes with total A. Pt demo difficulty following commands and in/out of alertness during session with max cues to hold head up. Patient able to reach for bed rail to assist with sitting balance with LUE x1 trial with maximal cueing. Several other attempts, however, pt unable to initiate.   -Pt able to nod head yes x2 attempts to answer yes/no questions, but overall inconsistent.   -Pt tolerated PROM to BUE x 10 reps in all available planes of motion while sitting EOB (total A for balance) RUE limited shoulder flex/ext ~60 degrees from residual deficits from previous CVA. Pt tolerated fairly and less resistive to ROM than previous session.  -Pt family present and very supportive in care. Provides pt with encouragement when needed.      Patient left left sidelying on wedge with B pressure relief boots and SCDs placed all lines intact, call button in reach, bed alarm on and nurse Teke and family presentEducation:      GOALS:   Multidisciplinary Problems     Occupational Therapy Goals        Problem: Occupational Therapy Goal    Goal Priority Disciplines Outcome Interventions   Occupational Therapy Goal     OT, PT/OT Ongoing, Progressing    Description:  Goals to be met by: 01/04/20     Patient will increase functional independence with ADLs by performing:    UE Dressing with Maximum Assistance.  Grooming  while seated with Moderate Assistance.  Sitting at edge of bed x10 minutes with Moderate Assistance.  Rolling to Bilateral with Moderate Assistance.   Supine to sit with Moderate Assistance.  Upper extremity exercise program x7 reps per handout, with assistance as needed.  Pt will visually scan for 3 out 5 ADL objects with 50% verbal cueing in order to increase active participation with ADLs.                       Time Tracking:     OT Date of Treatment: 12/24/19  OT Start Time: 0941  OT Stop Time: 1026  OT Total Time (min): 45 min    Billable Minutes:Therapeutic Exercise 22 (co-treat with PTA)    SELMA Morfin  12/24/2019

## 2019-12-24 NOTE — ASSESSMENT & PLAN NOTE
Pt unable to follow any simple commands to actively participate in therapy.    PT/OT continues to work with the patient. Family very encouraging.

## 2019-12-24 NOTE — PROGRESS NOTES
[facility] Hospital Medicine  Progress Note    Patient Name: Emilia Melgoza  MRN: [id number]  Patient Class: IP- Inpatient   Admission Date: 12/17/2019  Length of Stay: 6 days  Attending Physician: Tamika Calvin MD  Primary Care Provider: Jerson Price MD      Subjective:     Principal Problem:Failure to thrive in adult      HPI:  74 y.o. 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  She is also not taking her medications.  She appears to be more confused than usual.  Had a recent tooth extraction by the dentist and was unable to take prescribed clindamycin.  Daughter reports patient has indicated mouth pain. Also recently prescribed Macrobid for UTI and was unable to complete.  Daughter denies that the patient has had a fever.  History further limited secondary to the patient's condition.  In the ED, she was found to be tachycardic with hypernatremia, hypokalemia, mild elevation of CPK and troponin.  Urinalysis, CT head, chest x-ray, and EKG were all unremarkable for any acute abnormality.  She appears clinically dehydrated.  Placed in observation for further evaluation and treatment.    Overview/Hospital Course:  74 y.o.  AAF with history of stroke (2013 - residual L sided paralysis/paresthesia and aphasia) and vascular dementia, as well as HTN. She presented with her daughter who reports that shortly after thanksgiving her mother who was usually ambulatory with assist, could eat her food and feed herself once food was prepped, stopped eating, started hoarding medications, and was less attentive. She gradually became weaker whereas she could not stand.  According to her daughter who is at the bedside she assumed that the change in mental status was due to tooth-ache bottom left and right therefore she scheduled dentist appointment. She had BL lower oral extraction on Thursday 12/12/19 was ordered clindamycin but has not been able to get the patient to take the medication. She denies fever, chills, melena, hematochezia, report of HA or CP, diaphoresis; sick contacts, long trips, or trauma.    Patient was found to have CXR no acute cardiopulmonary process, CT head showed no acute large infarct ---   Pertinient positives  -Abnormal urine WBC = 12, urine culture NGTD,   -CT soft tissue neck showed soft tissue swelling and edema involving the R anterior mandibular region suspicious for cellulitis  -US abdomen shows common bile duct dilated 1.2 cm - no intrahepatic biliary dilatation    Interval History: No acute events overnight. EGD cancelled for elevated INR. Family thinks patient has abdominal pain.     Review of Systems   Unable to perform ROS: Patient nonverbal     Objective:     Vital Signs (Most Recent):  Temp: 97.8 °F (36.6 °C) (12/24/19 1122)  Pulse: 84 (12/24/19 1356)  Resp: 17 (12/24/19 1122)  BP: 128/62 (12/24/19 1356)  SpO2: 96 % (12/24/19 1356) Vital Signs (24h Range):  Temp:  [97.5 °F (36.4 °C)-98.1 °F (36.7 °C)] 97.8 °F (36.6 °C)  Pulse:  [75-91] 84  Resp:  [16-20] 17  SpO2:  [90 %-98 %] 96 %  BP: (128-173)/() 128/62     Weight: 66 kg (145 lb 8.1 oz)  Body mass index is 24.21 kg/m².    Intake/Output Summary (Last 24 hours) at 12/24/2019 1532  Last data filed at 12/24/2019 1221  Gross per 24 hour   Intake 0 ml   Output --   Net 0 ml      Physical Exam   Constitutional: She appears well-developed and well-nourished. No distress.   HENT:   Head: Normocephalic and atraumatic.   Eyes: Conjunctivae are normal.   Neck: Neck supple.   Cardiovascular: Normal rate, regular rhythm and normal heart sounds.   Pulmonary/Chest: Effort normal. No respiratory  distress.   Coarse upper and lower respiratory breath sounds   Abdominal: Soft. Bowel sounds are normal. She exhibits no distension.   Musculoskeletal: She exhibits no edema.   Neurological: She is alert.   Psychiatric: She is noncommunicative.   Nursing note and vitals reviewed.      Significant Labs:   BMP:   Recent Labs   Lab 12/24/19  0509   GLU 70   *   K 3.5      CO2 18*   BUN 10   CREATININE 0.8   CALCIUM 8.1*     CBC:   Recent Labs   Lab 12/23/19  0615 12/24/19  0509   WBC 6.83 5.38   HGB 9.2* 8.8*   HCT 27.9* 26.5*   * 120*       Significant Imaging: I have reviewed all pertinent imaging results/findings within the past 24 hours.      Assessment/Plan:     * Failure to thrive in adult  PEG to be placed Thursday (holiday coverage). Continue PT/ OT. Patient will be placed in SNF.     Metabolic acidosis       Mild. IV bicarb ordered.     Elevated INR  Repeat INR also elevated. LFTS have been elevated. Ammonia negative. Abd u/s negative for liver abnormality. Will check CT abd given suggested abd pain today. Will try to reverse with vitamin K, but etiology uncertain at this time.       Cellulitis of mouth  Unlikely. Complete course of Clindamycin day 7 of 7.     Acute encephalopathy  Neuro work up benign. Likely progressive disease process of CVA.     Mobility impaired  Pt unable to follow any simple commands to actively participate in therapy.    PT/OT continues to work with the patient. Family very encouraging.     Acute cystitis without hematuria  Secondary to E.Coli. Low colony count. Continue Levaquin day 7 of 7.    Broca's aphasia  Nonverbal at baseline    Hemiparesis affecting right side as late effect of cerebrovascular accident  At baseline, no acute issue. Fall precautions.     Essential hypertension  Stable. Continue clonidine patch 0.3 mg weekly and IV Lopressor 5 mg q6hr. Continue IV hydralazine prn.      Chronic diastolic congestive heart failure  Stable. Monitor for fluid  overload.      VTE Risk Mitigation (From admission, onward)         Ordered     IP VTE HIGH RISK PATIENT  Once      12/17/19 2235     Place sequential compression device  Until discontinued      12/17/19 2235              Tamika Calvin MD  Department of Hospital Medicine   [facility]

## 2019-12-24 NOTE — ASSESSMENT & PLAN NOTE
Repeat INR also elevated. LFTS have been elevated. Ammonia negative. Abd u/s negative for liver abnormality. Will check CT abd given suggested abd pain today. Will try to reverse with vitamin K, but etiology uncertain at this time.

## 2019-12-24 NOTE — PLAN OF CARE
HERMINIO met with patient's daughter Julissa again to obtain decision family made in regards to discharge. Julissa informed SW family has decided on SNF placement. Family requests HERMINIO referred patient to [facility], [facility], [facility], and [facility].                12/24/19 1435   Post-Acute Status   Post-Acute Authorization Placement  (SNF)   Post-Acute Placement Status Patient List Provided   Patient choice form signed by patient/caregiver List from [employer]

## 2019-12-24 NOTE — PLAN OF CARE
Problem: Occupational Therapy Goal  Goal: Occupational Therapy Goal  Description  Goals to be met by: 01/04/20     Patient will increase functional independence with ADLs by performing:    UE Dressing with Maximum Assistance.  Grooming while seated with Moderate Assistance.  Sitting at edge of bed x10 minutes with Moderate Assistance.  Rolling to Bilateral with Moderate Assistance.   Supine to sit with Moderate Assistance.  Upper extremity exercise program x7 reps per handout, with assistance as needed.  Pt will visually scan for 3 out 5 ADL objects with 50% verbal cueing in order to increase active participation with ADLs.      Outcome: Ongoing, Progressing   Goals ongoing. Continue OT POC as indicated.

## 2019-12-24 NOTE — HOSPITAL COURSE
She has been slightly better according to her daughter who is at the bedside. Ms. Melgoza has been slightly more alert and has been able to interact with her more than in the previous days. She has not been able to get back to her baseline.

## 2019-12-24 NOTE — PLAN OF CARE
Problem: Physical Therapy Goal  Goal: Physical Therapy Goal  Description  Goals to be met by: 20     Patient will increase functional independence with mobility by performin. Supine to sit with Moderate Assistance  2. Sit to supine with Moderate Assistance  3. Rolling to Left and Right with Moderate Assistance  4. Sitting at edge of bed x30 minutes with Minimal Assistance  5. Lower extremity exercise program x10 reps per handout, with assistance as needed     Outcome: Ongoing, Progressing   Pt continues to require total A x2 for BM and total A for sitting EOB.  However, improved tolerance with holding head up while sitting EOB today.  Pt able to shake head yes x2 attempts to answer yes/no questions. Pt sat EOB for ~30 minutes with Total A. Pt reached to hold HR while sitting EOB x1 trial requiring max verbal/tactile cues. Per chart, pt unable to receive PEG placement due to increased INR.  Hopefully, once pt is able to improve nutrition pt's functional status may improve. Demonstrates decreased trunk control, self care, functional mobility, balance, strength and cognition.  Pt would continue to benefit from skilled PT services to address pt's deficits and improve current level of function.

## 2019-12-25 LAB
ALBUMIN SERPL BCP-MCNC: 2.6 G/DL (ref 3.5–5.2)
ALP SERPL-CCNC: 52 U/L (ref 55–135)
ALT SERPL W/O P-5'-P-CCNC: 57 U/L (ref 10–44)
ANION GAP SERPL CALC-SCNC: 9 MMOL/L (ref 8–16)
AST SERPL-CCNC: 108 U/L (ref 10–40)
BASOPHILS # BLD AUTO: 0 K/UL (ref 0–0.2)
BASOPHILS NFR BLD: 0 % (ref 0–1.9)
BILIRUB SERPL-MCNC: 1 MG/DL (ref 0.1–1)
BUN SERPL-MCNC: 10 MG/DL (ref 8–23)
CALCIUM SERPL-MCNC: 7.9 MG/DL (ref 8.7–10.5)
CHLORIDE SERPL-SCNC: 98 MMOL/L (ref 95–110)
CO2 SERPL-SCNC: 26 MMOL/L (ref 23–29)
CREAT SERPL-MCNC: 1.1 MG/DL (ref 0.5–1.4)
DIFFERENTIAL METHOD: ABNORMAL
EOSINOPHIL # BLD AUTO: 0 K/UL (ref 0–0.5)
EOSINOPHIL NFR BLD: 0.7 % (ref 0–8)
ERYTHROCYTE [DISTWIDTH] IN BLOOD BY AUTOMATED COUNT: 17.7 % (ref 11.5–14.5)
EST. GFR  (AFRICAN AMERICAN): 57 ML/MIN/1.73 M^2
EST. GFR  (NON AFRICAN AMERICAN): 49 ML/MIN/1.73 M^2
GLUCOSE SERPL-MCNC: 130 MG/DL (ref 70–110)
HCT VFR BLD AUTO: 26.5 % (ref 37–48.5)
HGB BLD-MCNC: 8.9 G/DL (ref 12–16)
IMM GRANULOCYTES # BLD AUTO: 0.06 K/UL (ref 0–0.04)
IMM GRANULOCYTES NFR BLD AUTO: 1.1 % (ref 0–0.5)
INR PPP: 1.8 (ref 0.8–1.2)
LYMPHOCYTES # BLD AUTO: 1 K/UL (ref 1–4.8)
LYMPHOCYTES NFR BLD: 18.7 % (ref 18–48)
MCH RBC QN AUTO: 28.3 PG (ref 27–31)
MCHC RBC AUTO-ENTMCNC: 33.6 G/DL (ref 32–36)
MCV RBC AUTO: 84 FL (ref 82–98)
MONOCYTES # BLD AUTO: 0.7 K/UL (ref 0.3–1)
MONOCYTES NFR BLD: 12.2 % (ref 4–15)
NEUTROPHILS # BLD AUTO: 3.6 K/UL (ref 1.8–7.7)
NEUTROPHILS NFR BLD: 67.3 % (ref 38–73)
NRBC BLD-RTO: 0 /100 WBC
PLATELET # BLD AUTO: 120 K/UL (ref 150–350)
PMV BLD AUTO: ABNORMAL FL (ref 9.2–12.9)
POTASSIUM SERPL-SCNC: 2.8 MMOL/L (ref 3.5–5.1)
PROT SERPL-MCNC: 5.8 G/DL (ref 6–8.4)
PROTHROMBIN TIME: 19.1 SEC (ref 9–12.5)
RBC # BLD AUTO: 3.15 M/UL (ref 4–5.4)
SODIUM SERPL-SCNC: 133 MMOL/L (ref 136–145)
WBC # BLD AUTO: 5.39 K/UL (ref 3.9–12.7)

## 2019-12-25 PROCEDURE — 25000003 PHARM REV CODE 250: Performed by: NURSE PRACTITIONER

## 2019-12-25 PROCEDURE — 25000003 PHARM REV CODE 250: Performed by: FAMILY MEDICINE

## 2019-12-25 PROCEDURE — 80053 COMPREHEN METABOLIC PANEL: CPT

## 2019-12-25 PROCEDURE — C9113 INJ PANTOPRAZOLE SODIUM, VIA: HCPCS | Performed by: FAMILY MEDICINE

## 2019-12-25 PROCEDURE — 25000003 PHARM REV CODE 250: Performed by: EMERGENCY MEDICINE

## 2019-12-25 PROCEDURE — 36415 COLL VENOUS BLD VENIPUNCTURE: CPT

## 2019-12-25 PROCEDURE — 63600175 PHARM REV CODE 636 W HCPCS: Performed by: FAMILY MEDICINE

## 2019-12-25 PROCEDURE — 85610 PROTHROMBIN TIME: CPT

## 2019-12-25 PROCEDURE — S0077 INJECTION, CLINDAMYCIN PHOSP: HCPCS | Performed by: NURSE PRACTITIONER

## 2019-12-25 PROCEDURE — 81025 URINE PREGNANCY TEST: CPT | Performed by: INTERNAL MEDICINE

## 2019-12-25 PROCEDURE — 63600175 PHARM REV CODE 636 W HCPCS: Performed by: NURSE PRACTITIONER

## 2019-12-25 PROCEDURE — 85025 COMPLETE CBC W/AUTO DIFF WBC: CPT

## 2019-12-25 PROCEDURE — 11000001 HC ACUTE MED/SURG PRIVATE ROOM

## 2019-12-25 RX ORDER — PHYTONADIONE 10 MG/ML
1 INJECTION, EMULSION INTRAMUSCULAR; INTRAVENOUS; SUBCUTANEOUS ONCE
Status: COMPLETED | OUTPATIENT
Start: 2019-12-25 | End: 2019-12-25

## 2019-12-25 RX ORDER — POTASSIUM CHLORIDE 20 MEQ/15ML
40 SOLUTION ORAL ONCE
Status: COMPLETED | OUTPATIENT
Start: 2019-12-25 | End: 2019-12-25

## 2019-12-25 RX ORDER — SODIUM CHLORIDE 0.9 % (FLUSH) 0.9 %
10 SYRINGE (ML) INJECTION
Status: DISCONTINUED | OUTPATIENT
Start: 2019-12-25 | End: 2020-01-02

## 2019-12-25 RX ADMIN — LEVOFLOXACIN 750 MG: 750 INJECTION, SOLUTION INTRAVENOUS at 03:12

## 2019-12-25 RX ADMIN — PANTOPRAZOLE SODIUM 40 MG: 40 INJECTION, POWDER, LYOPHILIZED, FOR SOLUTION INTRAVENOUS at 08:12

## 2019-12-25 RX ADMIN — PHYTONADIONE 1 MG: 10 INJECTION, EMULSION INTRAMUSCULAR; INTRAVENOUS; SUBCUTANEOUS at 11:12

## 2019-12-25 RX ADMIN — ESCITALOPRAM OXALATE 10 MG: 10 TABLET ORAL at 11:12

## 2019-12-25 RX ADMIN — CLINDAMYCIN IN 5 PERCENT DEXTROSE 600 MG: 12 INJECTION, SOLUTION INTRAVENOUS at 04:12

## 2019-12-25 RX ADMIN — METOPROLOL TARTRATE 2.5 MG: 5 INJECTION, SOLUTION INTRAVENOUS at 12:12

## 2019-12-25 RX ADMIN — SODIUM BICARBONATE: 84 INJECTION, SOLUTION INTRAVENOUS at 11:12

## 2019-12-25 RX ADMIN — CLINDAMYCIN IN 5 PERCENT DEXTROSE 600 MG: 12 INJECTION, SOLUTION INTRAVENOUS at 11:12

## 2019-12-25 RX ADMIN — MEMANTINE HYDROCHLORIDE 10 MG: 10 TABLET ORAL at 11:12

## 2019-12-25 RX ADMIN — POTASSIUM CHLORIDE 40 MEQ: 20 SOLUTION ORAL at 11:12

## 2019-12-25 NOTE — ASSESSMENT & PLAN NOTE
Family decided to proceed with NGT for tube feeds while awaiting PEG. Patient tolerating feeds well. PEG to be placed tomorrow. Continue PT/ OT. Patient will be placed in SNF.

## 2019-12-25 NOTE — PROGRESS NOTES
Pt. Seen this AM.    Tolerating TF's via NG tube.  INR this AM = 1.8  Will plan for tentative EGD/PEG tomorrow, as should have INR < 1.5 by then.  Hold TF's after MN.  Discussed with family at bedside, who are in agreement with plan.  All questions answered.

## 2019-12-25 NOTE — PLAN OF CARE
Problem: Adult Inpatient Plan of Care  Goal: Plan of Care Review  Outcome: Ongoing, Progressing     Patient is nonverbal.  IVF initated and IV antibiotics administered as ordered.  NPO status maintained.  SCD in place.  Restraints initiated due to patient removing NGT that was placed today.  Awaiting XR results to start TF.  Incontinence care provided.  Family at bedside.  Will continue to monitor.

## 2019-12-25 NOTE — NURSING
Plan of Care:  Pt nonverbal s/p CVA.  Repositioned for comfort as tolerated.  Rt nare ngt remains intact and secured to nose and taped to gown.  Tolerating tube feedings well without any residual; goal rate maintained at 40 ml/hr.  Bilateral soft wrist restraints in use to prevent prevent pulling at NGT.  Vital signs stable; family remain at bedside for observation.

## 2019-12-25 NOTE — PROGRESS NOTES
[facility] Hospital Medicine  Progress Note    Patient Name: Emilia Melgoza  MRN: [id number]  Patient Class: IP- Inpatient   Admission Date: 12/17/2019  Length of Stay: 7 days  Attending Physician: Tamika Calvin MD  Primary Care Provider: Jerson Price MD    Subjective:     Principal Problem:Failure to thrive in adult    HPI:  74 y.o. 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  She is also not taking her medications.  She appears to be more confused than usual.  Had a recent tooth extraction by the dentist and was unable to take prescribed clindamycin.  Daughter reports patient has indicated mouth pain. Also recently prescribed Macrobid for UTI and was unable to complete.  Daughter denies that the patient has had a fever.  History further limited secondary to the patient's condition.  In the ED, she was found to be tachycardic with hypernatremia, hypokalemia, mild elevation of CPK and troponin.  Urinalysis, CT head, chest x-ray, and EKG were all unremarkable for any acute abnormality.  She appears clinically dehydrated.  Placed in observation for further evaluation and treatment.    Overview/Hospital Course:  74 y.o.  AAF with history of stroke (2013 - residual L sided paralysis/paresthesia and aphasia) and vascular dementia, as well as HTN. She presented with her daughter who reports that shortly after thanksgiving her mother who was usually ambulatory with assist, could eat her food and feed herself once food was prepped, stopped eating, started hoarding medications, and was less attentive. She gradually became weaker whereas she could not stand.  According to her daughter who is at the bedside she assumed that the change in mental status was due to tooth-ache bottom left and right therefore she scheduled dentist appointment. She had BL lower oral extraction on Thursday 12/12/19 was ordered clindamycin but has not been able to get the patient to take the medication. She denies fever, chills, melena, hematochezia, report of HA or CP, diaphoresis; sick contacts, long trips, or trauma.    Patient was found to have CXR no acute cardiopulmonary process, CT head showed no acute large infarct ---   Pertinient positives  -Abnormal urine WBC = 12, urine culture NGTD,   -CT soft tissue neck showed soft tissue swelling and edema involving the R anterior mandibular region suspicious for cellulitis  -US abdomen shows common bile duct dilated 1.2 cm - no intrahepatic biliary dilatation    Interval History: No acute events overnight.    Review of Systems   Unable to perform ROS: Patient nonverbal     Objective:     Vital Signs (Most Recent):  Temp: 97.5 °F (36.4 °C) (12/25/19 1212)  Pulse: 80 (12/25/19 1212)  Resp: 19 (12/25/19 1212)  BP: (!) 121/57 (12/25/19 1212)  SpO2: 97 % (12/25/19 1212) Vital Signs (24h Range):  Temp:  [97.5 °F (36.4 °C)-98.6 °F (37 °C)] 97.5 °F (36.4 °C)  Pulse:  [80-90] 80  Resp:  [18-19] 19  SpO2:  [90 %-97 %] 97 %  BP: ()/(54-81) 121/57     Weight: 66 kg (145 lb 8.1 oz)  Body mass index is 24.21 kg/m².    Intake/Output Summary (Last 24 hours) at 12/25/2019 1449  Last data filed at 12/25/2019 1152  Gross per 24 hour   Intake 2036.75 ml   Output --   Net 2036.75 ml      Physical Exam   Constitutional: She appears well-developed and well-nourished. No distress.   HENT:   Head: Normocephalic and atraumatic.   Eyes: Conjunctivae are normal.   Neck: Neck supple.   Cardiovascular: Normal rate, regular rhythm and normal heart sounds.   Pulmonary/Chest: Effort normal. No respiratory distress.   Abdominal: Soft. Bowel sounds are normal. She exhibits  no distension.   Musculoskeletal: She exhibits no edema.   Neurological: She is alert.   Psychiatric: She is noncommunicative.   Nursing note and vitals reviewed.      Significant Labs:   BMP:   Recent Labs   Lab 12/25/19  0404   *   *   K 2.8*   CL 98   CO2 26   BUN 10   CREATININE 1.1   CALCIUM 7.9*     CBC:   Recent Labs   Lab 12/24/19  0509 12/25/19  0404   WBC 5.38 5.39   HGB 8.8* 8.9*   HCT 26.5* 26.5*   * 120*       Significant Imaging: I have reviewed all pertinent imaging results/findings within the past 24 hours.      Assessment/Plan:      * Failure to thrive in adult  Family decided to proceed with NGT for tube feeds while awaiting PEG. Patient tolerating feeds well. PEG to be placed tomorrow. Continue PT/ OT. Patient will be placed in SNF.     Metabolic acidosis  Resolved with IV bicarb. DC drip. Monitor.     Elevated INR  Improved with vit K. Will give another dose for PEG placement tomorrow. LFTS have been elevated. Platelets are decreased. Ammonia negative. Abd u/s negative for liver abnormality. CT abd benign.     Cellulitis of mouth  Unlikely. Treated with IV Clindamycin for 7 days.     Acute encephalopathy  Neuro work up benign. Likely progressive disease process of CVA.     Mobility impaired  Pt unable to follow any simple commands to actively participate in therapy.    PT/OT continues to work with the patient. Family very encouraging.     Acute cystitis without hematuria  Secondary to E.Coli. Low colony count. Treated with 7 days of IV Levaquin.    Broca's aphasia  Nonverbal at baseline    Hemiparesis affecting right side as late effect of cerebrovascular accident  At baseline, no acute issue. Fall precautions.     Essential hypertension  Stable. Continue clonidine patch 0.3 mg weekly. Hydralazine per PEG prn.      Chronic diastolic congestive heart failure  Stable. Monitor for fluid overload.      VTE Risk Mitigation (From admission, onward)         Ordered     IP VTE HIGH  RISK PATIENT  Once      12/17/19 2235     Place sequential compression device  Until discontinued      12/17/19 2235              Tamika Calvin MD  Department of Hospital Medicine   [facility]

## 2019-12-25 NOTE — ASSESSMENT & PLAN NOTE
Improved with vit K. Will give another dose for PEG placement tomorrow. LFTS have been elevated. Platelets are decreased. Ammonia negative. Abd u/s negative for liver abnormality. CT abd benign.

## 2019-12-25 NOTE — SUBJECTIVE & OBJECTIVE
Interval History: No acute events overnight.    Review of Systems   Unable to perform ROS: Patient nonverbal     Objective:     Vital Signs (Most Recent):  Temp: 97.5 °F (36.4 °C) (12/25/19 1212)  Pulse: 80 (12/25/19 1212)  Resp: 19 (12/25/19 1212)  BP: (!) 121/57 (12/25/19 1212)  SpO2: 97 % (12/25/19 1212) Vital Signs (24h Range):  Temp:  [97.5 °F (36.4 °C)-98.6 °F (37 °C)] 97.5 °F (36.4 °C)  Pulse:  [80-90] 80  Resp:  [18-19] 19  SpO2:  [90 %-97 %] 97 %  BP: ()/(54-81) 121/57     Weight: 66 kg (145 lb 8.1 oz)  Body mass index is 24.21 kg/m².    Intake/Output Summary (Last 24 hours) at 12/25/2019 1449  Last data filed at 12/25/2019 1152  Gross per 24 hour   Intake 2036.75 ml   Output --   Net 2036.75 ml      Physical Exam   Constitutional: She appears well-developed and well-nourished. No distress.   HENT:   Head: Normocephalic and atraumatic.   Eyes: Conjunctivae are normal.   Neck: Neck supple.   Cardiovascular: Normal rate, regular rhythm and normal heart sounds.   Pulmonary/Chest: Effort normal. No respiratory distress.   Abdominal: Soft. Bowel sounds are normal. She exhibits no distension.   Musculoskeletal: She exhibits no edema.   Neurological: She is alert.   Psychiatric: She is noncommunicative.   Nursing note and vitals reviewed.      Significant Labs:   BMP:   Recent Labs   Lab 12/25/19  0404   *   *   K 2.8*   CL 98   CO2 26   BUN 10   CREATININE 1.1   CALCIUM 7.9*     CBC:   Recent Labs   Lab 12/24/19  0509 12/25/19  0404   WBC 5.38 5.39   HGB 8.8* 8.9*   HCT 26.5* 26.5*   * 120*       Significant Imaging: I have reviewed all pertinent imaging results/findings within the past 24 hours.

## 2019-12-26 PROBLEM — J96.02 ACUTE RESPIRATORY FAILURE WITH HYPOXIA AND HYPERCARBIA: Status: ACTIVE | Noted: 2019-12-26

## 2019-12-26 PROBLEM — J96.01 ACUTE RESPIRATORY FAILURE WITH HYPOXIA: Status: ACTIVE | Noted: 2019-12-26

## 2019-12-26 PROBLEM — Z99.11 ON MECHANICALLY ASSISTED VENTILATION: Status: ACTIVE | Noted: 2019-12-26

## 2019-12-26 LAB
ALBUMIN SERPL BCP-MCNC: 2.6 G/DL (ref 3.5–5.2)
ALLENS TEST: ABNORMAL
ALLENS TEST: ABNORMAL
ALP SERPL-CCNC: 56 U/L (ref 55–135)
ALT SERPL W/O P-5'-P-CCNC: 53 U/L (ref 10–44)
AMMONIA PLAS-SCNC: 33 UMOL/L (ref 10–50)
ANION GAP SERPL CALC-SCNC: 10 MMOL/L (ref 8–16)
APTT BLDCRRT: 34.8 SEC (ref 21–32)
AST SERPL-CCNC: 96 U/L (ref 10–40)
B-HCG UR QL: NEGATIVE
BASOPHILS # BLD AUTO: 0 K/UL (ref 0–0.2)
BASOPHILS NFR BLD: 0 % (ref 0–1.9)
BILIRUB SERPL-MCNC: 1 MG/DL (ref 0.1–1)
BUN SERPL-MCNC: 19 MG/DL (ref 8–23)
CALCIUM SERPL-MCNC: 8.1 MG/DL (ref 8.7–10.5)
CHLORIDE SERPL-SCNC: 96 MMOL/L (ref 95–110)
CO2 SERPL-SCNC: 27 MMOL/L (ref 23–29)
CREAT SERPL-MCNC: 1.9 MG/DL (ref 0.5–1.4)
CRP SERPL-MCNC: 44.12 MG/L (ref 0–3.19)
CTP QC/QA: YES
DELSYS: ABNORMAL
DELSYS: ABNORMAL
DIFFERENTIAL METHOD: ABNORMAL
EOSINOPHIL # BLD AUTO: 0 K/UL (ref 0–0.5)
EOSINOPHIL NFR BLD: 0.3 % (ref 0–8)
ERYTHROCYTE [DISTWIDTH] IN BLOOD BY AUTOMATED COUNT: 17.6 % (ref 11.5–14.5)
ERYTHROCYTE [SEDIMENTATION RATE] IN BLOOD BY WESTERGREN METHOD: 15 MM/HR (ref 0–20)
ERYTHROCYTE [SEDIMENTATION RATE] IN BLOOD BY WESTERGREN METHOD: 18 MM/H
EST. GFR  (AFRICAN AMERICAN): 29 ML/MIN/1.73 M^2
EST. GFR  (NON AFRICAN AMERICAN): 25 ML/MIN/1.73 M^2
ESTIMATED AVG GLUCOSE: 114 MG/DL (ref 68–131)
FIO2: 100
FIO2: 80
FLOW: 10
GLUCOSE SERPL-MCNC: 99 MG/DL (ref 70–110)
HBA1C MFR BLD HPLC: 5.6 % (ref 4–5.6)
HCO3 UR-SCNC: 30.3 MMOL/L (ref 24–28)
HCO3 UR-SCNC: 30.8 MMOL/L (ref 24–28)
HCT VFR BLD AUTO: 25.3 % (ref 37–48.5)
HGB BLD-MCNC: 8.6 G/DL (ref 12–16)
IMM GRANULOCYTES # BLD AUTO: 0.09 K/UL (ref 0–0.04)
IMM GRANULOCYTES NFR BLD AUTO: 1.3 % (ref 0–0.5)
INR PPP: 1.2 (ref 0.8–1.2)
LACTATE SERPL-SCNC: 1.7 MMOL/L (ref 0.5–2.2)
LYMPHOCYTES # BLD AUTO: 1.4 K/UL (ref 1–4.8)
LYMPHOCYTES NFR BLD: 20.3 % (ref 18–48)
MCH RBC QN AUTO: 27.8 PG (ref 27–31)
MCHC RBC AUTO-ENTMCNC: 34 G/DL (ref 32–36)
MCV RBC AUTO: 82 FL (ref 82–98)
MIN VOL: 10.1
MODE: ABNORMAL
MODE: ABNORMAL
MONOCYTES # BLD AUTO: 0.6 K/UL (ref 0.3–1)
MONOCYTES NFR BLD: 9 % (ref 4–15)
NEUTROPHILS # BLD AUTO: 4.6 K/UL (ref 1.8–7.7)
NEUTROPHILS NFR BLD: 69.1 % (ref 38–73)
NRBC BLD-RTO: 0 /100 WBC
PCO2 BLDA: 28.2 MMHG (ref 35–45)
PCO2 BLDA: 35 MMHG (ref 35–45)
PEEP: 5
PH SMN: 7.55 [PH] (ref 7.35–7.45)
PH SMN: 7.64 [PH] (ref 7.35–7.45)
PIP: 40
PLATELET # BLD AUTO: 116 K/UL (ref 150–350)
PMV BLD AUTO: ABNORMAL FL (ref 9.2–12.9)
PO2 BLDA: 145 MMHG (ref 80–100)
PO2 BLDA: 247 MMHG (ref 80–100)
POC BE: 8 MMOL/L
POC BE: 9 MMOL/L
POC SATURATED O2: 100 % (ref 95–100)
POC SATURATED O2: 100 % (ref 95–100)
POC TCO2: 31 MMOL/L (ref 23–27)
POC TCO2: 32 MMOL/L (ref 23–27)
POCT GLUCOSE: 132 MG/DL (ref 70–110)
POTASSIUM SERPL-SCNC: 3.8 MMOL/L (ref 3.5–5.1)
PREALB SERPL-MCNC: 8 MG/DL (ref 20–43)
PROCALCITONIN SERPL IA-MCNC: 0.39 NG/ML
PROT SERPL-MCNC: 6.1 G/DL (ref 6–8.4)
PROTHROMBIN TIME: 12.7 SEC (ref 9–12.5)
RBC # BLD AUTO: 3.09 M/UL (ref 4–5.4)
SAMPLE: ABNORMAL
SAMPLE: ABNORMAL
SITE: ABNORMAL
SITE: ABNORMAL
SODIUM SERPL-SCNC: 133 MMOL/L (ref 136–145)
SP02: 100
SP02: 98
T3FREE SERPL-MCNC: 1.9 PG/ML (ref 2.3–4.2)
T4 FREE SERPL-MCNC: 1.29 NG/DL (ref 0.71–1.51)
TB INDURATION 48 - 72 HR READ: 0 MM
TSH SERPL DL<=0.005 MIU/L-ACNC: 1.48 UIU/ML (ref 0.4–4)
VT: 450
WBC # BLD AUTO: 6.7 K/UL (ref 3.9–12.7)

## 2019-12-26 PROCEDURE — 36600 WITHDRAWAL OF ARTERIAL BLOOD: CPT

## 2019-12-26 PROCEDURE — 94761 N-INVAS EAR/PLS OXIMETRY MLT: CPT

## 2019-12-26 PROCEDURE — 84481 FREE ASSAY (FT-3): CPT

## 2019-12-26 PROCEDURE — 85652 RBC SED RATE AUTOMATED: CPT

## 2019-12-26 PROCEDURE — 99222 PR INITIAL HOSPITAL CARE,LEVL II: ICD-10-PCS | Mod: ,,, | Performed by: PSYCHIATRY & NEUROLOGY

## 2019-12-26 PROCEDURE — 85610 PROTHROMBIN TIME: CPT

## 2019-12-26 PROCEDURE — 95822 EEG COMA OR SLEEP ONLY: CPT

## 2019-12-26 PROCEDURE — 99900026 HC AIRWAY MAINTENANCE (STAT)

## 2019-12-26 PROCEDURE — S0028 INJECTION, FAMOTIDINE, 20 MG: HCPCS | Performed by: HOSPITALIST

## 2019-12-26 PROCEDURE — 82140 ASSAY OF AMMONIA: CPT

## 2019-12-26 PROCEDURE — 99291 PR CRITICAL CARE, E/M 30-74 MINUTES: ICD-10-PCS | Mod: ,,, | Performed by: INTERNAL MEDICINE

## 2019-12-26 PROCEDURE — 84134 ASSAY OF PREALBUMIN: CPT

## 2019-12-26 PROCEDURE — 83605 ASSAY OF LACTIC ACID: CPT

## 2019-12-26 PROCEDURE — 27200966 HC CLOSED SUCTION SYSTEM

## 2019-12-26 PROCEDURE — 95816 EEG AWAKE AND DROWSY: CPT | Mod: 26,,, | Performed by: PSYCHIATRY & NEUROLOGY

## 2019-12-26 PROCEDURE — 20000000 HC ICU ROOM

## 2019-12-26 PROCEDURE — 82803 BLOOD GASES ANY COMBINATION: CPT

## 2019-12-26 PROCEDURE — 36415 COLL VENOUS BLD VENIPUNCTURE: CPT

## 2019-12-26 PROCEDURE — 63600175 PHARM REV CODE 636 W HCPCS: Performed by: HOSPITALIST

## 2019-12-26 PROCEDURE — 94002 VENT MGMT INPAT INIT DAY: CPT

## 2019-12-26 PROCEDURE — 25000003 PHARM REV CODE 250: Performed by: FAMILY MEDICINE

## 2019-12-26 PROCEDURE — 99900035 HC TECH TIME PER 15 MIN (STAT)

## 2019-12-26 PROCEDURE — 84439 ASSAY OF FREE THYROXINE: CPT

## 2019-12-26 PROCEDURE — 84443 ASSAY THYROID STIM HORMONE: CPT

## 2019-12-26 PROCEDURE — 86141 C-REACTIVE PROTEIN HS: CPT

## 2019-12-26 PROCEDURE — 99222 1ST HOSP IP/OBS MODERATE 55: CPT | Mod: ,,, | Performed by: PSYCHIATRY & NEUROLOGY

## 2019-12-26 PROCEDURE — 94003 VENT MGMT INPAT SUBQ DAY: CPT

## 2019-12-26 PROCEDURE — 25000003 PHARM REV CODE 250: Performed by: HOSPITALIST

## 2019-12-26 PROCEDURE — 63600175 PHARM REV CODE 636 W HCPCS: Performed by: INTERNAL MEDICINE

## 2019-12-26 PROCEDURE — 83036 HEMOGLOBIN GLYCOSYLATED A1C: CPT

## 2019-12-26 PROCEDURE — 80053 COMPREHEN METABOLIC PANEL: CPT

## 2019-12-26 PROCEDURE — 85025 COMPLETE CBC W/AUTO DIFF WBC: CPT

## 2019-12-26 PROCEDURE — 95816 PR EEG,W/AWAKE & DROWSY RECORD: ICD-10-PCS | Mod: 26,,, | Performed by: PSYCHIATRY & NEUROLOGY

## 2019-12-26 PROCEDURE — 85730 THROMBOPLASTIN TIME PARTIAL: CPT

## 2019-12-26 PROCEDURE — 84145 PROCALCITONIN (PCT): CPT

## 2019-12-26 PROCEDURE — 99291 CRITICAL CARE FIRST HOUR: CPT | Mod: ,,, | Performed by: INTERNAL MEDICINE

## 2019-12-26 RX ORDER — SODIUM CHLORIDE 9 MG/ML
INJECTION, SOLUTION INTRAVENOUS CONTINUOUS
Status: DISCONTINUED | OUTPATIENT
Start: 2019-12-26 | End: 2019-12-26

## 2019-12-26 RX ORDER — ETOMIDATE 2 MG/ML
INJECTION INTRAVENOUS
Status: DISPENSED
Start: 2019-12-26 | End: 2019-12-26

## 2019-12-26 RX ORDER — DEXMEDETOMIDINE HYDROCHLORIDE 4 UG/ML
0.2 INJECTION, SOLUTION INTRAVENOUS CONTINUOUS PRN
Status: DISCONTINUED | OUTPATIENT
Start: 2019-12-26 | End: 2019-12-28

## 2019-12-26 RX ORDER — ONDANSETRON 2 MG/ML
8 INJECTION INTRAMUSCULAR; INTRAVENOUS EVERY 8 HOURS PRN
Status: DISCONTINUED | OUTPATIENT
Start: 2019-12-26 | End: 2020-01-04 | Stop reason: HOSPADM

## 2019-12-26 RX ORDER — FENTANYL CITRATE 50 UG/ML
50 INJECTION, SOLUTION INTRAMUSCULAR; INTRAVENOUS
Status: DISCONTINUED | OUTPATIENT
Start: 2019-12-31 | End: 2019-12-26

## 2019-12-26 RX ORDER — FAMOTIDINE 10 MG/ML
20 INJECTION INTRAVENOUS 2 TIMES DAILY
Status: DISCONTINUED | OUTPATIENT
Start: 2019-12-26 | End: 2019-12-26 | Stop reason: DRUGHIGH

## 2019-12-26 RX ORDER — HYDRALAZINE HYDROCHLORIDE 20 MG/ML
5 INJECTION INTRAMUSCULAR; INTRAVENOUS EVERY 8 HOURS PRN
Status: DISCONTINUED | OUTPATIENT
Start: 2019-12-26 | End: 2019-12-28

## 2019-12-26 RX ORDER — ROCURONIUM BROMIDE 10 MG/ML
100 INJECTION, SOLUTION INTRAVENOUS
Status: COMPLETED | OUTPATIENT
Start: 2019-12-26 | End: 2019-12-26

## 2019-12-26 RX ORDER — HEPARIN SODIUM 5000 [USP'U]/ML
5000 INJECTION, SOLUTION INTRAVENOUS; SUBCUTANEOUS EVERY 8 HOURS
Status: DISCONTINUED | OUTPATIENT
Start: 2019-12-26 | End: 2019-12-28

## 2019-12-26 RX ORDER — FENTANYL CITRATE 50 UG/ML
50 INJECTION, SOLUTION INTRAMUSCULAR; INTRAVENOUS
Status: DISCONTINUED | OUTPATIENT
Start: 2019-12-26 | End: 2019-12-26

## 2019-12-26 RX ORDER — ROCURONIUM BROMIDE 10 MG/ML
INJECTION, SOLUTION INTRAVENOUS
Status: DISPENSED
Start: 2019-12-26 | End: 2019-12-26

## 2019-12-26 RX ORDER — HYDRALAZINE HYDROCHLORIDE 20 MG/ML
10 INJECTION INTRAMUSCULAR; INTRAVENOUS EVERY 8 HOURS PRN
Status: DISCONTINUED | OUTPATIENT
Start: 2019-12-26 | End: 2019-12-26

## 2019-12-26 RX ORDER — FENTANYL CITRATE-0.9 % NACL/PF 10 MCG/ML
25 PLASTIC BAG, INJECTION (ML) INTRAVENOUS CONTINUOUS
Status: DISCONTINUED | OUTPATIENT
Start: 2019-12-26 | End: 2019-12-26

## 2019-12-26 RX ORDER — FAMOTIDINE 10 MG/ML
20 INJECTION INTRAVENOUS DAILY
Status: DISCONTINUED | OUTPATIENT
Start: 2019-12-26 | End: 2020-01-04 | Stop reason: HOSPADM

## 2019-12-26 RX ORDER — CHLORHEXIDINE GLUCONATE ORAL RINSE 1.2 MG/ML
15 SOLUTION DENTAL 2 TIMES DAILY
Status: DISCONTINUED | OUTPATIENT
Start: 2019-12-26 | End: 2020-01-04 | Stop reason: HOSPADM

## 2019-12-26 RX ORDER — ETOMIDATE 2 MG/ML
20 INJECTION INTRAVENOUS
Status: COMPLETED | OUTPATIENT
Start: 2019-12-26 | End: 2019-12-26

## 2019-12-26 RX ORDER — DEXMEDETOMIDINE HYDROCHLORIDE 4 UG/ML
0.2 INJECTION, SOLUTION INTRAVENOUS CONTINUOUS
Status: DISCONTINUED | OUTPATIENT
Start: 2019-12-26 | End: 2019-12-26

## 2019-12-26 RX ORDER — SODIUM CHLORIDE 9 MG/ML
INJECTION, SOLUTION INTRAVENOUS CONTINUOUS
Status: ACTIVE | OUTPATIENT
Start: 2019-12-26 | End: 2019-12-27

## 2019-12-26 RX ADMIN — HEPARIN SODIUM 5000 UNITS: 5000 INJECTION, SOLUTION INTRAVENOUS; SUBCUTANEOUS at 03:12

## 2019-12-26 RX ADMIN — HEPARIN SODIUM 5000 UNITS: 5000 INJECTION, SOLUTION INTRAVENOUS; SUBCUTANEOUS at 06:12

## 2019-12-26 RX ADMIN — SODIUM CHLORIDE: 0.9 INJECTION, SOLUTION INTRAVENOUS at 04:12

## 2019-12-26 RX ADMIN — HEPARIN SODIUM 5000 UNITS: 5000 INJECTION, SOLUTION INTRAVENOUS; SUBCUTANEOUS at 09:12

## 2019-12-26 RX ADMIN — ETOMIDATE 20 MG: 2 INJECTION, SOLUTION INTRAVENOUS at 03:12

## 2019-12-26 RX ADMIN — ROCURONIUM BROMIDE 100 MG: 10 INJECTION, SOLUTION INTRAVENOUS at 03:12

## 2019-12-26 RX ADMIN — FAMOTIDINE 20 MG: 10 INJECTION INTRAVENOUS at 08:12

## 2019-12-26 RX ADMIN — CHLORHEXIDINE GLUCONATE 0.12% ORAL RINSE 15 ML: 1.2 LIQUID ORAL at 09:12

## 2019-12-26 RX ADMIN — SODIUM CHLORIDE: 0.9 INJECTION, SOLUTION INTRAVENOUS at 11:12

## 2019-12-26 RX ADMIN — CHLORHEXIDINE GLUCONATE 0.12% ORAL RINSE 15 ML: 1.2 LIQUID ORAL at 08:12

## 2019-12-26 NOTE — SIGNIFICANT EVENT
Hospital Medicine  Cross-Cover Note        Diagnosis leading to hospitalization: Acute respiratory failure with hypoxia and hypercarbia    Length of Stay since admission: 8     HPI - Interval History:       Called by nursing staff for acute hypoxemia.  Patient unable to maintain oxygen saturation greater than 90%.  Had 1 transient episode of hypotension earlier in the shift which she spontaneously recovered from.  Later developed hypoxia despite nasal cannula.  Myself, ICU, and ER called to the bedside.  GCS of 3.  Brief discussion with daughter at the bedside who wanted everything done, including intubation and placement on the ventilator if necessary.  Explained that it may be difficult to wean her off the ventilator given her current condition and decline.  She voiced understanding and we proceeded with rapid sequence intubation and moved to the ICU for definitive care.    Family discussion of approximately 45 min with 3 daughters and 1 son.  Nursing supervisor, Will, present.  Summarize interviewed case and in the entirety.  Family voiced understanding and had no further questions at the end.  Agreed with treatment plan.    Vitals:  Vitals:    12/26/19 0456 12/26/19 0520 12/26/19 0525 12/26/19 0530   BP:   (!) 149/72 (!) 164/76   BP Location:       Patient Position:       Pulse: 80 74 71 73   Resp: 10 (!) 24 18 18   Temp:       TempSrc:       SpO2: 100% 100% 100% 100%   Weight:       Height:           Current Medications:  Scheduled Meds:   chlorhexidine  15 mL Mouth/Throat BID    escitalopram oxalate  10 mg Oral Daily    famotidine (PF)  20 mg Intravenous BID    heparin (porcine)  5,000 Units Subcutaneous Q8H    memantine  10 mg Oral Daily     Continuous Infusions:   dexmedetomidine (PRECEDEX) infusion      fentanyl       PRN Meds:.diphenhydrAMINE, fentaNYL **FOLLOWED BY** [START ON 12/31/2019] fentaNYL, fluconazole, hydrALAZINE, ondansetron, promethazine (PHENERGAN) IVPB, sodium chloride 0.9%, sodium  chloride 0.9%    Lab Results:     CBC:  Recent Labs   Lab 12/24/19  0509 12/25/19  0404 12/26/19  0500   WBC 5.38 5.39 6.70   HGB 8.8* 8.9* 8.6*   * 120* 116*   MCV 84 84 82   MCH 27.8 28.3 27.8   MCHC 33.2 33.6 34.0   RBC 3.16* 3.15* 3.09*       CMP:  Recent Labs   Lab 12/19/19  0602  12/21/19  0455 12/22/19  0517 12/23/19  0615 12/24/19  0509 12/25/19  0404      < > 137 133* 131* 132* 133*   K 3.1*   < > 3.4* 3.7 4.1 3.5 2.8*      < > 103 103 104 103 98   CO2 28   < > 23 24 21* 18* 26   BUN 8   < > 14 11 9 10 10   CREATININE 0.7   < > 0.8 0.7 0.7 0.8 1.1   CALCIUM 9.0   < > 8.4* 7.9* 7.8* 8.1* 7.9*   MG 1.6  --   --   --   --   --   --    PROT 7.2   < > 6.5 6.2 5.8* 6.0 5.8*   BILITOT 1.1*   < > 1.2* 1.1* 0.9 0.8 1.0   ALKPHOS 62   < > 58 55 58 51* 52*   ALT 54*   < > 70* 68* 65* 64* 57*   *   < > 153* 152* 130* 119* 108*    < > = values in this interval not displayed.     Coagulantion studies  Recent Labs   Lab 12/24/19  0509 12/24/19  1106 12/25/19  0404 12/26/19  0500   INR 3.7* 3.9* 1.8* 1.2     Cardiac Enzymes  No results for input(s): CPK, CKMB, TROPONINI in the last 168 hours.  Recent Labs   Lab 12/26/19  0505   LACTATE 1.7         Consults:  IP CONSULT TO PALLIATIVE CARE  IP CONSULT TO SPIRITUAL CARE  IP CONSULT TO GI  IP CONSULT TO SOCIAL WORK/CASE MANAGEMENT  IP CONSULT TO REGISTERED DIETITIAN/NUTRITIONIST  IP CONSULT TO NEUROLOGY  IP CONSULT TO GI  IP CONSULT TO ANESTHESIOLOGY  IP CONSULT TO REGISTERED DIETITIAN/NUTRITIONIST     Assessment and Plan:    Active Hospital Problems    Diagnosis  POA    *Acute respiratory failure with hypoxia and hypercarbia [J96.01, J96.02]  Yes     Priority: 1 - High    Failure to thrive in adult [R62.7]  Yes     Priority: 2     Elevated INR [R79.1]  Clinically Undetermined    Metabolic acidosis [E87.2]  No    Cellulitis of mouth [K12.2]  Yes    Acute encephalopathy [G93.40]  Yes    Mobility impaired [Z74.09]  Yes     Chronic    Acute  "cystitis without hematuria [N30.00]  Yes    Broca's aphasia [R47.01]  Yes     Chronic    Hemiparesis affecting right side as late effect of cerebrovascular accident [I69.351]  Not Applicable     Chronic    Essential hypertension [I10]  Yes     Chronic    Chronic diastolic congestive heart failure [I50.32]  Yes      Resolved Hospital Problems   No resolved problems to display.       Labs were reviewed.  Imaging was reviewed.  Problem listed reviewed and updated where needed.    Acute respiratory failure with hypoxia and hypercarbia secondary to poor control of secretions secondary to altered mental status  Spoke with the family for approximately 45 min in length.  Three daughters and a son.  Determined the patient has been declining since at least Thanksgiving.  S had difficulty eating and swallowing status post dental procedure.  Once unable to take medications and poor oral intake overall with what sounds to be early signs of aspiration.  This is continued throughout the hospital stay.  Multiple services have been consulted and are following the case.  PEG tube was planned.  She was diagnosed with failure to thrive.  Tonight was called to the bedside for hypoxemia.  Patient a GCS of approximately 3.  Discussion with family member at the bedside with Dr. Leonard in  with regards to goals of care.  Decision was made to undergo rapid sequence intubation and placement on ventilator.  Explained to the family members that it may be difficult to wean her back off ventilator if we are able to do so all.  They wished to proceed and wanted everything done" stating they did not a diagnosis and "did not know why she is sick".  They are asking for more time for a better understanding.    Check above magnesium and potassium levels.  Potassium 2.8 yesterday morning.    Additional orders placed:    1. Rapid sequence intubation  2. Chest x-ray  3. ABG  4. Transfer to ICU  5. Ventilator bundle orders  6. Discontinue " antihypertensives  7. CBC, CMP, magnesium, phos, lactic acid, procalcitonin, INR    Jaquan Luna MD, MPH  Hospital Medicine   Pager: [phone]      Critical care was given for Emilia Melgoza who has a condition that poses threat to life and bodily function, including:  Acute respiratory failure with hypoxia and hypercarbia secondary to poor control secretions secondary to altered mental status     Critical care was time spent personally by me on the following activities: development of treatment plan with patient or surrogate and bedside caregivers, discussions with consultants, evaluation of patient's response to treatment, examination of patient, ordering and performing treatments and interventions, ordering and review of laboratory studies, ordering and review of radiographic studies, pulse oximetry, re-evaluation of patient's condition. This critical care time did not overlap with that of any other provider or involve time for any procedures.    Reviewed: previous chart and vitals  Reviewed previous: labs, x-ray, CT scan and MRI  Interpretation: labs, x-ray, ABG    Total Critical Care time: 48 minutes. This excludes time spent performing separately reportable procedures and services.  Counseling/Conference Time: 50 minutes - physicians and family member discussions regarding current condition & goals of care

## 2019-12-26 NOTE — ASSESSMENT & PLAN NOTE
Patient intubated for AMS and somnolence. CT head ordered. Per notes patient has been altered for many days.

## 2019-12-26 NOTE — PLAN OF CARE
Remains in ICU on mechanical vent (weaned as tolerated). IVF and tube feeding started (not yet at goal). Mental status still poor on no sedation. Opens eyes and moves legs spontaneously (not to command). CT Head and EEG both negative. Patient PEG tube not completed today due to decline in status last night. No skin breakdown. Family updated throughout shift at bedside.

## 2019-12-26 NOTE — NURSING
Dr. Luna informed pt has no cough or gag; and does not respond to pain. Sedation has not been started. MD recommends starting Fentanyl @25mcg/hr for comfort.

## 2019-12-26 NOTE — NURSING
and Jeremy at bedside assessing patient. Both physicians spoke with patient's daughter and the decision was made to resume a Full Code and intubate patient.

## 2019-12-26 NOTE — PLAN OF CARE
Recommendations      1. W/ intubation rec TF:   -Isosource 1.5 initiated @ 10 ml/hr and advanced 10 ml q 4 hrs to goal rate of 45 ml/hr.  - Fluid flushes: 200 ml QID or per MD.    -TF to provide: 1620 kcal, 73 g pro, 825 ml fluid.      Goals: Initiate nutrition within 48 hrs  Nutrition Goal Status: new  Communication of RD Recs: reviewed with physician

## 2019-12-26 NOTE — CODE/ RAPID DOCUMENTATION
Respiratory therapist calls ICU charge nurse phone to come assess a declining patient in room 401. ICU charge nurse arrives to Med-Surg floor. Patient was found in bed lethargic, difficult to arouse, elevated temp of 99, on a non-rebreather, and a blood gas was obtained due to O2 Sat of 76%. Lungs were auscultated at this time and patient was found to have crackles. NGT was intact. Patient's primary nurse states she had been getting tube feeds and IVF. Primary nurse states patient had been in this state since admission, however daughter at bedside states her mother has been awake and talking, but this is something new that started this morning. At this time  was paged. [facility] nurses decided to call ER physician to come see patient because  had not responded to the page yet. When ER physician, , arrived to the 4th floor, was the same time  was walking onto the unit. Both physicians decided to intubate patient after assessing and discussing the patient's case to daughter at bedside.

## 2019-12-26 NOTE — PLAN OF CARE
12/26/19 1626   Discharge Reassessment   Assessment Type Discharge Planning Reassessment   Discharge Plan A Skilled Nursing Facility   Discharge Plan B Rehab   DME Needed Upon Discharge  none   Patient choice form signed by patient/caregiver N/A   Can the patient answer the patient profile reliably? No, cognitively impaired   How often would a person be available to care for the patient? Whenever needed

## 2019-12-26 NOTE — CARE UPDATE
[facility]  ICU Multidisciplinary Bedside Rounds     UPDATE     Date: 12/26/2019      Plan of care reviewed with the following, Nurse, Charge Nurse, Physician, Pulm CC and .       Needs/ Goals for the day: head CT, IVF, BP elevated, keep family informed      Level of Care: Critical Care

## 2019-12-26 NOTE — ASSESSMENT & PLAN NOTE
"Patient has shown continued decline  Does have evidence of encephalomalacia from old stroke and has had difficulty with PO intake mainly due to oral pain after tooth extraction. Dysphagia appears to be an additional issue as she was seen "gurgling" last night. Acute event likely secondary to hypoxia. Is now intubated but off sedation and not interacting much. Repeat CT of brain with no acute pathology. Other than alkalemia 2/2 respiratory alkalosis and mild hyponatremia, no electrolyte abnormalities to account for this. No severe anemia. Is malnourished. No evidence of active infection.   Seizures cannot be ruled out. EEG ordered. Neurology consulted  Safe extubation will depend on mental status/participation  Start tube feeds. Monitor vitals.   "

## 2019-12-26 NOTE — CARE UPDATE
Patient returned to room 263 from transport.  Patient placed back on ventilator on previous settings.  Ventilator and alarms on and functioning.  AMBU bag and mask at bedside.  Nurse notified

## 2019-12-26 NOTE — EICU
EICU    Pt is a 73 y/o f with hx CVA with residual aphasia and right hemiparesis admitted for poor nutritoin/failure to thrive in setting of possible tooth abscess with low electrolytes awaiting PEG while being repleted via ngt with acute hypoxemias and earlier transient hypotension with GCS 3 and after d/w pt duaghter, pt was intubated and txed to the ICU, RS.  Per ICU nursing, pt GCS was 8 at the beginning of last nights shift and has been charted as 8-9 since at least 12/22.  Currently pt is in the ICU intubated, sedate with vss 173/76 sao2 99% hr 79 regular, rr 18 mech vent PRVCX  rr 18 (28) gio2 60% PEEP 5 pt is synchronous sleeping, nonlabored breathing; not responsive to painful stimuli, no rash no decub, warm to touch no mottling  Pupils are 4-5 mm reactive bilat  Serum glucose at 0500 99    Wbc 6.7 P 69 L 20 M 9 0.09 immature grans  hgb 8.6 mcv 82   plt 116  Na 133 K 3.8 HCO3 27 ag 10 bun 19 creat 1.9 gluc 99 Ca 8.1  ast 96 alt 53 ammonia 33 alk phos 56 tb 1 tp 6.1 alb 2.6    tsh 1.4 ft4 1.29    7.64/28/247/30      cxr ett in place, ngt in place, low lung volumes, nl card silhouette no inf/eff    A/P  Hypoxemic Respiraotory failure  Pt described as altered and having difficulty controlling secretions, unclear if ams preceded difficulty with secretions  - cont current vent setting, sao2 ok and can likely decrease FIO2, synchronous with ventilator  - abg after changing vent due to sig alkalemia 0- will decr rate from 18 to 10, d/w nursing  - hd stable, sbp up a bit  - creat up, keep lytes replete, including PO4  - ms sedate, currently on no sedatives; after vent/pH issues improved, consider moving pt for head CT due to sudden change in ms, no clear pneumonic process on imaging, etiology of worsening secretion control unclear at this point  - dvt proph  - gi proph on vent  - dr Luna from [facility] had long d/w family, goc discussion should be ongoing

## 2019-12-26 NOTE — PROGRESS NOTES
Results for MANDY WILLIAMSON (MRN [id number]) as of 12/26/2019 05:23   Ref. Range 12/26/2019 02:56   POC PH Latest Ref Range: 7.35 - 7.45  7.552 (H)   POC PCO2 Latest Ref Range: 35 - 45 mmHg 35.0   POC PO2 Latest Ref Range: 80 - 100 mmHg 145 (H)   POC BE Latest Ref Range: -2 to 2 mmol/L 8   POC HCO3 Latest Ref Range: 24 - 28 mmol/L 30.8 (H)   POC SATURATED O2 Latest Ref Range: 95 - 100 % 100   POC TCO2 Latest Ref Range: 23 - 27 mmol/L 32 (H)   FiO2 Unknown 80   Flow Unknown 10   Sample Unknown ARTERIAL   DelSys Unknown NRB   Allens Test Unknown Pass   Site Unknown LR   Mode Unknown SPONT

## 2019-12-26 NOTE — CARE UPDATE
Results for MANDY WILLIAMSON (MRN [id number]) as of 12/26/2019 05:16   Ref. Range 12/26/2019 04:56   POC PH Latest Ref Range: 7.35 - 7.45  7.640 (HH)   POC PCO2 Latest Ref Range: 35 - 45 mmHg 28.2 (LL)   POC PO2 Latest Ref Range: 80 - 100 mmHg 247 (H)   POC BE Latest Ref Range: -2 to 2 mmol/L 9   POC HCO3 Latest Ref Range: 24 - 28 mmol/L 30.3 (H)   POC SATURATED O2 Latest Ref Range: 95 - 100 % 100   POC TCO2 Latest Ref Range: 23 - 27 mmol/L 31 (H)   FiO2 Unknown 100   Vt Unknown 450   PiP Unknown 40   PEEP Unknown 5   Sample Unknown ARTERIAL   DelSys Unknown Adult Vent   Allens Test Unknown Pass   Site Unknown RR   Mode Unknown AC/PRVC   Rate Unknown 18     ABG results reported to Dr. ZHEN Luna MD.

## 2019-12-26 NOTE — CARE UPDATE
Pt received in ICU, room  263, orally intubated with a 7.5 ET tube, secured at 23 cm at the lips. Pt placed on Servo I ventilator on documented settings. Alarms are set and functional, Ambu bag and mask at the bedside. NARN at this time, will continue to monitor and wean as tolerated.

## 2019-12-26 NOTE — PROGRESS NOTES
[facility] Hospital Medicine  Progress Note    Patient Name: Emilia Melgoza  MRN: [id number]  Patient Class: IP- Inpatient   Admission Date: 12/17/2019  Length of Stay: 8 days  Attending Physician: Berna Dawson MD  Primary Care Provider: Jerson Price MD        Subjective:     Principal Problem:Acute respiratory failure with hypoxia and hypercarbia        HPI:  74 y.o. 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  She is also not taking her medications.  She appears to be more confused than usual.  Had a recent tooth extraction by the dentist and was unable to take prescribed clindamycin.  Daughter reports patient has indicated mouth pain. Also recently prescribed Macrobid for UTI and was unable to complete.  Daughter denies that the patient has had a fever.  History further limited secondary to the patient's condition.  In the ED, she was found to be tachycardic with hypernatremia, hypokalemia, mild elevation of CPK and troponin.  Urinalysis, CT head, chest x-ray, and EKG were all unremarkable for any acute abnormality.  She appears clinically dehydrated.  Placed in observation for further evaluation and treatment.    Overview/Hospital Course:  No notes on file    Interval History: declined overnight. Intubated.     Review of Systems   Unable to perform ROS: Intubated     Objective:     Vital Signs (Most Recent):  Temp: 98.3 °F (36.8 °C) (12/26/19 1130)  Pulse: 81 (12/26/19 1130)  Resp: 15 (12/26/19 1130)  BP: (!) 109/56 (12/26/19 1130)  SpO2: 100 % (12/26/19 1130) Vital Signs (24h Range):  Temp:  [97.5 °F (36.4 °C)-100 °F (37.8 °C)] 98.3 °F (36.8  "°C)  Pulse:  [] 81  Resp:  [10-25] 15  SpO2:  [76 %-100 %] 100 %  BP: ()/() 109/56     Weight: 66.4 kg (146 lb 6.2 oz)  Body mass index is 24.36 kg/m².    Intake/Output Summary (Last 24 hours) at 12/26/2019 1234  Last data filed at 12/26/2019 1130  Gross per 24 hour   Intake 2082.25 ml   Output 175 ml   Net 1907.25 ml      Physical Exam   Cardiovascular: Normal rate and regular rhythm.   Pulmonary/Chest: She has no wheezes. She has no rales.   On mechanical ventilation   Abdominal: Soft. Bowel sounds are normal.   Neurological: She displays normal reflexes.   Drowsy while off sedation  Does not open eyes when called by name but it seems as she is trying. Moving both feet spontaneously    Psychiatric:   Unable to assess   Nursing note and vitals reviewed.      Significant Labs: All pertinent labs within the past 24 hours have been reviewed.    Significant Imaging: I have reviewed all pertinent imaging results/findings within the past 24 hours.  I have reviewed and interpreted all pertinent imaging results/findings within the past 24 hours.      Assessment/Plan:      * Acute respiratory failure with hypoxia  Acute aspiration highly suspected  Currently intubated but on low O2 requirements  Mentation is main limiting factor to extubation  Workup in progress. Start tube feeds today  No vasopressor support required at this time  Pulmonology on board for co-management      On mechanically assisted ventilation  Day # 1 of vent      Acute encephalopathy  Patient has shown continued decline  Does have evidence of encephalomalacia from old stroke and has had difficulty with PO intake mainly due to oral pain after tooth extraction. Dysphagia appears to be an additional issue as she was seen "gurgling" last night. Acute event likely secondary to hypoxia. Is now intubated but off sedation and not interacting much. Repeat CT of brain with no acute pathology. Other than alkalemia 2/2 respiratory alkalosis and " mild hyponatremia, no electrolyte abnormalities to account for this. No severe anemia. Is malnourished. No evidence of active infection.   Seizures cannot be ruled out. EEG ordered. Neurology consulted  Safe extubation will depend on mental status/participation  Start tube feeds. Monitor vitals.     Metabolic acidosis  Resolved with IV bicarb.    Elevated INR  Resolved       Failure to thrive in adult  Plan for PEG placement once more stable      Cellulitis of mouth  Treated with IV Clindamycin for 7 days.   Resolved       Mobility impaired  PT/OT when able    Acute cystitis without hematuria  Secondary to E.Coli. Low colony count. Treated with 7 days of IV Levaquin.  Resolved    Broca's aphasia  Nonverbal at baseline    Hemiparesis affecting right side as late effect of cerebrovascular accident  At baseline, no acute issue. Fall precautions.     Essential hypertension  Hydralazine PRN    Chronic diastolic congestive heart failure  Stable. Monitor for fluid overload.      VTE Risk Mitigation (From admission, onward)         Ordered     heparin (porcine) injection 5,000 Units  Every 8 hours      12/26/19 0532     IP VTE HIGH RISK PATIENT  Once      12/26/19 0532     Place sequential compression device  Until discontinued      12/17/19 4118                  Critical care time spent on the evaluation and treatment of severe organ dysfunction, review of pertinent labs and imaging studies, discussions with consulting providers and discussions with patient/family: > 35 minutes.      Berna Aldridge MD  Department of Hospital Medicine   [facility]

## 2019-12-26 NOTE — PLAN OF CARE
Problem: Restraint, Nonbehavioral (Nonviolent)  Goal: Personal Dignity and Safety Maintained  Outcome: Ongoing, Progressing     Problem: Restraint, Nonbehavioral (Nonviolent)  Goal: Discontinuation Criteria Achieved  Outcome: Ongoing, Not Progressing    Pt. Resting in bed with family at bedside through  out shift, transferred to ICU, see nursing notes.

## 2019-12-26 NOTE — PROGRESS NOTES
GI was planning for EGD/PEG today, previously were waiting for INR to be in safe range.  Reviewed overnight events of patient, requiring intubation and mechanical ventilation.    We will postpone EGD/PEG until acute issues have stabilized and been addressed.

## 2019-12-26 NOTE — PROGRESS NOTES
0345: Dr Leonard and Dr Luna at pt bedside to intubate. Pt intubated by Dr Leonard with 7.5 ETT secured with ET Narayanan at 26cm at the lip.     0406: ETT pulled back to 23cm at the lip per Dr Leonard.     0415: Pt transported to room 263 being bagged with Ambu. Set up on Servo I vent with settings PRVC, Rate 18, PEEP 5, FIO2 100%.     Hiral at bedside and given report on pt.

## 2019-12-26 NOTE — PROGRESS NOTES
Pharmacist Renal Dose Adjustment Note    Emilia Melgoza is a 74 y.o. female being treated with the medication famotidine IV    Patient Data:    Vital Signs (Most Recent):  Temp: 99.2 °F (37.3 °C) (12/26/19 0443)  Pulse: 73 (12/26/19 0530)  Resp: 18 (12/26/19 0530)  BP: (!) 164/76 (12/26/19 0530)  SpO2: 100 % (12/26/19 0530)   Vital Signs (72h Range):  Temp:  [97.5 °F (36.4 °C)-100 °F (37.8 °C)]   Pulse:  []   Resp:  [10-24]   BP: ()/()   SpO2:  [76 %-100 %]      Recent Labs   Lab 12/24/19  0509 12/25/19  0404 12/26/19  0500   CREATININE 0.8 1.1 1.9*     Serum creatinine: 1.9 mg/dL (H) 12/26/19 0500  Estimated creatinine clearance: 23 mL/min (A)    Medication: famotidine 20 mg IV BID will be changed to famotidine 20 mg IV daily    Pharmacist's Name: Martine Oakley  Pharmacist's Extension: [phone]

## 2019-12-26 NOTE — NURSING
Pt. Resting in bed, HOB elevated 30 degrees, daughter at bedside; aroused by touch, NG feeding stopped, flushed w/ 175 ml water, tolerated well, pt to remain NPO for procedure in morning. pt. repositioned in bed to side lying position, will cont. To monitor.

## 2019-12-26 NOTE — CONSULTS
"  [facility] [place]  Consult Note    SUMMARY     Recommendations     1. W/ intubation rec TF:   -Isosource 1.5 initiated @ 10 ml/hr and advanced 10 ml q 4 hrs to goal rate of 45 ml/hr.  - Fluid flushes: 200 ml QID or per MD.    -TF to provide: 1620 kcal, 73 g pro, 825 ml fluid.     Goals: Initiate nutrition within 48 hrs  Nutrition Goal Status: new  Communication of RD Recs: reviewed with physician    Reason for Assessment    Reason For Assessment: RD follow-up  Diagnosis: (FTT, toxic encephalopathy, respiratory distress)  Relevant Medical History: CVA, vascular dementia  Interdisciplinary Rounds: did not attend    General Information Comments: Pt transferred to ICU and now intubated. Prev plan for PEG, on hold w/ acute change. Pt received NGT feeds x 2 days, d/c w/ intubation.  Prev NFPE on : Age appropriate LBM losses, adequate fat mass; wt loss PTA noted.     Nutrition Discharge Planning: Discharge on TF    Nutrition Risk Screen    Nutrition Risk Screen: dysphagia or difficulty swallowing, tube feeding or parenteral nutrition    Nutrition/Diet History    Spiritual, Cultural Beliefs, [employer]ism Practices, Values that Affect Care: no  Food Allergies: NKFA  Factors Affecting Nutritional Intake: NPO, difficulty/impaired swallowing    Anthropometrics    Temp: 98.5 °F (36.9 °C)  Height Method: Stated  Height: 5' 5" (165.1 cm)  Height (inches): 65 in  Weight Method: Bed Scale  Weight: 66.4 kg (146 lb 6.2 oz)  Weight (lb): 146.39 lb  Ideal Body Weight (IBW), Female: 125 lb  % Ideal Body Weight, Female (lb): 116.41 %  BMI (Calculated): 24.4  BMI Grade: 18.5-24.9 - normal  Weight Loss: unintentional  Usual Body Weight (UBW), k.8 kg(2019)  % Usual Body Weight: 87.25  % Weight Change From Usual Weight: -12.93 %       Lab/Procedures/Meds    Pertinent Labs Reviewed: reviewed  Pertinent Labs Comments: prealb 8, CRP pending, Na 133; alb 2.6  Pertinent Medications Reviewed: " reviewed  Pertinent Medications Comments: precedex    Estimated/Assessed Needs    Weight Used For Calorie Calculations: 66.4 kg (146 lb 6.2 oz)  Energy Calorie Requirements (kcal): 1532  Energy Need Method: [facility]  Protein Requirements: 66-79 g (1-1.2 g/kg)  Weight Used For Protein Calculations: 66 kg (145 lb 8.1 oz)     Estimated Fluid Requirement Method: RDA Method  RDA Method (mL): 1532       Nutrition Prescription Ordered    Current Diet Order: NPO    Evaluation of Received Nutrient/Fluid Intake    I/O: 2057/-  Energy Calories Required: not meeting needs  Protein Required: not meeting needs  Fluid Required: (per MD)  Comments: LBM: 12/17    % Meal Intake: NPO    Nutrition Risk    Level of Risk/Frequency of Follow-up: (2 x week)     Assessment and Plan    Nutrition Problem  Inadequate energy intake     Related to (etiology):   Inadequate energy intake     Signs and Symptoms (as evidenced by):   Mechanical Intubation     Interventions  Collaboration with providers     Nutrition Diagnosis Status:   New       Monitor and Evaluation    Food and Nutrient Intake: energy intake, enteral nutrition intake  Food and Nutrient Adminstration: diet order, enteral and parenteral nutrition administration  Anthropometric Measurements: weight, weight change  Biochemical Data, Medical Tests and Procedures: electrolyte and renal panel  Nutrition-Focused Physical Findings: overall appearance     Malnutrition Assessment           Orbital Region (Subcutaneous Fat Loss): well nourished  Upper Arm Region (Subcutaneous Fat Loss): well nourished  Thoracic and Lumbar Region: well nourished   Taoist Region (Muscle Loss): mild depletion  Patellar Region (Muscle Loss): well nourished  Anterior Thigh Region (Muscle Loss): well nourished  Posterior Calf Region (Muscle Loss): well nourished       Subcutaneous Fat Loss (Final Summary): well nourished  Muscle Loss Evaluation (Final Summary): well nourished    Severe Weight Loss (Malnutrition):  greater than 7.5% in 3 months(13% in 3 months)    Nutrition Follow-Up    RD Follow-up?: Yes

## 2019-12-26 NOTE — NURSING
, , ICU Charge nurse, ER Charge nurse, and respiratory therapist at bedside ready to intubated. Patient on tele monitor and pulse ox. Patient being ventilated with ambu-bag. Sedation administered and ET tube inserted. Lungs auscultated and stat CXR obtained.

## 2019-12-26 NOTE — NURSING TRANSFER
Nursing Transfer Note      12/26/2019     Transfer From: 401 to     Transfer via bed    Transfer with cardiac monitor, ET tube/O2/and belongings     Transported by , ICU Charge nurse, and Respiratory therapist    Medicines sent: NONE    Chart send with patient: Yes    Notified: family at bedside    Patient reassessed at: 12/26/19 @ 0419    Upon arrival to floor: cardiac monitor applied and bed in lowest position

## 2019-12-26 NOTE — SUBJECTIVE & OBJECTIVE
Interval History: declined overnight. Intubated.     Review of Systems   Unable to perform ROS: Intubated     Objective:     Vital Signs (Most Recent):  Temp: 98.3 °F (36.8 °C) (12/26/19 1130)  Pulse: 81 (12/26/19 1130)  Resp: 15 (12/26/19 1130)  BP: (!) 109/56 (12/26/19 1130)  SpO2: 100 % (12/26/19 1130) Vital Signs (24h Range):  Temp:  [97.5 °F (36.4 °C)-100 °F (37.8 °C)] 98.3 °F (36.8 °C)  Pulse:  [] 81  Resp:  [10-25] 15  SpO2:  [76 %-100 %] 100 %  BP: ()/() 109/56     Weight: 66.4 kg (146 lb 6.2 oz)  Body mass index is 24.36 kg/m².    Intake/Output Summary (Last 24 hours) at 12/26/2019 1234  Last data filed at 12/26/2019 1130  Gross per 24 hour   Intake 2082.25 ml   Output 175 ml   Net 1907.25 ml      Physical Exam   Cardiovascular: Normal rate and regular rhythm.   Pulmonary/Chest: She has no wheezes. She has no rales.   On mechanical ventilation   Abdominal: Soft. Bowel sounds are normal.   Neurological: She displays normal reflexes.   Drowsy while off sedation  Does not open eyes when called by name but it seems as she is trying. Moving both feet spontaneously    Psychiatric:   Unable to assess   Nursing note and vitals reviewed.      Significant Labs: All pertinent labs within the past 24 hours have been reviewed.    Significant Imaging: I have reviewed all pertinent imaging results/findings within the past 24 hours.  I have reviewed and interpreted all pertinent imaging results/findings within the past 24 hours.

## 2019-12-26 NOTE — ASSESSMENT & PLAN NOTE
Acute aspiration highly suspected  Currently intubated but on low O2 requirements  Mentation is main limiting factor to extubation  Workup in progress. Start tube feeds today  No vasopressor support required at this time  Pulmonology on board for co-management

## 2019-12-26 NOTE — NURSING
Stat CXR obtained post intubation in room 401. Physician,  & Jeremy reviewed. ET tube was placed at 26 and repositioned at 23 at the lip.

## 2019-12-26 NOTE — CONSULTS
[facility] [place]  Pulmonology  Consult Note    Patient Name: Emilia Melgoza  MRN: [id number]  Admission Date: 12/17/2019  Hospital Length of Stay: 8 days  Code Status: Full Code  Attending Physician: Berna Dawson MD  Primary Care Provider: Jerson Price MD   Principal Problem: Acute respiratory failure with hypoxia and hypercarbia    Inpatient consult to Pulmonology  Consult performed by: Marium Rodriguez MD  Consult ordered by: Berna Dawson MD  Reason for consult: Ventilator management        Subjective:     HPI:  Pt is a 73 y/o f with hx CVA with residual aphasia and right hemiparesis admitted for poor nutritoin/failure to thrive in setting of possible tooth abscess with low electrolytes awaiting PEG while being repleted via ngt with acute hypoxia and earlier transient hypotension with GCS 3. After discussions with family patient intubated ans transferred to the ICU.   Patient is awake but only minimally moving extremities. Not following commands.   On FiO2 40% and PEEP 5.  Pulmonary consulted for ventilator management.       Past Medical History:   Diagnosis Date    Allergy     DDD (degenerative disc disease), lumbar     Chronic LBP and intermittent RLE radicular pain x 10 yrs    Hypertension     Overactive bladder     Senile cataract, unspecified - Both Eyes 6/11/2013    Stroke     right sided weakness       Past Surgical History:   Procedure Laterality Date    bladder mesh      BREAST BIOPSY Left     CATARACT EXTRACTION W/  INTRAOCULAR LENS IMPLANT Right 10/13/2014    Dr Crystal    CATARACT EXTRACTION W/  INTRAOCULAR LENS IMPLANT Left 11/10/2014    Dr Crystal    HEMORRHOID SURGERY      HYSTERECTOMY      INCONTINENCE SURGERY      OOPHORECTOMY      Right Rotator Cuff Repair         Review of patient's allergies indicates:   Allergen Reactions    Enoxaparin Other (See Comments) and Hives     Slurred speech per patient  Slurred speech per patient    Lisinopril Other (See  Comments)     Cough    Penicillins Rash       Family History     Problem Relation (Age of Onset)    Breast cancer Mother    Colon cancer Father    Dementia Mother    Hypertension Mother, Father    No Known Problems Sister, Brother, Maternal Aunt, Maternal Uncle, Paternal Aunt, Paternal Uncle, Maternal Grandmother, Maternal Grandfather, Paternal Grandmother, Paternal Grandfather    Rhinitis Daughter, Daughter        Tobacco Use    Smoking status: Former Smoker     Last attempt to quit: 1992     Years since quittin.0    Smokeless tobacco: Never Used   Substance and Sexual Activity    Alcohol use: No     Alcohol/week: 0.0 standard drinks    Drug use: No    Sexual activity: Not Currently         Review of Systems   Unable to perform ROS: Acuity of condition     Objective:     Vital Signs (Most Recent):  Temp: 98.5 °F (36.9 °C) (19 0730)  Pulse: 96 (19 0900)  Resp: (!) 22 (19 0900)  BP: (!) 147/67 (19 0900)  SpO2: 99 % (19 0900) Vital Signs (24h Range):  Temp:  [97.5 °F (36.4 °C)-100 °F (37.8 °C)] 98.5 °F (36.9 °C)  Pulse:  [] 96  Resp:  [10-25] 22  SpO2:  [76 %-100 %] 99 %  BP: ()/() 147/67     Weight: 66.4 kg (146 lb 6.2 oz)  Body mass index is 24.36 kg/m².      Intake/Output Summary (Last 24 hours) at 2019 0936  Last data filed at 2019 0000  Gross per 24 hour   Intake 1882.25 ml   Output --   Net 1882.25 ml       Physical Exam   Constitutional: She appears well-developed and well-nourished.   HENT:   Head: Normocephalic and atraumatic.   ETT in place   Eyes: Pupils are equal, round, and reactive to light. EOM are normal.   Neck: Normal range of motion. Neck supple.   Cardiovascular: Normal rate and regular rhythm.   Pulmonary/Chest: Effort normal and breath sounds normal.   On ventilator   Neurological: She is alert.   Not following commands. Patient moves her feet spontaneously.    Skin: Skin is warm.   Psychiatric:   Intubated   Nursing note  and vitals reviewed.      Vents:  Vent Mode: PRVC (12/26/19 0758)  Ventilator Initiated: Yes (12/26/19 0417)  Set Rate: 10 bmp (12/26/19 0807)  Vt Set: 400 mL (12/26/19 0807)  PEEP/CPAP: 5 cmH20 (12/26/19 0807)  Oxygen Concentration (%): 40 (12/26/19 0900)  Peak Airway Pressure: 32.4 cmH2O (12/26/19 0807)  Total Ve: 10.1 mL (12/26/19 0807)  F/VT Ratio<105 (RSBI): (!) 54.12 (12/26/19 0807)    Lines/Drains/Airways     Drain                 NG/OG Tube 12/24/19 1726 18 Fr. Right nostril 1 day         Urethral Catheter 12/26/19 0441 Straight-tip 12 Fr. less than 1 day          Airway                 Airway - Non-Surgical 12/26/19 0350 Endotracheal Tube less than 1 day          Peripheral Intravenous Line                 Peripheral IV - Single Lumen 12/23/19 1130 22 G Right Antecubital 2 days         Peripheral IV - Single Lumen 12/26/19 0439 20 G Right Hand less than 1 day                Significant Labs:    CBC/Anemia Profile:  Recent Labs   Lab 12/25/19  0404 12/26/19  0500   WBC 5.39 6.70   HGB 8.9* 8.6*   HCT 26.5* 25.3*   * 116*   MCV 84 82   RDW 17.7* 17.6*        Chemistries:  Recent Labs   Lab 12/25/19  0404 12/26/19  0500   * 133*   K 2.8* 3.8   CL 98 96   CO2 26 27   BUN 10 19   CREATININE 1.1 1.9*   CALCIUM 7.9* 8.1*   ALBUMIN 2.6* 2.6*   PROT 5.8* 6.1   BILITOT 1.0 1.0   ALKPHOS 52* 56   ALT 57* 53*   * 96*       All pertinent labs within the past 24 hours have been reviewed.    Significant Imaging:   I have reviewed and interpreted all pertinent imaging results/findings within the past 24 hours.    Assessment/Plan:     * Acute respiratory failure with hypoxia and hypercarbia  On minimal ventilator settings. CXR without a significant concern for pneumonia. Neuro status will dictate liberation from mechanical ventilation.     Acute encephalopathy  Patient intubated for AMS and somnolence. CT head ordered. Per notes patient has been altered for many days.       Critical Care time: 35  minutes.     Critical Care time for the evaluation and treatment for severe organ dysfunction, review of pertinent labs and imaging studies discussions with primary team/consulting services and discussions with family.    Family updated at bedside    Continue ICU care.         Thank you for your consult. I will follow-up with patient. Please contact us if you have any additional questions.     Marium Rodriguez MD  Pulmonology  [facility]

## 2019-12-26 NOTE — NURSING
02:30 Pt resting in bed, daughter at bedside, pt responds to touch, unable to follow commands, VS assessded, Spo2 76 % on venti mask, HOB elevated 40 degrees; applied nonrebreather mask w/ O2 @ 100%;   02:41 Spo2 91-92% on recheck, -102 on monitor. Paged nocturnist, notified charge nurse. Pt has not urinated tonight,  bladder scan performed 162ml pt. Began to void after scan complete, pericare provided, changed incontinence pad and bed pad; repositioned pt. In bed, HOB remains elevated greater than 30 degrees,family remains at bedside.  02:45 respiratory therapy, Martita, arrived on unit, assessed pt., ABGs obtained, RT remained on unit for support;  02:58 medsurg charge nurse paged nidiaist urgently;   03:10 ICU charge nurse paged, arrived promptly on unit, assessed pt, chest x-ray ordered, remained on unit for support; continued close monitoring of pt. Family at bedside;   03:24 overhead page nocturnist; house supervisor on unit attempting to reach nocturnist;   03:29 ER physician Dr Leonard called, SBAR given; 03:37 Dr. Leonard arrived to bedside w/ Sasha RN ER charge nurse, assessed pt.  03:42 ; 03:46 pt. Intubated by Dr. Leonard, 24 @ lip, 7.5 ET Tube, chest x-ray taken at bedside; 04:04 pt. Taken in bed to ICU, family remained with patient.

## 2019-12-26 NOTE — CONSULTS
[facility] [place]  [place]ult Note    Patient Name: Emilia Melgoza  MRN: [id number]  Admission Date: 12/17/2019  Hospital Length of Stay: 8 days  Code Status: Full Code   Attending Provider: Berna Dawson MD   Consulting Provider: Cem Eldridge MD  Primary Care Physician: Jerson Price MD  Principal Problem:Acute respiratory failure with hypoxia    Consults  Subjective:     Chief Complaint/Reason for consult: AMS    HPI: 73 y/o female with medical Hx as listed below was brought to ED for failure to thrive. Pt with poor oral intake for two weeks. Ms. Melgoza was found to have dysphagia for which a PEG tube was planned. Yesterday pt developed acute hypoxemia, hypotension, became unresponsive. Subsequently she was intubated. On no sedation pt is poorly responsive. Hx of stroke with residual right hemiparesis.    Past Medical History:   Diagnosis Date    Allergy     DDD (degenerative disc disease), lumbar     Chronic LBP and intermittent RLE radicular pain x 10 yrs    Hypertension     Overactive bladder     Senile cataract, unspecified - Both Eyes 6/11/2013    Stroke     right sided weakness       Past Surgical History:   Procedure Laterality Date    bladder mesh      BREAST BIOPSY Left     CATARACT EXTRACTION W/  INTRAOCULAR LENS IMPLANT Right 10/13/2014    Dr Crystal    CATARACT EXTRACTION W/  INTRAOCULAR LENS IMPLANT Left 11/10/2014    Dr Crystal    HEMORRHOID SURGERY      HYSTERECTOMY      INCONTINENCE SURGERY      OOPHORECTOMY      Right Rotator Cuff Repair         Review of patient's allergies indicates:   Allergen Reactions    Enoxaparin Other (See Comments) and Hives     Slurred speech per patient  Slurred speech per patient    Lisinopril Other (See Comments)     Cough    Penicillins Rash       Current Neurological Medications:     No current facility-administered medications on file prior to encounter.      Current Outpatient Medications on File Prior to Encounter    Medication Sig    amlodipine (NORVASC) 1 mg/mL Susp Take 5 mLs (5 mg total) by mouth once daily.    atorvastatin (LIPITOR) 80 MG tablet Take 1 tablet (80 mg total) by mouth once daily.    azelastine (ASTELIN) 137 mcg (0.1 %) nasal spray 1 spray (137 mcg total) by Nasal route 2 (two) times daily.    cholecalciferol, vitamin D3, 1,000 unit capsule Take 1 capsule (1,000 Units total) by mouth once daily.    clindamycin (CLEOCIN) 150 MG capsule TAKE 1 CAPSULE BY MOUTH EVERY 6 HOURS UNTIL COMPLETE    cloNIDine (CATAPRES) 0.1 MG tablet TAKE 2 TABLETS BY MOUTH ONCE DAILY IN THE EVENING    clopidogrel (PLAVIX) 75 mg tablet Take 1 tablet (75 mg total) by mouth once daily.    escitalopram oxalate (LEXAPRO) 5 mg/5 mL Soln Take 10 mLs (10 mg total) by mouth once daily.    fexofenadine (ALLEGRA) 180 MG tablet Take 1 tablet (180 mg total) by mouth once daily.    fluticasone propionate (FLONASE) 50 mcg/actuation nasal spray 1 spray (50 mcg total) by Each Nostril route once daily.    GENERLAC 10 gram/15 mL solution     ibuprofen (ADVIL,MOTRIN) 800 MG tablet     memantine (NAMENDA) 10 MG Tab Take 10 mg by mouth once daily.    oxybutynin (DITROPAN) 5 mg/5 mL syrup Take 10 mLs (10 mg total) by mouth 2 (two) times daily.    polyethylene glycol (GLYCOLAX) 17 gram PwPk DISSOLVE 17 GRAMS OF POWDER (1 PACK) IN WATER & DRINK ONCE DAILY FOR 5 DAYS    tramadol (ULTRAM) 50 mg tablet Take 1/2 tablet as needed for headache      Family History     Problem Relation (Age of Onset)    Breast cancer Mother    Colon cancer Father    Dementia Mother    Hypertension Mother, Father    No Known Problems Sister, Brother, Maternal Aunt, Maternal Uncle, Paternal Aunt, Paternal Uncle, Maternal Grandmother, Maternal Grandfather, Paternal Grandmother, Paternal Grandfather    Rhinitis Daughter, Daughter        Tobacco Use    Smoking status: Former Smoker     Last attempt to quit: 1992     Years since quittin.0    Smokeless tobacco:  Never Used   Substance and Sexual Activity    Alcohol use: No     Alcohol/week: 0.0 standard drinks    Drug use: No    Sexual activity: Not Currently     Review of Systems   Unable to perform ROS: Intubated     Objective:     Vital Signs (Most Recent):  Temp: 98.3 °F (36.8 °C) (12/26/19 1130)  Pulse: 76 (12/26/19 1430)  Resp: 13 (12/26/19 1430)  BP: (!) 104/52 (12/26/19 1430)  SpO2: 100 % (12/26/19 1430) Vital Signs (24h Range):  Temp:  [97.5 °F (36.4 °C)-100 °F (37.8 °C)] 98.3 °F (36.8 °C)  Pulse:  [] 76  Resp:  [10-25] 13  SpO2:  [76 %-100 %] 100 %  BP: ()/() 104/52     Weight: 66.4 kg (146 lb 6.2 oz)  Body mass index is 24.36 kg/m².    Physical Exam   Constitutional: No distress.   HENT:   Head: Normocephalic.   Eyes: Right eye exhibits no discharge. Left eye exhibits no discharge.   Cardiovascular: Normal rate.   Abdominal: Bowel sounds are normal.   Musculoskeletal: She exhibits no edema.   Skin: She is not diaphoretic.       NEUROLOGICAL EXAMINATION:     MENTAL STATUS        Opening eyes upon verbal stimulation     CRANIAL NERVES     CN III, IV, VI   Right pupil: Size: 4 mm. Shape: regular.   Left pupil: Size: 4 mm. Shape: regular.   Nystagmus: none   Conjugate gaze: present    MOTOR EXAM        No AROM of UE's and LE's  Increased tone on RUE       Significant Labs:   CBC:   Recent Labs   Lab 12/25/19  0404 12/26/19  0500   WBC 5.39 6.70   HGB 8.9* 8.6*   HCT 26.5* 25.3*   * 116*     CMP:   Recent Labs   Lab 12/25/19  0404 12/26/19  0500   * 99   * 133*   K 2.8* 3.8   CL 98 96   CO2 26 27   BUN 10 19   CREATININE 1.1 1.9*   CALCIUM 7.9* 8.1*   PROT 5.8* 6.1   ALBUMIN 2.6* 2.6*   BILITOT 1.0 1.0   ALKPHOS 52* 56   * 96*   ALT 57* 53*   ANIONGAP 9 10   EGFRNONAA 49* 25*       Significant Imaging:   Head CT    FINDINGS:  Old infarct left frontal lobe/basal ganglia.  No mass effect.  No intracranial hemorrhage.  No extra-axial fluid collections.    Diffuse thinning  of the corpus callosum.  Pituitary grossly unremarkable.  Small calcified mass/ planum sphenoidal meningioma, unchanged.  Visualized portions of the of the paranasal sinuses and mastoid air cells are clear.      Impression       No acute intracranial abnormalities.      Electronically signed by: Harry Vines MD  Date: 12/26/2019  Time: 11:16         Assessment and Plan:     75 y/o female consulted for AMS    1. AMS: multifactorial given her acute respiratory failure and her chronic medical issues.    On head CT no acute findings.   -EEG today.    Active Diagnoses:    Diagnosis Date Noted POA    PRINCIPAL PROBLEM:  Acute respiratory failure with hypoxia [J96.01] 12/26/2019 Yes    On mechanically assisted ventilation [Z99.11] 12/26/2019 Not Applicable    Elevated INR [R79.1] 12/24/2019 Yes    Metabolic acidosis [E87.2] 12/24/2019 No    Failure to thrive in adult [R62.7] 12/19/2019 Yes    Cellulitis of mouth [K12.2] 12/18/2019 Yes    Acute encephalopathy [G93.40] 12/17/2019 Yes    Mobility impaired [Z74.09] 12/10/2019 Yes     Chronic    Acute cystitis without hematuria [N30.00] 12/10/2019 Yes    Broca's aphasia [R47.01] 08/02/2018 Yes     Chronic    Hemiparesis affecting right side as late effect of cerebrovascular accident [I69.351] 10/06/2016 Not Applicable     Chronic    Essential hypertension [I10] 02/24/2016 Yes     Chronic    Chronic diastolic congestive heart failure [I50.32] 09/09/2013 Yes      Problems Resolved During this Admission:       VTE Risk Mitigation (From admission, onward)         Ordered     heparin (porcine) injection 5,000 Units  Every 8 hours      12/26/19 0532     IP VTE HIGH RISK PATIENT  Once      12/26/19 0532     Place sequential compression device  Until discontinued      12/17/19 8902                Thank you for your consult. I will follow-up with patient. Please contact us if you have any additional questions.    Cem Eldridge MD  Neurology  [facility] [place]

## 2019-12-26 NOTE — ASSESSMENT & PLAN NOTE
On minimal ventilator settings. CXR without a significant concern for pneumonia. Neuro status will dictate liberation from mechanical ventilation.

## 2019-12-26 NOTE — HPI
Pt is a 75 y/o f with hx CVA with residual aphasia and right hemiparesis admitted for poor nutritoin/failure to thrive in setting of possible tooth abscess with low electrolytes awaiting PEG while being repleted via ngt with acute hypoxia and earlier transient hypotension with GCS 3. After discussions with family patient intubated ans transferred to the ICU.   Patient is awake but only minimally moving extremities. Not following commands.   On FiO2 40% and PEEP 5.   Pulmonary consulted for ventilator management.

## 2019-12-26 NOTE — SUBJECTIVE & OBJECTIVE
Past Medical History:   Diagnosis Date    Allergy     DDD (degenerative disc disease), lumbar     Chronic LBP and intermittent RLE radicular pain x 10 yrs    Hypertension     Overactive bladder     Senile cataract, unspecified - Both Eyes 2013    Stroke     right sided weakness       Past Surgical History:   Procedure Laterality Date    bladder mesh      BREAST BIOPSY Left     CATARACT EXTRACTION W/  INTRAOCULAR LENS IMPLANT Right 10/13/2014    Dr Crystal    CATARACT EXTRACTION W/  INTRAOCULAR LENS IMPLANT Left 11/10/2014    Dr Crystal    HEMORRHOID SURGERY      HYSTERECTOMY      INCONTINENCE SURGERY      OOPHORECTOMY      Right Rotator Cuff Repair         Review of patient's allergies indicates:   Allergen Reactions    Enoxaparin Other (See Comments) and Hives     Slurred speech per patient  Slurred speech per patient    Lisinopril Other (See Comments)     Cough    Penicillins Rash       Family History     Problem Relation (Age of Onset)    Breast cancer Mother    Colon cancer Father    Dementia Mother    Hypertension Mother, Father    No Known Problems Sister, Brother, Maternal Aunt, Maternal Uncle, Paternal Aunt, Paternal Uncle, Maternal Grandmother, Maternal Grandfather, Paternal Grandmother, Paternal Grandfather    Rhinitis Daughter, Daughter        Tobacco Use    Smoking status: Former Smoker     Last attempt to quit: 1992     Years since quittin.0    Smokeless tobacco: Never Used   Substance and Sexual Activity    Alcohol use: No     Alcohol/week: 0.0 standard drinks    Drug use: No    Sexual activity: Not Currently         Review of Systems   Unable to perform ROS: Acuity of condition     Objective:     Vital Signs (Most Recent):  Temp: 98.5 °F (36.9 °C) (19 0730)  Pulse: 96 (19 0900)  Resp: (!) 22 (19 0900)  BP: (!) 147/67 (19 0900)  SpO2: 99 % (19 0900) Vital Signs (24h Range):  Temp:  [97.5 °F (36.4 °C)-100 °F (37.8 °C)] 98.5 °F (36.9  °C)  Pulse:  [] 96  Resp:  [10-25] 22  SpO2:  [76 %-100 %] 99 %  BP: ()/() 147/67     Weight: 66.4 kg (146 lb 6.2 oz)  Body mass index is 24.36 kg/m².      Intake/Output Summary (Last 24 hours) at 12/26/2019 0936  Last data filed at 12/26/2019 0000  Gross per 24 hour   Intake 1882.25 ml   Output --   Net 1882.25 ml       Physical Exam   Constitutional: She appears well-developed and well-nourished.   HENT:   Head: Normocephalic and atraumatic.   ETT in place   Eyes: Pupils are equal, round, and reactive to light. EOM are normal.   Neck: Normal range of motion. Neck supple.   Cardiovascular: Normal rate and regular rhythm.   Pulmonary/Chest: Effort normal and breath sounds normal.   On ventilator   Neurological: She is alert.   Not following commands. Patient moves her feet spontaneously.    Skin: Skin is warm.   Psychiatric:   Intubated   Nursing note and vitals reviewed.      Vents:  Vent Mode: PRVC (12/26/19 0758)  Ventilator Initiated: Yes (12/26/19 0417)  Set Rate: 10 bmp (12/26/19 0807)  Vt Set: 400 mL (12/26/19 0807)  PEEP/CPAP: 5 cmH20 (12/26/19 0807)  Oxygen Concentration (%): 40 (12/26/19 0900)  Peak Airway Pressure: 32.4 cmH2O (12/26/19 0807)  Total Ve: 10.1 mL (12/26/19 0807)  F/VT Ratio<105 (RSBI): (!) 54.12 (12/26/19 0807)    Lines/Drains/Airways     Drain                 NG/OG Tube 12/24/19 1726 18 Fr. Right nostril 1 day         Urethral Catheter 12/26/19 0441 Straight-tip 12 Fr. less than 1 day          Airway                 Airway - Non-Surgical 12/26/19 0350 Endotracheal Tube less than 1 day          Peripheral Intravenous Line                 Peripheral IV - Single Lumen 12/23/19 1130 22 G Right Antecubital 2 days         Peripheral IV - Single Lumen 12/26/19 0439 20 G Right Hand less than 1 day                Significant Labs:    CBC/Anemia Profile:  Recent Labs   Lab 12/25/19  0404 12/26/19  0500   WBC 5.39 6.70   HGB 8.9* 8.6*   HCT 26.5* 25.3*   * 116*   MCV 84 82    RDW 17.7* 17.6*        Chemistries:  Recent Labs   Lab 12/25/19  0404 12/26/19  0500   * 133*   K 2.8* 3.8   CL 98 96   CO2 26 27   BUN 10 19   CREATININE 1.1 1.9*   CALCIUM 7.9* 8.1*   ALBUMIN 2.6* 2.6*   PROT 5.8* 6.1   BILITOT 1.0 1.0   ALKPHOS 52* 56   ALT 57* 53*   * 96*       All pertinent labs within the past 24 hours have been reviewed.    Significant Imaging:   I have reviewed and interpreted all pertinent imaging results/findings within the past 24 hours.

## 2019-12-26 NOTE — PLAN OF CARE
Pt remains stable on ventilator settings prvc12/tv400/peep+5/fio2 30%. Minimal suctioning needed. Will continue to monitor and suction as needed. Ett in place and secure

## 2019-12-26 NOTE — CARE UPDATE
[facility]  ICU Multidisciplinary Bedside Rounds   SUMMARY     Date: 12/26/2019    Prehospitalization: Home  Admit Date / LOS : 12/17/2019/ 8 days    Diagnosis: Acute respiratory failure with hypoxia and hypercarbia    Consults:        Active: GI       Needed: Palliative and Pulm CC     Code Status: Full Code   Advanced Directive: <no information>    LDA: Hutchison and PIV       Central Lines/Site/Justification:Patient Does Not Have Central Line       Urinary Cath/Order/Justification:Critically Ill in ICU    Vasopressors/Infusions:    dexmedetomidine (PRECEDEX) infusion      fentanyl            GOALS: Volume/ Hemodynamic: N/A                     RASS: N/A    CAM ICU: N/A  Pain Management: IV       Pain Controlled: no     Rhythm: NSR    Respiratory Device: Vent    Vent Mode: PRVC  Oxygen Concentration (%):  [] 60  Resp Rate Total:  [18 br/min-20 br/min] 18 br/min  Vt Set:  [400 mL-450 mL] 400 mL  PEEP/CPAP:  [5 cmH20] 5 cmH20  Mean Airway Pressure:  [10.8 ieH61-18.7 cmH20] 10.8 cmH20             Most Recent SBT/ SAT: N/A      VTE Prophylaxis: Mechanical  Mobility: Bedrest  Stress Ulcer Prophylaxis: Yes    Dietary: NPO  Tolerance: yes  /  Advancement: no    Isolation: No active isolations    Restraints: Yes    Significant Dates:  Post Op Date: N/A  Rescue Date: N/A  Imaging/ Diagnostics: N/A    Noteworthy Labs:  AST/ALT, Cr    CBC/Anemia Labs: Coags:    Recent Labs   Lab 12/24/19  0509 12/25/19  0404 12/26/19  0500   WBC 5.38 5.39 6.70   HGB 8.8* 8.9* 8.6*   HCT 26.5* 26.5* 25.3*   * 120* 116*   MCV 84 84 82   RDW 17.7* 17.7* 17.6*    Recent Labs   Lab 12/24/19  1106 12/25/19  0404 12/26/19  0500   INR 3.9* 1.8* 1.2   APTT  --   --  34.8*        Chemistries:   Recent Labs   Lab 12/24/19  0509 12/25/19  0404 12/26/19  0500   * 133* 133*   K 3.5 2.8* 3.8    98 96   CO2 18* 26 27   BUN 10 10 19   CREATININE 0.8 1.1 1.9*   CALCIUM 8.1* 7.9* 8.1*   PROT 6.0 5.8* 6.1   BILITOT 0.8  1.0 1.0   ALKPHOS 51* 52* 56   ALT 64* 57* 53*   * 108* 96*        Cardiac Enzymes: Ejection Fractions:    No results for input(s): CPK, CPKMB, MB, TROPONINI in the last 72 hours. No results found for: EF     POCT Glucose: HbA1c:    No results for input(s): POCTGLUCOSE in the last 168 hours. Hemoglobin A1C   Date Value Ref Range Status   09/14/2019 5.8 (H) 4.0 - 5.6 % Final     Comment:     ADA Screening Guidelines:  5.7-6.4%  Consistent with prediabetes  >or=6.5%  Consistent with diabetes  High levels of fetal hemoglobin interfere with the HbA1C  assay. Heterozygous hemoglobin variants (HbS, HgC, etc)do  not significantly interfere with this assay.   However, presence of multiple variants may affect accuracy.     12/01/2017 5.7 (H) 4.0 - 5.6 % Final     Comment:     According to ADA guidelines, hemoglobin A1c <7.0% represents  optimal control in non-pregnant diabetic patients. Different  metrics may apply to specific patient populations.   Standards of Medical Care in Diabetes-2016.  For the purpose of screening for the presence of diabetes:  <5.7%     Consistent with the absence of diabetes  5.7-6.4%  Consistent with increasing risk for diabetes   (prediabetes)  >or=6.5%  Consistent with diabetes  Currently, no consensus exists for use of hemoglobin A1c  for diagnosis of diabetes for children.  This Hemoglobin A1c assay has significant interference with fetal   hemoglobin   (HbF). The results are invalid for patients with abnormal amounts of   HbF,   including those with known Hereditary Persistence   of Fetal Hemoglobin. Heterozygous hemoglobin variants (HbAS, HbAC,   HbAD, HbAE, HbA2) do not significantly interfere with this assay;   however, presence of multiple variants in a sample may impact the %   interference.     05/09/2017 6.0 4.5 - 6.2 % Final     Comment:     According to ADA guidelines, hemoglobin A1C <7.0% represents  optimal control in non-pregnant diabetic patients.  Different  metrics may  apply to specific populations.   Standards of Medical Care in Diabetes - 2016.  For the purpose of screening for the presence of diabetes:  <5.7%     Consistent with the absence of diabetes  5.7-6.4%  Consistent with increasing risk for diabetes   (prediabetes)  >or=6.5%  Consistent with diabetes  Currently no consensus exists for use of hemoglobin A1C  for diagnosis of diabetes for children.          Needs from Care Team: none     ICU LOS 2h  Level of Care: Critical Care

## 2019-12-26 NOTE — PROGRESS NOTES
We were called to the room as the patient was unresponsive.  Upon arrival she was a GCS of 3 with non-rebreather mask in place with SaO2 in the high 90's but clearly not protecting her airway with poor ventilatory effort.  Pt was unresponsive to sternal rub as well and required emergent intubation for airway protection and due to expected clinical course.  Accu-check at the bedside was normal  7.5 ET tube successfully placed using a Mac 4 blade and gum elastic bougie on the first attempt.. RSI was performed with etomidate and rocuronium.  CXR was taken and tube was two to three cm's deep so backed up to 23 cm at the lip.  CBSAB with no epigastric sounds noted.  Color change was noted with capnometry.  Care handed over to Dr. Luna with plan to place on the ventilator and admit to the ICU for further evaluation and management.    Cristiano Leonard MD  4:21 AM

## 2019-12-27 LAB
ALBUMIN SERPL BCP-MCNC: 2.3 G/DL (ref 3.5–5.2)
ALLENS TEST: ABNORMAL
ALP SERPL-CCNC: 63 U/L (ref 55–135)
ALT SERPL W/O P-5'-P-CCNC: 45 U/L (ref 10–44)
ANION GAP SERPL CALC-SCNC: 8 MMOL/L (ref 8–16)
ANISOCYTOSIS BLD QL SMEAR: SLIGHT
AST SERPL-CCNC: 73 U/L (ref 10–40)
BASOPHILS # BLD AUTO: 0.01 K/UL (ref 0–0.2)
BASOPHILS NFR BLD: 0.1 % (ref 0–1.9)
BILIRUB SERPL-MCNC: 0.6 MG/DL (ref 0.1–1)
BUN SERPL-MCNC: 21 MG/DL (ref 8–23)
BURR CELLS BLD QL SMEAR: ABNORMAL
CALCIUM SERPL-MCNC: 7.8 MG/DL (ref 8.7–10.5)
CHLORIDE SERPL-SCNC: 99 MMOL/L (ref 95–110)
CO2 SERPL-SCNC: 25 MMOL/L (ref 23–29)
CREAT SERPL-MCNC: 1.2 MG/DL (ref 0.5–1.4)
DELSYS: ABNORMAL
DIFFERENTIAL METHOD: ABNORMAL
EOSINOPHIL # BLD AUTO: 0.1 K/UL (ref 0–0.5)
EOSINOPHIL NFR BLD: 0.6 % (ref 0–8)
ERYTHROCYTE [DISTWIDTH] IN BLOOD BY AUTOMATED COUNT: 17.2 % (ref 11.5–14.5)
ERYTHROCYTE [SEDIMENTATION RATE] IN BLOOD BY WESTERGREN METHOD: 12 MM/H
EST. GFR  (AFRICAN AMERICAN): 51 ML/MIN/1.73 M^2
EST. GFR  (NON AFRICAN AMERICAN): 44 ML/MIN/1.73 M^2
FIO2: 30
GLUCOSE SERPL-MCNC: 129 MG/DL (ref 70–110)
HCO3 UR-SCNC: 26.8 MMOL/L (ref 24–28)
HCT VFR BLD AUTO: 22.9 % (ref 37–48.5)
HGB BLD-MCNC: 7.8 G/DL (ref 12–16)
HYPOCHROMIA BLD QL SMEAR: ABNORMAL
IMM GRANULOCYTES # BLD AUTO: 0.21 K/UL (ref 0–0.04)
IMM GRANULOCYTES NFR BLD AUTO: 1.9 % (ref 0–0.5)
LYMPHOCYTES # BLD AUTO: 2.6 K/UL (ref 1–4.8)
LYMPHOCYTES NFR BLD: 23.5 % (ref 18–48)
MAGNESIUM SERPL-MCNC: 1.1 MG/DL (ref 1.6–2.6)
MCH RBC QN AUTO: 28.4 PG (ref 27–31)
MCHC RBC AUTO-ENTMCNC: 34.1 G/DL (ref 32–36)
MCV RBC AUTO: 83 FL (ref 82–98)
MIN VOL: 8.3
MODE: ABNORMAL
MONOCYTES # BLD AUTO: 2 K/UL (ref 0.3–1)
MONOCYTES NFR BLD: 18.3 % (ref 4–15)
NEUTROPHILS # BLD AUTO: 6.1 K/UL (ref 1.8–7.7)
NEUTROPHILS NFR BLD: 55.6 % (ref 38–73)
NRBC BLD-RTO: 0 /100 WBC
PCO2 BLDA: 34.8 MMHG (ref 35–45)
PEEP: 5
PH SMN: 7.5 [PH] (ref 7.35–7.45)
PHOSPHATE SERPL-MCNC: 2.1 MG/DL (ref 2.7–4.5)
PIP: 21
PLATELET # BLD AUTO: 93 K/UL (ref 150–350)
PLATELET BLD QL SMEAR: ABNORMAL
PMV BLD AUTO: ABNORMAL FL (ref 9.2–12.9)
PO2 BLDA: 76 MMHG (ref 80–100)
POC BE: 3 MMOL/L
POC SATURATED O2: 96 % (ref 95–100)
POC TCO2: 28 MMOL/L (ref 23–27)
POLYCHROMASIA BLD QL SMEAR: ABNORMAL
POTASSIUM SERPL-SCNC: 3.9 MMOL/L (ref 3.5–5.1)
PROT SERPL-MCNC: 5.5 G/DL (ref 6–8.4)
RBC # BLD AUTO: 2.75 M/UL (ref 4–5.4)
SAMPLE: ABNORMAL
SCHISTOCYTES BLD QL SMEAR: PRESENT
SITE: ABNORMAL
SODIUM SERPL-SCNC: 132 MMOL/L (ref 136–145)
SP02: 100
TARGETS BLD QL SMEAR: ABNORMAL
VT: 400
WBC # BLD AUTO: 11.02 K/UL (ref 3.9–12.7)

## 2019-12-27 PROCEDURE — 83735 ASSAY OF MAGNESIUM: CPT

## 2019-12-27 PROCEDURE — 99900035 HC TECH TIME PER 15 MIN (STAT)

## 2019-12-27 PROCEDURE — 94761 N-INVAS EAR/PLS OXIMETRY MLT: CPT

## 2019-12-27 PROCEDURE — 27000221 HC OXYGEN, UP TO 24 HOURS

## 2019-12-27 PROCEDURE — 36600 WITHDRAWAL OF ARTERIAL BLOOD: CPT

## 2019-12-27 PROCEDURE — 63600175 PHARM REV CODE 636 W HCPCS: Performed by: INTERNAL MEDICINE

## 2019-12-27 PROCEDURE — 99291 CRITICAL CARE FIRST HOUR: CPT | Mod: ,,, | Performed by: INTERNAL MEDICINE

## 2019-12-27 PROCEDURE — 63600175 PHARM REV CODE 636 W HCPCS: Performed by: HOSPITALIST

## 2019-12-27 PROCEDURE — 94003 VENT MGMT INPAT SUBQ DAY: CPT

## 2019-12-27 PROCEDURE — 85025 COMPLETE CBC W/AUTO DIFF WBC: CPT

## 2019-12-27 PROCEDURE — 25000003 PHARM REV CODE 250: Performed by: HOSPITALIST

## 2019-12-27 PROCEDURE — S0028 INJECTION, FAMOTIDINE, 20 MG: HCPCS | Performed by: HOSPITALIST

## 2019-12-27 PROCEDURE — 20000000 HC ICU ROOM

## 2019-12-27 PROCEDURE — 84100 ASSAY OF PHOSPHORUS: CPT

## 2019-12-27 PROCEDURE — 80053 COMPREHEN METABOLIC PANEL: CPT

## 2019-12-27 PROCEDURE — 99291 PR CRITICAL CARE, E/M 30-74 MINUTES: ICD-10-PCS | Mod: ,,, | Performed by: INTERNAL MEDICINE

## 2019-12-27 PROCEDURE — 82803 BLOOD GASES ANY COMBINATION: CPT

## 2019-12-27 PROCEDURE — 36415 COLL VENOUS BLD VENIPUNCTURE: CPT

## 2019-12-27 PROCEDURE — 99900026 HC AIRWAY MAINTENANCE (STAT)

## 2019-12-27 RX ORDER — SODIUM CHLORIDE 9 MG/ML
INJECTION, SOLUTION INTRAVENOUS CONTINUOUS
Status: DISCONTINUED | OUTPATIENT
Start: 2019-12-27 | End: 2019-12-28

## 2019-12-27 RX ADMIN — HEPARIN SODIUM 5000 UNITS: 5000 INJECTION, SOLUTION INTRAVENOUS; SUBCUTANEOUS at 10:12

## 2019-12-27 RX ADMIN — SODIUM CHLORIDE: 0.9 INJECTION, SOLUTION INTRAVENOUS at 02:12

## 2019-12-27 RX ADMIN — HEPARIN SODIUM 5000 UNITS: 5000 INJECTION, SOLUTION INTRAVENOUS; SUBCUTANEOUS at 02:12

## 2019-12-27 RX ADMIN — CHLORHEXIDINE GLUCONATE 0.12% ORAL RINSE 15 ML: 1.2 LIQUID ORAL at 09:12

## 2019-12-27 RX ADMIN — HEPARIN SODIUM 5000 UNITS: 5000 INJECTION, SOLUTION INTRAVENOUS; SUBCUTANEOUS at 05:12

## 2019-12-27 RX ADMIN — SODIUM CHLORIDE: 0.9 INJECTION, SOLUTION INTRAVENOUS at 04:12

## 2019-12-27 RX ADMIN — FAMOTIDINE 20 MG: 10 INJECTION INTRAVENOUS at 09:12

## 2019-12-27 RX ADMIN — CHLORHEXIDINE GLUCONATE 0.12% ORAL RINSE 15 ML: 1.2 LIQUID ORAL at 10:12

## 2019-12-27 NOTE — PROGRESS NOTES
Pt received on Servo I vent with settings as followed: PRVC 12/400/+5 and 30%.  Size 7.5 ETT in place and secure at 23 cm at the lip.  Ambu bag and mask at bedside and all alarms on and functioning. NARN. RT will continue to monitor pt status.

## 2019-12-27 NOTE — CARE UPDATE
[facility]  ICU Multidisciplinary Bedside Rounds   SUMMARY     Date: 12/27/2019    Prehospitalization: Home  Admit Date / LOS : 12/17/2019/ 9 days    Diagnosis: Acute respiratory failure with hypoxia    Consults:        Active: GI, Neuro and Pulm CC       Needed: N/A     Code Status: Full Code   Advanced Directive: <no information>    LDA: Hutchison, NG and Vent       Central Lines/Site/Justification:Patient Does Not Have Central Line       Urinary Cath/Order/Justification:Critically Ill in ICU    Vasopressors/Infusions:    dexmedetomidine (PRECEDEX) infusion            GOALS: Volume/ Hemodynamic: N/A                     RASS: 0  alert and calm    CAM ICU: Negative  Pain Management: none       Pain Controlled: not applicable     Rhythm: NSR    Respiratory Device: Vent    Vent Mode: PRVC  Oxygen Concentration (%):  [] 30  Resp Rate Total:  [10 br/min-35 br/min] 17 br/min  Vt Set:  [400 mL-450 mL] 400 mL  PEEP/CPAP:  [5 cmH20] 5 cmH20  Mean Airway Pressure:  [8.2 noY07-85.7 cmH20] 10.6 cmH20             Most Recent SBT/ SAT: Did not perform       MOVE Screen: FAIL    VTE Prophylaxis: Mechanical  Mobility: Bedrest  Stress Ulcer Prophylaxis: Yes    Dietary: NPO and TF  Tolerance: yes  /  Advancement: yes    Isolation: No active isolations    Restraints: No    Significant Dates:  Post Op Date: N/A  Rescue Date: N/A  Imaging/ Diagnostics: N/A    Noteworthy Labs:  none    CBC/Anemia Labs: Coags:    Recent Labs   Lab 12/24/19  0509 12/25/19  0404 12/26/19  0500   WBC 5.38 5.39 6.70   HGB 8.8* 8.9* 8.6*   HCT 26.5* 26.5* 25.3*   * 120* 116*   MCV 84 84 82   RDW 17.7* 17.7* 17.6*    Recent Labs   Lab 12/24/19  1106 12/25/19  0404 12/26/19  0500   INR 3.9* 1.8* 1.2   APTT  --   --  34.8*        Chemistries:   Recent Labs   Lab 12/24/19  0509 12/25/19  0404 12/26/19  0500   * 133* 133*   K 3.5 2.8* 3.8    98 96   CO2 18* 26 27   BUN 10 10 19   CREATININE 0.8 1.1 1.9*   CALCIUM 8.1* 7.9*  8.1*   PROT 6.0 5.8* 6.1   BILITOT 0.8 1.0 1.0   ALKPHOS 51* 52* 56   ALT 64* 57* 53*   * 108* 96*        Cardiac Enzymes: Ejection Fractions:    No results for input(s): CPK, CPKMB, MB, TROPONINI in the last 72 hours. No results found for: EF     POCT Glucose: HbA1c:    Recent Labs   Lab 12/26/19  0341   POCTGLUCOSE 132*    Hemoglobin A1C   Date Value Ref Range Status   12/26/2019 5.6 4.0 - 5.6 % Final     Comment:     ADA Screening Guidelines:  5.7-6.4%  Consistent with prediabetes  >or=6.5%  Consistent with diabetes  High levels of fetal hemoglobin interfere with the HbA1C  assay. Heterozygous hemoglobin variants (HbS, HgC, etc)do  not significantly interfere with this assay.   However, presence of multiple variants may affect accuracy.     09/14/2019 5.8 (H) 4.0 - 5.6 % Final     Comment:     ADA Screening Guidelines:  5.7-6.4%  Consistent with prediabetes  >or=6.5%  Consistent with diabetes  High levels of fetal hemoglobin interfere with the HbA1C  assay. Heterozygous hemoglobin variants (HbS, HgC, etc)do  not significantly interfere with this assay.   However, presence of multiple variants may affect accuracy.     12/01/2017 5.7 (H) 4.0 - 5.6 % Final     Comment:     According to ADA guidelines, hemoglobin A1c <7.0% represents  optimal control in non-pregnant diabetic patients. Different  metrics may apply to specific patient populations.   Standards of Medical Care in Diabetes-2016.  For the purpose of screening for the presence of diabetes:  <5.7%     Consistent with the absence of diabetes  5.7-6.4%  Consistent with increasing risk for diabetes   (prediabetes)  >or=6.5%  Consistent with diabetes  Currently, no consensus exists for use of hemoglobin A1c  for diagnosis of diabetes for children.  This Hemoglobin A1c assay has significant interference with fetal   hemoglobin   (HbF). The results are invalid for patients with abnormal amounts of   HbF,   including those with known Hereditary Persistence    of Fetal Hemoglobin. Heterozygous hemoglobin variants (HbAS, HbAC,   HbAD, HbAE, HbA2) do not significantly interfere with this assay;   however, presence of multiple variants in a sample may impact the %   interference.          Needs from Care Team: none     ICU LOS 23h  Level of Care: Critical Care

## 2019-12-27 NOTE — PROCEDURES
ROUTINE ELECTROENCEPHALOGRAM REPORT      Emilia Melgoza  [id number]  1945    DATE OF SERVICE: 12/26/19    REQUESTING PROVIDER: Berna Dawson MD    METHODOLOGY   Electroencephalographic (EEG) recording is with electrodes placed according to the International 10-20 placement system.  Thirty two (32) channels of digital signal (sampling rate of 512/sec) including T1 and T2 was simultaneously recorded from the scalp and may include  EKG, EMG, and/or eye monitors.  Recording band pass was 0.1 to 512 hz.  Digital video recording of the patient is simultaneously recorded with the EEG.  The patient is instructed report clinical symptoms which may occur during the recording session.  EEG and video recording is stored and archived in digital format. Activation procedures which include photic stimulation, hyperventilation and instructing patients to perform simple task are done in selected patients.    The EEG is displayed on a monitor screen and can be reviewed using different montages.  Computer assisted analysis is employed to detect spike and electrographic seizure activity.   The entire record is submitted for computer analysis.  The entire recording is visually reviewed and the times identified by computer analysis as being spikes or seizures are reviewed again.  Compresses spectral analysis (CSA) is also performed on the activity recorded from each individual channel.  This is displayed as a power display of frequencies from 0 to 30 Hz over time.   The CSA is reviewed looking for asymmetries in power between homologous areas of the scalp and then compared with the original EEG recording.     Supersonic software was also utilized in the review of this study.  This software suite analyzes the EEG recording in multiple domains.  Coherence and rhythmicity is computed to identify EEG sections which may contain organized seizures.  Each channel undergoes analysis to detect presence of spike and sharp waves which have  special and morphological characteristic of epileptic activity.  The routine EEG recording is converted from spacial into frequency domain.  This is then displayed comparing homologous areas to identify areas of significant asymmetry.  Algorithm to identify non-cortically generated artifact is used to separate eye movement, EMG and other artifact from the EEG    EEG FINDINGS  Background activity:   The background rhythm was characterized by delta-theta (3-5 Hz) with superimposed faster (alpha-beta) activity   No posterior dominant rhythm seen.   Symmetry and continuity: The background was continuous and symmetric; intermittent EMG artifacts obscure the study.    Sleep:   Not seen.    Activation procedures:   Photic stimulation was performed with no abnormalities seen  Hyperventilation was not performed   No clinical response noted to noxious stimuli; reactivity noted on EEG.    Abnormal activity:   No epileptiform discharges, periodic discharges, lateralized rhythmic delta activity or electrographic seizures were seen.    EKG:   Irregular rhythm seen    IMPRESSION:   This is an abnormal routine EEG due to the severe generalized slowing seen, suggestive of severe diffuse or multifocal cerebral dysfunction.  No epileptiform activity or electrographic seizures seen.  Irregular heart rate noted on EKG.    CLINICAL CORRELATION IS RECOMMENDED.    Emerald Almonte MD, RUDDY(), HARINI TRISTAN.  Neurology-Epilepsy.  [facility].

## 2019-12-27 NOTE — ASSESSMENT & PLAN NOTE
"Patient has shown continued decline  Does have evidence of encephalomalacia from old stroke and has had difficulty with PO intake mainly due to oral pain after tooth extraction. Dysphagia appears to be an additional issue as she was seen "gurgling" last night. Acute event likely secondary to hypoxia. Is now intubated but off sedation and not interacting much. Repeat CT of brain with no acute pathology. Other than alkalemia 2/2 respiratory alkalosis and mild hyponatremia, no electrolyte abnormalities to account for this. No severe anemia. Is malnourished. No evidence of active infection.   EEG with slowing but no epileptiform activity  MRI ordered STAT  Safe extubation will depend on mental status/participation  Continue tube feeds. Monitor vitals.   "

## 2019-12-27 NOTE — SUBJECTIVE & OBJECTIVE
Interval History: Currently on no sedation and minimal ventilator settings.     Review of Systems   Unable to perform ROS: Intubated     Objective:     Vital Signs (Most Recent):  Temp: 98.9 °F (37.2 °C) (12/27/19 0715)  Pulse: 90 (12/27/19 0804)  Resp: (!) 21 (12/27/19 0804)  BP: 137/62 (12/27/19 0801)  SpO2: 100 % (12/27/19 0804) Vital Signs (24h Range):  Temp:  [98.2 °F (36.8 °C)-98.9 °F (37.2 °C)] 98.9 °F (37.2 °C)  Pulse:  [] 90  Resp:  [10-30] 21  SpO2:  [97 %-100 %] 100 %  BP: ()/(52-92) 137/62     Weight: 66.4 kg (146 lb 6.2 oz)  Body mass index is 24.36 kg/m².    Intake/Output Summary (Last 24 hours) at 12/27/2019 0946  Last data filed at 12/27/2019 0800  Gross per 24 hour   Intake 3180.42 ml   Output 535 ml   Net 2645.42 ml      Physical Exam   Constitutional: She appears well-developed and well-nourished.   HENT:   ETT in place.    Cardiovascular: Normal rate and regular rhythm.   Pulmonary/Chest: She has no wheezes. She has no rales.   On mechanical ventilation   Abdominal: Soft. Bowel sounds are normal.   Neurological: She displays normal reflexes.   Drowsy while off sedation  Does not open eyes when called by name but it seems as she is trying. Moving both feet spontaneously    Psychiatric:   Unable to assess   Nursing note and vitals reviewed.      Significant Labs: All pertinent labs within the past 24 hours have been reviewed.    Significant Imaging: I have reviewed all pertinent imaging results/findings within the past 24 hours.  I have reviewed and interpreted all pertinent imaging results/findings within the past 24 hours.

## 2019-12-27 NOTE — CARE UPDATE
Pt received orally intubated with a 7.5 ET tube, secured at 23 cm at the lips. Pt on Servo I ventilator, on documented settings. Alarms are set and functional, Ambu bag and mask at the bedside. NARN at this time, will continue to monitor and wean as tolerated.

## 2019-12-27 NOTE — ASSESSMENT & PLAN NOTE
Acute aspiration highly suspected  Currently intubated but on low O2 requirements  Mentation is main limiting factor for extubation  Workup in progress. Continue tube feeds  Sedation not required at this time  No vasopressor support required at this time  Pulmonology on board for co-management

## 2019-12-27 NOTE — PHYSICIAN QUERY
PT Name: Emilia Melgoza  MR #: [id number]    Physician Query Form - Nutrition Clarification     CDS: Rhiannon Rice RN  Contact information: [email or web address]    This form is a permanent document in the medical record.     Query Date: December 27, 2019  By submitting this query, we are merely seeking further clarification of documentation.. Please utilize your independent clinical judgment when addressing the question(s) below.    The Medical record contains the following:   Indicators  Supporting Clinical Findings Location in Medical Record   x % of Estimated Energy Intake over a time frame from p.o., TF, or TPN Constitutional: Positive for activity change, appetite change and fatigue      Family reports pt with decreased appetite over past few months, but worsened over past 2 weeks.    H&P 12/17        Nutrition consult 12/20   x Weight Status over a time frame Severe Weight Loss (Malnutrition): greater than 7.5% in 3 months(13% in 3 months)   Nutrition consult 12/20   x Subcutaneous Fat and/or Muscle Loss Subcutaneous Fat Loss (Final Summary): well nourished  Muscle Loss Evaluation (Final Summary): well nourished     Nutrition consult 12/20    Fluid Accumulation or Edema      Reduced  Strength     x Wt / BMI / Usual Body Weight Weight (lb): 145.51 lb  BMI (Calculated): 24.2   Nutrition consult 12/20   x Delayed Wound Healing / Failure to Thrive Failure to thrive in adult   HM PN 12/19   x Acute or Chronic Illness Acute encephalopathy  Likely secondary to malnutrition and dehydration from poor p.o. intake, continue supportive care with IV fluids.  Hypokalemia  Hypernatremia  Likely secondary to hypovolemia   H&P 12/17    Medication      Treatment      Other       AND / ASPEN Clinical Characteristics (October 2011)  A minimum of two characteristics is recommended for diagnosing either moderate or severe malnutrition   Mild Malnutrition Moderate Malnutrition Severe Malnutrition   Energy Intake from p.o., TF  or TPN. < 75% intake of estimated energy needs for less than 7 days < 75% intake of estimated energy needs for greater than 7 days < 50% intake of estimated energy needs for > 5 days   Weight Loss 1-2% in 1 month  5% in 3 months  7.5% in 6 months  10% in 1 year 1-2 % in 1 week  5% in 1 month  7.5% in 3 months  10% in 6 months  20% in 1 year > 2% in 1 week  > 5% in 1 month  > 7.5% in 3 months  > 10% in 6 months  > 20% in 1 year   Physical Findings     None *Mild subcutaneous fat and/or muscle loss  *Mild fluid accumulation  *Stage II decubitus  *Surgical wound or non-healing wound *Mod/severe subcutaneous fat and/or muscle loss  *Mod/severe fluid accumulation  *Stage III or IV decubitus  *Non-healing surgical wound     Provider, please further specify the malnutrition diagnosis associated with above clinical findings.    [  ] Mild Protein-Calorie Malnutrition   [ x ] Moderate Protein-Calorie Malnutrition   [  ] Other Nutritional Diagnosis (please specify):    [  ] Other:    [  ] Clinically Undetermined       Please document in your progress notes daily for the duration of treatment until resolved and include in your discharge summary.

## 2019-12-27 NOTE — PROGRESS NOTES
[facility] Hospital Medicine  Progress Note    Patient Name: Emilia Melgoza  MRN: [id number]  Patient Class: IP- Inpatient   Admission Date: 12/17/2019  Length of Stay: 9 days  Attending Physician: Berna Dawson MD  Primary Care Provider: Jerson Price MD        Subjective:     Principal Problem:Acute respiratory failure with hypoxia        HPI:  74 y.o. 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  She is also not taking her medications.  She appears to be more confused than usual.  Had a recent tooth extraction by the dentist and was unable to take prescribed clindamycin.  Daughter reports patient has indicated mouth pain. Also recently prescribed Macrobid for UTI and was unable to complete.  Daughter denies that the patient has had a fever.  History further limited secondary to the patient's condition.  In the ED, she was found to be tachycardic with hypernatremia, hypokalemia, mild elevation of CPK and troponin.  Urinalysis, CT head, chest x-ray, and EKG were all unremarkable for any acute abnormality.  She appears clinically dehydrated.  Placed in observation for further evaluation and treatment.    Overview/Hospital Course:  No notes on file    Interval History: still poorly responsive. Breathing effort very poor during SBT. Has been off sedation     Review of Systems   Unable to perform ROS: Intubated     Objective:     Vital Signs (Most Recent):  Temp: 99.9 °F (37.7 °C) (12/27/19 1600)  Pulse: 90 (12/27/19 1731)  Resp: 16 (12/27/19 1731)  BP: 137/63 (12/27/19 1702)  SpO2: 100 % (12/27/19 1731) Vital Signs (24h Range):  Temp:  [98.2 °F (36.8 °C)-99.9 °F  "(37.7 °C)] 99.9 °F (37.7 °C)  Pulse:  [77-96] 90  Resp:  [11-30] 16  SpO2:  [97 %-100 %] 100 %  BP: (103-147)/(52-92) 137/63     Weight: 66.4 kg (146 lb 6.2 oz)  Body mass index is 24.36 kg/m².    Intake/Output Summary (Last 24 hours) at 12/27/2019 1736  Last data filed at 12/27/2019 1400  Gross per 24 hour   Intake 2515 ml   Output 335 ml   Net 2180 ml      Physical Exam   Cardiovascular: Normal rate and regular rhythm.   Pulmonary/Chest: She has no wheezes. She has no rales.   On mechanical ventilation   Abdominal: Soft. Bowel sounds are normal.   Neurological: She displays normal reflexes.   Drowsy while off sedation  Does not open eyes when called by name. Moving both feet spontaneously    Psychiatric:   Unable to assess   Nursing note and vitals reviewed.      Significant Labs: All pertinent labs within the past 24 hours have been reviewed.    Significant Imaging: I have reviewed all pertinent imaging results/findings within the past 24 hours.  I have reviewed and interpreted all pertinent imaging results/findings within the past 24 hours.      Assessment/Plan:      * Acute respiratory failure with hypoxia  Acute aspiration highly suspected  Currently intubated but on low O2 requirements  Mentation is main limiting factor for extubation  Workup in progress. Continue tube feeds  Sedation not required at this time  No vasopressor support required at this time  Pulmonology on board for co-management      On mechanically assisted ventilation  Day # 2 of vent      Acute encephalopathy  Patient has shown continued decline  Does have evidence of encephalomalacia from old stroke and has had difficulty with PO intake mainly due to oral pain after tooth extraction. Dysphagia appears to be an additional issue as she was seen "gurgling" last night. Acute event likely secondary to hypoxia. Is now intubated but off sedation and not interacting much. Repeat CT of brain with no acute pathology. Other than alkalemia 2/2 " respiratory alkalosis and mild hyponatremia, no electrolyte abnormalities to account for this. No severe anemia. Is malnourished. No evidence of active infection.   EEG with slowing but no epileptiform activity  MRI ordered STAT  Safe extubation will depend on mental status/participation  Continue tube feeds. Monitor vitals.     Metabolic acidosis  Resolved with IV bicarb.    Elevated INR  Resolved       Failure to thrive in adult  Plan for PEG placement once more stable      Cellulitis of mouth  Treated with IV Clindamycin for 7 days.   Resolved       Mobility impaired  PT/OT when able    Acute cystitis without hematuria  Secondary to E.Coli. Low colony count. Treated with 7 days of IV Levaquin.  Resolved    Broca's aphasia  Nonverbal at baseline    Hemiparesis affecting right side as late effect of cerebrovascular accident  At baseline, no acute issue. Fall precautions.     Essential hypertension  Hydralazine PRN    Chronic diastolic congestive heart failure  Stable. Monitor for fluid overload.      VTE Risk Mitigation (From admission, onward)         Ordered     heparin (porcine) injection 5,000 Units  Every 8 hours      12/26/19 0532     IP VTE HIGH RISK PATIENT  Once      12/26/19 0532     Place sequential compression device  Until discontinued      12/17/19 6435              Assessment and plan discussed with patient's daughter at bedside. Palliative care consulted.     Critical care time spent on the evaluation and treatment of severe organ dysfunction, review of pertinent labs and imaging studies, discussions with consulting providers and discussions with patient/family: > 35 minutes.      Berna Aldridge MD  Department of Hospital Medicine   [facility]

## 2019-12-27 NOTE — SUBJECTIVE & OBJECTIVE
Interval History: still poorly responsive. Breathing effort very poor during SBT. Has been off sedation     Review of Systems   Unable to perform ROS: Intubated     Objective:     Vital Signs (Most Recent):  Temp: 99.9 °F (37.7 °C) (12/27/19 1600)  Pulse: 90 (12/27/19 1731)  Resp: 16 (12/27/19 1731)  BP: 137/63 (12/27/19 1702)  SpO2: 100 % (12/27/19 1731) Vital Signs (24h Range):  Temp:  [98.2 °F (36.8 °C)-99.9 °F (37.7 °C)] 99.9 °F (37.7 °C)  Pulse:  [77-96] 90  Resp:  [11-30] 16  SpO2:  [97 %-100 %] 100 %  BP: (103-147)/(52-92) 137/63     Weight: 66.4 kg (146 lb 6.2 oz)  Body mass index is 24.36 kg/m².    Intake/Output Summary (Last 24 hours) at 12/27/2019 1736  Last data filed at 12/27/2019 1400  Gross per 24 hour   Intake 2515 ml   Output 335 ml   Net 2180 ml      Physical Exam   Cardiovascular: Normal rate and regular rhythm.   Pulmonary/Chest: She has no wheezes. She has no rales.   On mechanical ventilation   Abdominal: Soft. Bowel sounds are normal.   Neurological: She displays normal reflexes.   Drowsy while off sedation  Does not open eyes when called by name. Moving both feet spontaneously    Psychiatric:   Unable to assess   Nursing note and vitals reviewed.      Significant Labs: All pertinent labs within the past 24 hours have been reviewed.    Significant Imaging: I have reviewed all pertinent imaging results/findings within the past 24 hours.  I have reviewed and interpreted all pertinent imaging results/findings within the past 24 hours.

## 2019-12-27 NOTE — PROGRESS NOTES
[facility]  Pulmonology  Progress Note    Patient Name: Emilia Melgoza  MRN: [id number]  Admission Date: 12/17/2019  Hospital Length of Stay: 9 days  Code Status: Full Code  Attending Provider: Berna Dawson MD  Primary Care Provider: Jerson Price MD   Principal Problem: Acute respiratory failure with hypoxia    Subjective:     Interval History: Currently on no sedation and minimal ventilator settings.     Review of Systems   Unable to perform ROS: Intubated     Objective:     Vital Signs (Most Recent):  Temp: 98.9 °F (37.2 °C) (12/27/19 0715)  Pulse: 90 (12/27/19 0804)  Resp: (!) 21 (12/27/19 0804)  BP: 137/62 (12/27/19 0801)  SpO2: 100 % (12/27/19 0804) Vital Signs (24h Range):  Temp:  [98.2 °F (36.8 °C)-98.9 °F (37.2 °C)] 98.9 °F (37.2 °C)  Pulse:  [] 90  Resp:  [10-30] 21  SpO2:  [97 %-100 %] 100 %  BP: ()/(52-92) 137/62     Weight: 66.4 kg (146 lb 6.2 oz)  Body mass index is 24.36 kg/m².    Intake/Output Summary (Last 24 hours) at 12/27/2019 0946  Last data filed at 12/27/2019 0800  Gross per 24 hour   Intake 3180.42 ml   Output 535 ml   Net 2645.42 ml      Physical Exam   Constitutional: She appears well-developed and well-nourished.   HENT:   ETT in place.    Cardiovascular: Normal rate and regular rhythm.   Pulmonary/Chest: She has no wheezes. She has no rales.   On mechanical ventilation   Abdominal: Soft. Bowel sounds are normal.   Neurological: She displays normal reflexes.   Drowsy while off sedation  Does not open eyes when called by name but it seems as she is trying. Moving both feet spontaneously    Psychiatric:   Unable to assess   Nursing note and vitals reviewed.      Significant Labs: All pertinent labs within the past 24 hours have been reviewed.    Significant Imaging: I have reviewed all pertinent imaging results/findings within the past 24 hours.  I have reviewed and interpreted all pertinent imaging results/findings within the past 24  hours.    Assessment/Plan:     * Acute respiratory failure with hypoxia  On minimal ventilator settings. CXR without a significant concern for pneumonia. Neuro status will dictate liberation from mechanical ventilation.     Acute encephalopathy  Patient intubated for AMS and somnolence.  Per notes patient has been altered for many days but worse in the last 2 days. CT head negative for acute process. EEG negative for seizures.   Recommend MRI.      Critical Care time: 35 minutes.    Critical Care time for the evaluation and treatment for severe organ dysfunction, review of pertinent labs and imaging studies discussions with primary team/consulting services and discussions with family.    Daughter updated at bedside    Continue ICU care.          Marium Rodriguez MD  Pulmonology  [facility]

## 2019-12-27 NOTE — PHYSICIAN QUERY
PT Name: Emilia Melgoza  MR #: [id number]    Physician Query Form - Consultant Diagnosis Clarification     CDS: Rhiannon Rice RN  Contact information: [email or web address]  This form is a permanent document in the medical record.     Query Date: December 27, 2019    By submitting this query, we are merely seeking further clarification of documentation.  Please utilize your independent clinical judgment when addressing the question(s) below.      The Medical record contains the following:   Diagnosis Supporting Clinical Information Location in Medical Record   Oral dysphagia     Oral Phase:   · Oral aversion noted  · holding     Pharyngeal Phase:   · Pt not initiated pharyngeal phase of swallow    Emilia Melgoza is a 74 y.o. female with dx of Failure to thrive in adult she presents  with severe oral dysphagia c/b oral aversion and holding of bolus and decreased management of secretions        Speech Therapy eval 12/22     Do you agree with the Consultants specificity of the diagnosis of oral dysphagia?    [ x ] Yes   [  ] No   [  ] Other/Clarification of findings:   [  ] Clinically undetermined

## 2019-12-27 NOTE — PLAN OF CARE
Patient remains in ICU on the ventilator PRVC , 30% FIO2, rate 12,  and peep 5. She opens eyes spontaneously but does not follow commands. She has been calm and not needed sedation. NS continues at 100ml/hr. Urine output low and Dr. Simon informed. On very small BM. VSS. Plan of care reviewed with daughter at bedside. Patient free from fall, injury or pain.

## 2019-12-27 NOTE — PT/OT/SLP PROGRESS
Physical Therapy      Patient Name:  Emilia Melgoza   MRN:  [id number]    Pt transferred from [facility] to ICU and intubated since 12/26/19.    Camelia Gardner, PT

## 2019-12-27 NOTE — ASSESSMENT & PLAN NOTE
Patient intubated for AMS and somnolence.  Per notes patient has been altered for many days but worse in the last 2 days. CT head negative for acute process. EEG negative for seizures.   Recommend MRI.

## 2019-12-27 NOTE — CARE UPDATE
[facility]  ICU Multidisciplinary Bedside Rounds     UPDATE     Date: 12/27/2019      Plan of care reviewed with the following, Nurse, Charge Nurse, Physician, Pulm CC and Resp. Therapist.       Needs/ Goals for the day:  SBT with possible extubation      Level of Care: Critical Care

## 2019-12-27 NOTE — NURSING
Notified Dr Aldridge of low urine output this shift and that IVFs were only to run for 24 hrs which will end tonight.  No new orders at this time.

## 2019-12-28 LAB
ALBUMIN SERPL BCP-MCNC: 2.7 G/DL (ref 3.5–5.2)
ALLENS TEST: ABNORMAL
ALP SERPL-CCNC: 67 U/L (ref 55–135)
ALT SERPL W/O P-5'-P-CCNC: 41 U/L (ref 10–44)
ANION GAP SERPL CALC-SCNC: 5 MMOL/L (ref 8–16)
AST SERPL-CCNC: 63 U/L (ref 10–40)
BASOPHILS # BLD AUTO: 0.02 K/UL (ref 0–0.2)
BASOPHILS NFR BLD: 0.2 % (ref 0–1.9)
BILIRUB SERPL-MCNC: 0.5 MG/DL (ref 0.1–1)
BUN SERPL-MCNC: 17 MG/DL (ref 8–23)
CALCIUM SERPL-MCNC: 8.4 MG/DL (ref 8.7–10.5)
CHLORIDE SERPL-SCNC: 103 MMOL/L (ref 95–110)
CO2 SERPL-SCNC: 26 MMOL/L (ref 23–29)
CREAT SERPL-MCNC: 0.8 MG/DL (ref 0.5–1.4)
DELSYS: ABNORMAL
DIFFERENTIAL METHOD: ABNORMAL
EOSINOPHIL # BLD AUTO: 0.2 K/UL (ref 0–0.5)
EOSINOPHIL NFR BLD: 1.5 % (ref 0–8)
ERYTHROCYTE [DISTWIDTH] IN BLOOD BY AUTOMATED COUNT: 17.9 % (ref 11.5–14.5)
ERYTHROCYTE [SEDIMENTATION RATE] IN BLOOD BY WESTERGREN METHOD: 15 MM/H
EST. GFR  (AFRICAN AMERICAN): >60 ML/MIN/1.73 M^2
EST. GFR  (NON AFRICAN AMERICAN): >60 ML/MIN/1.73 M^2
FIO2: 30
GLUCOSE SERPL-MCNC: 113 MG/DL (ref 70–110)
HCO3 UR-SCNC: 26.1 MMOL/L (ref 24–28)
HCT VFR BLD AUTO: 24.5 % (ref 37–48.5)
HGB BLD-MCNC: 8.1 G/DL (ref 12–16)
IMM GRANULOCYTES # BLD AUTO: 0.28 K/UL (ref 0–0.04)
IMM GRANULOCYTES NFR BLD AUTO: 2.5 % (ref 0–0.5)
INR PPP: 1.1 (ref 0.8–1.2)
LYMPHOCYTES # BLD AUTO: 1.8 K/UL (ref 1–4.8)
LYMPHOCYTES NFR BLD: 15.7 % (ref 18–48)
MAGNESIUM SERPL-MCNC: 1.3 MG/DL (ref 1.6–2.6)
MCH RBC QN AUTO: 28.4 PG (ref 27–31)
MCHC RBC AUTO-ENTMCNC: 33.1 G/DL (ref 32–36)
MCV RBC AUTO: 86 FL (ref 82–98)
MODE: ABNORMAL
MONOCYTES # BLD AUTO: 1.9 K/UL (ref 0.3–1)
MONOCYTES NFR BLD: 17.1 % (ref 4–15)
NEUTROPHILS # BLD AUTO: 7 K/UL (ref 1.8–7.7)
NEUTROPHILS NFR BLD: 63 % (ref 38–73)
NRBC BLD-RTO: 0 /100 WBC
PCO2 BLDA: 34 MMHG (ref 35–45)
PEEP: 5
PH SMN: 7.49 [PH] (ref 7.35–7.45)
PHOSPHATE SERPL-MCNC: 2.4 MG/DL (ref 2.7–4.5)
PLATELET # BLD AUTO: 126 K/UL (ref 150–350)
PMV BLD AUTO: 11.8 FL (ref 9.2–12.9)
PO2 BLDA: 145 MMHG (ref 80–100)
POC BE: 3 MMOL/L
POC SATURATED O2: 99 % (ref 95–100)
POC TCO2: 27 MMOL/L (ref 23–27)
POTASSIUM SERPL-SCNC: 3.6 MMOL/L (ref 3.5–5.1)
PROT SERPL-MCNC: 6.2 G/DL (ref 6–8.4)
PROTHROMBIN TIME: 11.4 SEC (ref 9–12.5)
RBC # BLD AUTO: 2.85 M/UL (ref 4–5.4)
SAMPLE: ABNORMAL
SITE: ABNORMAL
SODIUM SERPL-SCNC: 134 MMOL/L (ref 136–145)
VT: 330
WBC # BLD AUTO: 11.17 K/UL (ref 3.9–12.7)

## 2019-12-28 PROCEDURE — 84100 ASSAY OF PHOSPHORUS: CPT

## 2019-12-28 PROCEDURE — 25000003 PHARM REV CODE 250: Performed by: HOSPITALIST

## 2019-12-28 PROCEDURE — 93010 EKG 12-LEAD: ICD-10-PCS | Mod: ,,, | Performed by: INTERNAL MEDICINE

## 2019-12-28 PROCEDURE — 80053 COMPREHEN METABOLIC PANEL: CPT

## 2019-12-28 PROCEDURE — 99900035 HC TECH TIME PER 15 MIN (STAT)

## 2019-12-28 PROCEDURE — 99233 SBSQ HOSP IP/OBS HIGH 50: CPT | Mod: ,,, | Performed by: INTERNAL MEDICINE

## 2019-12-28 PROCEDURE — 83735 ASSAY OF MAGNESIUM: CPT

## 2019-12-28 PROCEDURE — 93010 ELECTROCARDIOGRAM REPORT: CPT | Mod: ,,, | Performed by: INTERNAL MEDICINE

## 2019-12-28 PROCEDURE — A4216 STERILE WATER/SALINE, 10 ML: HCPCS | Performed by: INTERNAL MEDICINE

## 2019-12-28 PROCEDURE — 85025 COMPLETE CBC W/AUTO DIFF WBC: CPT

## 2019-12-28 PROCEDURE — 94003 VENT MGMT INPAT SUBQ DAY: CPT

## 2019-12-28 PROCEDURE — 99900026 HC AIRWAY MAINTENANCE (STAT)

## 2019-12-28 PROCEDURE — 36415 COLL VENOUS BLD VENIPUNCTURE: CPT

## 2019-12-28 PROCEDURE — 99232 SBSQ HOSP IP/OBS MODERATE 35: CPT | Mod: ,,, | Performed by: PSYCHIATRY & NEUROLOGY

## 2019-12-28 PROCEDURE — 20000000 HC ICU ROOM

## 2019-12-28 PROCEDURE — 87040 BLOOD CULTURE FOR BACTERIA: CPT

## 2019-12-28 PROCEDURE — 36600 WITHDRAWAL OF ARTERIAL BLOOD: CPT

## 2019-12-28 PROCEDURE — 94761 N-INVAS EAR/PLS OXIMETRY MLT: CPT

## 2019-12-28 PROCEDURE — 99232 PR SUBSEQUENT HOSPITAL CARE,LEVL II: ICD-10-PCS | Mod: ,,, | Performed by: PSYCHIATRY & NEUROLOGY

## 2019-12-28 PROCEDURE — S0028 INJECTION, FAMOTIDINE, 20 MG: HCPCS | Performed by: HOSPITALIST

## 2019-12-28 PROCEDURE — 99233 PR SUBSEQUENT HOSPITAL CARE,LEVL III: ICD-10-PCS | Mod: ,,, | Performed by: INTERNAL MEDICINE

## 2019-12-28 PROCEDURE — 82803 BLOOD GASES ANY COMBINATION: CPT

## 2019-12-28 PROCEDURE — 63600175 PHARM REV CODE 636 W HCPCS: Performed by: INTERNAL MEDICINE

## 2019-12-28 PROCEDURE — 85610 PROTHROMBIN TIME: CPT

## 2019-12-28 PROCEDURE — 25000003 PHARM REV CODE 250: Performed by: INTERNAL MEDICINE

## 2019-12-28 PROCEDURE — 63600175 PHARM REV CODE 636 W HCPCS: Performed by: HOSPITALIST

## 2019-12-28 PROCEDURE — 93005 ELECTROCARDIOGRAM TRACING: CPT

## 2019-12-28 PROCEDURE — 36569 INSJ PICC 5 YR+ W/O IMAGING: CPT

## 2019-12-28 RX ORDER — NAPROXEN SODIUM 220 MG/1
81 TABLET, FILM COATED ORAL DAILY
Status: DISCONTINUED | OUTPATIENT
Start: 2019-12-28 | End: 2020-01-04 | Stop reason: HOSPADM

## 2019-12-28 RX ORDER — HEPARIN SODIUM 5000 [USP'U]/ML
5000 INJECTION, SOLUTION INTRAVENOUS; SUBCUTANEOUS EVERY 12 HOURS
Status: DISCONTINUED | OUTPATIENT
Start: 2019-12-28 | End: 2019-12-30

## 2019-12-28 RX ORDER — SODIUM CHLORIDE 0.9 % (FLUSH) 0.9 %
10 SYRINGE (ML) INJECTION
Status: DISCONTINUED | OUTPATIENT
Start: 2019-12-28 | End: 2020-01-04 | Stop reason: HOSPADM

## 2019-12-28 RX ORDER — SODIUM CHLORIDE 0.9 % (FLUSH) 0.9 %
10 SYRINGE (ML) INJECTION EVERY 6 HOURS
Status: DISCONTINUED | OUTPATIENT
Start: 2019-12-28 | End: 2020-01-04 | Stop reason: HOSPADM

## 2019-12-28 RX ORDER — MAGNESIUM SULFATE HEPTAHYDRATE 40 MG/ML
2 INJECTION, SOLUTION INTRAVENOUS ONCE
Status: COMPLETED | OUTPATIENT
Start: 2019-12-28 | End: 2019-12-28

## 2019-12-28 RX ADMIN — CHLORHEXIDINE GLUCONATE 0.12% ORAL RINSE 15 ML: 1.2 LIQUID ORAL at 09:12

## 2019-12-28 RX ADMIN — MAGNESIUM SULFATE 2 G: 2 INJECTION INTRAVENOUS at 02:12

## 2019-12-28 RX ADMIN — Medication 10 ML: at 09:12

## 2019-12-28 RX ADMIN — CHLORHEXIDINE GLUCONATE 0.12% ORAL RINSE 15 ML: 1.2 LIQUID ORAL at 08:12

## 2019-12-28 RX ADMIN — HEPARIN SODIUM 5000 UNITS: 5000 INJECTION, SOLUTION INTRAVENOUS; SUBCUTANEOUS at 09:12

## 2019-12-28 RX ADMIN — ASPIRIN 81 MG 81 MG: 81 TABLET ORAL at 02:12

## 2019-12-28 RX ADMIN — HEPARIN SODIUM 5000 UNITS: 5000 INJECTION, SOLUTION INTRAVENOUS; SUBCUTANEOUS at 06:12

## 2019-12-28 RX ADMIN — FAMOTIDINE 20 MG: 10 INJECTION INTRAVENOUS at 08:12

## 2019-12-28 NOTE — PROGRESS NOTES
"[facility]  Pulmonology  Progress Note    Patient Name: Emilia Melgoza  MRN: [id number]  Admission Date: 12/17/2019  Hospital Length of Stay: 10 days  Code Status: Full Code  Attending Provider: Berna Dawson MD  Primary Care Provider: Jerson Price MD   Principal Problem: Acute respiratory failure with hypoxia    Subjective:     Interval History: still poorly responsive. Would open eyes and "wiggle" both feet but unknown if purposeful or not. Daughter states patient made eye contact and squeezed hand on command for her. Is also concerned about heparin injections. Stated patient has experienced change in mental status with lovenox in the past. Had brown BM and urine is clear yellow. Labs for today still pending.     Review of Systems   Unable to perform ROS: Intubated     Objective:     Vital Signs (Most Recent):  Temp: 98.7 °F (37.1 °C) (12/28/19 0730)  Pulse: 106 (12/28/19 1200)  Resp: 19 (12/28/19 1200)  BP: (!) 149/69 (12/28/19 1200)  SpO2: 100 % (12/28/19 1200) Vital Signs (24h Range):  Temp:  [98 °F (36.7 °C)-99.9 °F (37.7 °C)] 98.7 °F (37.1 °C)  Pulse:  [] 106  Resp:  [16-29] 19  SpO2:  [99 %-100 %] 100 %  BP: (117-164)/(56-79) 149/69     Weight: 66.2 kg (146 lb)  Body mass index is 24.3 kg/m².    Intake/Output Summary (Last 24 hours) at 12/28/2019 1232  Last data filed at 12/28/2019 1100  Gross per 24 hour   Intake 3100 ml   Output 520 ml   Net 2580 ml      Physical Exam   Cardiovascular: Normal rate and regular rhythm.   Pulmonary/Chest: She has no wheezes. She has no rales.   On mechanical ventilation   Abdominal: Soft. Bowel sounds are normal.   Neurological: She displays normal reflexes.   Poorly responsive while off sedation  No squeezing hands on command. Opens eyes to sternal rub. Moving both feet spontaneously.  Withdraws to pain in lower extremities but not in upper extremities   Psychiatric:   Unable to assess   Nursing note and vitals reviewed.      Significant Labs: " All pertinent labs within the past 24 hours have been reviewed.    Significant Imaging: I have reviewed all pertinent imaging results/findings within the past 24 hours.  I have reviewed and interpreted all pertinent imaging results/findings within the past 24 hours.    Assessment/Plan:     * Acute respiratory failure with hypoxia  On minimal ventilator settings. CXR without a significant concern for pneumonia. Neuro status will dictate liberation from mechanical ventilation. Worry that she may need tracheostomy.     Acute encephalopathy  Patient intubated for AMS and somnolence.  Per notes patient has been altered for many days but worse in the last few days.   MRI with significant acute infarctions.       Recommend goals of care discussion.      Marium Rodriguez MD  Pulmonology  [facility]

## 2019-12-28 NOTE — CONSULTS
Consult Note  Palliative Care      Consult Requested By: Berna Dawson MD  Reason for Consult:       Goals of care, code status, advance care planning    SUBJECTIVE:     History of Present Illness:  Chief Complaint   Patient presents with    dx utit, not eating, drinking       pt dx with uti from pcp, daughter states pt not eating, drinking, not taking abx; was seen at pcp today and send here      CC: Decreased po intake      HPI:  74 y.o. female with a PMHx of CVA and aphasia presenting with daughter for evaluation of decreased PO intake for one week. Daughter reports pt was diagnosed with UTI one week ago by primary care physician and was treated with antibiotics. Daughter notes pt refuses to take antibiotics or any other medications. Daughter reports associated tremors today. Daughter denies fever. Daughter notes pt will fall every time she gets off bed. Daughter denies prior history of similar symptoms.      The history is provided by a relative. History limited by: patient is non verbal  No  was used.     Principal Problem:Acute respiratory failure with hypoxia and hypercarbia           HPI:  74 y.o. 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  She is also not taking her medications.  She appears to be more confused than usual.  Had a recent tooth extraction by the dentist and was unable to take prescribed clindamycin.  Daughter reports patient has indicated mouth pain. Also recently prescribed Macrobid for UTI and was unable to complete.  Daughter denies that the patient has had a fever.  History further limited secondary to the patient's condition.  In  the ED, she was found to be tachycardic with hypernatremia, hypokalemia, mild elevation of CPK and troponin.  Urinalysis, CT head, chest x-ray, and EKG were all unremarkable for any acute abnormality.  She appears clinically dehydrated.  Placed in observation for further evaluation and treatment.     Overview/Hospital Course:  No notes on file     Interval History: declined overnight. Intubated.      Interval History: still poorly responsive. Breathing effort very poor during SBT. Has been off sedation     Palliative Care has been consulted to discuss goals of care, code status and advance care planning.  MRI has been ordered for this afternoon.  Of note, she was seen by Palliative Care NP Ankita Galvin during this admission and patient/family had decide to have PEG placement.  This consult was placed after patient's respiratory decline and need for intubation.        Past Medical History:   Diagnosis Date    Allergy     DDD (degenerative disc disease), lumbar     Chronic LBP and intermittent RLE radicular pain x 10 yrs    Hypertension     Overactive bladder     Senile cataract, unspecified - Both Eyes 6/11/2013    Stroke     right sided weakness     Past Surgical History:   Procedure Laterality Date    bladder mesh      BREAST BIOPSY Left     CATARACT EXTRACTION W/  INTRAOCULAR LENS IMPLANT Right 10/13/2014    Dr Crystal    CATARACT EXTRACTION W/  INTRAOCULAR LENS IMPLANT Left 11/10/2014    Dr Crystal    HEMORRHOID SURGERY      HYSTERECTOMY      INCONTINENCE SURGERY      OOPHORECTOMY      Right Rotator Cuff Repair       Family History   Problem Relation Age of Onset    Hypertension Mother     Breast cancer Mother     Dementia Mother     Colon cancer Father     Hypertension Father     Rhinitis Daughter     Rhinitis Daughter     No Known Problems Sister     No Known Problems Brother     No Known Problems Maternal Aunt     No Known Problems Maternal Uncle     No Known Problems Paternal Aunt      No Known Problems Paternal Uncle     No Known Problems Maternal Grandmother     No Known Problems Maternal Grandfather     No Known Problems Paternal Grandmother     No Known Problems Paternal Grandfather     Amblyopia Neg Hx     Blindness Neg Hx     Cancer Neg Hx     Cataracts Neg Hx     Diabetes Neg Hx     Glaucoma Neg Hx     Macular degeneration Neg Hx     Retinal detachment Neg Hx     Strabismus Neg Hx     Stroke Neg Hx     Thyroid disease Neg Hx      Social History     Tobacco Use    Smoking status: Former Smoker     Last attempt to quit: 1992     Years since quittin.0    Smokeless tobacco: Never Used   Substance Use Topics    Alcohol use: No     Alcohol/week: 0.0 standard drinks    Drug use: No       Mental Status:   Intubated on vent support, can see eyelids move when I call her name, but she does not open eyes.  Seemed to move both feet on command x 3 (but just trace on the left).  Did not follow commands to squeeze hands.      Palliative Performance Score:  30 on TF via NGT.      OBJECTIVE:     Pain Assessment/symptom management:  Limited by patient's condition, intubated on vent support, no sedation, poorly responsive.  See MAR for medication regimen.      Decision-Making Capacity:    Patient unable to speak on her own behalf.      Advanced Directives:  Living Will:  No.    Do Not Resuscitate Status:  FULL CODE  Medical Power of :   NONE ON FILE - will ask family    Living Arrangements:   Lives at home with family (? and daughter)    Psychosocial, Spiritual, Cultural: Patient intubated on vent support.    Patient's most important priorities:  Patient's biggest concerns/fears:  Previous death/end of life care history:  Patient's goals/hopes:      ASSESSMENT/PLAN:     Patient lying supine in bed, appears older than stated age and chronically and acutely ill.  She is intubated on vent support settings PRVC 15/330/15/30%  And is breathing over the vent.  She does not  "open her eyes to name call, but visible eye movements noted under the lids.  After several requests she moved both feet (much less on left) to command; did not squeeze hands on command.  She is not on any sedation.      Respiratory effort is synchronous with vent and some initiation of spontaneous breaths is noted.  Lung sounds are coarse.  Heart tones audible, regular.  Abdomen soft, + bowel sounds and she has TF infusing via RNGT @ 45 ml/h.  She has > 4+ peripheral dependent edema to hands/forearm (worse on the right), about 1-2+ ankles.   VS:  Oral temp 99.9  HR 89 (sinus rhythm) /63  RR 24  Sat 100%.    Daughters Bobbi Morales and Julissa are at the bedside along with many extended family members.  Explained my role in patient's care and their support.  Explained she is going for MRI test in about an hour or so and we will meet on Monday to discuss goals of care after we have results and more information about her neuro status.  They state patient's baseline prior to this illness was she was able to walk, could say a few words (aphasia from prior stroke) and was alert and attentive.  They comment desire to "continue treating" their mother.  Explained she is on ventilator support and getting maximum care.      Provided literature "Hard Choices For [place] People" and asked them to read the chapters on cpr as well as feeding tubes to aid in their understanding.      Plan:  Educate.  Literature.  Goals of care and code status discussions.  Support.  Consult .     Recommendations:   Continue current treatment/supportive care.   FULL CODE.   Awaiting results of MRI.   Will meet with family again on Monday once MRI results are known to discuss goals of care    Palliative Care will continue to follow.    Discussed above with Dr. Aldridge and Dr. Eldridge.      Thank you for this consult and the opportunity to participate in Ms. Melgoza's car    Lelo Holder, BSN, RN, CCRN, CHPN   Palliative Care Nurse Coordinator "   [facility]  [phone]    Time Spent:  20 minute bedside assessment and discussion with family, 30 minute record review, 10 minute team discussion.

## 2019-12-28 NOTE — ASSESSMENT & PLAN NOTE
Patient intubated for AMS and somnolence.  Per notes patient has been altered for many days but worse in the last few days.   MRI with significant acute infarctions.

## 2019-12-28 NOTE — PROGRESS NOTES
[facility] Hospital Medicine  Progress Note    Patient Name: Emilia Melgoza  MRN: [id number]  Patient Class: IP- Inpatient   Admission Date: 12/17/2019  Length of Stay: 10 days  Attending Physician: Berna Dawson MD  Primary Care Provider: Jerson Price MD        Subjective:     Principal Problem:Acute respiratory failure with hypoxia        HPI:  74 y.o. 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  She is also not taking her medications.  She appears to be more confused than usual.  Had a recent tooth extraction by the dentist and was unable to take prescribed clindamycin.  Daughter reports patient has indicated mouth pain. Also recently prescribed Macrobid for UTI and was unable to complete.  Daughter denies that the patient has had a fever.  History further limited secondary to the patient's condition.  In the ED, she was found to be tachycardic with hypernatremia, hypokalemia, mild elevation of CPK and troponin.  Urinalysis, CT head, chest x-ray, and EKG were all unremarkable for any acute abnormality.  She appears clinically dehydrated.  Placed in observation for further evaluation and treatment.    Overview/Hospital Course:  Ms Melgoza presented with acute encephalopathy. Workup significant for soft tissue swelling and edema involving the right anterior mandibular region suspicious for cellulitis, hypernatremia (148) . Patient also noted to have significant dysphagia for which she was placed NPO due to high risk for aspiration. Also aphasic and significantly deconditioned requiring moderate assistance from physical and occupational therapists. Was  "initiated on empiric IV antibiotics. CT brain with no acute pathology, just old infarct to left frontal lobe extending to basal ganglia and meningioma (unchanged). CTA brain showed no vessel occlusion. NGT placed for enteral nutrition. Hypernatremia resolved. PT/OT/ST continued. Palliative care was consulted to address goals of care, mainly for PEG placement. Patient, by nodding yes or no, agreed with PEG tube placement. Gastroenterology consulted for PEG. Labwork showed elevated total bilirubin and AST >> ALT. INR also elevated and, per family, patient is not on anticoagulation at home, just clopidogrel for old stroke. Antiplatelets and chemical DVT prophylaxis held. Vitamin K given. INR corrected. Total bilirubin normalized and enzymes improved. Overnight 12/25-12/26, patient experienced hypoxia and worsening encephalopathy. Also reports of transient hypotension earlier that had resolved. Rapid response called. Placed on supplemental O2 but continued to decline therefore transferred to ICU and intubated. Patient was kept off sedation as she was minimally responsive. Neurology and pulmonology consulted. EEG obtained. Showed severe generalized slowing suggesting severe diffuse or multifocal cerebral dysfunction. No epileptiform activity or seizures appreciated. MRI of brain obtained. This showed extensive multifocal acute infarctions in bilateral parietooccipital lobes and other areas of the brain. Patient started on aspirin overnight. No statin as LDL 72 four months ago. Blood pressure maintained, feeding continued via NGT, prophylactic heparin SQ, and ventilator management. Echocardiogram ordered.     Interval History: still poorly responsive. Would open eyes and "wiggle" both feet but unknown if purposeful or not. Daughter states patient made eye contact and squeezed hand on command for her. Is also concerned about heparin injections. Stated patient has experienced change in mental status with lovenox in the " past. Had brown BM and urine is clear yellow. Labs for today still pending.     Review of Systems   Unable to perform ROS: Intubated     Objective:     Vital Signs (Most Recent):  Temp: 98.3 °F (36.8 °C) (12/28/19 0400)  Pulse: 108 (12/28/19 0824)  Resp: 19 (12/28/19 0824)  BP: (!) 164/76 (12/28/19 0730)  SpO2: 100 % (12/28/19 0824) Vital Signs (24h Range):  Temp:  [98 °F (36.7 °C)-99.9 °F (37.7 °C)] 98.3 °F (36.8 °C)  Pulse:  [] 108  Resp:  [12-29] 19  SpO2:  [99 %-100 %] 100 %  BP: (117-164)/(56-76) 164/76     Weight: 66.2 kg (146 lb)  Body mass index is 24.3 kg/m².    Intake/Output Summary (Last 24 hours) at 12/28/2019 0840  Last data filed at 12/28/2019 0600  Gross per 24 hour   Intake 3745 ml   Output 585 ml   Net 3160 ml      Physical Exam   Cardiovascular: Normal rate and regular rhythm.   Pulmonary/Chest: She has no wheezes. She has no rales.   On mechanical ventilation   Abdominal: Soft. Bowel sounds are normal.   Neurological: She displays normal reflexes.   Poorly responsive while off sedation  No squeezing hands on command. Opens eyes to sternal rub. Moving both feet spontaneously.  Unsure if this is purposeful or not   Psychiatric:   Unable to assess   Nursing note and vitals reviewed.      Significant Labs: All pertinent labs within the past 24 hours have been reviewed.    Significant Imaging: I have reviewed all pertinent imaging results/findings within the past 24 hours.  I have reviewed and interpreted all pertinent imaging results/findings within the past 24 hours.      Assessment/Plan:      * Acute respiratory failure with hypoxia  Acute aspiration highly suspected  Currently intubated but on low O2 requirements  Mentation and poor respiratory effort are main limiting factors for extubation  MRI of brain significant for large stroke  Continue tube feeds and supportive care  Sedation not required at this time  No vasopressor support required at this time  Pulmonology on board for  "co-management      On mechanically assisted ventilation  Day # 3 of vent      Acute encephalopathy  Patient has shown continued decline  Does have evidence of encephalomalacia from old stroke and has had difficulty with PO intake mainly due to oral pain after tooth extraction but late effect of stroke not ruled out. Dysphagia appears to be an additional issue as she was seen "gurgling" that. Is now intubated but minimally responsive despite being off all sedation. Repeat CT of brain with no acute pathology.   CTA showed no vessel occlusion  EEG with slowing but no epileptiform activity  MRI ordered showed bilateral large strokes  Could be 2/2 to hypotensive episode but embolic phenomena not ruled out  Cardiac monitor reviewed. No arrhythmia appreciated however I only have one strip available prior to 12/26  Echocardiogram ordered. Results are pending  Safe extubation will depend on mental status/participation  Continue tube feeds. Monitor vitals. Labs pending.     Metabolic acidosis  Resolved with IV bicarb.    Elevated INR  Resolved       Failure to thrive in adult  Plan for PEG placement once more stable      Cellulitis of mouth  Treated with IV Clindamycin for 7 days.   Resolved       Mobility impaired  PT/OT when able    Acute cystitis without hematuria  Secondary to E.Coli. Low colony count. Treated with 7 days of IV Levaquin.  Resolved    Broca's aphasia  Nonverbal at baseline    Hemiparesis affecting right side as late effect of cerebrovascular accident  At baseline, no acute issue. Fall precautions.     Essential hypertension  Hydralazine PRN    Chronic diastolic congestive heart failure  Stable. Monitor for fluid overload.      VTE Risk Mitigation (From admission, onward)         Ordered     IP VTE HIGH RISK PATIENT  Once      12/26/19 0532     Place sequential compression device  Until discontinued      12/17/19 4715   Heparin 5000 every 12 hours           Assessment and plan discussed with son and " daughters at bedside. All questions answered to satisfaction. Palliative care on board for ongoing goals of care discussion.    Critical care time spent on the evaluation and treatment of severe organ dysfunction, review of pertinent labs and imaging studies, discussions with consulting providers and discussions with patient/family: > 35 minutes.      Berna Aldridge MD  Department of Hospital Medicine   [facility]

## 2019-12-28 NOTE — PROCEDURES
"Emilia Melgoza is a 74 y.o. female patient.    Temp: 99.2 °F (37.3 °C) (12/28/19 1100)  Pulse: 107 (12/28/19 1309)  Resp: 18 (12/28/19 1309)  BP: (!) 179/74 (12/28/19 1300)  SpO2: 100 % (12/28/19 1309)  Weight: 66.2 kg (146 lb) (12/28/19 0824)  Height: 5' 5" (165.1 cm) (12/28/19 0824)    PICC  Date/Time: 12/28/2019 1:40 PM  Performed by: Harry Liang RN  Consent Done: Yes  Time out: Immediately prior to procedure a time out was called to verify the correct patient, procedure, equipment, support staff and site/side marked as required  Indications: med administration and vascular access  Anesthesia: local infiltration  Local anesthetic: lidocaine 1% without epinephrine  Anesthetic Total (mL): 3  Preparation: skin prepped with ChloraPrep  Skin prep agent dried: skin prep agent completely dried prior to procedure  Sterile barriers: all five maximum sterile barriers used - cap, mask, sterile gown, sterile gloves, and large sterile sheet  Hand hygiene: hand hygiene performed prior to central venous catheter insertion  Location details: right basilic  Catheter type: double lumen  Catheter size: 5 Fr  Catheter Length: 36cm    Ultrasound guidance: yes  Vessel Caliber: medium, compressibility normal  Needle advanced into vessel with real time Ultrasound guidance.  Guidewire confirmed in vessel.  Sterile sheath used.  Number of attempts: 1  Post-procedure: blood return through all ports, chlorhexidine patch and sterile dressing applied            Harry Liang  12/28/2019  "

## 2019-12-28 NOTE — ASSESSMENT & PLAN NOTE
"Patient has shown continued decline  Does have evidence of encephalomalacia from old stroke and has had difficulty with PO intake mainly due to oral pain after tooth extraction but late effect of stroke not ruled out. Dysphagia appears to be an additional issue as she was seen "gurgling" that. Is now intubated but minimally responsive despite being off all sedation. Repeat CT of brain with no acute pathology.   CTA showed no vessel occlusion  EEG with slowing but no epileptiform activity  MRI ordered showed bilateral large strokes  Could be 2/2 to hypotensive episode but embolic phenomena not ruled out  Cardiac monitor reviewed. No arrhythmia appreciated however I only have one strip available prior to 12/26  Echocardiogram ordered. Results are pending  Safe extubation will depend on mental status/participation  Continue tube feeds. Monitor vitals. Labs pending.   "

## 2019-12-28 NOTE — SUBJECTIVE & OBJECTIVE
"Interval History: still poorly responsive. Would open eyes and "wiggle" both feet but unknown if purposeful or not. Daughter states patient made eye contact and squeezed hand on command for her. Is also concerned about heparin injections. Stated patient has experienced change in mental status with lovenox in the past. Had brown BM and urine is clear yellow. Labs for today still pending.     Review of Systems   Unable to perform ROS: Intubated     Objective:     Vital Signs (Most Recent):  Temp: 98.7 °F (37.1 °C) (12/28/19 0730)  Pulse: 106 (12/28/19 1200)  Resp: 19 (12/28/19 1200)  BP: (!) 149/69 (12/28/19 1200)  SpO2: 100 % (12/28/19 1200) Vital Signs (24h Range):  Temp:  [98 °F (36.7 °C)-99.9 °F (37.7 °C)] 98.7 °F (37.1 °C)  Pulse:  [] 106  Resp:  [16-29] 19  SpO2:  [99 %-100 %] 100 %  BP: (117-164)/(56-79) 149/69     Weight: 66.2 kg (146 lb)  Body mass index is 24.3 kg/m².    Intake/Output Summary (Last 24 hours) at 12/28/2019 1232  Last data filed at 12/28/2019 1100  Gross per 24 hour   Intake 3100 ml   Output 520 ml   Net 2580 ml      Physical Exam   Cardiovascular: Normal rate and regular rhythm.   Pulmonary/Chest: She has no wheezes. She has no rales.   On mechanical ventilation   Abdominal: Soft. Bowel sounds are normal.   Neurological: She displays normal reflexes.   Poorly responsive while off sedation  No squeezing hands on command. Opens eyes to sternal rub. Moving both feet spontaneously.  Withdraws to pain in lower extremities but not in upper extremities   Psychiatric:   Unable to assess   Nursing note and vitals reviewed.      Significant Labs: All pertinent labs within the past 24 hours have been reviewed.    Significant Imaging: I have reviewed all pertinent imaging results/findings within the past 24 hours.  I have reviewed and interpreted all pertinent imaging results/findings within the past 24 hours.  "

## 2019-12-28 NOTE — PROGRESS NOTES
0800 difficulty with attempting to obtain peripheral IV access. Notified MD Dawson.  1400 pt's temp 99.4 oral. Notified MD Dawson. MD will assess and place new orders as needed. Also PICC line has been placed. Xray confirmation obtained.  1530 Unable to draw blood cultures from peripheral stick. Informed MD Eunice Md states ok to draw both sets from PICC line.

## 2019-12-28 NOTE — PROGRESS NOTES
Received patient orally intubated and on a Servo I Ventilator with settings as follows:  PRVC 15/ 330/ +5 PEEP/ 30%.  Patient has a size 7.5 ET tube in place which is secured at the 23 cm maxi at the lips on the Left side of the mouth.  The ET tube was rotated to the Right side of the mouth.  The Ventilator alarms are set and functional and the AMBU bag is at the HOB.

## 2019-12-28 NOTE — PLAN OF CARE
Pt continues in the ICU. MRI showed extensive multifocal acute infarctions in the supratentorial brain. Notified Dr Simon and new orders noted.  Family at bedside. Vitals stable.  No falls/ injuries this shift.

## 2019-12-28 NOTE — CARE UPDATE
[facility]  ICU Multidisciplinary Bedside Rounds   SUMMARY     Date: 12/28/2019    Prehospitalization: Home  Admit Date / LOS : 12/17/2019/ 10 days    Diagnosis: Acute respiratory failure with hypoxia    Consults:        Active: GI, Neuro, Palliative and Spiritual Care       Needed: N/A     Code Status: Full Code   Advanced Directive: <no information>    LDA: [place], PIV and Vent       Central Lines/Site/Justification:Patient Does Not Have Central Line       Urinary Cath/Order/Justification:Critically Ill in ICU    Vasopressors/Infusions:    dexmedetomidine (PRECEDEX) infusion            GOALS: Volume/ Hemodynamic: N/A                     RASS: 0  alert and calm    CAM ICU: Positive  Pain Management: none       Pain Controlled: no     Rhythm: NSR    Respiratory Device: Vent    Vent Mode: PRVC  Oxygen Concentration (%):  [30] 30  Resp Rate Total:  [12 br/min-25 br/min] 21 br/min  Vt Set:  [330 mL-400 mL] 330 mL  PEEP/CPAP:  [5 cmH20] 5 cmH20  Mean Airway Pressure:  [7.1 cmH20-10.5 cmH20] 8.7 cmH20             Most Recent SBT/ SAT: Fail       MOVE Screen: FAIL    VTE Prophylaxis: Pharm  Mobility: Bedrest  Stress Ulcer Prophylaxis: Yes    Dietary: TF  Tolerance: yes  /  Advancement: @ goal    Isolation: No active isolations    Restraints: No    Significant Dates:  Post Op Date: N/A  Rescue Date: N/A  Imaging/ Diagnostics: N/A    Noteworthy Labs:  none    CBC/Anemia Labs: Coags:    Recent Labs   Lab 12/25/19  0404 12/26/19  0500 12/27/19  0537   WBC 5.39 6.70 11.02   HGB 8.9* 8.6* 7.8*   HCT 26.5* 25.3* 22.9*   * 116* 93*   MCV 84 82 83   RDW 17.7* 17.6* 17.2*    Recent Labs   Lab 12/24/19  1106 12/25/19  0404 12/26/19  0500   INR 3.9* 1.8* 1.2   APTT  --   --  34.8*        Chemistries:   Recent Labs   Lab 12/25/19  0404 12/26/19  0500 12/27/19  0537   * 133* 132*   K 2.8* 3.8 3.9   CL 98 96 99   CO2 26 27 25   BUN 10 19 21   CREATININE 1.1 1.9* 1.2   CALCIUM 7.9* 8.1* 7.8*   PROT 5.8*  6.1 5.5*   BILITOT 1.0 1.0 0.6   ALKPHOS 52* 56 63   ALT 57* 53* 45*   * 96* 73*   MG  --   --  1.1*   PHOS  --   --  2.1*        Cardiac Enzymes: Ejection Fractions:    No results for input(s): CPK, CPKMB, MB, TROPONINI in the last 72 hours. No results found for: EF     POCT Glucose: HbA1c:    Recent Labs   Lab 12/26/19  0341   POCTGLUCOSE 132*    Hemoglobin A1C   Date Value Ref Range Status   12/26/2019 5.6 4.0 - 5.6 % Final     Comment:     ADA Screening Guidelines:  5.7-6.4%  Consistent with prediabetes  >or=6.5%  Consistent with diabetes  High levels of fetal hemoglobin interfere with the HbA1C  assay. Heterozygous hemoglobin variants (HbS, HgC, etc)do  not significantly interfere with this assay.   However, presence of multiple variants may affect accuracy.     09/14/2019 5.8 (H) 4.0 - 5.6 % Final     Comment:     ADA Screening Guidelines:  5.7-6.4%  Consistent with prediabetes  >or=6.5%  Consistent with diabetes  High levels of fetal hemoglobin interfere with the HbA1C  assay. Heterozygous hemoglobin variants (HbS, HgC, etc)do  not significantly interfere with this assay.   However, presence of multiple variants may affect accuracy.     12/01/2017 5.7 (H) 4.0 - 5.6 % Final     Comment:     According to ADA guidelines, hemoglobin A1c <7.0% represents  optimal control in non-pregnant diabetic patients. Different  metrics may apply to specific patient populations.   Standards of Medical Care in Diabetes-2016.  For the purpose of screening for the presence of diabetes:  <5.7%     Consistent with the absence of diabetes  5.7-6.4%  Consistent with increasing risk for diabetes   (prediabetes)  >or=6.5%  Consistent with diabetes  Currently, no consensus exists for use of hemoglobin A1c  for diagnosis of diabetes for children.  This Hemoglobin A1c assay has significant interference with fetal   hemoglobin   (HbF). The results are invalid for patients with abnormal amounts of   HbF,   including those with known  Hereditary Persistence   of Fetal Hemoglobin. Heterozygous hemoglobin variants (HbAS, HbAC,   HbAD, HbAE, HbA2) do not significantly interfere with this assay;   however, presence of multiple variants in a sample may impact the %   interference.          Needs from Care Team: none     ICU LOS 2d 2h  Level of Care: Critical Care

## 2019-12-28 NOTE — PLAN OF CARE
Pt remains in ICU on ventilator. Pt has been slightly tachycardic HR in the 100-115. Pt is not on any medication for sedation. Pt has gag reflex intact. Pt withdraws from pain. Pt has generalized edema noted worse to bilateral upper extremities. Pt has produced a moderate amount of drool and secretions throughout shift. Pt will open eyes. Pt has picc line placed in right upper arm today. Pt has espino in place. Pt has remained free from falls and injuries throughout shift. Pt does not show signs of skin breakdown. Pt remains on tube feedings via NG tube. Pt has espino with minimum urine output throughout shift.  Pt would move both feet, but not lift legs.

## 2019-12-28 NOTE — CARE UPDATE
Cross-Cover Progress Note    I was called by Ms. Emilia Melgoza's nurse tonight as the patient underwent MRI-brain tonight and was called by Radiology with the following report:     There are large areas of diffusion restriction in the bilateral parietooccipital lobes.  Areas of diffusion is identified within the bilateral temporal lobes.  There are additional punctate foci of diffusion restriction in the right insular cortex and involving the left frontal subcortical regions.  There is associated T2/FLAIR signal hyperintensity corresponding to the regions of diffusion weighted signal abnormality.    There is an old infarction in the right CAYLA distribution.  Old infarctions identified within the left MCA distribution.  No significant mass effect is identified.    She will be started on aspirin and we will allow permissive hypertension.  Lipid panel reveals an LDL was 61.8 three months ago and HbA1c was 5.6% yesterday.  Will monitor on cardiac telemetry and obtain an echocardiogram in the morning.  Will consult Neurology in the morning for further recommendations.    I will continue monitor the patient's progress throughout the night.          Critical Care Time: 20 minutes.          Mc Simon M.D.  Staff Nocturnist  Department of Hospital Medicine  [facility]  Pager: [phone]

## 2019-12-28 NOTE — SUBJECTIVE & OBJECTIVE
"Interval History: still poorly responsive. Would open eyes and "wiggle" both feet but unknown if purposeful or not. Daughter states patient made eye contact and squeezed hand on command for her. Is also concerned about heparin injections. Stated patient has experienced change in mental status with lovenox in the past. Had brown BM and urine is clear yellow. Labs for today still pending.     Review of Systems   Unable to perform ROS: Intubated     Objective:     Vital Signs (Most Recent):  Temp: 98.3 °F (36.8 °C) (12/28/19 0400)  Pulse: 108 (12/28/19 0824)  Resp: 19 (12/28/19 0824)  BP: (!) 164/76 (12/28/19 0730)  SpO2: 100 % (12/28/19 0824) Vital Signs (24h Range):  Temp:  [98 °F (36.7 °C)-99.9 °F (37.7 °C)] 98.3 °F (36.8 °C)  Pulse:  [] 108  Resp:  [12-29] 19  SpO2:  [99 %-100 %] 100 %  BP: (117-164)/(56-76) 164/76     Weight: 66.2 kg (146 lb)  Body mass index is 24.3 kg/m².    Intake/Output Summary (Last 24 hours) at 12/28/2019 0840  Last data filed at 12/28/2019 0600  Gross per 24 hour   Intake 3745 ml   Output 585 ml   Net 3160 ml      Physical Exam   Cardiovascular: Normal rate and regular rhythm.   Pulmonary/Chest: She has no wheezes. She has no rales.   On mechanical ventilation   Abdominal: Soft. Bowel sounds are normal.   Neurological: She displays normal reflexes.   Poorly responsive while off sedation  No squeezing hands on command. Opens eyes to sternal rub. Moving both feet spontaneously.  Unsure if this is purposeful or not   Psychiatric:   Unable to assess   Nursing note and vitals reviewed.      Significant Labs: All pertinent labs within the past 24 hours have been reviewed.    Significant Imaging: I have reviewed all pertinent imaging results/findings within the past 24 hours.  I have reviewed and interpreted all pertinent imaging results/findings within the past 24 hours.  "

## 2019-12-28 NOTE — ASSESSMENT & PLAN NOTE
On minimal ventilator settings. CXR without a significant concern for pneumonia. Neuro status will dictate liberation from mechanical ventilation. Worry that she may need tracheostomy.

## 2019-12-28 NOTE — CARE UPDATE
Patient returned to room __263__ from transport.  Patient placed back on ventilator on previous settings.  Ventilator and alarms on and functioning.  AMBU bag and mask at bedside.  Nurse notified

## 2019-12-28 NOTE — ASSESSMENT & PLAN NOTE
Acute aspiration highly suspected  Currently intubated but on low O2 requirements  Mentation and poor respiratory effort are main limiting factors for extubation  MRI of brain significant for large stroke  Continue tube feeds and supportive care  Sedation not required at this time  No vasopressor support required at this time  Pulmonology on board for co-management

## 2019-12-28 NOTE — CONSULTS
[facility] [place]  Neurology  Consult Note    Patient Name: Emilia Melgoza  MRN: [id number]  Admission Date: 12/17/2019  Hospital Length of Stay: 10 days  Code Status: Full Code   Attending Provider: Berna Dawson MD   Consulting Provider: Cem Eldridge MD  Primary Care Physician: Jerson Price MD  Principal Problem:Acute respiratory failure with hypoxia    Consults  Subjective:     Chief Complaint: Stroke     HPI:  73 y/o female with medical Hx as listed below was brought to ED for failure to thrive. Pt with poor oral intake for two weeks. Ms. Melgoza was found to have dysphagia for which a PEG tube was planned. Yesterday pt developed acute hypoxemia, hypotension, became unresponsive. Subsequently she was intubated. On no sedation pt is poorly responsive. Hx of stroke with residual right hemiparesis.     -12/28/19: Pt remains intubated. No interaction with clinical staff.    Past Medical History:   Diagnosis Date    Allergy     DDD (degenerative disc disease), lumbar     Chronic LBP and intermittent RLE radicular pain x 10 yrs    Hypertension     Overactive bladder     Senile cataract, unspecified - Both Eyes 6/11/2013    Stroke     right sided weakness       Past Surgical History:   Procedure Laterality Date    bladder mesh      BREAST BIOPSY Left     CATARACT EXTRACTION W/  INTRAOCULAR LENS IMPLANT Right 10/13/2014    Dr Crystal    CATARACT EXTRACTION W/  INTRAOCULAR LENS IMPLANT Left 11/10/2014    Dr Crystal    HEMORRHOID SURGERY      HYSTERECTOMY      INCONTINENCE SURGERY      OOPHORECTOMY      Right Rotator Cuff Repair         Review of patient's allergies indicates:   Allergen Reactions    Enoxaparin Other (See Comments) and Hives     Slurred speech per patient  Slurred speech per patient    Lisinopril Other (See Comments)     Cough    Penicillins Rash       Current Neurological Medications:     No current facility-administered medications on file prior to encounter.      Current  Outpatient Medications on File Prior to Encounter   Medication Sig    amlodipine (NORVASC) 1 mg/mL Susp Take 5 mLs (5 mg total) by mouth once daily.    atorvastatin (LIPITOR) 80 MG tablet Take 1 tablet (80 mg total) by mouth once daily.    azelastine (ASTELIN) 137 mcg (0.1 %) nasal spray 1 spray (137 mcg total) by Nasal route 2 (two) times daily.    cholecalciferol, vitamin D3, 1,000 unit capsule Take 1 capsule (1,000 Units total) by mouth once daily.    clindamycin (CLEOCIN) 150 MG capsule TAKE 1 CAPSULE BY MOUTH EVERY 6 HOURS UNTIL COMPLETE    cloNIDine (CATAPRES) 0.1 MG tablet TAKE 2 TABLETS BY MOUTH ONCE DAILY IN THE EVENING    clopidogrel (PLAVIX) 75 mg tablet Take 1 tablet (75 mg total) by mouth once daily.    escitalopram oxalate (LEXAPRO) 5 mg/5 mL Soln Take 10 mLs (10 mg total) by mouth once daily.    fexofenadine (ALLEGRA) 180 MG tablet Take 1 tablet (180 mg total) by mouth once daily.    fluticasone propionate (FLONASE) 50 mcg/actuation nasal spray 1 spray (50 mcg total) by Each Nostril route once daily.    GENERLAC 10 gram/15 mL solution     ibuprofen (ADVIL,MOTRIN) 800 MG tablet     memantine (NAMENDA) 10 MG Tab Take 10 mg by mouth once daily.    oxybutynin (DITROPAN) 5 mg/5 mL syrup Take 10 mLs (10 mg total) by mouth 2 (two) times daily.    polyethylene glycol (GLYCOLAX) 17 gram PwPk DISSOLVE 17 GRAMS OF POWDER (1 PACK) IN WATER & DRINK ONCE DAILY FOR 5 DAYS    tramadol (ULTRAM) 50 mg tablet Take 1/2 tablet as needed for headache      Family History     Problem Relation (Age of Onset)    Breast cancer Mother    Colon cancer Father    Dementia Mother    Hypertension Mother, Father    No Known Problems Sister, Brother, Maternal Aunt, Maternal Uncle, Paternal Aunt, Paternal Uncle, Maternal Grandmother, Maternal Grandfather, Paternal Grandmother, Paternal Grandfather    Rhinitis Daughter, Daughter        Tobacco Use    Smoking status: Former Smoker     Last attempt to quit: 1/1/1992      Years since quittin.0    Smokeless tobacco: Never Used   Substance and Sexual Activity    Alcohol use: No     Alcohol/week: 0.0 standard drinks    Drug use: No    Sexual activity: Not Currently     Review of Systems   Unable to perform ROS: Intubated   Constitutional: Negative for fever.   HENT: Negative for trouble swallowing.    Eyes: Negative for photophobia.   Respiratory: Negative for shortness of breath.    Cardiovascular: Negative for chest pain.   Gastrointestinal: Negative for abdominal pain.     Objective:     Vital Signs (Most Recent):  Temp: 98.3 °F (36.8 °C) (19 0400)  Pulse: 108 (19 08)  Resp: 19 (19 08)  BP: (!) 164/76 (19 0730)  SpO2: 100 % (19 08) Vital Signs (24h Range):  Temp:  [98 °F (36.7 °C)-99.9 °F (37.7 °C)] 98.3 °F (36.8 °C)  Pulse:  [] 108  Resp:  [12-29] 19  SpO2:  [99 %-100 %] 100 %  BP: (117-164)/(56-76) 164/76     Weight: 66.2 kg (146 lb)  Body mass index is 24.3 kg/m².    Physical Exam   Constitutional: She appears well-developed.   HENT:   Head: Normocephalic.   Eyes: Right eye exhibits no discharge. Left eye exhibits no discharge.   Neck: Normal range of motion.   Cardiovascular: Regular rhythm.   Pulmonary/Chest:   ETT in place. Symmetrical chest expansion.   Abdominal: Bowel sounds are normal.       NEUROLOGICAL EXAMINATION:     MENTAL STATUS        Spontaneous eye opening  Not following commands       CRANIAL NERVES     CN III, IV, VI   Right pupil: Size: 2 mm. Shape: regular.   Left pupil: Size: 2 mm. Shape: regular.   Nystagmus: none   Ophthalmoparesis: none    MOTOR EXAM        Spontaneous AROM on ankles     SENSORY EXAM        Slight withdrawal to noxious stimulation on LE's       Significant Labs:   CBC:   Recent Labs   Lab 19  05   WBC 11.02   HGB 7.8*   HCT 22.9*   PLT 93*     CMP:   Recent Labs   Lab 19   *   *   K 3.9   CL 99   CO2 25   BUN 21   CREATININE 1.2   CALCIUM 7.8*   MG 1.1*    PROT 5.5*   ALBUMIN 2.3*   BILITOT 0.6   ALKPHOS 63   AST 73*   ALT 45*   ANIONGAP 8   EGFRNONAA 44*     LIPIDS/LFTS:  Recent Labs   Lab 05/09/17  0836 09/14/19  0923   Cholesterol 152 164   Triglycerides 51 95   HDL 80 H 73   LDL Cholesterol 61.8 L 72.0   Non-HDL Cholesterol 72 91       Significant Imaging:  MRI brain  Impression       Extensive multifocal acute infarctions in the supratentorial brain as described above.  The findings most likely represent embolic disease.  Suggest clinical correlation.    Additional old infarctions as described above.    Case discussed with MILY Granger on 12/27/2019 at 22:06.      Electronically signed by: Lebron Reyez MD  Date: 12/27/2019  Time: 22:07         Assessment and Plan:     73 y/o female consulted for stroke    1. Stroke: bilateral cortical involvement in different arterial territories raise the suspicion of embolic phenomena but also of global ischemic event. No evidence of atrial fib/flutter in telemetry   -OK for permissive HTN. Treat if > 220/110.   -Echo.   - mg daily.   -Prognosis is guarded.  Family updated about findings and plan.    Active Diagnoses:    Diagnosis Date Noted POA    PRINCIPAL PROBLEM:  Acute respiratory failure with hypoxia [J96.01] 12/26/2019 Yes    Altered mental status [R41.82]  Unknown    On mechanically assisted ventilation [Z99.11] 12/26/2019 Not Applicable    Elevated INR [R79.1] 12/24/2019 Yes    Metabolic acidosis [E87.2] 12/24/2019 No    Failure to thrive in adult [R62.7] 12/19/2019 Yes    Cellulitis of mouth [K12.2] 12/18/2019 Yes    Acute encephalopathy [G93.40] 12/17/2019 Yes    Mobility impaired [Z74.09] 12/10/2019 Yes     Chronic    Acute cystitis without hematuria [N30.00] 12/10/2019 Yes    Broca's aphasia [R47.01] 08/02/2018 Yes     Chronic    Hemiparesis affecting right side as late effect of cerebrovascular accident [I69.351] 10/06/2016 Not Applicable     Chronic    Essential hypertension [I10] 02/24/2016 Yes      Chronic    Chronic diastolic congestive heart failure [I50.32] 09/09/2013 Yes      Problems Resolved During this Admission:       VTE Risk Mitigation (From admission, onward)         Ordered     IP VTE HIGH RISK PATIENT  Once      12/26/19 0532     Place sequential compression device  Until discontinued      12/17/19 2143                Thank you for your consult. I will follow-up with patient. Please contact us if you have any additional questions.    Cem Eldridge MD  Neurology  [facility]

## 2019-12-29 PROBLEM — I63.539 ACUTE ARTERIAL ISCHEMIC STROKE, MULTIFOCAL, POSTERIOR CIRCULATION: Status: ACTIVE | Noted: 2019-12-29

## 2019-12-29 LAB
ALBUMIN SERPL BCP-MCNC: 2.5 G/DL (ref 3.5–5.2)
ALLENS TEST: ABNORMAL
ALP SERPL-CCNC: 71 U/L (ref 55–135)
ALT SERPL W/O P-5'-P-CCNC: 37 U/L (ref 10–44)
ANION GAP SERPL CALC-SCNC: 5 MMOL/L (ref 8–16)
AORTIC ROOT ANNULUS: 3.16 CM
AORTIC VALVE CUSP SEPERATION: 1.82 CM
AST SERPL-CCNC: 58 U/L (ref 10–40)
AV INDEX (PROSTH): 0.65
AV MEAN GRADIENT: 6 MMHG
AV PEAK GRADIENT: 11 MMHG
AV VALVE AREA: 2.38 CM2
AV VELOCITY RATIO: 0.62
BASOPHILS # BLD AUTO: 0.02 K/UL (ref 0–0.2)
BASOPHILS NFR BLD: 0.1 % (ref 0–1.9)
BILIRUB SERPL-MCNC: 0.5 MG/DL (ref 0.1–1)
BSA FOR ECHO PROCEDURE: 1.74 M2
BUN SERPL-MCNC: 16 MG/DL (ref 8–23)
CALCIUM SERPL-MCNC: 9 MG/DL (ref 8.7–10.5)
CHLORIDE SERPL-SCNC: 103 MMOL/L (ref 95–110)
CO2 SERPL-SCNC: 28 MMOL/L (ref 23–29)
CREAT SERPL-MCNC: 0.7 MG/DL (ref 0.5–1.4)
CV ECHO LV RWT: 1.16 CM
DELSYS: ABNORMAL
DIFFERENTIAL METHOD: ABNORMAL
DOP CALC AO PEAK VEL: 1.67 M/S
DOP CALC AO VTI: 27.62 CM
DOP CALC LVOT AREA: 3.7 CM2
DOP CALC LVOT DIAMETER: 2.16 CM
DOP CALC LVOT PEAK VEL: 1.04 M/S
DOP CALC LVOT STROKE VOLUME: 65.78 CM3
DOP CALCLVOT PEAK VEL VTI: 17.96 CM
E/E' RATIO: 14.55 M/S
ECHO LV POSTERIOR WALL: 1.8 CM (ref 0.6–1.1)
EOSINOPHIL # BLD AUTO: 0.2 K/UL (ref 0–0.5)
EOSINOPHIL NFR BLD: 1.4 % (ref 0–8)
ERYTHROCYTE [DISTWIDTH] IN BLOOD BY AUTOMATED COUNT: 17.8 % (ref 11.5–14.5)
ERYTHROCYTE [SEDIMENTATION RATE] IN BLOOD BY WESTERGREN METHOD: 15 MM/H
EST. GFR  (AFRICAN AMERICAN): >60 ML/MIN/1.73 M^2
EST. GFR  (NON AFRICAN AMERICAN): >60 ML/MIN/1.73 M^2
FIO2: 30
FRACTIONAL SHORTENING: 29 % (ref 28–44)
GLUCOSE SERPL-MCNC: 111 MG/DL (ref 70–110)
HCO3 UR-SCNC: 27.4 MMOL/L (ref 24–28)
HCT VFR BLD AUTO: 23.2 % (ref 37–48.5)
HGB BLD-MCNC: 7.6 G/DL (ref 12–16)
IMM GRANULOCYTES # BLD AUTO: 0.31 K/UL (ref 0–0.04)
IMM GRANULOCYTES NFR BLD AUTO: 2.2 % (ref 0–0.5)
INR PPP: 1 (ref 0.8–1.2)
INTERVENTRICULAR SEPTUM: 1.79 CM (ref 0.6–1.1)
IVRT: 0.09 MSEC
LA MAJOR: 4.66 CM
LA MINOR: 4.15 CM
LA WIDTH: 4.12 CM
LEFT ATRIUM SIZE: 2.73 CM
LEFT ATRIUM VOLUME INDEX: 24.3 ML/M2
LEFT ATRIUM VOLUME: 41.97 CM3
LEFT INTERNAL DIMENSION IN SYSTOLE: 2.22 CM (ref 2.1–4)
LEFT VENTRICLE DIASTOLIC VOLUME INDEX: 22.11 ML/M2
LEFT VENTRICLE DIASTOLIC VOLUME: 38.26 ML
LEFT VENTRICLE MASS INDEX: 130 G/M2
LEFT VENTRICLE SYSTOLIC VOLUME INDEX: 9.6 ML/M2
LEFT VENTRICLE SYSTOLIC VOLUME: 16.54 ML
LEFT VENTRICULAR INTERNAL DIMENSION IN DIASTOLE: 3.11 CM (ref 3.5–6)
LEFT VENTRICULAR MASS: 225.81 G
LV LATERAL E/E' RATIO: 13.33 M/S
LV SEPTAL E/E' RATIO: 16 M/S
LYMPHOCYTES # BLD AUTO: 1.8 K/UL (ref 1–4.8)
LYMPHOCYTES NFR BLD: 12.9 % (ref 18–48)
MAGNESIUM SERPL-MCNC: 1.6 MG/DL (ref 1.6–2.6)
MCH RBC QN AUTO: 27.8 PG (ref 27–31)
MCHC RBC AUTO-ENTMCNC: 32.8 G/DL (ref 32–36)
MCV RBC AUTO: 85 FL (ref 82–98)
MIN VOL: 9
MODE: ABNORMAL
MONOCYTES # BLD AUTO: 2.3 K/UL (ref 0.3–1)
MONOCYTES NFR BLD: 16.2 % (ref 4–15)
MV PEAK E VEL: 0.8 M/S
NEUTROPHILS # BLD AUTO: 9.3 K/UL (ref 1.8–7.7)
NEUTROPHILS NFR BLD: 67.2 % (ref 38–73)
NRBC BLD-RTO: 0 /100 WBC
PCO2 BLDA: 44.2 MMHG (ref 35–45)
PEEP: 5
PH SMN: 7.4 [PH] (ref 7.35–7.45)
PHOSPHATE SERPL-MCNC: 2.9 MG/DL (ref 2.7–4.5)
PIP: 22
PISA TR MAX VEL: 3.41 M/S
PLATELET # BLD AUTO: 152 K/UL (ref 150–350)
PMV BLD AUTO: 12.2 FL (ref 9.2–12.9)
PO2 BLDA: 131 MMHG (ref 80–100)
POC BE: 2 MMOL/L
POC SATURATED O2: 99 % (ref 95–100)
POC TCO2: 29 MMOL/L (ref 23–27)
POTASSIUM SERPL-SCNC: 4 MMOL/L (ref 3.5–5.1)
PROT SERPL-MCNC: 6.1 G/DL (ref 6–8.4)
PROTHROMBIN TIME: 10.8 SEC (ref 9–12.5)
PULM VEIN S/D RATIO: 1.52
PV PEAK D VEL: 0.5 M/S
PV PEAK S VEL: 0.76 M/S
PV PEAK VELOCITY: 1.28 CM/S
RA MAJOR: 4.41 CM
RA PRESSURE: 15 MMHG
RA WIDTH: 3.54 CM
RBC # BLD AUTO: 2.73 M/UL (ref 4–5.4)
RIGHT VENTRICULAR END-DIASTOLIC DIMENSION: 3.16 CM
RV TISSUE DOPPLER FREE WALL SYSTOLIC VELOCITY 1 (APICAL 4 CHAMBER VIEW): 15.34 CM/S
SAMPLE: ABNORMAL
SINUS: 3.1 CM
SITE: ABNORMAL
SODIUM SERPL-SCNC: 136 MMOL/L (ref 136–145)
SP02: 98
STJ: 2.63 CM
TDI LATERAL: 0.06 M/S
TDI SEPTAL: 0.05 M/S
TDI: 0.06 M/S
TR MAX PG: 47 MMHG
TRICUSPID ANNULAR PLANE SYSTOLIC EXCURSION: 2.11 CM
TV REST PULMONARY ARTERY PRESSURE: 62 MMHG
VT: 340
WBC # BLD AUTO: 13.85 K/UL (ref 3.9–12.7)

## 2019-12-29 PROCEDURE — 82803 BLOOD GASES ANY COMBINATION: CPT

## 2019-12-29 PROCEDURE — 84100 ASSAY OF PHOSPHORUS: CPT

## 2019-12-29 PROCEDURE — 83735 ASSAY OF MAGNESIUM: CPT

## 2019-12-29 PROCEDURE — 25000003 PHARM REV CODE 250: Performed by: HOSPITALIST

## 2019-12-29 PROCEDURE — 85025 COMPLETE CBC W/AUTO DIFF WBC: CPT

## 2019-12-29 PROCEDURE — 36600 WITHDRAWAL OF ARTERIAL BLOOD: CPT

## 2019-12-29 PROCEDURE — 20000000 HC ICU ROOM

## 2019-12-29 PROCEDURE — S0028 INJECTION, FAMOTIDINE, 20 MG: HCPCS | Performed by: HOSPITALIST

## 2019-12-29 PROCEDURE — 25000003 PHARM REV CODE 250: Performed by: INTERNAL MEDICINE

## 2019-12-29 PROCEDURE — 99900035 HC TECH TIME PER 15 MIN (STAT)

## 2019-12-29 PROCEDURE — 80053 COMPREHEN METABOLIC PANEL: CPT

## 2019-12-29 PROCEDURE — 85610 PROTHROMBIN TIME: CPT

## 2019-12-29 PROCEDURE — 94003 VENT MGMT INPAT SUBQ DAY: CPT

## 2019-12-29 PROCEDURE — 63600175 PHARM REV CODE 636 W HCPCS: Performed by: INTERNAL MEDICINE

## 2019-12-29 PROCEDURE — A4216 STERILE WATER/SALINE, 10 ML: HCPCS | Performed by: INTERNAL MEDICINE

## 2019-12-29 PROCEDURE — 99900026 HC AIRWAY MAINTENANCE (STAT)

## 2019-12-29 PROCEDURE — 94761 N-INVAS EAR/PLS OXIMETRY MLT: CPT

## 2019-12-29 RX ORDER — NOREPINEPHRINE BITARTRATE/D5W 4MG/250ML
0.01 PLASTIC BAG, INJECTION (ML) INTRAVENOUS CONTINUOUS PRN
Status: DISCONTINUED | OUTPATIENT
Start: 2019-12-29 | End: 2020-01-02

## 2019-12-29 RX ORDER — DEXMEDETOMIDINE HYDROCHLORIDE 4 UG/ML
0.2 INJECTION, SOLUTION INTRAVENOUS CONTINUOUS PRN
Status: DISCONTINUED | OUTPATIENT
Start: 2019-12-29 | End: 2020-01-04 | Stop reason: HOSPADM

## 2019-12-29 RX ORDER — NOREPINEPHRINE BITARTRATE/D5W 4MG/250ML
0.02 PLASTIC BAG, INJECTION (ML) INTRAVENOUS CONTINUOUS PRN
Status: DISCONTINUED | OUTPATIENT
Start: 2019-12-29 | End: 2019-12-29

## 2019-12-29 RX ADMIN — CHLORHEXIDINE GLUCONATE 0.12% ORAL RINSE 15 ML: 1.2 LIQUID ORAL at 09:12

## 2019-12-29 RX ADMIN — FAMOTIDINE 20 MG: 10 INJECTION INTRAVENOUS at 08:12

## 2019-12-29 RX ADMIN — HEPARIN SODIUM 5000 UNITS: 5000 INJECTION, SOLUTION INTRAVENOUS; SUBCUTANEOUS at 08:12

## 2019-12-29 RX ADMIN — Medication 10 ML: at 06:12

## 2019-12-29 RX ADMIN — DEXMEDETOMIDINE HYDROCHLORIDE 0.2 MCG/KG/HR: 4 INJECTION, SOLUTION INTRAVENOUS at 09:12

## 2019-12-29 RX ADMIN — CHLORHEXIDINE GLUCONATE 0.12% ORAL RINSE 15 ML: 1.2 LIQUID ORAL at 08:12

## 2019-12-29 RX ADMIN — Medication 10 ML: at 01:12

## 2019-12-29 RX ADMIN — Medication 0.02 MCG/KG/MIN: at 05:12

## 2019-12-29 RX ADMIN — ASPIRIN 81 MG 81 MG: 81 TABLET ORAL at 08:12

## 2019-12-29 RX ADMIN — HEPARIN SODIUM 5000 UNITS: 5000 INJECTION, SOLUTION INTRAVENOUS; SUBCUTANEOUS at 09:12

## 2019-12-29 NOTE — PLAN OF CARE
Recommendations     1. Current TF adequately meeting needs     Goals: Initiate nutrition within 48 hrs  Nutrition Goal Status: goal met  Communication of RD Recs: reviewed with physician

## 2019-12-29 NOTE — PT/OT/SLP PROGRESS
Speech Language Pathology  Discharge    Emilia Melgoza  MRN: [id number]    Patient not seen today secondary to intubation. No further ST is warranted at this time.     Francisca Myers, RENITA-SLP

## 2019-12-29 NOTE — ASSESSMENT & PLAN NOTE
Cause unknown however episode of hypotension vs embolic event are highest on differential  Please see above for details  Aspirin and permissive HTN  Statin not indicated at this time  Heparin for DVT prophylaxis (no allergic reaction appreciated. No melena, bloody stools, hematuria nor blood secretions noted)  PT/OT when able to participate

## 2019-12-29 NOTE — PROGRESS NOTES
[facility] Hospital Medicine  Progress Note    Patient Name: Emilia Melgoza  MRN: [id number]  Patient Class: IP- Inpatient   Admission Date: 12/17/2019  Length of Stay: 11 days  Attending Physician: Berna Dawson MD  Primary Care Provider: Jerson Price MD        Subjective:     Principal Problem:Acute respiratory failure with hypoxia        HPI:  74 y.o. 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  She is also not taking her medications.  She appears to be more confused than usual.  Had a recent tooth extraction by the dentist and was unable to take prescribed clindamycin.  Daughter reports patient has indicated mouth pain. Also recently prescribed Macrobid for UTI and was unable to complete.  Daughter denies that the patient has had a fever.  History further limited secondary to the patient's condition.  In the ED, she was found to be tachycardic with hypernatremia, hypokalemia, mild elevation of CPK and troponin.  Urinalysis, CT head, chest x-ray, and EKG were all unremarkable for any acute abnormality.  She appears clinically dehydrated.  Placed in observation for further evaluation and treatment.    Overview/Hospital Course:  Ms Melgoza presented with acute encephalopathy. Workup significant for soft tissue swelling and edema involving the right anterior mandibular region suspicious for cellulitis, hypernatremia (148) . Patient also noted to have significant dysphagia for which she was placed NPO due to high risk for aspiration. Also aphasic and significantly deconditioned requiring moderate assistance from physical and occupational therapists. Was  initiated on empiric IV antibiotics. CT brain with no acute pathology, just old infarct to left frontal lobe extending to basal ganglia and meningioma (unchanged). CTA brain showed no vessel occlusion. NGT placed for enteral nutrition. Hypernatremia resolved. PT/OT/ST continued. Palliative care was consulted to address goals of care, mainly for PEG placement. Patient, by nodding yes or no, agreed with PEG tube placement. Gastroenterology consulted for PEG. Labwork showed elevated total bilirubin and AST >> ALT. INR also elevated and, per family, patient is not on anticoagulation at home, just clopidogrel for old stroke. Antiplatelets and chemical DVT prophylaxis held. Vitamin K given. INR corrected. Total bilirubin normalized and enzymes improved. Overnight 12/25-12/26, patient experienced hypoxia and worsening encephalopathy. Also reports of transient hypotension earlier that had resolved. Rapid response called. Placed on supplemental O2 but continued to decline therefore transferred to ICU and intubated. Patient was kept off sedation as she was minimally responsive. Neurology and pulmonology consulted. EEG obtained. Showed severe generalized slowing suggesting severe diffuse or multifocal cerebral dysfunction. No epileptiform activity or seizures appreciated. MRI of brain obtained. This showed extensive multifocal acute infarctions in bilateral parietooccipital lobes and other areas of the brain. Patient started on aspirin overnight. No statin as LDL 72 four months ago. Blood pressure maintained, feeding continued via NGT, prophylactic heparin SQ, and ventilator management. Echocardiogram ordered.     Interval History: tachycardic, biting ETT and moving more in bed, but still unsure if porpuseful. Appears uncomfortable.     Review of Systems   Unable to perform ROS: Intubated     Objective:     Vital Signs (Most Recent):  Temp: 98.7 °F (37.1 °C) (12/29/19 1200)  Pulse: 98 (12/29/19 1252)  Resp: 18 (12/29/19  1252)  BP: (!) 144/65 (12/29/19 1130)  SpO2: 99 % (12/29/19 1252) Vital Signs (24h Range):  Temp:  [98.3 °F (36.8 °C)-99.6 °F (37.6 °C)] 98.7 °F (37.1 °C)  Pulse:  [] 98  Resp:  [16-31] 18  SpO2:  [93 %-100 %] 99 %  BP: (144-193)/() 144/65     Weight: 66.2 kg (146 lb)  Body mass index is 24.3 kg/m².    Intake/Output Summary (Last 24 hours) at 12/29/2019 1344  Last data filed at 12/29/2019 1300  Gross per 24 hour   Intake 1779.24 ml   Output 800 ml   Net 979.24 ml      Physical Exam   Cardiovascular: Normal rate and regular rhythm.   Pulmonary/Chest: She has no wheezes. She has no rales.   On mechanical ventilation   Abdominal: Soft. Bowel sounds are normal.   Neurological: She displays normal reflexes.   Does open eyes but not on my command.   Don't see patient tracking me.   No squeezing hands on command.   Moving both feet spontaneously.  Biting ETT     Psychiatric:   Unable to assess   Nursing note and vitals reviewed.      Significant Labs: All pertinent labs within the past 24 hours have been reviewed.    Significant Imaging: I have reviewed all pertinent imaging results/findings within the past 24 hours.  I have reviewed and interpreted all pertinent imaging results/findings within the past 24 hours.      Assessment/Plan:      * Acute respiratory failure with hypoxia  Acute aspiration highly suspected  Currently intubated but on low O2 requirements  MRI of brain significant for bilateral large strokes  Slightly better respiratory effort on CPAP but not enough for safe extubation  Unsure if patient able to control secretions.   Biting tube and moving spontaneously but unsure if porpuseful  Appears uncomfortable. Will add low dose precedex for comfort  This will affect accuracy of neuro exam however we are unable to safely extubate  Family pursuing aggressive treatment therefore, per my discussion with pulmonology, plan is tracheostomy and PEG placement. Will consult  for LTAC for gradual weaning  "of vent support   General surgery consulted for both tracheostomy and PEG placement  Continue tube feeds and supportive care  No vasopressor support required at this time  Pulmonology on board for co-management      On mechanically assisted ventilation  Day # 4 of vent      Acute encephalopathy  Patient has shown continued decline  Does have evidence of encephalomalacia from old stroke and has had difficulty with PO intake mainly due to oral pain after tooth extraction but late effect of stroke not ruled out. Dysphagia appears to be an additional issue as she was seen "gurgling" that. Is now intubated but minimally responsive despite being off all sedation. Repeat CT of brain with no acute pathology.   CTA showed no vessel occlusion  EEG with slowing but no epileptiform activity  MRI ordered showed bilateral large strokes  Could be 2/2 to hypotensive episode but embolic phenomena not ruled out  Cardiac monitor reviewed. No arrhythmia appreciated however I only have one strip available prior to 12/26  Echocardiogram showed diastolic dysf and pulmonary HTN but otherwise normal  Continue tube feeds. Monitor vitals. Labs reviewed    Acute arterial ischemic stroke, multifocal, posterior circulation  Cause unknown however episode of hypotension vs embolic event are highest on differential  Please see above for details  Aspirin and permissive HTN  Statin not indicated at this time  Heparin for DVT prophylaxis (no allergic reaction appreciated. No melena, bloody stools, hematuria nor blood secretions noted)  PT/OT when able to participate      Metabolic acidosis  Resolved with IV bicarb.    Elevated INR  Resolved       Cellulitis of mouth  Treated with IV Clindamycin for 7 days.   Resolved       Mobility impaired  PT/OT when able    Acute cystitis without hematuria  Secondary to E.Coli. Low colony count. Treated with 7 days of IV Levaquin.  Resolved    Broca's aphasia  Nonverbal at baseline    Hemiparesis affecting right " side as late effect of cerebrovascular accident  At baseline, no acute issue. Fall precautions.     Essential hypertension  Permissive HTN    Chronic diastolic congestive heart failure  Stable. Monitor for fluid overload.      VTE Risk Mitigation (From admission, onward)         Ordered     heparin (porcine) injection 5,000 Units  Every 12 hours      12/28/19 0851     IP VTE HIGH RISK PATIENT  Once      12/26/19 0532     Place sequential compression device  Until discontinued      12/17/19 7409              Assessment and plan discussed with patient's daughters at bedside.  They are in agreement with precedex for comfort and General surgery consultation for trach/PEG placement.  And goal is LTAC for weaning of ventilator support.    Critical care time spent on the evaluation and treatment of severe organ dysfunction, review of pertinent labs and imaging studies, discussions with consulting providers and discussions with patient/family: > 35 minutes.      Berna Aldridge MD  Department of Hospital Medicine   [facility]

## 2019-12-29 NOTE — SUBJECTIVE & OBJECTIVE
Interval History: tachycardic, biting ETT and moving more in bed, but still unsure if porpuseful. Appears uncomfortable.     Review of Systems   Unable to perform ROS: Intubated     Objective:     Vital Signs (Most Recent):  Temp: 98.7 °F (37.1 °C) (12/29/19 1200)  Pulse: 98 (12/29/19 1252)  Resp: 18 (12/29/19 1252)  BP: (!) 144/65 (12/29/19 1130)  SpO2: 99 % (12/29/19 1252) Vital Signs (24h Range):  Temp:  [98.3 °F (36.8 °C)-99.6 °F (37.6 °C)] 98.7 °F (37.1 °C)  Pulse:  [] 98  Resp:  [16-31] 18  SpO2:  [93 %-100 %] 99 %  BP: (144-193)/() 144/65     Weight: 66.2 kg (146 lb)  Body mass index is 24.3 kg/m².    Intake/Output Summary (Last 24 hours) at 12/29/2019 1344  Last data filed at 12/29/2019 1300  Gross per 24 hour   Intake 1779.24 ml   Output 800 ml   Net 979.24 ml      Physical Exam   Cardiovascular: Normal rate and regular rhythm.   Pulmonary/Chest: She has no wheezes. She has no rales.   On mechanical ventilation   Abdominal: Soft. Bowel sounds are normal.   Neurological: She displays normal reflexes.   Does open eyes but not on my command.   Don't see patient tracking me.   No squeezing hands on command.   Moving both feet spontaneously.  Biting ETT     Psychiatric:   Unable to assess   Nursing note and vitals reviewed.      Significant Labs: All pertinent labs within the past 24 hours have been reviewed.    Significant Imaging: I have reviewed all pertinent imaging results/findings within the past 24 hours.  I have reviewed and interpreted all pertinent imaging results/findings within the past 24 hours.

## 2019-12-29 NOTE — PLAN OF CARE
Pt remains on ventilator settings prvc 15/tv 320 / peep +5/ 30% fio2. Ett in place and secure. Brief sbt done today by Dr. Rodriguez. Placed back on vent rate due to respiratory insufficiency. Will continue to monitor. Sx small amount secretions from ett.

## 2019-12-29 NOTE — CARE UPDATE
[facility]  ICU Multidisciplinary Bedside Rounds   SUMMARY     Date: 12/29/2019    Prehospitalization: Home  Admit Date / LOS : 12/17/2019/ 11 days    Diagnosis: Acute respiratory failure with hypoxia    Consults:        Active: GI, Neuro, Palliative, Pulm CC, RD and SW       Needed: N/A     Code Status: Full Code   Advanced Directive: <no information>    LDA: Hutchison, NG, PICC and Vent       Central Lines/Site/Justification:Unable to Obtain/Maintain PIV       Urinary Cath/Order/Justification:Critically Ill in ICU    Vasopressors/Infusions:        GOALS: Volume/ Hemodynamic: N/A                     RASS: 0  alert and calm    CAM ICU: Positive  Pain Management: none       Pain Controlled: not applicable     Rhythm: ST    Respiratory Device: Vent    Vent Mode: PRVC  Oxygen Concentration (%):  [30] 30  Resp Rate Total:  [17 br/min-29 br/min] 26 br/min  Vt Set:  [330 mL-340 mL] 340 mL  PEEP/CPAP:  [5 cmH20] 5 cmH20  Mean Airway Pressure:  [7.6 zkF10-75.3 cmH20] 7.6 cmH20             Most Recent SBT/ SAT: N/A       MOVE Screen: PASS    VTE Prophylaxis: Pharm and Mechanical  Mobility: Bedrest and A: Assist  Stress Ulcer Prophylaxis: Yes    Dietary: TF  Tolerance: yes  /  Advancement: @ goal    Isolation: No active isolations    Restraints: No    Significant Dates:  Post Op Date: N/A  Rescue Date: 12/26/19  Imaging/ Diagnostics: 12/17 CT neck, 12/18 CTA head, 12/21 CT head, 12/24 CT abd/pelvis, 12/26 ct head    Noteworthy Labs:  No AM labs ordered today 12/29    CBC/Anemia Labs: Coags:    Recent Labs   Lab 12/26/19  0500 12/27/19  0537 12/28/19  1132   WBC 6.70 11.02 11.17   HGB 8.6* 7.8* 8.1*   HCT 25.3* 22.9* 24.5*   * 93* 126*   MCV 82 83 86   RDW 17.6* 17.2* 17.9*    Recent Labs   Lab 12/25/19  0404 12/26/19  0500 12/28/19  1132   INR 1.8* 1.2 1.1   APTT  --  34.8*  --         Chemistries:   Recent Labs   Lab 12/26/19  0500 12/27/19  0537 12/28/19  1132   * 132* 134*   K 3.8 3.9 3.6   CL  96 99 103   CO2 27 25 26   BUN 19 21 17   CREATININE 1.9* 1.2 0.8   CALCIUM 8.1* 7.8* 8.4*   PROT 6.1 5.5* 6.2   BILITOT 1.0 0.6 0.5   ALKPHOS 56 63 67   ALT 53* 45* 41   AST 96* 73* 63*   MG  --  1.1* 1.3*   PHOS  --  2.1* 2.4*        Cardiac Enzymes: Ejection Fractions:    No results for input(s): CPK, CPKMB, MB, TROPONINI in the last 72 hours. No results found for: EF     POCT Glucose: HbA1c:    Recent Labs   Lab 12/26/19  0341   POCTGLUCOSE 132*    Hemoglobin A1C   Date Value Ref Range Status   12/26/2019 5.6 4.0 - 5.6 % Final     Comment:     ADA Screening Guidelines:  5.7-6.4%  Consistent with prediabetes  >or=6.5%  Consistent with diabetes  High levels of fetal hemoglobin interfere with the HbA1C  assay. Heterozygous hemoglobin variants (HbS, HgC, etc)do  not significantly interfere with this assay.   However, presence of multiple variants may affect accuracy.     09/14/2019 5.8 (H) 4.0 - 5.6 % Final     Comment:     ADA Screening Guidelines:  5.7-6.4%  Consistent with prediabetes  >or=6.5%  Consistent with diabetes  High levels of fetal hemoglobin interfere with the HbA1C  assay. Heterozygous hemoglobin variants (HbS, HgC, etc)do  not significantly interfere with this assay.   However, presence of multiple variants may affect accuracy.     12/01/2017 5.7 (H) 4.0 - 5.6 % Final     Comment:     According to ADA guidelines, hemoglobin A1c <7.0% represents  optimal control in non-pregnant diabetic patients. Different  metrics may apply to specific patient populations.   Standards of Medical Care in Diabetes-2016.  For the purpose of screening for the presence of diabetes:  <5.7%     Consistent with the absence of diabetes  5.7-6.4%  Consistent with increasing risk for diabetes   (prediabetes)  >or=6.5%  Consistent with diabetes  Currently, no consensus exists for use of hemoglobin A1c  for diagnosis of diabetes for children.  This Hemoglobin A1c assay has significant interference with fetal   hemoglobin   (HbF).  The results are invalid for patients with abnormal amounts of   HbF,   including those with known Hereditary Persistence   of Fetal Hemoglobin. Heterozygous hemoglobin variants (HbAS, HbAC,   HbAD, HbAE, HbA2) do not significantly interfere with this assay;   however, presence of multiple variants in a sample may impact the %   interference.          Needs from Care Team: something for HR/BP?, AM labs?     ICU LOS 3d  Level of Care: Critical Care

## 2019-12-29 NOTE — ASSESSMENT & PLAN NOTE
"Patient has shown continued decline  Does have evidence of encephalomalacia from old stroke and has had difficulty with PO intake mainly due to oral pain after tooth extraction but late effect of stroke not ruled out. Dysphagia appears to be an additional issue as she was seen "gurgling" that. Is now intubated but minimally responsive despite being off all sedation. Repeat CT of brain with no acute pathology.   CTA showed no vessel occlusion  EEG with slowing but no epileptiform activity  MRI ordered showed bilateral large strokes  Could be 2/2 to hypotensive episode but embolic phenomena not ruled out  Cardiac monitor reviewed. No arrhythmia appreciated however I only have one strip available prior to 12/26  Echocardiogram showed diastolic dysf and pulmonary HTN but otherwise normal  Continue tube feeds. Monitor vitals. Labs reviewed  "

## 2019-12-29 NOTE — CARE UPDATE
[facility]  ICU Multidisciplinary Bedside Rounds     UPDATE     Date: 12/29/2019      Plan of care reviewed with the following, Nurse, Charge Nurse, Physician, Pulm CC and Resp. Therapist, and family at bedside      Needs/ Goals for the day: Goals of care discussion with daughters at bedside, new consults for trach/peg placement, labs ordered. Tmax last pm 100. Pt's heart rate elevated and bp increased 170's to 190's, appearing more restless today, MD's will place new orders for light sedation.      Level of Care: Critical Care

## 2019-12-29 NOTE — CARE UPDATE
[facility]  ICU Multidisciplinary Bedside Rounds     UPDATE     Date: 12/28/2019      Plan of care reviewed with the following, Nurse, Charge Nurse, Physician, Pulm CC and Resp. Therapist.       Needs/ Goals for the day: Goals of care discussion with family. Seen by neurology this am along with MD Aldridge. Labs pending. Decreased PIV access, unable to obtain new PIV and blood draws limited due to difficulty obtaining blood from peripheral sites.      Level of Care: Critical Care

## 2019-12-29 NOTE — PLAN OF CARE
Patient remains in ICU overnight, intubated and sedated. PRVC, FiO2 30%, RR 15, PEEP 5, . Sinus tachy on cardiac monitor. BP permissively on high side, SBP 150s-180s. Opens eyes spontaneously, pupils round and reactive and equal. Not following commands. Moves BLE and left upper extremity. Withdraws from pain right upper extremity. Tube feeds at 45cc/hr and free water flushes q8 hr, tolerating well. UO 20-25cc/hr. BM x2. No AM labs ordered this morning. MD Simon made aware, no new orders placed. Pt turned q2 hr per protocol. SCDs and heel boots in use. No falls, injury, or skin breakdown this shift. Plan of care reviewed with pt and pt daughters at bedside.

## 2019-12-29 NOTE — CARE UPDATE
Pt remains in icu on vent settings rr 15 vt 340 peep 5 fio2 30%.  Pt was biting down on ett so a bite block was put in and pt is without distress.  All alarms are on and working.  Ambu bag at [facility].  Will continue to monitor

## 2019-12-29 NOTE — PROGRESS NOTES
"[facility]  Adult Nutrition  Progress Note    SUMMARY       Recommendations    1. Current TF adequately meeting needs    Goals: Initiate nutrition within 48 hrs  Nutrition Goal Status: goal met  Communication of RD Recs: reviewed with physician    Reason for Assessment    Reason For Assessment: RD follow-up  Diagnosis: (FTT, toxic encephalopathy, respiratory distress)  Relevant Medical History: CVA, vascular dementia  Interdisciplinary Rounds: did not attend    General Information Comments: Pt remains intubated. TF at goal rate without issue. Prev plan for PEG on hold at this time. NFPE : Age appropriate LBM losses, adequate fat mass; wt loss PTA noted. Poor po intake PTA x 1 month.    Nutrition Discharge Planning: Discharge on TF    Nutrition Risk Screen    Nutrition Risk Screen: tube feeding or parenteral nutrition    Nutrition/Diet History    Spiritual, Cultural Beliefs, Roman Catholic Practices, Values that Affect Care: no  Food Allergies: NKFA  Factors Affecting Nutritional Intake: NPO, on mechanical ventilation, difficulty/impaired swallowing    Anthropometrics    Temp: 99.6 °F (37.6 °C)  Height Method: Stated  Height: 5' 5" (165.1 cm)  Height (inches): 65 in  Weight Method: Bed Scale  Weight: 66.2 kg (146 lb)  Weight (lb): 146 lb  Ideal Body Weight (IBW), Female: 125 lb  % Ideal Body Weight, Female (lb): 116.8 %  BMI (Calculated): 24.3  BMI Grade: 18.5-24.9 - normal  Weight Loss: unintentional  Usual Body Weight (UBW), k.8 kg(2019)  % Usual Body Weight: 87.25  % Weight Change From Usual Weight: -12.93 %       Lab/Procedures/Meds    Pertinent Labs Reviewed: reviewed  Pertinent Labs Comments: Na 134; alb 2.7; GFR 51; Phos 2.4, Mg 1.3  Pertinent Medications Reviewed: reviewed  Pertinent Medications Comments: ASA, precedex    Estimated/Assessed Needs    Weight Used For Calorie Calculations: 66.4 kg (146 lb 6.2 oz)  Energy Calorie Requirements (kcal): 1452 kcal  Energy Need Method: Mo " State  Protein Requirements: 66-79 g (1-1.2 g/kg)  Weight Used For Protein Calculations: 66 kg (145 lb 8.1 oz)     Estimated Fluid Requirement Method: RDA Method  RDA Method (mL): 1452      Nutrition Prescription Ordered    Current Diet Order: NPO  Current Nutrition Support Formula Ordered: Isosource 1.5  Current Nutrition Support Rate Ordered: 45 (ml)  Current Nutrition Support Frequency Ordered: ml/hr    Evaluation of Received Nutrient/Fluid Intake    Enteral Calories (kcal): 1620  Enteral Protein (gm): 73  Enteral (Free Water) Fluid (mL): 825  Free Water Flush Fluid (mL): 600  % Kcal Needs: 112%  % Protein Needs: 100%  IV Fluid (mL): (IVF discontinued)  I/O: 1850/760  Energy Calories Required: meeting needs  Protein Required: meeting needs  Fluid Required: (per MD)  Comments: LBM: 12/29  Tolerance: tolerating    % Meal Intake: NPO    Nutrition Risk    Level of Risk/Frequency of Follow-up: (2 x week)     Assessment and Plan    Nutrition Problem  Inadequate energy intake     Related to (etiology):   Inadequate energy intake     Signs and Symptoms (as evidenced by):   Mechanical Intubation     Interventions  Collaboration with providers     Nutrition Diagnosis Status:   Improved       Monitor and Evaluation    Food and Nutrient Intake: energy intake, enteral nutrition intake  Food and Nutrient Adminstration: diet order, enteral and parenteral nutrition administration  Anthropometric Measurements: weight, weight change  Biochemical Data, Medical Tests and Procedures: electrolyte and renal panel  Nutrition-Focused Physical Findings: overall appearance     Malnutrition Assessment          Orbital Region (Subcutaneous Fat Loss): well nourished  Upper Arm Region (Subcutaneous Fat Loss): well nourished  Thoracic and Lumbar Region: well nourished   [place] Region (Muscle Loss): mild depletion  Patellar Region (Muscle Loss): well nourished  Anterior Thigh Region (Muscle Loss): well nourished  Posterior Calf Region (Muscle  Loss): well nourished       Subcutaneous Fat Loss (Final Summary): well nourished  Muscle Loss Evaluation (Final Summary): well nourished    Severe Weight Loss (Malnutrition): greater than 7.5% in 3 months(13% in 3 months)    Nutrition Follow-Up    RD Follow-up?: Yes

## 2019-12-29 NOTE — ASSESSMENT & PLAN NOTE
Acute aspiration highly suspected  Currently intubated but on low O2 requirements  MRI of brain significant for bilateral large strokes  Slightly better respiratory effort on CPAP but not enough for safe extubation  Unsure if patient able to control secretions.   Biting tube and moving spontaneously but unsure if porpuseful  Appears uncomfortable. Will add low dose precedex for comfort  This will affect accuracy of neuro exam however we are unable to safely extubate  Family pursuing aggressive treatment therefore, per my discussion with pulmonology, plan is tracheostomy and PEG placement. Will consult SW for LTAC for gradual weaning of vent support   General surgery consulted for both tracheostomy and PEG placement  Continue tube feeds and supportive care  No vasopressor support required at this time  Pulmonology on board for co-management

## 2019-12-30 ENCOUNTER — ANESTHESIA EVENT (OUTPATIENT)
Dept: ENDOSCOPY | Facility: HOSPITAL | Age: 74
DRG: 004 | End: 2019-12-30
Payer: MEDICARE

## 2019-12-30 PROBLEM — D64.9 NORMOCYTIC ANEMIA: Status: ACTIVE | Noted: 2019-12-30

## 2019-12-30 PROBLEM — R57.9 SHOCK: Status: ACTIVE | Noted: 2019-12-30

## 2019-12-30 LAB
ABO + RH BLD: NORMAL
ALBUMIN SERPL BCP-MCNC: 2.3 G/DL (ref 3.5–5.2)
ALLENS TEST: ABNORMAL
ALP SERPL-CCNC: 69 U/L (ref 55–135)
ALT SERPL W/O P-5'-P-CCNC: 30 U/L (ref 10–44)
ANION GAP SERPL CALC-SCNC: 3 MMOL/L (ref 8–16)
AST SERPL-CCNC: 48 U/L (ref 10–40)
BASOPHILS # BLD AUTO: 0.02 K/UL (ref 0–0.2)
BASOPHILS # BLD AUTO: 0.02 K/UL (ref 0–0.2)
BASOPHILS NFR BLD: 0.2 % (ref 0–1.9)
BASOPHILS NFR BLD: 0.2 % (ref 0–1.9)
BILIRUB SERPL-MCNC: 0.4 MG/DL (ref 0.1–1)
BLD GP AB SCN CELLS X3 SERPL QL: NORMAL
BLD PROD TYP BPU: NORMAL
BLOOD UNIT EXPIRATION DATE: NORMAL
BLOOD UNIT TYPE CODE: 5100
BLOOD UNIT TYPE: NORMAL
BUN SERPL-MCNC: 15 MG/DL (ref 8–23)
CALCIUM SERPL-MCNC: 8.9 MG/DL (ref 8.7–10.5)
CHLORIDE SERPL-SCNC: 104 MMOL/L (ref 95–110)
CO2 SERPL-SCNC: 29 MMOL/L (ref 23–29)
CODING SYSTEM: NORMAL
CREAT SERPL-MCNC: 0.7 MG/DL (ref 0.5–1.4)
DELSYS: ABNORMAL
DIFFERENTIAL METHOD: ABNORMAL
DIFFERENTIAL METHOD: ABNORMAL
DISPENSE STATUS: NORMAL
EOSINOPHIL # BLD AUTO: 0.3 K/UL (ref 0–0.5)
EOSINOPHIL # BLD AUTO: 0.4 K/UL (ref 0–0.5)
EOSINOPHIL NFR BLD: 2.5 % (ref 0–8)
EOSINOPHIL NFR BLD: 3 % (ref 0–8)
ERYTHROCYTE [DISTWIDTH] IN BLOOD BY AUTOMATED COUNT: 17.7 % (ref 11.5–14.5)
ERYTHROCYTE [DISTWIDTH] IN BLOOD BY AUTOMATED COUNT: 18.2 % (ref 11.5–14.5)
ERYTHROCYTE [SEDIMENTATION RATE] IN BLOOD BY WESTERGREN METHOD: 15 MM/H
EST. GFR  (AFRICAN AMERICAN): >60 ML/MIN/1.73 M^2
EST. GFR  (NON AFRICAN AMERICAN): >60 ML/MIN/1.73 M^2
FERRITIN SERPL-MCNC: 769 NG/ML (ref 20–300)
FIO2: 30
GLUCOSE SERPL-MCNC: 123 MG/DL (ref 70–110)
HCO3 UR-SCNC: 28.1 MMOL/L (ref 24–28)
HCT VFR BLD AUTO: 20.7 % (ref 37–48.5)
HCT VFR BLD AUTO: 21.6 % (ref 37–48.5)
HGB BLD-MCNC: 6.8 G/DL (ref 12–16)
HGB BLD-MCNC: 7.1 G/DL (ref 12–16)
IMM GRANULOCYTES # BLD AUTO: 0.32 K/UL (ref 0–0.04)
IMM GRANULOCYTES # BLD AUTO: 0.33 K/UL (ref 0–0.04)
IMM GRANULOCYTES NFR BLD AUTO: 2.6 % (ref 0–0.5)
IMM GRANULOCYTES NFR BLD AUTO: 2.8 % (ref 0–0.5)
IRON SERPL-MCNC: 23 UG/DL (ref 30–160)
LYMPHOCYTES # BLD AUTO: 1.7 K/UL (ref 1–4.8)
LYMPHOCYTES # BLD AUTO: 1.7 K/UL (ref 1–4.8)
LYMPHOCYTES NFR BLD: 14.3 % (ref 18–48)
LYMPHOCYTES NFR BLD: 14.4 % (ref 18–48)
MAGNESIUM SERPL-MCNC: 1.7 MG/DL (ref 1.6–2.6)
MCH RBC QN AUTO: 28 PG (ref 27–31)
MCH RBC QN AUTO: 28.5 PG (ref 27–31)
MCHC RBC AUTO-ENTMCNC: 32.9 G/DL (ref 32–36)
MCHC RBC AUTO-ENTMCNC: 32.9 G/DL (ref 32–36)
MCV RBC AUTO: 85 FL (ref 82–98)
MCV RBC AUTO: 87 FL (ref 82–98)
MIN VOL: 6.6
MODE: ABNORMAL
MONOCYTES # BLD AUTO: 1.5 K/UL (ref 0.3–1)
MONOCYTES # BLD AUTO: 1.6 K/UL (ref 0.3–1)
MONOCYTES NFR BLD: 12.9 % (ref 4–15)
MONOCYTES NFR BLD: 13.4 % (ref 4–15)
NEUTROPHILS # BLD AUTO: 7.7 K/UL (ref 1.8–7.7)
NEUTROPHILS # BLD AUTO: 8.1 K/UL (ref 1.8–7.7)
NEUTROPHILS NFR BLD: 66.7 % (ref 38–73)
NEUTROPHILS NFR BLD: 67 % (ref 38–73)
NRBC BLD-RTO: 0 /100 WBC
NRBC BLD-RTO: 0 /100 WBC
PCO2 BLDA: 43.7 MMHG (ref 35–45)
PEEP: 5
PH SMN: 7.42 [PH] (ref 7.35–7.45)
PHOSPHATE SERPL-MCNC: 3.1 MG/DL (ref 2.7–4.5)
PIP: 18
PLATELET # BLD AUTO: 143 K/UL (ref 150–350)
PLATELET # BLD AUTO: 146 K/UL (ref 150–350)
PMV BLD AUTO: 10.7 FL (ref 9.2–12.9)
PMV BLD AUTO: 11.9 FL (ref 9.2–12.9)
PO2 BLDA: 148 MMHG (ref 80–100)
POC BE: 3 MMOL/L
POC SATURATED O2: 99 % (ref 95–100)
POC TCO2: 29 MMOL/L (ref 23–27)
POTASSIUM SERPL-SCNC: 3.9 MMOL/L (ref 3.5–5.1)
PROT SERPL-MCNC: 5.7 G/DL (ref 6–8.4)
RBC # BLD AUTO: 2.43 M/UL (ref 4–5.4)
RBC # BLD AUTO: 2.49 M/UL (ref 4–5.4)
SAMPLE: ABNORMAL
SATURATED IRON: 14 % (ref 20–50)
SITE: ABNORMAL
SODIUM SERPL-SCNC: 136 MMOL/L (ref 136–145)
SP02: 100
TOTAL IRON BINDING CAPACITY: 164 UG/DL (ref 250–450)
TRANS ERYTHROCYTES VOL PATIENT: NORMAL ML
TRANSFERRIN SERPL-MCNC: 111 MG/DL (ref 200–375)
VT: 320
WBC # BLD AUTO: 11.6 K/UL (ref 3.9–12.7)
WBC # BLD AUTO: 12.12 K/UL (ref 3.9–12.7)

## 2019-12-30 PROCEDURE — 80053 COMPREHEN METABOLIC PANEL: CPT

## 2019-12-30 PROCEDURE — 36415 COLL VENOUS BLD VENIPUNCTURE: CPT

## 2019-12-30 PROCEDURE — 25000003 PHARM REV CODE 250: Performed by: HOSPITALIST

## 2019-12-30 PROCEDURE — A4216 STERILE WATER/SALINE, 10 ML: HCPCS | Performed by: INTERNAL MEDICINE

## 2019-12-30 PROCEDURE — 84100 ASSAY OF PHOSPHORUS: CPT

## 2019-12-30 PROCEDURE — S0028 INJECTION, FAMOTIDINE, 20 MG: HCPCS | Performed by: HOSPITALIST

## 2019-12-30 PROCEDURE — 99900035 HC TECH TIME PER 15 MIN (STAT)

## 2019-12-30 PROCEDURE — 99900026 HC AIRWAY MAINTENANCE (STAT)

## 2019-12-30 PROCEDURE — 83735 ASSAY OF MAGNESIUM: CPT

## 2019-12-30 PROCEDURE — 82803 BLOOD GASES ANY COMBINATION: CPT

## 2019-12-30 PROCEDURE — 25000003 PHARM REV CODE 250: Performed by: INTERNAL MEDICINE

## 2019-12-30 PROCEDURE — 86850 RBC ANTIBODY SCREEN: CPT

## 2019-12-30 PROCEDURE — 83540 ASSAY OF IRON: CPT

## 2019-12-30 PROCEDURE — 94003 VENT MGMT INPAT SUBQ DAY: CPT

## 2019-12-30 PROCEDURE — 99291 CRITICAL CARE FIRST HOUR: CPT | Mod: ,,, | Performed by: PHYSICIAN ASSISTANT

## 2019-12-30 PROCEDURE — 36600 WITHDRAWAL OF ARTERIAL BLOOD: CPT

## 2019-12-30 PROCEDURE — 94761 N-INVAS EAR/PLS OXIMETRY MLT: CPT

## 2019-12-30 PROCEDURE — 85025 COMPLETE CBC W/AUTO DIFF WBC: CPT

## 2019-12-30 PROCEDURE — 86920 COMPATIBILITY TEST SPIN: CPT

## 2019-12-30 PROCEDURE — 27200966 HC CLOSED SUCTION SYSTEM

## 2019-12-30 PROCEDURE — 20000000 HC ICU ROOM

## 2019-12-30 PROCEDURE — 82728 ASSAY OF FERRITIN: CPT

## 2019-12-30 PROCEDURE — 99291 PR CRITICAL CARE, E/M 30-74 MINUTES: ICD-10-PCS | Mod: ,,, | Performed by: PHYSICIAN ASSISTANT

## 2019-12-30 PROCEDURE — P9021 RED BLOOD CELLS UNIT: HCPCS

## 2019-12-30 PROCEDURE — 36430 TRANSFUSION BLD/BLD COMPNT: CPT

## 2019-12-30 RX ORDER — HYDROCODONE BITARTRATE AND ACETAMINOPHEN 500; 5 MG/1; MG/1
TABLET ORAL
Status: DISCONTINUED | OUTPATIENT
Start: 2019-12-30 | End: 2020-01-04 | Stop reason: HOSPADM

## 2019-12-30 RX ADMIN — DEXMEDETOMIDINE HYDROCHLORIDE 0.1 MCG/KG/HR: 4 INJECTION, SOLUTION INTRAVENOUS at 04:12

## 2019-12-30 RX ADMIN — CHLORHEXIDINE GLUCONATE 0.12% ORAL RINSE 15 ML: 1.2 LIQUID ORAL at 09:12

## 2019-12-30 RX ADMIN — Medication 10 ML: at 12:12

## 2019-12-30 RX ADMIN — ASPIRIN 81 MG 81 MG: 81 TABLET ORAL at 08:12

## 2019-12-30 RX ADMIN — FAMOTIDINE 20 MG: 10 INJECTION INTRAVENOUS at 08:12

## 2019-12-30 RX ADMIN — Medication 10 ML: at 05:12

## 2019-12-30 RX ADMIN — CHLORHEXIDINE GLUCONATE 0.12% ORAL RINSE 15 ML: 1.2 LIQUID ORAL at 08:12

## 2019-12-30 NOTE — CARE UPDATE
[facility]  ICU Multidisciplinary Bedside Rounds     UPDATE     Date: 12/30/2019      Plan of care reviewed with the following, Nurse, Charge Nurse, Physician, Pulm CC, Resp. Therapist, Infection Prevention and Dietician.       Needs/ Goals for the day: give 1 unit PRBC SBT daily      Level of Care: Critical Care

## 2019-12-30 NOTE — SUBJECTIVE & OBJECTIVE
Interval History: Appears comfortable. Hgb 6.8, then 7.1 on repeat CBC. No melena, hematochezia, hematemesis, hemoptysis, hematuria, no large bruising. No coagulopathy, TBil and BUN normal. Has been getting daily blood draws.     Review of Systems   Unable to perform ROS: Intubated     Objective:     Vital Signs (Most Recent):  Temp: 98.4 °F (36.9 °C) (12/29/19 2301)  Pulse: 79 (12/30/19 0916)  Resp: 15 (12/30/19 0916)  BP: (!) 149/66 (12/30/19 0747)  SpO2: 100 % (12/30/19 0916) Vital Signs (24h Range):  Temp:  [98.4 °F (36.9 °C)-99.6 °F (37.6 °C)] 98.4 °F (36.9 °C)  Pulse:  [] 79  Resp:  [11-27] 15  SpO2:  [96 %-100 %] 100 %  BP: (104-166)/() 149/66     Weight: 71.5 kg (157 lb 10.1 oz)  Body mass index is 26.23 kg/m².    Intake/Output Summary (Last 24 hours) at 12/30/2019 0944  Last data filed at 12/30/2019 0600  Gross per 24 hour   Intake 1679.57 ml   Output 610 ml   Net 1069.57 ml      Physical Exam   Constitutional: She appears well-developed. No distress.   HENT:   ETT in place   Cardiovascular: Normal rate and regular rhythm.   Pulmonary/Chest: She has no wheezes. She has no rales.   On mechanical ventilation   Abdominal: Soft. Bowel sounds are normal.   Neurological: She displays normal reflexes.   On slight sedation  Moving both feet spontaneously.  Appears comfortable   Skin: She is not diaphoretic.   Psychiatric:   Unable to assess   Nursing note and vitals reviewed.      Significant Labs: All pertinent labs within the past 24 hours have been reviewed.    Significant Imaging: I have reviewed all pertinent imaging results/findings within the past 24 hours.  I have reviewed and interpreted all pertinent imaging results/findings within the past 24 hours.

## 2019-12-30 NOTE — PROGRESS NOTES
[facility]  Gastroenterology  Progress Note    Patient Name: Emilia Melgoza  MRN: [id number]  Admission Date: 12/17/2019  Hospital Length of Stay: 12 days  Code Status: Full Code   Attending Provider: Berna Dawson MD  Primary Care Physician: Jerson Price MD  Principal Problem: Acute respiratory failure with hypoxia    Subjective:     CC= dysphagia    Interval History: GI re-consulted for PEG.  Evaluated by our service last week, but PEG was held off due to acute respiratory failure requiring intubation.  No acute events overnight.  Patient remains intubated and on sedation.  Daughter present at bedside.    Review of systems:  Unable to obtain due to clinical condition.    Objective:     Vital Signs (Most Recent):  Temp: 98.4 °F (36.9 °C) (12/30/19 0945)  Pulse: 79 (12/30/19 0916)  Resp: 15 (12/30/19 0916)  BP: (!) 149/66 (12/30/19 0747)  SpO2: 100 % (12/30/19 0916) Vital Signs (24h Range):  Temp:  [98.3 °F (36.8 °C)-98.9 °F (37.2 °C)] 98.4 °F (36.9 °C)  Pulse:  [] 79  Resp:  [11-27] 15  SpO2:  [96 %-100 %] 100 %  BP: (104-166)/() 149/66     Physical examination:  GEN: Chronically ill-appearing AAF who is intubated and sedated.   HENT: Normocephalic, anicteric sclera. + NG tube.  Cardiovascular: Regular rate and rhythm. No murmurs appreciated.   Chest: Non-labored respirations. Breath sounds equal   Abdomen: Soft, NTND, normoactive BS  Psych: Sedated.   Extermities: No C/C/E. 2+ dorsalis pedis pulses bilaterally    CBC:   Recent Labs   Lab 12/29/19  0834 12/30/19  0327 12/30/19  0842   WBC 13.85* 12.12 11.60   HGB 7.6* 6.8* 7.1*   HCT 23.2* 20.7* 21.6*    143* 146*         Imaging:  MRI brain (12/27/19):  Impression:  Extensive multifocal acute infarctions in the supratentorial brain as described above.  The findings most likely represent embolic disease.  Suggest clinical correlation.  Additional old infarctions as described above.    Assessment:   74 year old female  with a history of HTN, CVA with right sided weakness, aphasia, meningioma, gait instability and anxiety presenting with multifocal acute ischemic stroke and acute respiratory failure requiring intubation.  Now with need for trach and PEG.  Surgery planning to place trach on 1/2.    Plan:   Tentatively plan to place PEG tomorrow if H/H has corrected appropriately s/p transfusion. Otherwise, can be placed later this week. Discussed risks/benefits in detail with patient's two daughters, who will consent for procedure. Will hold tube feeds after MN.      Natasha Bray PA-C  Gastroenterology  [facility]

## 2019-12-30 NOTE — PROGRESS NOTES
gen surg  Consult dictated [id number]  resp failure/cva  Will proceed w trach 1/2/20  D/w Dr Aldridge  Will reconsult GI for PEG  Thank you for consult

## 2019-12-30 NOTE — CARE UPDATE
[facility]  ICU Multidisciplinary Bedside Rounds   SUMMARY     Date: 12/30/2019    Prehospitalization: Home  Admit Date / LOS : 12/17/2019/ 12 days    Diagnosis: Acute respiratory failure with hypoxia    Consults:        Active: Cardio, GI, Gen Surg, Neuro, Palliative, RD and SW       Needed: N/A     Code Status: Full Code   Advanced Directive: <no information>    LDA: Hutchison, PICC and Vent       Central Lines/Site/Justification:Pressors and Unable to Obtain/Maintain PIV       Urinary Cath/Order/Justification:Patient Does Not Have Urinary Catheter    Vasopressors/Infusions:    dexmedetomidine (PRECEDEX) infusion 0.103 mcg/kg/hr (12/30/19 0600)    norepinephrine bitartrate-D5W 0.01 mcg/kg/min (12/30/19 0600)          GOALS: Volume/ Hemodynamic: SBP >130-140                     RASS: -1  awakes to voice (eye opening/contact) > 10 seconds    CAM ICU: Positive  Pain Management: none       Pain Controlled: not applicable     Rhythm: NSR    Respiratory Device: Vent    Vent Mode: PRVC  Oxygen Concentration (%):  [25-30] 25  Resp Rate Total:  [15 br/min-27 br/min] 25 br/min  Vt Set:  [320 mL-340 mL] 320 mL  PEEP/CPAP:  [5 cmH20] 5 cmH20  Pressure Support:  [5 cmH20] 5 cmH20  Mean Airway Pressure:  [7.2 wzK32-76.7 cmH20] 10.1 cmH20             Most Recent SBT/ SAT: Fail       MOVE Screen: PASS    VTE Prophylaxis: Pharm and Mechanical  Mobility: Bedrest and A: Assist  Stress Ulcer Prophylaxis: Yes    Dietary: TF  Tolerance: yes  /  Advancement: @ goal    Isolation: No active isolations    Restraints: No    Significant Dates:  Post Op Date: N/A  Rescue Date: 12/26/19  Imaging/ Diagnostics: 12/17 CT neck, 12/1head, 12/21 CT head, 12/24 CT abd/pelvis, 12/26 ct head, MRI 12/27, EEG 12/26    Noteworthy Labs:  H+h (mdx2 notified)    CBC/Anemia Labs: Coags:    Recent Labs   Lab 12/28/19  1132 12/29/19  0834 12/30/19  0327   WBC 11.17 13.85* 12.12   HGB 8.1* 7.6* 6.8*   HCT 24.5* 23.2* 20.7*   * 152 143*    MCV 86 85 85   RDW 17.9* 17.8* 17.7*    Recent Labs   Lab 12/26/19  0500 12/28/19  1132 12/29/19  0834   INR 1.2 1.1 1.0   APTT 34.8*  --   --         Chemistries:   Recent Labs   Lab 12/28/19  1132 12/29/19  0834 12/30/19  0327   * 136 136   K 3.6 4.0 3.9    103 104   CO2 26 28 29   BUN 17 16 15   CREATININE 0.8 0.7 0.7   CALCIUM 8.4* 9.0 8.9   PROT 6.2 6.1 5.7*   BILITOT 0.5 0.5 0.4   ALKPHOS 67 71 69   ALT 41 37 30   AST 63* 58* 48*   MG 1.3* 1.6 1.7   PHOS 2.4* 2.9 3.1        Cardiac Enzymes: Ejection Fractions:    No results for input(s): CPK, CPKMB, MB, TROPONINI in the last 72 hours. No results found for: EF     POCT Glucose: HbA1c:    Recent Labs   Lab 12/26/19  0341   POCTGLUCOSE 132*    Hemoglobin A1C   Date Value Ref Range Status   12/26/2019 5.6 4.0 - 5.6 % Final     Comment:     ADA Screening Guidelines:  5.7-6.4%  Consistent with prediabetes  >or=6.5%  Consistent with diabetes  High levels of fetal hemoglobin interfere with the HbA1C  assay. Heterozygous hemoglobin variants (HbS, HgC, etc)do  not significantly interfere with this assay.   However, presence of multiple variants may affect accuracy.     09/14/2019 5.8 (H) 4.0 - 5.6 % Final     Comment:     ADA Screening Guidelines:  5.7-6.4%  Consistent with prediabetes  >or=6.5%  Consistent with diabetes  High levels of fetal hemoglobin interfere with the HbA1C  assay. Heterozygous hemoglobin variants (HbS, HgC, etc)do  not significantly interfere with this assay.   However, presence of multiple variants may affect accuracy.     12/01/2017 5.7 (H) 4.0 - 5.6 % Final     Comment:     According to ADA guidelines, hemoglobin A1c <7.0% represents  optimal control in non-pregnant diabetic patients. Different  metrics may apply to specific patient populations.   Standards of Medical Care in Diabetes-2016.  For the purpose of screening for the presence of diabetes:  <5.7%     Consistent with the absence of diabetes  5.7-6.4%  Consistent with  increasing risk for diabetes   (prediabetes)  >or=6.5%  Consistent with diabetes  Currently, no consensus exists for use of hemoglobin A1c  for diagnosis of diabetes for children.  This Hemoglobin A1c assay has significant interference with fetal   hemoglobin   (HbF). The results are invalid for patients with abnormal amounts of   HbF,   including those with known Hereditary Persistence   of Fetal Hemoglobin. Heterozygous hemoglobin variants (HbAS, HbAC,   HbAD, HbAE, HbA2) do not significantly interfere with this assay;   however, presence of multiple variants in a sample may impact the %   interference.          Needs from Care Team: none     ICU LOS 4d 3h  Level of Care: Critical Care

## 2019-12-30 NOTE — ASSESSMENT & PLAN NOTE
On minimal ventilator settings. CXR without a significant concern for pneumonia. Neuro status will dictate liberation from mechanical ventilation.   --Scheduled for tracheostomy on 1/2/2020 due to concern for chronic aspiration  --Daily SAT/SBT  --Tolerating SBT today however holding extubation due to mental status and plans for tracheostomy.

## 2019-12-30 NOTE — PLAN OF CARE
Patient remains in ICU overnight, intubated, and mechanically ventilated. PRVC, FiO2 25%, , RR 15, PEEP 5. Precedex infusing at 0.1 mcg/kg/min to maintain RASS -1. Levophed infusing at 0.02 mcg/kg/min to maintain -140. VSS otherwise. Pupils round, equal, and reactive. Moves BLE and L arm spontaneously, R arm withdraws from pain. Not following commands. TF at goal of 45cc/hr, free water flushes q8 hr.  Tolerating well. H+H on low side this AM, MD Simon notified, no new orders placed except to inform day shift primary team. UO 400cc, no BM. SCDs and heel boots in use. Passive ROM performed. No falls, injury, or skin breakdown this shift. Plan of care reviewed with patient and patient's children at bedside.

## 2019-12-30 NOTE — CONSULTS
Consult from Dr. Dawson.    Consult directed to Dr. Shine regarding tracheostomy and PEG placement.    HISTORY OF PRESENT ILLNESS:  This is a 74-year-old female admitted on 12/17/2019   with a diagnosis of acute encephalopathy, hypokalemia, hypernatremia.  She was   ultimately found to have suffered a cerebrovascular accident.  A PEG tube was to   be placed by Gastroenterology, but was postponed due to an elevated INR, which   was subsequently normalized.  She subsequently required intubation.  She is   currently arousable, but not able to participate in the interview process.  Her   son is at bedside.    PAST MEDICAL HISTORY:  Asthma, degenerative joint disease, hypertension,   overactive bladder, cerebrovascular accident.    PAST SURGICAL HISTORY:  Breast biopsy, cataract extraction, hemorrhoid surgery,   hysterectomy, oophorectomy, rotator cuff repair.    ALLERGIES:  LOVENOX, LISINOPRIL, AND PENICILLIN.    SOCIAL HISTORY:  She is a former smoker.  She does not consume alcoholic   beverages.    HOME MEDICATIONS:  See list.    PHYSICAL EXAMINATION:  VITAL SIGNS:  Blood pressure 110/50, respiratory rate 19, pulse 65 and   temperature 98.4.  GENERAL:  Arousable, opens eyes.  HEENT:  No cervical or supraclavicular masses or adenopathy.  Trachea is   midline.  Endotracheal tube is in place.  LUNGS:  Equal breath sounds bilaterally.  CARDIOVASCULAR:  Regular rate and rhythm.  AXILLA:  No masses bilaterally.  ABDOMEN:  Soft, nontender, nondistended.  GENITOURINARY:  No inguinal hernia bilaterally.    LABORATORY DATA:  White blood cell count 12, hematocrit 20, which is down from   23, platelet count 143.  INR 1.0 yesterday.  BUN 15 and creatinine 0.7.    ASSESSMENT AND PLAN:  Respiratory failure/unable to control airway with recent   cerebrovascular accident -- long discussion with son at bedside.  We will plan   for tracheostomy tube placement on Thursday, 01/02/2020, procedure and risks   discussed with son.  He  states he also wants to discuss the situation with his   sisters.  Contact information was given in case they have any questions.    Usually Gastroenterology performs PEG tube placement at this facility.  They   have recently seen the patient and will reconsult Gastroenterology for PEG tube   placement.    Thank you for consult.  We will follow.      BEAU  dd: 12/30/2019 07:24:10 (CST)  td: 12/30/2019 09:25:42 (CST)  Doc ID   #[id number]  Job ID #[id number]    CC:

## 2019-12-30 NOTE — ASSESSMENT & PLAN NOTE
Cause unknown however episode of hypotension vs embolic event are highest on differential  Please see above for details  Aspirin and permissive HTN  Statin not indicated at this time  Hold heparin for decreased Hgb level although no clear evidence of bleeding noted at this time  PT/OT when able to participate

## 2019-12-30 NOTE — ASSESSMENT & PLAN NOTE
Slowly decreasing on a daily basis without clear evidence of bleeding  I suspect is due to combination of daily blood draws (which we need) and malnutrition  Iron studies, B12 and folate added to today's labs  Will transfuse one unit in preparation for surgery this week  Hold heparin for now

## 2019-12-30 NOTE — PROGRESS NOTES
[facility]  Pulmonology  Progress Note    Patient Name: Emilia Melgoza  MRN: [id number]  Admission Date: 12/17/2019  Hospital Length of Stay: 12 days  Code Status: Full Code  Attending Provider: Berna Dawson MD  Primary Care Provider: Jerson Price MD   Principal Problem: Acute respiratory failure with hypoxia    Subjective:     Interval History: No acute events overnight. Patient remains on mechanical ventilation. Episode of agitation this morning requiring increase in precedex infusion. Continues to require low dose of norepinephrine while on precedex. Transitioned to SBT this morning and tolerating well.     Objective:     Vital Signs (Most Recent):  Temp: 98.4 °F (36.9 °C) (12/30/19 0945)  Pulse: 79 (12/30/19 0916)  Resp: 15 (12/30/19 0916)  BP: (!) 149/66 (12/30/19 0747)  SpO2: 100 % (12/30/19 0916) Vital Signs (24h Range):  Temp:  [98.3 °F (36.8 °C)-98.9 °F (37.2 °C)] 98.4 °F (36.9 °C)  Pulse:  [] 79  Resp:  [11-27] 15  SpO2:  [96 %-100 %] 100 %  BP: (104-166)/() 149/66     Weight: 71.5 kg (157 lb 10.1 oz)  Body mass index is 26.23 kg/m².      Intake/Output Summary (Last 24 hours) at 12/30/2019 1008  Last data filed at 12/30/2019 0600  Gross per 24 hour   Intake 1633.63 ml   Output 575 ml   Net 1058.63 ml       Physical Exam   Constitutional: She appears well-developed and well-nourished. She is sedated and restrained.   HENT:   Head: Normocephalic and atraumatic.   Eyes: Pupils are equal, round, and reactive to light.   Neck: Neck supple. No tracheal deviation present. No thyromegaly present.   Cardiovascular: Normal rate, regular rhythm and normal heart sounds. Exam reveals no gallop and no friction rub.   No murmur heard.  Pulses:       Dorsalis pedis pulses are 3+ on the right side, and 3+ on the left side.   Pulmonary/Chest: Effort normal. No accessory muscle usage or stridor. No tachypnea. She has no decreased breath sounds. She has no wheezes. She has rhonchi in the  right upper field and the right middle field. She has no rales.   Abdominal: Soft. Bowel sounds are normal.   Genitourinary:   Genitourinary Comments: Hutchison in place.    Neurological:   No spontaneous eye movements. Cough and gag reflex intact. Breathing over the ventilator. Spontaneously moving bilateral extremities. Withdrawal to pain only in LUE.    Skin: Skin is warm and dry.     Vents:  Vent Mode: PRVC (12/30/19 0916)  Ventilator Initiated: Yes (12/26/19 0417)  Set Rate: 15 bmp (12/30/19 0916)  Vt Set: 320 mL (12/30/19 0916)  Pressure Support: 5 cmH20 (12/29/19 0907)  PEEP/CPAP: 5 cmH20 (12/30/19 0916)  Oxygen Concentration (%): 25 (12/30/19 0916)  Peak Airway Pressure: 16.5 cmH2O (12/30/19 0916)  Total Ve: 6.1 mL (12/30/19 0916)  F/VT Ratio<105 (RSBI): 120 (12/30/19 0916)    Lines/Drains/Airways     Peripherally Inserted Central Catheter Line                 PICC Double Lumen 12/28/19 1340 right basilic 1 day          Drain                 NG/OG Tube 12/24/19 1726 18 Fr. Right nostril 5 days         Urethral Catheter 12/26/19 0441 Straight-tip 12 Fr. 4 days          Airway                 Airway - Non-Surgical 12/26/19 0350 Endotracheal Tube 4 days                Significant Labs:    CBC/Anemia Profile:  Recent Labs   Lab 12/29/19  0834 12/30/19  0327 12/30/19  0842   WBC 13.85* 12.12 11.60   HGB 7.6* 6.8* 7.1*   HCT 23.2* 20.7* 21.6*    143* 146*   MCV 85 85 87   RDW 17.8* 17.7* 18.2*        Chemistries:  Recent Labs   Lab 12/28/19  1132 12/29/19  0834 12/30/19  0327   * 136 136   K 3.6 4.0 3.9    103 104   CO2 26 28 29   BUN 17 16 15   CREATININE 0.8 0.7 0.7   CALCIUM 8.4* 9.0 8.9   ALBUMIN 2.7* 2.5* 2.3*   PROT 6.2 6.1 5.7*   BILITOT 0.5 0.5 0.4   ALKPHOS 67 71 69   ALT 41 37 30   AST 63* 58* 48*   MG 1.3* 1.6 1.7   PHOS 2.4* 2.9 3.1       All pertinent labs within the past 24 hours have been reviewed.    Significant Imaging:  I have reviewed and interpreted all pertinent imaging  results/findings within the past 24 hours.    Assessment/Plan:     * Acute respiratory failure with hypoxia  On minimal ventilator settings. CXR without a significant concern for pneumonia. Neuro status will dictate liberation from mechanical ventilation.   --Scheduled for tracheostomy on 1/2/2020 due to concern for chronic aspiration  --Daily SAT/SBT  --Tolerating SBT today however holding extubation due to mental status and plans for tracheostomy.     Shock  Likely due to sedation while on mechanical ventilation. No signs of infection with BCx NGTD. Not currently on abx. Completed full treatment course for E coli UTI earlier during admission.   --Wean norepinephrine to maintain MAP >65    On mechanically assisted ventilation  See respiratory failure    Acute encephalopathy  Patient intubated for AMS and somnolence.  Per notes patient has been altered for many days but worse in the last few days.  MRI (12/27) with significant acute infarctions.   --Neurology following  --Will need PEG for chronic dysphagia  --Episodes of agitation while on mechanical ventilation so precedex infusion initiated  --Wean for RASS 0    Discussed with Dr. Rodriguez and primary team. Family updated at the bedside.     Critical Care Time: 35 minutes  Critical care was time spent personally by me on the following activities: evaluating this patient's organ dysfunction, development of treatment plan, discussing treatment plan with patient or surrogate and bedside caregivers, discussions with consultants, evaluation of patient's response to treatment, examination of patient, ordering and performing treatments and interventions, ordering and review of laboratory studies, ordering and review of radiographic studies, re-evaluation of patient's condition. This critical care time did not overlap with that of any other provider or involve time for any procedures.     Madonna Howell PA-C  Pulmonology  [facility]

## 2019-12-30 NOTE — SUBJECTIVE & OBJECTIVE
Interval History: No acute events overnight. Patient remains on mechanical ventilation. Episode of agitation this morning requiring increase in precedex infusion. Continues to require low dose of norepinephrine while on precedex. Transitioned to SBT this morning and tolerating well.     Objective:     Vital Signs (Most Recent):  Temp: 98.4 °F (36.9 °C) (12/30/19 0945)  Pulse: 79 (12/30/19 0916)  Resp: 15 (12/30/19 0916)  BP: (!) 149/66 (12/30/19 0747)  SpO2: 100 % (12/30/19 0916) Vital Signs (24h Range):  Temp:  [98.3 °F (36.8 °C)-98.9 °F (37.2 °C)] 98.4 °F (36.9 °C)  Pulse:  [] 79  Resp:  [11-27] 15  SpO2:  [96 %-100 %] 100 %  BP: (104-166)/() 149/66     Weight: 71.5 kg (157 lb 10.1 oz)  Body mass index is 26.23 kg/m².      Intake/Output Summary (Last 24 hours) at 12/30/2019 1008  Last data filed at 12/30/2019 0600  Gross per 24 hour   Intake 1633.63 ml   Output 575 ml   Net 1058.63 ml       Physical Exam   Constitutional: She appears well-developed and well-nourished. She is sedated and restrained.   HENT:   Head: Normocephalic and atraumatic.   Eyes: Pupils are equal, round, and reactive to light.   Neck: Neck supple. No tracheal deviation present. No thyromegaly present.   Cardiovascular: Normal rate, regular rhythm and normal heart sounds. Exam reveals no gallop and no friction rub.   No murmur heard.  Pulses:       Dorsalis pedis pulses are 3+ on the right side, and 3+ on the left side.   Pulmonary/Chest: Effort normal. No accessory muscle usage or stridor. No tachypnea. She has no decreased breath sounds. She has no wheezes. She has rhonchi in the right upper field and the right middle field. She has no rales.   Abdominal: Soft. Bowel sounds are normal.   Genitourinary:   Genitourinary Comments: Hutchison in place.    Neurological:   No spontaneous eye movements. Cough and gag reflex intact. Breathing over the ventilator. Spontaneously moving bilateral extremities. Withdrawal to pain only in LUE.     Skin: Skin is warm and dry.     Vents:  Vent Mode: PRVC (12/30/19 0916)  Ventilator Initiated: Yes (12/26/19 0417)  Set Rate: 15 bmp (12/30/19 0916)  Vt Set: 320 mL (12/30/19 0916)  Pressure Support: 5 cmH20 (12/29/19 0907)  PEEP/CPAP: 5 cmH20 (12/30/19 0916)  Oxygen Concentration (%): 25 (12/30/19 0916)  Peak Airway Pressure: 16.5 cmH2O (12/30/19 0916)  Total Ve: 6.1 mL (12/30/19 0916)  F/VT Ratio<105 (RSBI): 120 (12/30/19 0916)    Lines/Drains/Airways     Peripherally Inserted Central Catheter Line                 PICC Double Lumen 12/28/19 1340 right basilic 1 day          Drain                 NG/OG Tube 12/24/19 1726 18 Fr. Right nostril 5 days         Urethral Catheter 12/26/19 0441 Straight-tip 12 Fr. 4 days          Airway                 Airway - Non-Surgical 12/26/19 0350 Endotracheal Tube 4 days                Significant Labs:    CBC/Anemia Profile:  Recent Labs   Lab 12/29/19  0834 12/30/19  0327 12/30/19  0842   WBC 13.85* 12.12 11.60   HGB 7.6* 6.8* 7.1*   HCT 23.2* 20.7* 21.6*    143* 146*   MCV 85 85 87   RDW 17.8* 17.7* 18.2*        Chemistries:  Recent Labs   Lab 12/28/19  1132 12/29/19  0834 12/30/19  0327   * 136 136   K 3.6 4.0 3.9    103 104   CO2 26 28 29   BUN 17 16 15   CREATININE 0.8 0.7 0.7   CALCIUM 8.4* 9.0 8.9   ALBUMIN 2.7* 2.5* 2.3*   PROT 6.2 6.1 5.7*   BILITOT 0.5 0.5 0.4   ALKPHOS 67 71 69   ALT 41 37 30   AST 63* 58* 48*   MG 1.3* 1.6 1.7   PHOS 2.4* 2.9 3.1       All pertinent labs within the past 24 hours have been reviewed.    Significant Imaging:  I have reviewed and interpreted all pertinent imaging results/findings within the past 24 hours.

## 2019-12-30 NOTE — PROGRESS NOTES
[facility] Hospital Medicine  Progress Note    Patient Name: Emilia Melgoza  MRN: [id number]  Patient Class: IP- Inpatient   Admission Date: 12/17/2019  Length of Stay: 12 days  Attending Physician: Berna Dawson MD  Primary Care Provider: Jerson Price MD        Subjective:     Principal Problem:Acute respiratory failure with hypoxia        HPI:  74 y.o. 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  She is also not taking her medications.  She appears to be more confused than usual.  Had a recent tooth extraction by the dentist and was unable to take prescribed clindamycin.  Daughter reports patient has indicated mouth pain. Also recently prescribed Macrobid for UTI and was unable to complete.  Daughter denies that the patient has had a fever.  History further limited secondary to the patient's condition.  In the ED, she was found to be tachycardic with hypernatremia, hypokalemia, mild elevation of CPK and troponin.  Urinalysis, CT head, chest x-ray, and EKG were all unremarkable for any acute abnormality.  She appears clinically dehydrated.  Placed in observation for further evaluation and treatment.    Overview/Hospital Course:  Ms Melgoza presented with acute encephalopathy. Workup significant for soft tissue swelling and edema involving the right anterior mandibular region suspicious for cellulitis, hypernatremia (148) . Patient also noted to have significant dysphagia for which she was placed NPO due to high risk for aspiration. Also aphasic and significantly deconditioned requiring moderate assistance from physical and occupational therapists. Was  initiated on empiric IV antibiotics. CT brain with no acute pathology, just old infarct to left frontal lobe extending to basal ganglia and meningioma (unchanged). CTA brain showed no vessel occlusion. NGT placed for enteral nutrition. Hypernatremia resolved. PT/OT/ST continued. Palliative care was consulted to address goals of care, mainly for PEG placement. Patient, by nodding yes or no, agreed with PEG tube placement. Gastroenterology consulted for PEG. Labwork showed elevated total bilirubin and AST >> ALT. INR also elevated and, per family, patient is not on anticoagulation at home, just clopidogrel for old stroke. Antiplatelets and chemical DVT prophylaxis held. Vitamin K given. INR corrected. Total bilirubin normalized and enzymes improved. Overnight 12/25-12/26, patient experienced hypoxia and worsening encephalopathy. Also reports of transient hypotension earlier that had resolved. Rapid response called. Placed on supplemental O2 but continued to decline therefore transferred to ICU and intubated. Patient was kept off sedation as she was minimally responsive. Neurology and pulmonology consulted. EEG obtained. Showed severe generalized slowing suggesting severe diffuse or multifocal cerebral dysfunction. No epileptiform activity or seizures appreciated. MRI of brain obtained. This showed extensive multifocal acute infarctions in bilateral parietooccipital lobes and other areas of the brain. Patient started on aspirin overnight. No statin as LDL 72 four months ago. Blood pressure maintained, feeding continued via NGT, prophylactic heparin SQ, and ventilator management. Echocardiogram ordered. Showed diastolic dysfunction but preserved EF and no thrombus. No arrhythmias appreciated on cardiac monitoring. Patient would open eyes and move extremities but unsure if purposeful. Respiratory effort improved at small increments with each SBT but not enough to safely extubate, in addition to neuro deficits and  inability to determine if she can manage her own secretions. Extended ventilator support expected. General surgery consulted for Trach/PEG and SW consulted for LTAC.     Interval History: Appears comfortable. Hgb 6.8, then 7.1 on repeat CBC. No melena, hematochezia, hematemesis, hemoptysis, hematuria, no large bruising. No coagulopathy, TBil and BUN normal. Has been getting daily blood draws.     Review of Systems   Unable to perform ROS: Intubated     Objective:     Vital Signs (Most Recent):  Temp: 98.4 °F (36.9 °C) (12/29/19 2301)  Pulse: 79 (12/30/19 0916)  Resp: 15 (12/30/19 0916)  BP: (!) 149/66 (12/30/19 0747)  SpO2: 100 % (12/30/19 0916) Vital Signs (24h Range):  Temp:  [98.4 °F (36.9 °C)-99.6 °F (37.6 °C)] 98.4 °F (36.9 °C)  Pulse:  [] 79  Resp:  [11-27] 15  SpO2:  [96 %-100 %] 100 %  BP: (104-166)/() 149/66     Weight: 71.5 kg (157 lb 10.1 oz)  Body mass index is 26.23 kg/m².    Intake/Output Summary (Last 24 hours) at 12/30/2019 0944  Last data filed at 12/30/2019 0600  Gross per 24 hour   Intake 1679.57 ml   Output 610 ml   Net 1069.57 ml      Physical Exam   Constitutional: She appears well-developed. No distress.   HENT:   ETT in place   Cardiovascular: Normal rate and regular rhythm.   Pulmonary/Chest: She has no wheezes. She has no rales.   On mechanical ventilation   Abdominal: Soft. Bowel sounds are normal.   Neurological: She displays normal reflexes.   On slight sedation  Moving both feet spontaneously.  Appears comfortable   Skin: She is not diaphoretic.   Psychiatric:   Unable to assess   Nursing note and vitals reviewed.      Significant Labs: All pertinent labs within the past 24 hours have been reviewed.    Significant Imaging: I have reviewed all pertinent imaging results/findings within the past 24 hours.  I have reviewed and interpreted all pertinent imaging results/findings within the past 24 hours.      Assessment/Plan:      * Acute respiratory failure with hypoxia  Acute  "aspiration highly suspected  Currently intubated but on low O2 requirements  MRI of brain significant for bilateral large strokes  Slightly better respiratory effort on CPAP but not enough for safe extubation  Unsure if patient able to control secretions.   Appeared uncomfortable on 12/29. On low dose precedex for comfort  This will affect accuracy of neuro exam however we are unable to safely extubate  Family pursuing aggressive treatment therefore, per my discussion with pulmonology, plan is tracheostomy and PEG placement. SW consulted for LTAC for gradual weaning of vent support   General surgery consulted for both tracheostomy and PEG placement  Continue tube feeds and supportive care  Very low dose of vasopressors to maintain higher blood pressure while on precedex   Pulmonology on board for co-management      On mechanically assisted ventilation  Day # 5 of vent      Acute encephalopathy  Patient has shown continued decline  Does have evidence of encephalomalacia from old stroke and has had difficulty with PO intake mainly due to oral pain after tooth extraction but late effect of stroke not ruled out. Dysphagia appears to be an additional issue as she was seen "gurgling" that. Is now intubated but minimally responsive despite being off all sedation. Repeat CT of brain with no acute pathology.   CTA showed no vessel occlusion  EEG with slowing but no epileptiform activity  MRI ordered showed bilateral large strokes  Could be 2/2 to hypotensive episode but embolic phenomena not ruled out  Cardiac monitor reviewed. No arrhythmia appreciated however I only have one strip available prior to 12/26  Echocardiogram showed diastolic dysf and pulmonary HTN but otherwise normal  Maintain BP. Continue ASA  Will obtain LE US to r/o DVT  Continue tube feeds. Monitor vitals. Labs reviewed    Acute arterial ischemic stroke, multifocal, posterior circulation  Cause unknown however episode of hypotension vs embolic event are " highest on differential  Please see above for details  Aspirin and permissive HTN  Statin not indicated at this time  Hold heparin for decreased Hgb level although no clear evidence of bleeding noted at this time  PT/OT when able to participate      Normocytic anemia  Slowly decreasing on a daily basis without clear evidence of bleeding  I suspect is due to combination of daily blood draws (which we need) and malnutrition  Iron studies, B12 and folate added to today's labs  Will transfuse one unit in preparation for surgery this week  Hold heparin for now      Metabolic acidosis  Resolved with IV bicarb.    Elevated INR  Resolved       Cellulitis of mouth  Treated with IV Clindamycin for 7 days.   Resolved       Mobility impaired  PT/OT when able    Acute cystitis without hematuria  Secondary to E.Coli. Low colony count. Treated with 7 days of IV Levaquin.  Resolved    Broca's aphasia  Nonverbal at baseline    Hemiparesis affecting right side as late effect of cerebrovascular accident  At baseline, no acute issue. Fall precautions.     Essential hypertension  Permissive HTN    Chronic diastolic congestive heart failure  Stable. Monitor for fluid overload.      VTE Risk Mitigation (From admission, onward)         Ordered     Place MAURA hose  Until discontinued      12/30/19 0953     IP VTE HIGH RISK PATIENT  Once      12/26/19 0532     Place sequential compression device  Until discontinued      12/17/19 9904              Discussed with patient's son at bedside    Critical care time spent on the evaluation and treatment of severe organ dysfunction, review of pertinent labs and imaging studies, discussions with consulting providers and discussions with patient/family: > 35 minutes.      Berna Aldridge MD  Department of Hospital Medicine   [facility]

## 2019-12-30 NOTE — ASSESSMENT & PLAN NOTE
Patient intubated for AMS and somnolence.  Per notes patient has been altered for many days but worse in the last few days.  MRI (12/27) with significant acute infarctions.   --Neurology following  --Will need PEG for chronic dysphagia  --Episodes of agitation while on mechanical ventilation so precedex infusion initiated  --Wean for RASS 0

## 2019-12-30 NOTE — PLAN OF CARE
Problem: Inability to Wean (Mechanical Ventilation, Invasive)  Goal: Mechanical Ventilation Liberation  12/30/2019 1654 by Jeanna Singleton, CRT  Outcome: Ongoing, Not Progressing  12/30/2019 1654 by Jeanna Singleton, CRT  Reactivated   Unable to wean vent at this time.  Will wean as tolerated

## 2019-12-30 NOTE — NURSING
MD Aldridge notified of pt H+H. Type and screen ordered, consent obtained and signed by pt son, type and screen drawn, and band placed by lab with dual sign off by this RN.

## 2019-12-30 NOTE — PROGRESS NOTES
1850 Call placed to MD Aldridge to clarify levophed orders. MD desires sbp to be between 130-140. Informed MD pt's BP labile has been up to 160's. New orders received for  titrating parameters. Read back and verified.

## 2019-12-30 NOTE — PLAN OF CARE
Pt remains on vent settings PRVC 15/320/+5/FIO2 30% Ambu bag is HOB and all alarms are set and working properly. Will continue to monitor.

## 2019-12-30 NOTE — ANESTHESIA PREPROCEDURE EVALUATION
12/30/2019  Emilia Melgoza is a 74 y.o., female.  Pre-operative evaluation for Procedure(s) (LRB):  INSERTION, PEG TUBE (N/A)    NPO >8h     In ICU intubated and sedated on precedex 0.2 mcg/kg/hr.     Was on levophed 12/29 for hypotension but discontinued later that same day.    GI consulted for PEG was held off due to acute respiratory failure requiring intubation 12/25-12/26.  Pt with hx CVA w/ right hemiparesis and aphasia admitted 12/17 for decreased PO intake and increased confusion x 2 weeks. Recent UTI and dental extraction procedure prior to admission. Admitted for dehydration and cellulitis of mandible (dysphagia). Daughter signing consents.    Patient Active Problem List   Diagnosis    DDD (degenerative disc disease), lumbar    Overactive bladder    Vitamin D deficiency disease    Chronic diastolic congestive heart failure    Cerebral microvascular disease    Aphasia, late effect of cerebrovascular disease    Pre-diabetes    Essential hypertension    Meningioma    Constipation    Gait instability    Hemiparesis affecting right side as late effect of cerebrovascular accident    Chronic hypokalemia    Anxiety    MCI (mild cognitive impairment)    Pleurisy    Broca's aphasia    Dysarthria    Cerebral infarction    Fatigue    Upper respiratory tract infection    Acute cystitis without hematuria    Right shoulder pain    Lethargy    Tachycardia    Mobility impaired    Vitamin D deficiency    Asymptomatic menopausal state     Acute encephalopathy    Cellulitis of mouth    Elevated INR    Metabolic acidosis    Acute respiratory failure with hypoxia    On mechanically assisted ventilation    Altered mental status    Acute arterial ischemic stroke, multifocal, posterior circulation    Normocytic anemia    Shock       Review of patient's allergies indicates:    Allergen Reactions    Enoxaparin Other (See Comments) and Hives     Slurred speech per patient  Slurred speech per patient    Lisinopril Other (See Comments)     Cough    Penicillins Rash       No current facility-administered medications on file prior to encounter.      Current Outpatient Medications on File Prior to Encounter   Medication Sig Dispense Refill    amlodipine (NORVASC) 1 mg/mL Susp Take 5 mLs (5 mg total) by mouth once daily. 150 mL 1    atorvastatin (LIPITOR) 80 MG tablet Take 1 tablet (80 mg total) by mouth once daily. 90 tablet 3    azelastine (ASTELIN) 137 mcg (0.1 %) nasal spray 1 spray (137 mcg total) by Nasal route 2 (two) times daily. 30 mL 0    cholecalciferol, vitamin D3, 1,000 unit capsule Take 1 capsule (1,000 Units total) by mouth once daily. 90 capsule 3    clindamycin (CLEOCIN) 150 MG capsule TAKE 1 CAPSULE BY MOUTH EVERY 6 HOURS UNTIL COMPLETE  0    cloNIDine (CATAPRES) 0.1 MG tablet TAKE 2 TABLETS BY MOUTH ONCE DAILY IN THE EVENING 180 tablet 1    clopidogrel (PLAVIX) 75 mg tablet Take 1 tablet (75 mg total) by mouth once daily. 90 tablet 3    escitalopram oxalate (LEXAPRO) 5 mg/5 mL Soln Take 10 mLs (10 mg total) by mouth once daily. 300 mL 11    fexofenadine (ALLEGRA) 180 MG tablet Take 1 tablet (180 mg total) by mouth once daily. 30 tablet 0    fluticasone propionate (FLONASE) 50 mcg/actuation nasal spray 1 spray (50 mcg total) by Each Nostril route once daily. 1 Bottle 0    GENERLAC 10 gram/15 mL solution       ibuprofen (ADVIL,MOTRIN) 800 MG tablet       memantine (NAMENDA) 10 MG Tab Take 10 mg by mouth once daily.  1    oxybutynin (DITROPAN) 5 mg/5 mL syrup Take 10 mLs (10 mg total) by mouth 2 (two) times daily. 473 mL 1    polyethylene glycol (GLYCOLAX) 17 gram PwPk DISSOLVE 17 GRAMS OF POWDER (1 PACK) IN WATER & DRINK ONCE DAILY FOR 5 DAYS  0    tramadol (ULTRAM) 50 mg tablet Take 1/2 tablet as needed for headache 30 tablet 0       Past Surgical History:    Procedure Laterality Date    bladder mesh      BREAST BIOPSY Left     CATARACT EXTRACTION W/  INTRAOCULAR LENS IMPLANT Right 10/13/2014    Dr Crystal    CATARACT EXTRACTION W/  INTRAOCULAR LENS IMPLANT Left 11/10/2014    Dr Crystal    HEMORRHOID SURGERY      HYSTERECTOMY      INCONTINENCE SURGERY      OOPHORECTOMY      Right Rotator Cuff Repair         Social History     Socioeconomic History    Marital status:      Spouse name: Not on file    Number of children: Not on file    Years of education: Not on file    Highest education level: Not on file   Occupational History    Not on file   Social Needs    Financial resource strain: Not on file    Food insecurity:     Worry: Not on file     Inability: Not on file    Transportation needs:     Medical: Not on file     Non-medical: Not on file   Tobacco Use    Smoking status: Former Smoker     Last attempt to quit: 1992     Years since quittin.0    Smokeless tobacco: Never Used   Substance and Sexual Activity    Alcohol use: No     Alcohol/week: 0.0 standard drinks    Drug use: No    Sexual activity: Not Currently   Lifestyle    Physical activity:     Days per week: Not on file     Minutes per session: Not on file    Stress: Not on file   Relationships    Social connections:     Talks on phone: Not on file     Gets together: Not on file     Attends Rastafari service: Not on file     Active member of club or organization: Not on file     Attends meetings of clubs or organizations: Not on file     Relationship status: Not on file   Other Topics Concern    Not on file   Social History Narrative    Retired         CBC:   Recent Labs     19  0327 19  0842   WBC 12.12 11.60   RBC 2.43* 2.49*   HGB 6.8* 7.1*   HCT 20.7* 21.6*   * 146*   MCV 85 87   MCH 28.0 28.5   MCHC 32.9 32.9       CMP:   Recent Labs     19  0834 19  0327    136   K 4.0 3.9    104   CO2 28 29   BUN 16 15   CREATININE 0.7 0.7    * 123*   MG 1.6 1.7   PHOS 2.9 3.1   CALCIUM 9.0 8.9   ALBUMIN 2.5* 2.3*   PROT 6.1 5.7*   ALKPHOS 71 69   ALT 37 30   AST 58* 48*   BILITOT 0.5 0.4       INR  Recent Labs     19  1132 19  0834   INR 1.1 1.0           Diagnostic Studies:      EKD Echo:  No results found for this or any previous visit.      Anesthesia Evaluation    I have reviewed the Patient Summary Reports.     I have reviewed the Medications.     Review of Systems  Anesthesia Hx:  No problems with previous Anesthesia  History of prior surgery of interest to airway management or planning:   Social:  Former Smoker    Hematology/Oncology:     Oncology Normal    -- Anemia: Hematology Comments: Hgb 7.1; repeat in AM     EENT/Dental:EENT/Dental Normal   Cardiovascular:   Exercise tolerance: poor Hypertension CHF hyperlipidemia ECG has been reviewed.    Pulmonary:  Pulmonary Normal    Renal/:  Renal/ Normal     Hepatic/GI:   GERD Dysphagia for PEG   Musculoskeletal:   Arthritis     Neurological:   CVA, residual symptoms Denies Seizures.    Endocrine:  Endocrine Normal    Psych:  Psychiatric Normal           Physical Exam  General:  Well nourished    Airway/Jaw/Neck:  AIRWAY FINDINGS: Normal      Chest/Lungs:  Chest/Lungs Clear    Heart/Vascular:  Heart Findings: Normal       Mental Status:  Mental Status Findings: Normal        Anesthesia Plan  Type of Anesthesia, risks & benefits discussed:  Anesthesia Type:  general  Patient's Preference:   Intra-op Monitoring Plan: standard ASA monitors  Intra-op Monitoring Plan Comments:   Post Op Pain Control Plan: multimodal analgesia  Post Op Pain Control Plan Comments:   Induction:    Beta Blocker:  Patient is not currently on a Beta-Blocker (No further documentation required).       Informed Consent: Patient representative understands risks and agrees with Anesthesia plan.  Questions answered. Anesthesia consent signed with patient representative.  ASA Score: 3     Day of  Surgery Review of History & Physical:  There are no significant changes.          Ready For Surgery From Anesthesia Perspective.

## 2019-12-30 NOTE — ASSESSMENT & PLAN NOTE
Acute aspiration highly suspected  Currently intubated but on low O2 requirements  MRI of brain significant for bilateral large strokes  Slightly better respiratory effort on CPAP but not enough for safe extubation  Unsure if patient able to control secretions.   Appeared uncomfortable on 12/29. On low dose precedex for comfort  This will affect accuracy of neuro exam however we are unable to safely extubate  Family pursuing aggressive treatment therefore, per my discussion with pulmonology, plan is tracheostomy and PEG placement. SW consulted for LTAC for gradual weaning of vent support   General surgery consulted for both tracheostomy and PEG placement  Continue tube feeds and supportive care  Very low dose of vasopressors to maintain higher blood pressure while on precedex   Pulmonology on board for co-management

## 2019-12-30 NOTE — ASSESSMENT & PLAN NOTE
Likely due to sedation while on mechanical ventilation. No signs of infection with BCx NGTD. Not currently on abx. Completed full treatment course for E coli UTI earlier during admission.   --Wean norepinephrine to maintain MAP >65

## 2019-12-30 NOTE — PLAN OF CARE
Pt remains in ICU on ventilator. Pt was started on precedex this morning. Pt was tachycardic and hypertensive. As the day progressed pt started to have NSR with rates in the 80's. Pt has been able to maintain acceptable BP until around 1700 when her systolic pressure dropped to 109. Dr. Aldridge made aware and started pt on norepinephrine to maintain systolic BP above 140. Pt has remained free from injury and falls. Pt does not have evidence of skin breakdown. Pt's edema seems to have decreased slightly in bilateral hands. Dr. Almanza was consulted for surgery and placement of trach and peg tube. Pt is to have social work consult. Pt has PICC line noted to right upper arm no redness, swelling, or streaking noted. Pt has ejection fraction of 65%. Family has remained at bedside throughout shift. Pt on tube feeding via NG tube @45 mL/hr to right nare.

## 2019-12-30 NOTE — ASSESSMENT & PLAN NOTE
"Patient has shown continued decline  Does have evidence of encephalomalacia from old stroke and has had difficulty with PO intake mainly due to oral pain after tooth extraction but late effect of stroke not ruled out. Dysphagia appears to be an additional issue as she was seen "gurgling" that. Is now intubated but minimally responsive despite being off all sedation. Repeat CT of brain with no acute pathology.   CTA showed no vessel occlusion  EEG with slowing but no epileptiform activity  MRI ordered showed bilateral large strokes  Could be 2/2 to hypotensive episode but embolic phenomena not ruled out  Cardiac monitor reviewed. No arrhythmia appreciated however I only have one strip available prior to 12/26  Echocardiogram showed diastolic dysf and pulmonary HTN but otherwise normal  Maintain BP. Continue ASA  Continue tube feeds. Monitor vitals. Labs reviewed  "

## 2019-12-31 ENCOUNTER — ANESTHESIA (OUTPATIENT)
Dept: ENDOSCOPY | Facility: HOSPITAL | Age: 74
DRG: 004 | End: 2019-12-31
Payer: MEDICARE

## 2019-12-31 LAB
ALBUMIN SERPL BCP-MCNC: 2.4 G/DL (ref 3.5–5.2)
ALLENS TEST: ABNORMAL
ALP SERPL-CCNC: 73 U/L (ref 55–135)
ALT SERPL W/O P-5'-P-CCNC: 29 U/L (ref 10–44)
ANION GAP SERPL CALC-SCNC: 4 MMOL/L (ref 8–16)
AST SERPL-CCNC: 46 U/L (ref 10–40)
BASOPHILS # BLD AUTO: 0.03 K/UL (ref 0–0.2)
BASOPHILS NFR BLD: 0.2 % (ref 0–1.9)
BILIRUB SERPL-MCNC: 0.5 MG/DL (ref 0.1–1)
BUN SERPL-MCNC: 14 MG/DL (ref 8–23)
CALCIUM SERPL-MCNC: 9.4 MG/DL (ref 8.7–10.5)
CHLORIDE SERPL-SCNC: 101 MMOL/L (ref 95–110)
CO2 SERPL-SCNC: 29 MMOL/L (ref 23–29)
CREAT SERPL-MCNC: 0.7 MG/DL (ref 0.5–1.4)
DELSYS: ABNORMAL
DIFFERENTIAL METHOD: ABNORMAL
EOSINOPHIL # BLD AUTO: 0.3 K/UL (ref 0–0.5)
EOSINOPHIL NFR BLD: 2 % (ref 0–8)
ERYTHROCYTE [DISTWIDTH] IN BLOOD BY AUTOMATED COUNT: 17.3 % (ref 11.5–14.5)
ERYTHROCYTE [SEDIMENTATION RATE] IN BLOOD BY WESTERGREN METHOD: 15 MM/H
EST. GFR  (AFRICAN AMERICAN): >60 ML/MIN/1.73 M^2
EST. GFR  (NON AFRICAN AMERICAN): >60 ML/MIN/1.73 M^2
FIO2: 25
FOLATE SERPL-MCNC: 4.9 NG/ML (ref 4–24)
GLUCOSE SERPL-MCNC: 96 MG/DL (ref 70–110)
HCO3 UR-SCNC: 29.7 MMOL/L (ref 24–28)
HCT VFR BLD AUTO: 25.9 % (ref 37–48.5)
HGB BLD-MCNC: 8.4 G/DL (ref 12–16)
IMM GRANULOCYTES # BLD AUTO: 0.38 K/UL (ref 0–0.04)
IMM GRANULOCYTES NFR BLD AUTO: 2.9 % (ref 0–0.5)
LYMPHOCYTES # BLD AUTO: 1.7 K/UL (ref 1–4.8)
LYMPHOCYTES NFR BLD: 12.9 % (ref 18–48)
MAGNESIUM SERPL-MCNC: 1.7 MG/DL (ref 1.6–2.6)
MCH RBC QN AUTO: 27.2 PG (ref 27–31)
MCHC RBC AUTO-ENTMCNC: 32.4 G/DL (ref 32–36)
MCV RBC AUTO: 84 FL (ref 82–98)
MIN VOL: 5.8
MODE: ABNORMAL
MONOCYTES # BLD AUTO: 1.8 K/UL (ref 0.3–1)
MONOCYTES NFR BLD: 13.4 % (ref 4–15)
NEUTROPHILS # BLD AUTO: 9 K/UL (ref 1.8–7.7)
NEUTROPHILS NFR BLD: 68.6 % (ref 38–73)
NRBC BLD-RTO: 0 /100 WBC
PCO2 BLDA: 44 MMHG (ref 35–45)
PEEP: 5
PH SMN: 7.44 [PH] (ref 7.35–7.45)
PHOSPHATE SERPL-MCNC: 3.6 MG/DL (ref 2.7–4.5)
PIP: 22
PLATELET # BLD AUTO: 159 K/UL (ref 150–350)
PMV BLD AUTO: 11.4 FL (ref 9.2–12.9)
PO2 BLDA: 93 MMHG (ref 80–100)
POC BE: 5 MMOL/L
POC SATURATED O2: 97 % (ref 95–100)
POC TCO2: 31 MMOL/L (ref 23–27)
POTASSIUM SERPL-SCNC: 4.6 MMOL/L (ref 3.5–5.1)
PROT SERPL-MCNC: 6.2 G/DL (ref 6–8.4)
RBC # BLD AUTO: 3.09 M/UL (ref 4–5.4)
SAMPLE: ABNORMAL
SITE: ABNORMAL
SODIUM SERPL-SCNC: 134 MMOL/L (ref 136–145)
SP02: 99
VIT B12 SERPL-MCNC: 798 PG/ML (ref 210–950)
VT: 320
WBC # BLD AUTO: 13.1 K/UL (ref 3.9–12.7)

## 2019-12-31 PROCEDURE — 25000003 PHARM REV CODE 250: Performed by: HOSPITALIST

## 2019-12-31 PROCEDURE — 20000000 HC ICU ROOM

## 2019-12-31 PROCEDURE — 36600 WITHDRAWAL OF ARTERIAL BLOOD: CPT

## 2019-12-31 PROCEDURE — 37000009 HC ANESTHESIA EA ADD 15 MINS: Performed by: INTERNAL MEDICINE

## 2019-12-31 PROCEDURE — 85025 COMPLETE CBC W/AUTO DIFF WBC: CPT

## 2019-12-31 PROCEDURE — 82803 BLOOD GASES ANY COMBINATION: CPT

## 2019-12-31 PROCEDURE — A4216 STERILE WATER/SALINE, 10 ML: HCPCS | Performed by: INTERNAL MEDICINE

## 2019-12-31 PROCEDURE — D9220A PRA ANESTHESIA: ICD-10-PCS | Mod: CRNA,,, | Performed by: NURSE ANESTHETIST, CERTIFIED REGISTERED

## 2019-12-31 PROCEDURE — D9220A PRA ANESTHESIA: ICD-10-PCS | Mod: ANES,,, | Performed by: ANESTHESIOLOGY

## 2019-12-31 PROCEDURE — 99900026 HC AIRWAY MAINTENANCE (STAT)

## 2019-12-31 PROCEDURE — 94761 N-INVAS EAR/PLS OXIMETRY MLT: CPT

## 2019-12-31 PROCEDURE — 36415 COLL VENOUS BLD VENIPUNCTURE: CPT

## 2019-12-31 PROCEDURE — 25000003 PHARM REV CODE 250: Performed by: INTERNAL MEDICINE

## 2019-12-31 PROCEDURE — D9220A PRA ANESTHESIA: Mod: ANES,,, | Performed by: ANESTHESIOLOGY

## 2019-12-31 PROCEDURE — 43246 EGD PLACE GASTROSTOMY TUBE: CPT | Performed by: INTERNAL MEDICINE

## 2019-12-31 PROCEDURE — 63600175 PHARM REV CODE 636 W HCPCS: Performed by: INTERNAL MEDICINE

## 2019-12-31 PROCEDURE — 82746 ASSAY OF FOLIC ACID SERUM: CPT

## 2019-12-31 PROCEDURE — S0028 INJECTION, FAMOTIDINE, 20 MG: HCPCS | Performed by: HOSPITALIST

## 2019-12-31 PROCEDURE — D9220A PRA ANESTHESIA: Mod: CRNA,,, | Performed by: NURSE ANESTHETIST, CERTIFIED REGISTERED

## 2019-12-31 PROCEDURE — 80053 COMPREHEN METABOLIC PANEL: CPT

## 2019-12-31 PROCEDURE — 83735 ASSAY OF MAGNESIUM: CPT

## 2019-12-31 PROCEDURE — 99900035 HC TECH TIME PER 15 MIN (STAT)

## 2019-12-31 PROCEDURE — 82607 VITAMIN B-12: CPT

## 2019-12-31 PROCEDURE — 94003 VENT MGMT INPAT SUBQ DAY: CPT

## 2019-12-31 PROCEDURE — 84100 ASSAY OF PHOSPHORUS: CPT

## 2019-12-31 PROCEDURE — 99291 PR CRITICAL CARE, E/M 30-74 MINUTES: ICD-10-PCS | Mod: ,,, | Performed by: NURSE PRACTITIONER

## 2019-12-31 PROCEDURE — 99291 CRITICAL CARE FIRST HOUR: CPT | Mod: ,,, | Performed by: NURSE PRACTITIONER

## 2019-12-31 PROCEDURE — 37000008 HC ANESTHESIA 1ST 15 MINUTES: Performed by: INTERNAL MEDICINE

## 2019-12-31 PROCEDURE — 63600175 PHARM REV CODE 636 W HCPCS: Performed by: NURSE ANESTHETIST, CERTIFIED REGISTERED

## 2019-12-31 RX ORDER — EPHEDRINE SULFATE 50 MG/ML
INJECTION, SOLUTION INTRAVENOUS
Status: DISCONTINUED
Start: 2019-12-31 | End: 2019-12-31 | Stop reason: WASHOUT

## 2019-12-31 RX ORDER — PROPOFOL 10 MG/ML
VIAL (ML) INTRAVENOUS
Status: DISCONTINUED | OUTPATIENT
Start: 2019-12-31 | End: 2019-12-31

## 2019-12-31 RX ORDER — PHENYLEPHRINE HYDROCHLORIDE 10 MG/ML
INJECTION INTRAVENOUS
Status: DISCONTINUED | OUTPATIENT
Start: 2019-12-31 | End: 2019-12-31

## 2019-12-31 RX ORDER — VANCOMYCIN HCL IN 5 % DEXTROSE 1G/250ML
1000 PLASTIC BAG, INJECTION (ML) INTRAVENOUS ONCE
Status: COMPLETED | OUTPATIENT
Start: 2019-12-31 | End: 2019-12-31

## 2019-12-31 RX ORDER — ETOMIDATE 2 MG/ML
INJECTION INTRAVENOUS
Status: DISCONTINUED
Start: 2019-12-31 | End: 2019-12-31 | Stop reason: WASHOUT

## 2019-12-31 RX ORDER — SODIUM CHLORIDE 9 MG/ML
INJECTION, SOLUTION INTRAVENOUS CONTINUOUS PRN
Status: DISCONTINUED | OUTPATIENT
Start: 2019-12-31 | End: 2019-12-31

## 2019-12-31 RX ADMIN — PROPOFOL 50 MG: 10 INJECTION, EMULSION INTRAVENOUS at 01:12

## 2019-12-31 RX ADMIN — Medication 10 ML: at 06:12

## 2019-12-31 RX ADMIN — Medication 10 ML: at 12:12

## 2019-12-31 RX ADMIN — DEXMEDETOMIDINE HYDROCHLORIDE 0.8 MCG/KG/HR: 4 INJECTION, SOLUTION INTRAVENOUS at 09:12

## 2019-12-31 RX ADMIN — DEXMEDETOMIDINE HYDROCHLORIDE 0.3 MCG/KG/HR: 4 INJECTION, SOLUTION INTRAVENOUS at 06:12

## 2019-12-31 RX ADMIN — PHENYLEPHRINE HYDROCHLORIDE 100 MCG: 10 INJECTION INTRAVENOUS at 01:12

## 2019-12-31 RX ADMIN — CHLORHEXIDINE GLUCONATE 0.12% ORAL RINSE 15 ML: 1.2 LIQUID ORAL at 08:12

## 2019-12-31 RX ADMIN — PROPOFOL 40 MG: 10 INJECTION, EMULSION INTRAVENOUS at 01:12

## 2019-12-31 RX ADMIN — FAMOTIDINE 20 MG: 10 INJECTION INTRAVENOUS at 08:12

## 2019-12-31 RX ADMIN — CHLORHEXIDINE GLUCONATE 0.12% ORAL RINSE 15 ML: 1.2 LIQUID ORAL at 09:12

## 2019-12-31 RX ADMIN — VANCOMYCIN HYDROCHLORIDE 1000 MG: 1 INJECTION, POWDER, LYOPHILIZED, FOR SOLUTION INTRAVENOUS at 12:12

## 2019-12-31 RX ADMIN — SODIUM CHLORIDE: 9 INJECTION, SOLUTION INTRAVENOUS at 01:12

## 2019-12-31 NOTE — [employer] PATIENT INSTRUCTIONS
Discharge Summary/Instructions after an Endoscopic Procedure  Patient Name: Emilia Melgoza  Patient MRN: [id number]  Patient YOB: 1945 Tuesday, December 31, 2019  Veronika Cruz MD  RESTRICTIONS:  During your procedure today, you received medications for sedation.  These   medications may affect your judgment, balance and coordination.  Therefore,   for 24 hours, you have the following restrictions:   - DO NOT drive a car, operate machinery, make legal/financial decisions,   sign important papers or drink alcohol.    ACTIVITY:  Today: no heavy lifting, straining or running due to procedural   sedation/anesthesia.  The following day: return to full activity including work.  DIET:  Eat and drink normally unless instructed otherwise.     TREATMENT FOR COMMON SIDE EFFECTS:  - Mild abdominal pain, nausea, belching, bloating or excessive gas:  rest,   eat lightly and use a heating pad.  - Sore Throat: treat with throat lozenges and/or gargle with warm salt   water.  - Because air was used during the procedure, expelling large amounts of air   from your rectum or belching is normal.  - If a bowel prep was taken, you may not have a bowel movement for 1-3 days.    This is normal.  SYMPTOMS TO WATCH FOR AND REPORT TO YOUR PHYSICIAN:  1. Abdominal pain or bloating, other than gas cramps.  2. Chest pain.  3. Back pain.  4. Signs of infection such as: chills or fever occurring within 24 hours   after the procedure.  5. Rectal bleeding, which would show as bright red, maroon, or black stools.   (A tablespoon of blood from the rectum is not serious, especially if   hemorrhoids are present.)  6. Vomiting.  7. Weakness or dizziness.  GO DIRECTLY TO THE NEAREST EMERGENCY ROOM IF YOU HAVE ANY OF THE FOLLOWING:      Difficulty breathing              Chills and/or fever over 101 F   Persistent vomiting and/or vomiting blood   Severe abdominal pain   Severe chest pain   Black, tarry stools   Bleeding- more than one  tablespoon   Any other symptom or condition that you feel may need urgent attention  Your doctor recommends these additional instructions:  If any biopsies were taken, your doctors clinic will contact you in 1 to 2   weeks with any results.  - Return patient to ICU for ongoing care.   - Diet per speech therapy.   - Continue present medications.   - Please follow the post-PEG recommendations including: Nutrition consult   for formula and volume, external bolster snug to abdominal wall, change   dressing once per day, start using PEG today, flush PEG daily with 60 ml   water and clean site with soap and water daily and dry thoroughly.   - GI team to loosen PEG bumper in the next 1-2 days.  For questions, problems or results please call your physician - Veronika Cruz MD at Work:  [phone].  [facility] can be reached at [phone]     IF A COMPLICATION OR EMERGENCY SITUATION ARISES AND YOU ARE UNABLE TO REACH   YOUR PHYSICIAN - GO DIRECTLY TO THE EMERGENCY ROOM.  Veronika Cruz MD  12/31/2019 1:56:03 PM  This report has been verified and signed electronically.  [employer]

## 2019-12-31 NOTE — ASSESSMENT & PLAN NOTE
Patient intubated for encephalopathy and somnolence.  Per notes patient has been altered for many days but worse in the last few days.  MRI (12/27) with significant acute infarctions.   --Neurology following  --Plan for PEG today for chronic dysphagia  --Episodes of agitation while on mechanical ventilation so precedex infusion initiated; precedex currently being held.  --Plan for tracheostomy on 01/02

## 2019-12-31 NOTE — SUBJECTIVE & OBJECTIVE
Interval History/Significant Events: ***    Review of Systems  Objective:     Vital Signs (Most Recent):  Temp: 97.7 °F (36.5 °C) (12/31/19 0720)  Pulse: 64 (12/31/19 0830)  Resp: 17 (12/31/19 0830)  BP: (!) 101/52 (12/31/19 0830)  SpO2: 98 % (12/31/19 0830) Vital Signs (24h Range):  Temp:  [96.9 °F (36.1 °C)-98.6 °F (37 °C)] 97.7 °F (36.5 °C)  Pulse:  [] 64  Resp:  [13-33] 17  SpO2:  [92 %-100 %] 98 %  BP: (101-194)/(52-95) 101/52   Weight: 71.4 kg (157 lb 6.5 oz)  Body mass index is 26.19 kg/m².      Intake/Output Summary (Last 24 hours) at 12/31/2019 0900  Last data filed at 12/31/2019 0700  Gross per 24 hour   Intake 1527 ml   Output 1015 ml   Net 512 ml       Physical Exam    Vents:  Vent Mode: PRVC (12/31/19 0736)  Ventilator Initiated: Yes (12/26/19 0417)  Set Rate: 15 bmp (12/31/19 0736)  Vt Set: 320 mL (12/31/19 0736)  Pressure Support: 5 cmH20 (12/29/19 0907)  PEEP/CPAP: 5 cmH20 (12/31/19 0736)  Oxygen Concentration (%): 25 (12/31/19 0830)  Peak Airway Pressure: 25.7 cmH2O (12/31/19 0736)  Total Ve: 5.3 mL (12/31/19 0736)  F/VT Ratio<105 (RSBI): (!) 83.59 (12/31/19 0736)  Lines/Drains/Airways     Peripherally Inserted Central Catheter Line                 PICC Double Lumen 12/28/19 1340 right basilic 2 days          Drain                 NG/OG Tube 12/24/19 1726 18 Fr. Right nostril 6 days         Urethral Catheter 12/26/19 0441 Straight-tip 12 Fr. 5 days          Airway                 Airway - Non-Surgical 12/26/19 0350 Endotracheal Tube 5 days              Significant Labs:    CBC/Anemia Profile:  Recent Labs   Lab 12/30/19  0327 12/30/19  0842 12/31/19  0305   WBC 12.12 11.60 13.10*   HGB 6.8* 7.1* 8.4*   HCT 20.7* 21.6* 25.9*   * 146* 159   MCV 85 87 84   RDW 17.7* 18.2* 17.3*   IRON 23*  --   --    FERRITIN 769*  --   --    FOLATE  --   --  4.9   [id number]  --   --  798        Chemistries:  Recent Labs   Lab 12/30/19 0327 12/31/19  0305    134*   K 3.9 4.6    101   CO2 29  29   BUN 15 14   CREATININE 0.7 0.7   CALCIUM 8.9 9.4   ALBUMIN 2.3* 2.4*   PROT 5.7* 6.2   BILITOT 0.4 0.5   ALKPHOS 69 73   ALT 30 29   AST 48* 46*   MG 1.7 1.7   PHOS 3.1 3.6       {Results:04415}    Significant Imaging:  {Imaging Review:31974}

## 2019-12-31 NOTE — CARE UPDATE
[facility]  ICU Multidisciplinary Bedside Rounds   SUMMARY     Date: 12/31/2019    Prehospitalization: Home  Admit Date / LOS : 12/17/2019/ 13 days    Diagnosis: Acute respiratory failure with hypoxia    Consults:        Active: GI, Gen Surg, Neuro, Palliative and SW       Needed: N/A     Code Status: Full Code   Advanced Directive: <no information>    LDA: Hutch[place], NG, PICC and Vent       Central Lines/Site/Justification:Pressors       Urinary Cath/Order/Justification:Critically Ill in ICU    Vasopressors/Infusions:    dexmedetomidine (PRECEDEX) infusion 0.302 mcg/kg/hr (12/31/19 0500)    norepinephrine bitartrate-D5W 0.01 mcg/kg/min (12/31/19 0500)          GOALS: Volume/ Hemodynamic: SBP > 130                     RASS: -1  awakes to voice (eye opening/contact) > 10 seconds    CAM ICU: Positive  Pain Management: none       Pain Controlled: yes     Rhythm: NSR    Respiratory Device: Vent    Vent Mode: PRVC  Oxygen Concentration (%):  [25] 25  Resp Rate Total:  [15 br/min-31 br/min] 16 br/min  Vt Set:  [320 mL] 320 mL  PEEP/CPAP:  [5 cmH20] 5 cmH20  Mean Airway Pressure:  [8.8 amN40-59.6 cmH20] 40.6 cmH20             Most Recent SBT/ SAT: Fail       MOVE Screen: FAIL    VTE Prophylaxis: Mechanical  Mobility: Bedrest  Stress Ulcer Prophylaxis: Yes    Dietary: NPO  Tolerance: not applicable  /  Advancement: @ goal    Isolation: No active isolations    Restraints: Yes    Significant Dates:  Post Op Date: N/A  Rescue Date: N/A  Imaging/ Diagnostics: N/A    Noteworthy Labs:  WBC 13.1, HGB 8.4, HCT 25.9,     CBC/Anemia Labs: Coags:    Recent Labs   Lab 12/30/19  0327 12/30/19  0842 12/31/19  0305   WBC 12.12 11.60 13.10*   HGB 6.8* 7.1* 8.4*   HCT 20.7* 21.6* 25.9*   * 146* 159   MCV 85 87 84   RDW 17.7* 18.2* 17.3*   IRON 23*  --   --    FERRITIN 769*  --   --     Recent Labs   Lab 12/26/19  0500 12/28/19  1132 12/29/19  0834   INR 1.2 1.1 1.0   APTT 34.8*  --   --         Chemistries:    Recent Labs   Lab 12/29/19  0834 12/30/19  0327 12/31/19  0305    136 134*   K 4.0 3.9 4.6    104 101   CO2 28 29 29   BUN 16 15 14   CREATININE 0.7 0.7 0.7   CALCIUM 9.0 8.9 9.4   PROT 6.1 5.7* 6.2   BILITOT 0.5 0.4 0.5   ALKPHOS 71 69 73   ALT 37 30 29   AST 58* 48* 46*   MG 1.6 1.7 1.7   PHOS 2.9 3.1 3.6        Cardiac Enzymes: Ejection Fractions:    No results for input(s): CPK, CPKMB, MB, TROPONINI in the last 72 hours. No results found for: EF     POCT Glucose: HbA1c:    Recent Labs   Lab 12/26/19  0341   POCTGLUCOSE 132*    Hemoglobin A1C   Date Value Ref Range Status   12/26/2019 5.6 4.0 - 5.6 % Final     Comment:     ADA Screening Guidelines:  5.7-6.4%  Consistent with prediabetes  >or=6.5%  Consistent with diabetes  High levels of fetal hemoglobin interfere with the HbA1C  assay. Heterozygous hemoglobin variants (HbS, HgC, etc)do  not significantly interfere with this assay.   However, presence of multiple variants may affect accuracy.     09/14/2019 5.8 (H) 4.0 - 5.6 % Final     Comment:     ADA Screening Guidelines:  5.7-6.4%  Consistent with prediabetes  >or=6.5%  Consistent with diabetes  High levels of fetal hemoglobin interfere with the HbA1C  assay. Heterozygous hemoglobin variants (HbS, HgC, etc)do  not significantly interfere with this assay.   However, presence of multiple variants may affect accuracy.     12/01/2017 5.7 (H) 4.0 - 5.6 % Final     Comment:     According to ADA guidelines, hemoglobin A1c <7.0% represents  optimal control in non-pregnant diabetic patients. Different  metrics may apply to specific patient populations.   Standards of Medical Care in Diabetes-2016.  For the purpose of screening for the presence of diabetes:  <5.7%     Consistent with the absence of diabetes  5.7-6.4%  Consistent with increasing risk for diabetes   (prediabetes)  >or=6.5%  Consistent with diabetes  Currently, no consensus exists for use of hemoglobin A1c  for diagnosis of diabetes for  children.  This Hemoglobin A1c assay has significant interference with fetal   hemoglobin   (HbF). The results are invalid for patients with abnormal amounts of   HbF,   including those with known Hereditary Persistence   of Fetal Hemoglobin. Heterozygous hemoglobin variants (HbAS, HbAC,   HbAD, HbAE, HbA2) do not significantly interfere with this assay;   however, presence of multiple variants in a sample may impact the %   interference.          Needs from Care Team: none     ICU LOS 5d 1h  Level of Care: Critical Care

## 2019-12-31 NOTE — PROGRESS NOTES
"gen surg  Intubated, easily arousable  Blood pressure 120/60, pulse 63, temperature 96.9 °F (36.1 °C), temperature source Axillary, resp. rate 20, height 5' 5" (1.651 m), weight 71.4 kg (157 lb 6.5 oz), SpO2 98 %, not currently breastfeeding.  neck trachea midline, no masses  A/p resp failure/aspiration- for peg today by GI, for trach 1/2/20, reasons for procedure and risks d/w son at bedside, questions answered, he wishes to proceed  "

## 2019-12-31 NOTE — PLAN OF CARE
Pt remains on precedex at 0.2 and also on levophed at 0.02 with BP > 130's most of day. Pt opens eyes spontaneously and moving left arm to grab at times. Family has remained at bedside today. Pt tolerating TF with out difficulty, no residual. Pt to be NPO after MN tonight for PEG placement in AM.Pt remains on PRVC Fio2 at 25% rate 15, PEEP 5 and tidal volume 320, sats %.

## 2019-12-31 NOTE — PROGRESS NOTES
[facility]  Pulmonology  Progress Note    Patient Name: Emilia Melgoza  MRN: [id number]  Admission Date: 12/17/2019  Hospital Length of Stay: 13 days  Code Status: Full Code  Attending Provider: Valeria Méndez MD  Primary Care Provider: Jerson Price MD   Principal Problem: Acute respiratory failure with hypoxia    Subjective:     Interval History: No acute events overnight.  Intermittently on low dose norepinephrine.  On low dose precedex infusion for agitation.        Objective:     Vital Signs (Most Recent):  Temp: 97.7 °F (36.5 °C) (12/31/19 0720)  Pulse: 64 (12/31/19 0830)  Resp: 17 (12/31/19 0830)  BP: (!) 101/52 (12/31/19 0830)  SpO2: 98 % (12/31/19 0830) Vital Signs (24h Range):  Temp:  [96.9 °F (36.1 °C)-98.6 °F (37 °C)] 97.7 °F (36.5 °C)  Pulse:  [] 64  Resp:  [13-33] 17  SpO2:  [92 %-100 %] 98 %  BP: (101-194)/(52-95) 101/52     Weight: 71.4 kg (157 lb 6.5 oz)  Body mass index is 26.19 kg/m².      Intake/Output Summary (Last 24 hours) at 12/31/2019 0900  Last data filed at 12/31/2019 0700  Gross per 24 hour   Intake 1527 ml   Output 1015 ml   Net 512 ml       Physical Exam   Constitutional: She appears well-developed and well-nourished. She is sedated and restrained.   HENT:   Head: Normocephalic and atraumatic.   Eyes: Pupils are equal, round, and reactive to light.   Neck: Neck supple. No tracheal deviation present. No thyromegaly present.   Cardiovascular: Normal rate, regular rhythm, normal heart sounds and intact distal pulses. Exam reveals no gallop and no friction rub.   No murmur heard.  Warm extremities   Pulmonary/Chest: Effort normal. No accessory muscle usage or stridor. No tachypnea. She has no decreased breath sounds. She has no wheezes. She has rhonchi in the right upper field and the right middle field. She has no rales.   Minimal clear, thin secretions   Abdominal: Soft. Bowel sounds are normal. There is no tenderness.   Genitourinary:   Genitourinary  Comments: Urine catheter with clear yellow output   Neurological: GCS eye subscore is 2. GCS verbal subscore is 1. GCS motor subscore is 5.   Eye opening with suctioning. Eyes midline, no nystagmus. Cough and gag reflex intact.  Spontaneously moving LUE/LLE, RLE.   Withdrawal to noxious stimuli in RUE. (chronic RUE hemiparesis).  Does not follow request.  Skin: Skin is warm and dry.   Nursing note and vitals reviewed.      Vents:  Vent Mode: PRVC (12/31/19 0736)  Ventilator Initiated: Yes (12/26/19 0417)  Set Rate: 15 bmp (12/31/19 0736)  Vt Set: 320 mL (12/31/19 0736)  Pressure Support: 5 cmH20 (12/29/19 0907)  PEEP/CPAP: 5 cmH20 (12/31/19 0736)  Oxygen Concentration (%): 25 (12/31/19 0830)  Peak Airway Pressure: 25.7 cmH2O (12/31/19 0736)  Total Ve: 5.3 mL (12/31/19 0736)  F/VT Ratio<105 (RSBI): (!) 83.59 (12/31/19 0736)    Lines/Drains/Airways     Peripherally Inserted Central Catheter Line                 PICC Double Lumen 12/28/19 1340 right basilic 2 days          Drain                 NG/OG Tube 12/24/19 1726 18 Fr. Right nostril 6 days         Urethral Catheter 12/26/19 0441 Straight-tip 12 Fr. 5 days          Airway                 Airway - Non-Surgical 12/26/19 0350 Endotracheal Tube 5 days                Significant Labs:    CBC/Anemia Profile:  Recent Labs   Lab 12/30/19 0327 12/30/19  0842 12/31/19  0305   WBC 12.12 11.60 13.10*   HGB 6.8* 7.1* 8.4*   HCT 20.7* 21.6* 25.9*   * 146* 159   MCV 85 87 84   RDW 17.7* 18.2* 17.3*   IRON 23*  --   --    FERRITIN 769*  --   --    FOLATE  --   --  4.9   [id number]  --   --  798        Chemistries:  Recent Labs   Lab 12/30/19 0327 12/31/19  0305    134*   K 3.9 4.6    101   CO2 29 29   BUN 15 14   CREATININE 0.7 0.7   CALCIUM 8.9 9.4   ALBUMIN 2.3* 2.4*   PROT 5.7* 6.2   BILITOT 0.4 0.5   ALKPHOS 69 73   ALT 30 29   AST 48* 46*   MG 1.7 1.7   PHOS 3.1 3.6       All pertinent labs within the past 24 hours have been reviewed.    Significant  Imaging:  I have reviewed and interpreted all pertinent imaging results/findings within the past 24 hours.    Assessment/Plan:     * Acute respiratory failure with hypoxia  On minimal ventilator settings. CXR without signs of pneumonia. Neuro status currently precludes liberation from mechanical ventilation.   --Scheduled for tracheostomy on 1/2/2020 due to concern for chronic aspiration  --Daily SAT/SBT  --Tolerating SBT today however holding extubation due to mental status and concern for chronic aspiration.    Shock  Likely due to sedation while on mechanical ventilation. No signs of infection with BCx NGTD. Not currently on abx. Completed full treatment course for E coli UTI earlier during admission.   --Wean norepinephrine to maintain MAP >65    On mechanically assisted ventilation  See respiratory failure    Acute encephalopathy  Patient intubated for encephalopathy and somnolence.  Per notes patient has been altered for many days but worse in the last few days.  MRI (12/27) with significant acute infarctions.   --Neurology following  --Plan for PEG today for chronic dysphagia  --Episodes of agitation while on mechanical ventilation so precedex infusion initiated; precedex currently being held.  --Plan for tracheostomy on 01/02      DVT ppx:  SCDs    Critical Care Time: 35 minutes    Critical care was time spent personally by me on the following activities: evaluating this patient's organ dysfunction, development of treatment plan, discussing treatment plan with patient or surrogate and bedside caregivers, discussions with consultants, evaluation of patient's response to treatment, examination of patient, ordering and performing treatments and interventions, ordering and review of laboratory studies, ordering and review of radiographic studies, re-evaluation of patient's condition. This critical care time did not overlap with that of any other provider or involve time for any procedures.       Lou Melton,  NP  Pulmonology  [facility] [place]

## 2019-12-31 NOTE — SUBJECTIVE & OBJECTIVE
Interval History: peg tube placed    Review of Systems   Unable to perform ROS: Intubated     Objective:     Vital Signs (Most Recent):  Temp: 97.7 °F (36.5 °C) (12/31/19 1350)  Pulse: (!) 114 (12/31/19 1545)  Resp: 20 (12/31/19 1545)  BP: (!) 112/58 (12/31/19 1545)  SpO2: 95 % (12/31/19 1545) Vital Signs (24h Range):  Temp:  [96.9 °F (36.1 °C)-98.6 °F (37 °C)] 97.7 °F (36.5 °C)  Pulse:  [] 114  Resp:  [13-29] 20  SpO2:  [91 %-100 %] 95 %  BP: (101-200)/(52-95) 112/58     Weight: 71.4 kg (157 lb 6.5 oz)  Body mass index is 26.19 kg/m².    Intake/Output Summary (Last 24 hours) at 12/31/2019 1623  Last data filed at 12/31/2019 1600  Gross per 24 hour   Intake 958.78 ml   Output 1640 ml   Net -681.22 ml      Physical Exam   Constitutional: She appears well-developed. No distress.   HENT:   ETT in place   Cardiovascular: Normal rate and regular rhythm.   Pulmonary/Chest: She has no wheezes. She has no rales.   On mechanical ventilation   Abdominal: Soft. Bowel sounds are normal.   Neurological: She displays normal reflexes.   On slight sedation  Moving both feet spontaneously.  Appears comfortable   Skin: She is not diaphoretic.   Psychiatric:   Unable to assess   Nursing note and vitals reviewed.      Significant Labs: All pertinent labs within the past 24 hours have been reviewed.    Significant Imaging: I have reviewed and interpreted all pertinent imaging results/findings within the past 24 hours.

## 2019-12-31 NOTE — PROGRESS NOTES
[facility] Hospital Medicine  Progress Note    Patient Name: Emilia Melgoza  MRN: [id number]  Patient Class: IP- Inpatient   Admission Date: 12/17/2019  Length of Stay: 13 days  Attending Physician: Valeria Méndez MD  Primary Care Provider: Jerson Price MD        Subjective:     Principal Problem:Acute respiratory failure with hypoxia        HPI:  74 y.o. 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  She is also not taking her medications.  She appears to be more confused than usual.  Had a recent tooth extraction by the dentist and was unable to take prescribed clindamycin.  Daughter reports patient has indicated mouth pain. Also recently prescribed Macrobid for UTI and was unable to complete.  Daughter denies that the patient has had a fever.  History further limited secondary to the patient's condition.  In the ED, she was found to be tachycardic with hypernatremia, hypokalemia, mild elevation of CPK and troponin.  Urinalysis, CT head, chest x-ray, and EKG were all unremarkable for any acute abnormality.  She appears clinically dehydrated.  Placed in observation for further evaluation and treatment.    Overview/Hospital Course:  Ms Melgoza presented with acute encephalopathy. Workup significant for soft tissue swelling and edema involving the right anterior mandibular region suspicious for cellulitis, hypernatremia (148) . Patient also noted to have significant dysphagia for which she was placed NPO due to high risk for aspiration. Also aphasic and significantly deconditioned requiring moderate assistance from physical and occupational therapists. Was  initiated on empiric IV antibiotics. CT brain with no acute pathology, just old infarct to left frontal lobe extending to basal ganglia and meningioma (unchanged). CTA brain showed no vessel occlusion. NGT placed for enteral nutrition. Hypernatremia resolved. PT/OT/ST continued. Palliative care was consulted to address goals of care, mainly for PEG placement. Patient, by nodding yes or no, agreed with PEG tube placement. Gastroenterology consulted for PEG. Labwork showed elevated total bilirubin and AST >> ALT. INR also elevated and, per family, patient is not on anticoagulation at home, just clopidogrel for old stroke. Antiplatelets and chemical DVT prophylaxis held. Vitamin K given. INR corrected. Total bilirubin normalized and enzymes improved. Overnight 12/25-12/26, patient experienced hypoxia and worsening encephalopathy. Also reports of transient hypotension earlier that had resolved. Rapid response called. Placed on supplemental O2 but continued to decline therefore transferred to ICU and intubated. Patient was kept off sedation as she was minimally responsive. Neurology and pulmonology consulted. EEG obtained. Showed severe generalized slowing suggesting severe diffuse or multifocal cerebral dysfunction. No epileptiform activity or seizures appreciated. MRI of brain obtained. This showed extensive multifocal acute infarctions in bilateral parietooccipital lobes and other areas of the brain. Patient started on aspirin overnight. No statin as LDL 72 four months ago. Blood pressure maintained, feeding continued via NGT, prophylactic heparin SQ, and ventilator management. Echocardiogram ordered. Showed diastolic dysfunction but preserved EF and no thrombus. No arrhythmias appreciated on cardiac monitoring. Patient would open eyes and move extremities but unsure if purposeful. Respiratory effort improved at small increments with each SBT but not enough to safely extubate, in addition to neuro deficits and  inability to determine if she can manage her own secretions. Extended ventilator support expected. General surgery consulted for Trach/PEG and SW consulted for LTAC.     12/31- plan for Trach 1/2    Interval History: peg tube placed    Review of Systems   Unable to perform ROS: Intubated     Objective:     Vital Signs (Most Recent):  Temp: 97.7 °F (36.5 °C) (12/31/19 1350)  Pulse: (!) 114 (12/31/19 1545)  Resp: 20 (12/31/19 1545)  BP: (!) 112/58 (12/31/19 1545)  SpO2: 95 % (12/31/19 1545) Vital Signs (24h Range):  Temp:  [96.9 °F (36.1 °C)-98.6 °F (37 °C)] 97.7 °F (36.5 °C)  Pulse:  [] 114  Resp:  [13-29] 20  SpO2:  [91 %-100 %] 95 %  BP: (101-200)/(52-95) 112/58     Weight: 71.4 kg (157 lb 6.5 oz)  Body mass index is 26.19 kg/m².    Intake/Output Summary (Last 24 hours) at 12/31/2019 1623  Last data filed at 12/31/2019 1600  Gross per 24 hour   Intake 958.78 ml   Output 1640 ml   Net -681.22 ml      Physical Exam   Constitutional: She appears well-developed. No distress.   HENT:   ETT in place   Cardiovascular: Normal rate and regular rhythm.   Pulmonary/Chest: She has no wheezes. She has no rales.   On mechanical ventilation   Abdominal: Soft. Bowel sounds are normal.   Neurological: She displays normal reflexes.   On slight sedation  Moving both feet spontaneously.  Appears comfortable   Skin: She is not diaphoretic.   Psychiatric:   Unable to assess   Nursing note and vitals reviewed.      Significant Labs: All pertinent labs within the past 24 hours have been reviewed.    Significant Imaging: I have reviewed and interpreted all pertinent imaging results/findings within the past 24 hours.      Assessment/Plan:      * Acute respiratory failure with hypoxia  Acute aspiration highly suspected  Currently intubated but on low O2 requirements  MRI of brain significant for bilateral large strokes  Slightly better respiratory effort on CPAP but not enough for safe extubation  Unsure if patient able to control  "secretions.   Appeared uncomfortable on 12/29. On low dose precedex for comfort  This will affect accuracy of neuro exam however we are unable to safely extubate  Family pursuing aggressive treatment therefore, per my discussion with pulmonology, plan is tracheostomy and PEG placement. SW consulted for LTAC for gradual weaning of vent support   General surgery consulted for both tracheostomy and PEG placement  Continue tube feeds and supportive care  Very low dose of vasopressors to maintain higher blood pressure while on precedex   Pulmonology on board for co-management      Normocytic anemia  Slowly decreasing on a daily basis without clear evidence of bleeding  I suspect is due to combination of daily blood draws (which we need) and malnutrition  Iron studies, B12 and folate added to today's labs  Will transfuse one unit in preparation for surgery this week  Hold heparin for now      Acute arterial ischemic stroke, multifocal, posterior circulation  Cause unknown however episode of hypotension vs embolic event are highest on differential  Please see above for details  Aspirin and permissive HTN  Statin not indicated at this time  Hold heparin for decreased Hgb level although no clear evidence of bleeding noted at this time  PT/OT when able to participate      On mechanically assisted ventilation  Day # 5 of vent      Metabolic acidosis  Resolved with IV bicarb.    Elevated INR  Resolved       Cellulitis of mouth  Treated with IV Clindamycin for 7 days.   Resolved       Acute encephalopathy  Patient has shown continued decline  Does have evidence of encephalomalacia from old stroke and has had difficulty with PO intake mainly due to oral pain after tooth extraction but late effect of stroke not ruled out. Dysphagia appears to be an additional issue as she was seen "gurgling" that. Is now intubated but minimally responsive despite being off all sedation. Repeat CT of brain with no acute pathology.   CTA showed no " vessel occlusion  EEG with slowing but no epileptiform activity  MRI ordered showed bilateral large strokes  Could be 2/2 to hypotensive episode but embolic phenomena not ruled out  Cardiac monitor reviewed. No arrhythmia appreciated however I only have one strip available prior to 12/26  Echocardiogram showed diastolic dysf and pulmonary HTN but otherwise normal  Maintain BP. Continue ASA  Continue tube feeds. Monitor vitals. Labs reviewed    Mobility impaired  PT/OT when able    Acute cystitis without hematuria  Secondary to E.Coli. Low colony count. Treated with 7 days of IV Levaquin.  Resolved    Broca's aphasia  Nonverbal at baseline    Hemiparesis affecting right side as late effect of cerebrovascular accident  At baseline, no acute issue. Fall precautions.     Essential hypertension  Permissive HTN    Chronic diastolic congestive heart failure  Stable. Monitor for fluid overload.        VTE Risk Mitigation (From admission, onward)         Ordered     Place MAURA hose  Until discontinued      12/30/19 0953     IP VTE HIGH RISK PATIENT  Once      12/26/19 0532     Place sequential compression device  Until discontinued      12/17/19 8400                Critical care time spent on the evaluation and treatment of severe organ dysfunction, review of pertinent labs and imaging studies, discussions with consulting providers and discussions with patient/family: 45 minutes.      Valeria Méndez MD  Department of Hospital Medicine   [facility]

## 2019-12-31 NOTE — PLAN OF CARE
Remains in ICU orally intubated, tube feeding stopped at midnight for PEG placement this AM. Soft wrist restraint applied to left wrist due to patient grabbing ETT. On last PM the patients daughter had RT to return the ETT to the center of her mouth because she thought that her mother was distressed. Remains on Precedex at 0.3 mcg/kg/hour and Levophed at 0.01 mcg/kg/minute. Hutchison w/ minimal urine output. Skin intact. Remains free of falls and injuries.

## 2019-12-31 NOTE — ASSESSMENT & PLAN NOTE
On minimal ventilator settings. CXR without signs of pneumonia. Neuro status currently precludes liberation from mechanical ventilation.   --Scheduled for tracheostomy on 1/2/2020 due to concern for chronic aspiration  --Daily SAT/SBT  --Tolerating SBT today however holding extubation due to mental status and concern for chronic aspiration.

## 2019-12-31 NOTE — SUBJECTIVE & OBJECTIVE
Interval History: No acute events overnight.  Intermittently on low dose norepinephrine.  On low dose precedex infusion for agitation.        Objective:     Vital Signs (Most Recent):  Temp: 97.7 °F (36.5 °C) (12/31/19 0720)  Pulse: 64 (12/31/19 0830)  Resp: 17 (12/31/19 0830)  BP: (!) 101/52 (12/31/19 0830)  SpO2: 98 % (12/31/19 0830) Vital Signs (24h Range):  Temp:  [96.9 °F (36.1 °C)-98.6 °F (37 °C)] 97.7 °F (36.5 °C)  Pulse:  [] 64  Resp:  [13-33] 17  SpO2:  [92 %-100 %] 98 %  BP: (101-194)/(52-95) 101/52     Weight: 71.4 kg (157 lb 6.5 oz)  Body mass index is 26.19 kg/m².      Intake/Output Summary (Last 24 hours) at 12/31/2019 0900  Last data filed at 12/31/2019 0700  Gross per 24 hour   Intake 1527 ml   Output 1015 ml   Net 512 ml       Physical Exam   Constitutional: She appears well-developed and well-nourished. She is sedated and restrained.   HENT:   Head: Normocephalic and atraumatic.   Eyes: Pupils are equal, round, and reactive to light.   Neck: Neck supple. No tracheal deviation present. No thyromegaly present.   Cardiovascular: Normal rate, regular rhythm, normal heart sounds and intact distal pulses. Exam reveals no gallop and no friction rub.   No murmur heard.  Warm extremities   Pulmonary/Chest: Effort normal. No accessory muscle usage or stridor. No tachypnea. She has no decreased breath sounds. She has no wheezes. She has rhonchi in the right upper field and the right middle field. She has no rales.   Minimal clear, thin secretions   Abdominal: Soft. Bowel sounds are normal. There is no tenderness.   Genitourinary:   Genitourinary Comments: Urine catheter with clear yellow output   Neurological: GCS eye subscore is 2. GCS verbal subscore is 1. GCS motor subscore is 5.   Eye opening with suctioning. Eyes midline, no nystagmus. Cough and gag reflex intact.  Spontaneously moving LUE/LLE, RLE.   Withdrawal to noxious stimuli in RUE. (chronic RUE hemiparesis)   Skin: Skin is warm and dry.    Nursing note and vitals reviewed.      Vents:  Vent Mode: PRVC (12/31/19 0736)  Ventilator Initiated: Yes (12/26/19 0417)  Set Rate: 15 bmp (12/31/19 0736)  Vt Set: 320 mL (12/31/19 0736)  Pressure Support: 5 cmH20 (12/29/19 0907)  PEEP/CPAP: 5 cmH20 (12/31/19 0736)  Oxygen Concentration (%): 25 (12/31/19 0830)  Peak Airway Pressure: 25.7 cmH2O (12/31/19 0736)  Total Ve: 5.3 mL (12/31/19 0736)  F/VT Ratio<105 (RSBI): (!) 83.59 (12/31/19 0736)    Lines/Drains/Airways     Peripherally Inserted Central Catheter Line                 PICC Double Lumen 12/28/19 1340 right basilic 2 days          Drain                 NG/OG Tube 12/24/19 1726 18 Fr. Right nostril 6 days         Urethral Catheter 12/26/19 0441 Straight-tip 12 Fr. 5 days          Airway                 Airway - Non-Surgical 12/26/19 0350 Endotracheal Tube 5 days                Significant Labs:    CBC/Anemia Profile:  Recent Labs   Lab 12/30/19  0327 12/30/19  0842 12/31/19  0305   WBC 12.12 11.60 13.10*   HGB 6.8* 7.1* 8.4*   HCT 20.7* 21.6* 25.9*   * 146* 159   MCV 85 87 84   RDW 17.7* 18.2* 17.3*   IRON 23*  --   --    FERRITIN 769*  --   --    FOLATE  --   --  4.9   [id number]  --   --  798        Chemistries:  Recent Labs   Lab 12/30/19  0327 12/31/19  0305    134*   K 3.9 4.6    101   CO2 29 29   BUN 15 14   CREATININE 0.7 0.7   CALCIUM 8.9 9.4   ALBUMIN 2.3* 2.4*   PROT 5.7* 6.2   BILITOT 0.4 0.5   ALKPHOS 69 73   ALT 30 29   AST 48* 46*   MG 1.7 1.7   PHOS 3.1 3.6       All pertinent labs within the past 24 hours have been reviewed.    Significant Imaging:  I have reviewed and interpreted all pertinent imaging results/findings within the past 24 hours.

## 2019-12-31 NOTE — CARE UPDATE
[facility]  ICU Multidisciplinary Bedside Rounds     UPDATE     Date: 12/31/2019      Plan of care reviewed with the following, Nurse, Charge Nurse, Physician and Infection Prevention.       Needs/ Goals for the day: peg placement, wean levo      Level of Care: Critical Care

## 2019-12-31 NOTE — PLAN OF CARE
Maintain patent airway    Pt recieved intubated on Servo i ventilator with the following settings;.  PRVC/ 320 TV/ 15 RR/ +5 Peep/ 25% FI02  Alarms are set and functioning, Ambu bag at bedside, Pt without distress RT will continue to monitor and wean as tolerated.

## 2019-12-31 NOTE — PT/OT/SLP PROGRESS
Physical Therapy      Patient Name:  Emilia Melgoza   MRN:  [id number]    Patient not seen today secondary to Failed Move screen.  Pt's daughter educated on PROM and given handout 12/23/2019.  Recommend PROM, Turning, and positioning by nursing staff until patient is more appropriate for skilled Pt servgraham Su, PT

## 2019-12-31 NOTE — TRANSFER OF CARE
"Anesthesia Transfer of Care Note    Patient: Emilia Melgoza    Procedure(s) Performed: Procedure(s) (LRB):  INSERTION, PEG TUBE (N/A)    Patient location: ICU    Anesthesia Type: general    Post pain: adequate analgesia    Post assessment: no apparent anesthetic complications and tolerated procedure well    Post vital signs: unstable    Level of consciousness: awake (returned to baseline)    Nausea/Vomiting: no nausea/vomiting    Complications: none    Transfer of care protocol was followedComments: Pt already in ICU intubated, procedure done at bedside.  No transport needed.  RN at bedside       Last vitals:   Visit Vitals  BP (!) 140/67   Pulse 89   Temp 36.5 °C (97.7 °F)   Resp 20   Ht 5' 5" (1.651 m)   Wt 71.4 kg (157 lb 6.5 oz)   SpO2 98%   Breastfeeding? No   BMI 26.19 kg/m²     "
Yes

## 2019-12-31 NOTE — CARE UPDATE
Pt received orally intubated with a size 7.5 ET tube, secured at 23 cm at the lips. Pt on Servo I ventilator on documented settings. Alarms are set and functional, Ambu bag and mask at the bedside. NARN at this time, will continue to monitor.

## 2020-01-01 LAB
ALBUMIN SERPL BCP-MCNC: 2.3 G/DL (ref 3.5–5.2)
ALLENS TEST: ABNORMAL
ALP SERPL-CCNC: 73 U/L (ref 55–135)
ALT SERPL W/O P-5'-P-CCNC: 24 U/L (ref 10–44)
ANION GAP SERPL CALC-SCNC: 8 MMOL/L (ref 8–16)
AST SERPL-CCNC: 41 U/L (ref 10–40)
BASOPHILS # BLD AUTO: 0.01 K/UL (ref 0–0.2)
BASOPHILS NFR BLD: 0.1 % (ref 0–1.9)
BILIRUB SERPL-MCNC: 0.4 MG/DL (ref 0.1–1)
BUN SERPL-MCNC: 14 MG/DL (ref 8–23)
CALCIUM SERPL-MCNC: 9.2 MG/DL (ref 8.7–10.5)
CHLORIDE SERPL-SCNC: 101 MMOL/L (ref 95–110)
CO2 SERPL-SCNC: 28 MMOL/L (ref 23–29)
CREAT SERPL-MCNC: 0.7 MG/DL (ref 0.5–1.4)
DELSYS: ABNORMAL
DIFFERENTIAL METHOD: ABNORMAL
EOSINOPHIL # BLD AUTO: 0.2 K/UL (ref 0–0.5)
EOSINOPHIL NFR BLD: 2.5 % (ref 0–8)
ERYTHROCYTE [DISTWIDTH] IN BLOOD BY AUTOMATED COUNT: 17.2 % (ref 11.5–14.5)
ERYTHROCYTE [SEDIMENTATION RATE] IN BLOOD BY WESTERGREN METHOD: 15 MM/H
EST. GFR  (AFRICAN AMERICAN): >60 ML/MIN/1.73 M^2
EST. GFR  (NON AFRICAN AMERICAN): >60 ML/MIN/1.73 M^2
FIO2: 30
GLUCOSE SERPL-MCNC: 128 MG/DL (ref 70–110)
HCO3 UR-SCNC: 29 MMOL/L (ref 24–28)
HCT VFR BLD AUTO: 24.6 % (ref 37–48.5)
HGB BLD-MCNC: 8 G/DL (ref 12–16)
IMM GRANULOCYTES # BLD AUTO: 0.18 K/UL (ref 0–0.04)
IMM GRANULOCYTES NFR BLD AUTO: 1.9 % (ref 0–0.5)
LYMPHOCYTES # BLD AUTO: 1.2 K/UL (ref 1–4.8)
LYMPHOCYTES NFR BLD: 12.8 % (ref 18–48)
MAGNESIUM SERPL-MCNC: 1.8 MG/DL (ref 1.6–2.6)
MCH RBC QN AUTO: 27.2 PG (ref 27–31)
MCHC RBC AUTO-ENTMCNC: 32.5 G/DL (ref 32–36)
MCV RBC AUTO: 84 FL (ref 82–98)
MODE: ABNORMAL
MONOCYTES # BLD AUTO: 1.2 K/UL (ref 0.3–1)
MONOCYTES NFR BLD: 12 % (ref 4–15)
NEUTROPHILS # BLD AUTO: 6.8 K/UL (ref 1.8–7.7)
NEUTROPHILS NFR BLD: 70.7 % (ref 38–73)
NRBC BLD-RTO: 0 /100 WBC
PCO2 BLDA: 36.5 MMHG (ref 35–45)
PEEP: 5
PH SMN: 7.51 [PH] (ref 7.35–7.45)
PHOSPHATE SERPL-MCNC: 4.4 MG/DL (ref 2.7–4.5)
PLATELET # BLD AUTO: 181 K/UL (ref 150–350)
PMV BLD AUTO: 11.1 FL (ref 9.2–12.9)
PO2 BLDA: 80 MMHG (ref 80–100)
POC BE: 6 MMOL/L
POC SATURATED O2: 97 % (ref 95–100)
POC TCO2: 30 MMOL/L (ref 23–27)
POTASSIUM SERPL-SCNC: 4.2 MMOL/L (ref 3.5–5.1)
PROT SERPL-MCNC: 6 G/DL (ref 6–8.4)
RBC # BLD AUTO: 2.94 M/UL (ref 4–5.4)
SAMPLE: ABNORMAL
SITE: ABNORMAL
SODIUM SERPL-SCNC: 137 MMOL/L (ref 136–145)
VT: 350
WBC # BLD AUTO: 9.58 K/UL (ref 3.9–12.7)

## 2020-01-01 PROCEDURE — 82803 BLOOD GASES ANY COMBINATION: CPT

## 2020-01-01 PROCEDURE — 85025 COMPLETE CBC W/AUTO DIFF WBC: CPT

## 2020-01-01 PROCEDURE — 99900035 HC TECH TIME PER 15 MIN (STAT)

## 2020-01-01 PROCEDURE — 25000003 PHARM REV CODE 250: Performed by: INTERNAL MEDICINE

## 2020-01-01 PROCEDURE — 25000003 PHARM REV CODE 250: Performed by: HOSPITALIST

## 2020-01-01 PROCEDURE — 94003 VENT MGMT INPAT SUBQ DAY: CPT

## 2020-01-01 PROCEDURE — 83735 ASSAY OF MAGNESIUM: CPT

## 2020-01-01 PROCEDURE — 36415 COLL VENOUS BLD VENIPUNCTURE: CPT

## 2020-01-01 PROCEDURE — 20000000 HC ICU ROOM

## 2020-01-01 PROCEDURE — S0028 INJECTION, FAMOTIDINE, 20 MG: HCPCS | Performed by: HOSPITALIST

## 2020-01-01 PROCEDURE — 80053 COMPREHEN METABOLIC PANEL: CPT

## 2020-01-01 PROCEDURE — 36600 WITHDRAWAL OF ARTERIAL BLOOD: CPT

## 2020-01-01 PROCEDURE — 27000221 HC OXYGEN, UP TO 24 HOURS

## 2020-01-01 PROCEDURE — 99900026 HC AIRWAY MAINTENANCE (STAT)

## 2020-01-01 PROCEDURE — 94761 N-INVAS EAR/PLS OXIMETRY MLT: CPT

## 2020-01-01 PROCEDURE — 84100 ASSAY OF PHOSPHORUS: CPT

## 2020-01-01 RX ADMIN — DEXMEDETOMIDINE HYDROCHLORIDE 0.8 MCG/KG/HR: 4 INJECTION, SOLUTION INTRAVENOUS at 06:01

## 2020-01-01 RX ADMIN — ASPIRIN 81 MG 81 MG: 81 TABLET ORAL at 08:01

## 2020-01-01 RX ADMIN — FAMOTIDINE 20 MG: 10 INJECTION INTRAVENOUS at 08:01

## 2020-01-01 RX ADMIN — DEXMEDETOMIDINE HYDROCHLORIDE 0.8 MCG/KG/HR: 4 INJECTION, SOLUTION INTRAVENOUS at 04:01

## 2020-01-01 RX ADMIN — CHLORHEXIDINE GLUCONATE 0.12% ORAL RINSE 15 ML: 1.2 LIQUID ORAL at 08:01

## 2020-01-01 RX ADMIN — DEXMEDETOMIDINE HYDROCHLORIDE 0.8 MCG/KG/HR: 4 INJECTION, SOLUTION INTRAVENOUS at 11:01

## 2020-01-01 RX ADMIN — CHLORHEXIDINE GLUCONATE 0.12% ORAL RINSE 15 ML: 1.2 LIQUID ORAL at 09:01

## 2020-01-01 NOTE — PLAN OF CARE
Problem: Adult Inpatient Plan of Care  Goal: Plan of Care Review  Outcome: Ongoing, Progressing   Patient remain intubated and sedated on precedex. Open eyes on stimulation. Did not follow commands. Coarse lung sounds bilaterally. With moderate amount of secretion upon oropharyngeal suctioning. Hutchison with adequate urine output. Remain edematous specially of bilateral upper extremities +3. Maintaining SBP>130 mmHG as per order with the support of Levophed. Peg noted dressing clean dry and intact. Tolerating tube feeding well at goal rate of 45 ml/hr

## 2020-01-01 NOTE — PLAN OF CARE
Pt remains on vent. Peg tube placed to LUQ today. Pt was awake off precedex majority of day until she became restless and tachycardic. Precedex was restarted as well as levo to maintain sbp>130. Skin remains intact. No falls or adverse events this shift. Daughter, Julissa, remains at bedside.

## 2020-01-01 NOTE — CARE UPDATE
[facility]  ICU Multidisciplinary Bedside Rounds     UPDATE     Date: 1/1/2020      Plan of care reviewed with the following, Nurse, Charge Nurse, Physician, Pulm CC and Infection Prevention.       Needs/ Goals for the day: maintain b/p wdl to stay off levo, tube feeds, trach in am, npo after midnight      Level of Care: Critical Care

## 2020-01-01 NOTE — CARE UPDATE
[facility]  ICU Multidisciplinary Bedside Rounds   SUMMARY     Date: 1/1/2020    Prehospitalization: Home  Admit Date / LOS : 12/17/2019/ 14 days    Diagnosis: Acute respiratory failure with hypoxia    Consults:        Active: GI, Gen Surg, Neuro, Palliative and SW       Needed: PT     Code Status: Full Code   Advanced Directive: <no information>    LDA: Hutchison, PICC and Vent       Central Lines/Site/Justification:Pressors       Urinary Cath/Order/Justification:Critically Ill in ICU    Vasopressors/Infusions:    dexmedetomidine (PRECEDEX) infusion 0.8 mcg/kg/hr (01/01/20 0400)    norepinephrine bitartrate-D5W Stopped (01/01/20 0342)          GOALS: Volume/ Hemodynamic: SBP >                     RASS: -2  light sedation, briefly awakes to voice (eye opening)    CAM ICU: N/A  Pain Management: none       Pain Controlled: not applicable     Rhythm: NSR    Respiratory Device: Vent    Vent Mode: PRVC  Oxygen Concentration (%):  [25-60] 30  Resp Rate Total:  [15 br/min-26 br/min] 15 br/min  Vt Set:  [320 mL-350 mL] 350 mL  PEEP/CPAP:  [5 cmH20] 5 cmH20  Mean Airway Pressure:  [8.7 cjQ12-27 cmH20] 8.7 cmH20             Most Recent SBT/ SAT: N/A       MOVE Screen:     VTE Prophylaxis: Mechanical  Mobility: Bedrest  Stress Ulcer Prophylaxis: Yes    Dietary: TF  Tolerance: yes  /  Advancement: @ goal    Isolation: No active isolations    Restraints: Yes    Significant Dates:  Post Op Date: N/A  Rescue Date: N/A  Imaging/ Diagnostics: N/A    Noteworthy Labs:      CBC/Anemia Labs: Coags:    Recent Labs   Lab 12/30/19  0327 12/30/19  0842 12/31/19  0305   WBC 12.12 11.60 13.10*   HGB 6.8* 7.1* 8.4*   HCT 20.7* 21.6* 25.9*   * 146* 159   MCV 85 87 84   RDW 17.7* 18.2* 17.3*   IRON 23*  --   --    FERRITIN 769*  --   --    FOLATE  --   --  4.9   [id number]  --   --  798    Recent Labs   Lab 12/26/19  0500 12/28/19  1132 12/29/19  0834   INR 1.2 1.1 1.0   APTT 34.8*  --   --         Chemistries:   Recent  Labs   Lab 12/29/19  0834 12/30/19  0327 12/31/19  0305    136 134*   K 4.0 3.9 4.6    104 101   CO2 28 29 29   BUN 16 15 14   CREATININE 0.7 0.7 0.7   CALCIUM 9.0 8.9 9.4   PROT 6.1 5.7* 6.2   BILITOT 0.5 0.4 0.5   ALKPHOS 71 69 73   ALT 37 30 29   AST 58* 48* 46*   MG 1.6 1.7 1.7   PHOS 2.9 3.1 3.6        Cardiac Enzymes: Ejection Fractions:    No results for input(s): CPK, CPKMB, MB, TROPONINI in the last 72 hours. No results found for: EF     POCT Glucose: HbA1c:    Recent Labs   Lab 12/26/19  0341   POCTGLUCOSE 132*    Hemoglobin A1C   Date Value Ref Range Status   12/26/2019 5.6 4.0 - 5.6 % Final     Comment:     ADA Screening Guidelines:  5.7-6.4%  Consistent with prediabetes  >or=6.5%  Consistent with diabetes  High levels of fetal hemoglobin interfere with the HbA1C  assay. Heterozygous hemoglobin variants (HbS, HgC, etc)do  not significantly interfere with this assay.   However, presence of multiple variants may affect accuracy.     09/14/2019 5.8 (H) 4.0 - 5.6 % Final     Comment:     ADA Screening Guidelines:  5.7-6.4%  Consistent with prediabetes  >or=6.5%  Consistent with diabetes  High levels of fetal hemoglobin interfere with the HbA1C  assay. Heterozygous hemoglobin variants (HbS, HgC, etc)do  not significantly interfere with this assay.   However, presence of multiple variants may affect accuracy.     12/01/2017 5.7 (H) 4.0 - 5.6 % Final     Comment:     According to ADA guidelines, hemoglobin A1c <7.0% represents  optimal control in non-pregnant diabetic patients. Different  metrics may apply to specific patient populations.   Standards of Medical Care in Diabetes-2016.  For the purpose of screening for the presence of diabetes:  <5.7%     Consistent with the absence of diabetes  5.7-6.4%  Consistent with increasing risk for diabetes   (prediabetes)  >or=6.5%  Consistent with diabetes  Currently, no consensus exists for use of hemoglobin A1c  for diagnosis of diabetes for  children.  This Hemoglobin A1c assay has significant interference with fetal   hemoglobin   (HbF). The results are invalid for patients with abnormal amounts of   HbF,   including those with known Hereditary Persistence   of Fetal Hemoglobin. Heterozygous hemoglobin variants (HbAS, HbAC,   HbAD, HbAE, HbA2) do not significantly interfere with this assay;   however, presence of multiple variants in a sample may impact the %   interference.          Needs from Care Team: none     ICU LOS 6d  Level of Care: Critical Care

## 2020-01-01 NOTE — PROGRESS NOTES
Surgery    Intubated arousable    Vital signs stable afebrile    Trachea midline    For tracheostomy tomorrow

## 2020-01-01 NOTE — PROGRESS NOTES
Received patient orally intubated and on a Servo I Ventilator with settings as follows:  PRVC 15/ 350/ +5 PEEP/ 25%.  Patient has a size 7.5 E T tube in place which is secured at the 23 cm mxai at the lips on the Left side of the mouth.  The patient also has a bite block in place which is located on the Left side of the mouth as well.  Both the ET tube and the bite block were rotated to the Right side of the mouth.  The Ventilator alarms are set and functional and the AMBU bag and mask are at the HOB.

## 2020-01-01 NOTE — PROGRESS NOTES
Pt received on Servo I vent with settings as followed: PRVC 15/350/+5 and 25%.  Size 7.5 ETT in place and secure at 23 cm at the lip.  Ambu bag and mask at bedside and all alarms on and functioning. NARN. RT will continue to monitor pt status.

## 2020-01-01 NOTE — PROGRESS NOTES
[facility]  Gastroenterology  Progress Note    Patient Name: Emilia Melgoza  MRN: [id number]  Admission Date: 12/17/2019  Hospital Length of Stay: 14 days  Code Status: Full Code   Attending Provider: Valeria Méndez MD  Primary Care Physician: Jerson Price MD  Principal Problem: Acute respiratory failure with hypoxia    Subjective:     CC= S/P PEG placement    Interval History: Patient is tolerating tube feeding well. NO nausea or vomiting. No residual.     Review of systems:  General: Negative for fevers, chills.  Cardiovascular:  Negative for chest pain, shortness of breath     Objective:     Vital Signs (Most Recent):  Temp: 97.7 °F (36.5 °C) (01/01/20 0700)  Pulse: (!) 58 (01/01/20 1000)  Resp: 15 (01/01/20 1000)  BP: (!) 185/81 (01/01/20 1000)  SpO2: 100 % (01/01/20 1000) Vital Signs (24h Range):  Temp:  [97.7 °F (36.5 °C)-99 °F (37.2 °C)] 97.7 °F (36.5 °C)  Pulse:  [] 58  Resp:  [6-46] 15  SpO2:  [91 %-100 %] 100 %  BP: (109-197)/() 185/81     Physical examination:  GEN: intubated  HENT: Normocephalic, anicteric sclera   Cardiovascular: Regular rate and rhythm. No murmurs appreciated.   Chest: Non-labored respirations. Breath sounds equal   Abdomen:  PEG sites looks good.  Out side stud was loosened and adjusted to 2.5 cm from distal end.  Psych: Appropriate mood and affect.   Extermities: No C/C/E. 2+ dorsalis pedis pulses bilaterally  Skin: No new visible or palpable lesions.     CBC:   Recent Labs   Lab 12/31/19  0305 01/01/20  0427   WBC 13.10* 9.58   HGB 8.4* 8.0*   HCT 25.9* 24.6*    181     BMP:   Recent Labs   Lab 01/01/20  0427   *      K 4.2      CO2 28   BUN 14   CREATININE 0.7   CALCIUM 9.2   MG 1.8           Assessment:   S/P PEG placement. Tolerating tube feeding well.    Plan:   Continue with tube feeding.  Please reconsult if there are GI concern.    Vik Vargas MD  Gastroenterology  [facility]

## 2020-01-02 ENCOUNTER — ANESTHESIA (OUTPATIENT)
Dept: SURGERY | Facility: HOSPITAL | Age: 75
DRG: 004 | End: 2020-01-02
Payer: MEDICARE

## 2020-01-02 ENCOUNTER — ANESTHESIA EVENT (OUTPATIENT)
Dept: SURGERY | Facility: HOSPITAL | Age: 75
DRG: 004 | End: 2020-01-02
Payer: MEDICARE

## 2020-01-02 LAB
ALBUMIN SERPL BCP-MCNC: 2.1 G/DL (ref 3.5–5.2)
ALLENS TEST: ABNORMAL
ALP SERPL-CCNC: 70 U/L (ref 55–135)
ALT SERPL W/O P-5'-P-CCNC: 26 U/L (ref 10–44)
ANION GAP SERPL CALC-SCNC: 5 MMOL/L (ref 8–16)
AST SERPL-CCNC: 34 U/L (ref 10–40)
BACTERIA BLD CULT: NORMAL
BACTERIA BLD CULT: NORMAL
BASOPHILS # BLD AUTO: 0.02 K/UL (ref 0–0.2)
BASOPHILS NFR BLD: 0.2 % (ref 0–1.9)
BILIRUB SERPL-MCNC: 0.4 MG/DL (ref 0.1–1)
BUN SERPL-MCNC: 16 MG/DL (ref 8–23)
CALCIUM SERPL-MCNC: 9 MG/DL (ref 8.7–10.5)
CHLORIDE SERPL-SCNC: 102 MMOL/L (ref 95–110)
CO2 SERPL-SCNC: 31 MMOL/L (ref 23–29)
CREAT SERPL-MCNC: 0.7 MG/DL (ref 0.5–1.4)
DELSYS: ABNORMAL
DIFFERENTIAL METHOD: ABNORMAL
EOSINOPHIL # BLD AUTO: 0.2 K/UL (ref 0–0.5)
EOSINOPHIL NFR BLD: 2.8 % (ref 0–8)
ERYTHROCYTE [DISTWIDTH] IN BLOOD BY AUTOMATED COUNT: 17.3 % (ref 11.5–14.5)
ERYTHROCYTE [SEDIMENTATION RATE] IN BLOOD BY WESTERGREN METHOD: 15 MM/H
EST. GFR  (AFRICAN AMERICAN): >60 ML/MIN/1.73 M^2
EST. GFR  (NON AFRICAN AMERICAN): >60 ML/MIN/1.73 M^2
FIO2: 30
GLUCOSE SERPL-MCNC: 93 MG/DL (ref 70–110)
HCO3 UR-SCNC: 30 MMOL/L (ref 24–28)
HCT VFR BLD AUTO: 24.7 % (ref 37–48.5)
HGB BLD-MCNC: 7.9 G/DL (ref 12–16)
IMM GRANULOCYTES # BLD AUTO: 0.1 K/UL (ref 0–0.04)
IMM GRANULOCYTES NFR BLD AUTO: 1.2 % (ref 0–0.5)
LYMPHOCYTES # BLD AUTO: 1.2 K/UL (ref 1–4.8)
LYMPHOCYTES NFR BLD: 14.2 % (ref 18–48)
MAGNESIUM SERPL-MCNC: 1.7 MG/DL (ref 1.6–2.6)
MCH RBC QN AUTO: 27.5 PG (ref 27–31)
MCHC RBC AUTO-ENTMCNC: 32 G/DL (ref 32–36)
MCV RBC AUTO: 86 FL (ref 82–98)
MIN VOL: 5.7
MODE: ABNORMAL
MONOCYTES # BLD AUTO: 0.9 K/UL (ref 0.3–1)
MONOCYTES NFR BLD: 10.6 % (ref 4–15)
NEUTROPHILS # BLD AUTO: 6.1 K/UL (ref 1.8–7.7)
NEUTROPHILS NFR BLD: 71 % (ref 38–73)
NRBC BLD-RTO: 0 /100 WBC
PCO2 BLDA: 39.2 MMHG (ref 35–45)
PEEP: 5
PH SMN: 7.49 [PH] (ref 7.35–7.45)
PHOSPHATE SERPL-MCNC: 4.1 MG/DL (ref 2.7–4.5)
PIP: 23
PLATELET # BLD AUTO: 217 K/UL (ref 150–350)
PMV BLD AUTO: 11.3 FL (ref 9.2–12.9)
PO2 BLDA: 105 MMHG (ref 80–100)
POC BE: 6 MMOL/L
POC SATURATED O2: 98 % (ref 95–100)
POC TCO2: 31 MMOL/L (ref 23–27)
POCT GLUCOSE: 98 MG/DL (ref 70–110)
POTASSIUM SERPL-SCNC: 4.5 MMOL/L (ref 3.5–5.1)
PROT SERPL-MCNC: 5.8 G/DL (ref 6–8.4)
RBC # BLD AUTO: 2.87 M/UL (ref 4–5.4)
SAMPLE: ABNORMAL
SITE: ABNORMAL
SODIUM SERPL-SCNC: 138 MMOL/L (ref 136–145)
SP02: 100
VT: 350
WBC # BLD AUTO: 8.6 K/UL (ref 3.9–12.7)

## 2020-01-02 PROCEDURE — D9220A PRA ANESTHESIA: Mod: ANES,,, | Performed by: ANESTHESIOLOGY

## 2020-01-02 PROCEDURE — D9220A PRA ANESTHESIA: ICD-10-PCS | Mod: CRNA,,, | Performed by: NURSE ANESTHETIST, CERTIFIED REGISTERED

## 2020-01-02 PROCEDURE — 94761 N-INVAS EAR/PLS OXIMETRY MLT: CPT

## 2020-01-02 PROCEDURE — 85025 COMPLETE CBC W/AUTO DIFF WBC: CPT

## 2020-01-02 PROCEDURE — D9220A PRA ANESTHESIA: ICD-10-PCS | Mod: ANES,,, | Performed by: ANESTHESIOLOGY

## 2020-01-02 PROCEDURE — D9220A PRA ANESTHESIA: Mod: CRNA,,, | Performed by: NURSE ANESTHETIST, CERTIFIED REGISTERED

## 2020-01-02 PROCEDURE — 80053 COMPREHEN METABOLIC PANEL: CPT

## 2020-01-02 PROCEDURE — A4216 STERILE WATER/SALINE, 10 ML: HCPCS | Performed by: SURGERY

## 2020-01-02 PROCEDURE — 83735 ASSAY OF MAGNESIUM: CPT

## 2020-01-02 PROCEDURE — 63600175 PHARM REV CODE 636 W HCPCS: Performed by: NURSE ANESTHETIST, CERTIFIED REGISTERED

## 2020-01-02 PROCEDURE — 25000003 PHARM REV CODE 250: Performed by: SURGERY

## 2020-01-02 PROCEDURE — 25000003 PHARM REV CODE 250: Performed by: INTERNAL MEDICINE

## 2020-01-02 PROCEDURE — 37000009 HC ANESTHESIA EA ADD 15 MINS: Performed by: SURGERY

## 2020-01-02 PROCEDURE — 37000008 HC ANESTHESIA 1ST 15 MINUTES: Performed by: SURGERY

## 2020-01-02 PROCEDURE — 99900035 HC TECH TIME PER 15 MIN (STAT)

## 2020-01-02 PROCEDURE — 27000221 HC OXYGEN, UP TO 24 HOURS

## 2020-01-02 PROCEDURE — 94003 VENT MGMT INPAT SUBQ DAY: CPT

## 2020-01-02 PROCEDURE — 27201423 OPTIME MED/SURG SUP & DEVICES STERILE SUPPLY: Performed by: SURGERY

## 2020-01-02 PROCEDURE — S0028 INJECTION, FAMOTIDINE, 20 MG: HCPCS | Performed by: SURGERY

## 2020-01-02 PROCEDURE — A4216 STERILE WATER/SALINE, 10 ML: HCPCS | Performed by: INTERNAL MEDICINE

## 2020-01-02 PROCEDURE — 63600175 PHARM REV CODE 636 W HCPCS: Performed by: HOSPITALIST

## 2020-01-02 PROCEDURE — 84100 ASSAY OF PHOSPHORUS: CPT

## 2020-01-02 PROCEDURE — 36000707: Performed by: SURGERY

## 2020-01-02 PROCEDURE — 36600 WITHDRAWAL OF ARTERIAL BLOOD: CPT

## 2020-01-02 PROCEDURE — 36000706: Performed by: SURGERY

## 2020-01-02 PROCEDURE — 20000000 HC ICU ROOM

## 2020-01-02 PROCEDURE — 99291 CRITICAL CARE FIRST HOUR: CPT | Mod: ,,, | Performed by: NURSE PRACTITIONER

## 2020-01-02 PROCEDURE — 82803 BLOOD GASES ANY COMBINATION: CPT

## 2020-01-02 PROCEDURE — 99900026 HC AIRWAY MAINTENANCE (STAT)

## 2020-01-02 PROCEDURE — 99291 PR CRITICAL CARE, E/M 30-74 MINUTES: ICD-10-PCS | Mod: ,,, | Performed by: NURSE PRACTITIONER

## 2020-01-02 DEVICE — IMPLANTABLE DEVICE: Type: IMPLANTABLE DEVICE | Site: TRACHEA | Status: FUNCTIONAL

## 2020-01-02 RX ORDER — SODIUM CHLORIDE, SODIUM LACTATE, POTASSIUM CHLORIDE, CALCIUM CHLORIDE 600; 310; 30; 20 MG/100ML; MG/100ML; MG/100ML; MG/100ML
INJECTION, SOLUTION INTRAVENOUS CONTINUOUS PRN
Status: DISCONTINUED | OUTPATIENT
Start: 2020-01-02 | End: 2020-01-02

## 2020-01-02 RX ORDER — HYDRALAZINE HYDROCHLORIDE 20 MG/ML
10 INJECTION INTRAMUSCULAR; INTRAVENOUS EVERY 6 HOURS PRN
Status: DISCONTINUED | OUTPATIENT
Start: 2020-01-02 | End: 2020-01-04 | Stop reason: HOSPADM

## 2020-01-02 RX ORDER — FENTANYL CITRATE 50 UG/ML
INJECTION, SOLUTION INTRAMUSCULAR; INTRAVENOUS
Status: DISCONTINUED | OUTPATIENT
Start: 2020-01-02 | End: 2020-01-02

## 2020-01-02 RX ORDER — PROPOFOL 10 MG/ML
VIAL (ML) INTRAVENOUS
Status: DISCONTINUED | OUTPATIENT
Start: 2020-01-02 | End: 2020-01-02

## 2020-01-02 RX ADMIN — SODIUM CHLORIDE, SODIUM LACTATE, POTASSIUM CHLORIDE, AND CALCIUM CHLORIDE: .6; .31; .03; .02 INJECTION, SOLUTION INTRAVENOUS at 08:01

## 2020-01-02 RX ADMIN — CHLORHEXIDINE GLUCONATE 0.12% ORAL RINSE 15 ML: 1.2 LIQUID ORAL at 09:01

## 2020-01-02 RX ADMIN — HYDRALAZINE HYDROCHLORIDE 10 MG: 20 INJECTION INTRAMUSCULAR; INTRAVENOUS at 06:01

## 2020-01-02 RX ADMIN — DEXMEDETOMIDINE HYDROCHLORIDE 0.8 MCG/KG/HR: 4 INJECTION, SOLUTION INTRAVENOUS at 02:01

## 2020-01-02 RX ADMIN — PROPOFOL 100 MG: 10 INJECTION, EMULSION INTRAVENOUS at 09:01

## 2020-01-02 RX ADMIN — Medication 10 ML: at 06:01

## 2020-01-02 RX ADMIN — CHLORHEXIDINE GLUCONATE 0.12% ORAL RINSE 15 ML: 1.2 LIQUID ORAL at 02:01

## 2020-01-02 RX ADMIN — FAMOTIDINE 20 MG: 10 INJECTION INTRAVENOUS at 02:01

## 2020-01-02 RX ADMIN — Medication 10 ML: at 05:01

## 2020-01-02 RX ADMIN — FENTANYL CITRATE 100 MCG: 50 INJECTION INTRAMUSCULAR; INTRAVENOUS at 09:01

## 2020-01-02 RX ADMIN — DEXMEDETOMIDINE HYDROCHLORIDE 0.2 MCG/KG/HR: 4 INJECTION, SOLUTION INTRAVENOUS at 08:01

## 2020-01-02 RX ADMIN — Medication 10 ML: at 12:01

## 2020-01-02 RX ADMIN — DEXMEDETOMIDINE HYDROCHLORIDE 0.6 MCG/KG/HR: 4 INJECTION, SOLUTION INTRAVENOUS at 04:01

## 2020-01-02 NOTE — PT/OT/SLP PROGRESS
Physical Therapy      Patient Name:  Emilia Melgoza   MRN:  [id number]    Patient not seen at this time for PT re-eval secondary to (hold per nurse Langford, pt s/p trach this am). Will follow-up as able.    Camelia Gardner, PT

## 2020-01-02 NOTE — SUBJECTIVE & OBJECTIVE
Interval History: No acute events over night. Trach placed in OR this morning.     Objective:     Vital Signs (Most Recent):  Temp: 98.6 °F (37 °C) (01/02/20 1150)  Pulse: 60 (01/02/20 1150)  Resp: 10(ambu) (01/02/20 1150)  BP: (!) 143/76 (01/02/20 1150)  SpO2: 100 % (01/02/20 1150) Vital Signs (24h Range):  Temp:  [96.6 °F (35.9 °C)-98.6 °F (37 °C)] 98.6 °F (37 °C)  Pulse:  [57-78] 60  Resp:  [10-22] 10  SpO2:  [98 %-100 %] 100 %  BP: (100-185)/(51-83) 143/76     Weight: 71.4 kg (157 lb 6.5 oz)  Body mass index is 26.19 kg/m².      Intake/Output Summary (Last 24 hours) at 1/2/2020 1210  Last data filed at 1/2/2020 0700  Gross per 24 hour   Intake 475.8 ml   Output 275 ml   Net 200.8 ml       Physical Exam   Constitutional: She appears well-developed and well-nourished. She is sedated.   HENT:   Head: Normocephalic and atraumatic.   Eyes: Pupils are equal, round, and reactive to light.   Neck: Neck supple. No tracheal deviation present. No thyromegaly present.   Trach in place with minimal blood on gauze    Cardiovascular: Normal rate, regular rhythm, normal heart sounds and intact distal pulses. Exam reveals no gallop and no friction rub.   No murmur heard.  Pulmonary/Chest: Effort normal. No accessory muscle usage or stridor. No tachypnea. She has no decreased breath sounds. She has no wheezes. She has rhonchi (R>L). She has no rales.   Abdominal: Soft. Bowel sounds are normal. There is no tenderness.   Genitourinary:   Genitourinary Comments: Urine catheter with clear yellow output   Neurological: GCS eye subscore is 3. GCS verbal subscore is 1. GCS motor subscore is 5.   PERRL. Eyes open to speech, does not track. Cough and gag reflex intact.  Spontaneously moves all extremities, does not follow commands.    Skin: Skin is warm and dry.   Nursing note and vitals reviewed.      Vents:  Vent Mode: PRVC (01/02/20 0803)  Ventilator Initiated: Yes (12/26/19 0417)  Set Rate: 15 bmp (01/02/20 0803)  Vt Set: 350 mL  (01/02/20 0803)  Pressure Support: 5 cmH20 (12/29/19 0907)  PEEP/CPAP: 5 cmH20 (01/02/20 0803)  Oxygen Concentration (%): 30 (01/02/20 1000)  Peak Airway Pressure: 22.7 cmH2O (01/02/20 0803)  Total Ve: 5.5 mL (01/02/20 0803)  F/VT Ratio<105 (RSBI): (!) 47.34 (01/02/20 0803)    Lines/Drains/Airways     Peripherally Inserted Central Catheter Line                 PICC Double Lumen 12/28/19 1340 right basilic 4 days          Drain                 Urethral Catheter 12/26/19 0441 Straight-tip 12 Fr. 7 days         Gastrostomy/Enterostomy 12/31/19 1330 Percutaneous endoscopic gastrostomy (PEG) LUQ feeding 1 day          Airway                 Surgical Airway 01/02/20 0919 Shiley Cuffed less than 1 day                Significant Labs:    CBC/Anemia Profile:  Recent Labs   Lab 01/01/20 0427 01/02/20  0331   WBC 9.58 8.60   HGB 8.0* 7.9*   HCT 24.6* 24.7*    217   MCV 84 86   RDW 17.2* 17.3*        Chemistries:  Recent Labs   Lab 01/01/20  0427 01/02/20  0331    138   K 4.2 4.5    102   CO2 28 31*   BUN 14 16   CREATININE 0.7 0.7   CALCIUM 9.2 9.0   ALBUMIN 2.3* 2.1*   PROT 6.0 5.8*   BILITOT 0.4 0.4   ALKPHOS 73 70   ALT 24 26   AST 41* 34   MG 1.8 1.7   PHOS 4.4 4.1       All pertinent labs within the past 24 hours have been reviewed.    Significant Imaging:  I have reviewed all pertinent imaging results/findings within the past 24 hours.

## 2020-01-02 NOTE — TRANSFER OF CARE
"Anesthesia Transfer of Care Note    Patient: Emilia Melgoza    Procedure(s) Performed: Procedure(s) (LRB):  TRACHEOTOMY (N/A)    Patient location: ICU    Anesthesia Type: general    Transport from OR: Transported from OR intubated on 100% O2 by AMBU with assisted ventilation    Post pain: adequate analgesia    Post assessment: no apparent anesthetic complications    Post vital signs: stable    Level of consciousness: sedated    Nausea/Vomiting: no nausea/vomiting    Complications: none    Transfer of care protocol was followed      Last vitals:   Visit Vitals  BP (!) 143/76 (BP Location: Left arm, Patient Position: Lying)   Pulse 60   Temp 37 °C (98.6 °F)   Resp 10   Ht 5' 5" (1.651 m)   Wt 71.4 kg (157 lb 6.5 oz)   SpO2 100%   Breastfeeding? No   BMI 26.19 kg/m²     "

## 2020-01-02 NOTE — CARE UPDATE
[facility]  ICU Multidisciplinary Bedside Rounds   SUMMARY     Date: 1/2/2020    Prehospitalization: Home  Admit Date / LOS : 12/17/2019/ 15 days    Diagnosis: Acute respiratory failure with hypoxia    Consults:        Active: GI, Gen Surg, Neuro, Palliative and SW       Needed: N/A     Code Status: Full Code   Advanced Directive: <no information>    LDA: Hutchison, PICC and Vent       Central Lines/Site/Justification:Multiple GTTS       Urinary Cath/Order/Justification:Critically Ill in ICU    Vasopressors/Infusions:    dexmedetomidine (PRECEDEX) infusion 0.798 mcg/kg/hr (01/02/20 0600)    norepinephrine bitartrate-D5W Stopped (01/01/20 0342)          GOALS: Volume/ Hemodynamic: N/A                     RASS: -3  moderate sedation, movement or eye opening; no eye contact    CAM ICU: N/A  Pain Management: none       Pain Controlled: not applicable     Rhythm: NSR    Respiratory Device: Vent    Vent Mode: PRVC  Oxygen Concentration (%):  [] 30  Resp Rate Total:  [15 br/min-31 br/min] 15 br/min  Vt Set:  [350 mL] 350 mL  PEEP/CPAP:  [5 cmH20] 5 cmH20  Mean Airway Pressure:  [8.5 cmH20-15.6 cmH20] 9 cmH20             Most Recent SBT/ SAT: N/A       MOVE Screen:     VTE Prophylaxis: Mechanical  Mobility: Bedrest  Stress Ulcer Prophylaxis: Yes    Dietary: NPO  Tolerance: not applicable  /  Advancement:     Isolation: No active isolations    Restraints: No    Significant Dates:  Post Op Date: N/A  Rescue Date: N/A  Imaging/ Diagnostics: N/A    Noteworthy Labs:      CBC/Anemia Labs: Coags:    Recent Labs   Lab 12/30/19  0327  12/31/19  0305 01/01/20  0427 01/02/20  0331   WBC 12.12   < > 13.10* 9.58 8.60   HGB 6.8*   < > 8.4* 8.0* 7.9*   HCT 20.7*   < > 25.9* 24.6* 24.7*   *   < > 159 181 217   MCV 85   < > 84 84 86   RDW 17.7*   < > 17.3* 17.2* 17.3*   IRON 23*  --   --   --   --    FERRITIN 769*  --   --   --   --    FOLATE  --   --  4.9  --   --    [id number]  --   --  798  --   --     < > =  values in this interval not displayed.    Recent Labs   Lab 12/28/19  1132 12/29/19  0834   INR 1.1 1.0        Chemistries:   Recent Labs   Lab 12/31/19  0305 01/01/20  0427 01/02/20  0331   * 137 138   K 4.6 4.2 4.5    101 102   CO2 29 28 31*   BUN 14 14 16   CREATININE 0.7 0.7 0.7   CALCIUM 9.4 9.2 9.0   PROT 6.2 6.0 5.8*   BILITOT 0.5 0.4 0.4   ALKPHOS 73 73 70   ALT 29 24 26   AST 46* 41* 34   MG 1.7 1.8 1.7   PHOS 3.6 4.4 4.1        Cardiac Enzymes: Ejection Fractions:    No results for input(s): CPK, CPKMB, MB, TROPONINI in the last 72 hours. No results found for: EF     POCT Glucose: HbA1c:    No results for input(s): POCTGLUCOSE in the last 168 hours. Hemoglobin A1C   Date Value Ref Range Status   12/26/2019 5.6 4.0 - 5.6 % Final     Comment:     ADA Screening Guidelines:  5.7-6.4%  Consistent with prediabetes  >or=6.5%  Consistent with diabetes  High levels of fetal hemoglobin interfere with the HbA1C  assay. Heterozygous hemoglobin variants (HbS, HgC, etc)do  not significantly interfere with this assay.   However, presence of multiple variants may affect accuracy.     09/14/2019 5.8 (H) 4.0 - 5.6 % Final     Comment:     ADA Screening Guidelines:  5.7-6.4%  Consistent with prediabetes  >or=6.5%  Consistent with diabetes  High levels of fetal hemoglobin interfere with the HbA1C  assay. Heterozygous hemoglobin variants (HbS, HgC, etc)do  not significantly interfere with this assay.   However, presence of multiple variants may affect accuracy.     12/01/2017 5.7 (H) 4.0 - 5.6 % Final     Comment:     According to ADA guidelines, hemoglobin A1c <7.0% represents  optimal control in non-pregnant diabetic patients. Different  metrics may apply to specific patient populations.   Standards of Medical Care in Diabetes-2016.  For the purpose of screening for the presence of diabetes:  <5.7%     Consistent with the absence of diabetes  5.7-6.4%  Consistent with increasing risk for diabetes    (prediabetes)  >or=6.5%  Consistent with diabetes  Currently, no consensus exists for use of hemoglobin A1c  for diagnosis of diabetes for children.  This Hemoglobin A1c assay has significant interference with fetal   hemoglobin   (HbF). The results are invalid for patients with abnormal amounts of   HbF,   including those with known Hereditary Persistence   of Fetal Hemoglobin. Heterozygous hemoglobin variants (HbAS, HbAC,   HbAD, HbAE, HbA2) do not significantly interfere with this assay;   however, presence of multiple variants in a sample may impact the %   interference.          Needs from Care Team: none     ICU LOS 7d 1h  Level of Care: Critical Care

## 2020-01-02 NOTE — OP NOTE
gen surg op note  Pre op dx resp failure  Post op dx same  Proc tracheosotmy  anesth gen  ebl min  Findings dictated  No specimen  danny [id number]

## 2020-01-02 NOTE — ASSESSMENT & PLAN NOTE
On minimal ventilator settings. CXR without signs of pneumonia. Neuro status currently precludes liberation from mechanical ventilation.   --Tracheostomy placed on 1/2/2020   --Wean ventilatory support, transition to trach collar as tolerated

## 2020-01-02 NOTE — PROGRESS NOTES
[facility]  Pulmonology  Progress Note    Patient Name: Emilia Melgoza  MRN: [id number]  Admission Date: 12/17/2019  Hospital Length of Stay: 15 days  Code Status: Full Code  Attending Provider: Valeria Méndez MD  Primary Care Provider: Jerson Price MD   Principal Problem: Acute respiratory failure with hypoxia    Subjective:     Interval History: No acute events over night. Trach placed in OR this morning.     Objective:     Vital Signs (Most Recent):  Temp: 98.6 °F (37 °C) (01/02/20 1150)  Pulse: 60 (01/02/20 1150)  Resp: 10(ambu) (01/02/20 1150)  BP: (!) 143/76 (01/02/20 1150)  SpO2: 100 % (01/02/20 1150) Vital Signs (24h Range):  Temp:  [96.6 °F (35.9 °C)-98.6 °F (37 °C)] 98.6 °F (37 °C)  Pulse:  [57-78] 60  Resp:  [10-22] 10  SpO2:  [98 %-100 %] 100 %  BP: (100-185)/(51-83) 143/76     Weight: 71.4 kg (157 lb 6.5 oz)  Body mass index is 26.19 kg/m².      Intake/Output Summary (Last 24 hours) at 1/2/2020 1210  Last data filed at 1/2/2020 0700  Gross per 24 hour   Intake 475.8 ml   Output 275 ml   Net 200.8 ml       Physical Exam   Constitutional: She appears well-developed and well-nourished. She is sedated.   HENT:   Head: Normocephalic and atraumatic.   Eyes: Pupils are equal, round, and reactive to light.   Neck: Neck supple. No tracheal deviation present. No thyromegaly present.   Trach in place with minimal blood on gauze    Cardiovascular: Normal rate, regular rhythm, normal heart sounds and intact distal pulses. Exam reveals no gallop and no friction rub.   No murmur heard.  Pulmonary/Chest: Effort normal. No accessory muscle usage or stridor. No tachypnea. She has no decreased breath sounds. She has no wheezes. She has rhonchi (R>L). She has no rales.   Abdominal: Soft. Bowel sounds are normal. There is no tenderness.   Genitourinary:   Genitourinary Comments: Urine catheter with clear yellow output   Neurological: GCS eye subscore is 3. GCS verbal subscore is 1. GCS motor  subscore is 5.   PERRL. Eyes open to speech, does not track. Cough and gag reflex intact.  Spontaneously moves all extremities, does not follow commands.    Skin: Skin is warm and dry.   Nursing note and vitals reviewed.      Vents:  Vent Mode: PRVC (01/02/20 0803)  Ventilator Initiated: Yes (12/26/19 0417)  Set Rate: 15 bmp (01/02/20 0803)  Vt Set: 350 mL (01/02/20 0803)  Pressure Support: 5 cmH20 (12/29/19 0907)  PEEP/CPAP: 5 cmH20 (01/02/20 0803)  Oxygen Concentration (%): 30 (01/02/20 1000)  Peak Airway Pressure: 22.7 cmH2O (01/02/20 0803)  Total Ve: 5.5 mL (01/02/20 0803)  F/VT Ratio<105 (RSBI): (!) 47.34 (01/02/20 0803)    Lines/Drains/Airways     Peripherally Inserted Central Catheter Line                 PICC Double Lumen 12/28/19 1340 right basilic 4 days          Drain                 Urethral Catheter 12/26/19 0441 Straight-tip 12 Fr. 7 days         Gastrostomy/Enterostomy 12/31/19 1330 Percutaneous endoscopic gastrostomy (PEG) LUQ feeding 1 day          Airway                 Surgical Airway 01/02/20 0919 Shiley Cuffed less than 1 day                Significant Labs:    CBC/Anemia Profile:  Recent Labs   Lab 01/01/20 0427 01/02/20  0331   WBC 9.58 8.60   HGB 8.0* 7.9*   HCT 24.6* 24.7*    217   MCV 84 86   RDW 17.2* 17.3*        Chemistries:  Recent Labs   Lab 01/01/20 0427 01/02/20  0331    138   K 4.2 4.5    102   CO2 28 31*   BUN 14 16   CREATININE 0.7 0.7   CALCIUM 9.2 9.0   ALBUMIN 2.3* 2.1*   PROT 6.0 5.8*   BILITOT 0.4 0.4   ALKPHOS 73 70   ALT 24 26   AST 41* 34   MG 1.8 1.7   PHOS 4.4 4.1       All pertinent labs within the past 24 hours have been reviewed.    Significant Imaging:  I have reviewed all pertinent imaging results/findings within the past 24 hours.    Assessment/Plan:     * Acute respiratory failure with hypoxia  On minimal ventilator settings. CXR without signs of pneumonia. Neuro status currently precludes liberation from mechanical ventilation.    --Tracheostomy placed on 1/2/2020   --Wean ventilatory support, transition to trach collar as tolerated     Shock  Likely due to sedation while on mechanical ventilation. No signs of infection with BCx NGTD. Not currently on abx. Completed full treatment course for E coli UTI earlier during admission.   --shock resolved     On mechanically assisted ventilation  See respiratory failure    Acute encephalopathy  Patient intubated for encephalopathy and somnolence.  Per notes patient has been altered for many days but worse in the last few days.  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   --Neurology following  --PEG placed 12/31 for chronic dysphagia  --Episodes of agitation while on mechanical ventilation so precedex infusion initiated; wean precedex as tolerated   --Tracheostomy placed on 01/02    Plan discussed with Dr. Rodriguez.       Critical Care Time: 45 minutes  Critical secondary to Patient has a condition that poses threat to life and bodily function:  Respiratory failure on mechanical ventilation      Critical care was time spent personally by me on the following activities: development of treatment plan with patient or surrogate and bedside caregivers, discussions with consultants, evaluation of patient's response to treatment, examination of patient, ordering and performing treatments and interventions, ordering and review of laboratory studies, ordering and review of radiographic studies, pulse oximetry, re-evaluation of patient's condition. This critical care time did not overlap with that of any other provider or involve time for any procedures.    Tg Peter, NP  Pulmonology  [facility]

## 2020-01-02 NOTE — SUBJECTIVE & OBJECTIVE
Interval History: no acute events     Review of Systems   Unable to perform ROS: Intubated     Objective:     Vital Signs (Most Recent):  Temp: 96.6 °F (35.9 °C) (01/02/20 1000)  Pulse: 62 (01/02/20 1015)  Resp: 15 (01/02/20 1015)  BP: (!) 175/79 (01/02/20 0830)  SpO2: 99 % (01/02/20 1015) Vital Signs (24h Range):  Temp:  [96.6 °F (35.9 °C)-98.4 °F (36.9 °C)] 96.6 °F (35.9 °C)  Pulse:  [57-78] 62  Resp:  [10-22] 15  SpO2:  [98 %-100 %] 99 %  BP: (100-185)/(51-83) 175/79     Weight: 71.4 kg (157 lb 6.5 oz)  Body mass index is 26.19 kg/m².    Intake/Output Summary (Last 24 hours) at 1/2/2020 1017  Last data filed at 1/2/2020 0700  Gross per 24 hour   Intake 592.2 ml   Output 475 ml   Net 117.2 ml      Physical Exam   Constitutional: She appears well-developed. No distress.   HENT:   ETT in place   Cardiovascular: Normal rate and regular rhythm.   Pulmonary/Chest: She has no wheezes. She has no rales.   On mechanical ventilation   Abdominal: Soft. Bowel sounds are normal.   Neurological: She displays normal reflexes.   On slight sedation  Moving both feet spontaneously.  Appears comfortable   Skin: She is not diaphoretic.   Psychiatric:   Unable to assess   Nursing note and vitals reviewed.      Significant Labs: All pertinent labs within the past 24 hours have been reviewed.    Significant Imaging: I have reviewed and interpreted all pertinent imaging results/findings within the past 24 hours.

## 2020-01-02 NOTE — ASSESSMENT & PLAN NOTE
Patient intubated for encephalopathy and somnolence.  Per notes patient has been altered for many days but worse in the last few days.  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   --Neurology following  --PEG placed 12/31 for chronic dysphagia  --Episodes of agitation while on mechanical ventilation so precedex infusion initiated; wean precedex as tolerated   --Tracheostomy placed on 01/02

## 2020-01-02 NOTE — PROGRESS NOTES
[facility] Hospital Medicine  Progress Note    Patient Name: Emilia Melgoza  MRN: [id number]  Patient Class: IP- Inpatient   Admission Date: 12/17/2019  Length of Stay: 15 days  Attending Physician: Valeria Méndez MD  Primary Care Provider: Jerson Price MD        Subjective:     Principal Problem:Acute respiratory failure with hypoxia        HPI:  74 y.o. 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  She is also not taking her medications.  She appears to be more confused than usual.  Had a recent tooth extraction by the dentist and was unable to take prescribed clindamycin.  Daughter reports patient has indicated mouth pain. Also recently prescribed Macrobid for UTI and was unable to complete.  Daughter denies that the patient has had a fever.  History further limited secondary to the patient's condition.  In the ED, she was found to be tachycardic with hypernatremia, hypokalemia, mild elevation of CPK and troponin.  Urinalysis, CT head, chest x-ray, and EKG were all unremarkable for any acute abnormality.  She appears clinically dehydrated.  Placed in observation for further evaluation and treatment.    Overview/Hospital Course:  Ms Melgoza presented with acute encephalopathy. Workup significant for soft tissue swelling and edema involving the right anterior mandibular region suspicious for cellulitis, hypernatremia (148) . Patient also noted to have significant dysphagia for which she was placed NPO due to high risk for aspiration. Also aphasic and significantly deconditioned requiring moderate assistance from physical and occupational therapists. Was  initiated on empiric IV antibiotics. CT brain with no acute pathology, just old infarct to left frontal lobe extending to basal ganglia and meningioma (unchanged). CTA brain showed no vessel occlusion. NGT placed for enteral nutrition. Hypernatremia resolved. PT/OT/ST continued. Palliative care was consulted to address goals of care, mainly for PEG placement. Patient, by nodding yes or no, agreed with PEG tube placement. Gastroenterology consulted for PEG. Labwork showed elevated total bilirubin and AST >> ALT. INR also elevated and, per family, patient is not on anticoagulation at home, just clopidogrel for old stroke. Antiplatelets and chemical DVT prophylaxis held. Vitamin K given. INR corrected. Total bilirubin normalized and enzymes improved. Overnight 12/25-12/26, patient experienced hypoxia and worsening encephalopathy. Also reports of transient hypotension earlier that had resolved. Rapid response called. Placed on supplemental O2 but continued to decline therefore transferred to ICU and intubated. Patient was kept off sedation as she was minimally responsive. Neurology and pulmonology consulted. EEG obtained. Showed severe generalized slowing suggesting severe diffuse or multifocal cerebral dysfunction. No epileptiform activity or seizures appreciated. MRI of brain obtained. This showed extensive multifocal acute infarctions in bilateral parietooccipital lobes and other areas of the brain. Patient started on aspirin overnight. No statin as LDL 72 four months ago. Blood pressure maintained, feeding continued via NGT, prophylactic heparin SQ, and ventilator management. Echocardiogram ordered. Showed diastolic dysfunction but preserved EF and no thrombus. No arrhythmias appreciated on cardiac monitoring. Patient would open eyes and move extremities but unsure if purposeful. Respiratory effort improved at small increments with each SBT but not enough to safely extubate, in addition to neuro deficits and  inability to determine if she can manage her own secretions. Extended ventilator support expected. General surgery consulted for Trach/PEG and SW consulted for LTAC.     12/31- plan for Trach 1/2    Interval History: no acute events     Review of Systems   Unable to perform ROS: Intubated     Objective:     Vital Signs (Most Recent):  Temp: 96.6 °F (35.9 °C) (01/02/20 1000)  Pulse: 62 (01/02/20 1015)  Resp: 15 (01/02/20 1015)  BP: (!) 175/79 (01/02/20 0830)  SpO2: 99 % (01/02/20 1015) Vital Signs (24h Range):  Temp:  [96.6 °F (35.9 °C)-98.4 °F (36.9 °C)] 96.6 °F (35.9 °C)  Pulse:  [57-78] 62  Resp:  [10-22] 15  SpO2:  [98 %-100 %] 99 %  BP: (100-185)/(51-83) 175/79     Weight: 71.4 kg (157 lb 6.5 oz)  Body mass index is 26.19 kg/m².    Intake/Output Summary (Last 24 hours) at 1/2/2020 1017  Last data filed at 1/2/2020 0700  Gross per 24 hour   Intake 592.2 ml   Output 475 ml   Net 117.2 ml      Physical Exam   Constitutional: She appears well-developed. No distress.   HENT:   ETT in place   Cardiovascular: Normal rate and regular rhythm.   Pulmonary/Chest: She has no wheezes. She has no rales.   On mechanical ventilation   Abdominal: Soft. Bowel sounds are normal.   Neurological: She displays normal reflexes.   On slight sedation  Moving both feet spontaneously.  Appears comfortable   Skin: She is not diaphoretic.   Psychiatric:   Unable to assess   Nursing note and vitals reviewed.      Significant Labs: All pertinent labs within the past 24 hours have been reviewed.    Significant Imaging: I have reviewed and interpreted all pertinent imaging results/findings within the past 24 hours.      Assessment/Plan:      * Acute respiratory failure with hypoxia  Acute aspiration highly suspected  Currently intubated but on low O2 requirements  MRI of brain significant for bilateral large strokes  Slightly better respiratory effort on CPAP but not enough for safe extubation  Unsure if patient able to control secretions.  "  Appeared uncomfortable on 12/29. On low dose precedex for comfort  This will affect accuracy of neuro exam however we are unable to safely extubate  Family pursuing aggressive treatment therefore, per my discussion with pulmonology, plan is tracheostomy and PEG placement. SW consulted for LTAC for gradual weaning of vent support   General surgery consulted for both tracheostomy and PEG placement  Continue tube feeds and supportive care  Very low dose of vasopressors to maintain higher blood pressure while on precedex   Pulmonology on board for co-management      Normocytic anemia  Slowly decreasing on a daily basis without clear evidence of bleeding  I suspect is due to combination of daily blood draws (which we need) and malnutrition  Iron studies, B12 and folate added to today's labs  Will transfuse one unit in preparation for surgery this week  Hold heparin for now      Acute arterial ischemic stroke, multifocal, posterior circulation  Cause unknown however episode of hypotension vs embolic event are highest on differential  Please see above for details  Aspirin and permissive HTN  Statin not indicated at this time  Hold heparin for decreased Hgb level although no clear evidence of bleeding noted at this time  PT/OT when able to participate      On mechanically assisted ventilation  Day # 5 of vent      Metabolic acidosis  Resolved with IV bicarb.    Elevated INR  Resolved       Cellulitis of mouth  Treated with IV Clindamycin for 7 days.   Resolved       Acute encephalopathy  Patient has shown continued decline  Does have evidence of encephalomalacia from old stroke and has had difficulty with PO intake mainly due to oral pain after tooth extraction but late effect of stroke not ruled out. Dysphagia appears to be an additional issue as she was seen "gurgling" that. Is now intubated but minimally responsive despite being off all sedation. Repeat CT of brain with no acute pathology.   CTA showed no vessel " occlusion  EEG with slowing but no epileptiform activity  MRI ordered showed bilateral large strokes  Could be 2/2 to hypotensive episode but embolic phenomena not ruled out  Cardiac monitor reviewed. No arrhythmia appreciated however I only have one strip available prior to 12/26  Echocardiogram showed diastolic dysf and pulmonary HTN but otherwise normal  Maintain BP. Continue ASA  Continue tube feeds. Monitor vitals. Labs reviewed    Mobility impaired  PT/OT when able    Acute cystitis without hematuria  Secondary to E.Coli. Low colony count. Treated with 7 days of IV Levaquin.  Resolved    Broca's aphasia  Nonverbal at baseline    Hemiparesis affecting right side as late effect of cerebrovascular accident  At baseline, no acute issue. Fall precautions.     Essential hypertension  Permissive HTN    Chronic diastolic congestive heart failure  Stable. Monitor for fluid overload.        VTE Risk Mitigation (From admission, onward)         Ordered     Place MAURA hose  Until discontinued      12/30/19 0953     IP VTE HIGH RISK PATIENT  Once      12/26/19 0532     Place sequential compression device  Until discontinued      12/17/19 2408                Critical care time spent on the evaluation and treatment of severe organ dysfunction, review of pertinent labs and imaging studies, discussions with consulting providers and discussions with patient/family: 40 minutes.      Valeria Méndez MD  Department of Hospital Medicine   [facility]

## 2020-01-02 NOTE — PLAN OF CARE
Problem: Adult Inpatient Plan of Care  Goal: Plan of Care Review  Outcome: Ongoing, Progressing    Patient remain intubated and sedated on Precedex. Open eyes on stimulation but does not follow commands. Vital signs stable and within limits. Peg noted with tube feeding at gaol rate. Feeding stop at midnight for Trach placement today by surgery. Consent has been signed and on chart. Hutchison noted with adequate urine output. No fall or injury No skin breakdown noted.

## 2020-01-02 NOTE — PROGRESS NOTES
No acute events overnight  On minimal vent settings  NPO for trach today    A/p  Respiratory failure  Trach today by Dr. Shine

## 2020-01-02 NOTE — PT/OT/SLP PROGRESS
Occupational Therapy      Patient Name:  Emilia Melgoza   MRN:  [id number]    Patient not seen today secondary to Other (Comment)(hold per Anastasiya dominguez. Pt is s/p trach this AM.). Will follow-up later once medically appropriate.    Marquita Virk, OT  1/2/2020

## 2020-01-02 NOTE — PROGRESS NOTES
gen surg  Pt examined and chart reviewed  Situation, procedure, risks d/w daughters at bedside, questions answered,they wish to proceed

## 2020-01-02 NOTE — PLAN OF CARE
Pt remains on precedex, has not required levo. Tolerating tube feed well. PROM performed throughout day on pt abigail lower and upper extremities. No falls, no skin breakdown

## 2020-01-02 NOTE — PROGRESS NOTES
Results for MANDY WILLIAMSON (MRN [id number]) as of 1/2/2020 05:25   Ref. Range 1/2/2020 04:37   POC PH Latest Ref Range: 7.35 - 7.45  7.491 (H)   POC PCO2 Latest Ref Range: 35 - 45 mmHg 39.2   POC PO2 Latest Ref Range: 80 - 100 mmHg 105 (H)   POC BE Latest Ref Range: -2 to 2 mmol/L 6   POC HCO3 Latest Ref Range: 24 - 28 mmol/L 30.0 (H)   POC SATURATED O2 Latest Ref Range: 95 - 100 % 98   POC TCO2 Latest Ref Range: 23 - 27 mmol/L 31 (H)   FiO2 Unknown 30   Vt Unknown 350   PiP Unknown 23   PEEP Unknown 5   Sample Unknown ARTERIAL   DelSys Unknown Adult Vent   Allens Test Unknown Pass   Site Unknown LR   Mode Unknown AC/PRVC   Rate Unknown 15

## 2020-01-02 NOTE — OP NOTE
DATE OF PROCEDURE:  01/02/2020    PREOPERATIVE DIAGNOSIS:  Respiratory failure.    POSTOPERATIVE DIAGNOSIS:  Respiratory failure.    PROCEDURE PERFORMED:  Tracheostomy tube placement.    SURGEON:  Marco Shine III, M.D.    ANESTHESIA:  General.    BLOOD LOSS:  Minimal.    CLINICAL HISTORY:  This is a 74-year-old female, intubated with respiratory   failure and concerns of aspiration, in need for tracheostomy tube.    PROCEDURE IN DETAIL:  The patient was brought to the Operating Room, placed on   the operating table in the supine position with a transverse shoulder roll and   the neck slightly extended.  The anterior neck and chest were prepped and draped   in sterile fashion.  A curvilinear collar incision was made about 2 to 3 cm in   length, about 2 fingerbreadths above the sternal notch.  Electrocautery was used   to dissect through the subcutaneous tissue and the platysma down to the strap   muscles.  A subcutaneous vein on the left in the subcutaneous tissue had some   bleeding.  It was suture ligated with a 3-0 silk figure-of-eight suture.  The   strap muscles were divided in the midline, exposing the anterior surface of the   trachea.  The thyroid isthmus was divided with generous use of cautery cleaning   the anterior surface of the trachea.  The third tracheal ring was chosen for the   tracheotomy.  A total of two 2-0 Prolene stay sutures were placed to the left   and right of the midline through the third tracheal ring.  Next, an H type   incision was made through the tracheal ring.  The trachea was spread.  A #8   fenestrated Shiley trach was placed as the endotracheal tube was withdrawn.  I   inserted the tube into the trachea.  The balloon was inflated.  It was connected   to the anesthesia machine with easy ventilation and the confirmation of end   tidal CO2.  The wound was inspected.  It was found to be hemostatic.  The skin   was reapproximated to the left and the right of midline with 3-0  nylon sutures.    Next, the tracheostomy tube was secured to the skin in 4 places with 3-0 nylon   suture.  A sterile dressing was applied.  The patient tolerated the procedure   well.      BIR/IN  dd: 01/02/2020 09:35:26 (CST)  td: 01/02/2020 10:04:58 (CST)  Doc ID   #[id number]  Job ID #[id number]    CC:

## 2020-01-02 NOTE — ASSESSMENT & PLAN NOTE
Likely due to sedation while on mechanical ventilation. No signs of infection with BCx NGTD. Not currently on abx. Completed full treatment course for E coli UTI earlier during admission.   --shock resolved

## 2020-01-02 NOTE — PLAN OF CARE
01/02/20 1617   Discharge Reassessment   Assessment Type Discharge Planning Reassessment   Discharge Plan A Skilled Nursing Facility   Discharge Plan B   (TBD)   Patient choice form signed by patient/caregiver N/A   Anticipated Discharge Disposition SNF   Can the patient answer the patient profile reliably? No, cognitively impaired   How often would a person be available to care for the patient? Whenever needed

## 2020-01-02 NOTE — ANESTHESIA PREPROCEDURE EVALUATION
01/02/2020    Pre-operative evaluation for Procedure(s) (LRB):  TRACHEOTOMY (N/A)    Emilia Melgoza is a 74 y.o. female     Patient Active Problem List   Diagnosis    DDD (degenerative disc disease), lumbar    Overactive bladder    Vitamin D deficiency disease    Chronic diastolic congestive heart failure    Cerebral microvascular disease    Aphasia, late effect of cerebrovascular disease    Pre-diabetes    Essential hypertension    Meningioma    Constipation    Gait instability    Hemiparesis affecting right side as late effect of cerebrovascular accident    Chronic hypokalemia    Anxiety    MCI (mild cognitive impairment)    Pleurisy    Broca's aphasia    Dysarthria    Cerebral infarction    Fatigue    Upper respiratory tract infection    Acute cystitis without hematuria    Right shoulder pain    Lethargy    Tachycardia    Mobility impaired    Vitamin D deficiency    Asymptomatic menopausal state     Acute encephalopathy    Cellulitis of mouth    Elevated INR    Metabolic acidosis    Acute respiratory failure with hypoxia    On mechanically assisted ventilation    Altered mental status    Acute arterial ischemic stroke, multifocal, posterior circulation    Normocytic anemia    Shock       Review of patient's allergies indicates:   Allergen Reactions    Enoxaparin Other (See Comments) and Hives     Slurred speech per patient  Slurred speech per patient    Lisinopril Other (See Comments)     Cough    Penicillins Rash       No current facility-administered medications on file prior to encounter.      Current Outpatient Medications on File Prior to Encounter   Medication Sig Dispense Refill    amlodipine (NORVASC) 1 mg/mL Susp Take 5 mLs (5 mg total) by mouth once daily. 150 mL 1    atorvastatin (LIPITOR) 80 MG tablet Take 1 tablet (80 mg total) by mouth once daily. 90 tablet 3    azelastine (ASTELIN) 137 mcg (0.1 %) nasal spray 1 spray (137 mcg total) by Nasal route 2  (two) times daily. 30 mL 0    cholecalciferol, vitamin D3, 1,000 unit capsule Take 1 capsule (1,000 Units total) by mouth once daily. 90 capsule 3    clindamycin (CLEOCIN) 150 MG capsule TAKE 1 CAPSULE BY MOUTH EVERY 6 HOURS UNTIL COMPLETE  0    cloNIDine (CATAPRES) 0.1 MG tablet TAKE 2 TABLETS BY MOUTH ONCE DAILY IN THE EVENING 180 tablet 1    clopidogrel (PLAVIX) 75 mg tablet Take 1 tablet (75 mg total) by mouth once daily. 90 tablet 3    escitalopram oxalate (LEXAPRO) 5 mg/5 mL Soln Take 10 mLs (10 mg total) by mouth once daily. 300 mL 11    fexofenadine (ALLEGRA) 180 MG tablet Take 1 tablet (180 mg total) by mouth once daily. 30 tablet 0    fluticasone propionate (FLONASE) 50 mcg/actuation nasal spray 1 spray (50 mcg total) by Each Nostril route once daily. 1 Bottle 0    GENERLAC 10 gram/15 mL solution       ibuprofen (ADVIL,MOTRIN) 800 MG tablet       memantine (NAMENDA) 10 MG Tab Take 10 mg by mouth once daily.  1    oxybutynin (DITROPAN) 5 mg/5 mL syrup Take 10 mLs (10 mg total) by mouth 2 (two) times daily. 473 mL 1    polyethylene glycol (GLYCOLAX) 17 gram PwPk DISSOLVE 17 GRAMS OF POWDER (1 PACK) IN WATER & DRINK ONCE DAILY FOR 5 DAYS  0    tramadol (ULTRAM) 50 mg tablet Take 1/2 tablet as needed for headache 30 tablet 0       VITALS  Vitals:    01/02/20 0600   BP: (!) 178/78   Pulse: 63   Resp: 15   Temp:        LABS  CBC:   Recent Labs     01/01/20 0427 01/02/20  0331   WBC 9.58 8.60   RBC 2.94* 2.87*   HGB 8.0* 7.9*   HCT 24.6* 24.7*    217   MCV 84 86   MCH 27.2 27.5   MCHC 32.5 32.0       CMP:   Recent Labs     01/01/20  0427 01/02/20  0331    138   K 4.2 4.5    102   CO2 28 31*   BUN 14 16   CREATININE 0.7 0.7   * 93   MG 1.8 1.7   PHOS 4.4 4.1   CALCIUM 9.2 9.0   ALBUMIN 2.3* 2.1*   PROT 6.0 5.8*   ALKPHOS 73 70   ALT 24 26   AST 41* 34   BILITOT 0.4 0.4     CAYDEN 12/28/19  · Normal left ventricular systolic function. The estimated ejection fraction is  65%  · Concentric left ventricular hypertrophy.  · Left ventricular diastolic dysfunction.  · Normal right ventricular systolic function.  · Mild left atrial enlargement.  · The estimated PA systolic pressure is 62 mm Hg  · Pulmonary hypertension present.    Anesthesia Evaluation    I have reviewed the Patient Summary Reports.     I have reviewed the Medications.     Review of Systems  Anesthesia Hx:  No previous Anesthesia  History of prior surgery of interest to airway management or planning: Denies Family Hx of Anesthesia complications.   Denies Personal Hx of Anesthesia complications.   Social:  Non-Smoker    Hematology/Oncology:  Hematology Normal   Oncology Normal   Hematology Comments:      EENT/Dental:EENT/Dental Normal   Cardiovascular:   Exercise tolerance: poor Hypertension CHF hyperlipidemia ECG has been reviewed.    Pulmonary:  Pulmonary Normal  Pulmonary Hypertension Echocardiographic Estimated Pulmonary Artery Systolic Pressure 45 - 60    Renal/:  Renal/ Normal     Hepatic/GI:  Hepatic/GI Normal Dysphagia for PEG   Musculoskeletal:   Arthritis     Neurological:   CVA, residual symptoms    Endocrine:  Endocrine Normal    Dermatological:  Skin Normal    Psych:  Psychiatric Normal           Physical Exam  General:  Well nourished    Airway/Jaw/Neck:  Airway Findings: Pre-Existing Airway Tube(s): Oral Endotracheal tube      Chest/Lungs:  Chest/Lungs Findings: Normal Respiratory Rate     Heart/Vascular:  Heart Findings: Rate: Normal        Mental Status:  Mental Status Findings: (sedated )         Anesthesia Plan  Type of Anesthesia, risks & benefits discussed:  Anesthesia Type:  general  Patient's Preference:   Intra-op Monitoring Plan: standard ASA monitors  Intra-op Monitoring Plan Comments:   Post Op Pain Control Plan: multimodal analgesia  Post Op Pain Control Plan Comments:   Induction:    Beta Blocker:  Patient is not currently on a Beta-Blocker (No further documentation required).        Informed Consent: Patient representative understands risks and agrees with Anesthesia plan.  Questions answered. Anesthesia consent signed with patient representative.  ASA Score: 3     Day of Surgery Review of History & Physical:  There are no significant changes.          Ready For Surgery From Anesthesia Perspective.

## 2020-01-03 LAB
ALBUMIN SERPL BCP-MCNC: 2.1 G/DL (ref 3.5–5.2)
ALLENS TEST: ABNORMAL
ALP SERPL-CCNC: 64 U/L (ref 55–135)
ALT SERPL W/O P-5'-P-CCNC: 20 U/L (ref 10–44)
ANION GAP SERPL CALC-SCNC: 8 MMOL/L (ref 8–16)
AST SERPL-CCNC: 33 U/L (ref 10–40)
BASOPHILS # BLD AUTO: 0.03 K/UL (ref 0–0.2)
BASOPHILS NFR BLD: 0.3 % (ref 0–1.9)
BILIRUB SERPL-MCNC: 0.4 MG/DL (ref 0.1–1)
BUN SERPL-MCNC: 19 MG/DL (ref 8–23)
CALCIUM SERPL-MCNC: 8.8 MG/DL (ref 8.7–10.5)
CHLORIDE SERPL-SCNC: 101 MMOL/L (ref 95–110)
CO2 SERPL-SCNC: 27 MMOL/L (ref 23–29)
CREAT SERPL-MCNC: 0.8 MG/DL (ref 0.5–1.4)
DELSYS: ABNORMAL
DIFFERENTIAL METHOD: ABNORMAL
EOSINOPHIL # BLD AUTO: 0 K/UL (ref 0–0.5)
EOSINOPHIL NFR BLD: 0.4 % (ref 0–8)
ERYTHROCYTE [DISTWIDTH] IN BLOOD BY AUTOMATED COUNT: 16.7 % (ref 11.5–14.5)
ERYTHROCYTE [SEDIMENTATION RATE] IN BLOOD BY WESTERGREN METHOD: 15 MM/H
EST. GFR  (AFRICAN AMERICAN): >60 ML/MIN/1.73 M^2
EST. GFR  (NON AFRICAN AMERICAN): >60 ML/MIN/1.73 M^2
FIO2: 21
GLUCOSE SERPL-MCNC: 146 MG/DL (ref 70–110)
HCO3 UR-SCNC: 30.3 MMOL/L (ref 24–28)
HCT VFR BLD AUTO: 23.9 % (ref 37–48.5)
HGB BLD-MCNC: 7.8 G/DL (ref 12–16)
IMM GRANULOCYTES # BLD AUTO: 0.1 K/UL (ref 0–0.04)
IMM GRANULOCYTES NFR BLD AUTO: 0.9 % (ref 0–0.5)
LYMPHOCYTES # BLD AUTO: 1 K/UL (ref 1–4.8)
LYMPHOCYTES NFR BLD: 8.4 % (ref 18–48)
MAGNESIUM SERPL-MCNC: 1.7 MG/DL (ref 1.6–2.6)
MCH RBC QN AUTO: 27.6 PG (ref 27–31)
MCHC RBC AUTO-ENTMCNC: 32.6 G/DL (ref 32–36)
MCV RBC AUTO: 85 FL (ref 82–98)
MIN VOL: 6.1
MODE: ABNORMAL
MONOCYTES # BLD AUTO: 1 K/UL (ref 0.3–1)
MONOCYTES NFR BLD: 8.4 % (ref 4–15)
NEUTROPHILS # BLD AUTO: 9.3 K/UL (ref 1.8–7.7)
NEUTROPHILS NFR BLD: 81.6 % (ref 38–73)
NRBC BLD-RTO: 0 /100 WBC
PCO2 BLDA: 36.7 MMHG (ref 35–45)
PEEP: 5
PH SMN: 7.52 [PH] (ref 7.35–7.45)
PHOSPHATE SERPL-MCNC: 4.2 MG/DL (ref 2.7–4.5)
PIP: 17
PLATELET # BLD AUTO: 229 K/UL (ref 150–350)
PMV BLD AUTO: 9.8 FL (ref 9.2–12.9)
PO2 BLDA: 95 MMHG (ref 80–100)
POC BE: 7 MMOL/L
POC SATURATED O2: 98 % (ref 95–100)
POC TCO2: 31 MMOL/L (ref 23–27)
POTASSIUM SERPL-SCNC: 4.3 MMOL/L (ref 3.5–5.1)
PROT SERPL-MCNC: 5.8 G/DL (ref 6–8.4)
RBC # BLD AUTO: 2.83 M/UL (ref 4–5.4)
SAMPLE: ABNORMAL
SITE: ABNORMAL
SODIUM SERPL-SCNC: 136 MMOL/L (ref 136–145)
SP02: 100
VT: 350
WBC # BLD AUTO: 11.38 K/UL (ref 3.9–12.7)

## 2020-01-03 PROCEDURE — 80053 COMPREHEN METABOLIC PANEL: CPT

## 2020-01-03 PROCEDURE — 99900035 HC TECH TIME PER 15 MIN (STAT)

## 2020-01-03 PROCEDURE — 85025 COMPLETE CBC W/AUTO DIFF WBC: CPT

## 2020-01-03 PROCEDURE — 25000003 PHARM REV CODE 250: Performed by: SURGERY

## 2020-01-03 PROCEDURE — 25000003 PHARM REV CODE 250: Performed by: HOSPITALIST

## 2020-01-03 PROCEDURE — 99291 CRITICAL CARE FIRST HOUR: CPT | Mod: ,,, | Performed by: NURSE PRACTITIONER

## 2020-01-03 PROCEDURE — 36415 COLL VENOUS BLD VENIPUNCTURE: CPT

## 2020-01-03 PROCEDURE — 84100 ASSAY OF PHOSPHORUS: CPT

## 2020-01-03 PROCEDURE — 99900026 HC AIRWAY MAINTENANCE (STAT)

## 2020-01-03 PROCEDURE — 51702 INSERT TEMP BLADDER CATH: CPT

## 2020-01-03 PROCEDURE — 36600 WITHDRAWAL OF ARTERIAL BLOOD: CPT

## 2020-01-03 PROCEDURE — 94761 N-INVAS EAR/PLS OXIMETRY MLT: CPT

## 2020-01-03 PROCEDURE — 97110 THERAPEUTIC EXERCISES: CPT

## 2020-01-03 PROCEDURE — A4216 STERILE WATER/SALINE, 10 ML: HCPCS | Performed by: SURGERY

## 2020-01-03 PROCEDURE — 83735 ASSAY OF MAGNESIUM: CPT

## 2020-01-03 PROCEDURE — 20000000 HC ICU ROOM

## 2020-01-03 PROCEDURE — 82803 BLOOD GASES ANY COMBINATION: CPT

## 2020-01-03 PROCEDURE — 63600175 PHARM REV CODE 636 W HCPCS: Performed by: HOSPITALIST

## 2020-01-03 PROCEDURE — 99291 PR CRITICAL CARE, E/M 30-74 MINUTES: ICD-10-PCS | Mod: ,,, | Performed by: NURSE PRACTITIONER

## 2020-01-03 PROCEDURE — S0028 INJECTION, FAMOTIDINE, 20 MG: HCPCS | Performed by: SURGERY

## 2020-01-03 RX ORDER — CLOPIDOGREL BISULFATE 75 MG/1
75 TABLET ORAL DAILY
Status: DISCONTINUED | OUTPATIENT
Start: 2020-01-03 | End: 2020-01-04 | Stop reason: HOSPADM

## 2020-01-03 RX ORDER — GLYCOPYRROLATE 0.2 MG/ML
0.2 INJECTION INTRAMUSCULAR; INTRAVENOUS 3 TIMES DAILY
Status: DISCONTINUED | OUTPATIENT
Start: 2020-01-03 | End: 2020-01-04 | Stop reason: HOSPADM

## 2020-01-03 RX ORDER — HYDROCODONE BITARTRATE AND ACETAMINOPHEN 7.5; 325 MG/15ML; MG/15ML
15 SOLUTION ORAL EVERY 4 HOURS PRN
Status: DISCONTINUED | OUTPATIENT
Start: 2020-01-03 | End: 2020-01-04 | Stop reason: HOSPADM

## 2020-01-03 RX ORDER — ACETAMINOPHEN 650 MG/20.3ML
650 LIQUID ORAL EVERY 6 HOURS PRN
Status: DISCONTINUED | OUTPATIENT
Start: 2020-01-03 | End: 2020-01-04 | Stop reason: HOSPADM

## 2020-01-03 RX ORDER — ATORVASTATIN CALCIUM 40 MG/1
40 TABLET, FILM COATED ORAL NIGHTLY
Status: DISCONTINUED | OUTPATIENT
Start: 2020-01-03 | End: 2020-01-04 | Stop reason: HOSPADM

## 2020-01-03 RX ORDER — MORPHINE SULFATE 10 MG/ML
2 INJECTION INTRAMUSCULAR; INTRAVENOUS; SUBCUTANEOUS EVERY 6 HOURS PRN
Status: DISCONTINUED | OUTPATIENT
Start: 2020-01-03 | End: 2020-01-04 | Stop reason: HOSPADM

## 2020-01-03 RX ADMIN — Medication 10 ML: at 11:01

## 2020-01-03 RX ADMIN — CHLORHEXIDINE GLUCONATE 0.12% ORAL RINSE 15 ML: 1.2 LIQUID ORAL at 08:01

## 2020-01-03 RX ADMIN — ACETAMINOPHEN ORAL SOLUTION 650 MG: 650 SOLUTION ORAL at 08:01

## 2020-01-03 RX ADMIN — HYDROCODONE BITARTRATE AND ACETAMINOPHEN 15 ML: 7.5; 325 SOLUTION ORAL at 11:01

## 2020-01-03 RX ADMIN — FAMOTIDINE 20 MG: 10 INJECTION INTRAVENOUS at 08:01

## 2020-01-03 RX ADMIN — GLYCOPYRROLATE 0.2 MG: 0.2 INJECTION, SOLUTION INTRAMUSCULAR; INTRAVENOUS at 08:01

## 2020-01-03 RX ADMIN — ATORVASTATIN CALCIUM 40 MG: 40 TABLET, FILM COATED ORAL at 08:01

## 2020-01-03 RX ADMIN — MORPHINE SULFATE 2 MG: 10 INJECTION INTRAVENOUS at 06:01

## 2020-01-03 RX ADMIN — ASPIRIN 81 MG 81 MG: 81 TABLET ORAL at 08:01

## 2020-01-03 RX ADMIN — CLOPIDOGREL BISULFATE 75 MG: 75 TABLET ORAL at 03:01

## 2020-01-03 RX ADMIN — DEXMEDETOMIDINE HYDROCHLORIDE 0.8 MCG/KG/HR: 4 INJECTION, SOLUTION INTRAVENOUS at 12:01

## 2020-01-03 RX ADMIN — GLYCOPYRROLATE 0.2 MG: 0.2 INJECTION, SOLUTION INTRAMUSCULAR; INTRAVENOUS at 03:01

## 2020-01-03 NOTE — PROGRESS NOTES
[facility] Hospital Medicine  Progress Note    Patient Name: Emilia Melgoza  MRN: [id number]  Patient Class: IP- Inpatient   Admission Date: 12/17/2019  Length of Stay: 15 days  Attending Physician: Valeria Méndez MD  Primary Care Provider: Jerson Price MD        Subjective:     Principal Problem:Acute respiratory failure with hypoxia        HPI:  74 y.o. 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  She is also not taking her medications.  She appears to be more confused than usual.  Had a recent tooth extraction by the dentist and was unable to take prescribed clindamycin.  Daughter reports patient has indicated mouth pain. Also recently prescribed Macrobid for UTI and was unable to complete.  Daughter denies that the patient has had a fever.  History further limited secondary to the patient's condition.  In the ED, she was found to be tachycardic with hypernatremia, hypokalemia, mild elevation of CPK and troponin.  Urinalysis, CT head, chest x-ray, and EKG were all unremarkable for any acute abnormality.  She appears clinically dehydrated.  Placed in observation for further evaluation and treatment.    Overview/Hospital Course:  Ms Melgoza presented with acute encephalopathy. Workup significant for soft tissue swelling and edema involving the right anterior mandibular region suspicious for cellulitis, hypernatremia (148) . Patient also noted to have significant dysphagia for which she was placed NPO due to high risk for aspiration. Also aphasic and significantly deconditioned requiring moderate assistance from physical and occupational therapists. Was  initiated on empiric IV antibiotics. CT brain with no acute pathology, just old infarct to left frontal lobe extending to basal ganglia and meningioma (unchanged). CTA brain showed no vessel occlusion. NGT placed for enteral nutrition. Hypernatremia resolved. PT/OT/ST continued. Palliative care was consulted to address goals of care, mainly for PEG placement. Patient, by nodding yes or no, agreed with PEG tube placement. Gastroenterology consulted for PEG. Labwork showed elevated total bilirubin and AST >> ALT. INR also elevated and, per family, patient is not on anticoagulation at home, just clopidogrel for old stroke. Antiplatelets and chemical DVT prophylaxis held. Vitamin K given. INR corrected. Total bilirubin normalized and enzymes improved. Overnight 12/25-12/26, patient experienced hypoxia and worsening encephalopathy. Also reports of transient hypotension earlier that had resolved. Rapid response called. Placed on supplemental O2 but continued to decline therefore transferred to ICU and intubated. Patient was kept off sedation as she was minimally responsive. Neurology and pulmonology consulted. EEG obtained. Showed severe generalized slowing suggesting severe diffuse or multifocal cerebral dysfunction. No epileptiform activity or seizures appreciated. MRI of brain obtained. This showed extensive multifocal acute infarctions in bilateral parietooccipital lobes and other areas of the brain. Patient started on aspirin overnight. No statin as LDL 72 four months ago. Blood pressure maintained, feeding continued via NGT, prophylactic heparin SQ, and ventilator management. Echocardiogram ordered. Showed diastolic dysfunction but preserved EF and no thrombus. No arrhythmias appreciated on cardiac monitoring. Patient would open eyes and move extremities but unsure if purposeful. Respiratory effort improved at small increments with each SBT but not enough to safely extubate, in addition to neuro deficits and  inability to determine if she can manage her own secretions. Extended ventilator support expected. General surgery consulted for Trach/PEG and SW consulted for LTAC.     12/31- plan for Trach 1/2  1/2- trach placed, pt moving left arm more and requiring restraints; SW notified of LTAC dc planning; tighter BP control     Interval History: pt more alert and moving left arm    Review of Systems   Unable to perform ROS: Intubated     Objective:     Vital Signs (Most Recent):  Temp: 97.8 °F (36.6 °C) (01/02/20 1540)  Pulse: 89 (01/02/20 1845)  Resp: (!) 24 (01/02/20 1845)  BP: (!) 124/56 (01/02/20 1832)  SpO2: 98 % (01/02/20 1845) Vital Signs (24h Range):  Temp:  [96.6 °F (35.9 °C)-98.6 °F (37 °C)] 97.8 °F (36.6 °C)  Pulse:  [59-96] 89  Resp:  [5-43] 24  SpO2:  [87 %-100 %] 98 %  BP: (100-185)/(51-85) 124/56     Weight: 71.4 kg (157 lb 6.5 oz)  Body mass index is 26.19 kg/m².    Intake/Output Summary (Last 24 hours) at 1/2/2020 1941  Last data filed at 1/2/2020 1830  Gross per 24 hour   Intake 532.48 ml   Output 950 ml   Net -417.52 ml      Physical Exam   Constitutional: She appears well-developed. No distress.   HENT:   Trach in place    Cardiovascular: Normal rate and regular rhythm.   Pulmonary/Chest: She has no wheezes. She has no rales.   On mechanical ventilation   Abdominal: Soft. Bowel sounds are normal.   Neurological: She is alert. She displays normal reflexes.   On slight sedation  Moving both feet spontaneously.  Restless  Moving left arm   Skin: She is not diaphoretic.   Psychiatric:   Unable to assess   Nursing note and vitals reviewed.      Significant Labs: All pertinent labs within the past 24 hours have been reviewed.    Significant Imaging: I have reviewed and interpreted all pertinent imaging results/findings within the past 24 hours.      Assessment/Plan:      * Acute respiratory failure with hypoxia  Acute aspiration highly suspected  Currently intubated but on low O2 requirements  MRI of brain  significant for bilateral large strokes  Slightly better respiratory effort on CPAP but not enough for safe extubation  Unsure if patient able to control secretions.   Appeared uncomfortable on 12/29. On low dose precedex for comfort  This will affect accuracy of neuro exam however we are unable to safely extubate  Family pursuing aggressive treatment therefore, per my discussion with pulmonology, plan is tracheostomy and PEG placement. SW consulted for LTAC for gradual weaning of vent support   General surgery consulted for both tracheostomy and PEG placement  Continue tube feeds and supportive care  Very low dose of vasopressors to maintain higher blood pressure while on precedex   Pulmonology on board for co-management      Shock  Resolved     Normocytic anemia  Slowly decreasing on a daily basis without clear evidence of bleeding  I suspect is due to combination of daily blood draws (which we need) and malnutrition  Iron studies, B12 and folate added to today's labs  Will transfuse one unit in preparation for surgery this week  Hold heparin for now      Acute arterial ischemic stroke, multifocal, posterior circulation  Cause unknown however episode of hypotension vs embolic event are highest on differential  Please see above for details  Aspirin and permissive HTN  Statin not indicated at this time  Hold heparin for decreased Hgb level although no clear evidence of bleeding noted at this time  PT/OT when able to participate      On mechanically assisted ventilation  Trach placed 1/2       Metabolic acidosis  Resolved with IV bicarb.    Elevated INR  Resolved       Cellulitis of mouth  Treated with IV Clindamycin for 7 days.   Resolved       Acute encephalopathy  Patient has shown continued decline  Does have evidence of encephalomalacia from old stroke and has had difficulty with PO intake mainly due to oral pain after tooth extraction but late effect of stroke not ruled out. Dysphagia appears to be an  "additional issue as she was seen "gurgling" that. Is now intubated but minimally responsive despite being off all sedation. Repeat CT of brain with no acute pathology.   CTA showed no vessel occlusion  EEG with slowing but no epileptiform activity  MRI ordered showed bilateral large strokes  Could be 2/2 to hypotensive episode but embolic phenomena not ruled out  Cardiac monitor reviewed. No arrhythmia appreciated however I only have one strip available prior to 12/26  Echocardiogram showed diastolic dysf and pulmonary HTN but otherwise normal  Maintain BP. Continue ASA  Continue tube feeds. Monitor vitals. Labs reviewed    Mobility impaired  PT/OT when able    Acute cystitis without hematuria  Secondary to E.Coli. Low colony count. Treated with 7 days of IV Levaquin.  Resolved    Broca's aphasia  Nonverbal at baseline    Hemiparesis affecting right side as late effect of cerebrovascular accident  At baseline, no acute issue. Fall precautions.         Essential hypertension  Permissive HTN    Chronic diastolic congestive heart failure  Stable. Monitor for fluid overload.        VTE Risk Mitigation (From admission, onward)         Ordered     Place MAURA hose  Until discontinued      12/30/19 0953     IP VTE HIGH RISK PATIENT  Once      12/26/19 0532     Place sequential compression device  Until discontinued      12/17/19 4226                Critical care time spent on the evaluation and treatment of severe organ dysfunction, review of pertinent labs and imaging studies, discussions with consulting providers and discussions with patient/family: 40 minutes.      Valeria Méndez MD  Department of Hospital Medicine   [facility]    "

## 2020-01-03 NOTE — PLAN OF CARE
Pt received trached and on the servo-i vent. 8.0 Shiley ETT in place. With the following settings: PRVC 15/350/ +/5/21%.AMBU bag and mask are at the HOB, All alarms are set and functioning. Will continue to monitor and wean as tolerated.

## 2020-01-03 NOTE — PLAN OF CARE
Pt remains in ICU with tracheostomy to aerosol t-piece. Glycopyrrolate x1 given due to copious secretions. HR elevated, awaiting order for pain medication. Social work working out transfer to LTACH. Tube feedings continue without issue. Tracheostomy care done. [place] with adequate output. Remains free from fall, injury, or breakdown throughout shift.

## 2020-01-03 NOTE — PROGRESS NOTES
[facility] Hospital Medicine  Progress Note    Patient Name: Emilia Melgoza  MRN: [id number]  Patient Class: IP- Inpatient   Admission Date: 12/17/2019  Length of Stay: 16 days  Attending Physician: Valeria Méndez MD  Primary Care Provider: Jerson Price MD        Subjective:     Principal Problem:Acute respiratory failure with hypoxia        HPI:  74 y.o. 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  She is also not taking her medications.  She appears to be more confused than usual.  Had a recent tooth extraction by the dentist and was unable to take prescribed clindamycin.  Daughter reports patient has indicated mouth pain. Also recently prescribed Macrobid for UTI and was unable to complete.  Daughter denies that the patient has had a fever.  History further limited secondary to the patient's condition.  In the ED, she was found to be tachycardic with hypernatremia, hypokalemia, mild elevation of CPK and troponin.  Urinalysis, CT head, chest x-ray, and EKG were all unremarkable for any acute abnormality.  She appears clinically dehydrated.  Placed in observation for further evaluation and treatment.    Overview/Hospital Course:  Ms Melgoza presented with acute encephalopathy. Workup significant for soft tissue swelling and edema involving the right anterior mandibular region suspicious for cellulitis, hypernatremia (148) . Patient also noted to have significant dysphagia for which she was placed NPO due to high risk for aspiration. Also aphasic and significantly deconditioned requiring moderate assistance from physical and occupational therapists. Was  initiated on empiric IV antibiotics. CT brain with no acute pathology, just old infarct to left frontal lobe extending to basal ganglia and meningioma (unchanged). CTA brain showed no vessel occlusion. NGT placed for enteral nutrition. Hypernatremia resolved. PT/OT/ST continued. Palliative care was consulted to address goals of care, mainly for PEG placement. Patient, by nodding yes or no, agreed with PEG tube placement. Gastroenterology consulted for PEG. Labwork showed elevated total bilirubin and AST >> ALT. INR also elevated and, per family, patient is not on anticoagulation at home, just clopidogrel for old stroke. Antiplatelets and chemical DVT prophylaxis held. Vitamin K given. INR corrected. Total bilirubin normalized and enzymes improved. Overnight 12/25-12/26, patient experienced hypoxia and worsening encephalopathy. Also reports of transient hypotension earlier that had resolved. Rapid response called. Placed on supplemental O2 but continued to decline therefore transferred to ICU and intubated. Patient was kept off sedation as she was minimally responsive. Neurology and pulmonology consulted. EEG obtained. Showed severe generalized slowing suggesting severe diffuse or multifocal cerebral dysfunction. No epileptiform activity or seizures appreciated. MRI of brain obtained. This showed extensive multifocal acute infarctions in bilateral parietooccipital lobes and other areas of the brain. Patient started on aspirin overnight. No statin as LDL 72 four months ago. Blood pressure maintained, feeding continued via NGT, prophylactic heparin SQ, and ventilator management. Echocardiogram ordered. Showed diastolic dysfunction but preserved EF and no thrombus. No arrhythmias appreciated on cardiac monitoring. Patient would open eyes and move extremities but unsure if purposeful. Respiratory effort improved at small increments with each SBT but not enough to safely extubate, in addition to neuro deficits and  inability to determine if she can manage her own secretions. Extended ventilator support expected. General surgery consulted for Trach/PEG and SW consulted for LTAC.     12/31- plan for Trach 1/2  1/2- trach placed, pt moving left arm more and requiring restraints; SW notified of LTAC dc planning; tighter BP control   await PT/OT     Interval History: pt has lots of secretions from trach, HR rising    Review of Systems   Unable to perform ROS: Intubated     Objective:     Vital Signs (Most Recent):  Temp: 99.5 °F (37.5 °C) (01/03/20 1510)  Pulse: (!) 136 (01/03/20 1510)  Resp: (!) 30 (01/03/20 1510)  BP: 133/62 (01/03/20 1510)  SpO2: 96 % (01/03/20 1510) Vital Signs (24h Range):  Temp:  [97.8 °F (36.6 °C)-99.5 °F (37.5 °C)] 99.5 °F (37.5 °C)  Pulse:  [] 136  Resp:  [13-43] 30  SpO2:  [87 %-100 %] 96 %  BP: ()/(49-85) 133/62     Weight: 71.4 kg (157 lb 6.5 oz)  Body mass index is 26.19 kg/m².    Intake/Output Summary (Last 24 hours) at 1/3/2020 1624  Last data filed at 1/3/2020 1510  Gross per 24 hour   Intake 1111.34 ml   Output 1350 ml   Net -238.66 ml      Physical Exam   Constitutional: She appears well-developed and well-nourished. She appears ill.   HENT:   Head: Normocephalic and atraumatic.   Eyes: Pupils are equal, round, and reactive to light.   Neck: Neck supple. No tracheal deviation present. No thyromegaly present.   #8 Shiley in place    Cardiovascular: Normal rate, regular rhythm, normal heart sounds and intact distal pulses. Exam reveals no gallop and no friction rub.   No murmur heard.  Pulmonary/Chest: Effort normal. No accessory muscle usage or stridor. No tachypnea. She has no decreased breath sounds. She has no wheezes. She has rhonchi (generalized). She has no rales.   Small amount of blood tinged sputum suctioned from trach   Abdominal: Soft. Bowel sounds are normal. There is no tenderness.   Genitourinary:   Genitourinary Comments: Urine catheter with clear yellow output    Neurological: GCS eye subscore is 3. GCS verbal subscore is 1. GCS motor subscore is 5.   PERRL. Eyes open to speech, tracking noted. Cough and gag reflex intact.  Moves LUE to command, no movement from RUE.  Moves bilateral lower extremities spontaneously, but not to command.   Skin: Skin is warm and dry.   Nursing note and vitals reviewed.      Significant Labs: All pertinent labs within the past 24 hours have been reviewed.    Significant Imaging: I have reviewed and interpreted all pertinent imaging results/findings within the past 24 hours.      Assessment/Plan:      * Acute respiratory failure with hypoxia  Acute aspiration highly suspected  Currently intubated but on low O2 requirements  MRI of brain significant for bilateral large strokes  Slightly better respiratory effort on CPAP but not enough for safe extubation  Unsure if patient able to control secretions.   Appeared uncomfortable on 12/29. On low dose precedex for comfort  This will affect accuracy of neuro exam however we are unable to safely extubate  Family pursuing aggressive treatment therefore, per my discussion with pulmonology, plan is tracheostomy and PEG placement. SW consulted for LTAC for gradual weaning of vent support   General surgery consulted for both tracheostomy and PEG placement  Continue tube feeds and supportive care  Very low dose of vasopressors to maintain higher blood pressure while on precedex   Pulmonology on board for co-management      Shock  Resolved     Normocytic anemia  Slowly decreasing on a daily basis without clear evidence of bleeding  I suspect is due to combination of daily blood draws (which we need) and malnutrition  Iron studies, B12 and folate added to today's labs  Will transfuse one unit in preparation for surgery this week  Hold heparin for now      Acute arterial ischemic stroke, multifocal, posterior circulation  Cause unknown however episode of hypotension vs embolic event are highest on  "differential  Please see above for details  Aspirin and permissive HTN  Statin not indicated at this time  Hold heparin for decreased Hgb level although no clear evidence of bleeding noted at this time  PT/OT when able to participate      On mechanically assisted ventilation  Trach placed 1/2   Trach collar as tolerated       Metabolic acidosis  Resolved with IV bicarb.    Elevated INR  Resolved       Cellulitis of mouth  Treated with IV Clindamycin for 7 days.   Resolved       Acute encephalopathy  Patient has shown continued decline  Does have evidence of encephalomalacia from old stroke and has had difficulty with PO intake mainly due to oral pain after tooth extraction but late effect of stroke not ruled out. Dysphagia appears to be an additional issue as she was seen "gurgling" that. Is now intubated but minimally responsive despite being off all sedation. Repeat CT of brain with no acute pathology.   CTA showed no vessel occlusion  EEG with slowing but no epileptiform activity  MRI ordered showed bilateral large strokes  Could be 2/2 to hypotensive episode but embolic phenomena not ruled out  Cardiac monitor reviewed. No arrhythmia appreciated however I only have one strip available prior to 12/26  Echocardiogram showed diastolic dysf and pulmonary HTN but otherwise normal  Maintain BP. Continue ASA  Continue tube feeds. Monitor vitals. Labs reviewed    Mobility impaired  PT/OT when able    Acute cystitis without hematuria  Secondary to E.Coli. Low colony count. Treated with 7 days of IV Levaquin.  Resolved    Broca's aphasia  Nonverbal at baseline    Hemiparesis affecting right side as late effect of cerebrovascular accident  At baseline, no acute issue. Fall precautions.         Essential hypertension  Permissive HTN    Chronic diastolic congestive heart failure  Stable. Monitor for fluid overload.        VTE Risk Mitigation (From admission, onward)         Ordered     Place MAURA hose  Until discontinued      " 12/30/19 0953     IP VTE HIGH RISK PATIENT  Once      12/26/19 0532     Place sequential compression device  Until discontinued      12/17/19 9086                Critical care time spent on the evaluation and treatment of severe organ dysfunction, review of pertinent labs and imaging studies, discussions with consulting providers and discussions with patient/family: 30 minutes.      Valeria Méndez MD  Department of Hospital Medicine   [facility]

## 2020-01-03 NOTE — SUBJECTIVE & OBJECTIVE
Interval History: No acute events over night. Tolerating SBT this morning; will transition to trach collar/t piece.     Objective:     Vital Signs (Most Recent):  Temp: 98.8 °F (37.1 °C) (01/03/20 0715)  Pulse: 76 (01/03/20 0900)  Resp: 18 (01/03/20 0900)  BP: (!) 116/56 (01/03/20 0900)  SpO2: 100 % (01/03/20 0900) Vital Signs (24h Range):  Temp:  [96.6 °F (35.9 °C)-98.8 °F (37.1 °C)] 98.8 °F (37.1 °C)  Pulse:  [] 76  Resp:  [5-43] 18  SpO2:  [87 %-100 %] 100 %  BP: ()/(49-85) 116/56     Weight: 71.4 kg (157 lb 6.5 oz)  Body mass index is 26.19 kg/m².      Intake/Output Summary (Last 24 hours) at 1/3/2020 0959  Last data filed at 1/3/2020 0700  Gross per 24 hour   Intake 1186.63 ml   Output 1290 ml   Net -103.37 ml       Physical Exam   Constitutional: She appears well-developed and well-nourished. She appears ill.   HENT:   Head: Normocephalic and atraumatic.   Eyes: Pupils are equal, round, and reactive to light.   Neck: Neck supple. No tracheal deviation present. No thyromegaly present.   #8 Shiley in place    Cardiovascular: Normal rate, regular rhythm, normal heart sounds and intact distal pulses. Exam reveals no gallop and no friction rub.   No murmur heard.  Pulmonary/Chest: Effort normal. No accessory muscle usage or stridor. No tachypnea. She has no decreased breath sounds. She has no wheezes. She has rhonchi (generalized). She has no rales.   Small amount of blood tinged sputum suctioned from trach   Abdominal: Soft. Bowel sounds are normal. There is no tenderness.   Genitourinary:   Genitourinary Comments: Urine catheter with clear yellow output   Neurological: GCS eye subscore is 3. GCS verbal subscore is 1. GCS motor subscore is 5.   PERRL. Eyes open to speech, tracking noted. Cough and gag reflex intact.  Moves LUE to command, no movement from RUE.  Moves bilateral lower extremities spontaneously, but not to command.   Skin: Skin is warm and dry.   Nursing note and vitals  reviewed.      Vents:  Vent Mode: PRVC (01/03/20 0756)  Ventilator Initiated: Yes (12/26/19 0417)  Set Rate: 15 bmp (01/03/20 0756)  Vt Set: 350 mL (01/03/20 0756)  Pressure Support: 5 cmH20 (12/29/19 0907)  PEEP/CPAP: 5 cmH20 (01/03/20 0756)  Oxygen Concentration (%): 21 (01/03/20 0900)  Peak Airway Pressure: 10.2 cmH2O (01/03/20 0756)  Total Ve: 8.1 mL (01/03/20 0756)  F/VT Ratio<105 (RSBI): (!) 81.73 (01/03/20 0756)    Lines/Drains/Airways     Peripherally Inserted Central Catheter Line                 PICC Double Lumen 12/28/19 1340 right basilic 5 days          Drain                 Urethral Catheter 12/26/19 0441 Straight-tip 12 Fr. 8 days         Gastrostomy/Enterostomy 12/31/19 1330 Percutaneous endoscopic gastrostomy (PEG) LUQ feeding 2 days          Airway                 Surgical Airway 01/02/20 0919 Shiley Cuffed 1 day                Significant Labs:    CBC/Anemia Profile:  Recent Labs   Lab 01/02/20  0331 01/03/20  0307   WBC 8.60 11.38   HGB 7.9* 7.8*   HCT 24.7* 23.9*    229   MCV 86 85   RDW 17.3* 16.7*        Chemistries:  Recent Labs   Lab 01/02/20  0331 01/03/20  0307    136   K 4.5 4.3    101   CO2 31* 27   BUN 16 19   CREATININE 0.7 0.8   CALCIUM 9.0 8.8   ALBUMIN 2.1* 2.1*   PROT 5.8* 5.8*   BILITOT 0.4 0.4   ALKPHOS 70 64   ALT 26 20   AST 34 33   MG 1.7 1.7   PHOS 4.1 4.2       All pertinent labs within the past 24 hours have been reviewed.    Significant Imaging:  I have reviewed all pertinent imaging results/findings within the past 24 hours.

## 2020-01-03 NOTE — ASSESSMENT & PLAN NOTE
Patient intubated for encephalopathy and somnolence.  Per notes patient has been altered for many days but acutely worsened  MRI (12/27) with Extensive multifocal acute infarctions in the supratentorial brain .   --Neurology following  --PEG placed 12/31 for chronic dysphagia  --Episodes of agitation while on mechanical ventilation so precedex infusion initiated; wean precedex as tolerated   --Tracheostomy placed on 01/02  --Continue PT/OT  --ready for LTAC per pulmonary

## 2020-01-03 NOTE — ANESTHESIA POSTPROCEDURE EVALUATION
Anesthesia Post Evaluation    Patient: Emilia Melgoza    Procedure(s) Performed: Procedure(s) (LRB):  TRACHEOTOMY (N/A)    Final Anesthesia Type: general    Patient location during evaluation: ICU  Patient participation: No - Unable to Participate, Other Reason (see comments) (tracheotomy)  Level of consciousness: awake and alert and oriented  Post-procedure vital signs: reviewed and stable  Pain management: adequate  Airway patency: patent    PONV status at discharge: No PONV  Anesthetic complications: no      Cardiovascular status: blood pressure returned to baseline, hemodynamically stable and stable  Respiratory status: unassisted, spontaneous ventilation and room air  Hydration status: euvolemic  Follow-up not needed.          Vitals Value Taken Time   /62 1/3/2020  3:02 PM   Temp 37.1 °C (98.8 °F) 1/3/2020 12:00 PM   Pulse 136 1/3/2020  3:07 PM   Resp 29 1/3/2020  3:07 PM   SpO2 95 % 1/3/2020  3:07 PM   Vitals shown include unvalidated device data.      No case tracking events are documented in the log.      Pain/Becca Score: No data recorded

## 2020-01-03 NOTE — SUBJECTIVE & OBJECTIVE
Interval History: pt more alert and moving left arm    Review of Systems   Unable to perform ROS: Intubated     Objective:     Vital Signs (Most Recent):  Temp: 97.8 °F (36.6 °C) (01/02/20 1540)  Pulse: 89 (01/02/20 1845)  Resp: (!) 24 (01/02/20 1845)  BP: (!) 124/56 (01/02/20 1832)  SpO2: 98 % (01/02/20 1845) Vital Signs (24h Range):  Temp:  [96.6 °F (35.9 °C)-98.6 °F (37 °C)] 97.8 °F (36.6 °C)  Pulse:  [59-96] 89  Resp:  [5-43] 24  SpO2:  [87 %-100 %] 98 %  BP: (100-185)/(51-85) 124/56     Weight: 71.4 kg (157 lb 6.5 oz)  Body mass index is 26.19 kg/m².    Intake/Output Summary (Last 24 hours) at 1/2/2020 1941  Last data filed at 1/2/2020 1830  Gross per 24 hour   Intake 532.48 ml   Output 950 ml   Net -417.52 ml      Physical Exam   Constitutional: She appears well-developed. No distress.   HENT:   Trach in place    Cardiovascular: Normal rate and regular rhythm.   Pulmonary/Chest: She has no wheezes. She has no rales.   On mechanical ventilation   Abdominal: Soft. Bowel sounds are normal.   Neurological: She is alert. She displays normal reflexes.   On slight sedation  Moving both feet spontaneously.  Restless  Moving left arm   Skin: She is not diaphoretic.   Psychiatric:   Unable to assess   Nursing note and vitals reviewed.      Significant Labs: All pertinent labs within the past 24 hours have been reviewed.    Significant Imaging: I have reviewed and interpreted all pertinent imaging results/findings within the past 24 hours.

## 2020-01-03 NOTE — ASSESSMENT & PLAN NOTE
On minimal ventilator settings. CXR without signs of pneumonia. Neuro status questionable for airway protection/ secretion management.   --Tracheostomy placed on 1/2/2020   --Wean ventilatory support, transition to trach collar as tolerated   --Currently tolerating T piece

## 2020-01-03 NOTE — CARE UPDATE
Patient taken off of the vent and placed on an 21%. aerosolized t-piece with humidified air. WOB normal sats 99 %. Will continue to monitor.

## 2020-01-03 NOTE — PLAN OF CARE
Problem: Adult Inpatient Plan of Care  Goal: Plan of Care Review  Outcome: Ongoing, Progressing    Patient had her trach placed yesterday. Site has serosanguineous drainage. Patient still on vent. Tolerating well, Vital signs stable and within limits. Tolerating feeding at goal rate.

## 2020-01-03 NOTE — CARE UPDATE
[facility]  ICU Multidisciplinary Bedside Rounds   SUMMARY     Date: 1/3/2020    Prehospitalization: Home  Admit Date / LOS : 12/17/2019/ 16 days    Diagnosis: Acute respiratory failure with hypoxia    Consults:        Active: GI, Gen Surg, Neuro and Palliative       Needed: SW     Code Status: Full Code   Advanced Directive: <no information>    LDA: [place], PEG, Trach and Vent       Central Lines/Site/Justification:Multiple GTTS       Urinary Cath/Order/Justification:Critically Ill in ICU    Vasopressors/Infusions:    dexmedetomidine (PRECEDEX) infusion 0.798 mcg/kg/hr (01/03/20 0400)          GOALS: Volume/ Hemodynamic: N/A                     RASS: -2  light sedation, briefly awakes to voice (eye opening)    CAM ICU: N/A  Pain Management: none       Pain Controlled: not applicable     Rhythm: NSR    Respiratory Device: Vent    Vent Mode: PRVC  Oxygen Concentration (%):  [] 21  Resp Rate Total:  [15 br/min-40 br/min] 16 br/min  Vt Set:  [350 mL] 350 mL  PEEP/CPAP:  [5 cmH20] 5 cmH20  Mean Airway Pressure:  [7.5 koF44-10.2 cmH20] 7.5 cmH20             Most Recent SBT/ SAT: N/A       MOVE Screen:     VTE Prophylaxis: Mechanical  Mobility: Bedrest  Stress Ulcer Prophylaxis: Yes    Dietary: TF  Tolerance: yes  /  Advancement: @ goal    Isolation: No active isolations    Restraints: Yes    Significant Dates:  Post Op Date: N/A  Rescue Date: N/A  Imaging/ Diagnostics: N/A    Noteworthy Labs:      CBC/Anemia Labs: Coags:    Recent Labs   Lab 12/30/19  0327  12/31/19  0305 01/01/20  0427 01/02/20  0331 01/03/20  0307   WBC 12.12   < > 13.10* 9.58 8.60 11.38   HGB 6.8*   < > 8.4* 8.0* 7.9* 7.8*   HCT 20.7*   < > 25.9* 24.6* 24.7* 23.9*   *   < > 159 181 217 229   MCV 85   < > 84 84 86 85   RDW 17.7*   < > 17.3* 17.2* 17.3* 16.7*   IRON 23*  --   --   --   --   --    FERRITIN 769*  --   --   --   --   --    FOLATE  --   --  4.9  --   --   --    [id number]  --   --  798  --   --   --     < > =  values in this interval not displayed.    Recent Labs   Lab 12/28/19  1132 12/29/19  0834   INR 1.1 1.0        Chemistries:   Recent Labs   Lab 01/01/20  0427 01/02/20  0331 01/03/20  0307    138 136   K 4.2 4.5 4.3    102 101   CO2 28 31* 27   BUN 14 16 19   CREATININE 0.7 0.7 0.8   CALCIUM 9.2 9.0 8.8   PROT 6.0 5.8* 5.8*   BILITOT 0.4 0.4 0.4   ALKPHOS 73 70 64   ALT 24 26 20   AST 41* 34 33   MG 1.8 1.7 1.7   PHOS 4.4 4.1 4.2        Cardiac Enzymes: Ejection Fractions:    No results for input(s): CPK, CPKMB, MB, TROPONINI in the last 72 hours. No results found for: EF     POCT Glucose: HbA1c:    Recent Labs   Lab 01/02/20  0828   POCTGLUCOSE 98    Hemoglobin A1C   Date Value Ref Range Status   12/26/2019 5.6 4.0 - 5.6 % Final     Comment:     ADA Screening Guidelines:  5.7-6.4%  Consistent with prediabetes  >or=6.5%  Consistent with diabetes  High levels of fetal hemoglobin interfere with the HbA1C  assay. Heterozygous hemoglobin variants (HbS, HgC, etc)do  not significantly interfere with this assay.   However, presence of multiple variants may affect accuracy.     09/14/2019 5.8 (H) 4.0 - 5.6 % Final     Comment:     ADA Screening Guidelines:  5.7-6.4%  Consistent with prediabetes  >or=6.5%  Consistent with diabetes  High levels of fetal hemoglobin interfere with the HbA1C  assay. Heterozygous hemoglobin variants (HbS, HgC, etc)do  not significantly interfere with this assay.   However, presence of multiple variants may affect accuracy.     12/01/2017 5.7 (H) 4.0 - 5.6 % Final     Comment:     According to ADA guidelines, hemoglobin A1c <7.0% represents  optimal control in non-pregnant diabetic patients. Different  metrics may apply to specific patient populations.   Standards of Medical Care in Diabetes-2016.  For the purpose of screening for the presence of diabetes:  <5.7%     Consistent with the absence of diabetes  5.7-6.4%  Consistent with increasing risk for diabetes    (prediabetes)  >or=6.5%  Consistent with diabetes  Currently, no consensus exists for use of hemoglobin A1c  for diagnosis of diabetes for children.  This Hemoglobin A1c assay has significant interference with fetal   hemoglobin   (HbF). The results are invalid for patients with abnormal amounts of   HbF,   including those with known Hereditary Persistence   of Fetal Hemoglobin. Heterozygous hemoglobin variants (HbAS, HbAC,   HbAD, HbAE, HbA2) do not significantly interfere with this assay;   however, presence of multiple variants in a sample may impact the %   interference.          Needs from Care Team: Consult SS for LTAC placement     ICU LOS 8d  Level of Care: Critical Care

## 2020-01-03 NOTE — PROGRESS NOTES
Surgery Progress Note    Primary Problem: Acute respiratory failure with hypoxia    Other problems:   Active Hospital Problems    Diagnosis  POA    *Acute respiratory failure with hypoxia [J96.01]  Yes    Normocytic anemia [D64.9]  Yes    Shock [R57.9]  No    Acute arterial ischemic stroke, multifocal, posterior circulation [I63.539]  No    Altered mental status [R41.82]  Yes    On mechanically assisted ventilation [Z99.11]  Not Applicable    Elevated INR [R79.1]  Yes    Metabolic acidosis [E87.2]  No    Cellulitis of mouth [K12.2]  Yes    Acute encephalopathy [G93.40]  Yes    Mobility impaired [Z74.09]  Yes     Chronic    Acute cystitis without hematuria [N30.00]  Yes    Broca's aphasia [R47.01]  Yes     Chronic    Hemiparesis affecting right side as late effect of cerebrovascular accident [I69.351]  Not Applicable     Chronic    Essential hypertension [I10]  Yes     Chronic    Chronic diastolic congestive heart failure [I50.32]  Yes      Resolved Hospital Problems   No resolved problems to display.       HD #  LOS: 16 days   POD #1 Day Post-Op status post trach    Overnight events:  No acute events overnight.  Continue to wean vent.      Diet - No diet orders on file     I/O last 3 completed shifts:  In: 1345.8 [I.V.:495.8; NG/GT:850]  Out: 1425 [Urine:1425]    Intake/Output Summary (Last 24 hours) at 1/3/2020 0624  Last data filed at 1/3/2020 0600  Gross per 24 hour   Intake 1186.63 ml   Output 1290 ml   Net -103.37 ml       Temp:  [96.6 °F (35.9 °C)-98.6 °F (37 °C)] 98 °F (36.7 °C)  Pulse:  [] 69  Resp:  [5-43] 20  SpO2:  [87 %-100 %] 99 %  BP: ()/(49-85) 111/56    Gen:  No apparent distress    Trach in place.  Minimal oozing on dressing.  Some secretions around it.    Recent Labs   Lab 12/30/19  0327 12/30/19  0842 12/31/19  0305 01/01/20  0427 01/02/20  0331 01/03/20  0307   WBC 12.12 11.60 13.10* 9.58 8.60 11.38   HGB 6.8* 7.1* 8.4* 8.0* 7.9* 7.8*   HCT 20.7* 21.6* 25.9* 24.6*  24.7* 23.9*   * 146* 159 181 217 229     --  134* 137 138 136   K 3.9  --  4.6 4.2 4.5 4.3     --  101 101 102 101   BUN 15  --  14 14 16 19   *  --  96 128* 93 146*   PROT 5.7*  --  6.2 6.0 5.8* 5.8*   ALBUMIN 2.3*  --  2.4* 2.3* 2.1* 2.1*   BILITOT 0.4  --  0.5 0.4 0.4 0.4   AST 48*  --  46* 41* 34 33   ALKPHOS 69  --  73 73 70 64   ALT 30  --  29 24 26 20        Assessment:  Postop day 1 status post trach    Plan:  Routine trach care  Continue care per primary    Basim Young MD

## 2020-01-03 NOTE — PROGRESS NOTES
"[facility]  Adult Nutrition  Progress Note    SUMMARY       Recommendations     1. Current TF meeting needs    - can increase fluid flushes to 200 ml QID to better meet maintenance fluid needs when appropriate  - Bolus feeds: 4 cans/day total    2. Monitor weights weekly for adequacy of TF    Goals: Initiate nutrition within 48 hrs  Nutrition Goal Status: goal met  Communication of RD Recs: reviewed with physician    Reason for Assessment    Reason For Assessment: RD follow-up  Diagnosis: (FTT, toxic encephalopathy, respiratory distress)  Relevant Medical History: CVA, vascular dementia  Interdisciplinary Rounds: did not attend    General Information Comments: Trache/PEG placed. TF running at goal rate without issue. NFPE :age appropriate LBM losses, adequate fat mass; wt loss PTA noted and poor intake PTA due to dentition infxn issues.    Nutrition Discharge Planning: Discharge on TF    Nutrition Risk Screen    Nutrition Risk Screen: tube feeding or parenteral nutrition    Nutrition/Diet History    Spiritual, Cultural Beliefs, Restorationism Practices, Values that Affect Care: no  Food Allergies: NKFA  Factors Affecting Nutritional Intake: NPO    Anthropometrics    Temp: 98.8 °F (37.1 °C)  Height Method: Stated  Height: 5' 5" (165.1 cm)  Height (inches): 65 in  Weight Method: Bed Scale  Weight: 71.4 kg (157 lb 6.5 oz)  Weight (lb): 157.41 lb  Ideal Body Weight (IBW), Female: 125 lb  % Ideal Body Weight, Female (lb): 116.8 %  BMI (Calculated): 26.2  BMI Grade: 18.5-24.9 - normal  Weight Loss: unintentional  Usual Body Weight (UBW), k.4 kg  % Usual Body Weight: 100.21  % Weight Change From Usual Weight: -12.93 %       Lab/Procedures/Meds    Pertinent Labs Reviewed: reviewed  Pertinent Labs Comments: alb 2.1  Pertinent Medications Reviewed: reviewed  Pertinent Medications Comments: ASA    Estimated/Assessed Needs    Weight Used For Calorie Calculations: 71.4 kg (157 lb 6.5 oz)  Energy Calorie " Requirements (kcal): 1511 kcal  Energy Need Method: [facility]  Protein Requirements: 66-79 g (1-1.2 g/kg)  Weight Used For Protein Calculations: 66 kg (145 lb 8.1 oz)     Estimated Fluid Requirement Method: RDA Method  RDA Method (mL): 1511      Nutrition Prescription Ordered    Current Diet Order: NPO  Current Nutrition Support Formula Ordered: Isosource 1.5  Current Nutrition Support Rate Ordered: 45 (ml)  Current Nutrition Support Frequency Ordered: ml/hr    Evaluation of Received Nutrient/Fluid Intake    Enteral Calories (kcal): 1620  Enteral Protein (gm): 73  Enteral (Free Water) Fluid (mL): 825  Free Water Flush Fluid (mL): 600  % Kcal Needs: 108%  % Protein Needs: 100%  IV Fluid (mL): (IVF discontinued)  Total Fluid Intake (mL): 1425  I/O: 1850/760  Energy Calories Required: meeting needs  Protein Required: meeting needs  Fluid Required: (per MD)  Comments: LBM: 12/29  Tolerance: tolerating    % Meal Intake: NPO    Nutrition Risk    Level of Risk/Frequency of Follow-up: (1 x week)     Assessment and Plan    Nutrition Problem  Difficulty swallowing    Related to (etiology):   Dysphagia    Signs and Symptoms (as evidenced by):   NPO/PEG placement    Interventions  Enteral Nutrition    Nutrition Diagnosis Status:   Continues           Monitor and Evaluation    Food and Nutrient Intake: energy intake, enteral nutrition intake  Food and Nutrient Adminstration: diet order, enteral and parenteral nutrition administration  Anthropometric Measurements: weight, weight change  Biochemical Data, Medical Tests and Procedures: electrolyte and renal panel  Nutrition-Focused Physical Findings: overall appearance     Malnutrition Assessment         Orbital Region (Subcutaneous Fat Loss): well nourished  Upper Arm Region (Subcutaneous Fat Loss): well nourished  Thoracic and Lumbar Region: well nourished   [place] Region (Muscle Loss): mild depletion  Patellar Region (Muscle Loss): well nourished  Anterior Thigh Region (Muscle  Loss): well nourished  Posterior Calf Region (Muscle Loss): well nourished       Subcutaneous Fat Loss (Final Summary): well nourished  Muscle Loss Evaluation (Final Summary): well nourished    Severe Weight Loss (Malnutrition): greater than 7.5% in 3 months(13% in 3 months)    Nutrition Follow-Up    RD Follow-up?: Yes

## 2020-01-03 NOTE — PLAN OF CARE
Pt remains on vent PRVC 15/350/+5/FIO2 21%  with a size 8.0 shiley in place.Ambu bag is at HOB and  all alarms are set and working properly. Will continue to monitor.

## 2020-01-03 NOTE — CONSULTS
SW sent pt referral to [facility] and [facility], daughter Trina preference is [facility] then [facility].

## 2020-01-03 NOTE — PT/OT/SLP PROGRESS
Physical Therapy      Patient Name:  Emilia Melgoza   MRN:  [id number]    Patient not seen for PT at this time secondary to (Pt s/p trach, continued to be on the vent.  Pt did not pass MOVE Screen.  Pt became agitated and resisted UE ROM with OT.). Will follow-up as appropriate.    Camelia Gardner, PT

## 2020-01-03 NOTE — PROGRESS NOTES
[facility]  Pulmonology  Progress Note    Patient Name: Emilia Melgoza  MRN: [id number]  Admission Date: 12/17/2019  Hospital Length of Stay: 16 days  Code Status: Full Code  Attending Provider: Valeria Méndez MD  Primary Care Provider: Jerson Price MD   Principal Problem: Acute respiratory failure with hypoxia    Subjective:     Interval History: No acute events over night. Tolerating SBT this morning; will transition to trach collar/t piece.     Objective:     Vital Signs (Most Recent):  Temp: 98.8 °F (37.1 °C) (01/03/20 0715)  Pulse: 76 (01/03/20 0900)  Resp: 18 (01/03/20 0900)  BP: (!) 116/56 (01/03/20 0900)  SpO2: 100 % (01/03/20 0900) Vital Signs (24h Range):  Temp:  [96.6 °F (35.9 °C)-98.8 °F (37.1 °C)] 98.8 °F (37.1 °C)  Pulse:  [] 76  Resp:  [5-43] 18  SpO2:  [87 %-100 %] 100 %  BP: ()/(49-85) 116/56     Weight: 71.4 kg (157 lb 6.5 oz)  Body mass index is 26.19 kg/m².      Intake/Output Summary (Last 24 hours) at 1/3/2020 0959  Last data filed at 1/3/2020 0700  Gross per 24 hour   Intake 1186.63 ml   Output 1290 ml   Net -103.37 ml       Physical Exam   Constitutional: She appears well-developed and well-nourished. She appears ill.   HENT:   Head: Normocephalic and atraumatic.   Eyes: Pupils are equal, round, and reactive to light.   Neck: Neck supple. No tracheal deviation present. No thyromegaly present.   #8 Shiley in place    Cardiovascular: Normal rate, regular rhythm, normal heart sounds and intact distal pulses. Exam reveals no gallop and no friction rub.   No murmur heard.  Pulmonary/Chest: Effort normal. No accessory muscle usage or stridor. No tachypnea. She has no decreased breath sounds. She has no wheezes. She has rhonchi (generalized). She has no rales.   Small amount of blood tinged sputum suctioned from trach   Abdominal: Soft. Bowel sounds are normal. There is no tenderness.   Genitourinary:   Genitourinary Comments: Urine catheter with clear yellow  output   Neurological: GCS eye subscore is 3. GCS verbal subscore is 1. GCS motor subscore is 5.   PERRL. Eyes open to speech, tracking noted. Cough and gag reflex intact.  Moves LUE to command, no movement from RUE.  Moves bilateral lower extremities spontaneously, but not to command.   Skin: Skin is warm and dry.   Nursing note and vitals reviewed.      Vents:  Vent Mode: PRVC (01/03/20 0756)  Ventilator Initiated: Yes (12/26/19 0417)  Set Rate: 15 bmp (01/03/20 0756)  Vt Set: 350 mL (01/03/20 0756)  Pressure Support: 5 cmH20 (12/29/19 0907)  PEEP/CPAP: 5 cmH20 (01/03/20 0756)  Oxygen Concentration (%): 21 (01/03/20 0900)  Peak Airway Pressure: 10.2 cmH2O (01/03/20 0756)  Total Ve: 8.1 mL (01/03/20 0756)  F/VT Ratio<105 (RSBI): (!) 81.73 (01/03/20 0756)    Lines/Drains/Airways     Peripherally Inserted Central Catheter Line                 PICC Double Lumen 12/28/19 1340 right basilic 5 days          Drain                 Urethral Catheter 12/26/19 0441 Straight-tip 12 Fr. 8 days         Gastrostomy/Enterostomy 12/31/19 1330 Percutaneous endoscopic gastrostomy (PEG) LUQ feeding 2 days          Airway                 Surgical Airway 01/02/20 0919 Shiley Cuffed 1 day                Significant Labs:    CBC/Anemia Profile:  Recent Labs   Lab 01/02/20  0331 01/03/20  0307   WBC 8.60 11.38   HGB 7.9* 7.8*   HCT 24.7* 23.9*    229   MCV 86 85   RDW 17.3* 16.7*        Chemistries:  Recent Labs   Lab 01/02/20  0331 01/03/20  0307    136   K 4.5 4.3    101   CO2 31* 27   BUN 16 19   CREATININE 0.7 0.8   CALCIUM 9.0 8.8   ALBUMIN 2.1* 2.1*   PROT 5.8* 5.8*   BILITOT 0.4 0.4   ALKPHOS 70 64   ALT 26 20   AST 34 33   MG 1.7 1.7   PHOS 4.1 4.2       All pertinent labs within the past 24 hours have been reviewed.    Significant Imaging:  I have reviewed all pertinent imaging results/findings within the past 24 hours.    Assessment/Plan:     * Acute respiratory failure with hypoxia  On minimal ventilator  settings. CXR without signs of pneumonia. Neuro status questionable for airway protection/ secretion management.   --Tracheostomy placed on 1/2/2020   --Wean ventilatory support, transition to trach collar as tolerated   --Currently tolerating T piece    Acute arterial ischemic stroke, multifocal, posterior circulation  Patient intubated for encephalopathy and somnolence.  Per notes patient has been altered for many days but acutely worsened  MRI (12/27) with Extensive multifocal acute infarctions in the supratentorial brain .   --Neurology following  --PEG placed 12/31 for chronic dysphagia  --Episodes of agitation while on mechanical ventilation so precedex infusion initiated; wean precedex as tolerated   --Tracheostomy placed on 01/02  --Continue PT/OT  --ready for LTAC per pulmonary      On mechanically assisted ventilation  See respiratory failure, wean to trach collar as tolerated    Plan discussed with Dr. Rodriguez.        Critical Care Time: 40 minutes  Critical secondary to Patient has a condition that poses threat to life and bodily function:  Chronic respiratory failure, ventilator weaning      Critical care was time spent personally by me on the following activities: development of treatment plan with patient or surrogate and bedside caregivers, discussions with consultants, evaluation of patient's response to treatment, examination of patient, ordering and performing treatments and interventions, ordering and review of laboratory studies, ordering and review of radiographic studies, pulse oximetry, re-evaluation of patient's condition. This critical care time did not overlap with that of any other provider or involve time for any procedures.     Tg Peter NP  Pulmonology  [facility]

## 2020-01-03 NOTE — SUBJECTIVE & OBJECTIVE
Interval History: pt has lots of secretions from trach, HR rising    Review of Systems   Unable to perform ROS: Intubated     Objective:     Vital Signs (Most Recent):  Temp: 99.5 °F (37.5 °C) (01/03/20 1510)  Pulse: (!) 136 (01/03/20 1510)  Resp: (!) 30 (01/03/20 1510)  BP: 133/62 (01/03/20 1510)  SpO2: 96 % (01/03/20 1510) Vital Signs (24h Range):  Temp:  [97.8 °F (36.6 °C)-99.5 °F (37.5 °C)] 99.5 °F (37.5 °C)  Pulse:  [] 136  Resp:  [13-43] 30  SpO2:  [87 %-100 %] 96 %  BP: ()/(49-85) 133/62     Weight: 71.4 kg (157 lb 6.5 oz)  Body mass index is 26.19 kg/m².    Intake/Output Summary (Last 24 hours) at 1/3/2020 1624  Last data filed at 1/3/2020 1510  Gross per 24 hour   Intake 1111.34 ml   Output 1350 ml   Net -238.66 ml      Physical Exam   Constitutional: She appears well-developed and well-nourished. She appears ill.   HENT:   Head: Normocephalic and atraumatic.   Eyes: Pupils are equal, round, and reactive to light.   Neck: Neck supple. No tracheal deviation present. No thyromegaly present.   #8 Shiley in place    Cardiovascular: Normal rate, regular rhythm, normal heart sounds and intact distal pulses. Exam reveals no gallop and no friction rub.   No murmur heard.  Pulmonary/Chest: Effort normal. No accessory muscle usage or stridor. No tachypnea. She has no decreased breath sounds. She has no wheezes. She has rhonchi (generalized). She has no rales.   Small amount of blood tinged sputum suctioned from trach   Abdominal: Soft. Bowel sounds are normal. There is no tenderness.   Genitourinary:   Genitourinary Comments: Urine catheter with clear yellow output   Neurological: GCS eye subscore is 3. GCS verbal subscore is 1. GCS motor subscore is 5.   PERRL. Eyes open to speech, tracking noted. Cough and gag reflex intact.  Moves LUE to command, no movement from RUE.  Moves bilateral lower extremities spontaneously, but not to command.   Skin: Skin is warm and dry.   Nursing note and vitals  reviewed.      Significant Labs: All pertinent labs within the past 24 hours have been reviewed.    Significant Imaging: I have reviewed and interpreted all pertinent imaging results/findings within the past 24 hours.

## 2020-01-04 VITALS
BODY MASS INDEX: 26.23 KG/M2 | TEMPERATURE: 99 F | WEIGHT: 157.44 LBS | OXYGEN SATURATION: 98 % | HEART RATE: 125 BPM | HEIGHT: 65 IN | DIASTOLIC BLOOD PRESSURE: 58 MMHG | RESPIRATION RATE: 21 BRPM | SYSTOLIC BLOOD PRESSURE: 144 MMHG

## 2020-01-04 PROBLEM — E87.20 METABOLIC ACIDOSIS: Status: RESOLVED | Noted: 2019-12-24 | Resolved: 2020-01-04

## 2020-01-04 PROBLEM — R50.9 FEVER: Status: ACTIVE | Noted: 2020-01-04

## 2020-01-04 PROBLEM — R57.9 SHOCK: Status: RESOLVED | Noted: 2019-12-30 | Resolved: 2020-01-04

## 2020-01-04 PROBLEM — Z99.11 ON MECHANICALLY ASSISTED VENTILATION: Status: RESOLVED | Noted: 2019-12-26 | Resolved: 2020-01-04

## 2020-01-04 PROBLEM — R79.1 ELEVATED INR: Status: RESOLVED | Noted: 2019-12-24 | Resolved: 2020-01-04

## 2020-01-04 PROBLEM — K12.2 CELLULITIS OF MOUTH: Status: RESOLVED | Noted: 2019-12-18 | Resolved: 2020-01-04

## 2020-01-04 PROBLEM — N30.00 ACUTE CYSTITIS WITHOUT HEMATURIA: Status: RESOLVED | Noted: 2019-12-10 | Resolved: 2020-01-04

## 2020-01-04 LAB
ALBUMIN SERPL BCP-MCNC: 2.5 G/DL (ref 3.5–5.2)
ALP SERPL-CCNC: 79 U/L (ref 55–135)
ALT SERPL W/O P-5'-P-CCNC: 22 U/L (ref 10–44)
ANION GAP SERPL CALC-SCNC: 11 MMOL/L (ref 8–16)
AST SERPL-CCNC: 39 U/L (ref 10–40)
BASOPHILS # BLD AUTO: 0.03 K/UL (ref 0–0.2)
BASOPHILS NFR BLD: 0.2 % (ref 0–1.9)
BILIRUB SERPL-MCNC: 0.8 MG/DL (ref 0.1–1)
BILIRUB UR QL STRIP: NEGATIVE
BUN SERPL-MCNC: 20 MG/DL (ref 8–23)
CALCIUM SERPL-MCNC: 9.4 MG/DL (ref 8.7–10.5)
CHLORIDE SERPL-SCNC: 102 MMOL/L (ref 95–110)
CLARITY UR: CLEAR
CO2 SERPL-SCNC: 24 MMOL/L (ref 23–29)
COLOR UR: NORMAL
CREAT SERPL-MCNC: 0.8 MG/DL (ref 0.5–1.4)
DIFFERENTIAL METHOD: ABNORMAL
EOSINOPHIL # BLD AUTO: 0.1 K/UL (ref 0–0.5)
EOSINOPHIL NFR BLD: 1.1 % (ref 0–8)
ERYTHROCYTE [DISTWIDTH] IN BLOOD BY AUTOMATED COUNT: 16.4 % (ref 11.5–14.5)
EST. GFR  (AFRICAN AMERICAN): >60 ML/MIN/1.73 M^2
EST. GFR  (NON AFRICAN AMERICAN): >60 ML/MIN/1.73 M^2
GLUCOSE SERPL-MCNC: 94 MG/DL (ref 70–110)
GLUCOSE UR QL STRIP: NEGATIVE
HCT VFR BLD AUTO: 25.2 % (ref 37–48.5)
HGB BLD-MCNC: 8.3 G/DL (ref 12–16)
HGB UR QL STRIP: NEGATIVE
IMM GRANULOCYTES # BLD AUTO: 0.16 K/UL (ref 0–0.04)
IMM GRANULOCYTES NFR BLD AUTO: 1.3 % (ref 0–0.5)
KETONES UR QL STRIP: NEGATIVE
LEUKOCYTE ESTERASE UR QL STRIP: NEGATIVE
LYMPHOCYTES # BLD AUTO: 3.1 K/UL (ref 1–4.8)
LYMPHOCYTES NFR BLD: 24.4 % (ref 18–48)
MAGNESIUM SERPL-MCNC: 1.9 MG/DL (ref 1.6–2.6)
MCH RBC QN AUTO: 27.3 PG (ref 27–31)
MCHC RBC AUTO-ENTMCNC: 32.9 G/DL (ref 32–36)
MCV RBC AUTO: 83 FL (ref 82–98)
MONOCYTES # BLD AUTO: 1.4 K/UL (ref 0.3–1)
MONOCYTES NFR BLD: 11.3 % (ref 4–15)
NEUTROPHILS # BLD AUTO: 7.8 K/UL (ref 1.8–7.7)
NEUTROPHILS NFR BLD: 61.7 % (ref 38–73)
NITRITE UR QL STRIP: NEGATIVE
NRBC BLD-RTO: 0 /100 WBC
PH UR STRIP: 6 [PH] (ref 5–8)
PHOSPHATE SERPL-MCNC: 3.5 MG/DL (ref 2.7–4.5)
PLATELET # BLD AUTO: 318 K/UL (ref 150–350)
PMV BLD AUTO: 10.8 FL (ref 9.2–12.9)
POTASSIUM SERPL-SCNC: 4 MMOL/L (ref 3.5–5.1)
PROT SERPL-MCNC: 6.7 G/DL (ref 6–8.4)
PROT UR QL STRIP: NEGATIVE
RBC # BLD AUTO: 3.04 M/UL (ref 4–5.4)
SODIUM SERPL-SCNC: 137 MMOL/L (ref 136–145)
SP GR UR STRIP: 1 (ref 1–1.03)
URN SPEC COLLECT METH UR: NORMAL
UROBILINOGEN UR STRIP-ACNC: NEGATIVE EU/DL
WBC # BLD AUTO: 12.6 K/UL (ref 3.9–12.7)

## 2020-01-04 PROCEDURE — 94761 N-INVAS EAR/PLS OXIMETRY MLT: CPT

## 2020-01-04 PROCEDURE — 99900035 HC TECH TIME PER 15 MIN (STAT)

## 2020-01-04 PROCEDURE — 87070 CULTURE OTHR SPECIMN AEROBIC: CPT

## 2020-01-04 PROCEDURE — 99900026 HC AIRWAY MAINTENANCE (STAT)

## 2020-01-04 PROCEDURE — 63600175 PHARM REV CODE 636 W HCPCS: Performed by: HOSPITALIST

## 2020-01-04 PROCEDURE — 81003 URINALYSIS AUTO W/O SCOPE: CPT

## 2020-01-04 PROCEDURE — 25000003 PHARM REV CODE 250: Performed by: SURGERY

## 2020-01-04 PROCEDURE — A4216 STERILE WATER/SALINE, 10 ML: HCPCS | Performed by: SURGERY

## 2020-01-04 PROCEDURE — 25000003 PHARM REV CODE 250: Performed by: HOSPITALIST

## 2020-01-04 PROCEDURE — 80053 COMPREHEN METABOLIC PANEL: CPT

## 2020-01-04 PROCEDURE — 97110 THERAPEUTIC EXERCISES: CPT

## 2020-01-04 PROCEDURE — S0028 INJECTION, FAMOTIDINE, 20 MG: HCPCS | Performed by: SURGERY

## 2020-01-04 PROCEDURE — 83735 ASSAY OF MAGNESIUM: CPT

## 2020-01-04 PROCEDURE — 36415 COLL VENOUS BLD VENIPUNCTURE: CPT

## 2020-01-04 PROCEDURE — 84100 ASSAY OF PHOSPHORUS: CPT

## 2020-01-04 PROCEDURE — 85025 COMPLETE CBC W/AUTO DIFF WBC: CPT

## 2020-01-04 PROCEDURE — 87205 SMEAR GRAM STAIN: CPT

## 2020-01-04 PROCEDURE — 87040 BLOOD CULTURE FOR BACTERIA: CPT | Mod: 59

## 2020-01-04 RX ORDER — CHLORHEXIDINE GLUCONATE ORAL RINSE 1.2 MG/ML
15 SOLUTION DENTAL 2 TIMES DAILY
Qty: 15 ML | Refills: 0
Start: 2020-01-04 | End: 2020-01-18

## 2020-01-04 RX ORDER — GLYCOPYRROLATE 0.2 MG/ML
0.2 INJECTION INTRAMUSCULAR; INTRAVENOUS 3 TIMES DAILY
Qty: 90 ML | Refills: 0
Start: 2020-01-04 | End: 2020-02-03

## 2020-01-04 RX ORDER — NAPROXEN SODIUM 220 MG/1
81 TABLET, FILM COATED ORAL DAILY
Qty: 30 TABLET | Refills: 11
Start: 2020-01-05 | End: 2020-07-03 | Stop reason: SDUPTHER

## 2020-01-04 RX ORDER — ACETAMINOPHEN 650 MG/20.3ML
650 LIQUID ORAL EVERY 6 HOURS PRN
Qty: 5 ML | Refills: 0
Start: 2020-01-04

## 2020-01-04 RX ORDER — CLOPIDOGREL BISULFATE 75 MG/1
75 TABLET ORAL DAILY
Qty: 30 TABLET | Refills: 11 | Status: SHIPPED | OUTPATIENT
Start: 2020-01-05 | End: 2020-07-03 | Stop reason: SDUPTHER

## 2020-01-04 RX ORDER — ATORVASTATIN CALCIUM 40 MG/1
40 TABLET, FILM COATED ORAL NIGHTLY
Qty: 90 TABLET | Refills: 3 | OUTPATIENT
Start: 2020-01-04 | End: 2020-07-03

## 2020-01-04 RX ORDER — LEVOFLOXACIN 5 MG/ML
750 INJECTION, SOLUTION INTRAVENOUS DAILY
Qty: 1050 ML | Refills: 0
Start: 2020-01-04 | End: 2020-01-11

## 2020-01-04 RX ORDER — HYDROCODONE BITARTRATE AND ACETAMINOPHEN 7.5; 325 MG/15ML; MG/15ML
15 SOLUTION ORAL EVERY 4 HOURS PRN
Qty: 30 ML | Refills: 0
Start: 2020-01-04

## 2020-01-04 RX ORDER — LEVOFLOXACIN 5 MG/ML
750 INJECTION, SOLUTION INTRAVENOUS
Status: DISCONTINUED | OUTPATIENT
Start: 2020-01-04 | End: 2020-01-04 | Stop reason: HOSPADM

## 2020-01-04 RX ADMIN — GLYCOPYRROLATE 0.2 MG: 0.2 INJECTION, SOLUTION INTRAMUSCULAR; INTRAVENOUS at 08:01

## 2020-01-04 RX ADMIN — Medication 10 ML: at 06:01

## 2020-01-04 RX ADMIN — ASPIRIN 81 MG 81 MG: 81 TABLET ORAL at 08:01

## 2020-01-04 RX ADMIN — HYDRALAZINE HYDROCHLORIDE 10 MG: 20 INJECTION INTRAMUSCULAR; INTRAVENOUS at 02:01

## 2020-01-04 RX ADMIN — MORPHINE SULFATE 2 MG: 10 INJECTION INTRAVENOUS at 03:01

## 2020-01-04 RX ADMIN — CHLORHEXIDINE GLUCONATE 0.12% ORAL RINSE 15 ML: 1.2 LIQUID ORAL at 08:01

## 2020-01-04 RX ADMIN — GLYCOPYRROLATE 0.2 MG: 0.2 INJECTION, SOLUTION INTRAMUSCULAR; INTRAVENOUS at 02:01

## 2020-01-04 RX ADMIN — LEVOFLOXACIN 750 MG: 750 INJECTION, SOLUTION INTRAVENOUS at 12:01

## 2020-01-04 RX ADMIN — VANCOMYCIN HYDROCHLORIDE 2000 MG: 1 INJECTION, POWDER, LYOPHILIZED, FOR SOLUTION INTRAVENOUS at 12:01

## 2020-01-04 RX ADMIN — Medication 10 ML: at 12:01

## 2020-01-04 RX ADMIN — FAMOTIDINE 20 MG: 10 INJECTION INTRAVENOUS at 08:01

## 2020-01-04 RX ADMIN — CLOPIDOGREL BISULFATE 75 MG: 75 TABLET ORAL at 08:01

## 2020-01-04 NOTE — PLAN OF CARE
Problem: Adult Inpatient Plan of Care  Goal: Plan of Care Review  Outcome: Ongoing, Progressing   Patient remain in ICU on Aresol TP @ 21% O2 (trach),  Trach dressing changed 1x this shift. TF continues at 45ml/hr with 200ml H20 q6. Remained tachycardia through out night. Temp 101 and received tylenol elixir per PEG. temp currently 99.2. Pain management with Morphine and Hydrocordone. Restless throughtout night (0hrs) sleep. Family at bedside. BP elevated and received hydralazine PRN for SBP >160. B/p currently 117/56. No falls, breakdowns or injuries continue to monitor.

## 2020-01-04 NOTE — PLAN OF CARE
01/04/20 1412   Final Note   Assessment Type Final Discharge Note   Anticipated Discharge Disposition LTAC   Hospital Follow Up  Appt(s) scheduled? No   Discharge plans and expectations educations in teach back method with documentation complete? No       SW informed Nurse Mauricio bajwa was cleared from case management.

## 2020-01-04 NOTE — PROGRESS NOTES
HERMINIO spoke with Kadie from [facility], she stated she will contact HERMINIO when an ICU bed is available , should be ready today.

## 2020-01-04 NOTE — PLAN OF CARE
01/04/20 1411   Post-Acute Status   Post-Acute Authorization Placement  (LTAC)   Post-Acute Placement Status Set-up Complete

## 2020-01-04 NOTE — CARE UPDATE
[facility]  ICU Multidisciplinary Bedside Rounds   SUMMARY     Date: 1/4/2020    Prehospitalization: Home  Admit Date / LOS : 12/17/2019/ 17 days    Diagnosis: Acute respiratory failure with hypoxia    Consults:        Active: Neuro, Pulm CC and SW       Needed: N/A     Code Status: Full Code   Advanced Directive: <no information>    LDA: Hutchison, PICC and Vent       Central Lines/Site/Justification:Multiple GTTS       Urinary Cath/Order/Justification:Chronic Indwelling Urinary Cathether on Admission    Vasopressors/Infusions:    dexmedetomidine (PRECEDEX) infusion Stopped (01/03/20 0803)          GOALS: Volume/ Hemodynamic: SBP < 160                     RASS:     CAM ICU: Positive  Pain Management: IV       Pain Controlled: yes     Rhythm: ST    Respiratory Device: Aresol TP  Vent Mode: PRVC  Oxygen Concentration (%):  [21] 21  Resp Rate Total:  [14 br/min-25 br/min] 22 br/min  Vt Set:  [350 mL] 350 mL  PEEP/CPAP:  [5 cmH20] 5 cmH20  Mean Airway Pressure:  [6.6 cmH20] 6.6 cmH20             Most Recent SBT/ SAT: N/A       MOVE Screen:     VTE Prophylaxis: Mechanical  Mobility: Bedrest  Stress Ulcer Prophylaxis: No    Dietary: TF  Tolerance: yes  /  Advancement: yes    Isolation: No active isolations    Restraints: No    Significant Dates:  Post Op Date: N/A  Rescue Date: N/A  Imaging/ Diagnostics: N/A    Noteworthy Labs:      CBC/Anemia Labs: Coags:    Recent Labs   Lab 12/30/19  0327  12/31/19  0305  01/02/20  0331 01/03/20  0307 01/04/20  0330   WBC 12.12   < > 13.10*   < > 8.60 11.38 12.60   HGB 6.8*   < > 8.4*   < > 7.9* 7.8* 8.3*   HCT 20.7*   < > 25.9*   < > 24.7* 23.9* 25.2*   *   < > 159   < > 217 229 318   MCV 85   < > 84   < > 86 85 83   RDW 17.7*   < > 17.3*   < > 17.3* 16.7* 16.4*   IRON 23*  --   --   --   --   --   --    FERRITIN 769*  --   --   --   --   --   --    FOLATE  --   --  4.9  --   --   --   --    [id number]  --   --  798  --   --   --   --     < > = values in this  interval not displayed.    Recent Labs   Lab 12/28/19  1132 12/29/19  0834   INR 1.1 1.0        Chemistries:   Recent Labs   Lab 01/02/20  0331 01/03/20  0307 01/04/20  0330    136 137   K 4.5 4.3 4.0    101 102   CO2 31* 27 24   BUN 16 19 20   CREATININE 0.7 0.8 0.8   CALCIUM 9.0 8.8 9.4   PROT 5.8* 5.8* 6.7   BILITOT 0.4 0.4 0.8   ALKPHOS 70 64 79   ALT 26 20 22   AST 34 33 39   MG 1.7 1.7 1.9   PHOS 4.1 4.2 3.5        Cardiac Enzymes: Ejection Fractions:    No results for input(s): CPK, CPKMB, MB, TROPONINI in the last 72 hours. No results found for: EF     POCT Glucose: HbA1c:    Recent Labs   Lab 01/02/20  0828   POCTGLUCOSE 98    Hemoglobin A1C   Date Value Ref Range Status   12/26/2019 5.6 4.0 - 5.6 % Final     Comment:     ADA Screening Guidelines:  5.7-6.4%  Consistent with prediabetes  >or=6.5%  Consistent with diabetes  High levels of fetal hemoglobin interfere with the HbA1C  assay. Heterozygous hemoglobin variants (HbS, HgC, etc)do  not significantly interfere with this assay.   However, presence of multiple variants may affect accuracy.     09/14/2019 5.8 (H) 4.0 - 5.6 % Final     Comment:     ADA Screening Guidelines:  5.7-6.4%  Consistent with prediabetes  >or=6.5%  Consistent with diabetes  High levels of fetal hemoglobin interfere with the HbA1C  assay. Heterozygous hemoglobin variants (HbS, HgC, etc)do  not significantly interfere with this assay.   However, presence of multiple variants may affect accuracy.     12/01/2017 5.7 (H) 4.0 - 5.6 % Final     Comment:     According to ADA guidelines, hemoglobin A1c <7.0% represents  optimal control in non-pregnant diabetic patients. Different  metrics may apply to specific patient populations.   Standards of Medical Care in Diabetes-2016.  For the purpose of screening for the presence of diabetes:  <5.7%     Consistent with the absence of diabetes  5.7-6.4%  Consistent with increasing risk for diabetes   (prediabetes)  >or=6.5%  Consistent  with diabetes  Currently, no consensus exists for use of hemoglobin A1c  for diagnosis of diabetes for children.  This Hemoglobin A1c assay has significant interference with fetal   hemoglobin   (HbF). The results are invalid for patients with abnormal amounts of   HbF,   including those with known Hereditary Persistence   of Fetal Hemoglobin. Heterozygous hemoglobin variants (HbAS, HbAC,   HbAD, HbAE, HbA2) do not significantly interfere with this assay;   however, presence of multiple variants in a sample may impact the %   interference.          Needs from Care Team: none     ICU LOS 9d 3h  Level of Care: OK to Transfer

## 2020-01-04 NOTE — PLAN OF CARE
01/04/20 1215   Post-Acute Status   Post-Acute Authorization Placement   Post-Acute Placement Status Pending Bed Availability   Patient choice form signed by patient/caregiver List with quality metrics by geographic area provided

## 2020-01-04 NOTE — PROGRESS NOTES
HERMINIO spoke with Kadie from [employer], she stated the SW can set transportation for 6:00pm and the Nurse can call report for 3:00pm. Pt is going to room 7126.       ADT 30 order placed for [facility] [employer].  Requested  time:6:00pm  If transportation does not arrive at ETA time nurse will be instructed to follow protocol for transportation below:   How can I get in touch directly with dispatch, if needed?                 · Non-emergent (stretcher): [phone]    · Emergent dispatch: [phone]    ++NURSING:  If Stretcher does not arrive at requested time please call the above Non Emergent Dispatcher.  If issue not resolved please escalate to your charge nurse for further instructions.

## 2020-01-04 NOTE — PLAN OF CARE
[facility]    FACILITY TRANSFER ORDERS      Patient Name: Emilia Melgoza  YOB: 1945    PCP: Jerson Price MD   PCP Address: [address] [place] [address] / [place] [address]  PCP Phone Number: [phone]  PCP Fax: [phone]    Encounter Date: 01/04/2020    Admit to: [facility]    Vital Signs:  Routine    Diagnoses:   Active Hospital Problems    Diagnosis  POA    *Acute respiratory failure with hypoxia [J96.01]  Yes    Fever [R50.9]  No    Normocytic anemia [D64.9]  Yes    Acute arterial ischemic stroke, multifocal, posterior circulation [I63.539]  No    Acute encephalopathy [G93.40]  Yes    Mobility impaired [Z74.09]  Yes     Chronic    Broca's aphasia [R47.01]  Yes     Chronic    Hemiparesis affecting right side as late effect of cerebrovascular accident [I69.351]  Not Applicable     Chronic    Essential hypertension [I10]  Yes     Chronic    Chronic diastolic congestive heart failure [I50.32]  Yes      Resolved Hospital Problems    Diagnosis Date Resolved POA    Shock [R57.9] 01/04/2020 No    Altered mental status [R41.82] 01/04/2020 Yes    On mechanically assisted ventilation [Z99.11] 01/04/2020 Not Applicable    Elevated INR [R79.1] 01/04/2020 Yes    Metabolic acidosis [E87.2] 01/04/2020 No    Cellulitis of mouth [K12.2] 01/04/2020 Yes    Acute cystitis without hematuria [N30.00] 01/04/2020 Yes       Allergies:  Review of patient's allergies indicates:   Allergen Reactions    Enoxaparin Other (See Comments) and Hives     Slurred speech per patient  Slurred speech per patient    Lisinopril Other (See Comments)     Cough    Penicillins Rash       Diet: NPO  isosource 1.5 via g tube bat 45cc/hr continuous, flush g tube with 200 cc water Q 6 hours     Activities: Activity as tolerated    Nursing: Vitals - per protocol  Aspiration precautions   Trach care  Hutchison care  Peg tube care     Full code     Labs: CBC and BMP  weekly    CONSULTS:    Physical Therapy to  evaluate and treat. , Occupational Therapy to evaluate and treat. and Speech Therapy to evaluate and treat for Language, Swallowing and Cognition.    MISCELLANEOUS CARE:  PEG Care: Clean site every 24 hours. , Hutchison Care: Empty Hutchison bag every shift. Change Hutchison every month. and Routine Skin for Bedridden Patients: Apply moisture barrier cream to all skin folds and wet areas in perineal area daily and after baths and all bowel movements.    WOUND CARE ORDERS      Medications: Review discharge medications with patient and family and provide education.      Current Discharge Medication List      START taking these medications    Details   acetaminophen (TYLENOL) 650 mg/20.3 mL Soln 20.3 mLs (650 mg total) by Per G Tube route every 6 (six) hours as needed for Temperature greater than (temp >101).  Qty: 5 mL, Refills: 0      aspirin 81 MG Chew Take 1 tablet (81 mg total) by mouth once daily.  Qty: 30 tablet, Refills: 11      chlorhexidine (PERIDEX) 0.12 % solution Use as directed 15 mLs in the mouth or throat 2 (two) times daily. for 14 days  Qty: 15 mL, Refills: 0      glycopyrrolate (ROBINUL) 0.2 mg/mL injection Inject 1 mL (0.2 mg total) into the vein 3 (three) times daily.  Qty: 90 mL, Refills: 0      hydrocodone-acetaminophen (HYCET) solution 7.5-325 mg/15mL 15 mLs by Per G Tube route every 4 (four) hours as needed.  Qty: 30 mL, Refills: 0    Comments: Quantity prescribed more than 7 day supply? Yes, quantity medically necessary      levoFLOXacin (LEVAQUIN) 750 mg/150 mL PgBk Inject 150 mLs (750 mg total) into the vein once daily. for 7 days  Qty: 1050 mL, Refills: 0         CONTINUE these medications which have CHANGED    Details   atorvastatin (LIPITOR) 40 MG tablet 1 tablet (40 mg total) by Per G Tube route every evening.  Qty: 90 tablet, Refills: 3      clopidogrel (PLAVIX) 75 mg tablet 1 tablet (75 mg total) by Per G Tube route once daily.  Qty: 30 tablet, Refills: 11         STOP taking these medications        amlodipine (NORVASC) 1 mg/mL Susp Comments:   Reason for Stopping:         azelastine (ASTELIN) 137 mcg (0.1 %) nasal spray Comments:   Reason for Stopping:         cholecalciferol, vitamin D3, 1,000 unit capsule Comments:   Reason for Stopping:         clindamycin (CLEOCIN) 150 MG capsule Comments:   Reason for Stopping:         cloNIDine (CATAPRES) 0.1 MG tablet Comments:   Reason for Stopping:         escitalopram oxalate (LEXAPRO) 5 mg/5 mL Soln Comments:   Reason for Stopping:         fexofenadine (ALLEGRA) 180 MG tablet Comments:   Reason for Stopping:         fluticasone propionate (FLONASE) 50 mcg/actuation nasal spray Comments:   Reason for Stopping:         GENERLAC 10 gram/15 mL solution Comments:   Reason for Stopping:         ibuprofen (ADVIL,MOTRIN) 800 MG tablet Comments:   Reason for Stopping:         memantine (NAMENDA) 10 MG Tab Comments:   Reason for Stopping:         nitrofurantoin (FURADANTIN) 25 mg/5 mL Susp Comments:   Reason for Stopping:         oxybutynin (DITROPAN) 5 mg/5 mL syrup Comments:   Reason for Stopping:         polyethylene glycol (GLYCOLAX) 17 gram PwPk Comments:   Reason for Stopping:         tramadol (ULTRAM) 50 mg tablet Comments:   Reason for Stopping:                    _________________________________  Valeria Méndez MD  01/04/2020

## 2020-01-04 NOTE — PT/OT/SLP PROGRESS
Physical Therapy Treatment    Patient Name:  Emilia Melgoaz   MRN:  [id number]    Recommendations:     Discharge Recommendations:  (LTAC per chart)   Discharge Equipment Recommendations: bedside commode, wheelchair, and hospital bed   Barriers to discharge: None    Assessment:     Emilia Melgoza is a 74 y.o. female admitted with a medical diagnosis of Acute respiratory failure with hypoxia and CVA.  She presents with the following impairments/functional limitations:  weakness, impaired self care skills, impaired functional mobilty, impaired balance, impaired cognition, decreased coordination, decreased lower extremity function, decreased upper extremity function, decreased safety awareness.    Rehab Prognosis: Fair; patient would benefit from acute skilled PT services to address these deficits and reach maximum level of function.    Recent Surgery: Procedure(s) (LRB):  TRACHEOTOMY (N/A) 2 Days Post-Op    Plan:     During this hospitalization, patient to be seen 5 x/week to address the identified rehab impairments via gait training, therapeutic activities, therapeutic exercises and progress toward the following goals:    · Plan of Care Expires:  01/11/20    Subjective     Chief Complaint: N/A  Patient/Family Comments/goals: N/A; Daughter would like to know if pt will walk again.    Pain/Comfort:  Pain Rating 1: 0/10      Objective:     Communicated with nurse Martínez prior to session.  Patient found left sidelying with blood pressure cuff, tracheostomy, oxygen 5L, peripheral IV, pulse ox (continuous), telemetry, PEG, and espino catheter upon PT entry to room.     General Precautions: Standard, aphasia, NPO, aspiration, fall   Orthopedic Precautions:N/A   Braces: N/A    Functional Mobility:  Pt total assistance for bed mobility and repositioning per nursing staff.  Per nurse, ok for bed therex.      AM-PAC 6 CLICK MOBILITY  Turning over in bed (including adjusting bedclothes, sheets and blankets)?: 1  Sitting down  on and standing up from a chair with arms (e.g., wheelchair, bedside commode, etc.): 1  Moving from lying on back to sitting on the side of the bed?: 1  Moving to and from a bed to a chair (including a wheelchair)?: 1  Need to walk in hospital room?: 1  Climbing 3-5 steps with a railing?: 1  Basic Mobility Total Score: 6       Therapeutic Activities and Exercises:  Pt was alert, however with very limited eye contact.  Pt did not follow any simple commands.  Pt unable to actively participate in UE/LE therex despite max VC's/TC's, and demonstration.  During PROM some resistance met in RUE and LLE.  RLE with hypotonia during ROM.  AROM noted mostly with LUE, however not on commands.    PROM BUE in supine in all available planes: finger flex/wrist, wrist flex/ext, forearm sup/pron, elbow flex/ext, and shoulder flex/ext    PROM BLE in supine in all available planes: hip flex, hip IR/ER, knee flex/ext, and ankle DF/PF    Patient left left sidelying with all lines intact, call button in reach, nurse Mauricio notified and daughter present.    GOALS:   Multidisciplinary Problems     Physical Therapy Goals        Problem: Physical Therapy Goal    Goal Priority Disciplines Outcome Goal Variances Interventions   Physical Therapy Goal     PT, PT/OT Ongoing, Progressing     Description:  Goals to be met by: 20     Patient will increase functional independence with mobility by performin. Supine to sit with Moderate Assistance  2. Sit to supine with Moderate Assistance  3. Rolling to Left and Right with Moderate Assistance  4. Sitting at edge of bed x30 minutes with Minimal Assistance  5. Lower extremity exercise program x10 reps per handout, with assistance as needed                       Time Tracking:     PT Received On: 20  PT Start Time: 1442     PT Stop Time: 1456  PT Total Time (min): 14 min     Billable Minutes: Therapeutic Exercise 14 min    Treatment Type: Treatment  PT/PTA: PT     PTA Visit Number: 0      Camelia Gardner, PT  01/04/2020

## 2020-01-04 NOTE — PLAN OF CARE
01/04/20 1411   Medicare Message   Important Message from Medicare regarding Discharge Appeal Rights Given to patient/caregiver;Explained to patient/caregiver;Signed/date by patient/caregiver   Date IMM was signed 01/04/20   Time IMM was signed 1402

## 2020-01-04 NOTE — PLAN OF CARE
Problem: Physical Therapy Goal  Goal: Physical Therapy Goal  Description  Goals to be met by: 20     Patient will increase functional independence with mobility by performin. Supine to sit with Moderate Assistance  2. Sit to supine with Moderate Assistance  3. Rolling to Left and Right with Moderate Assistance  4. Sitting at edge of bed x30 minutes with Minimal Assistance  5. Lower extremity exercise program x10 reps per handout, with assistance as needed      Outcome: Ongoing, Progressing    PROM performed to BUE and BLE.

## 2020-01-04 NOTE — PROGRESS NOTES
Surgery Progress Note    Primary Problem: Acute respiratory failure with hypoxia    Other problems:   Active Hospital Problems    Diagnosis  POA    *Acute respiratory failure with hypoxia [J96.01]  Yes    Normocytic anemia [D64.9]  Yes    Shock [R57.9]  No    Acute arterial ischemic stroke, multifocal, posterior circulation [I63.539]  No    Altered mental status [R41.82]  Yes    On mechanically assisted ventilation [Z99.11]  Not Applicable    Elevated INR [R79.1]  Yes    Metabolic acidosis [E87.2]  No    Cellulitis of mouth [K12.2]  Yes    Acute encephalopathy [G93.40]  Yes    Mobility impaired [Z74.09]  Yes     Chronic    Acute cystitis without hematuria [N30.00]  Yes    Broca's aphasia [R47.01]  Yes     Chronic    Hemiparesis affecting right side as late effect of cerebrovascular accident [I69.351]  Not Applicable     Chronic    Essential hypertension [I10]  Yes     Chronic    Chronic diastolic congestive heart failure [I50.32]  Yes      Resolved Hospital Problems   No resolved problems to display.       HD #  LOS: 17 days   POD #2 Days Post-Op status post tracheostomy    Overnight events:  Wean to T-piece      Diet - No diet orders on file     I/O last 3 completed shifts:  In: 1992.6 [I.V.:157.6; NG/GT:1835]  Out: 1615 [Urine:1615]    Intake/Output Summary (Last 24 hours) at 1/4/2020 1041  Last data filed at 1/4/2020 1000  Gross per 24 hour   Intake 1380 ml   Output 1290 ml   Net 90 ml       Temp:  [98.2 °F (36.8 °C)-101 °F (38.3 °C)] 98.2 °F (36.8 °C)  Pulse:  [101-136] 120  Resp:  [17-42] 18  SpO2:  [90 %-100 %] 97 %  BP: ()/() 120/58    Trach in place.  No oozing around it.    Recent Labs   Lab 12/31/19  0305 01/01/20  0427 01/02/20  0331 01/03/20  0307 01/04/20  0330   WBC 13.10* 9.58 8.60 11.38 12.60   HGB 8.4* 8.0* 7.9* 7.8* 8.3*   HCT 25.9* 24.6* 24.7* 23.9* 25.2*    181 217 229 318   * 137 138 136 137   K 4.6 4.2 4.5 4.3 4.0    101 102 101 102   BUN 14 14 16  19 20   GLU 96 128* 93 146* 94   PROT 6.2 6.0 5.8* 5.8* 6.7   ALBUMIN 2.4* 2.3* 2.1* 2.1* 2.5*   BILITOT 0.5 0.4 0.4 0.4 0.8   AST 46* 41* 34 33 39   ALKPHOS 73 73 70 64 79   ALT 29 24 26 20 22        Assessment:  Status post trach    Plan:  Routine trach care  Continue care for per primary  Will sign off.  Call for further issues.    Basim Young MD

## 2020-01-04 NOTE — PROGRESS NOTES
Pharmacokinetic Initial Assessment: IV Vancomycin    Assessment/Plan:    Initiate intravenous vancomycin with loading dose of 2000 mg once followed by a maintenance dose of vancomycin 2000mg IV every 24 hours  Desired empiric serum trough concentration is 10 to 20 mcg/mL  Draw vancomycin trough level 30 min prior to third dose on 1/6/2020 at approximately 1200   Pharmacy will continue to follow and monitor vancomycin.      Please contact pharmacy at extension [phone] with any questions regarding this assessment.     Thank you for the consult,   Topher Terrazas       Patient brief summary:  Emilia Melgoza is a 74 y.o. female initiated on antimicrobial therapy with IV Vancomycin for treatment of suspected pneumonia     Drug Allergies:   Review of patient's allergies indicates:   Allergen Reactions    Enoxaparin Other (See Comments) and Hives     Slurred speech per patient  Slurred speech per patient    Lisinopril Other (See Comments)     Cough    Penicillins Rash       Actual Body Weight:   71.4kg    Renal Function:   Estimated Creatinine Clearance: 60.2 mL/min (based on SCr of 0.8 mg/dL).,     Dialysis Method (if applicable):  no     CBC (last 72 hours):  Recent Labs   Lab Result Units 01/02/20  0331 01/03/20  0307 01/04/20  0330   WBC K/uL 8.60 11.38 12.60   Hemoglobin g/dL 7.9* 7.8* 8.3*   Hematocrit % 24.7* 23.9* 25.2*   Platelets K/uL 217 229 318   Gran% % 71.0 81.6* 61.7   Lymph% % 14.2* 8.4* 24.4   Mono% % 10.6 8.4 11.3   Eosinophil% % 2.8 0.4 1.1   Basophil% % 0.2 0.3 0.2   Differential Method  Automated Automated Automated       Metabolic Panel (last 72 hours):  Recent Labs   Lab Result Units 01/02/20  0331 01/03/20  0307 01/04/20  0330   Sodium mmol/L 138 136 137   Potassium mmol/L 4.5 4.3 4.0   Chloride mmol/L 102 101 102   CO2 mmol/L 31* 27 24   Glucose mg/dL 93 146* 94   BUN, Bld mg/dL 16 19 20   Creatinine mg/dL 0.7 0.8 0.8   Albumin g/dL 2.1* 2.1* 2.5*   Total Bilirubin mg/dL 0.4 0.4 0.8   Alkaline  Phosphatase U/L 70 64 79   AST U/L 34 33 39   ALT U/L 26 20 22   Magnesium mg/dL 1.7 1.7 1.9   Phosphorus mg/dL 4.1 4.2 3.5       Drug levels (last 3 results):  No results for input(s): VANCOMYCINRA, VANCOMYCINPE, VANCOMYCINTR in the last 72 hours.    Microbiologic Results:  Microbiology Results (last 7 days)       Procedure Component Value Units Date/Time    Blood culture [[id number]] Collected:  01/04/20 1225    Order Status:  Sent Specimen:  Blood Updated:  01/04/20 1232    Blood culture [[id number]] Collected:  01/04/20 1225    Order Status:  Sent Specimen:  Blood Updated:  01/04/20 1232    Culture, Respiratory with Gram Stain [[id number]]     Order Status:  No result Specimen:  Respiratory from Tracheal Aspirate     Blood culture [[id number]] Collected:  12/28/19 1530    Order Status:  Completed Specimen:  Blood Updated:  01/02/20 1903     Blood Culture, Routine No growth after 5 days.    Blood culture [[id number]] Collected:  12/28/19 1545    Order Status:  Completed Specimen:  Blood Updated:  01/02/20 1903     Blood Culture, Routine No growth after 5 days.

## 2020-01-05 NOTE — NURSING
Pt being transported to [facility] via [facility] stretcher accompanied by daughter. ALDO Hutchison PICC, Peg, and trach in place on 5L 28% aerosol t-piece. No acute changes and/or distress noted, VSS, daughter took home all belongings, pt and daughter educated on discharge instructions and follow-up.    1932 Report given to nurse Garcia, orders and chart reviewed, POC discussed, opportunity for questions provided, all questions answered.

## 2020-01-06 LAB
BACTERIA SPEC AEROBE CULT: NORMAL
GRAM STN SPEC: NORMAL

## 2020-01-06 NOTE — PT/OT/SLP DISCHARGE
Occupational Therapy Discharge Summary    Emilia Melgoza  MRN: [id number]   Principal Problem: Acute respiratory failure with hypoxia      Patient Discharged from acute Occupational Therapy on 01/04/20.  Please refer to prior OT notes for functional status.    Assessment:      Patient appropriate for care in another setting.    Objective:     GOALS:   Multidisciplinary Problems     Occupational Therapy Goals        Problem: Occupational Therapy Goal    Goal Priority Disciplines Outcome Interventions   Occupational Therapy Goal     OT, PT/OT Ongoing, Progressing    Description:  Goals to be met by: 01/04/20     Patient will increase functional independence with ADLs by performing:    UE Dressing with Maximum Assistance.  Grooming while seated with Moderate Assistance.  Sitting at edge of bed x10 minutes with Moderate Assistance.  Rolling to Bilateral with Moderate Assistance.   Supine to sit with Moderate Assistance.  Upper extremity exercise program x7 reps per handout, with assistance as needed.  Pt will visually scan for 3 out 5 ADL objects with 50% verbal cueing in order to increase active participation with ADLs.                       Reasons for Discontinuation of Therapy Services  Transfer to alternate level of care.      Plan:     Patient Discharged to: Long Term Acute Care    Marquita Virk OT  1/6/2020

## 2020-01-06 NOTE — PT/OT/SLP DISCHARGE
Physical Therapy Discharge Summary    Name: Emilia Melgoza  MRN: [id number]   Principal Problem: Acute respiratory failure with hypoxia     Patient Discharged from acute Physical Therapy on 20.  Please refer to prior PT notes for functional status.     Assessment:     Patient appropriate for care in another setting.    Objective:     GOALS:   Multidisciplinary Problems     Physical Therapy Goals        Problem: Physical Therapy Goal    Goal Priority Disciplines Outcome Goal Variances Interventions   Physical Therapy Goal     PT, PT/OT Ongoing, Progressing     Description:  Goals to be met by: 20     Patient will increase functional independence with mobility by performin. Supine to sit with Moderate Assistance  2. Sit to supine with Moderate Assistance  3. Rolling to Left and Right with Moderate Assistance  4. Sitting at edge of bed x30 minutes with Minimal Assistance  5. Lower extremity exercise program x10 reps per handout, with assistance as needed                       Reasons for Discontinuation of Therapy Services  Transfer to alternate level of care.      Plan:     Patient Discharged to: Long Term Acute Care.    Camelia Gardner, PT  2020

## 2020-01-07 NOTE — ANESTHESIA POSTPROCEDURE EVALUATION
Anesthesia Post Evaluation    Patient: Emilia Melgoza    Procedure(s) Performed: Procedure(s) (LRB):  INSERTION, PEG TUBE (N/A)    Final Anesthesia Type: general    Patient location during evaluation: PACU  Patient participation: Yes- Able to Participate  Level of consciousness: awake and alert  Post-procedure vital signs: reviewed and stable  Pain management: adequate  Airway patency: patent    PONV status at discharge: No PONV  Anesthetic complications: no      Cardiovascular status: blood pressure returned to baseline and hemodynamically stable  Respiratory status: unassisted and spontaneous ventilation  Hydration status: euvolemic  Follow-up not needed.          Vitals Value Taken Time   /58 1/4/2020  7:01 PM   Temp 37.3 °C (99.1 °F) 1/4/2020  4:00 PM   Pulse 124 1/4/2020  7:14 PM   Resp 67 1/4/2020  7:14 PM   SpO2 100 % 1/4/2020  7:11 PM   Vitals shown include unvalidated device data.      No case tracking events are documented in the log.      Pain/Becca Score: No data recorded

## 2020-01-09 LAB
BACTERIA BLD CULT: NORMAL
BACTERIA BLD CULT: NORMAL

## 2020-01-10 NOTE — PHYSICIAN QUERY
PT Name: Emilia Melgoza  MR #: [id number]     Physician Query Form - Documentation Clarification      CDS: Rhiannon Rice RN  Contact information: [email or web address]  This form is a permanent document in the medical record.     Query Date: January 10, 2020  By submitting this query, we are merely seeking further clarification of documentation. Please utilize your independent clinical judgment when addressing the question(s) below.    The Medical record reflects the following:  Supporting Clinical Findings Location in Medical Record   Pt's heart rate elevated and bp increased 170's to 190's, appearing more restless today, MD's will place new orders for light sedation.    Pt had a decrease in BP. Pt's BP is 109/53        1850 Call placed to MD Aldridge to clarify levophed orders. MD desires sbp to be between 130-140. Informed MD pt's BP labile has been up to 160's.     Precedex infusing at 0.1 mcg/kg/min to maintain RASS -1. Levophed infusing at 0.02 mcg/kg/min to maintain -140. VSS otherwise Nursing care update 12/29 1157    Nursing care update   12/29 1839    Nursing care update 12/29 1850      Nursing plan of care 12/30 0629     Appeared uncomfortable on 12/29. On low dose precedex for comfort  Very low dose of vasopressors to maintain higher blood pressure while on precedex    PN 12/30   Shock  Likely due to sedation while on mechanical ventilation. No signs of infection with BCx NGTD. Not currently on abx. Completed full treatment course for E coli UTI earlier during admission.   --Wean norepinephrine to maintain MAP >65   Pulmonary Med PN 12/30  LATRELL Howell Dr.   Vital Signs (24h Range):  BP: (144-193)/() 144/65    Vital Signs (24h Range):  BP: (104-166)/() 149/66    Vital Signs (24h Range):  BP: (101-200)/(52-95) 112/58    PN 12/29       PN 12/30       PN 12/31   norepinephrine 4 mg in dextrose 5% 250 mL infusion  Started: 12/29 1737  Stopped: 1/1 0342 MAR                                                                           Doctor, please clarify the diagnosis associated with above clinical findings.    Provider Use Only             [  ] Hypotension due to sedation/drug (Precedex)       [ x ] Hypotension, unspecified       [  ] Shock due to sedation/drug (Precedex)       [  ] Other clarification of findings: __________________________________                                                                                                           [  ] Clinically Undetermined

## 2020-01-10 NOTE — DISCHARGE SUMMARY
[facility] Hospital Medicine  Discharge Summary      Patient Name: Emilia Melgoza  MRN: [id number]  Admission Date: 12/17/2019  Hospital Length of Stay: 17 days  Discharge Date and Time: 1/4/2019  Attending Physician: No att. providers found   Discharging Provider: Valeria Méndez MD  Primary Care Provider: Jerson Price MD      HPI:   74 y.o. 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  She is also not taking her medications.  She appears to be more confused than usual.  Had a recent tooth extraction by the dentist and was unable to take prescribed clindamycin.  Daughter reports patient has indicated mouth pain. Also recently prescribed Macrobid for UTI and was unable to complete.  Daughter denies that the patient has had a fever.  History further limited secondary to the patient's condition.  In the ED, she was found to be tachycardic with hypernatremia, hypokalemia, mild elevation of CPK and troponin.  Urinalysis, CT head, chest x-ray, and EKG were all unremarkable for any acute abnormality.  She appears clinically dehydrated.  Placed in observation for further evaluation and treatment.    Procedure(s) (LRB):  TRACHEOTOMY (N/A)      Hospital Course:   Ms Melgoza presented with acute encephalopathy. Workup significant for soft tissue swelling and edema involving the right anterior mandibular region suspicious for cellulitis, hypernatremia (148) . Patient also noted to have significant dysphagia for which she was placed NPO due to high risk for aspiration. Also aphasic and significantly deconditioned requiring moderate assistance from physical and occupational  therapists. Was initiated on empiric IV antibiotics. CT brain with no acute pathology, just old infarct to left frontal lobe extending to basal ganglia and meningioma (unchanged). CTA brain showed no vessel occlusion. NGT placed for enteral nutrition. Hypernatremia resolved. PT/OT/ST continued. Palliative care was consulted to address goals of care, mainly for PEG placement. Patient, by nodding yes or no, agreed with PEG tube placement. Gastroenterology consulted for PEG. Labwork showed elevated total bilirubin and AST >> ALT. INR also elevated and, per family, patient is not on anticoagulation at home, just clopidogrel for old stroke. Antiplatelets and chemical DVT prophylaxis held. Vitamin K given. INR corrected. Total bilirubin normalized and enzymes improved. Overnight 12/25-12/26, patient experienced hypoxia and worsening encephalopathy. Also reports of transient hypotension earlier that had resolved. Rapid response called. Placed on supplemental O2 but continued to decline therefore transferred to ICU and intubated. Patient was kept off sedation as she was minimally responsive. Neurology and pulmonology consulted. EEG obtained. Showed severe generalized slowing suggesting severe diffuse or multifocal cerebral dysfunction. No epileptiform activity or seizures appreciated. MRI of brain obtained. This showed extensive multifocal acute infarctions in bilateral parietooccipital lobes and other areas of the brain. Patient started on aspirin overnight. No statin as LDL 72 four months ago. Blood pressure maintained, feeding continued via NGT, prophylactic heparin SQ, and ventilator management. Echocardiogram ordered. Showed diastolic dysfunction but preserved EF and no thrombus. No arrhythmias appreciated on cardiac monitoring. Patient would open eyes and move extremities but unsure if purposeful. Respiratory effort improved at small increments with each SBT but not enough to safely extubate, in addition to neuro  deficits and inability to determine if she can manage her own secretions. Extended ventilator support expected. General surgery consulted for Trach/PEG and SW consulted for LTAC.     12/31- plan for Trach 1/2  1/2- trach placed, pt moving left arm more and requiring restraints; SW notified of LTAC dc planning; tighter BP control   await PT/OT     1/4- pt spiked fever, updated family that resp, urine and blood cultures were sent. Started on empiric abx. Pt has bed at [facility] and will plan to dc there today. Facility aware of fever and pending cultures. Pt resumed on all stroke prevention meds via peg tube that includes asa, plavix and statin.      Consults:   Consults (From admission, onward)        Status Ordering Provider     Inpatient consult to Gastroenterology  Once     Provider:  Giancarlo Goodwin MD    Completed GEOVANNA LAMBERT     Inpatient consult to Neurology  Once     Provider:  Cordell Bunch MD    Completed TERESITA DISLA     Inpatient consult to Neurology  Once     Provider:  Cem Eldridge MD    Completed MAYKEL JUARES     Inpatient consult to Neurology  Once     Provider:  Cem Eldridge MD    Completed SILVIA BECKER     Inpatient consult to Palliative Care  Once     Provider:  Ankita Galvin NP    Completed GEOVANNA LAMBERT     Inpatient consult to Palliative Care  Once     Provider:  Lelo Holder RN    Completed JOSE RODRIGUEZ     Inpatient consult to PICC team (NIAS)  Once     Provider:  (Not yet assigned)    Completed MAYKEL JUARES     Inpatient consult to Pulmonology  Once     Provider:  Jose Rodriguez MD    Completed MAYKEL JUARES     Inpatient consult to Registered Dietitian/Nutritionist  Once     Provider:  (Not yet assigned)    Completed TERESITA DISLA     Inpatient consult to Social Work  Once     Provider:  (Not yet assigned)    Completed TERESITA DISLA     Inpatient consult to Social Work  Once     Provider:  (Not yet  assigned)    Completed ALBERTO MARIE     Inpatient consult to Social Work  Once     Provider:  (Not yet assigned)    Completed ALBERTO MARIE     IP consult to dietary  Once     Provider:  (Not yet assigned)    Completed NADINE JOYA          No new Assessment & Plan notes have been filed under this hospital service since the last note was generated.  Service: Hospital Medicine    Final Active Diagnoses:    Diagnosis Date Noted POA    PRINCIPAL PROBLEM:  Acute respiratory failure with hypoxia [J96.01] 12/26/2019 Yes    Fever [R50.9] 01/04/2020 No    Normocytic anemia [D64.9] 12/30/2019 Yes    Acute arterial ischemic stroke, multifocal, posterior circulation [I63.539] 12/29/2019 No    Acute encephalopathy [G93.40] 12/17/2019 Yes    Mobility impaired [Z74.09] 12/10/2019 Yes     Chronic    Broca's aphasia [R47.01] 08/02/2018 Yes     Chronic    Hemiparesis affecting right side as late effect of cerebrovascular accident [I69.351] 10/06/2016 Not Applicable     Chronic    Essential hypertension [I10] 02/24/2016 Yes     Chronic    Chronic diastolic congestive heart failure [I50.32] 09/09/2013 Yes      Problems Resolved During this Admission:    Diagnosis Date Noted Date Resolved POA    Shock [R57.9] 12/30/2019 01/04/2020 No    Altered mental status [R41.82]  01/04/2020 Yes    On mechanically assisted ventilation [Z99.11] 12/26/2019 01/04/2020 Not Applicable    Elevated INR [R79.1] 12/24/2019 01/04/2020 Yes    Metabolic acidosis [E87.2] 12/24/2019 01/04/2020 No    Cellulitis of mouth [K12.2] 12/18/2019 01/04/2020 Yes    Acute cystitis without hematuria [N30.00] 12/10/2019 01/04/2020 Yes       Discharged Condition: good    Disposition: Long Term Care    Follow Up:  Follow-up Information     Jerson Price MD On 12/31/2019.    Specialty:  Internal Medicine  Why:  Outpatient Services PCP Follow-Up Tuesday at 1:00 PM  Contact information:  [address]  [place] [id number]  [phone]              Belgica Lucas DO On 2/11/2020.    Specialty:  Neurology  Why:  Outpatient Services Neurology Follow-Up Tuesday at 10:00 AM  Contact information:  [facility]  [address] [place] [address] [phone]                 Patient Instructions:      Diet NPO     Activity as tolerated         Pending Diagnostic Studies:     None         Medications:  Reconciled Home Medications:      Medication List      START taking these medications    acetaminophen 650 mg/20.3 mL Soln  Commonly known as:  TYLENOL  20.3 mLs (650 mg total) by Per G Tube route every 6 (six) hours as needed for Temperature greater than (temp >101).     aspirin 81 MG Chew  Take 1 tablet (81 mg total) by mouth once daily.     chlorhexidine 0.12 % solution  Commonly known as:  PERIDEX  Use as directed 15 mLs in the mouth or throat 2 (two) times daily. for 14 days     glycopyrrolate 0.2 mg/mL injection  Commonly known as:  ROBINUL  Inject 1 mL (0.2 mg total) into the vein 3 (three) times daily.     hydrocodone-apap 7.5-325 MG/15 ML oral solution  Commonly known as:  HYCET  15 mLs by Per G Tube route every 4 (four) hours as needed.     levoFLOXacin 750 mg/150 mL Pgbk  Commonly known as:  LEVAQUIN  Inject 150 mLs (750 mg total) into the vein once daily. for 7 days        CHANGE how you take these medications    atorvastatin 40 MG tablet  Commonly known as:  LIPITOR  1 tablet (40 mg total) by Per G Tube route every evening.  What changed:    · medication strength  · how much to take  · how to take this  · when to take this     clopidogrel 75 mg tablet  Commonly known as:  PLAVIX  1 tablet (75 mg total) by Per G Tube route once daily.  What changed:  how to take this        STOP taking these medications    amlodipine 1 mg/mL Susp  Commonly known as:  norvasc     azelastine 137 mcg (0.1 %) nasal spray  Commonly known as:  ASTELIN     cholecalciferol (vitamin D3) 25 mcg (1,000 unit) capsule  Commonly known as:  VITAMIN D3     clindamycin 150 MG  capsule  Commonly known as:  CLEOCIN     cloNIDine 0.1 MG tablet  Commonly known as:  CATAPRES     escitalopram oxalate 5 mg/5 mL Soln  Commonly known as:  LEXAPRO     fexofenadine 180 MG tablet  Commonly known as:  ALLEGRA     fluticasone propionate 50 mcg/actuation nasal spray  Commonly known as:  FLONASE     Generlac 10 gram/15 mL solution  Generic drug:  lactulose     ibuprofen 800 MG tablet  Commonly known as:  ADVIL,MOTRIN     memantine 10 MG Tab  Commonly known as:  NAMENDA     nitrofurantoin 25 mg/5 mL Susp  Commonly known as:  FURADANTIN     oxybutynin 5 mg/5 mL syrup  Commonly known as:  DITROPAN     polyethylene glycol 17 gram Pwpk  Commonly known as:  GLYCOLAX     traMADol 50 mg tablet  Commonly known as:  ULTRAM            Indwelling Lines/Drains at time of discharge:   Lines/Drains/Airways     Peripherally Inserted Central Catheter Line                 PICC Double Lumen 12/28/19 1340 right basilic 12 days          Drain                 Urethral Catheter 12/26/19 0441 Straight-tip 12 Fr. 15 days         Gastrostomy/Enterostomy 12/31/19 1330 Percutaneous endoscopic gastrostomy (PEG) LUQ feeding 9 days          Airway                 Surgical Airway 01/02/20 0919 Shiley Cuffed 7 days                Time spent on the discharge of patient: 30 minutes  Patient was seen and examined on the date of discharge and determined to be suitable for discharge.    Critical care time spent on the evaluation and treatment of severe organ dysfunction, review of pertinent labs and imaging studies, discussions with consulting providers and discussions with patient/family: 20 minutes.     Valeria Méndez MD  Department of Hospital Medicine  [facility]

## 2020-01-21 ENCOUNTER — PES CALL (OUTPATIENT)
Dept: ADMINISTRATIVE | Facility: CLINIC | Age: 75
End: 2020-01-21

## 2020-02-07 ENCOUNTER — PATIENT OUTREACH (OUTPATIENT)
Dept: ADMINISTRATIVE | Facility: OTHER | Age: 75
End: 2020-02-07

## 2020-02-12 ENCOUNTER — EXTERNAL HOSPITAL ADMISSION (OUTPATIENT)
Dept: ADMINISTRATIVE | Facility: CLINIC | Age: 75
End: 2020-02-12

## 2020-02-12 ENCOUNTER — PATIENT OUTREACH (OUTPATIENT)
Dept: ADMINISTRATIVE | Facility: CLINIC | Age: 75
End: 2020-02-12

## 2020-02-12 ENCOUNTER — TELEPHONE (OUTPATIENT)
Dept: FAMILY MEDICINE | Facility: CLINIC | Age: 75
End: 2020-02-12

## 2020-02-12 NOTE — TELEPHONE ENCOUNTER
C3 nurse attempted to contact patient. No answer. The following message was left for the patient to return the call:  Good morning, my name is Emelyn and I am a registered nurse calling on behalf of [facility] from the Care Coordination [facility] post discharge unit.  This is a Transitional Care call for Emilia Melgoza.  When you have a moment please contact me at [phone] between 8 and 4, Monday through Friday. If you have questions or issues, a nurse is available 24 hours every day at our ON CALL # thats [phone]. On behalf of [facility], thank you, and have a nice day.    The patient does not have a scheduled HOSFU appointment within 7 days post hospital discharge date 2/10/2020. Message sent to Physician staff to assist with HOSFU appointment scheduling.

## 2020-03-16 ENCOUNTER — TELEPHONE (OUTPATIENT)
Dept: FAMILY MEDICINE | Facility: CLINIC | Age: 75
End: 2020-03-16

## 2020-03-17 ENCOUNTER — TELEPHONE (OUTPATIENT)
Dept: FAMILY MEDICINE | Facility: CLINIC | Age: 75
End: 2020-03-17

## 2020-03-17 NOTE — TELEPHONE ENCOUNTER
----- Message from Jerson Price MD sent at 3/16/2020 10:37 AM CDT -----  Please call to offer rescheduling of today's visit to 4-6 weeks given current circumstances of Covid-19.

## 2020-06-04 ENCOUNTER — TELEPHONE (OUTPATIENT)
Dept: FAMILY MEDICINE | Facility: CLINIC | Age: 75
End: 2020-06-04

## 2020-06-04 NOTE — TELEPHONE ENCOUNTER
----- Message from Rachana Guzman sent at 6/4/2020  2:25 PM CDT -----  Contact: Roger (Daughter)  Name of Who is Calling: Roger (Daughter)      What is the request in detail: Would like to speak to staff in regards to the patient being discharged from the hospital and her home health agency is requesting an order for a . Please advise.       Can the clinic reply by [facility]: No      What Number to Call Back if not in [facility]: [phone]

## 2020-06-04 NOTE — TELEPHONE ENCOUNTER
Pt was discharged from [facility] with HH services being provided by [facility]. They are requesting an order for a  to go out and evaluate for assistance with transportation so that Pt can come to the office for an OV. Please advise.

## 2020-06-04 NOTE — TELEPHONE ENCOUNTER
I attempted to contact pt daughter and was unable to leave a message as her voicemail has not been set up.

## 2020-06-04 NOTE — TELEPHONE ENCOUNTER
----- Message from Jeanine Lima sent at 6/4/2020  2:54 PM CDT -----  Contact: Daughter [phone]  Type:  Patient Returning Call    Who Called: Daughter    Who Left Message for Patient: Lorrie Livingstonws    Does the patient know what this is regarding?: yes    Would the patient rather a call back or a response via [facility]? Call back     Best Call Back Number: [phone]

## 2020-06-05 DIAGNOSIS — I69.351 HEMIPARESIS AFFECTING RIGHT SIDE AS LATE EFFECT OF CEREBROVASCULAR ACCIDENT: Primary | Chronic | ICD-10-CM

## 2020-06-05 DIAGNOSIS — R47.1 DYSARTHRIA: ICD-10-CM

## 2020-06-08 ENCOUNTER — TELEPHONE (OUTPATIENT)
Dept: FAMILY MEDICINE | Facility: CLINIC | Age: 75
End: 2020-06-08

## 2020-06-08 NOTE — TELEPHONE ENCOUNTER
----- Message from Krys Keita sent at 6/8/2020 11:46 AM CDT -----  Contact: Ankita sebastian/Occupational Therapy //Please call sharifa Duran [phone]  Type: Patient Call Back    Who called:Ankita sebastian/Occupational Therapy    What is the request in detail: Patient has a temp of 99.4 and blood pressure of 94/60 and heart rate of 91. Sounds like she has a wet cough. Please call sharifa Duran at [phone]    Would the patient rather a call back or a response via [facility]? Call back    Best call back number:Sharifa Duran [phone]

## 2020-06-08 NOTE — TELEPHONE ENCOUNTER
Called daughter Roger back [phone] in regards to pt feeling sick with low grade fever of 99.4 and cough. PT is bedridden. Daughter would like to know if PCP can send a medication for the pt and to further advise.

## 2020-06-09 ENCOUNTER — TELEPHONE (OUTPATIENT)
Dept: FAMILY MEDICINE | Facility: CLINIC | Age: 75
End: 2020-06-09

## 2020-06-09 DIAGNOSIS — I69.351 HEMIPARESIS AFFECTING RIGHT SIDE AS LATE EFFECT OF CEREBROVASCULAR ACCIDENT: Primary | Chronic | ICD-10-CM

## 2020-06-09 DIAGNOSIS — R47.01 BROCA'S APHASIA: Chronic | ICD-10-CM

## 2020-06-09 DIAGNOSIS — J06.9 UPPER RESPIRATORY TRACT INFECTION, UNSPECIFIED TYPE: ICD-10-CM

## 2020-06-09 RX ORDER — AZITHROMYCIN 250 MG/1
TABLET, FILM COATED ORAL
Qty: 6 TABLET | Refills: 0 | Status: SHIPPED | OUTPATIENT
Start: 2020-06-09

## 2020-06-09 NOTE — TELEPHONE ENCOUNTER
Notes diarrhea today x 4.  Had fever yesterday - 99.4 to 100 degrees.  Had wet cough yesterday.    Currently on tube feeds Jevity via PEG.    Daughter reports COVID testing was negative prior to discharge to home.    [facility] Health is coming ([facility])    Patient had been at [facility] facility at [facility].

## 2020-06-09 NOTE — TELEPHONE ENCOUNTER
Discussed with patient regarding goals of care over the phone.  Currently with home health - daughter does not report patient is on hospice/palliative care.  Limited mobility and functional status of patient.    I discussed with her recommendation for eval by social work/case mgmt, assessment/discussion for transition to hospice care.    Rx sent for azithromycin given sx of fever, productive cough.

## 2020-06-10 ENCOUNTER — TELEPHONE (OUTPATIENT)
Dept: FAMILY MEDICINE | Facility: CLINIC | Age: 75
End: 2020-06-10

## 2020-06-10 ENCOUNTER — OUTPATIENT CASE MANAGEMENT (OUTPATIENT)
Dept: ADMINISTRATIVE | Facility: OTHER | Age: 75
End: 2020-06-10

## 2020-06-10 NOTE — TELEPHONE ENCOUNTER
----- Message from Saul Granger sent at 6/10/2020 10:04 AM CDT -----  Contact: Chaparrita - Home health nurse  Type: Patient Call Back    Who called: Chaparrita    What is the request in detail: She states patient needs to be seen within the next month or they cannot service her. Please advise.    Can the clinic reply by [facility]? No    Would the patient rather a call back or a response via [facility]? Call    Best call back number: [phone] (home) [phone] (work)    Additional Information:n/a

## 2020-06-11 DIAGNOSIS — I69.351 HEMIPARESIS AFFECTING RIGHT SIDE AS LATE EFFECT OF CEREBROVASCULAR ACCIDENT: Primary | Chronic | ICD-10-CM

## 2020-06-11 DIAGNOSIS — R47.01 BROCA'S APHASIA: Chronic | ICD-10-CM

## 2020-06-11 DIAGNOSIS — I63.9 CEREBRAL INFARCTION, UNSPECIFIED MECHANISM: ICD-10-CM

## 2020-06-11 NOTE — PROGRESS NOTES
Outpatient Care Management  Initial Patient Assessment    Patient: Emilia Melgoza  MRN: [id number]  Date of Service: 06/10/2020  Completed by: Yoanna Tolentino RN  Referral Date: 06/09/2020  Program: Case Management (High Risk)    Reason for Visit   Patient presents with    Initial Assessment    OPCM Enrollment Call    Plan Of Care       Brief Summary: Spoke with pt's daughter Carmen and was informed that her sister Rena is the MPOA. Call placed to Rena Coellooine and was informed that this CM can contact the pt's other two daughters on the pt's behalf. Rena reports that she has MPOA. Rena reports that the pt is mostly non-verbal at this time. Reports that the pt can answer yes/no questions and make facial gestures.Reports that the pt had a stroke back in Novemeber. Reports that the pt has been in the hospital and rehabs since that time. Reports that she and the sisters are questioning if the pt could have had the Coronavirus instead of a stroke. Reports that the pt has tested negative x's 2 for the COVID-19. Reports that the pt is presently on tube-feedings. Reports that the pt is pulling at the PEG tube. Reports that the pt is receiving ST in the home for 9 weeks. Reports that the pt does not have a wheelchair in the home. Reports that the pt is measured for a custom chair. Reports that the pt was to receive a loaner wheelchair. Reports that they have not received the loaner wc. Reports that the [employer] tech was [employer] and telephone number is [phone]. Reports that she does not know the company supplying the wc. Rena reports the pt's other DME was received from [employer]. Reports that the pt is wearing diapers. Reports that the family is presently purchasing adult diapers and wipes for the pt. Reports that the pt receives HHA once a week. Reports that the pt receives a bed bath. Reports that the pt is able to make small movements in the bed and adjust her covers. Rena reports that  Carmen is currently living in the home with the pt. Rena reports that the pt needs sister services. Reports that the pt does not have Medicaid. Reports that the pt's income is between 1700 and 1900 dollars monthly. Reports that the pt is a retired . Will refer the case to LMSW with OPCM.              Assessment Documentation     OPCM Initial Assessment    Involvement of Care  Patient Reported Insurance  Verified current insurance plan:  Medicare  Current Health Status  Patient Health Rating  Compared to other people your age, how would you rate your health?:  Good  Patient Reported Labs & Vitals  Social Determinants  Advanced Care Planning  Support  Present activity level:  need help from person or special equipment to leave house  Housing  Living arrangements:  family members  Number of people in home:  2  Type of residence:  single family home  Own or rent?:  own  Permanent residence?:  yes  Does the patient's residence have any of the following?:  grab bars in the bathroom  Access to [employer]  Does the patient have access to any of the following devices or technologies?:  none  Clinical Assessment  Medication Adherence  How does the patient obtain their medications?:  family picks up  How many days a week do you miss medications?:  never  *Active medication list was reviewed and reconciled with patient and/or caregiver:    Nutritional Status  Labs  PHQ Depression Screen  Cognitive/Behavioral Health  Culture/Episcopalian  Communication  Health Literacy  Activities of Daily Living  Fall Risk  Equipment/Current Services  Current services:  speech therapy  Community & Government Programs  Community Resource Assessment  Completion of Initial Assessment         Problem List and History     Patient Active Problem List   Diagnosis    DDD (degenerative disc disease), lumbar    Overactive bladder    Vitamin D deficiency disease    Chronic diastolic congestive heart failure    Cerebral  microvascular disease    Aphasia, late effect of cerebrovascular disease    Pre-diabetes    Essential hypertension    Meningioma    Constipation    Gait instability    Hemiparesis affecting right side as late effect of cerebrovascular accident    Chronic hypokalemia    Anxiety    MCI (mild cognitive impairment)    Pleurisy    Broca's aphasia    Dysarthria    Cerebral infarction    Fatigue    Upper respiratory tract infection    Right shoulder pain    Lethargy    Tachycardia    Mobility impaired    Vitamin D deficiency    Asymptomatic menopausal state     Acute encephalopathy    Acute respiratory failure with hypoxia    Acute arterial ischemic stroke, multifocal, posterior circulation    Normocytic anemia    Fever       Reviewed Active Problem List with patient and/or Caregiver. The following were identified as areas of need: Stroke and HTN  Reviewed medical history with patient and/or caregiver    Social History:  Reviewed social history with patient and/or caregiver    Complex Care Plan    Care plan was discussed and completed today with input from patient and/or caregiver.        Patient Instructions     Instructions were provided via the [employer] patient resources and are available for the patient to view on the patient portal, if active.    Next steps: Educate on goal BP reading.                     Educate on the s/s of HTN   Follow up in about 8 days (around 6/18/2020) for RN Follow.    Todays OPCM Self-Management Care Plan was developed with the patients/caregivers input and was based on identified barriers from todays assessment.  Goals were written today with the patient/caregiver and the patient has agreed to work towards these goals to improve his/her overall well-being. Patient verbalized understanding of the care plan, goals, and all of today's instructions. Encouraged patient/caregiver to communicate with his/her physician and health care team about health conditions and  the treatment plan.  Provided my contact information today and encouraged patient/caregiver to call me with any questions as needed.

## 2020-06-15 ENCOUNTER — CARE AT HOME (OUTPATIENT)
Dept: HOME HEALTH SERVICES | Facility: CLINIC | Age: 75
End: 2020-06-15
Payer: MEDICARE

## 2020-06-15 VITALS
SYSTOLIC BLOOD PRESSURE: 124 MMHG | HEART RATE: 95 BPM | TEMPERATURE: 98 F | OXYGEN SATURATION: 98 % | HEIGHT: 65 IN | WEIGHT: 157 LBS | BODY MASS INDEX: 26.16 KG/M2 | RESPIRATION RATE: 18 BRPM | DIASTOLIC BLOOD PRESSURE: 63 MMHG

## 2020-06-15 DIAGNOSIS — I50.32 CHRONIC DIASTOLIC CONGESTIVE HEART FAILURE: ICD-10-CM

## 2020-06-15 DIAGNOSIS — R47.1 DYSARTHRIA: ICD-10-CM

## 2020-06-15 DIAGNOSIS — I10 ESSENTIAL HYPERTENSION: Chronic | ICD-10-CM

## 2020-06-15 DIAGNOSIS — Z74.09 MOBILITY IMPAIRED: Primary | Chronic | ICD-10-CM

## 2020-06-15 DIAGNOSIS — I69.351 HEMIPARESIS AFFECTING RIGHT SIDE AS LATE EFFECT OF CEREBROVASCULAR ACCIDENT: Chronic | ICD-10-CM

## 2020-06-15 DIAGNOSIS — I63.539 ACUTE ISCHEMIC MULTIFOCAL POSTERIOR CIRCULATION STROKE, UNSPECIFIED LATERALITY: ICD-10-CM

## 2020-06-15 DIAGNOSIS — F41.9 ANXIETY: ICD-10-CM

## 2020-06-15 PROCEDURE — 99342 PR HOME VISIT,NEW PATIENT,LEVEL II: ICD-10-PCS | Mod: S$GLB,,, | Performed by: NURSE PRACTITIONER

## 2020-06-15 PROCEDURE — 99342 HOME/RES VST NEW LOW MDM 30: CPT | Mod: S$GLB,,, | Performed by: NURSE PRACTITIONER

## 2020-06-15 RX ORDER — SILVER SULFADIAZINE 10 G/1000G
CREAM TOPICAL 2 TIMES DAILY
Qty: 1 TUBE | Refills: 0 | Status: SHIPPED | OUTPATIENT
Start: 2020-06-15 | End: 2020-06-29

## 2020-06-15 NOTE — PROGRESS NOTES
[facility]  Medical Home Visit    Visit Date: 6/15/2020  Encounter Provider: Elaine Westbrook NP  PCP:  Jerson Price MD    Subjective:      Patient ID: Emilia Melgoza is a 74 y.o. female.    Consult Requested By:  Dr. Jerson Price  Reason for Consult:  Establish Care    Emilia is being seen at home due to  physical debility that presents a taxing effort to leave the home, to mitigate high risk of hospital readmission or due to the limited availability of reliable or safe options for transportation to the point of access to the provider. Prior to treatment on this visit the chart was reviewed and patient consent was obtained.  .      Chief Complaint: CVA     HPI Emilia Melgoza is a 74 year old female with a past medical history of Hemiplegia and hemiparesis following cerebral infarction affecting right dominant side, Hypertensive heart disease with heart failure, Dysphagia, oropharyngeal phase, Unspecified symptoms and signs involving cognitive functions following cerebral infarction, Overactive bladder, Other disturbances of skin sensation, Urinary tract infection, site not specified, Hyperlipidemia, unspecified, Chronic systolic (congestive) heart failure, Dysarthria following cerebral infarction, Aphasia following cerebral infarction, Other sequelae of cerebral infarction, Other intervertebral disc degeneration, and lumbar region. She is beng seen at home today to establish home care. She lives in [place] in a single story home with her daughter, she is bedbound, found in a hospital bed, awake and alert, nods yes to all questions asked, she is diapered, espino to gravity, daughter reports normal bowel movements, she has a PEG tube in place in which she uses for feeding. She has has bilateral heel protectors in place, skin tear which looks like a burn to left shoulder, daughter denies burn, she reports it may have started as a blister, topical ordered at visit. All medications reviewed, no other needs  identified at this time. Patient has RN/HHA/PT/OT[facility]     I initiated the process of advance care planning today and explained the importance of this process to the patient and family.  I introduced the concept of advance directives to the patient, as well. Then the patient received detailed information about the importance of designating a Health Care Power of  (HCPOA). She was also instructed to communicate with this person about their wishes for future healthcare, should she/he become sick and lose decision-making capacity.     Attestation: Screening criteria to assess the level of the patient's risk for infection with COVID-19 as recommended by the CDC at the time of the above documented home visit concluded appropriateness to proceed. Universal precautions were maintained at all times, including provider use of 60% alcohol gel hand  immediately prior to entry and upon departing the patient's home.      Review of Systems   Unable to perform ROS: Dementia          Assessments:  · Environmental: Lives in single story home with daughter, uncluttered, clean  · Functional Status: Total Care  · Safety: Fall precautions  · Nutritional: PEG feedings  · Home Health/DME/Supplies: hospital bed    Objective:   Physical Exam     Constitutional: She appears well-developed and well-nourished. No distress.   HENT:   Head: Normocephalic and atraumatic.   Right Ear: External ear normal.   Left Ear: External ear normal.   Mouth/Throat: Mucous membranes are dry.   Eyes:   L eye lazy drooping  Neck: Normal range of motion. Neck supple.   Cardiovascular: Regular rhythm and intact distal pulses.   Pulmonary/Chest: Effort normal and breath sounds normal. No respiratory distress. She has no wheezes.   Abdominal: Soft. Bowel sounds are normal. She exhibits no distension. There is no tenderness.   Musculoskeletal: She exhibits no edema or tenderness.   R hand contracture   Neurological: She is  "alert.   Awake, groaning non-verbal  Skin: Skin is warm and dry.   Psychiatric: Thought content normal.  She is noncommunicative.       Vitals:    06/15/20 1036   BP: 124/63   Pulse: 95   Resp: 18   Temp: 98 °F (36.7 °C)   TempSrc: Skin   SpO2: 98%   Weight: 71.2 kg (157 lb)   Height: 5' 5" (1.651 m)   PainSc: 0-No pain     Body mass index is 26.13 kg/m².    Assessment:   Emilia was seen today for transitional care.    Diagnoses and all orders for this visit:    Acute cystitis without hematuria  Resolved  Catheter in place     Essential hypertension  Continue home meds       Hemiparesis affecting right side as late effect of cerebrovascular accident  Mobility impaired  At baseline     Broca's aphasia  Nonverbal at baseline     Hyperlipidemia  Continue home meds    Shoulder burn  -     silver sulfADIAZINE 1% (SILVADENE) 1 % cream; Apply topically 2 (two) times daily. for 14 days        Plan:     Ethical / Legal: Advance Care Planning   · Surrogate decision maker:  Name Carmen Tobar, Relationship: daughter  · Code Status:  Full Code  · [place]:  [place]  · Other advance directive:  Note on file, Capacity to make medical decisions:  no, Conflict no       Emilia was seen today for transitional care.    Diagnoses and all orders for this visit:      Acute cystitis without hematuria  Resolved  Catheter in place     Essential hypertension  Continue home meds       Hemiparesis affecting right side as late effect of cerebrovascular accident  Mobility impaired  At baseline     Broca's aphasia  Nonverbal at baseline     Hyperlipidemia  Continue home meds    Shoulder burn  -     silver sulfADIAZINE 1% (SILVADENE) 1 % cream; Apply topically 2 (two) times daily. for 14 days        Were controlled substances prescribed?  No    Follow Up Appointments:   No future appointments.    Signature:  Elaine Westbrook NP    Patient consent obtained prior to treatment at this visit.  "

## 2020-06-16 ENCOUNTER — OUTPATIENT CASE MANAGEMENT (OUTPATIENT)
Dept: ADMINISTRATIVE | Facility: OTHER | Age: 75
End: 2020-06-16

## 2020-06-16 NOTE — LETTER
June 16, 2020    Emilia Melgoza  [address] [address]             [facility] [address] [place] [address] Dear Ms. Emilia Melgoza,    Welcome to [facility] Complex Care Management Program.  It was a pleasure talking with you today.  My name is Yoanna Tolentino, MILY. I look forward to working with you as your Care Manager.  My goal is to help you function at the healthiest and highest level possible.  You can contact me directly at [phone].    As an [facility] patient, some of the services we may be able to provide include:      Development of an individualized care plan with a Registered Nurse    Connection with a    Connection with available resources and services     Coordinate communication among your care team members    Provide coaching and education    Help you understand your doctors treatment plan   Help you obtain information about your insurance coverage.     All services provided by [facility]s Complex Care Managers and other care team members are coordinated with and communicated to your primary care team.    As part of your enrollment, you will be receiving education materials and more information about these services in your [facility] account, by phone or through the mail.  If you do not wish to participate or receive information, please contact our office at [phone].      Sincerely,        Yoanna Tolentino, RN  [facility]   Out-patient RN Complex Care Manager

## 2020-06-16 NOTE — PROGRESS NOTES
"Outpatient Care Management  Plan of Care Follow Up Visit    Patient: Emilia Melgoza  MRN: [id number]  Date of Service: 06/16/2020  Completed by: Yoanna Tolentino RN  Referral Date: 06/09/2020  Program: Case Management (High Risk)    Reason for Visit   Patient presents with    Update Plan Of Care       Brief Summary: Spoke with pt's daughter, Julissa, and was informed that the pt requires a lot of care. Reports that the pt's three daughters are working and taking care of the patient. Julissa reports that they were able to get the pt into the loaner wheelchair one on occasion. Reports that they used the chandler lift to assist with the transfer. Reports that the pt's energy levels decreases sometimes during the day. Reports that the pt does not do well with having two therapies on one day. Reports that the pt continues to have "the much needed" ST in the home. Julissa reports that the family is trying to get help per sitter during the day at times. Informed Julissa that the pt is referred to a  with the OPCM dept. Educated Julissa on the importance of keeping the HOB during tube feeding. Julissa reports that they are changing the pt's position every 2 hours to maintain the skin integrity.Julissa reports that it is a lot of work. Informed Julissa to expect call from the [facility] on the case for information on available resources.      Patient Summary     Involvement of Care:  Do I have permission to speak with other family members about your care?   Yes    Patient Reported Labs & Vitals:  1.  Any Patient Reported Labs & Vitals?     2.  Patient Reported Blood Pressure:     3.  Patient Reported Pulse:     4.  Patient Reported Weight (Kg):     5.  Patient Reported Blood Glucose (mg/dl):       Medical History:  Reviewed medical history with patient and/or caregiver    Social History:  Reviewed social history with patient and/or caregiver    Clinical Assessment     Reviewed and provided basic information on available community resources for " mental health, transportation, wellness resources, and palliative care programs with patient and/or caregiver.    Complex Care Plan     Care plan was discussed and completed today with input from patient and/or caregiver.        Patient Instructions     Instructions were provided via the [employer] patient resources and are available for the patient to view on the patient portal.      Next Steps: Educate caregivers on the s/s of stroke.                      Educate on prevention of complications of stroke.    Follow up in about 1 week (around 6/23/2020) for RN Follow.      Todays OPCM Self-Management Care Plan was developed with the patients/caregivers input and was based on identified barriers from todays assessment.  Goals were written today with the patient/caregiver and the patient has agreed to work towards these goals to improve his/her overall well-being. Patient verbalized understanding of the care plan, goals, and all of today's instructions. Encouraged patient/caregiver to communicate with his/her physician and health care team about health conditions and the treatment plan.  Provided my contact information today and encouraged patient/caregiver to call me with any questions as needed.

## 2020-06-17 NOTE — PATIENT INSTRUCTIONS
Instructions:  1. Take all medications as prescribed  2. Keep all follow-up appointments  3. Return to the hospital or call your primary care physicians if any worsening symptoms such as fever, chest pain, shortness of breath, return of symptoms, or any other concerns.  4. Home visit in 2 weeks.

## 2020-06-19 ENCOUNTER — OUTPATIENT CASE MANAGEMENT (OUTPATIENT)
Dept: ADMINISTRATIVE | Facility: OTHER | Age: 75
End: 2020-06-19

## 2020-06-19 NOTE — LETTER
June 23, 2020    Emilia Melgoza  [address] [address]             [facility] [address] [place] [address] Dear Ms. Melgoza:    I have enclosed resources for support at home as discussed via telephone today. I am also including a senior resource guide. I hope this will be helpful.    If any additional needs arise, please contact me at [phone].    Sincerely,          Arminda Palomo LMSW     Outpatient Complex Care Management

## 2020-06-19 NOTE — PROGRESS NOTES
Outpatient Care Management   - High Risk Patient Assessment    Patient: Emilia Melgoza  MRN:  [id number]  Date of Service:  6/19/2020  Completed by:  Arminda Palomo LMSW  Referral Date: 06/09/2020  Program: Case Management (High Risk)    Reason for Visit   Patient presents with    Social Work Assessment - High Risk    [facility] Chart Review    Plan Of Care     SUMMARY     LMSW received referral from OPCM RN Yoanna for assistance with identified barrier support at home. Patient is a 74 year old female with Hx of Aphasia, MCI, and Anxiety. Social work assessment was completed by caregiver via phone . Patient has cognitive barriers and was unable to complete assessment with .Patient daughter completed. Patient currently is dependent with ADLs/IADls. Daughter report because of the stroke she is non-verbal and bed bound. Caregiver report support system consist of herself and sister. She reports they all still work and is hard to provide around the clock care. Caregiver reports having some financial needs and gross monthly income is around $1200-$1500. She reports that most of the income goes to providing care for her mother, making it difficult for other expenses. During assessment with patient/caregiver it was reported to social work that family is struggling with taking care of patient. Patient/Caregiver is requesting assistance with sitter services at this time. Patient/caregiver worked collaboratively with  on patient goals.  Patient in agreement with  mailing resources. Patient/Caregiver agrees to review resources by next follow up. No other concerns were voiced at this time.   Patient/Caregiver agrees to [facility]  follow up to assess progress towards goal. Patient in agreement, LMSW will follow up with patient within one/ two weeks to evaluate progress towards identified goals.      Resources Provided:   Medicaid Waivers   Sitter Service List  Utility  and Food resources  Senior resource guide    Patient Summary     OPCM Social Work Assessment (High Risk)    Involvement of Care  Do I have permission to speak with other family members about your care?: Yes  Assessment completed by: Children  Cognitive  Which of the following can you state?: Name, Date of birth, Address  Cognitive barriers?: Yes (Comment: Aphasia, MCI)  General  Marital status:   Current employment status: Retired and not working  Support  Level of Caregiver support: Caregiver currently provides assistance, Caregiver needs training or supportive services (Comment: Family needs additonal care and support in the home)  Support system: Children, Family  Is the caregiver reporting burnout?: Yes  Support Barriers?: Yes  Advanced Care Planning  Do you have any of the following?: None (Comment: Daughter reports her sisiter may have this but unsure. She will check into this)  If yes, do we have a copy?: No  If no, would you like Advance Directive resources?: No (Comment: Not at this time)  Advance Care Planning resources provided?: No  Is Advance Care Planning an area of need?: No  Financial  Current medical coverage: Medicare  Have you applied for government assistance programs?: Yes  Are you unable to pay any of the following?: Food, Utilities  Gross monthly income: 1200  Financial Support Barriers?: Yes  Safety  Significant change in functioning?: Disease progression  Safety barriers?: No   History  Do you or your spouse currently or formerly serve in the ?: No  Wartime service?: No  Current use of VA services?: No  Disaster Plan  Established evacuation plan?: Yes  Standard residence: [place]  Evacuation location: With children  Registered for evacuation?: Yes  Mental Health Status  Emotional status: Telephonic/Unable to assess  Have you recenetly lost a loved one?: No  Psychiatric diagnosis: Anxiety  Current mental health treatment: No  Would you like mental health resources?:  No  Current symptoms: None  Mental/Behavioral/Environmental risk: None  Mental Health Barriers?: No  Addictive Behaviors  Current alcohol consumption?: No  Current substance abuse?: No  Gambling habits?: No  Was the PHQ depression screening completed?: No  Was the CARLIE-7 completed?: No  Resources  Food: Emergency food, Food cabral, Home delivery         Complex Care Plan     Care plan was discussed and completed today with input from patient and/or caregiver.    Patient Instructions     Follow up in about 11 days (around 6/30/2020) for Follow up call with .    Todays OPCM Self-Management Care Plan was developed with the patients/caregivers input and was based on identified barriers from todays assessment.  Goals were written today with the patient/caregiver and the patient has agreed to work towards these goals to improve his/her overall well-being. Patient verbalized understanding of the care plan, goals, and all of today's instructions. Encouraged patient/caregiver to communicate with his/her physician and health care team about health conditions and the treatment plan.  Provided my contact information today and encouraged patient/caregiver to call me with any questions as needed.

## 2020-06-23 ENCOUNTER — TELEPHONE (OUTPATIENT)
Dept: FAMILY MEDICINE | Facility: CLINIC | Age: 75
End: 2020-06-23

## 2020-06-23 DIAGNOSIS — N39.0 URINARY TRACT INFECTION ASSOCIATED WITH CATHETERIZATION OF URINARY TRACT, UNSPECIFIED INDWELLING URINARY CATHETER TYPE, INITIAL ENCOUNTER: Primary | ICD-10-CM

## 2020-06-23 DIAGNOSIS — T83.511A URINARY TRACT INFECTION ASSOCIATED WITH CATHETERIZATION OF URINARY TRACT, UNSPECIFIED INDWELLING URINARY CATHETER TYPE, INITIAL ENCOUNTER: Primary | ICD-10-CM

## 2020-06-23 RX ORDER — SULFAMETHOXAZOLE AND TRIMETHOPRIM 800; 160 MG/1; MG/1
1 TABLET ORAL 2 TIMES DAILY
Qty: 10 TABLET | Refills: 0 | Status: SHIPPED | OUTPATIENT
Start: 2020-06-23 | End: 2020-06-23 | Stop reason: CLARIF

## 2020-06-23 NOTE — TELEPHONE ENCOUNTER
----- Message from Josephine Marte sent at 6/23/2020  8:36 AM CDT -----  Uti and want to know if you received results from urine test please advise family stopped day time tube feeding    Fiorella [facility] nurse [phone]

## 2020-06-23 NOTE — TELEPHONE ENCOUNTER
[facility] is going to fax UA results and reports that the patient is supposed to be on continuous feeds and the caregiver has stopped continuous feeds during the day and is giving Bolus feeding during the day. She also reports that the pt is having difficulty with her catheter maybe related to the UTI. Pt has appointment scheduled with Dr. Do next week for follow up and refills. Do you want to see the pt if possible?

## 2020-06-26 ENCOUNTER — TELEPHONE (OUTPATIENT)
Dept: FAMILY MEDICINE | Facility: CLINIC | Age: 75
End: 2020-06-26

## 2020-06-26 ENCOUNTER — OUTPATIENT CASE MANAGEMENT (OUTPATIENT)
Dept: ADMINISTRATIVE | Facility: OTHER | Age: 75
End: 2020-06-26

## 2020-06-26 NOTE — PROGRESS NOTES
Outpatient Care Management  Plan of Care Follow Up Visit    Patient: Emilia Melgoza  MRN: [id number]  Date of Service: 06/26/2020  Completed by: Yoanna Tolentino RN  Referral Date: 06/09/2020  Program: Case Management (High Risk)    Reason for Visit   Patient presents with    Update Plan Of Care       Brief Summary: Spoke with pt's daughter eRna. Reports that she has received called from [facility] , [facility], and [facility]. Reports that it is hard to determine what is the best care for the pt. Also verbalized that she does not know the difference in the services. Informed Rena the the pt's PCP has recommended the [facility] due to the difficulty of getting the pt in for office appts. Educated pt's daughter on some of the aspects of Palliative care and Hospice services. Email message sent to Dr. Portillo to notify of recommendation per pt's PCP for the [facility] clinic. Rena reports that the family needs help from sitters. Reports that they are scheduled to keep home health until July. Reports that she was informed that PT is discharging the pt from home health care. Rena reports that they are managing to provide care for the pt at this time. Reports that her sisters work for the school system which is out at this time, but changes will be made when they return to school in the fall.       Patient Summary     Involvement of Care:  Do I have permission to speak with other family members about your care?   yes    Patient Reported Labs & Vitals:  1.  Any Patient Reported Labs & Vitals?     2.  Patient Reported Blood Pressure:     3.  Patient Reported Pulse:     4.  Patient Reported Weight (Kg):     5.  Patient Reported Blood Glucose (mg/dl):       Medical and social history was reviewed with patient and/or caregiver.     Clinical Assessment     Reviewed and provided basic information on available community resources for mental health, transportation, wellness resources,  and palliative care programs with patient and/or caregiver.     Complex Care Plan     Care plan was discussed and completed today with input from patient and/or caregiver.    Patient Instructions     Instructions were provided via the [employer] patient resources and are available for the patient to view on the patient portal.    Next Steps: F/u with enrollment in [facility] clinic    Follow up in about 4 days (around 6/30/2020) for RN Follow.    Todays OPCM Self-Management Care Plan was developed with the patients/caregivers input and was based on identified barriers from todays assessment.  Goals were written today with the patient/caregiver and the patient has agreed to work towards these goals to improve his/her overall well-being. Patient verbalized understanding of the care plan, goals, and all of today's instructions. Encouraged patient/caregiver to communicate with his/her physician and health care team about health conditions and the treatment plan.  Provided my contact information today and encouraged patient/caregiver to call me with any questions as needed.

## 2020-06-26 NOTE — TELEPHONE ENCOUNTER
----- Message from Jerson Price MD sent at 6/26/2020 12:12 AM CDT -----  Please call patient's family to inform them that given limited mobility/difficulty in transportation to medical appointments and complexity of care, recommend transfer to [facility] to look after Ms. Melgoza as primary care provider.  They will be able to manage her chronic health issues at home.    If they are okay with this, I will place to referral.

## 2020-06-30 ENCOUNTER — OUTPATIENT CASE MANAGEMENT (OUTPATIENT)
Dept: ADMINISTRATIVE | Facility: OTHER | Age: 75
End: 2020-06-30

## 2020-06-30 DIAGNOSIS — I69.351 HEMIPARESIS AFFECTING RIGHT SIDE AS LATE EFFECT OF CEREBROVASCULAR ACCIDENT: Primary | Chronic | ICD-10-CM

## 2020-06-30 DIAGNOSIS — Z74.09 MOBILITY IMPAIRED: Chronic | ICD-10-CM

## 2020-06-30 DIAGNOSIS — I50.32 CHRONIC DIASTOLIC CONGESTIVE HEART FAILURE: ICD-10-CM

## 2020-06-30 DIAGNOSIS — I67.89 CEREBRAL MICROVASCULAR DISEASE: ICD-10-CM

## 2020-06-30 DIAGNOSIS — R47.1 DYSARTHRIA: ICD-10-CM

## 2020-06-30 DIAGNOSIS — I10 ESSENTIAL HYPERTENSION: Chronic | ICD-10-CM

## 2020-06-30 NOTE — PROGRESS NOTES
Outpatient Care Management   - Care Plan Follow Up    Patient: Emilia Melgoza  MRN:  [id number]  Date of Service:  6/30/2020  Completed by:  Arminda Palomo LMSW  Referral Date: 06/09/2020  Program: Case Management (High Risk)    Reason for Visit   Patient presents with    OPCM Chart Review    Update Plan Of Care     SUMMARY   Patient has 3 daughters who all assist in her care. Julissa, Carmen, and Areantione. Discussed with Julissa about family meeting to discuss patient long term goals and how they plan to provide care/support for patient in the home. Advised discussion once resources are received she reports they haven't received mail yet. SW will allow additional time. No other concerns were voiced at this time. Patient in agreement, ANDRES will follow up with patient within one week to evaluate progress towards goals.     Complex Care Plan     Care plan was discussed and completed today with input from patient and/or caregiver.      Patient Instructions     Instructions were provided via the [employer] patient resources and are available for the patient to view on the patient portal.    Follow up in about 9 days (around 7/9/2020) for Follow up call with .    Todays OPCM Self-Management Care Plan was developed with the patients/caregivers input and was based on identified barriers from todays assessment.  Goals were written today with the patient/caregiver and the patient has agreed to work towards these goals to improve his/her overall well-being. Patient verbalized understanding of the care plan, goals, and all of today's instructions. Encouraged patient/caregiver to communicate with his/her physician and health care team about health conditions and the treatment plan.  Provided my contact information today and encouraged patient/caregiver to call me with any questions as needed.

## 2020-06-30 NOTE — TELEPHONE ENCOUNTER
I called and spoke to Pt daughter Rena. She would like the referral placed for [facility] to take over Pt care due to inability of transportation and the pt current condition.

## 2020-07-02 ENCOUNTER — TELEPHONE (OUTPATIENT)
Dept: INTERNAL MEDICINE | Facility: CLINIC | Age: 75
End: 2020-07-02

## 2020-07-02 ENCOUNTER — HOSPITAL ENCOUNTER (EMERGENCY)
Facility: HOSPITAL | Age: 75
Discharge: HOME | End: 2020-07-03
Attending: EMERGENCY MEDICINE
Payer: MEDICARE

## 2020-07-02 DIAGNOSIS — R50.9 FEVER, UNSPECIFIED FEVER CAUSE: Primary | ICD-10-CM

## 2020-07-02 DIAGNOSIS — T83.511A URINARY TRACT INFECTION ASSOCIATED WITH INDWELLING URETHRAL CATHETER, INITIAL ENCOUNTER: Primary | ICD-10-CM

## 2020-07-02 DIAGNOSIS — R50.9 FEVER: ICD-10-CM

## 2020-07-02 DIAGNOSIS — N39.0 URINARY TRACT INFECTION ASSOCIATED WITH INDWELLING URETHRAL CATHETER, INITIAL ENCOUNTER: Primary | ICD-10-CM

## 2020-07-02 LAB
ALBUMIN SERPL BCP-MCNC: 3.3 G/DL (ref 3.5–5.2)
ALP SERPL-CCNC: 103 U/L (ref 55–135)
ALT SERPL W/O P-5'-P-CCNC: 19 U/L (ref 10–44)
ANION GAP SERPL CALC-SCNC: 11 MMOL/L (ref 8–16)
AST SERPL-CCNC: 25 U/L (ref 10–40)
BACTERIA #/AREA URNS HPF: ABNORMAL /HPF
BASOPHILS # BLD AUTO: 0.03 K/UL (ref 0–0.2)
BASOPHILS NFR BLD: 0.4 % (ref 0–1.9)
BILIRUB SERPL-MCNC: 0.2 MG/DL (ref 0.1–1)
BILIRUB UR QL STRIP: NEGATIVE
BNP SERPL-MCNC: 17 PG/ML (ref 0–99)
BUN SERPL-MCNC: 24 MG/DL (ref 8–23)
CALCIUM SERPL-MCNC: 9.1 MG/DL (ref 8.7–10.5)
CHLORIDE SERPL-SCNC: 97 MMOL/L (ref 95–110)
CLARITY UR: ABNORMAL
CO2 SERPL-SCNC: 27 MMOL/L (ref 23–29)
COLOR UR: YELLOW
CREAT SERPL-MCNC: 0.9 MG/DL (ref 0.5–1.4)
DIFFERENTIAL METHOD: ABNORMAL
EOSINOPHIL # BLD AUTO: 0.1 K/UL (ref 0–0.5)
EOSINOPHIL NFR BLD: 1.6 % (ref 0–8)
ERYTHROCYTE [DISTWIDTH] IN BLOOD BY AUTOMATED COUNT: 16.4 % (ref 11.5–14.5)
EST. GFR  (AFRICAN AMERICAN): >60 ML/MIN/1.73 M^2
EST. GFR  (NON AFRICAN AMERICAN): >60 ML/MIN/1.73 M^2
GLUCOSE SERPL-MCNC: 137 MG/DL (ref 70–110)
GLUCOSE UR QL STRIP: NEGATIVE
HCT VFR BLD AUTO: 32.4 % (ref 37–48.5)
HGB BLD-MCNC: 10.2 G/DL (ref 12–16)
HGB UR QL STRIP: ABNORMAL
HYALINE CASTS #/AREA URNS LPF: ABNORMAL /LPF
IMM GRANULOCYTES # BLD AUTO: 0.01 K/UL (ref 0–0.04)
IMM GRANULOCYTES NFR BLD AUTO: 0.1 % (ref 0–0.5)
KETONES UR QL STRIP: NEGATIVE
LEUKOCYTE ESTERASE UR QL STRIP: ABNORMAL
LYMPHOCYTES # BLD AUTO: 2.5 K/UL (ref 1–4.8)
LYMPHOCYTES NFR BLD: 32.2 % (ref 18–48)
MCH RBC QN AUTO: 27.1 PG (ref 27–31)
MCHC RBC AUTO-ENTMCNC: 31.5 G/DL (ref 32–36)
MCV RBC AUTO: 86 FL (ref 82–98)
MICROSCOPIC COMMENT: ABNORMAL
MONOCYTES # BLD AUTO: 0.8 K/UL (ref 0.3–1)
MONOCYTES NFR BLD: 10 % (ref 4–15)
NEUTROPHILS # BLD AUTO: 4.3 K/UL (ref 1.8–7.7)
NEUTROPHILS NFR BLD: 55.7 % (ref 38–73)
NITRITE UR QL STRIP: NEGATIVE
NRBC BLD-RTO: 0 /100 WBC
PH UR STRIP: 7 [PH] (ref 5–8)
PLATELET # BLD AUTO: 293 K/UL (ref 150–350)
PMV BLD AUTO: 11.2 FL (ref 9.2–12.9)
POTASSIUM SERPL-SCNC: 4.3 MMOL/L (ref 3.5–5.1)
PROT SERPL-MCNC: 7.8 G/DL (ref 6–8.4)
PROT UR QL STRIP: ABNORMAL
RBC # BLD AUTO: 3.77 M/UL (ref 4–5.4)
RBC #/AREA URNS HPF: 25 /HPF (ref 0–4)
SARS-COV-2 RDRP RESP QL NAA+PROBE: NEGATIVE
SODIUM SERPL-SCNC: 135 MMOL/L (ref 136–145)
SP GR UR STRIP: 1.01 (ref 1–1.03)
SQUAMOUS #/AREA URNS HPF: ABNORMAL /HPF
TROPONIN I SERPL DL<=0.01 NG/ML-MCNC: <0.006 NG/ML (ref 0–0.03)
TSH SERPL DL<=0.005 MIU/L-ACNC: 0.46 UIU/ML (ref 0.4–4)
URN SPEC COLLECT METH UR: ABNORMAL
UROBILINOGEN UR STRIP-ACNC: NEGATIVE EU/DL
WBC # BLD AUTO: 7.67 K/UL (ref 3.9–12.7)
WBC #/AREA URNS HPF: 75 /HPF (ref 0–5)

## 2020-07-02 PROCEDURE — 83880 ASSAY OF NATRIURETIC PEPTIDE: CPT

## 2020-07-02 PROCEDURE — 93010 EKG 12-LEAD: ICD-10-PCS | Mod: ,,, | Performed by: INTERNAL MEDICINE

## 2020-07-02 PROCEDURE — 99285 EMERGENCY DEPT VISIT HI MDM: CPT | Mod: 25

## 2020-07-02 PROCEDURE — 81000 URINALYSIS NONAUTO W/SCOPE: CPT

## 2020-07-02 PROCEDURE — 84443 ASSAY THYROID STIM HORMONE: CPT

## 2020-07-02 PROCEDURE — 93005 ELECTROCARDIOGRAM TRACING: CPT

## 2020-07-02 PROCEDURE — 84484 ASSAY OF TROPONIN QUANT: CPT

## 2020-07-02 PROCEDURE — 85025 COMPLETE CBC W/AUTO DIFF WBC: CPT

## 2020-07-02 PROCEDURE — 96365 THER/PROPH/DIAG IV INF INIT: CPT | Mod: 59

## 2020-07-02 PROCEDURE — 93010 ELECTROCARDIOGRAM REPORT: CPT | Mod: ,,, | Performed by: INTERNAL MEDICINE

## 2020-07-02 PROCEDURE — 87077 CULTURE AEROBIC IDENTIFY: CPT | Mod: 59

## 2020-07-02 PROCEDURE — 87086 URINE CULTURE/COLONY COUNT: CPT

## 2020-07-02 PROCEDURE — 80053 COMPREHEN METABOLIC PANEL: CPT

## 2020-07-02 PROCEDURE — 87088 URINE BACTERIA CULTURE: CPT

## 2020-07-02 PROCEDURE — U0002 COVID-19 LAB TEST NON-CDC: HCPCS

## 2020-07-02 PROCEDURE — 51702 INSERT TEMP BLADDER CATH: CPT

## 2020-07-02 PROCEDURE — 87186 SC STD MICRODIL/AGAR DIL: CPT | Mod: 59

## 2020-07-02 NOTE — TELEPHONE ENCOUNTER
Patient needs COVID testing. Does she want to come here or for me to send someone to the home?    Does she want to come to clinic?    Thanks,  KJ

## 2020-07-02 NOTE — TELEPHONE ENCOUNTER
Called to ask if the family of the patient wants to bring in the patient to get Covid tested or send someone to their home. Unable to LVM, No VM set up. Will continue to reach out to the patient

## 2020-07-02 NOTE — TELEPHONE ENCOUNTER
In-home COVID testing ordered. Please inform her to call [facility] for in-home COVID testing and to assess her symptoms and overall illness. I am concerned about her fever.     Why didn't they call us earlier??    Thanks,  Lolita Portillo MD/MPH  [facility]  [phone] [employer]

## 2020-07-02 NOTE — TELEPHONE ENCOUNTER
Spoke to the patient's POA daughter Maye, informed her that Dr MERIDA wants them to call [facility] and tell them to come out and access the patient, access her for COVID and test her for COVID in the patient's home. Gave Daughter their number [phone]. Dr MERIDA instructed me to call [facility] as well and give them the same information about accessing this patient, spoke to [facility] and they asked for the POA's phone number so they can call her and schedule a appt. Gave them that information

## 2020-07-02 NOTE — TELEPHONE ENCOUNTER
"Spoke to patient's daughter, states the patient has a cold symptoms. No cough, No SOB but Had a fever of 103.0 on this past Tuesday, gave patient tylenol and her temp today is 98.0.Daughter Ryan expressed the patient is "spitting up a lot of mucus" and daughter states she thinks the patient needs to be seen sooner. Dr Zambrano's next NP appt is not until 8/12/2020. Please advise. Patient is also bed bound Per daughter.   "

## 2020-07-03 VITALS
RESPIRATION RATE: 34 BRPM | TEMPERATURE: 98 F | SYSTOLIC BLOOD PRESSURE: 131 MMHG | HEART RATE: 105 BPM | HEIGHT: 66 IN | OXYGEN SATURATION: 100 % | WEIGHT: 220 LBS | BODY MASS INDEX: 35.36 KG/M2 | DIASTOLIC BLOOD PRESSURE: 77 MMHG

## 2020-07-03 PROCEDURE — 63600175 PHARM REV CODE 636 W HCPCS: Performed by: EMERGENCY MEDICINE

## 2020-07-03 RX ORDER — ATORVASTATIN CALCIUM 40 MG/1
40 TABLET, FILM COATED ORAL NIGHTLY
Qty: 90 TABLET | Refills: 3 | Status: SHIPPED | OUTPATIENT
Start: 2020-07-03 | End: 2020-07-03 | Stop reason: SDUPTHER

## 2020-07-03 RX ORDER — CLOPIDOGREL BISULFATE 75 MG/1
75 TABLET ORAL DAILY
Qty: 90 TABLET | Refills: 3 | Status: SHIPPED | OUTPATIENT
Start: 2020-07-03 | End: 2021-07-03

## 2020-07-03 RX ORDER — NAPROXEN SODIUM 220 MG/1
81 TABLET, FILM COATED ORAL DAILY
Qty: 90 TABLET | Refills: 3 | Status: SHIPPED | OUTPATIENT
Start: 2020-07-03 | End: 2021-07-03

## 2020-07-03 RX ORDER — CEPHALEXIN 250 MG/5ML
500 POWDER, FOR SUSPENSION ORAL EVERY 8 HOURS
Qty: 420 ML | Refills: 0 | Status: SHIPPED | OUTPATIENT
Start: 2020-07-03 | End: 2020-07-17

## 2020-07-03 RX ORDER — CEPHALEXIN 250 MG/5ML
500 POWDER, FOR SUSPENSION ORAL EVERY 8 HOURS
Qty: 420 ML | Refills: 0 | Status: SHIPPED | OUTPATIENT
Start: 2020-07-03 | End: 2020-07-03 | Stop reason: SDUPTHER

## 2020-07-03 RX ORDER — ATORVASTATIN CALCIUM 40 MG/1
40 TABLET, FILM COATED ORAL NIGHTLY
Qty: 90 TABLET | Refills: 3 | Status: SHIPPED | OUTPATIENT
Start: 2020-07-03 | End: 2021-08-23 | Stop reason: SDUPTHER

## 2020-07-03 RX ADMIN — CEFTRIAXONE 1 G: 1 INJECTION, SOLUTION INTRAVENOUS at 12:07

## 2020-07-03 NOTE — ED PROVIDER NOTES
"Encounter Date: 7/2/2020       History     Chief Complaint   Patient presents with    Fever     Fever, cough, and congestion at home with hx of stroke     Cough    Nasal Congestion     73 yo female presents via EMS with family member with fever and "coughing up phlegm." Patient's family member reports that patient started having fever to 103F three days ago (Monday 6/29/20); family has been giving APAP for this. No vomiting. Patient is usually PEG fed but did drink a little water the other day. Patient has a chronic indwelling espino catheter which was changed 2 weeks ago. She recently completed a course of bactrim for a UTI.     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 The estimated ejection fraction is 65%  · Concentric left ventricular hypertrophy.  · Left ventricular diastolic dysfunction.  · Normal right ventricular systolic function.  · Mild left atrial enlargement.  · The estimated PA systolic pressure is 62 mm Hg  · Pulmonary hypertension present.            Review of patient's allergies indicates:   Allergen Reactions    Enoxaparin Other (See Comments) and Hives     Slurred speech per patient  Slurred speech per patient    Lisinopril Other (See Comments)     Cough    Penicillins Rash     Past Medical History:   Diagnosis Date    Allergy     DDD (degenerative disc disease), lumbar     Chronic LBP and intermittent RLE radicular pain x 10 yrs    Hypertension     Overactive bladder     Senile cataract, unspecified - Both Eyes 6/11/2013    Stroke     right sided weakness     Past Surgical History:   Procedure Laterality Date    bladder mesh      BREAST BIOPSY Left     CATARACT EXTRACTION W/  INTRAOCULAR LENS IMPLANT Right 10/13/2014    Dr Crystal    " CATARACT EXTRACTION W/  INTRAOCULAR LENS IMPLANT Left 11/10/2014    Dr Crystal    HEMORRHOID SURGERY      HYSTERECTOMY      INCONTINENCE SURGERY      OOPHORECTOMY      Right Rotator Cuff Repair      TRACHEOTOMY N/A 2020    Procedure: TRACHEOTOMY;  Surgeon: Marco Shine III, MD;  Location: [facility];  Service: General;  Laterality: N/A;     Family History   Problem Relation Age of Onset    Hypertension Mother     Breast cancer Mother     Dementia Mother     Colon cancer Father     Hypertension Father     Rhinitis Daughter     Rhinitis Daughter     No Known Problems Sister     No Known Problems Brother     No Known Problems Maternal Aunt     No Known Problems Maternal Uncle     No Known Problems Paternal Aunt     No Known Problems Paternal Uncle     No Known Problems Maternal Grandmother     No Known Problems Maternal Grandfather     No Known Problems Paternal Grandmother     No Known Problems Paternal Grandfather     Amblyopia Neg Hx     Blindness Neg Hx     Cancer Neg Hx     Cataracts Neg Hx     Diabetes Neg Hx     Glaucoma Neg Hx     Macular degeneration Neg Hx     Retinal detachment Neg Hx     Strabismus Neg Hx     Stroke Neg Hx     Thyroid disease Neg Hx      Social History     Tobacco Use    Smoking status: Former Smoker     Quit date: 1992     Years since quittin.5    Smokeless tobacco: Never Used   Substance Use Topics    Alcohol use: No     Alcohol/week: 0.0 standard drinks    Drug use: No     Review of Systems   Unable to perform ROS: Patient nonverbal (aphasia from prior CVA)   Constitutional: Positive for fever.   Eyes: Negative for discharge.   Respiratory: Positive for cough.    Skin: Negative for wound.   Neurological: Positive for speech difficulty (chronic) and weakness (R sided).       Physical Exam     Initial Vitals [20]   BP Pulse Resp Temp SpO2   (!) 146/60 100 18 99.3 °F (37.4 °C) 100 %      MAP       --         Physical  Exam    Nursing note and vitals reviewed.  Constitutional: She appears well-developed and well-nourished. She is not diaphoretic.   Awake, alert, nontoxic.   HENT:   Head: Normocephalic and atraumatic.   Eyes: EOM are normal. Pupils are equal, round, and reactive to light.   Neck: Normal range of motion. Neck supple.   Well-healed trach scar.   Cardiovascular: Normal rate, regular rhythm and intact distal pulses.   Pulmonary/Chest: She has no wheezes. She has no rhonchi. She has no rales.   Abdominal: Soft. There is no abdominal tenderness.   PEG   Genitourinary:    Genitourinary Comments: Indwelling espino draining clear urine.     Musculoskeletal: Edema (1+ LLE>RLE) present. No tenderness.      Comments: Muscle wasting. Contracture R hand.    Neurological: She is alert.   Aphasia. R hemiparesis.   Skin: Skin is warm and dry. No erythema. No pallor.   Booties in place bilat feet. No breakdown.   Psychiatric: Her behavior is normal.         ED Course   Procedures  Labs Reviewed   CBC W/ AUTO DIFFERENTIAL - Abnormal; Notable for the following components:       Result Value    RBC 3.77 (*)     Hemoglobin 10.2 (*)     Hematocrit 32.4 (*)     Mean Corpuscular Hemoglobin Conc 31.5 (*)     RDW 16.4 (*)     All other components within normal limits   COMPREHENSIVE METABOLIC PANEL - Abnormal; Notable for the following components:    Sodium 135 (*)     Glucose 137 (*)     BUN, Bld 24 (*)     Albumin 3.3 (*)     All other components within normal limits   URINALYSIS, REFLEX TO URINE CULTURE - Abnormal; Notable for the following components:    Appearance, UA Hazy (*)     Protein, UA 1+ (*)     Occult Blood UA 2+ (*)     Leukocytes, UA 3+ (*)     All other components within normal limits    Narrative:     Specimen Source->Urine   URINALYSIS MICROSCOPIC - Abnormal; Notable for the following components:    RBC, UA 25 (*)     WBC, UA 75 (*)     All other components within normal limits    Narrative:     Specimen Source->Urine    CULTURE, URINE    Narrative:     Specimen Source->Urine   SARS-COV-2 RNA AMPLIFICATION, QUAL   B-TYPE NATRIURETIC PEPTIDE   TROPONIN I   TSH     EKG Readings: (Independently Interpreted)   21:23: NSR, HR 91. Normal axis. No ectopy. No STEMI.        Imaging Results          X-Ray Chest AP Portable (Final result)  Result time 07/02/20 22:06:23    Final result by Petey Tirado MD (07/02/20 22:06:23)                 Impression:      No acute cardiopulmonary process identified.      Electronically signed by: Petey Tirado MD  Date:    07/02/2020  Time:    22:06             Narrative:    EXAMINATION:  XR CHEST AP PORTABLE    CLINICAL HISTORY:  fever;    TECHNIQUE:  Single frontal view of the chest was performed.    COMPARISON:  01/02/2020.    FINDINGS:  Cardiac silhouette is stable in size.  Lungs are hypoinflated which accentuates pulmonary vascular markings.  No evidence of focal consolidative process, pneumothorax, or significant pleural effusion.  No acute osseous abnormality identified.  Previously visualized tracheostomy tube and right-sided PICC line have been discontinued.                              X-Rays:   Independently Interpreted Readings:   Other Readings:  CXR NAD    Medical Decision Making:   History:   Old Medical Records: I decided to obtain old medical records.  Old Records Summarized: records from clinic visits and records from previous admission(s).  Initial Assessment:   74 y.o. female with chronic debility presents with fever all week.  Differential Diagnosis:   Ddx includes UTI, pneumonia, aspiration, COVID-19, dehydration, JOEL, other.  Independently Interpreted Test(s):   I have ordered and independently interpreted X-rays - see prior notes.  I have ordered and independently interpreted EKG Reading(s) - see prior notes  Clinical Tests:   Lab Tests: Ordered and Reviewed  Radiological Study: Ordered and Reviewed  Medical Tests: Ordered and Reviewed  ED Management:  EKG no STEMI.    CXR  NAD.    Labs: Anemia at baseline, Hgb 10.2. CMP reassuring, minimal hyperglycemia, normal renal function. Troponin/BNP negative. TSH normal. COVID-19 negative. UA 3+ leuks.    Persistent UTI likely catheter-related. (Catheter was changed here prior to UA.) Tx'ed here with rocephin, d/c'ed on cipro. Return precautions discussed with daughter.    Patient to be d/c'ed via ambulance.                                 Clinical Impression:       ICD-10-CM ICD-9-CM   1. Urinary tract infection associated with indwelling urethral catheter, initial encounter  T83.511A 996.64    N39.0 599.0   2. Fever  R50.9 780.60             ED Disposition Condition    Discharge Stable        ED Prescriptions     Medication Sig Dispense Start Date End Date Auth. Provider    cephALEXin (KEFLEX) 250 mg/5 mL suspension  (Status: Discontinued) Take 10 mLs (500 mg total) by mouth every 8 (eight) hours. for 14 days 420 mL 7/3/2020 7/3/2020 Magnolia Maya MD    atorvastatin (LIPITOR) 40 MG tablet  (Status: Discontinued) 1 tablet (40 mg total) by Per G Tube route every evening. 90 tablet 7/3/2020 7/3/2020 Magnolia Maya MD    cephALEXin (KEFLEX) 250 mg/5 mL suspension 10 mLs (500 mg total) by Per G Tube route every 8 (eight) hours. for 14 days 420 mL 7/3/2020 7/17/2020 Magnolia Maya MD    clopidogreL (PLAVIX) 75 mg tablet 1 tablet (75 mg total) by Per G Tube route once daily. 90 tablet 7/3/2020 7/3/2021 Magnolia Maya MD    atorvastatin (LIPITOR) 40 MG tablet 1 tablet (40 mg total) by Per G Tube route every evening. 90 tablet 7/3/2020 7/3/2021 Magnolia Maya MD    aspirin 81 MG Chew Take 1 tablet (81 mg total) by mouth once daily. 90 tablet 7/3/2020 7/3/2021 Magnolia Maya MD        Follow-up Information     Follow up With Specialties Details Why Contact Info    Jerson Price MD Internal Medicine   [address] [place] [address] [place] [address]  [phone]                                       Magnolia Maya MD  07/03/20 153

## 2020-07-03 NOTE — ED TRIAGE NOTES
Pt presented to ER with cc of fever and nonproductive cough for a couple of days. Pt hx of stroke with neurological deficits of right sided weakness and upper and lower extremity  contractures. Pt also has dysphagia and aphasia. Pt family at bedside to help with assessment. Pt awake and alert. Unable to assess full orientation. Pt is combative with pt care.        Airway:  Bilateral chest rise and fall.  RR regular and non-labored.  Air entry patent with and clear x 5 lobes of the lungs.  No crepitus or subcutaneous emphysema noted on palpation.       Circulatory:  Skin warm, dry, and pink.  Apical and radial pulses strong and regular.  Capillary refill/skin blanching less than 3 seconds to distal of 4 extremities.     Abdomen:  Abdomen soft and non-distended.  Positive normo-active bowel sounds x 4 quadrants.       Urinary: Denies pressure, frequency, urgency, odor or pain.     Extremities:  No redness, heat, deformity, or pain

## 2020-07-06 ENCOUNTER — TELEPHONE (OUTPATIENT)
Dept: PRIMARY CARE CLINIC | Facility: CLINIC | Age: 75
End: 2020-07-06

## 2020-07-06 ENCOUNTER — TELEPHONE (OUTPATIENT)
Dept: EMERGENCY MEDICINE | Facility: HOSPITAL | Age: 75
End: 2020-07-06

## 2020-07-06 LAB
BACTERIA UR CULT: ABNORMAL
BACTERIA UR CULT: ABNORMAL

## 2020-07-06 RX ORDER — NITROFURANTOIN 25; 75 MG/1; MG/1
100 CAPSULE ORAL 2 TIMES DAILY
Qty: 10 CAPSULE | Refills: 0 | Status: SHIPPED | OUTPATIENT
Start: 2020-07-06 | End: 2020-07-11

## 2020-07-06 NOTE — TELEPHONE ENCOUNTER
Spoke with Isra Duran, pt dtr. Pt went to ER at [facility]  Thursday, [facility] assess and felt pt needed to go to ER.  They worked up pt, they verified pt still had bladder infection.   Pt was given meds in ER. Sent home with antibiotic. Pt is doing better.    Temp is wnl at this time. Pt is doing better.  Pt has now has diarrhea. Encouraged probiotics and yogurt with live culture.    Explained that if diarrhea worsens, to please call us back because the antibiotics can cause other problems.    Isra verbalized understanding.     Isra stated she did not know how to contact us. She called but didn't know names of this clinic or the doctors in this clinic.  She now has that information.    Isra states pt needs to be seen. Should pt be scheduled with you?

## 2020-07-06 NOTE — TELEPHONE ENCOUNTER
Please get patient scheduled with me some time this week for new patient visit. See what works best for the daughter.    She needs an expedited appt due to ED visit recently.    Thanks,  KJ

## 2020-07-06 NOTE — TELEPHONE ENCOUNTER
----- Message from Hannah Granger sent at 7/6/2020  8:22 AM CDT -----  Good Morning,    Attached patient is being referred to the [facility] by her Primary Dr Jerson Price. Could you please assist in getting her an appointment scheduled? Thank you!

## 2020-07-09 ENCOUNTER — OUTPATIENT CASE MANAGEMENT (OUTPATIENT)
Dept: ADMINISTRATIVE | Facility: OTHER | Age: 75
End: 2020-07-09

## 2020-07-09 NOTE — PROGRESS NOTES
HERMINIO completed chart review. Patient has been referred to [facility] clinic. Appointment scheduled for 7/10/2020 with Dr. MERIDA. HERMINIO spoke with mohsen Montez. Discussed patient goals and care plan. Melida will complete assessment with patient and daughter tomorrow during visit. Request for Hospice was also placed. HERMINIO will follow up with Melida after visit.

## 2020-07-10 ENCOUNTER — OFFICE VISIT (OUTPATIENT)
Dept: PRIMARY CARE CLINIC | Facility: CLINIC | Age: 75
End: 2020-07-10
Payer: MEDICARE

## 2020-07-10 ENCOUNTER — TELEPHONE (OUTPATIENT)
Dept: PRIMARY CARE CLINIC | Facility: CLINIC | Age: 75
End: 2020-07-10

## 2020-07-10 ENCOUNTER — HOSPITAL ENCOUNTER (EMERGENCY)
Facility: HOSPITAL | Age: 75
Discharge: HOME | End: 2020-07-10
Attending: EMERGENCY MEDICINE
Payer: MEDICARE

## 2020-07-10 VITALS
TEMPERATURE: 99 F | DIASTOLIC BLOOD PRESSURE: 70 MMHG | OXYGEN SATURATION: 99 % | WEIGHT: 160 LBS | SYSTOLIC BLOOD PRESSURE: 118 MMHG | RESPIRATION RATE: 18 BRPM | HEART RATE: 101 BPM | BODY MASS INDEX: 28.35 KG/M2 | HEIGHT: 63 IN

## 2020-07-10 DIAGNOSIS — Z93.1 PEG (PERCUTANEOUS ENDOSCOPIC GASTROSTOMY) STATUS: ICD-10-CM

## 2020-07-10 DIAGNOSIS — D69.2 SENILE PURPURA: ICD-10-CM

## 2020-07-10 DIAGNOSIS — R53.2 FUNCTIONAL QUADRIPLEGIA: ICD-10-CM

## 2020-07-10 DIAGNOSIS — G81.91 RIGHT HEMIPARESIS: ICD-10-CM

## 2020-07-10 DIAGNOSIS — T83.9XXA COMPLICATION OF FOLEY CATHETER, INITIAL ENCOUNTER: ICD-10-CM

## 2020-07-10 DIAGNOSIS — I63.539 ACUTE ISCHEMIC MULTIFOCAL POSTERIOR CIRCULATION STROKE, UNSPECIFIED LATERALITY: ICD-10-CM

## 2020-07-10 DIAGNOSIS — I50.32 CHRONIC DIASTOLIC CONGESTIVE HEART FAILURE: ICD-10-CM

## 2020-07-10 DIAGNOSIS — I69.351 HEMIPARESIS AFFECTING RIGHT SIDE AS LATE EFFECT OF CEREBROVASCULAR ACCIDENT: Chronic | ICD-10-CM

## 2020-07-10 DIAGNOSIS — I65.23 ATHEROSCLEROSIS OF BOTH CAROTID ARTERIES: ICD-10-CM

## 2020-07-10 DIAGNOSIS — I70.0 AORTIC ATHEROSCLEROSIS: ICD-10-CM
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
## 2020-07-10 DIAGNOSIS — J96.01 ACUTE RESPIRATORY FAILURE WITH HYPOXIA: ICD-10-CM

## 2020-07-10 DIAGNOSIS — I63.9 CEREBRAL INFARCTION, UNSPECIFIED MECHANISM: ICD-10-CM

## 2020-07-10 DIAGNOSIS — D32.9 MENINGIOMA: ICD-10-CM

## 2020-07-10 DIAGNOSIS — Z86.73 HISTORY OF ARTERIAL ISCHEMIC STROKE: ICD-10-CM

## 2020-07-10 PROCEDURE — 99215 PR OFFICE/OUTPT VISIT, EST, LEVL V, 40-54 MIN: ICD-10-PCS | Mod: 95,GW,, | Performed by: INTERNAL MEDICINE

## 2020-07-10 PROCEDURE — 99284 EMERGENCY DEPT VISIT MOD MDM: CPT | Mod: 25

## 2020-07-10 PROCEDURE — 99215 OFFICE O/P EST HI 40 MIN: CPT | Mod: 95,GW,, | Performed by: INTERNAL MEDICINE

## 2020-07-10 RX ORDER — OXYBUTYNIN CHLORIDE 5 MG/1
5 TABLET, EXTENDED RELEASE ORAL DAILY
Qty: 90 TABLET | Refills: 3 | Status: SHIPPED | OUTPATIENT
Start: 2020-07-10 | End: 2021-07-10

## 2020-07-10 NOTE — ASSESSMENT & PLAN NOTE
Numerous strokes in 9/2019, 12/2019, 1/2020  · Supportive care  · Advised hospice due to low functional status and poor prognosis

## 2020-07-10 NOTE — ED TRIAGE NOTES
Pt arrives to er via ems on stretcher pt alert, unintelligible speech. Ems reports pt have fall to floor this am.

## 2020-07-10 NOTE — ED PROVIDER NOTES
Encounter Date: 7/10/2020    SCRIBE #1 NOTE: I, Byron Ny, am scribing for, and in the presence of,  Andrés Reeves MD. I have scribed the following portions of the note - Other sections scribed: HPI, ROS.       History     Chief Complaint   Patient presents with    Fall     EMS reports pt had a controlled fall out of the bed while family was changing her. no trauma noted. pt non verbal.      CC: Fall    HPI: This is a 74 y.o. F who has Degenerative disc disease lumbar, HTN, and Hx of Stroke who presents to the ED via EMS transportation for emergent evaluation of fall while being changed by family member.  There are no known injuries.  The patient will respond with yes and no answers.. Pt is on hospice.    The history is provided by the patient and the EMS personnel. No  was used.     Review of patient's allergies indicates:   Allergen Reactions    Enoxaparin Other (See Comments) and Hives     Slurred speech per patient  Slurred speech per patient    Lisinopril Other (See Comments)     Cough    Penicillins Rash     Past Medical History:   Diagnosis Date    Allergy     DDD (degenerative disc disease), lumbar     Chronic LBP and intermittent RLE radicular pain x 10 yrs    Hypertension     Overactive bladder     Senile cataract, unspecified - Both Eyes 6/11/2013    Stroke     right sided weakness     Past Surgical History:   Procedure Laterality Date    bladder mesh      BREAST BIOPSY Left     CATARACT EXTRACTION W/  INTRAOCULAR LENS IMPLANT Right 10/13/2014    Dr Crystal    CATARACT EXTRACTION W/  INTRAOCULAR LENS IMPLANT Left 11/10/2014    Dr Crystal    HEMORRHOID SURGERY      HYSTERECTOMY      INCONTINENCE SURGERY      OOPHORECTOMY      Right Rotator Cuff Repair      TRACHEOTOMY N/A 1/2/2020    Procedure: TRACHEOTOMY;  Surgeon: Marco Shine III, MD;  [facility];  Service: General;  Laterality: N/A;     Family History   Problem Relation Age of Onset     Hypertension Mother     Breast cancer Mother     Dementia Mother     Colon cancer Father     Hypertension Father     Rhinitis Daughter     Rhinitis Daughter     No Known Problems Sister     No Known Problems Brother     No Known Problems Maternal Aunt     No Known Problems Maternal Uncle     No Known Problems Paternal Aunt     No Known Problems Paternal Uncle     No Known Problems Maternal Grandmother     No Known Problems Maternal Grandfather     No Known Problems Paternal Grandmother     No Known Problems Paternal Grandfather     Amblyopia Neg Hx     Blindness Neg Hx     Cancer Neg Hx     Cataracts Neg Hx     Diabetes Neg Hx     Glaucoma Neg Hx     Macular degeneration Neg Hx     Retinal detachment Neg Hx     Strabismus Neg Hx     Stroke Neg Hx     Thyroid disease Neg Hx      Social History     Tobacco Use    Smoking status: Former Smoker     Quit date: 1992     Years since quittin.5    Smokeless tobacco: Never Used   Substance Use Topics    Alcohol use: No     Alcohol/week: 0.0 standard drinks    Drug use: No     Review of Systems    The patient was questioned specifically with regard to the following.  She will give yes and no answers.  General: Fever, chills, sweats. Neuro: Headache. Eyes: eye problems. ENT: Ear pain, sore throat. Cardiovascular: Chest pain. Respiratory: Cough, shortness of breath. Gastrointestinal: Abdominal pain, vomiting, diarrhea. Genitourinary: Painful urination.  Musculoskeletal: Arm and leg problems. Skin: Rash.  The review of systems was negative except for the following:  The patient denies head trauma neck or spinal pain chest pain abdominal pain extremity pain.    Physical Exam     Initial Vitals [07/10/20 1325]   BP Pulse Resp Temp SpO2   (!) 142/84 90 18 98.5 °F (36.9 °C) 100 %      MAP       --         Physical Exam  The patient was examined specifically for the following:   General:No significant distress, Good color, Warm and dry. Head and  neck:Scalp atraumatic, Neck supple. Neurological:Appropriate conversation, Gross motor deficits. Eyes:Conjugate gaze, Clear corneas. ENT: No epistaxis. Cardiac: Regular rate and rhythm, Grossly normal heart tones. Pulmonary: Wheezing, Rales. Gastrointestinal: Abdominal tenderness, Abdominal distention. Musculoskeletal: Extremity deformity, Apparent pain with range of motion of the joints. Skin: Rash.   The findings on examination were normal except for the following:  The patient has a right hemiparesis.  There is no extremity tenderness or pain with range of motion of any joints.  The patient has contractures in the right upper extremity.  Chest abdomen and pelvis are nontender.  The lungs are clear the heart tones are normal the scalp is atraumatic.  The patient will track with her eyes.  She shakes her head yes and no.  She is alert and well groomed.  Vital signs are stable.  ED Course   Procedures  Labs Reviewed - No data to display       Imaging Results          X-Ray Shoulder Complete 2 View Right (Final result)  Result time 07/10/20 15:21:41    Final result by Jules Baez Jr., MD (07/10/20 15:21:41)                 Impression:      Normal      Electronically signed by: Jules Baltazar Jr  Date:    07/10/2020  Time:    15:21             Narrative:    EXAMINATION:  XR SHOULDER COMPLETE 2 OR MORE VIEWS RIGHT    CLINICAL HISTORY:  Unspecified fall, initial encounter    TECHNIQUE:  XR SHOULDER COMPLETE 2 OR MORE VIEWS RIGHT    COMPARISON:  12/10/2019    FINDINGS:  No bone, joint, or soft tissue abnormality is seen.                               X-Ray Hip 2 View Right (Final result)  Result time 07/10/20 15:22:29    Final result by Jules Baez Jr., MD (07/10/20 15:22:29)                 Impression:      Normal      Electronically signed by: Jules Baltazar Jr  Date:    07/10/2020  Time:    15:22             Narrative:    EXAMINATION:  XR HIP 2 VIEW RIGHT    CLINICAL HISTORY:  Unspecified fall,  initial encounter    TECHNIQUE:  XR HIP 2 VIEW RIGHT    COMPARISON:  None    FINDINGS:  No bone, joint, or soft tissue abnormality is seen.                              Medical decision making:  Given the above I find no convincing evidence of significant trauma.  I will discharge this patient to outpatient evaluation and treatment.  X-rays of the right shoulder right hip failed to reveal significant abnormalities.                                     Clinical Impression:       ICD-10-CM ICD-9-CM   1. Fall, initial encounter  W19.XXXA E888.9   2. Right hemiparesis  G81.91 342.90   3. History of arterial ischemic stroke  Z86.73 V12.54   4. Fall  W19.XXXA E888.9   5. Fall  W19.XXXA E888.9             ED Disposition Condition    Discharge Stable        ED Prescriptions     None        Follow-up Information     Follow up With Specialties Details Why Contact Info    Lolita Portillo MD Internal Medicine In 3 days  [address]  [place] [address]  [phone]                          I personally performed the services described in this documentation.  All medical record  entries made by the scribe are at my direction and in my presence.  Signed, Dr. Lala Reeves MD  07/10/20 4856       Andrés Reeves MD  07/10/20 5695

## 2020-07-10 NOTE — ED NOTES
PLEASE CALL DAUGHTER WITH UPDATES, THE PHONE NUMBER IS ON THE FACE SHEET-LAUREN CHAVEZ  DAUGHTER STATES THAT PATIENT IS ALSO ON HOSPICE- [facility]

## 2020-07-10 NOTE — LETTER
July 10, 2020      Jerson Price MD  [address]  [facility] [address]           [facility]  [address]  [place] [address]  Phone: [phone]  Fax: [phone]          Patient: Emilia Melgoza   MR Number: [id number]   YOB: 1945   Date of Visit: 7/10/2020       Dear Dr. Jerson Price:    Thank you for referring Emilia Melgoza to me for evaluation. Attached you will find relevant portions of my assessment and plan of care.    If you have questions, please do not hesitate to call me. I look forward to following Emilia Melgoza along with you.    Sincerely,    Lolita Portillo MD    Enclosure  CC:  No Recipients    If you would like to receive this communication electronically, please contact [email or web address] or [phone] to request more information on [employer] access.    For providers and/or their staff who would like to refer a patient to [facility], please contact us through our one-stop-shop provider referral line, [facility], at [phone].    If you feel you have received this communication in error or would no longer like to receive these types of communications, please e-mail [email or web address]

## 2020-07-10 NOTE — ASSESSMENT & PLAN NOTE
"Echo results in 12/2019: "Normal left ventricular systolic function. The estimated ejection fraction is 65%. Concentric left ventricular hypertrophy. Left ventricular diastolic dysfunction."  · Supportive care  "

## 2020-07-10 NOTE — ASSESSMENT & PLAN NOTE
"On CT abdomen in 12/2019: "There is some calcified plaque in the aorta"   · Continue statin and supportive care  "

## 2020-07-10 NOTE — PROGRESS NOTES
"[facility] Primary Care Provider Telemedicine Appointment      The patient location is:  Patient Home   The chief complaint leading to consultation is: establish care  Total time spent with patient: 60min    Visit type: Virtual visit with synchronous audio only and video  Each patient to whom he or she provides medical services by telemedicine is:  (1) informed of the relationship between the physician and patient and the respective role of any other health care provider with respect to management of the patient; and (2) notified that he or she may decline to receive medical services by telemedicine and may withdraw from such care at any time.      Subjective:      Patient ID: Emilia Melgoza is a 74 y.o. female with numerous strokes, functional quadriplegia    Chief Complaint: Establish Care    New patient to [facility]. Referred by her PCP and OPCM due to bedbound status and poor prognosis following stroke. She had CVAs in 2019, 2019, 2020. On chart review this is "multifocal in posterior circulation." She is dependent for all ADLs and nonverbal, bedbound.     Patient was seen sleeping throughout exam. Her daughter managed the visit through proxy access virtual visit. Her daughter describes a persistent bedbound state. She was in in-patient at [facility] for trach maintenance then rehab for months.    Her HH will  soon, but family continues to need support.  Her daughter states "I do think she's going to get better. Just in these short 6 months she was fine. We don't know her prognosis, we just know what we're looking at." In regards to rehab: "They did a little bit, but I felt like they didn't do enough. They just looked at her laying in the bed, they didn't look at her eyes. By the time she got comfortable with them, they shipped her out."    Patient lives on the  in [place].    Past Surgical History:   Procedure Laterality Date    bladder mesh      BREAST BIOPSY Left     CATARACT EXTRACTION " W/  INTRAOCULAR LENS IMPLANT Right 10/13/2014    Dr Crystal    CATARACT EXTRACTION W/  INTRAOCULAR LENS IMPLANT Left 11/10/2014    Dr Crystal    HEMORRHOID SURGERY      HYSTERECTOMY      INCONTINENCE SURGERY      OOPHORECTOMY      Right Rotator Cuff Repair      TRACHEOTOMY N/A 1/2/2020    Procedure: TRACHEOTOMY;  Surgeon: Marco Shine III, MD;  Location: [facility];  Service: General;  Laterality: N/A;       Past Medical History:   Diagnosis Date    Allergy     DDD (degenerative disc disease), lumbar     Chronic LBP and intermittent RLE radicular pain x 10 yrs    Hypertension     Overactive bladder     Senile cataract, unspecified - Both Eyes 6/11/2013    Stroke     right sided weakness       Review of Systems   Unable to perform ROS: Dementia       Objective:   There were no vitals taken for this visit.    Physical Exam  Constitutional:       Comments: Bedbound, nonverbal   Psychiatric:      Comments: Does not track with eyes  Does not follow unstructions         Lab Results   Component Value Date    WBC 7.67 07/02/2020    HGB 10.2 (L) 07/02/2020    HCT 32.4 (L) 07/02/2020     07/02/2020    CHOL 164 09/14/2019    TRIG 95 09/14/2019    HDL 73 09/14/2019    ALT 19 07/02/2020    AST 25 07/02/2020     (L) 07/02/2020    K 4.3 07/02/2020    CL 97 07/02/2020    CREATININE 0.9 07/02/2020    BUN 24 (H) 07/02/2020    CO2 27 07/02/2020    TSH 0.460 07/02/2020    INR 1.0 12/29/2019    HGBA1C 5.6 12/26/2019         Assessment:   74 y.o. female with multiple co-morbid illnesses here to continue work-up of chronic issues notably with numerous strokes, functional quadriplegia.     Plan:     Problem List Items Addressed This Visit        Neuro    Hemiparesis affecting right side as late effect of cerebrovascular accident (Chronic)    Cerebral infarction     Numerous strokes in 9/2019, 12/2019, 1/2020  · Supportive care  · Advised hospice due to low functional status and poor prognosis         Acute arterial  "ischemic stroke, multifocal, posterior circulation     Strokes in 12/2019, 1/2020. "Extensive multifocal acute infarctions in the supratentorial brain". No improvement in functional status with rehab and PT/OT. Dependent for all ADLs, nonverbal  · Advised hospice         Relevant Orders    Ambulatory referral/consult to Hospice - [facility]       Pulmonary    Acute respiratory failure with hypoxia     Oxygen PRN with exertion  · Continue PRN O2            Cardiac/Vascular    Chronic diastolic congestive heart failure     Echo results in 12/2019: "Normal left ventricular systolic function. The estimated ejection fraction is 65%. Concentric left ventricular hypertrophy. Left ventricular diastolic dysfunction."  · Supportive care         Aortic atherosclerosis     On CT abdomen in 12/2019: "There is some calcified plaque in the aorta"   · Continue statin and supportive care         Atherosclerosis of both carotid arteries     Seen on CTA head in 12/2019:  "The bilateral internal carotid arteries are patent with atherosclerotic plaque noted."  · Continue statin            Renal/    Complication of Espino catheter     Chronic indwelling espino, with frequent UTIs and bladder spasms  · Restart oxybutinin         Relevant Medications    oxybutynin (DITROPAN-XL) 5 MG TR24       Hematology    Senile purpura     Ecchymoses and purpura on UE bilaterally  · Supportive care            Oncology    Meningioma     Stable on last MRI in 2019  · Supportive care            GI    PEG (percutaneous endoscopic gastrostomy) status     PEG placed during last hospital admit for stroke treatment  · Continue supportive care            Other    Functional quadriplegia     Bedbound following CVA in 12/2019  · Advised hospice               Health Maintenance       Date Due Completion Date    Shingles Vaccine (2 of 3) 08/27/2013 7/2/2013    DEXA SCAN 03/02/2019 3/2/2016    Override on 6/15/2012: Done    Influenza Vaccine (1) 09/01/2020 12/1/2017    High " Dose Statin 07/03/2021 7/3/2020    TETANUS VACCINE 10/06/2026 10/6/2016          Follow up in about 2 weeks (around 7/24/2020). One hour spent with this patient today, more than half of that in counseling on the following issues: with numerous strokes, functional quadriplegia. 30min spend on ACP, and hospice advised    Lolita Meléndez MD/MPH  Internal Medicine  [facility]  [phone]

## 2020-07-10 NOTE — LETTER
July 10, 2020     Dear Carmen Tboar,    We are pleased to provide you with secure, online access to medical information via [facility] for: Emilia Melgoza       How Do I Sign Up?  Activating a [facility] account is as easy as 1-2-3!     1. Visit [email or web address] and enter this activation code and your date of birth, then select Next.  [email or web address]  2. Create a username and password to use when you visit [facility] in the future and select a security question in case you lose your password and select Next.  3. Enter your e-mail address and click Sign Up!       Additional Information  If you have questions, please e-mail [email or web address] or call [phone] to talk to our [facility] staff. Remember, [facility] is NOT to be used for urgent needs. For non-life threatening issues outside of normal clinic hours, call our after-hours nurse care line, [facility] at [phone]. For medical emergencies, dial 911.     Sincerely,    Your [facility] Team

## 2020-07-10 NOTE — DISCHARGE INSTRUCTIONS
Please return to the emergency room if you get worse or if new problems develop.  Please follow-up with her primary care doctor this week.  Tylenol for pain.

## 2020-07-10 NOTE — ASSESSMENT & PLAN NOTE
"Seen on CTA head in 12/2019:  "The bilateral internal carotid arteries are patent with atherosclerotic plaque noted."  · Continue statin  "

## 2020-07-10 NOTE — TELEPHONE ENCOUNTER
"Melanie Portillo MD   Phone Number: [phone]             Thank you so much for the referral for Ms. Melgoza.      I spoke with daughter and we will do info visit on Monday, 7/13/2020.   She stated that her mother is "fine" and is not declining.   But we will assess when we go out on Monday and will let you know if we admit her.          "

## 2020-07-10 NOTE — ASSESSMENT & PLAN NOTE
"Strokes in 12/2019, 1/2020. "Extensive multifocal acute infarctions in the supratentorial brain". No improvement in functional status with rehab and PT/OT. Dependent for all ADLs, nonverbal  · Advised hospice  "

## 2020-07-13 ENCOUNTER — TELEPHONE (OUTPATIENT)
Dept: PRIMARY CARE CLINIC | Facility: CLINIC | Age: 75
End: 2020-07-13

## 2020-07-13 NOTE — PROGRESS NOTES
Outpatient Care Management   - Care Plan Follow Up    Patient: Emilia Melgoza  MRN:  [id number]  Date of Service:  7/13/2020  Completed by:  Arminda Palomo LMSW  Referral Date: 06/09/2020  Program: Case Management (High Risk)    Reason for Visit   Patient presents with    OPCM Chart Review    Case Closure     Hospice Placement     PATIENT SUMMARY   ANDRES consulted with Herlinda DURHAM. Patient is now going to receive hospice services. LMSW will close case. Discussed case closure with RN.       Complex Care Plan     Care plan was discussed and completed today with input from patient and/or caregiver.    Patient Instructions     Instructions were provided via the [employer] patient resources and are available for the patient to view on the patient portal.    No follow-ups on file.    Todays OPCM Self-Management Care Plan was developed with the patients/caregivers input and was based on identified barriers from todays assessment.  Goals were written today with the patient/caregiver and the patient has agreed to work towards these goals to improve his/her overall well-being. Patient verbalized understanding of the care plan, goals, and all of today's instructions. Encouraged patient/caregiver to communicate with his/her physician and health care team about health conditions and the treatment plan.  Provided my contact information today and encouraged patient/caregiver to call me with any questions as needed.

## 2020-07-13 NOTE — TELEPHONE ENCOUNTER
Patient admitted to hospice with [facility]. Please let family know that I think this is a great plan.     Thanks,  FUAD Portillo MD   Phone Number: [phone]             Dr. Portillo,   Patient did sign up with hospice services over the weekend.  She was referred to [facility] either from the ER or from Home Health? We did not have an opportunity to meet with her.  Ms. Duran contacted me this morning to notify us that she is receiving services, which is good.     Thank you,   [facility]

## 2020-07-13 NOTE — TELEPHONE ENCOUNTER
Spoke to pt daughter and adv her that Dr MERIDA thinks this is a great plan   She really appreciated the call

## 2020-07-15 ENCOUNTER — OUTPATIENT CASE MANAGEMENT (OUTPATIENT)
Dept: ADMINISTRATIVE | Facility: OTHER | Age: 75
End: 2020-07-15

## 2020-07-15 DIAGNOSIS — Z71.89 COMPLEX CARE COORDINATION: ICD-10-CM

## 2020-07-15 NOTE — PROGRESS NOTES
Outpatient Care Management  Plan of Care Follow Up Visit    Patient: Emilia Melgoza  MRN: [id number]  Date of Service: 07/15/2020  Completed by: Yoanna Tolentino RN  Referral Date: 06/09/2020  Program: Case Management (High Risk)    No chief complaint on file.      Brief Summary: Spoke with pt's daughter, Ms. Duran, who reports that the pt is enrolled with hospice. Reports that everything is going well at this time. Reports that they are getting more services in the home. Reports that she is concerned about the bills that they received even with the pt having two insurances. Ms. Duran encouraged to contact the providers to make sure that both insurances were bill. Also encouraged daughter to request a breakdown of the bill if there continues to be a balance. Verbalized understanding at this time. Informed of case closure due to enrollment with hospice. Verbalized understanding at this time.    Patient Summary     Involvement of Care:  Do I have permission to speak with other family members about your care?   yes    Patient Reported Labs & Vitals:  1.  Any Patient Reported Labs & Vitals?     2.  Patient Reported Blood Pressure:     3.  Patient Reported Pulse:     4.  Patient Reported Weight (Kg):     5.  Patient Reported Blood Glucose (mg/dl):       Medical and social history was reviewed with patient and/or caregiver.     Clinical Assessment     Reviewed and provided basic information on available community resources for mental health, transportation, wellness resources, and palliative care programs with patient and/or caregiver.     Complex Care Plan     Care plan was discussed and completed today with input from patient and/or caregiver.    Patient Instructions     Instructions were provided via the [employer] patient resources and are available for the patient to view on the patient portal.    Next Steps: na    No follow-ups on file.    Todays OPCM Self-Management Care Plan was developed with the  patients/caregivers input and was based on identified barriers from todays assessment.  Goals were written today with the patient/caregiver and the patient has agreed to work towards these goals to improve his/her overall well-being. Patient verbalized understanding of the care plan, goals, and all of today's instructions. Encouraged patient/caregiver to communicate with his/her physician and health care team about health conditions and the treatment plan.  Provided my contact information today and encouraged patient/caregiver to call me with any questions as needed.

## 2020-07-20 ENCOUNTER — PES CALL (OUTPATIENT)
Dept: ADMINISTRATIVE | Facility: CLINIC | Age: 75
End: 2020-07-20

## 2020-07-24 ENCOUNTER — OFFICE VISIT (OUTPATIENT)
Dept: PRIMARY CARE CLINIC | Facility: CLINIC | Age: 75
End: 2020-07-24
Payer: MEDICARE

## 2020-07-24 DIAGNOSIS — I63.9 CEREBRAL INFARCTION, UNSPECIFIED MECHANISM: ICD-10-CM

## 2020-07-24 DIAGNOSIS — R53.2 FUNCTIONAL QUADRIPLEGIA: ICD-10-CM

## 2020-07-24 PROCEDURE — 99215 OFFICE O/P EST HI 40 MIN: CPT | Mod: 95,GW,, | Performed by: INTERNAL MEDICINE

## 2020-07-24 PROCEDURE — 99215 PR OFFICE/OUTPT VISIT, EST, LEVL V, 40-54 MIN: ICD-10-PCS | Mod: 95,GW,, | Performed by: INTERNAL MEDICINE

## 2020-07-24 NOTE — PROGRESS NOTES
[facility] Telemedicine Appointment      The patient location is:  Patient Home   The chief complaint leading to consultation is: routine care  Total time spent with patient: 40min    Visit type: Virtual visit with synchronous audio only and video  Each patient to whom he or she provides medical services by telemedicine is:  (1) informed of the relationship between the physician and patient and the respective role of any other health care provider with respect to management of the patient; and (2) notified that he or she may decline to receive medical services by telemedicine and may withdraw from such care at any time.      Subjective:      Patient ID: Emilia Melgoza is a 75 y.o. female with HTN, HFpEF, multiple strokes, on hospice who presents for follow-up.    Chief Complaint: follow-up    Spoke to Ms. Carmen Tobar, patient's daughter as she is non-verbal and bedbound. She will be helping patient with getting onto virtual visit.    Ms. Melgoza has been doing fine lately. She thinks she's about to die because she's on hospice. She is having some pains in her hands from time to time. She is still sleeping a lot. She does seem to understand well when people are speaking to her.    She got nitrofurantoin for a UTI recently, and last dose Monday. She has been doing well since then. She has a espino catheter in.    She gives her a tylenol when she runs above 99.1F. She seems to get sleepy after getting the tylenol.    Got her tetanus shot recently.    Past Surgical History:   Procedure Laterality Date    bladder mesh      BREAST BIOPSY Left     CATARACT EXTRACTION W/  INTRAOCULAR LENS IMPLANT Right 10/13/2014    Dr Crystal    CATARACT EXTRACTION W/  INTRAOCULAR LENS IMPLANT Left 11/10/2014    Dr Crystal    HEMORRHOID SURGERY      HYSTERECTOMY      INCONTINENCE SURGERY      OOPHORECTOMY      Right Rotator Cuff Repair      TRACHEOTOMY N/A 1/2/2020    Procedure: TRACHEOTOMY;  Surgeon: Marco NAYLOR  Fátima MERINO MD;  Location: [facility];  Service: General;  Laterality: N/A;       Past Medical History:   Diagnosis Date    Allergy     DDD (degenerative disc disease), lumbar     Chronic LBP and intermittent RLE radicular pain x 10 yrs    Hypertension     Overactive bladder     Senile cataract, unspecified - Both Eyes 6/11/2013    Stroke     right sided weakness       Review of Systems   Constitutional: Negative for chills and fever.   HENT: Negative for rhinorrhea and sore throat.    Eyes: Negative for pain and visual disturbance.   Respiratory: Negative for cough and shortness of breath.    Cardiovascular: Negative for chest pain and palpitations.   Gastrointestinal: Negative for nausea and vomiting.   Genitourinary: Negative for difficulty urinating and dysuria.   Musculoskeletal: Negative for arthralgias and back pain.   Skin: Negative for rash and wound.   Neurological: Positive for speech difficulty and weakness.   Psychiatric/Behavioral: Negative for agitation and confusion.       Objective:   There were no vitals taken for this visit.    Physical Exam  Constitutional:       Comments: Abnormal appearance  bedbound   Psychiatric:      Comments: Abnormal thought content         Lab Results   Component Value Date    WBC 7.67 07/02/2020    HGB 10.2 (L) 07/02/2020    HCT 32.4 (L) 07/02/2020     07/02/2020    CHOL 164 09/14/2019    TRIG 95 09/14/2019    HDL 73 09/14/2019    ALT 19 07/02/2020    AST 25 07/02/2020     (L) 07/02/2020    K 4.3 07/02/2020    CL 97 07/02/2020    CREATININE 0.9 07/02/2020    BUN 24 (H) 07/02/2020    CO2 27 07/02/2020    TSH 0.460 07/02/2020    INR 1.0 12/29/2019    HGBA1C 5.6 12/26/2019         Assessment:   75 y.o. female with multiple co-morbid illnesses here to continue work-up of chronic issues notably HTN, HFpEF.     Plan:     Problem List Items Addressed This Visit        Neuro    Cerebral infarction     Numerous strokes in 9/2019, 12/2019, 1/2020  · Supportive  care  · Advised to continue hospice due to low functional status and poor prognosis            Other    Functional quadriplegia     Bedbound following CVA in 12/2019  · Advised hospice               Health Maintenance       Date Due Completion Date    Hepatitis C Screening 1945 ---    Shingles Vaccine (2 of 3) 08/27/2013 7/2/2013    DEXA SCAN 03/02/2019 3/2/2016    Override on 6/15/2012: Done    Influenza Vaccine (1) 09/01/2020 12/1/2017    High Dose Statin 07/03/2021 7/3/2020    TETANUS VACCINE 10/06/2026 10/6/2016          Follow up in about 2 months (around 9/24/2020), or if symptoms worsen or fail to improve. . One hour spent with this patient today, more than half of that in counseling on the following issues: HTN, HFpEF    Lolita Portillo MD/MPH  Internal Medicine  [facility]  [phone]

## 2020-08-14 NOTE — ASSESSMENT & PLAN NOTE
Numerous strokes in 9/2019, 12/2019, 1/2020  · Supportive care  · Advised to continue hospice due to low functional status and poor prognosis

## 2020-08-17 NOTE — CONSULTS
[facility]  Neurology  Consult Note    Patient Name: Emilia Melgoza  MRN: [id number]  Admission Date: 12/17/2019  Hospital Length of Stay: 2 days  Code Status: Full Code   Attending Provider: Tamika Calvin MD   Consulting Provider: Belgica Lucas DO  Primary Care Physician: Jerson Price MD  Principal Problem:Failure to thrive in adult    Inpatient consult to Neurology  Consult performed by: Belgica Lucas DO  Consult ordered by: Tamika Calvin MD         Subjective:     Chief Complaint:  Altered mental status    HPI:   73 yo woman with pmhx of meningioma and L MCA infarct in 2013 which 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 At baseline, patient is able to say one to two words, but needs help with most all ADLs. No family currently at bedside. As per HPI, daughter reports patient being more confused than usual. She had been on antibiotics for UTI, but did not complete course as patient has not been eating or drinking for 2 weeks. She also had a recent tooth extraction and has indicated mouth pain. She was found to be hypernatremic, hypokalemic and tachycardic in the ED.     Past Medical History:   Diagnosis Date    Allergy     DDD (degenerative disc disease), lumbar     Chronic LBP and intermittent RLE radicular pain x 10 yrs    Hypertension     Overactive bladder     Senile cataract, unspecified - Both Eyes 6/11/2013    Stroke     right sided weakness       Past Surgical History:   Procedure Laterality Date    bladder mesh      BREAST BIOPSY Left     CATARACT EXTRACTION W/  INTRAOCULAR LENS IMPLANT Right 10/13/2014    Dr Crystal    CATARACT EXTRACTION W/  INTRAOCULAR LENS IMPLANT Left 11/10/2014    Dr Crystal    HEMORRHOID SURGERY      HYSTERECTOMY      INCONTINENCE SURGERY      OOPHORECTOMY      Right Rotator Cuff Repair         Review of patient's allergies indicates:   Allergen Reactions    Enoxaparin Other  oriented to person, place and time ; short and long term memory intact (See Comments) and Hives     Slurred speech per patient  Slurred speech per patient    Lisinopril Other (See Comments)     Cough    Penicillins Rash       Current Neurological Medications:     No current facility-administered medications on file prior to encounter.      Current Outpatient Medications on File Prior to Encounter   Medication Sig    amlodipine (NORVASC) 1 mg/mL Susp Take 5 mLs (5 mg total) by mouth once daily.    atorvastatin (LIPITOR) 80 MG tablet Take 1 tablet (80 mg total) by mouth once daily.    azelastine (ASTELIN) 137 mcg (0.1 %) nasal spray 1 spray (137 mcg total) by Nasal route 2 (two) times daily.    cholecalciferol, vitamin D3, 1,000 unit capsule Take 1 capsule (1,000 Units total) by mouth once daily.    clindamycin (CLEOCIN) 150 MG capsule TAKE 1 CAPSULE BY MOUTH EVERY 6 HOURS UNTIL COMPLETE    cloNIDine (CATAPRES) 0.1 MG tablet TAKE 2 TABLETS BY MOUTH ONCE DAILY IN THE EVENING    clopidogrel (PLAVIX) 75 mg tablet Take 1 tablet (75 mg total) by mouth once daily.    escitalopram oxalate (LEXAPRO) 5 mg/5 mL Soln Take 10 mLs (10 mg total) by mouth once daily.    fexofenadine (ALLEGRA) 180 MG tablet Take 1 tablet (180 mg total) by mouth once daily.    fluticasone propionate (FLONASE) 50 mcg/actuation nasal spray 1 spray (50 mcg total) by Each Nostril route once daily.    GENERLAC 10 gram/15 mL solution     ibuprofen (ADVIL,MOTRIN) 800 MG tablet     memantine (NAMENDA) 10 MG Tab Take 10 mg by mouth once daily.    nitrofurantoin (FURADANTIN) 25 mg/5 mL Susp Take 20 mLs (100 mg total) by mouth every 12 (twelve) hours. for 5 days    oxybutynin (DITROPAN) 5 mg/5 mL syrup Take 10 mLs (10 mg total) by mouth 2 (two) times daily.    polyethylene glycol (GLYCOLAX) 17 gram PwPk DISSOLVE 17 GRAMS OF POWDER (1 PACK) IN WATER & DRINK ONCE DAILY FOR 5 DAYS    tramadol (ULTRAM) 50 mg tablet Take 1/2 tablet as needed for headache     Family History     Problem Relation (Age of Onset)    Breast  cancer Mother    Colon cancer Father    Dementia Mother    Hypertension Mother, Father    No Known Problems Sister, Brother, Maternal Aunt, Maternal Uncle, Paternal Aunt, Paternal Uncle, Maternal Grandmother, Maternal Grandfather, Paternal Grandmother, Paternal Grandfather    Rhinitis Daughter, Daughter        Tobacco Use    Smoking status: Former Smoker     Last attempt to quit: 1992     Years since quittin.9    Smokeless tobacco: Never Used   Substance and Sexual Activity    Alcohol use: No     Alcohol/week: 0.0 standard drinks    Drug use: No    Sexual activity: Not Currently     Review of Systems - unable to perform as patient aphasic  Objective:     Vital Signs (Most Recent):  Temp: 97.5 °F (36.4 °C) (19 1129)  Pulse: 96 (19 1129)  Resp: 17 (19 1129)  BP: (!) 152/81 (19 1129)  SpO2: 100 % (19 1129) Vital Signs (24h Range):  Temp:  [97.3 °F (36.3 °C)-98.8 °F (37.1 °C)] 97.5 °F (36.4 °C)  Pulse:  [] 96  Resp:  [16-20] 17  SpO2:  [97 %-100 %] 100 %  BP: (152-197)/(72-96) 152/81     Weight: 66 kg (145 lb 8.1 oz)  Body mass index is 24.21 kg/m².    Physical Exam  Mental status: awake, alert, currently nonverbal, follows some simple commands. Exam limited as patient unable to follow all commands  CN: PERRL, moves eyes in all directions, right facial droop  Motor:   RUE moves at least 3/5   RLE moves at least 2/5, externally rotated  LUE moves at least 4/5  LLE moves at least 3/5        Reflexes: 3+ right ankle jerk, 3+ right knee jerk, 3+ right biceps, otherwise 2+ throughout  Sensory: withdraws to painful stimuli x 4 extremities  Coordination: patient unable to follow command for this       Significant Labs: Reviewed    Significant Imaging: Reviewed    Assessment and Plan:     Acute encephalopathy  Likely due to toxic metabolic encephalopathy  R/o infectious causes  Recommend MRI brain with and without contrast    Hemiparesis affecting right side as late effect of  cerebrovascular accident  C/w plavix and statin when patient able to swallow  Can give aspirin per rectum until patient able to swallow        VTE Risk Mitigation (From admission, onward)         Ordered     IP VTE HIGH RISK PATIENT  Once      12/17/19 2235     Place sequential compression device  Until discontinued      12/17/19 2235                Thank you for your consult. I will follow-up with patient. Please contact us if you have any additional questions.    Belgica Lucas,   Neurology  [facility]

## 2020-09-18 ENCOUNTER — TELEPHONE (OUTPATIENT)
Dept: PRIMARY CARE CLINIC | Facility: CLINIC | Age: 75
End: 2020-09-18

## 2020-09-18 NOTE — TELEPHONE ENCOUNTER
----- Message from Erica James sent at 9/18/2020  2:39 PM CDT -----  Contact: [phone]  Patient is returning a phone call.  Who left a message for the patient: ?  Does patient know what this is regarding:  [place]   Comments:

## 2020-09-18 NOTE — TELEPHONE ENCOUNTER
----- Message from Erica James sent at 9/18/2020  2:39 PM CDT -----  Contact: [phone]  Patient is returning a phone call.  Who left a message for the patient: ?  Does patient know what this is regarding:  [place]   Comments:

## 2020-09-25 ENCOUNTER — OFFICE VISIT (OUTPATIENT)
Dept: PRIMARY CARE CLINIC | Facility: CLINIC | Age: 75
End: 2020-09-25
Payer: MEDICARE

## 2020-09-25 DIAGNOSIS — Z93.1 PEG (PERCUTANEOUS ENDOSCOPIC GASTROSTOMY) STATUS: ICD-10-CM

## 2020-09-25 PROCEDURE — 99215 OFFICE O/P EST HI 40 MIN: CPT | Mod: 95,GW,, | Performed by: INTERNAL MEDICINE

## 2020-09-25 PROCEDURE — 99215 PR OFFICE/OUTPT VISIT, EST, LEVL V, 40-54 MIN: ICD-10-PCS | Mod: 95,GW,, | Performed by: INTERNAL MEDICINE

## 2020-09-25 NOTE — PROGRESS NOTES
[facility] Telemedicine Appointment      The patient location is:  Patient Home   The chief complaint leading to consultation is: routine care  Total time spent with patient: 40min    Visit type: Virtual visit with synchronous audio only and video  Each patient to whom he or she provides medical services by telemedicine is:  (1) informed of the relationship between the physician and patient and the respective role of any other health care provider with respect to management of the patient; and (2) notified that he or she may decline to receive medical services by telemedicine and may withdraw from such care at any time.      Subjective:      Patient ID: Emilia Melgoza is a 75 y.o. female who has quadriplegia, encephalopathy, hypoxia, failure to thrive    Chief Complaint: Cough    Patient is enrolled in hospice in the home. Daughters are pleased with this care but have a few urgent needs. Daughter is complaining that her feeding tubing is getting clogged. They have used [employer], but continues to have clogs. She knows to follow-up with hospice about this.    She also spits up tube feeds regularly. Family aware that they may be overfeeding her.    Otherwise family is happy with hospice care and will continue to receive it. PCP signed off on their chronic care but advised them to reach out if they want to talk again.     Advance Care Planning     Patient is enrolled in hospice and family is enjoying the support. They elect to continue this.               Past Surgical History:   Procedure Laterality Date    bladder mesh      BREAST BIOPSY Left     CATARACT EXTRACTION W/  INTRAOCULAR LENS IMPLANT Right 10/13/2014    Dr Crystal    CATARACT EXTRACTION W/  INTRAOCULAR LENS IMPLANT Left 11/10/2014    Dr Crystal    HEMORRHOID SURGERY      HYSTERECTOMY      INCONTINENCE SURGERY      OOPHORECTOMY      Right Rotator Cuff Repair      TRACHEOTOMY N/A 1/2/2020    Procedure: TRACHEOTOMY;  Surgeon:  Marco Shine III, MD;  Location: [facility];  Service: General;  Laterality: N/A;       Past Medical History:   Diagnosis Date    Allergy     DDD (degenerative disc disease), lumbar     Chronic LBP and intermittent RLE radicular pain x 10 yrs    Hypertension     Overactive bladder     Senile cataract, unspecified - Both Eyes 6/11/2013    Stroke     right sided weakness       Review of Systems   Constitutional: Positive for activity change, appetite change and fatigue.   Gastrointestinal: Positive for abdominal distention.   Skin: Positive for rash and wound.   Neurological: Positive for speech difficulty.   Psychiatric/Behavioral: Positive for confusion.       Objective:   There were no vitals taken for this visit.    Physical Exam  Constitutional:       Comments: bedbound  Lip smacking  No eye contact         Lab Results   Component Value Date    WBC 7.67 07/02/2020    HGB 10.2 (L) 07/02/2020    HCT 32.4 (L) 07/02/2020     07/02/2020    CHOL 164 09/14/2019    TRIG 95 09/14/2019    HDL 73 09/14/2019    ALT 19 07/02/2020    AST 25 07/02/2020     (L) 07/02/2020    K 4.3 07/02/2020    CL 97 07/02/2020    CREATININE 0.9 07/02/2020    BUN 24 (H) 07/02/2020    CO2 27 07/02/2020    TSH 0.460 07/02/2020    INR 1.0 12/29/2019    HGBA1C 5.6 12/26/2019         Assessment:   75 y.o. female with multiple co-morbid illnesses here to continue work-up of chronic issues notably quadriplegia, encephalopathy, hypoxia, failure to thrive.     Plan:     Problem List Items Addressed This Visit        GI    PEG (percutaneous endoscopic gastrostomy) status     PEG placed during last hospital admit for stroke treatment  · Continue supportive care  · Advised to decrease PEG feeds               Health Maintenance       Date Due Completion Date    Hepatitis C Screening 1945 ---    Shingles Vaccine (2 of 3) 08/27/2013 7/2/2013    DEXA SCAN 03/02/2019 3/2/2016    Override on 6/15/2012: Done    Influenza Vaccine (1)  08/01/2020 12/1/2017    TETANUS VACCINE 10/06/2026 10/6/2016          Follow up if symptoms worsen or fail to improve. One hour spent with this patient today, more than half of that in counseling on the following issues: quadriplegia, encephalopathy, hypoxia, failure to thrive    Lolita Portillo MD/MPH  [facility]  [phone]

## 2020-10-21 NOTE — PLAN OF CARE
Your Child's Health  12-Month-Old Visit      Gissel MATOS Qing  October 21, 2020    Visit Vitals  Ht 31\" (78.7 cm)   Wt 9.435 kg   HC 46 cm (18.11\")   BMI 15.22 kg/m²           Weight:     YOUR CHILD'S 12 MONTH OLD VISIT       Key points at this age…  • Correct use of a car seat is always critically important for your baby’s safety. When you replace the infant car seat, make sure to get a convertible car seat that can be kept rear facing until your child reaches the top height or weight limit allowed by the car seat’s .        • Get your child’s dental health off to a good start by starting regular brushing habits (with regular fluoridated toothpaste--not baby toothpaste) and discontinuing the bottle as soon as possible.    • Consistency is critical right now--consistent bedtime routines will help your baby sleep well and consistent responses to how they are behaving will help them learn what is “right” and what is “wrong”.     NUTRITION:   As your baby’s growth slows down after their first birthday, their appetite may vary from day to day--this is normal. It is also common for them to prefer a few particular foods for days (or weeks!) and to sometimes eat one big meal a day and not really care about eating for the rest of the day. You should simply offer healthy foods in small portions. After eating that, if your child still seems hungry, give them another small portion. Continue to be careful to avoid hard, chunky or chewy foods that your child could choke on. And remember that their likes and dislikes will change rapidly, so don’t be afraid to try again with something that ended up on the floor a couple weeks ago. Keep avoiding sweets, junk food and sweet drinks (fruit drinks, soda)--there’s no reason to start those unhealthy habits this early in life!    A switch from formula to cow’s milk is recommended at age 12 months (if your baby is not sensitive to dairy products).  Whole milk is often  Failed SBT today, minimal patient effort.  Remains minimally responsive, opens eyes on occasion, moves BLE on bed surface.  Does not follow any commands.  Has received no sedation.  Awaiting an MRI this evening.  Tolerating TFs at goal.  Low urine output.  Palliative Care RN meeting with family at present.   recommended at the time of the switch because of its higher fat content. However, if your child is getting a healthy variety of table foods or if your child is at risk for becoming overweight (a family history of obesity, high blood pressure or heart disease), 2% milk (low fat) is okay. Your child should have between 16 and 24 ounces of milk per day.    If your child is still nursing, you can start supplementing with beverages from a cup to ease the transition to independent drinking.    Eating fruit is much healthier than drinking juice. Even \"natural\" juices have extra sugars that can lead to tooth decay and weight gain. Children between 1 and 3 years of age should have no more than 4 ounces of 100% fruit juice daily. Do not water it down in a bottle or sippy cup that they can carry around and drink from over an extended period of time; this frequent exposure to the sugars in juice (even if diluted with water) just increases their risk of tooth decay.      Most toddlers who eat a varied diet will be getting adequate vitamins-- with the exception of vitamin D. For that reason, a straight vitamin D supplement (with either 400 or 600 IU per dose) or a multivitamin preparation with iron is reasonable. (Liquid preparations are available for infants. They should not be given chewable vitamins because they could choke on those.)      DEVELOPMENT/BEHAVIOR:    Many children at this age are walking (or soon will be). There is a wide range of “normal”, however, and some children will not walk until between 15 and 18 months. (Encourage your child to walk by letting them move around without help and try not to pick them up too often!) At this age they will also be able to drink from a cup, point or gesture to things they are interested in, babble and say 1 or 2 words.     Mobility and increasing independence happen together around this age--this makes things challenging! Not only do they have to be watched more carefully (they  can get into more things than they could before!), but they will start wanting to have their own way more often. So, it is an important time to make sure they understand (or to teach them) that what they choose to do is okay or not okay. (If you laugh when they are hugging their favorite stuffed toy, but you also laugh when they throw food on the floor, they aren’t learning which is right and which is wrong.) Giving immediate, consistent feedback to a young one is the best way to let them know which actions are okay and which are not okay. Keep it simple--long lectures don’t work in this age group. Distracting them from unwanted behaviors by offering something else (another toy, a book, even a snuggle and a hug) can be very effective as well.     Remember, kids want your approval and your positive attention. So give kind words and smiles when they are behaving in a good way. When they are doing something you don’t like, turn away, ignore them or say something simple like “that’s not nice”.  And don’t wait until your child is misbehaving to give them feedback--being proactive (“catch them being good” or “time in”) is the best way for them to learn early what type of behavior will earn your positive attention and smiles. Our busy world and the constant presence of cell phones make it easy to ignore your child when they are behaving well--but that’s exactly the time you should be telling them that they are doing the right thing!    Try to avoid using the word “no” as much as you can. That’s an easy word for them to learn to say back to you, plus it only tells them what you do not want them to do-- it doesn’t really teach them a better option. Try saying “let’s not do this” followed by “let’s do this” more often; save “no” for situations when your baby might be getting in harm’s way. It will mean more if you don’t say it all the time.   DENTAL CARE:   A very important daily routine is teeth brushing. Establish regular  times each day for this task, ideally after breakfast and before bed. As soon as babies have teeth, you should be brushing them twice a day with a tiny smear (the size of a grain of rice!) of regular (fluoridated) toothpaste. (You don’t need to buy baby toothpaste.) Fluoride is very important for your child’s dental health. In addition to brushing with fluoridated toothpaste, your child should be drinking the tap (city) water (which is carefully monitored so it has the ideal amount of fluoride for dental health). If you have well water instead of a city water supply, however, you should have it tested for fluoride content to determine whether your child might need fluoride supplements.    It’s also important to get your child OFF the bottle as soon as possible. At this point, the bottle only causes problems, particularly tooth decay! If your child insists on a bottle, try putting only water in the bottle and everything else in a cup.     CAR SAFETY:   An approved car seat in the back seat of the car is required by [place] law. Your child is safest in a rear-facing car seat, so replace your infant car seat with a convertible car seat that can be kept rear facing until your child reaches the top height or weight limit allowed by the car seat’s .       ACCIDENT PREVENTION:  Setting up your home to be safe can also help shape your child’s behavior. When there are less things that are off limits to them, then you will be saying “no” and redirecting them less often.   1.  Falls: Try to remove furniture with sharp edges which can result in cuts and lacerations for toddling children (who fall frequently!).  Keep chicas on stairs and window latches on upper-story windows.  2.  Burns: Begin to safely teach your child what “hot” and “cold” mean.  Have a family fire safety plan, including regular smoke detector (and carbon monoxide detector) battery checks, working fire extinguishers, and escape routes.  3.   Poisoning: Keep all cleaning and chemical products safely stored out of sight and out of reach. Pay attention to what is under your bathroom sink as well as your kitchen sink. Keep purses (your own and ones belonging to visitors) out of reach of curious toddlers; they can quickly find medicines, cigarettes, and small objects to choke on!     For all medicines, including vitamins, keep the original safety caps that came with the bottle; do not transfer medicines to non-child-proofed or unlabeled containers. Be especially careful when around older people because they may keep their medicines out in the open or in non-childproofed containers.   Call the Poison Center at 1-172.791.3072 for any known or suspected poisoning (if you haven’t already, put this phone number in your phone for quick reference!).    LEAD EXPOSURE: If there is any lead in your home or yard, crawling and toddling children are at risk for lead poisoning because they are moving around on their own and touching so many things. Lead poisoning can have a harmful and irreversible effect on your child’s behavior, intelligence and learning potential, so it is very important to prevent and screen for lead exposure. If you live in [facility], your child should be screened for lead now (at age 12 months). If you do not live in [facility], talk to your doctor about lead screening if any of the following apply: (1) your child currently (or had previously) lives in or frequently visits a house or building built before 1950 (including , grandparent homes, etc); (2) your child currently (or had previously) lives in or frequently visits a house or building built before 1978 with recent or ongoing renovations; (3) your child has a brother, sister or playmate who has had lead poisoning; (4) your child is enrolled in Medicaid or WIC.  If you have questions about older neighborhoods in [place] that might have lead connecting (or service) pipes, do  an internet search for \"[place] lead awareness and drinking water safety\". From this web page, you can search for your address to find out if your home was built with lead service lines. There are also helpful hints regarding water safety on this site.         SLEEP: Establishing a consistent bedtime routine at this age can help develop good long term sleep habits.   • Create a nightly routine before bed starting with brushing teeth, spending quiet time with your child (read and sing to them), then ending with them soothing themselves to sleep in their crib. Be consistent with your routine!   • Avoid overly stimulating activities before bedtime, including active play, stimulating games or videos.   • A favorite toy and a nightlight are often helpful.   • Make sure your child does not nap too late in the afternoon (they need to be tired at bedtime!).  • Have a consistent “wake up” time as well; this further helps young children develop a regular sleep cycle.      SUN EXPOSURE: Continue to try to protect your baby from direct sun. Keep them in the shade as much as possible and use protective clothing (including hats and sunglasses) when you are out. Always use infant sunscreens with an SPF of 15 or higher to protect from sunburn and long term skin damage; apply it at least 20 to 30 minutes before going out in the sun.    SMOKING:  Continue to protect your child from cigarette smoke. Exposure to cigarette smoke will increase your baby’s risk of asthma, ear infections, and respiratory infections (pneumonia). If you smoke and are ready to consider quitting, talk to your doctor. Nicotine replacement products can be very helpful in breaking this tough addiction.       SHOES:  Children this age need shoes primarily to protect their feet when they start walking outside. When you buy shoes, choose ones that have a roomy fit and flat, flexible soles with nonskid bottoms. And don’t spend too much money--at this age, they  outgrow them quickly!      MEDICATION FOR FEVER OR PAIN:   Acetaminophen liquid (e.g., Tylenol or Tempra) may be given every four hours as needed for pain or fever.  Be sure to check which product CONCENTRATION you are using.    INFANT/CHILDREN’S Tylenol/Acetaminophen  (160 MG/5 mL)  Child’s Weight:            Dose:  12 - 17 pounds:           80 mg (2.5 mL  (1/2 Teaspoon))  18 - 23 pounds:           120 mg (3.75 mL (3/4 Teaspoon))      INFANT Ibuprofen (50 mg/1.25 ml) liquid (for example Advil or Motrin) may be given every 6 hours as needed for pain or fever.  Child’s Weight:            Dose:  12 - 17 pounds:           50 mg (1.25 mL)    18 - 23 pounds:           75 mg (1.875 mL)      CHILDREN'S Ibuprofen (100 mg/5 mL) liquid (for example Advil or Motrin) may be given every 6 hours as needed for pain or fever.  Child’s Weight:            Dose:  12 - 17 pounds:           50 mg (2.5 mL (1/2 Teaspoon))  18 - 23 pounds:           75 mg (3.75  mL (3/4 Teaspoon))  24 - 35 pounds           100 mg (5 mL (1 Teaspoon))    If Reeves is outside these weight ranges, call your pediatrician's office for advice.    Most Recent Immunizations   Administered Date(s) Administered   • DTaP/Hep B/IPV 04/15/2020   • Hep B, Unspecified Formulation 2019   • Hep B, adolescent or pediatric 2019   • Hib (PRP-OMP) 02/12/2020   • Influenza, injectable, quadrivalent, preservative-free 05/13/2020   • Pneumococcal Conjugate 13 valent 04/15/2020   • Rotavirus - pentavalent 04/15/2020   Pended Date(s) Pended   • Hep A, ped/adol, 2 dose 10/21/2020   • Influenza, injectable, quadrivalent, preservative-free 10/21/2020   • MMR 10/21/2020   • Varicella 10/21/2020       If Reeves develops any of the following reactions within 72 hours after an immunization, notify your pediatrician by calling the pediatric phone nurse:  1. A temperature of 105 degrees or above  2. More than 3 hours of continuous crying  3. A shrill, high-pitched cry  4. A pale,  limp spell  5. A seizure or fainting spell.  In this case, you should call 911 or go immediately to the emergency room.    NEXT VISIT:  15 MONTHS OF AGE    Thank you for entrusting your care to [facility].      Also, check out “Children’s Health” on the [facility] Blog for updates on timely topics regarding children’s health!

## 2020-10-31 ENCOUNTER — EXTERNAL CHRONIC CARE MANAGEMENT (OUTPATIENT)
Dept: PRIMARY CARE CLINIC | Facility: CLINIC | Age: 75
End: 2020-10-31
Payer: MEDICARE

## 2020-10-31 PROCEDURE — G2058 CCM ADD 20MIN: HCPCS | Mod: S$PBB,GW,ICN, | Performed by: INTERNAL MEDICINE

## 2020-10-31 PROCEDURE — G2058 CCM ADD 20MIN: HCPCS | Mod: PBBFAC | Performed by: INTERNAL MEDICINE

## 2020-10-31 PROCEDURE — G2058 PR CHRON CARE MGMT, EA ADDTL 20 MINS: ICD-10-PCS | Mod: S$PBB,GW,ICN, | Performed by: INTERNAL MEDICINE

## 2020-10-31 PROCEDURE — 99490 PR CHRONIC CARE MGMT, 1ST 20 MIN: ICD-10-PCS | Mod: S$PBB,GW,ICN, | Performed by: INTERNAL MEDICINE

## 2020-10-31 PROCEDURE — 99490 CHRNC CARE MGMT STAFF 1ST 20: CPT | Mod: PBBFAC | Performed by: INTERNAL MEDICINE

## 2020-10-31 PROCEDURE — 99490 CHRNC CARE MGMT STAFF 1ST 20: CPT | Mod: S$PBB,GW,ICN, | Performed by: INTERNAL MEDICINE

## 2020-11-30 ENCOUNTER — EXTERNAL CHRONIC CARE MANAGEMENT (OUTPATIENT)
Dept: PRIMARY CARE CLINIC | Facility: CLINIC | Age: 75
End: 2020-11-30
Payer: MEDICARE

## 2020-11-30 PROCEDURE — 99490 CHRNC CARE MGMT STAFF 1ST 20: CPT | Mod: S$PBB,GW,, | Performed by: INTERNAL MEDICINE

## 2020-11-30 PROCEDURE — 99490 CHRNC CARE MGMT STAFF 1ST 20: CPT | Mod: PBBFAC | Performed by: INTERNAL MEDICINE

## 2020-11-30 PROCEDURE — 99490 PR CHRONIC CARE MGMT, 1ST 20 MIN: ICD-10-PCS | Mod: S$PBB,GW,, | Performed by: INTERNAL MEDICINE

## 2020-12-31 ENCOUNTER — EXTERNAL CHRONIC CARE MANAGEMENT (OUTPATIENT)
Dept: PRIMARY CARE CLINIC | Facility: CLINIC | Age: 75
End: 2020-12-31
Payer: MEDICARE

## 2020-12-31 PROCEDURE — 99490 CHRNC CARE MGMT STAFF 1ST 20: CPT | Mod: PBBFAC | Performed by: INTERNAL MEDICINE

## 2020-12-31 PROCEDURE — 99490 PR CHRONIC CARE MGMT, 1ST 20 MIN: ICD-10-PCS | Mod: GW,S$PBB,ICN, | Performed by: INTERNAL MEDICINE

## 2020-12-31 PROCEDURE — 99490 CHRNC CARE MGMT STAFF 1ST 20: CPT | Mod: GW,S$PBB,ICN, | Performed by: INTERNAL MEDICINE

## 2021-01-31 ENCOUNTER — EXTERNAL CHRONIC CARE MANAGEMENT (OUTPATIENT)
Dept: PRIMARY CARE CLINIC | Facility: CLINIC | Age: 76
End: 2021-01-31
Payer: MEDICARE

## 2021-01-31 PROCEDURE — 99490 CHRNC CARE MGMT STAFF 1ST 20: CPT | Mod: GW,S$PBB,, | Performed by: INTERNAL MEDICINE

## 2021-01-31 PROCEDURE — 99490 CHRNC CARE MGMT STAFF 1ST 20: CPT | Mod: PBBFAC | Performed by: INTERNAL MEDICINE

## 2021-01-31 PROCEDURE — 99490 PR CHRONIC CARE MGMT, 1ST 20 MIN: ICD-10-PCS | Mod: GW,S$PBB,, | Performed by: INTERNAL MEDICINE

## 2021-02-25 ENCOUNTER — PATIENT MESSAGE (OUTPATIENT)
Dept: PRIMARY CARE CLINIC | Facility: CLINIC | Age: 76
End: 2021-02-25

## 2021-02-26 ENCOUNTER — TELEPHONE (OUTPATIENT)
Dept: PRIMARY CARE CLINIC | Facility: CLINIC | Age: 76
End: 2021-02-26

## 2021-02-27 ENCOUNTER — PATIENT MESSAGE (OUTPATIENT)
Dept: PRIMARY CARE CLINIC | Facility: CLINIC | Age: 76
End: 2021-02-27

## 2021-02-28 ENCOUNTER — EXTERNAL CHRONIC CARE MANAGEMENT (OUTPATIENT)
Dept: PRIMARY CARE CLINIC | Facility: CLINIC | Age: 76
End: 2021-02-28
Payer: MEDICARE

## 2021-02-28 PROCEDURE — 99490 CHRNC CARE MGMT STAFF 1ST 20: CPT | Mod: PBBFAC | Performed by: INTERNAL MEDICINE

## 2021-02-28 PROCEDURE — 99490 PR CHRONIC CARE MGMT, 1ST 20 MIN: ICD-10-PCS | Mod: GW,S$PBB,, | Performed by: INTERNAL MEDICINE

## 2021-02-28 PROCEDURE — 99490 CHRNC CARE MGMT STAFF 1ST 20: CPT | Mod: GW,S$PBB,, | Performed by: INTERNAL MEDICINE

## 2021-03-01 ENCOUNTER — PATIENT MESSAGE (OUTPATIENT)
Dept: PRIMARY CARE CLINIC | Facility: CLINIC | Age: 76
End: 2021-03-01

## 2021-03-01 ENCOUNTER — TELEPHONE (OUTPATIENT)
Dept: PRIMARY CARE CLINIC | Facility: CLINIC | Age: 76
End: 2021-03-01

## 2021-03-01 DIAGNOSIS — J96.01 ACUTE RESPIRATORY FAILURE WITH HYPOXIA: Primary | ICD-10-CM

## 2021-03-03 ENCOUNTER — TELEPHONE (OUTPATIENT)
Dept: PRIMARY CARE CLINIC | Facility: CLINIC | Age: 76
End: 2021-03-03

## 2021-03-31 ENCOUNTER — EXTERNAL CHRONIC CARE MANAGEMENT (OUTPATIENT)
Dept: PRIMARY CARE CLINIC | Facility: CLINIC | Age: 76
End: 2021-03-31
Payer: MEDICARE

## 2021-03-31 PROCEDURE — 99439 PR CHRONIC CARE MGMT, EA ADDTL 20 MIN: ICD-10-PCS | Mod: GW,S$PBB,, | Performed by: INTERNAL MEDICINE

## 2021-03-31 PROCEDURE — 99490 PR CHRONIC CARE MGMT, 1ST 20 MIN: ICD-10-PCS | Mod: GW,S$PBB,, | Performed by: INTERNAL MEDICINE

## 2021-03-31 PROCEDURE — 99439 CHRNC CARE MGMT STAF EA ADDL: CPT | Mod: PBBFAC | Performed by: INTERNAL MEDICINE

## 2021-03-31 PROCEDURE — 99490 CHRNC CARE MGMT STAFF 1ST 20: CPT | Mod: GW,S$PBB,, | Performed by: INTERNAL MEDICINE

## 2021-03-31 PROCEDURE — 99490 CHRNC CARE MGMT STAFF 1ST 20: CPT | Mod: PBBFAC | Performed by: INTERNAL MEDICINE

## 2021-03-31 PROCEDURE — 99439 CHRNC CARE MGMT STAF EA ADDL: CPT | Mod: GW,S$PBB,, | Performed by: INTERNAL MEDICINE

## 2021-04-30 ENCOUNTER — EXTERNAL CHRONIC CARE MANAGEMENT (OUTPATIENT)
Dept: PRIMARY CARE CLINIC | Facility: CLINIC | Age: 76
End: 2021-04-30
Payer: MEDICARE

## 2021-04-30 PROCEDURE — 99490 CHRNC CARE MGMT STAFF 1ST 20: CPT | Mod: PBBFAC | Performed by: INTERNAL MEDICINE

## 2021-04-30 PROCEDURE — 99490 CHRNC CARE MGMT STAFF 1ST 20: CPT | Mod: GW,S$PBB,, | Performed by: INTERNAL MEDICINE

## 2021-04-30 PROCEDURE — 99490 PR CHRONIC CARE MGMT, 1ST 20 MIN: ICD-10-PCS | Mod: GW,S$PBB,, | Performed by: INTERNAL MEDICINE

## 2021-05-31 ENCOUNTER — EXTERNAL CHRONIC CARE MANAGEMENT (OUTPATIENT)
Dept: PRIMARY CARE CLINIC | Facility: CLINIC | Age: 76
End: 2021-05-31
Payer: MEDICARE

## 2021-05-31 PROCEDURE — 99490 CHRNC CARE MGMT STAFF 1ST 20: CPT | Mod: S$PBB,GW,ICN, | Performed by: INTERNAL MEDICINE

## 2021-05-31 PROCEDURE — 99490 PR CHRONIC CARE MGMT, 1ST 20 MIN: ICD-10-PCS | Mod: S$PBB,GW,ICN, | Performed by: INTERNAL MEDICINE

## 2021-05-31 PROCEDURE — 99490 CHRNC CARE MGMT STAFF 1ST 20: CPT | Mod: PBBFAC | Performed by: INTERNAL MEDICINE

## 2021-06-30 ENCOUNTER — EXTERNAL CHRONIC CARE MANAGEMENT (OUTPATIENT)
Dept: PRIMARY CARE CLINIC | Facility: CLINIC | Age: 76
End: 2021-06-30
Payer: MEDICARE

## 2021-06-30 PROCEDURE — 99490 CHRNC CARE MGMT STAFF 1ST 20: CPT | Mod: PBBFAC | Performed by: INTERNAL MEDICINE

## 2021-06-30 PROCEDURE — 99490 PR CHRONIC CARE MGMT, 1ST 20 MIN: ICD-10-PCS | Mod: S$PBB,GW,, | Performed by: INTERNAL MEDICINE

## 2021-06-30 PROCEDURE — 99490 CHRNC CARE MGMT STAFF 1ST 20: CPT | Mod: S$PBB,GW,, | Performed by: INTERNAL MEDICINE

## 2021-07-22 ENCOUNTER — HOSPITAL ENCOUNTER (EMERGENCY)
Facility: HOSPITAL | Age: 76
Discharge: HOME | End: 2021-07-22
Attending: EMERGENCY MEDICINE
Payer: MEDICARE

## 2021-07-22 VITALS
TEMPERATURE: 98 F | WEIGHT: 150 LBS | BODY MASS INDEX: 23.54 KG/M2 | SYSTOLIC BLOOD PRESSURE: 168 MMHG | DIASTOLIC BLOOD PRESSURE: 74 MMHG | OXYGEN SATURATION: 100 % | RESPIRATION RATE: 18 BRPM | HEART RATE: 86 BPM | HEIGHT: 67 IN

## 2021-07-22 DIAGNOSIS — K94.23 PEG TUBE MALFUNCTION: Primary | ICD-10-CM

## 2021-07-22 PROCEDURE — 43762 RPLC GTUBE NO REVJ TRC: CPT

## 2021-07-22 PROCEDURE — 99284 EMERGENCY DEPT VISIT MOD MDM: CPT | Mod: 25

## 2021-07-22 PROCEDURE — 25500020 PHARM REV CODE 255: Performed by: EMERGENCY MEDICINE

## 2021-07-22 RX ADMIN — DIATRIZOATE MEGLUMINE AND DIATRIZOATE SODIUM 60 ML: 660; 100 LIQUID ORAL; RECTAL at 11:07

## 2021-07-31 ENCOUNTER — EXTERNAL CHRONIC CARE MANAGEMENT (OUTPATIENT)
Dept: PRIMARY CARE CLINIC | Facility: CLINIC | Age: 76
End: 2021-07-31
Payer: MEDICARE

## 2021-07-31 PROCEDURE — 99490 CHRNC CARE MGMT STAFF 1ST 20: CPT | Mod: PBBFAC | Performed by: INTERNAL MEDICINE

## 2021-07-31 PROCEDURE — 99490 PR CHRONIC CARE MGMT, 1ST 20 MIN: ICD-10-PCS | Mod: GW,S$PBB,, | Performed by: INTERNAL MEDICINE

## 2021-07-31 PROCEDURE — 99490 CHRNC CARE MGMT STAFF 1ST 20: CPT | Mod: GW,S$PBB,, | Performed by: INTERNAL MEDICINE

## 2021-08-23 ENCOUNTER — TELEPHONE (OUTPATIENT)
Dept: PRIMARY CARE CLINIC | Facility: CLINIC | Age: 76
End: 2021-08-23

## 2021-08-23 DIAGNOSIS — I65.23 ATHEROSCLEROSIS OF BOTH CAROTID ARTERIES: Primary | ICD-10-CM

## 2021-08-23 RX ORDER — ATORVASTATIN CALCIUM 40 MG/1
40 TABLET, FILM COATED ORAL NIGHTLY
Qty: 90 TABLET | Refills: 3 | Status: SHIPPED | OUTPATIENT
Start: 2021-08-23 | End: 2022-09-09 | Stop reason: SDUPTHER

## 2021-08-31 ENCOUNTER — EXTERNAL CHRONIC CARE MANAGEMENT (OUTPATIENT)
Dept: PRIMARY CARE CLINIC | Facility: CLINIC | Age: 76
End: 2021-08-31
Payer: MEDICARE

## 2021-08-31 PROCEDURE — 99490 PR CHRONIC CARE MGMT, 1ST 20 MIN: ICD-10-PCS | Mod: S$PBB,GW,, | Performed by: INTERNAL MEDICINE

## 2021-08-31 PROCEDURE — 99490 CHRNC CARE MGMT STAFF 1ST 20: CPT | Mod: PBBFAC | Performed by: INTERNAL MEDICINE

## 2021-08-31 PROCEDURE — 99490 CHRNC CARE MGMT STAFF 1ST 20: CPT | Mod: S$PBB,GW,, | Performed by: INTERNAL MEDICINE

## 2021-09-28 ENCOUNTER — PES CALL (OUTPATIENT)
Dept: ADMINISTRATIVE | Facility: CLINIC | Age: 76
End: 2021-09-28

## 2021-09-30 ENCOUNTER — EXTERNAL CHRONIC CARE MANAGEMENT (OUTPATIENT)
Dept: PRIMARY CARE CLINIC | Facility: CLINIC | Age: 76
End: 2021-09-30
Payer: MEDICARE

## 2021-09-30 PROCEDURE — 99490 CHRNC CARE MGMT STAFF 1ST 20: CPT | Mod: S$PBB,GW,, | Performed by: INTERNAL MEDICINE

## 2021-09-30 PROCEDURE — 99490 PR CHRONIC CARE MGMT, 1ST 20 MIN: ICD-10-PCS | Mod: S$PBB,GW,, | Performed by: INTERNAL MEDICINE

## 2021-09-30 PROCEDURE — 99490 CHRNC CARE MGMT STAFF 1ST 20: CPT | Mod: PBBFAC | Performed by: INTERNAL MEDICINE

## 2021-10-31 ENCOUNTER — EXTERNAL CHRONIC CARE MANAGEMENT (OUTPATIENT)
Dept: PRIMARY CARE CLINIC | Facility: CLINIC | Age: 76
End: 2021-10-31
Payer: MEDICARE

## 2021-10-31 PROCEDURE — 99490 PR CHRONIC CARE MGMT, 1ST 20 MIN: ICD-10-PCS | Mod: S$PBB,GW,, | Performed by: INTERNAL MEDICINE

## 2021-10-31 PROCEDURE — 99490 CHRNC CARE MGMT STAFF 1ST 20: CPT | Mod: S$PBB,GW,, | Performed by: INTERNAL MEDICINE

## 2021-10-31 PROCEDURE — 99490 CHRNC CARE MGMT STAFF 1ST 20: CPT | Mod: PBBFAC | Performed by: INTERNAL MEDICINE

## 2021-11-30 ENCOUNTER — EXTERNAL CHRONIC CARE MANAGEMENT (OUTPATIENT)
Dept: PRIMARY CARE CLINIC | Facility: CLINIC | Age: 76
End: 2021-11-30
Payer: MEDICARE

## 2021-11-30 PROCEDURE — 99490 CHRNC CARE MGMT STAFF 1ST 20: CPT | Mod: GW,S$PBB,, | Performed by: INTERNAL MEDICINE

## 2021-11-30 PROCEDURE — 99490 PR CHRONIC CARE MGMT, 1ST 20 MIN: ICD-10-PCS | Mod: GW,S$PBB,, | Performed by: INTERNAL MEDICINE

## 2021-11-30 PROCEDURE — 99490 CHRNC CARE MGMT STAFF 1ST 20: CPT | Mod: PBBFAC | Performed by: INTERNAL MEDICINE

## 2021-12-31 ENCOUNTER — EXTERNAL CHRONIC CARE MANAGEMENT (OUTPATIENT)
Dept: PRIMARY CARE CLINIC | Facility: CLINIC | Age: 76
End: 2021-12-31
Payer: MEDICARE

## 2021-12-31 PROCEDURE — 99490 CHRNC CARE MGMT STAFF 1ST 20: CPT | Mod: GW,S$PBB,ICN, | Performed by: INTERNAL MEDICINE

## 2021-12-31 PROCEDURE — 99490 PR CHRONIC CARE MGMT, 1ST 20 MIN: ICD-10-PCS | Mod: GW,S$PBB,ICN, | Performed by: INTERNAL MEDICINE

## 2022-02-16 ENCOUNTER — HOSPITAL ENCOUNTER (EMERGENCY)
Facility: HOSPITAL | Age: 77
Discharge: HOME | End: 2022-02-16
Attending: EMERGENCY MEDICINE
Payer: MEDICARE

## 2022-02-16 VITALS
SYSTOLIC BLOOD PRESSURE: 162 MMHG | BODY MASS INDEX: 23.54 KG/M2 | TEMPERATURE: 98 F | WEIGHT: 150 LBS | HEART RATE: 94 BPM | DIASTOLIC BLOOD PRESSURE: 71 MMHG | HEIGHT: 67 IN | OXYGEN SATURATION: 100 % | RESPIRATION RATE: 18 BRPM

## 2022-02-16 DIAGNOSIS — T85.528A DISLODGED GASTROSTOMY TUBE: Primary | ICD-10-CM

## 2022-02-16 PROCEDURE — 99283 EMERGENCY DEPT VISIT LOW MDM: CPT | Mod: 25

## 2022-02-16 PROCEDURE — 43762 RPLC GTUBE NO REVJ TRC: CPT

## 2022-02-16 NOTE — ED PROVIDER NOTES
Encounter Date: 2/16/2022    SCRIBE #1 NOTE: I, Michael Granger, am scribing for, and in the presence of,  Andrés Reeves MD. I have scribed the following portions of the note - Other sections scribed: HPI, ROS.       History     Chief Complaint   Patient presents with    Peg Tube Displacement     Presents to the ED via EMS from home with c/o peg tube displacement. Peg tube in patient's lap upon arrival to ED. Hutchison with leg bag noted in triage. Patient awake and alert but nonverbal. EMS reports this is pt's baseline.      76 y.o. female with a PMHx of CVA presents to the ED for PEG tube displacement. Per patient's caregiver, the patient was found this morning with her gastrostomy tube displaced. Caregiver notes this occurred sometime last night. Peg tube in patient's lap upon arrival to ED. Denies cough, shortness of breath, chest pain, fever, chills, abdominal pain, nausea, vomiting, diarrhea, dysuria, headaches, congestion, sore throat, arm or leg trouble, eye pain, ear pain, rash, or other associated symptoms. No other exacerbating or alleviating factors. No other associated symptoms.           The history is provided by a caregiver.     Review of patient's allergies indicates:   Allergen Reactions    Enoxaparin Other (See Comments) and Hives     Slurred speech per patient  Slurred speech per patient    Lisinopril Other (See Comments)     Cough    Penicillins Rash     Past Medical History:   Diagnosis Date    Allergy     DDD (degenerative disc disease), lumbar     Chronic LBP and intermittent RLE radicular pain x 10 yrs    Hypertension     Overactive bladder     Senile cataract, unspecified - Both Eyes 6/11/2013    Stroke     right sided weakness     Past Surgical History:   Procedure Laterality Date    bladder mesh      BREAST BIOPSY Left     CATARACT EXTRACTION W/  INTRAOCULAR LENS IMPLANT Right 10/13/2014    Dr Crystal    CATARACT EXTRACTION W/  INTRAOCULAR LENS IMPLANT Left 11/10/2014    Dr Crystal     HEMORRHOID SURGERY      HYSTERECTOMY      INCONTINENCE SURGERY      OOPHORECTOMY      Right Rotator Cuff Repair      TRACHEOTOMY N/A 2020    Procedure: TRACHEOTOMY;  Surgeon: Marco Shine III, MD;  Location: [facility] OR;  Service: General;  Laterality: N/A;     Family History   Problem Relation Age of Onset    Hypertension Mother     Breast cancer Mother     Dementia Mother     Colon cancer Father     Hypertension Father     Rhinitis Daughter     Rhinitis Daughter     No Known Problems Sister     No Known Problems Brother     No Known Problems Maternal Aunt     No Known Problems Maternal Uncle     No Known Problems Paternal Aunt     No Known Problems Paternal Uncle     No Known Problems Maternal Grandmother     No Known Problems Maternal Grandfather     No Known Problems Paternal Grandmother     No Known Problems Paternal Grandfather     Amblyopia Neg Hx     Blindness Neg Hx     Cancer Neg Hx     Cataracts Neg Hx     Diabetes Neg Hx     Glaucoma Neg Hx     Macular degeneration Neg Hx     Retinal detachment Neg Hx     Strabismus Neg Hx     Stroke Neg Hx     Thyroid disease Neg Hx      Social History     Tobacco Use    Smoking status: Former Smoker     Quit date: 1992     Years since quittin.1    Smokeless tobacco: Never Used   Substance Use Topics    Alcohol use: No     Alcohol/week: 0.0 standard drinks    Drug use: No     Review of Systems   Constitutional: Negative for chills, diaphoresis and fever.   HENT: Negative for ear pain and sore throat.    Eyes: Negative for pain.   Respiratory: Negative for cough and shortness of breath.    Cardiovascular: Negative for chest pain.   Gastrointestinal: Negative for abdominal pain, diarrhea, nausea and vomiting.   Genitourinary: Negative for dysuria.   Musculoskeletal: Negative for back pain.        (-) Arm or leg trouble.    Skin: Negative for rash.   Neurological: Negative for headaches.   Psychiatric/Behavioral:  Negative for confusion.       Physical Exam     Initial Vitals [02/16/22 0814]   BP Pulse Resp Temp SpO2   (!) 157/85 86 20 98.4 °F (36.9 °C) 98 %      MAP       --         Physical Exam  The patient was examined specifically for the following:   General:No significant distress, Good color, Warm and dry. Head and neck:Scalp atraumatic, Neck supple. Neurological:Appropriate conversation, Gross motor deficits. Eyes:Conjugate gaze, Clear corneas. ENT: No epistaxis. Cardiac: Regular rate and rhythm, Grossly normal heart tones. Pulmonary: Wheezing, Rales. Gastrointestinal: Abdominal tenderness, Abdominal distention. Musculoskeletal: Extremity deformity, Apparent pain with range of motion of the joints. Skin: Rash.   The findings on examination were normal except for the following:  The patient has weakness of both upper extremities and both lower extremities.  She will track me with her eyes.  The lungs are clear.  The heart tones are normal.  The abdomen is soft.  The patient has a gastrostomy wound in the left epigastrium.   ED Course   Feeding Tube    Date/Time: 2/16/2022 10:13 AM  Location procedure was performed: [facility] EMERGENCY DEPARTMENT  Performed by: Andrés Reeves MD  Authorized by: Andrés Reeves MD   Consent given by: guardian  Patient identity confirmed: verbally with patient, arm band, provided demographic data and hospital-assigned identification number  Indications: tube dislodged    Sedation:  Patient sedated: no    Local anesthesia used: no    Anesthesia:  Local anesthesia used: no  Tube type: gastrostomy  Patient position: recumbent  Procedure type: replacement  Tube size: 16 Fr  Bulb inflation fluid: normal saline  Placement/position confirmation: pH test  Tube placement difficulty: moderate  Complications: No  Specimens: No  Implants: No  Patient tolerance: patient tolerated the procedure well with no immediate complications  Comments: I could not pass a 20 Romansh gastrostomy tube.  I passed a 16  Panamanian Hutchison.  Gastric contents were identified in the Hutchison and the pH of the contents in the Hutchison was 5. This 16 Panamanian Hutchison was removed.  A 16 Panamanian gastrostomy tube was used to replace it.  Gastric contents when the lumen of the gastrostomy tube.  The pH of the contents was 5. The patient will be discharged home          Labs Reviewed - No data to display       Imaging Results    None       Medical decision making:  Given the above this patient presents to the emergency room after having pulled out her gastrostomy tube during the night.  The mechanism by which this occurred is unknown.  The patient had some friable skin tags around her ostomy.  I was eventually able to place a 16 Panamanian Hutchison catheter into the ostomy.  I changed it out to a 16 gastrostomy tube.  The pH of the fluid that came from the gastrostomy tube was 5. I believe this is gastric placement.  The Hutchison also had fluid with a pH of 5 in its lumen.  I will discharge this patient to outpatient evaluation and treatment.          Medications - No data to display           Scribe Attestation:   Scribe #1: I performed the above scribed service and the documentation accurately describes the services I performed. I attest to the accuracy of the note.                 Clinical Impression:   Final diagnoses:  [T85.528A] Dislodged gastrostomy tube (Primary)      I personally performed the services described in this documentation.  All medical record  entries made by the scribe are at my direction and in my presence.  Signed, Dr. Reeves     ED Disposition Condition    Discharge Stable        ED Prescriptions     None        Follow-up Information     Follow up With Specialties Details Why Contact Info    Susi Su MD Gastroenterology In 1 week  [address] [facility] [address]  [phone]             Andrés Reeves MD  02/16/22 4244

## 2022-02-16 NOTE — ED NOTES
Pt moved to [facility] to wait for transportation services by charge nurse MILY Barba. Pt stable and with family awaiting [facility] ambulance service.

## 2022-02-16 NOTE — DISCHARGE INSTRUCTIONS
Please use the gastrostomy tube.  Please return immediately if the tube becomes obstructed.  Please follow-up with the gastroenterologist above.

## 2022-02-16 NOTE — ED NOTES
PT to ED via ambulance with complaints of dislodged PEG tube. PEG tube noted on bed of patient. PEG tube site to LUQ noted with no drainage. Pt VSS. Nad noted. Assessment in progress. Will continue to monitor. Daughter at bedside.

## 2022-02-24 ENCOUNTER — PES CALL (OUTPATIENT)
Dept: ADMINISTRATIVE | Facility: CLINIC | Age: 77
End: 2022-02-24
Payer: MEDICARE

## 2022-02-25 ENCOUNTER — HOSPITAL ENCOUNTER (INPATIENT)
Facility: HOSPITAL | Age: 77
LOS: 5 days | Discharge: HOME HEALTH | DRG: 920 | End: 2022-03-04
Attending: EMERGENCY MEDICINE | Admitting: EMERGENCY MEDICINE
Payer: MEDICARE

## 2022-02-25 DIAGNOSIS — T85.528A DISLODGED GASTROSTOMY TUBE: Primary | ICD-10-CM

## 2022-02-25 PROCEDURE — 63600175 PHARM REV CODE 636 W HCPCS: Performed by: EMERGENCY MEDICINE

## 2022-02-25 PROCEDURE — 99285 EMERGENCY DEPT VISIT HI MDM: CPT | Mod: 25

## 2022-02-25 PROCEDURE — 96372 THER/PROPH/DIAG INJ SC/IM: CPT

## 2022-02-25 RX ORDER — HALOPERIDOL 5 MG/ML
3 INJECTION INTRAMUSCULAR
Status: COMPLETED | OUTPATIENT
Start: 2022-02-25 | End: 2022-02-25

## 2022-02-25 RX ADMIN — HALOPERIDOL LACTATE 3 MG: 5 INJECTION, SOLUTION INTRAMUSCULAR at 11:02

## 2022-02-26 PROBLEM — T85.528A DISLODGED GASTROSTOMY TUBE: Status: ACTIVE | Noted: 2022-02-26

## 2022-02-26 PROBLEM — E87.1 HYPONATREMIA: Status: ACTIVE | Noted: 2022-02-26

## 2022-02-26 LAB
ALBUMIN SERPL BCP-MCNC: 3.3 G/DL (ref 3.5–5.2)
ALP SERPL-CCNC: 112 U/L (ref 55–135)
ALT SERPL W/O P-5'-P-CCNC: 13 U/L (ref 10–44)
ANION GAP SERPL CALC-SCNC: 11 MMOL/L (ref 8–16)
AST SERPL-CCNC: 18 U/L (ref 10–40)
BASOPHILS # BLD AUTO: 0.03 K/UL (ref 0–0.2)
BASOPHILS NFR BLD: 0.5 % (ref 0–1.9)
BILIRUB SERPL-MCNC: 0.4 MG/DL (ref 0.1–1)
BUN SERPL-MCNC: 16 MG/DL (ref 8–23)
CALCIUM SERPL-MCNC: 9.3 MG/DL (ref 8.7–10.5)
CHLORIDE SERPL-SCNC: 98 MMOL/L (ref 95–110)
CO2 SERPL-SCNC: 24 MMOL/L (ref 23–29)
CREAT SERPL-MCNC: 0.6 MG/DL (ref 0.5–1.4)
CTP QC/QA: YES
DIFFERENTIAL METHOD: ABNORMAL
EOSINOPHIL # BLD AUTO: 0.4 K/UL (ref 0–0.5)
EOSINOPHIL NFR BLD: 7 % (ref 0–8)
ERYTHROCYTE [DISTWIDTH] IN BLOOD BY AUTOMATED COUNT: 14.8 % (ref 11.5–14.5)
EST. GFR  (AFRICAN AMERICAN): >60 ML/MIN/1.73 M^2
EST. GFR  (NON AFRICAN AMERICAN): >60 ML/MIN/1.73 M^2
GLUCOSE SERPL-MCNC: 108 MG/DL (ref 70–110)
HCT VFR BLD AUTO: 35.5 % (ref 37–48.5)
HGB BLD-MCNC: 11.6 G/DL (ref 12–16)
IMM GRANULOCYTES # BLD AUTO: 0.01 K/UL (ref 0–0.04)
IMM GRANULOCYTES NFR BLD AUTO: 0.2 % (ref 0–0.5)
LYMPHOCYTES # BLD AUTO: 1.7 K/UL (ref 1–4.8)
LYMPHOCYTES NFR BLD: 26.8 % (ref 18–48)
MAGNESIUM SERPL-MCNC: 1.8 MG/DL (ref 1.6–2.6)
MCH RBC QN AUTO: 27.4 PG (ref 27–31)
MCHC RBC AUTO-ENTMCNC: 32.7 G/DL (ref 32–36)
MCV RBC AUTO: 84 FL (ref 82–98)
MONOCYTES # BLD AUTO: 0.7 K/UL (ref 0.3–1)
MONOCYTES NFR BLD: 11.9 % (ref 4–15)
NEUTROPHILS # BLD AUTO: 3.3 K/UL (ref 1.8–7.7)
NEUTROPHILS NFR BLD: 53.6 % (ref 38–73)
NRBC BLD-RTO: 0 /100 WBC
PLATELET # BLD AUTO: 176 K/UL (ref 150–450)
PMV BLD AUTO: 11.1 FL (ref 9.2–12.9)
POTASSIUM SERPL-SCNC: 4.4 MMOL/L (ref 3.5–5.1)
PROT SERPL-MCNC: 7.5 G/DL (ref 6–8.4)
RBC # BLD AUTO: 4.23 M/UL (ref 4–5.4)
SARS-COV-2 RDRP RESP QL NAA+PROBE: NEGATIVE
SODIUM SERPL-SCNC: 133 MMOL/L (ref 136–145)
WBC # BLD AUTO: 6.16 K/UL (ref 3.9–12.7)

## 2022-02-26 PROCEDURE — 25000003 PHARM REV CODE 250: Performed by: EMERGENCY MEDICINE

## 2022-02-26 PROCEDURE — 99223 PR INITIAL HOSPITAL CARE,LEVL III: ICD-10-PCS | Mod: ,,, | Performed by: INTERNAL MEDICINE

## 2022-02-26 PROCEDURE — 83735 ASSAY OF MAGNESIUM: CPT | Performed by: EMERGENCY MEDICINE

## 2022-02-26 PROCEDURE — 85025 COMPLETE CBC W/AUTO DIFF WBC: CPT | Performed by: EMERGENCY MEDICINE

## 2022-02-26 PROCEDURE — 99223 1ST HOSP IP/OBS HIGH 75: CPT | Mod: ,,, | Performed by: INTERNAL MEDICINE

## 2022-02-26 PROCEDURE — G0378 HOSPITAL OBSERVATION PER HR: HCPCS

## 2022-02-26 PROCEDURE — 80053 COMPREHEN METABOLIC PANEL: CPT | Performed by: EMERGENCY MEDICINE

## 2022-02-26 PROCEDURE — U0002 COVID-19 LAB TEST NON-CDC: HCPCS | Performed by: EMERGENCY MEDICINE

## 2022-02-26 RX ORDER — SODIUM CHLORIDE 9 MG/ML
1000 INJECTION, SOLUTION INTRAVENOUS
Status: COMPLETED | OUTPATIENT
Start: 2022-02-26 | End: 2022-02-26

## 2022-02-26 RX ORDER — CETIRIZINE HYDROCHLORIDE 10 MG/1
10 TABLET ORAL DAILY
COMMUNITY
End: 2023-02-20 | Stop reason: SDUPTHER

## 2022-02-26 RX ORDER — MELATONIN 10 MG
CAPSULE ORAL
COMMUNITY

## 2022-02-26 RX ORDER — SODIUM CHLORIDE 9 MG/ML
INJECTION, SOLUTION INTRAVENOUS CONTINUOUS
Status: DISCONTINUED | OUTPATIENT
Start: 2022-02-26 | End: 2022-02-27

## 2022-02-26 RX ORDER — HYDRALAZINE HYDROCHLORIDE 20 MG/ML
10 INJECTION INTRAMUSCULAR; INTRAVENOUS EVERY 6 HOURS PRN
Status: DISCONTINUED | OUTPATIENT
Start: 2022-02-26 | End: 2022-03-04 | Stop reason: HOSPADM

## 2022-02-26 RX ORDER — IBUPROFEN 100 MG/5ML
1000 SUSPENSION, ORAL (FINAL DOSE FORM) ORAL DAILY
COMMUNITY

## 2022-02-26 RX ORDER — SODIUM CHLORIDE 0.9 % (FLUSH) 0.9 %
10 SYRINGE (ML) INJECTION EVERY 12 HOURS PRN
Status: DISCONTINUED | OUTPATIENT
Start: 2022-02-26 | End: 2022-03-04 | Stop reason: HOSPADM

## 2022-02-26 RX ORDER — AMLODIPINE BESYLATE 5 MG/1
5 TABLET ORAL 2 TIMES DAILY
COMMUNITY
Start: 2021-09-07

## 2022-02-26 RX ORDER — CHOLECALCIFEROL (VITAMIN D3) 25 MCG
1000 TABLET ORAL DAILY
COMMUNITY

## 2022-02-26 RX ADMIN — SODIUM CHLORIDE 1000 ML: 0.9 INJECTION, SOLUTION INTRAVENOUS at 03:02

## 2022-02-26 NOTE — CONSULTS
[facility] Note    CC: PEG tube fell out    HPI This is a 76 y.o. female with history of CVA on plavix (last dose ), Alzheimer's dementia, HTN who presented to the ED after her daughter noticed her PEG tube had fallen out. In the ER, she had several attempts at re-insertion including with 16 and 14 Fr tubes, and this was unsuccessful. GI is consulted for further management.    Per chart review, patient also presented to the ED on  with dislodged PEG tube. It was replaced bedside with a 16 Fr tube and she was discharged to home.     The daughter is unclear when the current tube fell out - but she believes it was some time yesterday. The daughter states the patient has relied on PEG tube for feeding for at least the past 2-3 years since her stroke. She rarely takes any food by mouth.    EGD in 2019 with placement of a 20 Fr Endovive tube.     Chart reviewed and summarized here.    Past Medical History  Past Medical History:   Diagnosis Date    Allergy     DDD (degenerative disc disease), lumbar     Chronic LBP and intermittent RLE radicular pain x 10 yrs    Hypertension     Overactive bladder     Senile cataract, unspecified - Both Eyes 2013    Stroke     right sided weakness       Past Surgical History  Past Surgical History:   Procedure Laterality Date    bladder mesh      BREAST BIOPSY Left     CATARACT EXTRACTION W/  INTRAOCULAR LENS IMPLANT Right 10/13/2014    Dr Crystal    CATARACT EXTRACTION W/  INTRAOCULAR LENS IMPLANT Left 11/10/2014    Dr Crystal    HEMORRHOID SURGERY      HYSTERECTOMY      INCONTINENCE SURGERY      OOPHORECTOMY      Right Rotator Cuff Repair      TRACHEOTOMY N/A 2020    Procedure: TRACHEOTOMY;  Surgeon: Marco Shine III, MD;  Location: [facility];  Service: General;  Laterality: N/A;       Social History  Social History     Tobacco Use    Smoking status: Former Smoker     Quit date: 1992     Years since quittin.1    Smokeless tobacco:  "Never Used   Substance Use Topics    Alcohol use: No     Alcohol/week: 0.0 standard drinks    Drug use: No       Family History  Family History   Problem Relation Age of Onset    Hypertension Mother     Breast cancer Mother     Dementia Mother     Colon cancer Father     Hypertension Father     Rhinitis Daughter     Rhinitis Daughter     No Known Problems Sister     No Known Problems Brother     No Known Problems Maternal Aunt     No Known Problems Maternal Uncle     No Known Problems Paternal Aunt     No Known Problems Paternal Uncle     No Known Problems Maternal Grandmother     No Known Problems Maternal Grandfather     No Known Problems Paternal Grandmother     No Known Problems Paternal Grandfather     Amblyopia Neg Hx     Blindness Neg Hx     Cancer Neg Hx     Cataracts Neg Hx     Diabetes Neg Hx     Glaucoma Neg Hx     Macular degeneration Neg Hx     Retinal detachment Neg Hx     Strabismus Neg Hx     Stroke Neg Hx     Thyroid disease Neg Hx        Review of Systems  General ROS: negative for chills, fever or weight loss  Psychological ROS: negative for hallucination, depression or suicidal ideation  Ophthalmic ROS: negative for blurry vision, photophobia or eye pain  ENT ROS: negative for epistaxis, sore throat or rhinorrhea  Respiratory ROS: no cough, shortness of breath, or wheezing  Cardiovascular ROS: no chest pain or dyspnea on exertion  Gastrointestinal ROS: no abdominal pain, change in bowel habits, or black/ bloody stools  Genito-Urinary ROS: no dysuria, trouble voiding, or hematuria  Musculoskeletal ROS: negative for gait disturbance or muscular weakness  Neurological ROS: no syncope or seizures; no ataxia  Dermatological ROS: negative for pruritis, rash and jaundice    Physical Examination  BP (!) 161/72   Pulse 84   Temp 98.1 °F (36.7 °C)   Resp 18   Ht 5' 7" (1.702 m)   Wt 68 kg (150 lb)   SpO2 98%   Breastfeeding No   BMI 23.49 kg/m²     Physical " Exam:  General Appearance: well-appearing, NAD  Head:   Normocephalic, without obvious abnormality  Eyes:    No scleral icterus, EOMI  ENT: Neck supple; moist mucus membranes  Lungs: clear to auscultation bilaterally.  Heart:  regular rate and rhythm, S1, S2 normal, no murmurs heard  Abdomen: soft, non-tender, PEG tube site does not appear infected, no erythema or fluctuance but notable for hard granulation tissue  Extremities: 2+ pulses, no clubbing, cyanosis or edema  Skin: No visible rash  Neurologic: no focal motor deficits    Labs:  Lab Results   Component Value Date    WBC 6.16 02/26/2022    HGB 11.6 (L) 02/26/2022    HCT 35.5 (L) 02/26/2022    MCV 84 02/26/2022     02/26/2022         CMP  Sodium   Date Value Ref Range Status   02/26/2022 133 (L) 136 - 145 mmol/L Final     Potassium   Date Value Ref Range Status   02/26/2022 4.4 3.5 - 5.1 mmol/L Final     Chloride   Date Value Ref Range Status   02/26/2022 98 95 - 110 mmol/L Final     CO2   Date Value Ref Range Status   02/26/2022 24 23 - 29 mmol/L Final     Glucose   Date Value Ref Range Status   02/26/2022 108 70 - 110 mg/dL Final     BUN   Date Value Ref Range Status   02/26/2022 16 8 - 23 mg/dL Final     Creatinine   Date Value Ref Range Status   02/26/2022 0.6 0.5 - 1.4 mg/dL Final     Calcium   Date Value Ref Range Status   02/26/2022 9.3 8.7 - 10.5 mg/dL Final     Total Protein   Date Value Ref Range Status   02/26/2022 7.5 6.0 - 8.4 g/dL Final     Albumin   Date Value Ref Range Status   02/26/2022 3.3 (L) 3.5 - 5.2 g/dL Final     Total Bilirubin   Date Value Ref Range Status   02/26/2022 0.4 0.1 - 1.0 mg/dL Final     Comment:     For infants and newborns, interpretation of results should be based  on gestational age, weight and in agreement with clinical  observations.    Premature Infant recommended reference ranges:  Up to 24 hours.............<8.0 mg/dL  Up to 48 hours............<12.0 mg/dL  3-5 days..................<15.0 mg/dL  6-29  days.................<15.0 mg/dL       Alkaline Phosphatase   Date Value Ref Range Status   02/26/2022 112 55 - 135 U/L Final     AST   Date Value Ref Range Status   02/26/2022 18 10 - 40 U/L Final     ALT   Date Value Ref Range Status   02/26/2022 13 10 - 44 U/L Final     Anion Gap   Date Value Ref Range Status   02/26/2022 11 8 - 16 mmol/L Final     eGFR if    Date Value Ref Range Status   02/26/2022 >60 >60 mL/min/1.73 m^2 Final     eGFR if non    Date Value Ref Range Status   02/26/2022 >60 >60 mL/min/1.73 m^2 Final     Comment:     Calculation used to obtain the estimated glomerular filtration  rate (eGFR) is the CKD-EPI equation.              IMAGING:  No recent    PROCEDURES:  EGD 2019  Normal esophagus.                        - Normal stomach.                        - Normal examined duodenum.                        - An externally removable PEG placement was                        successfully completed. Outer bumper at 2 cm - snug                        to help control bleeding.     Assessment/Plan:  75 yo F with history of stoke on plavix (last dose 2/25) who presented to the ED after PEG tube fell out at home. Unsuccessful replacement attempt by the ED physician. Patient admitted to the hospital and GI consulted for further management.    I attempted to re-place the patient's PEG tube at bedside this morning however the insertion site had some scarring/granulation tissue which impeded insertion. I believe the patient warrants endoscopic replacement of her PEG tube - possibly at a different site. However, she last took Plavix on 2/25 and therefore would await 5 days before endoscopic PEG tube placement attempt, tentatively on Wednesday 3/2).    Recommend consulting wound care for PEG site evaluation.    Diaz Damon MD

## 2022-02-26 NOTE — ASSESSMENT & PLAN NOTE
Patient was just here a week ago in the ED with dislodged peg tube. Daughter brought her back again tonight. ED unsuccessful with replacement  Consult GI   NPO   IVF  Hold all meds until peg tube replaced

## 2022-02-26 NOTE — SUBJECTIVE & OBJECTIVE
Past Medical History:   Diagnosis Date    Allergy     DDD (degenerative disc disease), lumbar     Chronic LBP and intermittent RLE radicular pain x 10 yrs    Hypertension     Overactive bladder     Senile cataract, unspecified - Both Eyes 6/11/2013    Stroke     right sided weakness       Past Surgical History:   Procedure Laterality Date    bladder mesh      BREAST BIOPSY Left     CATARACT EXTRACTION W/  INTRAOCULAR LENS IMPLANT Right 10/13/2014    Dr Crystal    CATARACT EXTRACTION W/  INTRAOCULAR LENS IMPLANT Left 11/10/2014    Dr Crystal    HEMORRHOID SURGERY      HYSTERECTOMY      INCONTINENCE SURGERY      OOPHORECTOMY      Right Rotator Cuff Repair      TRACHEOTOMY N/A 1/2/2020    Procedure: TRACHEOTOMY;  Surgeon: Marco Shine III, MD;  Location: [facility] OR;  Service: General;  Laterality: N/A;       Review of patient's allergies indicates:   Allergen Reactions    Enoxaparin Other (See Comments) and Hives     Slurred speech per patient  Slurred speech per patient    Lisinopril Other (See Comments)     Cough    Penicillins Rash       No current facility-administered medications on file prior to encounter.     Current Outpatient Medications on File Prior to Encounter   Medication Sig    acetaminophen (TYLENOL) 650 mg/20.3 mL Soln 20.3 mLs (650 mg total) by Per G Tube route every 6 (six) hours as needed for Temperature greater than (temp >101).    aspirin 81 MG Chew Take 1 tablet (81 mg total) by mouth once daily.    atorvastatin (LIPITOR) 40 MG tablet 1 tablet (40 mg total) by Per G Tube route every evening.    azithromycin (ZITHROMAX Z-TALON) 250 MG tablet Take 2 pills day 1, then 1 pill day 2-5. Administer via PEG.    clopidogreL (PLAVIX) 75 mg tablet 1 tablet (75 mg total) by Per G Tube route once daily.    hydrocodone-acetaminophen (HYCET) solution 7.5-325 mg/15mL 15 mLs by Per G Tube route every 4 (four) hours as needed.    oxybutynin (DITROPAN-XL) 5 MG TR24 Take 1 tablet (5 mg total) by mouth once daily.      Family History       Problem Relation (Age of Onset)    Breast cancer Mother    Colon cancer Father    Dementia Mother    Hypertension Mother, Father    No Known Problems Sister, Brother, Maternal Aunt, Maternal Uncle, Paternal Aunt, Paternal Uncle, Maternal Grandmother, Maternal Grandfather, Paternal Grandmother, Paternal Grandfather    Rhinitis Daughter, Daughter          Tobacco Use    Smoking status: Former Smoker     Quit date: 1992     Years since quittin.1    Smokeless tobacco: Never Used   Substance and Sexual Activity    Alcohol use: No     Alcohol/week: 0.0 standard drinks    Drug use: No    Sexual activity: Not Currently     Review of Systems  Unable to perform ROS: Dementia   Skin:        + PEG tube displacement.   + gastrostomy wound.      Objective:     Vital Signs (Most Recent):  Temp: 98.4 °F (36.9 °C) (22)  Pulse: 85 (22)  Resp: 18 (22)  BP: 138/67 (22)  SpO2: 100 % (22) Vital Signs (24h Range):  Temp:  [98.4 °F (36.9 °C)-98.6 °F (37 °C)] 98.4 °F (36.9 °C)  Pulse:  [74-85] 85  Resp:  [17-18] 18  SpO2:  [96 %-100 %] 100 %  BP: (114-142)/(66-91) 138/67     Weight: 68 kg (150 lb)  Body mass index is 23.49 kg/m².    Physical Exam  Physical Exam     Nursing note and vitals reviewed.  Constitutional: She appears well-developed. She is not diaphoretic.   HENT:   Head: Normocephalic and atraumatic.   Eyes: Conjunctivae are normal. Right eye exhibits no discharge. Left eye exhibits no discharge. No scleral icterus.   Neck: No JVD present.   Cardiovascular: Normal rate, regular rhythm, normal heart sounds and intact distal pulses. Exam reveals no gallop and no friction rub.    No murmur heard.  Pulmonary/Chest: Breath sounds normal. No stridor. No respiratory distress. She has no wheezes. She has no rhonchi. She has no rales. She exhibits no tenderness.   Abdominal: Abdomen is soft. There is no abdominal tenderness.   Gastrostomy wound to the  left upper quadrant.    Musculoskeletal:         General: No edema.      Comments: Right sided contractures to the upper extremity from previous stroke.      Neurological: She is alert. GCS score is 15. GCS eye subscore is 4. GCS verbal subscore is 5. GCS motor subscore is 6.   Expressive aphasia secondary to previous stroke. Right sided hemiparesis from previous stroke.    Skin: Capillary refill takes less than 2 seconds. No rash and no abscess noted. No erythema. No pallor.   Psychiatric: She is agitated (mild).       Significant Labs: All pertinent labs within the past 24 hours have been reviewed.    Significant Imaging: I have reviewed all pertinent imaging results/findings within the past 24 hours.

## 2022-02-26 NOTE — ED PROVIDER NOTES
Encounter Date: 2/25/2022    SCRIBE #1 NOTE: I, Reina Gutierrez, am scribing for, and in the presence of, Cristiano Leonard MD.       History     Chief Complaint   Patient presents with    Peg Tube Problem     Here via [facility] with c/o PEG tube accidentally removed, pt from home     Emilia Melgoza is a 76 y.o. female, with a PMHx of CVA, Alzheimer's dementia, HTN, who presents to the ED from private residence with accidental PEG tube displacement occurring PTA today. Daughter reports patient's PEG tube was accidentally displaced today after the patient pulled the tube out. She is unsure how long the tube has been displaced. Patient last had her PEG tube replaced with a 16 Fr tube 1 week ago, on 2/16/22. She has pulled out her tube numerous times in the past. No other exacerbating or alleviating factors. Patient is currently at her baseline mental status. This is the extent of the patient's complaints today in the Emergency Department.     The history is provided by a significant other.     Review of patient's allergies indicates:   Allergen Reactions    Enoxaparin Other (See Comments) and Hives     Slurred speech per patient  Slurred speech per patient    Lisinopril Other (See Comments)     Cough    Penicillins Rash     Past Medical History:   Diagnosis Date    Allergy     DDD (degenerative disc disease), lumbar     Chronic LBP and intermittent RLE radicular pain x 10 yrs    Hypertension     Overactive bladder     Senile cataract, unspecified - Both Eyes 6/11/2013    Stroke     right sided weakness     Past Surgical History:   Procedure Laterality Date    bladder mesh      BREAST BIOPSY Left     CATARACT EXTRACTION W/  INTRAOCULAR LENS IMPLANT Right 10/13/2014    Dr Crystal    CATARACT EXTRACTION W/  INTRAOCULAR LENS IMPLANT Left 11/10/2014    Dr Crystal    HEMORRHOID SURGERY      HYSTERECTOMY      INCONTINENCE SURGERY      OOPHORECTOMY      Right Rotator Cuff Repair      TRACHEOTOMY N/A 1/2/2020     Procedure: TRACHEOTOMY;  Surgeon: Marco Shine III, MD;  Location: [facility];  Service: General;  Laterality: N/A;     Family History   Problem Relation Age of Onset    Hypertension Mother     Breast cancer Mother     Dementia Mother     Colon cancer Father     Hypertension Father     Rhinitis Daughter     Rhinitis Daughter     No Known Problems Sister     No Known Problems Brother     No Known Problems Maternal Aunt     No Known Problems Maternal Uncle     No Known Problems Paternal Aunt     No Known Problems Paternal Uncle     No Known Problems Maternal Grandmother     No Known Problems Maternal Grandfather     No Known Problems Paternal Grandmother     No Known Problems Paternal Grandfather     Amblyopia Neg Hx     Blindness Neg Hx     Cancer Neg Hx     Cataracts Neg Hx     Diabetes Neg Hx     Glaucoma Neg Hx     Macular degeneration Neg Hx     Retinal detachment Neg Hx     Strabismus Neg Hx     Stroke Neg Hx     Thyroid disease Neg Hx      Social History     Tobacco Use    Smoking status: Former Smoker     Quit date: 1992     Years since quittin.1    Smokeless tobacco: Never Used   Substance Use Topics    Alcohol use: No     Alcohol/week: 0.0 standard drinks    Drug use: No     Review of Systems   Unable to perform ROS: Patient nonverbal (Remote CVA)   Skin:        + PEG tube displacement.   + gastrostomy wound.   Neurological: Positive for speech difficulty (dysarthria and aphasia from remote stroke) and weakness (RUE contracture).       Physical Exam     Initial Vitals   BP Pulse Resp Temp SpO2   22 2317 22 2317 22 2317 22 2318 22   114/70 74 18 98.6 °F (37 °C) 96 %      MAP       --                Physical Exam    Nursing note and vitals reviewed.  Constitutional: She appears well-developed and well-nourished. She is not diaphoretic. No distress.   HENT:   Head: Normocephalic and atraumatic.   Eyes: Conjunctivae are normal. Right  eye exhibits no discharge. Left eye exhibits no discharge. No scleral icterus.   Neck: Neck supple. No thyromegaly present. No tracheal deviation present. No JVD present.   Normal range of motion.  Cardiovascular: Normal rate, regular rhythm, normal heart sounds and intact distal pulses. Exam reveals no gallop and no friction rub.    No murmur heard.  Pulmonary/Chest: Breath sounds normal. No stridor. No respiratory distress. She has no wheezes. She has no rhonchi. She has no rales. She exhibits no tenderness.   Abdominal: Abdomen is soft. She exhibits no distension and no mass. There is no abdominal tenderness.   Gastrostomy stoma present to the left upper quadrant.  There is no rebound and no guarding.   Musculoskeletal:         General: No tenderness or edema.      Cervical back: Normal range of motion and neck supple.      Comments: Right sided contractures to the upper extremity from previous stroke.      Lymphadenopathy:     She has no cervical adenopathy.   Neurological: She is alert. GCS score is 15. GCS eye subscore is 4. GCS verbal subscore is 5. GCS motor subscore is 6.    Aphasia as well as dysarthria present secondary to previous stroke. Right sided hemiparesis including RUE contracture also present on exam.    Skin: Skin is warm. Capillary refill takes less than 2 seconds. No rash and no abscess noted. No erythema. No pallor.   Psychiatric: Her mood appears anxious. She is agitated (mild).   Anxious but nonverbal due to remote CVA         ED Course   Procedures  Labs Reviewed   CBC W/ AUTO DIFFERENTIAL - Abnormal; Notable for the following components:       Result Value    Hemoglobin 11.6 (*)     Hematocrit 35.5 (*)     RDW 14.8 (*)     All other components within normal limits   COMPREHENSIVE METABOLIC PANEL - Abnormal; Notable for the following components:    Sodium 133 (*)     Albumin 3.3 (*)     All other components within normal limits   MAGNESIUM   SARS-COV-2 RDRP GENE          Imaging Results     None          Medications   haloperidol lactate injection 3 mg (3 mg Intramuscular Given 2/25/22 4412)   0.9%  NaCl infusion (1,000 mLs Intravenous New Bag 2/26/22 0305)     Medical Decision Making:   History:   Old Medical Records: I decided to obtain old medical records.          Scribe Attestation:   Scribe #1: I performed the above scribed service and the documentation accurately describes the services I performed. I attest to the accuracy of the note.        ED Course as of 02/26/22 0323   Fri Feb 25, 2022   2345 1st attempt of re-insertion of 16 Fr G-tube was unsuccessful as the patient was clenching abdomen and mildly agitated during procedure. Will administer Haloperidol and re-attempt insertion. [JL]   Sat Feb 26, 2022   0103 Unsuccessful second attempt with 16 and 14 [place] tubes. Will consult  for observation status.  [JL]      ED Course User Index  [JL] Reina Brenda             Pt arrived alert, afebrile, non-toxic in appearance, in no acute respiratory distress with VSS.  PEG replacement was attempted at the bedside and was unsuccessful.  Labs returned unremarkable.  Maintenance fluids initiated.  Pt discussed with ARTUR and placed in OBS for further evaluation and management.    Cristiano Leonard MD        Clinical Impression:   Final diagnoses:  [T85.528A] Dislodged gastrostomy tube (Primary)          ED Disposition Condition    Observation          I, Cristiano Leonard, personally performed the services described in this documentation. All medical record entries made by the scribe were at my direction and in my presence.  I have reviewed the chart and agree that the record reflects my personal performance and is accurate and complete.    Cristiano Leonard MD  02/26/22 [id number]

## 2022-02-26 NOTE — CARE UPDATE
Placed in observation for dislodgement of PEG. GI attempted bedside replacement of PEG tube however the insertion site had scarring/gransulation tissue. For PEG tube placement on Monday. IV hydralazine prn for elevated blood pressure. Hold ASA/Plavix (last dose am 2/25).   See HP for full assessment plan and care.   Nonverbal, contracted bed bound  IVF  EHOB, turn every 2     Radha, NP  Staff: Chuy Aldana MD

## 2022-02-26 NOTE — PHARMACY MED REC
"Admission Medication History     The home medication history was taken by Karina Holder.    You may go to "Admission" then "Reconcile Home Medications" tabs to review and/or act upon these items.      The home medication list has been updated by the Pharmacy department.    Please read ALL comments highlighted in yellow.    Please address this information as you see fit.     Feel free to contact us if you have any questions or require assistance.      The medications listed below were removed from the home medication list. Please reorder if appropriate:  Patient reports no longer taking the following medication(s):   Aspirin 81mg   Hycet   Azithromycin    Medications listed below were obtained from: Patient/family    Plavix 75mg   Oxybutynin 5mg   Amlodipine 5mg   Vitamin C   Vitamin D   Zyrtec    The medication reconciliation was completed at the patient's bedside.      Karina Holder  [phone]    "

## 2022-02-26 NOTE — HPI
76 y.o. female, with a PMHx of CVA, Alzheimer's dementia, HTN, who presents to the ED from private residence with accidental PEG tube displacement occurring PTA today. Daughter reports patient's PEG tube was accidentally displaced today after the patient pulled the tube out. She is unsure how long the tube has been displaced. Patient last had her PEG tube replaced with a 16 Fr tube 1 week ago, on 2/16/22. She has pulled out her tube numerous times in the past. No other exacerbating or alleviating factors. Patient is currently at her baseline mental status.

## 2022-02-26 NOTE — H&P
[facility]  History & Physical    Patient Name: Emilia Melgoza  MRN: [id number]  Patient Class: OP- Observation  Admission Date: 2/25/2022  Attending Physician: Chuy Aldana MD   Primary Care Provider: Lolita Portillo MD         Patient information was obtained from relative(s), past medical records and ER records.     Subjective:     Principal Problem:Dislodged gastrostomy tube    Chief Complaint:   Chief Complaint   Patient presents with    Peg Tube Problem     [facility] with c/o PEG tube accidentally removed, pt from home        HPI: 76 y.o. female, with a PMHx of CVA, Alzheimer's dementia, HTN, who presents to the ED from private residence with accidental PEG tube displacement occurring PTA today. Daughter reports patient's PEG tube was accidentally displaced today after the patient pulled the tube out. She is unsure how long the tube has been displaced. Patient last had her PEG tube replaced with a 16 Fr tube 1 week ago, on 2/16/22. She has pulled out her tube numerous times in the past. No other exacerbating or alleviating factors. Patient is currently at her baseline mental status.      Past Medical History:   Diagnosis Date    Allergy     DDD (degenerative disc disease), lumbar     Chronic LBP and intermittent RLE radicular pain x 10 yrs    Hypertension     Overactive bladder     Senile cataract, unspecified - Both Eyes 6/11/2013    Stroke     right sided weakness       Past Surgical History:   Procedure Laterality Date    bladder mesh      BREAST BIOPSY Left     CATARACT EXTRACTION W/  INTRAOCULAR LENS IMPLANT Right 10/13/2014    Dr Crystal    CATARACT EXTRACTION W/  INTRAOCULAR LENS IMPLANT Left 11/10/2014    Dr Crystal    HEMORRHOID SURGERY      HYSTERECTOMY      INCONTINENCE SURGERY      OOPHORECTOMY      Right Rotator Cuff Repair      TRACHEOTOMY N/A 1/2/2020    Procedure: TRACHEOTOMY;  Surgeon: Marco Shine III, MD;  Location: [facility]  OR;  Service: General;  Laterality: N/A;       Review of patient's allergies indicates:   Allergen Reactions    Enoxaparin Other (See Comments) and Hives     Slurred speech per patient  Slurred speech per patient    Lisinopril Other (See Comments)     Cough    Penicillins Rash       No current facility-administered medications on file prior to encounter.     Current Outpatient Medications on File Prior to Encounter   Medication Sig    acetaminophen (TYLENOL) 650 mg/20.3 mL Soln 20.3 mLs (650 mg total) by Per G Tube route every 6 (six) hours as needed for Temperature greater than (temp >101).    aspirin 81 MG Chew Take 1 tablet (81 mg total) by mouth once daily.    atorvastatin (LIPITOR) 40 MG tablet 1 tablet (40 mg total) by Per G Tube route every evening.    azithromycin (ZITHROMAX Z-TALON) 250 MG tablet Take 2 pills day 1, then 1 pill day 2-5. Administer via PEG.    clopidogreL (PLAVIX) 75 mg tablet 1 tablet (75 mg total) by Per G Tube route once daily.    hydrocodone-acetaminophen (HYCET) solution 7.5-325 mg/15mL 15 mLs by Per G Tube route every 4 (four) hours as needed.    oxybutynin (DITROPAN-XL) 5 MG TR24 Take 1 tablet (5 mg total) by mouth once daily.     Family History       Problem Relation (Age of Onset)    Breast cancer Mother    Colon cancer Father    Dementia Mother    Hypertension Mother, Father    No Known Problems Sister, Brother, Maternal Aunt, Maternal Uncle, Paternal Aunt, Paternal Uncle, Maternal Grandmother, Maternal Grandfather, Paternal Grandmother, Paternal Grandfather    Rhinitis Daughter, Daughter          Tobacco Use    Smoking status: Former Smoker     Quit date: 1992     Years since quittin.1    Smokeless tobacco: Never Used   Substance and Sexual Activity    Alcohol use: No     Alcohol/week: 0.0 standard drinks    Drug use: No    Sexual activity: Not Currently     Review of Systems  Unable to perform ROS: Dementia   Skin:        + PEG tube displacement.   +  gastrostomy wound.      Objective:     Vital Signs (Most Recent):  Temp: 98.4 °F (36.9 °C) (02/26/22 0308)  Pulse: 85 (02/26/22 0308)  Resp: 18 (02/26/22 0308)  BP: 138/67 (02/26/22 0308)  SpO2: 100 % (02/26/22 0308) Vital Signs (24h Range):  Temp:  [98.4 °F (36.9 °C)-98.6 °F (37 °C)] 98.4 °F (36.9 °C)  Pulse:  [74-85] 85  Resp:  [17-18] 18  SpO2:  [96 %-100 %] 100 %  BP: (114-142)/(66-91) 138/67     Weight: 68 kg (150 lb)  Body mass index is 23.49 kg/m².    Physical Exam  Physical Exam     Nursing note and vitals reviewed.  Constitutional: She appears well-developed. She is not diaphoretic.   HENT:   Head: Normocephalic and atraumatic.   Eyes: Conjunctivae are normal. Right eye exhibits no discharge. Left eye exhibits no discharge. No scleral icterus.   Neck: No JVD present.   Cardiovascular: Normal rate, regular rhythm, normal heart sounds and intact distal pulses. Exam reveals no gallop and no friction rub.    No murmur heard.  Pulmonary/Chest: Breath sounds normal. No stridor. No respiratory distress. She has no wheezes. She has no rhonchi. She has no rales. She exhibits no tenderness.   Abdominal: Abdomen is soft. There is no abdominal tenderness.   Gastrostomy wound to the left upper quadrant.    Musculoskeletal:         General: No edema.      Comments: Right sided contractures to the upper extremity from previous stroke.      Neurological: She is alert. GCS score is 15. GCS eye subscore is 4. GCS verbal subscore is 5. GCS motor subscore is 6.   Expressive aphasia secondary to previous stroke. Right sided hemiparesis from previous stroke.    Skin: Capillary refill takes less than 2 seconds. No rash and no abscess noted. No erythema. No pallor.   Psychiatric: She is agitated (mild).       Significant Labs: All pertinent labs within the past 24 hours have been reviewed.    Significant Imaging: I have reviewed all pertinent imaging results/findings within the past 24 hours.    Assessment/Plan:     * Dislodged  gastrostomy tube  Patient was just here a week ago in the ED with dislodged peg tube. Daughter brought her back again tonight. ED unsuccessful with replacement  Consult GI   NPO   IVF  Hold all meds until peg tube replaced       Hyponatremia  Gentle IVF hydration  Free water fluid bolus  Trend level       Broca's aphasia        Hemiparesis affecting right side as late effect of cerebrovascular accident        Essential hypertension  Resume meds once peg tube replaced         VTE Risk Mitigation (From admission, onward)         Ordered     Reason for No Pharmacological VTE Prophylaxis  Once        Question:  Reasons:  Answer:  Physician Provided (leave comment)  Comment:  allergies to heparin and lovenox    02/26/22 0329     IP VTE HIGH RISK PATIENT  Once         02/26/22 0329     Place sequential compression device  Until discontinued         02/26/22 0329              As clarification, on 2/26/22 @ 0320, patient should be placed in hospital observation services under my care in collaboration with MD Jaleesa Tony NP  Department of Hospital Medicine   [facility]

## 2022-02-27 LAB
ALBUMIN SERPL BCP-MCNC: 3.1 G/DL (ref 3.5–5.2)
ALP SERPL-CCNC: 99 U/L (ref 55–135)
ALT SERPL W/O P-5'-P-CCNC: 16 U/L (ref 10–44)
AMORPH CRY URNS QL MICRO: ABNORMAL
ANION GAP SERPL CALC-SCNC: 10 MMOL/L (ref 8–16)
AST SERPL-CCNC: 20 U/L (ref 10–40)
BACTERIA #/AREA URNS HPF: ABNORMAL /HPF
BASOPHILS # BLD AUTO: 0.03 K/UL (ref 0–0.2)
BASOPHILS NFR BLD: 0.4 % (ref 0–1.9)
BILIRUB SERPL-MCNC: 0.7 MG/DL (ref 0.1–1)
BILIRUB UR QL STRIP: NEGATIVE
BUN SERPL-MCNC: 14 MG/DL (ref 8–23)
CALCIUM SERPL-MCNC: 9.1 MG/DL (ref 8.7–10.5)
CHLORIDE SERPL-SCNC: 103 MMOL/L (ref 95–110)
CLARITY UR: ABNORMAL
CO2 SERPL-SCNC: 22 MMOL/L (ref 23–29)
COLOR UR: ABNORMAL
CREAT SERPL-MCNC: 0.6 MG/DL (ref 0.5–1.4)
DIFFERENTIAL METHOD: ABNORMAL
EOSINOPHIL # BLD AUTO: 0.1 K/UL (ref 0–0.5)
EOSINOPHIL NFR BLD: 1.5 % (ref 0–8)
ERYTHROCYTE [DISTWIDTH] IN BLOOD BY AUTOMATED COUNT: 14.9 % (ref 11.5–14.5)
EST. GFR  (AFRICAN AMERICAN): >60 ML/MIN/1.73 M^2
EST. GFR  (NON AFRICAN AMERICAN): >60 ML/MIN/1.73 M^2
GLUCOSE SERPL-MCNC: 74 MG/DL (ref 70–110)
GLUCOSE UR QL STRIP: NEGATIVE
HCT VFR BLD AUTO: 34.9 % (ref 37–48.5)
HGB BLD-MCNC: 11.3 G/DL (ref 12–16)
HGB UR QL STRIP: NEGATIVE
HYALINE CASTS #/AREA URNS LPF: 0 /LPF
IMM GRANULOCYTES # BLD AUTO: 0.02 K/UL (ref 0–0.04)
IMM GRANULOCYTES NFR BLD AUTO: 0.3 % (ref 0–0.5)
KETONES UR QL STRIP: ABNORMAL
LEUKOCYTE ESTERASE UR QL STRIP: ABNORMAL
LYMPHOCYTES # BLD AUTO: 1.5 K/UL (ref 1–4.8)
LYMPHOCYTES NFR BLD: 19.8 % (ref 18–48)
MCH RBC QN AUTO: 27.4 PG (ref 27–31)
MCHC RBC AUTO-ENTMCNC: 32.4 G/DL (ref 32–36)
MCV RBC AUTO: 85 FL (ref 82–98)
MICROSCOPIC COMMENT: ABNORMAL
MONOCYTES # BLD AUTO: 0.8 K/UL (ref 0.3–1)
MONOCYTES NFR BLD: 10.8 % (ref 4–15)
NEUTROPHILS # BLD AUTO: 5.1 K/UL (ref 1.8–7.7)
NEUTROPHILS NFR BLD: 67.2 % (ref 38–73)
NITRITE UR QL STRIP: NEGATIVE
NRBC BLD-RTO: 0 /100 WBC
PH UR STRIP: >8 [PH] (ref 5–8)
PLATELET # BLD AUTO: 227 K/UL (ref 150–450)
PMV BLD AUTO: 10.9 FL (ref 9.2–12.9)
POCT GLUCOSE: 95 MG/DL (ref 70–110)
POTASSIUM SERPL-SCNC: 4 MMOL/L (ref 3.5–5.1)
PROT SERPL-MCNC: 7.5 G/DL (ref 6–8.4)
PROT UR QL STRIP: ABNORMAL
RBC # BLD AUTO: 4.13 M/UL (ref 4–5.4)
RBC #/AREA URNS HPF: 6 /HPF (ref 0–4)
SODIUM SERPL-SCNC: 135 MMOL/L (ref 136–145)
SP GR UR STRIP: 1.01 (ref 1–1.03)
SQUAMOUS #/AREA URNS HPF: 9 /HPF
TRI-PHOS CRY URNS QL MICRO: ABNORMAL
URN SPEC COLLECT METH UR: ABNORMAL
UROBILINOGEN UR STRIP-ACNC: NEGATIVE EU/DL
WBC # BLD AUTO: 7.51 K/UL (ref 3.9–12.7)
WBC #/AREA URNS HPF: 23 /HPF (ref 0–5)

## 2022-02-27 PROCEDURE — 21400001 HC TELEMETRY ROOM

## 2022-02-27 PROCEDURE — 63600175 PHARM REV CODE 636 W HCPCS: Performed by: NURSE PRACTITIONER

## 2022-02-27 PROCEDURE — 99233 PR SUBSEQUENT HOSPITAL CARE,LEVL III: ICD-10-PCS | Mod: ,,, | Performed by: INTERNAL MEDICINE

## 2022-02-27 PROCEDURE — 36415 COLL VENOUS BLD VENIPUNCTURE: CPT | Performed by: NURSE PRACTITIONER

## 2022-02-27 PROCEDURE — 80053 COMPREHEN METABOLIC PANEL: CPT | Performed by: NURSE PRACTITIONER

## 2022-02-27 PROCEDURE — 87086 URINE CULTURE/COLONY COUNT: CPT | Performed by: NURSE PRACTITIONER

## 2022-02-27 PROCEDURE — 87186 SC STD MICRODIL/AGAR DIL: CPT | Performed by: NURSE PRACTITIONER

## 2022-02-27 PROCEDURE — 87077 CULTURE AEROBIC IDENTIFY: CPT | Performed by: NURSE PRACTITIONER

## 2022-02-27 PROCEDURE — 63600175 PHARM REV CODE 636 W HCPCS: Performed by: HOSPITALIST

## 2022-02-27 PROCEDURE — 85025 COMPLETE CBC W/AUTO DIFF WBC: CPT | Performed by: NURSE PRACTITIONER

## 2022-02-27 PROCEDURE — 25000003 PHARM REV CODE 250: Performed by: HOSPITALIST

## 2022-02-27 PROCEDURE — 99233 SBSQ HOSP IP/OBS HIGH 50: CPT | Mod: ,,, | Performed by: INTERNAL MEDICINE

## 2022-02-27 PROCEDURE — 87088 URINE BACTERIA CULTURE: CPT | Performed by: NURSE PRACTITIONER

## 2022-02-27 PROCEDURE — 87040 BLOOD CULTURE FOR BACTERIA: CPT | Performed by: NURSE PRACTITIONER

## 2022-02-27 PROCEDURE — 63600175 PHARM REV CODE 636 W HCPCS: Performed by: PHYSICIAN ASSISTANT

## 2022-02-27 PROCEDURE — 25000003 PHARM REV CODE 250: Performed by: NURSE PRACTITIONER

## 2022-02-27 PROCEDURE — 81000 URINALYSIS NONAUTO W/SCOPE: CPT | Performed by: NURSE PRACTITIONER

## 2022-02-27 RX ORDER — MUPIROCIN 20 MG/G
OINTMENT TOPICAL 2 TIMES DAILY
Status: DISCONTINUED | OUTPATIENT
Start: 2022-02-27 | End: 2022-03-04 | Stop reason: HOSPADM

## 2022-02-27 RX ORDER — CEFEPIME HYDROCHLORIDE 1 G/50ML
1 INJECTION, SOLUTION INTRAVENOUS
Status: DISCONTINUED | OUTPATIENT
Start: 2022-02-27 | End: 2022-03-04 | Stop reason: HOSPADM

## 2022-02-27 RX ORDER — SCOLOPAMINE TRANSDERMAL SYSTEM 1 MG/1
1 PATCH, EXTENDED RELEASE TRANSDERMAL
Status: DISCONTINUED | OUTPATIENT
Start: 2022-02-27 | End: 2022-03-04 | Stop reason: HOSPADM

## 2022-02-27 RX ORDER — DEXTROSE MONOHYDRATE AND SODIUM CHLORIDE 5; .9 G/100ML; G/100ML
INJECTION, SOLUTION INTRAVENOUS CONTINUOUS
Status: DISCONTINUED | OUTPATIENT
Start: 2022-02-27 | End: 2022-03-04 | Stop reason: HOSPADM

## 2022-02-27 RX ORDER — ACETAMINOPHEN 650 MG/1
650 SUPPOSITORY RECTAL EVERY 6 HOURS PRN
Status: DISCONTINUED | OUTPATIENT
Start: 2022-02-27 | End: 2022-03-04 | Stop reason: HOSPADM

## 2022-02-27 RX ADMIN — CEFEPIME HYDROCHLORIDE 1 G: 1 INJECTION, SOLUTION INTRAVENOUS at 09:02

## 2022-02-27 RX ADMIN — VANCOMYCIN HYDROCHLORIDE 1250 MG: 1.25 INJECTION, POWDER, LYOPHILIZED, FOR SOLUTION INTRAVENOUS at 09:02

## 2022-02-27 RX ADMIN — DEXTROSE AND SODIUM CHLORIDE: 5; .9 INJECTION, SOLUTION INTRAVENOUS at 06:02

## 2022-02-27 RX ADMIN — SCOPALAMINE 1 PATCH: 1 PATCH, EXTENDED RELEASE TRANSDERMAL at 05:02

## 2022-02-27 RX ADMIN — MUPIROCIN: 20 OINTMENT TOPICAL at 09:02

## 2022-02-27 NOTE — ASSESSMENT & PLAN NOTE
Patient was just here a week ago in the ED with dislodged peg tube. Daughter brought her back again tonight. ED unsuccessful with replacement  GI attempted bedside replacement of PEG tube however the insertion site had scarring/gransulation tissue.  Last ASA/plavix 2/25 am  PEG placement 2/28

## 2022-02-27 NOTE — HOSPITAL COURSE
"Placed in observation for dislodgement of PEG. GI attempted bedside replacement of PEG tube however the insertion site had scarring/gransulation tissue. IV hydralazine prn for elevated blood pressure. Hold ASA/Plavix (last dose am 2/25). Nonverbal, non interactive. Contracted, bed bound. PEG  replacement today today 2/28/22. Wound care consult for open old PEG site. IVF until able to use PEG. Nutrition consult (home Jevity 100 cc/hr? X 12 hrs and water flush 100 cc per hr).     Fever 100.8 on 2/27 down with tylenol. Low grade this am 2/28. CT head no acute process (ordered as Daughter felt upper lips was dropping to one side ). No leukocytosis and VS stable. CXR "Suspected mild interstitial and alveolar infiltrates superimposed on diminished depth of inspiration". Continue vanc/cefepine . Urine and blood culture pending. Will treat for suspect aspiration given increase of oral secretion and suctioning per both Daughters. Scopolamine patch to help with oral secretions. GI was consulted. Patient went for EGD and new PEG tube placement on 3/2/22.  Patient was discharged to home on 3/3.  Activity- bed bound. Resume PEG tube feeds. Follow up with PCP in one week     "

## 2022-02-27 NOTE — NURSING
Report received from off going nurse, MILY Llamas. Patient awake and alert, nonverbal, family at bedside for support. No signs of distress noted. Call light in reach. Bed low and locked. Will continue plan of care.

## 2022-02-27 NOTE — PLAN OF CARE
[facility] - Telemetry  Initial Discharge Assessment       Primary Care Provider: Lolita Portillo MD No time preference     Admission Diagnosis: Dislodged gastrostomy tube [T85.528A]    Admission Date: 2/25/2022  Expected Discharge Date:     Discharge Barriers Identified: None    Payor: MEDICARE / Plan: MEDICARE PART A & B / Product Type: Government /     Extended Emergency Contact Information  Primary Emergency Contact: Carmen Tobar   [place]  Mobile Phone: [phone]  Relation: Daughter  Secondary Emergency Contact: Julissa Choudhary  Address: [address] [place] [address] [place]  Mobile Phone: [phone]  Relation: Daughter    Discharge Plan A: Home with family  Discharge Plan B: Other (TBD)      [facility] [address] [place] [address] - [address] [facility]  [address] [facility] [address]  Phone: [phone] Fax: [phone]      Initial Assessment (most recent)     Adult Discharge Assessment - 02/27/22 0850        Discharge Assessment    Assessment Type Discharge Planning Assessment     Confirmed/corrected address, phone number and insurance Yes     Confirmed Demographics Correct on Facesheet     Source of Information family;health record     Does patient/caregiver understand observation status Yes     Reason For Admission Dislodged gastrostomy     Lives With child(amaya), adult     Facility Arrived From: Home     Do you expect to return to your current living situation? Yes     Do you have help at home or someone to help you manage your care at home? Yes     Who are your caregiver(s) and their phone number(s)? 3 daughters rotate in her home and help and care for her; Eve: [phone]; Carmen: [phone]; Julissa: [phone]     Prior to hospitilization cognitive status: --   Dementia    Current cognitive status: --   Dementia    Walking or Climbing Stairs Difficulty ambulation difficulty, requires equipment;ambulation difficulty, assistance 1  person;ambulation difficulty, dependent     Mobility Management WC     Dressing/Bathing Difficulty bathing difficulty, requires equipment;bathing difficulty, assistance 1 person;dressing difficulty, requires equipment     Dressing/Bathing Management SC; sponge bath patient     Do you have any problems with: Errands/Grocery;Needs other help     Specify other help Adult daughters provide for needs and care for patient     Home Accessibility wheelchair accessible     Home Layout Able to live on 1st floor     Equipment Currently Used at Home wheelchair;shower chair;oxygen     Readmission within 30 days? No     Patient currently being followed by outpatient case management? No     Do you currently have service(s) that help you manage your care at home? No     Do you take prescription medications? Yes     Do you have prescription coverage? Yes     Coverage Medicare A,B; BCBS     Do you have any problems affording any of your prescribed medications? No     Is the patient taking medications as prescribed? yes     Who is going to help you get home at discharge? Daughters: Julissa Morales Lacy     How do you get to doctors appointments? agency   EMS transport: non-abulatory; obesity, dementia    Are you on dialysis? No     Do you take coumadin? No     Discharge Plan A Home with family     Discharge Plan B Other   TBD    DME Needed Upon Discharge  other (see comments)   TBD    Discharge Plan discussed with: Caregiver;Adult children     Name(s) and Number(s) Teresa: [phone]     Discharge Barriers Identified None        Relationship/Environment    Name(s) of Who Lives With Patient Julissa Prado; Carmen: daughters               SW Role explained to patient; two patient identifiers recognized; SW contact information placed on Communication board. Discussed patient managing health care at home; determined who would be helping patient at home with recovery: 3 Adult daughters rotate in and out to help and care for patient daily;  will help with recovery at home.    PCP: Lolita Portillo MD No time preference     [place] [id number] [facility], [place] - [address] [facility] [address]  [facility] [address]  Phone: [phone] Fax: [phone]    Payor: MEDICARE / Plan: MEDICARE PART A & B / Product Type: Government /

## 2022-02-27 NOTE — SUBJECTIVE & OBJECTIVE
Past Medical History:   Diagnosis Date    Allergy     DDD (degenerative disc disease), lumbar     Chronic LBP and intermittent RLE radicular pain x 10 yrs    Hypertension     Overactive bladder     Senile cataract, unspecified - Both Eyes 6/11/2013    Stroke     right sided weakness       Past Surgical History:   Procedure Laterality Date    bladder mesh      BREAST BIOPSY Left     CATARACT EXTRACTION W/  INTRAOCULAR LENS IMPLANT Right 10/13/2014    Dr Crystal    CATARACT EXTRACTION W/  INTRAOCULAR LENS IMPLANT Left 11/10/2014    Dr Crystal    HEMORRHOID SURGERY      HYSTERECTOMY      INCONTINENCE SURGERY      OOPHORECTOMY      Right Rotator Cuff Repair      TRACHEOTOMY N/A 1/2/2020    Procedure: TRACHEOTOMY;  Surgeon: Marco Shine III, MD;  Location: [facility] OR;  Service: General;  Laterality: N/A;       Review of patient's allergies indicates:   Allergen Reactions    Enoxaparin Other (See Comments) and Hives     Slurred speech per patient  Slurred speech per patient    Lisinopril Other (See Comments)     Cough    Penicillins Rash       No current facility-administered medications on file prior to encounter.     Current Outpatient Medications on File Prior to Encounter   Medication Sig    acetaminophen (TYLENOL) 650 mg/20.3 mL Soln 20.3 mLs (650 mg total) by Per G Tube route every 6 (six) hours as needed for Temperature greater than (temp >101).    amLODIPine (NORVASC) 5 MG tablet Take 5 mg by mouth 2 (two) times daily.    ascorbic acid, vitamin C, (VITAMIN C) 1000 MG tablet Take 1,000 mg by mouth once daily.    atorvastatin (LIPITOR) 40 MG tablet 1 tablet (40 mg total) by Per G Tube route every evening.    cetirizine (ZYRTEC) 10 MG tablet Take 10 mg by mouth once daily.    clopidogreL (PLAVIX) 75 mg tablet 1 tablet (75 mg total) by Per G Tube route once daily.    melatonin 10 mg Cap Take by mouth.    oxybutynin (DITROPAN-XL) 5 MG TR24 Take 1 tablet (5 mg total) by mouth once daily.    vitamin D (VITAMIN D3) 1000  units Tab Take 1,000 Units by mouth once daily.    aspirin 81 MG Chew Take 1 tablet (81 mg total) by mouth once daily.    azithromycin (ZITHROMAX Z-TALON) 250 MG tablet Take 2 pills day 1, then 1 pill day 2-5. Administer via PEG.    hydrocodone-acetaminophen (HYCET) solution 7.5-325 mg/15mL 15 mLs by Per G Tube route every 4 (four) hours as needed.     Family History       Problem Relation (Age of Onset)    Breast cancer Mother    Colon cancer Father    Dementia Mother    Hypertension Mother, Father    No Known Problems Sister, Brother, Maternal Aunt, Maternal Uncle, Paternal Aunt, Paternal Uncle, Maternal Grandmother, Maternal Grandfather, Paternal Grandmother, Paternal Grandfather    Rhinitis Daughter, Daughter          Tobacco Use    Smoking status: Former Smoker     Quit date: 1992     Years since quittin.1    Smokeless tobacco: Never Used   Substance and Sexual Activity    Alcohol use: No     Alcohol/week: 0.0 standard drinks    Drug use: No    Sexual activity: Not Currently     Review of Systems   Unable to perform ROS: Dementia   Objective:     Vital Signs (Most Recent):  Temp: 99.6 °F (37.6 °C) (22)  Pulse: (!) 113 (22)  Resp: 20 (22)  BP: 136/62 (22)  SpO2: 97 % (22)   Vital Signs (24h Range):  Temp:  [99.4 °F (37.4 °C)-99.6 °F (37.6 °C)] 99.6 °F (37.6 °C)  Pulse:  [] 113  Resp:  [16-20] 20  SpO2:  [96 %-100 %] 97 %  BP: (136-170)/(62-91) 136/62     Weight: 68 kg (150 lb)  Body mass index is 23.49 kg/m².    Physical Exam  Constitutional:       Appearance: She is ill-appearing.      Comments: Eyes open. Non verbal, non interactive. Right arm contracted. Bedbound.    Pulmonary:      Effort: Pulmonary effort is normal.      Breath sounds: Normal breath sounds.   Abdominal:      Palpations: Abdomen is soft.      Comments: LUQ old peg incision site with granulation tissue. No signs of infection   Skin:     General: Skin is warm and dry.            Significant Labs: All pertinent labs within the past 24 hours have been reviewed.    Significant Imaging: I have reviewed all pertinent imaging results/findings within the past 24 hours.

## 2022-02-27 NOTE — NURSING
Patient received from ED on stretcher.  patient kept on comfortable position.  Placed her in Tele monitor.   Got a set of vitals and assessment done.  IV assessed, IV fluid is going on continuously.  Bed in lowest locked position.  Patient is non verbal and is not able to call for need so her daughter was given orientation about call bell and visiting hour.  Position maintained.  Safety maintained. Patient had a bowel movement so changed pads.  Dressing is on place at the PEG tube insertion place.  Will continue to monitor the patient.

## 2022-02-27 NOTE — PROGRESS NOTES
[facility] Medicine  Progress Note    Patient Name: Emilia Melgoza  MRN: [id number]  Patient Class: OP- Observation   Admission Date: 2/25/2022  Length of Stay: 0 days  Attending Physician: Chuy Aldana MD  Primary Care Provider: Lolita Portillo MD        Subjective:     Principal Problem:Dislodged gastrostomy tube        HPI:  76 y.o. female, with a PMHx of CVA, Alzheimer's dementia, HTN, who presents to the ED from private residence with accidental PEG tube displacement occurring PTA today. Daughter reports patient's PEG tube was accidentally displaced today after the patient pulled the tube out. She is unsure how long the tube has been displaced. Patient last had her PEG tube replaced with a 16 Fr tube 1 week ago, on 2/16/22. She has pulled out her tube numerous times in the past. No other exacerbating or alleviating factors. Patient is currently at her baseline mental status.      Overview/Hospital Course:  Placed in observation for dislodgement of PEG. GI attempted bedside replacement of PEG tube however the insertion site had scarring/gransulation tissue. IV hydralazine prn for elevated blood pressure. Hold ASA/Plavix (last dose am 2/25). Nonverbal, contracted bed bound  Low grade fever this am. No leukocytosis Obtain UA  IVF  EHOB, turn every   For PEG placement tomorrow 2/28/22      Past Medical History:   Diagnosis Date    Allergy     DDD (degenerative disc disease), lumbar     Chronic LBP and intermittent RLE radicular pain x 10 yrs    Hypertension     Overactive bladder     Senile cataract, unspecified - Both Eyes 6/11/2013    Stroke     right sided weakness       Past Surgical History:   Procedure Laterality Date    bladder mesh      BREAST BIOPSY Left     CATARACT EXTRACTION W/  INTRAOCULAR LENS IMPLANT Right 10/13/2014    Dr Crystal    CATARACT EXTRACTION W/  INTRAOCULAR LENS IMPLANT Left 11/10/2014    Dr Crystal    HEMORRHOID SURGERY      HYSTERECTOMY       INCONTINENCE SURGERY      OOPHORECTOMY      Right Rotator Cuff Repair      TRACHEOTOMY N/A 1/2/2020    Procedure: TRACHEOTOMY;  Surgeon: Marco Shine III, MD;  Location: [facility];  Service: General;  Laterality: N/A;       Review of patient's allergies indicates:   Allergen Reactions    Enoxaparin Other (See Comments) and Hives     Slurred speech per patient  Slurred speech per patient    Lisinopril Other (See Comments)     Cough    Penicillins Rash       No current facility-administered medications on file prior to encounter.     Current Outpatient Medications on File Prior to Encounter   Medication Sig    acetaminophen (TYLENOL) 650 mg/20.3 mL Soln 20.3 mLs (650 mg total) by Per G Tube route every 6 (six) hours as needed for Temperature greater than (temp >101).    amLODIPine (NORVASC) 5 MG tablet Take 5 mg by mouth 2 (two) times daily.    ascorbic acid, vitamin C, (VITAMIN C) 1000 MG tablet Take 1,000 mg by mouth once daily.    atorvastatin (LIPITOR) 40 MG tablet 1 tablet (40 mg total) by Per G Tube route every evening.    cetirizine (ZYRTEC) 10 MG tablet Take 10 mg by mouth once daily.    clopidogreL (PLAVIX) 75 mg tablet 1 tablet (75 mg total) by Per G Tube route once daily.    melatonin 10 mg Cap Take by mouth.    oxybutynin (DITROPAN-XL) 5 MG TR24 Take 1 tablet (5 mg total) by mouth once daily.    vitamin D (VITAMIN D3) 1000 units Tab Take 1,000 Units by mouth once daily.    aspirin 81 MG Chew Take 1 tablet (81 mg total) by mouth once daily.    azithromycin (ZITHROMAX Z-TALON) 250 MG tablet Take 2 pills day 1, then 1 pill day 2-5. Administer via PEG.    hydrocodone-acetaminophen (HYCET) solution 7.5-325 mg/15mL 15 mLs by Per G Tube route every 4 (four) hours as needed.     Family History       Problem Relation (Age of Onset)    Breast cancer Mother    Colon cancer Father    Dementia Mother    Hypertension Mother, Father    No Known Problems Sister, Brother, Maternal Aunt, Maternal Uncle,  Paternal Aunt, Paternal Uncle, Maternal Grandmother, Maternal Grandfather, Paternal Grandmother, Paternal Grandfather    Rhinitis Daughter, Daughter          Tobacco Use    Smoking status: Former Smoker     Quit date: 1992     Years since quittin.1    Smokeless tobacco: Never Used   Substance and Sexual Activity    Alcohol use: No     Alcohol/week: 0.0 standard drinks    Drug use: No    Sexual activity: Not Currently     Review of Systems   Unable to perform ROS: Dementia   Objective:     Vital Signs (Most Recent):  Temp: 99.6 °F (37.6 °C) (22)  Pulse: (!) 113 (22)  Resp: 20 (22)  BP: 136/62 (22)  SpO2: 97 % (22)   Vital Signs (24h Range):  Temp:  [99.4 °F (37.4 °C)-99.6 °F (37.6 °C)] 99.6 °F (37.6 °C)  Pulse:  [] 113  Resp:  [16-20] 20  SpO2:  [96 %-100 %] 97 %  BP: (136-170)/(62-91) 136/62     Weight: 68 kg (150 lb)  Body mass index is 23.49 kg/m².    Physical Exam  Constitutional:       Appearance: She is ill-appearing.      Comments: Eyes open. Non verbal, non interactive. Right arm contracted. Bedbound.    Pulmonary:      Effort: Pulmonary effort is normal.      Breath sounds: Normal breath sounds.   Abdominal:      Palpations: Abdomen is soft.      Comments: LUQ old peg incision site with granulation tissue. No signs of infection   Skin:     General: Skin is warm and dry.           Significant Labs: All pertinent labs within the past 24 hours have been reviewed.    Significant Imaging: I have reviewed all pertinent imaging results/findings within the past 24 hours.      Assessment/Plan:      * Dislodged gastrostomy tube  Patient was just here a week ago in the ED with dislodged peg tube. Daughter brought her back again tonight. ED unsuccessful with replacement  GI attempted bedside replacement of PEG tube however the insertion site had scarring/gransulation tissue.  Last ASA/plavix  am  PEG placement       Hyponatremia  Resolved  with IV.   Continue IVF       Broca's aphasia  At baseline      Hemiparesis affecting right side as late effect of cerebrovascular accident  Bedbound and cared by Family   Baseline      Essential hypertension  Resume meds once peg tube replaced   Hydralazine prn        VTE Risk Mitigation (From admission, onward)         Ordered     Reason for No Pharmacological VTE Prophylaxis  Once        Question:  Reasons:  Answer:  Physician Provided (leave comment)  Comment:  allergies to heparin and lovenox    02/26/22 0329     IP VTE HIGH RISK PATIENT  Once         02/26/22 0329     Place sequential compression device  Until discontinued         02/26/22 0329                Discharge Planning   ANA MARÍA:      Code Status: Full Code   Is the patient medically ready for discharge?:     Reason for patient still in hospital (select all that apply): Treatment             Thuy Garcia NP  Department of Hospital Medicine   [facility] [place] [facility]

## 2022-02-27 NOTE — PROGRESS NOTES
GI Progress Note - 2/27/2022   See GI consult note for full H&P      Subjective:   Patient seen and examined at bedside.   Daughter at bedside. Patient resting in bed. IVF running.    Objective:    Vital signs in last 24 hours:  Temp:  [99.4 °F (37.4 °C)-99.6 °F (37.6 °C)] 99.6 °F (37.6 °C)  Pulse:  [] 113  Resp:  [16-20] 20  SpO2:  [96 %-100 %] 97 %  BP: (136-170)/(62-91) 136/62    Intake/Output last 3 shifts:  I/O last 3 completed shifts:  In: 239.3 [I.V.:239.3]  Out: 400 [Urine:400]  Intake/Output this shift:  No intake/output data recorded.    General Appearance: resting in bed, non-verbal  Head:   Normocephalic, without obvious abnormality  Eyes:    No scleral icterus, EOMI  ENT: Neck supple; moist mucus membranes  Lungs: clear to auscultation bilaterally.  Heart:  regular rate and rhythm, S1, S2 normal, no murmurs heard  Abdomen: soft, non-tender, PEG tube site does not appear infected, no erythema or fluctuance but notable for hard granulation tissue  Extremities: 2+ pulses, no clubbing, cyanosis or edema  Skin: No visible rash  Neurologic: no focal motor deficits       Recent Labs   Lab 02/26/22  0129 02/27/22  0353   WBC 6.16 7.51   HGB 11.6* 11.3*   HCT 35.5* 34.9*    227   MCV 84 85      Recent Labs   Lab 02/26/22  0129 02/27/22  0353   * 135*   K 4.4 4.0   CL 98 103   CO2 24 22*   BUN 16 14   CALCIUM 9.3 9.1     Recent Labs   Lab 02/26/22  0129 02/27/22  0353   AST 18 20   ALT 13 16   ALKPHOS 112 99   BILITOT 0.4 0.7       Scheduled Meds:  Continuous Infusions:   dextrose 5 % and 0.9 % NaCl 75 mL/hr at 02/27/22 0615       IMAGING:  No recent     PROCEDURES:  EGD 2019  Normal esophagus.                        - Normal stomach.                        - Normal examined duodenum.                        - An externally removable PEG placement was                        successfully completed. Outer bumper at 2 cm - snug                        to help control bleeding.       Assessment/Plan:  77 yo F with history of stoke on plavix (last dose 2/25) who presented to the ED after PEG tube fell out at home. Unsuccessful replacement attempt by the ED physician. Patient admitted to the hospital and GI consulted for further management.     I attempted to re-place the patient's PEG tube at bedside 2/26 however the insertion site had some scarring/granulation tissue which impeded insertion. I believe the patient warrants endoscopic replacement of her PEG tube - possibly at a different site. However, she last took Plavix on 2/25 and therefore would await 5 days before endoscopic PEG tube placement attempt, tentatively on Wednesday 3/2.     Recommend consulting wound care for PEG site evaluation  Consider NGT for tube feeds in the mean time.        Diaz Damon  Gastroenterology   [facility]

## 2022-02-27 NOTE — PLAN OF CARE
Patient came for the PEG tube insertion.  She is in NPO. IV fluid is going on.  Been changing her position 2 hourly.  Safety maintained.  IV site assessed and it's been flushing good.  Will continue to monitor.    Problem: Adult Inpatient Plan of Care  Goal: Plan of Care Review  Outcome: Ongoing, Progressing  Goal: Patient-Specific Goal (Individualized)  Outcome: Ongoing, Progressing  Goal: Absence of Hospital-Acquired Illness or Injury  Outcome: Ongoing, Progressing  Goal: Optimal Comfort and Wellbeing  Outcome: Ongoing, Progressing  Goal: Readiness for Transition of Care  Outcome: Ongoing, Progressing     Problem: Skin Injury Risk Increased  Goal: Skin Health and Integrity  Outcome: Ongoing, Progressing

## 2022-02-28 ENCOUNTER — EXTERNAL CHRONIC CARE MANAGEMENT (OUTPATIENT)
Dept: PRIMARY CARE CLINIC | Facility: CLINIC | Age: 77
DRG: 920 | End: 2022-02-28
Payer: MEDICARE

## 2022-02-28 PROBLEM — E87.6 HYPOKALEMIA: Status: ACTIVE | Noted: 2022-02-28

## 2022-02-28 PROBLEM — Z71.89 GOALS OF CARE, COUNSELING/DISCUSSION: Status: ACTIVE | Noted: 2022-02-28

## 2022-02-28 LAB
ALBUMIN SERPL BCP-MCNC: 2.9 G/DL (ref 3.5–5.2)
ALP SERPL-CCNC: 88 U/L (ref 55–135)
ALT SERPL W/O P-5'-P-CCNC: 16 U/L (ref 10–44)
ANION GAP SERPL CALC-SCNC: 9 MMOL/L (ref 8–16)
AST SERPL-CCNC: 26 U/L (ref 10–40)
BASOPHILS # BLD AUTO: 0.03 K/UL (ref 0–0.2)
BASOPHILS NFR BLD: 0.3 % (ref 0–1.9)
BILIRUB SERPL-MCNC: 0.7 MG/DL (ref 0.1–1)
BUN SERPL-MCNC: 11 MG/DL (ref 8–23)
CALCIUM SERPL-MCNC: 8.8 MG/DL (ref 8.7–10.5)
CHLORIDE SERPL-SCNC: 106 MMOL/L (ref 95–110)
CO2 SERPL-SCNC: 22 MMOL/L (ref 23–29)
CREAT SERPL-MCNC: 0.6 MG/DL (ref 0.5–1.4)
DIFFERENTIAL METHOD: ABNORMAL
EOSINOPHIL # BLD AUTO: 0.1 K/UL (ref 0–0.5)
EOSINOPHIL NFR BLD: 1.5 % (ref 0–8)
ERYTHROCYTE [DISTWIDTH] IN BLOOD BY AUTOMATED COUNT: 15.3 % (ref 11.5–14.5)
EST. GFR  (AFRICAN AMERICAN): >60 ML/MIN/1.73 M^2
EST. GFR  (NON AFRICAN AMERICAN): >60 ML/MIN/1.73 M^2
GLUCOSE SERPL-MCNC: 129 MG/DL (ref 70–110)
HCT VFR BLD AUTO: 34.1 % (ref 37–48.5)
HGB BLD-MCNC: 10.9 G/DL (ref 12–16)
IMM GRANULOCYTES # BLD AUTO: 0.02 K/UL (ref 0–0.04)
IMM GRANULOCYTES NFR BLD AUTO: 0.2 % (ref 0–0.5)
LYMPHOCYTES # BLD AUTO: 1.4 K/UL (ref 1–4.8)
LYMPHOCYTES NFR BLD: 14.2 % (ref 18–48)
MCH RBC QN AUTO: 27.1 PG (ref 27–31)
MCHC RBC AUTO-ENTMCNC: 32 G/DL (ref 32–36)
MCV RBC AUTO: 85 FL (ref 82–98)
MONOCYTES # BLD AUTO: 1 K/UL (ref 0.3–1)
MONOCYTES NFR BLD: 10.5 % (ref 4–15)
NEUTROPHILS # BLD AUTO: 7 K/UL (ref 1.8–7.7)
NEUTROPHILS NFR BLD: 73.3 % (ref 38–73)
NRBC BLD-RTO: 0 /100 WBC
PLATELET # BLD AUTO: 212 K/UL (ref 150–450)
PMV BLD AUTO: 10.9 FL (ref 9.2–12.9)
POTASSIUM SERPL-SCNC: 3.4 MMOL/L (ref 3.5–5.1)
PROT SERPL-MCNC: 7.1 G/DL (ref 6–8.4)
RBC # BLD AUTO: 4.02 M/UL (ref 4–5.4)
SODIUM SERPL-SCNC: 137 MMOL/L (ref 136–145)
WBC # BLD AUTO: 9.49 K/UL (ref 3.9–12.7)

## 2022-02-28 PROCEDURE — 99490 CHRNC CARE MGMT STAFF 1ST 20: CPT | Mod: S$PBB,,, | Performed by: INTERNAL MEDICINE

## 2022-02-28 PROCEDURE — 25000003 PHARM REV CODE 250: Performed by: PHYSICIAN ASSISTANT

## 2022-02-28 PROCEDURE — 63600175 PHARM REV CODE 636 W HCPCS: Performed by: PHYSICIAN ASSISTANT

## 2022-02-28 PROCEDURE — 25000003 PHARM REV CODE 250: Performed by: HOSPITALIST

## 2022-02-28 PROCEDURE — 36415 COLL VENOUS BLD VENIPUNCTURE: CPT | Performed by: NURSE PRACTITIONER

## 2022-02-28 PROCEDURE — 63600175 PHARM REV CODE 636 W HCPCS: Performed by: NURSE PRACTITIONER

## 2022-02-28 PROCEDURE — 99490 PR CHRONIC CARE MGMT, 1ST 20 MIN: ICD-10-PCS | Mod: S$PBB,,, | Performed by: INTERNAL MEDICINE

## 2022-02-28 PROCEDURE — 85025 COMPLETE CBC W/AUTO DIFF WBC: CPT | Performed by: NURSE PRACTITIONER

## 2022-02-28 PROCEDURE — 21400001 HC TELEMETRY ROOM

## 2022-02-28 PROCEDURE — 99490 CHRNC CARE MGMT STAFF 1ST 20: CPT | Mod: PBBFAC | Performed by: INTERNAL MEDICINE

## 2022-02-28 PROCEDURE — 80053 COMPREHEN METABOLIC PANEL: CPT | Performed by: NURSE PRACTITIONER

## 2022-02-28 RX ORDER — POTASSIUM CHLORIDE 7.45 MG/ML
10 INJECTION INTRAVENOUS
Status: DISPENSED | OUTPATIENT
Start: 2022-02-28 | End: 2022-02-28

## 2022-02-28 RX ORDER — VANCOMYCIN HCL IN 5 % DEXTROSE 1G/250ML
1000 PLASTIC BAG, INJECTION (ML) INTRAVENOUS
Status: DISCONTINUED | OUTPATIENT
Start: 2022-02-28 | End: 2022-03-01

## 2022-02-28 RX ADMIN — VANCOMYCIN HYDROCHLORIDE 1000 MG: 1 INJECTION, POWDER, LYOPHILIZED, FOR SOLUTION INTRAVENOUS at 11:02

## 2022-02-28 RX ADMIN — POTASSIUM CHLORIDE 10 MEQ: 10 INJECTION, SOLUTION INTRAVENOUS at 08:02

## 2022-02-28 RX ADMIN — MUPIROCIN: 20 OINTMENT TOPICAL at 10:02

## 2022-02-28 RX ADMIN — CEFEPIME HYDROCHLORIDE 1 G: 1 INJECTION, SOLUTION INTRAVENOUS at 10:02

## 2022-02-28 RX ADMIN — POTASSIUM CHLORIDE 10 MEQ: 10 INJECTION, SOLUTION INTRAVENOUS at 06:02

## 2022-02-28 RX ADMIN — CEFEPIME HYDROCHLORIDE 1 G: 1 INJECTION, SOLUTION INTRAVENOUS at 09:02

## 2022-02-28 RX ADMIN — DEXTROSE AND SODIUM CHLORIDE: 5; .9 INJECTION, SOLUTION INTRAVENOUS at 10:02

## 2022-02-28 RX ADMIN — MUPIROCIN: 20 OINTMENT TOPICAL at 09:02

## 2022-02-28 NOTE — NURSING
Bedside handoff report received from offgoing nurse. Pt lying in bed, NAD noted. Fall/safety precautions maintained. Communication borad updated. Will continue to monitor.

## 2022-02-28 NOTE — PROGRESS NOTES
"[facility] Medicine  Progress Note    Patient Name: Emilia Melgoza  MRN: [id number]  Patient Class: IP- Inpatient   Admission Date: 2/25/2022  Length of Stay: 1 days  Attending Physician: Chuy Aldana MD  Primary Care Provider: Lolita Portillo MD        Subjective:     Principal Problem:Dislodged gastrostomy tube        HPI:  76 y.o. female, with a PMHx of CVA, Alzheimer's dementia, HTN, who presents to the ED from private residence with accidental PEG tube displacement occurring PTA today. Daughter reports patient's PEG tube was accidentally displaced today after the patient pulled the tube out. She is unsure how long the tube has been displaced. Patient last had her PEG tube replaced with a 16 Fr tube 1 week ago, on 2/16/22. She has pulled out her tube numerous times in the past. No other exacerbating or alleviating factors. Patient is currently at her baseline mental status.      Overview/Hospital Course:  Placed in observation for dislodgement of PEG. GI attempted bedside replacement of PEG tube however the insertion site had scarring/gransulation tissue. IV hydralazine prn for elevated blood pressure. Hold ASA/Plavix (last dose am 2/25). Nonverbal, non interactive. Contracted, bed bound. PEG  replacement wed 3/2/22. Wound care consult for open old PEG site. IVF until able to use PEG. Nutrition consult (home Jevity 100 cc/hr? X 12 hrs and water flush 100 cc per hr).     Fever 100.8 on 2/27 down with tylenol. Low grade this am 2/28. CT head no acute process (ordered as Daughter felt upper lips was dropping to one side ). No leukocytosis and VS stable. CXR "Suspected mild interstitial and alveolar infiltrates superimposed on diminished depth of inspiration". Continue vanc/cefepine . Urine and blood culture pending. Will treat for suspect aspiration given increase of oral secretion and suctioning per both Daughters. Scopolamine patch to help with oral secretions.        Interval " History: non verbal non interactive. Daughter Rena at bedside.     Review of Systems   Unable to perform ROS: Patient nonverbal   Objective:     Vital Signs (Most Recent):  Temp: 99 °F (37.2 °C) (02/28/22 0740)  Pulse: 96 (02/28/22 0740)  Resp: 20 (02/28/22 0740)  BP: (!) 158/85 (02/28/22 0740)  SpO2: 98 % (02/28/22 0740)   Vital Signs (24h Range):  Temp:  [99 °F (37.2 °C)-100.8 °F (38.2 °C)] 99 °F (37.2 °C)  Pulse:  [] 96  Resp:  [17-20] 20  SpO2:  [95 %-98 %] 98 %  BP: (135-158)/(64-85) 158/85     Weight: 72.9 kg (160 lb 11.5 oz)  Body mass index is 25.17 kg/m².    Intake/Output Summary (Last 24 hours) at 2/28/2022 0810  Last data filed at 2/28/2022 0647  Gross per 24 hour   Intake --   Output 400 ml   Net -400 ml      Physical Exam  Constitutional:       Appearance: She is ill-appearing.      Comments: Eyes open. Non verbal, non interactive. Right arm contracted. Bedbound.   Less oral secretion this am   Pulmonary:      Effort: Pulmonary effort is normal.      Breath sounds: Normal breath sounds.   Abdominal:      Palpations: Abdomen is soft.      Comments: LUQ old peg incision site with granulation tissue. No signs of infection   Skin:     General: Skin is warm and dry.      Comments: BLE in EHOB       Significant Labs: All pertinent labs within the past 24 hours have been reviewed.    Significant Imaging: I have reviewed all pertinent imaging results/findings within the past 24 hours.      Assessment/Plan:      * Dislodged gastrostomy tube  Patient was just here a week ago in the ED with dislodged peg tube. Daughter brought her back again tonight. ED unsuccessful with replacement  GI attempted bedside replacement of PEG tube however the insertion site had scarring/gransulation tissue.  Wound care consult for old PEG site   Last ASA/plavix 2/25 am  PEG placement 3/2     Fever; suspect aspiration   Fever 100.8 on 2/27 down with tylenol. Low grade this am 2/28.  No leukocytosis and VS stable.   CXR  ""Suspected mild interstitial and alveolar infiltrates superimposed on diminished depth of inspiration".   Suspect aspiration given increase of oral secretion and suctioning per both Daughters Arevia/Julissa. Scopolamine patch to help with oral secretions.  Continue vanc/cefepine   Follow up urine and blood culture.     Hypokalemia  IV replacement until PEG placement      Goals of care, counseling/discussion  Advance Care Planning   Rivas Chase wished  Not to discuss CODE status at this time. Remains full code. Daughter Rena will discuss with family CODE status.        Hyponatremia  Resolved with IV.   Continue IVF until PEG Placement    Broca's aphasia  At baseline  Repeat CT no intracranial abnormalities      Hemiparesis affecting right side as late effect of cerebrovascular accident  Bedbound and cared by Family   Baseline      Essential hypertension  Resume meds once peg tube replaced   Hydralazine prn        VTE Risk Mitigation (From admission, onward)         Ordered     Reason for No Pharmacological VTE Prophylaxis  Once        Question:  Reasons:  Answer:  Physician Provided (leave comment)  Comment:  allergies to heparin and lovenox    02/26/22 0329     IP VTE HIGH RISK PATIENT  Once         02/26/22 0329     Place sequential compression device  Until discontinued         02/26/22 0329                Discharge Planning   ANA MARÍA:      Code Status: Full Code   Is the patient medically ready for discharge?:     Reason for patient still in hospital (select all that apply): Treatment  Discharge Plan A: Home with family            Thuy Garcia NP  Department of Hospital Medicine   [facility]    "

## 2022-02-28 NOTE — SUBJECTIVE & OBJECTIVE
Interval History: non verbal non interactive. Daughter Rena at bedside.     Review of Systems   Unable to perform ROS: Patient nonverbal   Objective:     Vital Signs (Most Recent):  Temp: 99 °F (37.2 °C) (02/28/22 0740)  Pulse: 96 (02/28/22 0740)  Resp: 20 (02/28/22 0740)  BP: (!) 158/85 (02/28/22 0740)  SpO2: 98 % (02/28/22 0740)   Vital Signs (24h Range):  Temp:  [99 °F (37.2 °C)-100.8 °F (38.2 °C)] 99 °F (37.2 °C)  Pulse:  [] 96  Resp:  [17-20] 20  SpO2:  [95 %-98 %] 98 %  BP: (135-158)/(64-85) 158/85     Weight: 72.9 kg (160 lb 11.5 oz)  Body mass index is 25.17 kg/m².    Intake/Output Summary (Last 24 hours) at 2/28/2022 0810  Last data filed at 2/28/2022 0647  Gross per 24 hour   Intake --   Output 400 ml   Net -400 ml      Physical Exam  Constitutional:       Appearance: She is ill-appearing.      Comments: Eyes open. Non verbal, non interactive. Right arm contracted. Bedbound.   Less oral secretion this am   Pulmonary:      Effort: Pulmonary effort is normal.      Breath sounds: Normal breath sounds.   Abdominal:      Palpations: Abdomen is soft.      Comments: LUQ old peg incision site with granulation tissue. No signs of infection   Skin:     General: Skin is warm and dry.      Comments: BLE in EHOB       Significant Labs: All pertinent labs within the past 24 hours have been reviewed.    Significant Imaging: I have reviewed all pertinent imaging results/findings within the past 24 hours.

## 2022-02-28 NOTE — PLAN OF CARE
Recommendations    1) Place NGT while waiting for G-tube placement  Initiate Isosource 1.5 @ 10 mL adv by 10 mL q4h until goal rate of 40 mL/hr is met, Hold for residuals > 400 mL.  mL H20 q6h, additional fluids to be determined by MD. Provides 1440 kcal, 65g P, 169g C, 1433 mL total H20    2) Pablo BID via NGT/G-Tube to assist in meeting EEN/EPN/Wound healing    3) Monitor TF tolerance.    Goals: 1) Pt to have means of nutrition initiated by RD follow up  Nutrition Goal Status: new  Communication of RD Recs:  (POC)

## 2022-02-28 NOTE — ASSESSMENT & PLAN NOTE
Patient was just here a week ago in the ED with dislodged peg tube. Daughter brought her back again tonight. ED unsuccessful with replacement  GI attempted bedside replacement of PEG tube however the insertion site had scarring/gransulation tissue.  Wound care consult for old PEG site   Last ASA/plavix 2/25 am  PEG placement today 2/28

## 2022-02-28 NOTE — PLAN OF CARE
Patient is resting on her left side with daughter at bedside. Patient's family have been very helpful all night, suctioning the patient as needed. Patient is nonverbal but appears to be free of pain. No facial grimacing. Will continue to monitor.

## 2022-02-28 NOTE — ASSESSMENT & PLAN NOTE
Advance Care Planning   Daughter Rena wished  Not to discuss CODE status at this time. Remains full code. Rivas Chase will discuss with family CODE status.

## 2022-02-28 NOTE — ASSESSMENT & PLAN NOTE
"Fever 100.8 on 2/27 down with tylenol. Low grade this am 2/28.  No leukocytosis and VS stable.   CXR "Suspected mild interstitial and alveolar infiltrates superimposed on diminished depth of inspiration".   Suspect aspiration given increase of oral secretion and suctioning per both Daughters Arevia/Julissa. Scopolamine patch to help with oral secretions.  Continue vanc/cefepine   Follow up urine and blood culture.   "

## 2022-02-28 NOTE — CONSULTS
[facility] [place] - Telemetry  Wound Care    Patient Name:  Emilia Melgoza   MRN:  [id number]  Date: 2022  Diagnosis: Dislodged gastrostomy tube    History:     Past Medical History:   Diagnosis Date    Allergy     DDD (degenerative disc disease), lumbar     Chronic LBP and intermittent RLE radicular pain x 10 yrs    Hypertension     Overactive bladder     Senile cataract, unspecified - Both Eyes 2013    Stroke     right sided weakness       Social History     Socioeconomic History    Marital status:     Number of children: 3   Tobacco Use    Smoking status: Former Smoker     Quit date: 1992     Years since quittin.1    Smokeless tobacco: Never Used   Substance and Sexual Activity    Alcohol use: No     Alcohol/week: 0.0 standard drinks    Drug use: No    Sexual activity: Not Currently   Social History Narrative    Retired       Precautions:     Allergies as of 2022 - Reviewed 2022   Allergen Reaction Noted    Enoxaparin Other (See Comments) and Hives 2013    Lisinopril Other (See Comments) 2012    Penicillins Rash 2013       WO Assessment Details/Treatment   Consulted for PEG site granulation tissue  A 76 year old female admitted 22 from home with dislodged gastrostomy tube; hyponatremia; Broca's aphasia; hemiparesis affecting right side as late effect of CVA; essential hypertension   WBC 9.49 Hgb 10.9 Hct 34.1 Alb 2.9    GI consult- PEG tube site does not appear infected; notable for hard granulation tissue; attempted to replace PEG tube at bedside, but unable due to scarring/graunlation tissue; warrants endoscopic replacement, possibly at different site. Await 5 days due to Plavix taken last ; tentative plan to replace Wednesday 3/2  On Isoflex mattress; Ryan score 11; fecal incontinence; espino catheter; bedbound; upper extremity contractures  Assessment:  Left upper abdomen gastrostomy site- Drying hypergranular tissue ~ 3 mm  located in abdominal fold. No surrounding erythema/induration. No gastric contents draining from site.   Recommendation:  Keep area clean and dry at this time. Dress with gauze. Await placement of PEG tube and monitor previous site.   Orders placed.     02/28/2022

## 2022-02-28 NOTE — PLAN OF CARE
Problem: Skin Injury Risk Increased  Goal: Skin Health and Integrity  Intervention: Optimize Skin Protection  Flowsheets (Taken 2/27/2022 1811)  Pressure Reduction Techniques: weight shift assistance provided  Skin Protection: skin-to-device areas padded  Head of Bed (HOB) Positioning: HOB at 60-90 degrees     Problem: Aspiration (Enteral Nutrition)  Goal: Absence of Aspiration Signs and Symptoms  Intervention: Minimize Aspiration Risk  Flowsheets (Taken 2/27/2022 1811)  Head of Bed (HOB) Positioning: HOB at 60-90 degrees

## 2022-02-28 NOTE — NURSING
Patient awake, alert, resting comfortably. No signs of distress observed. bed low and locked. Call light in reach. Report given to oncoming nurse, MILY Salcedo. 12hour chart check complete. Will continue plan of care.

## 2022-02-28 NOTE — PROGRESS NOTES
Pharmacokinetic Initial Assessment: IV Vancomycin    Assessment/Plan:    Initiate intravenous vancomycin with loading dose of 1250 mg once followed by a maintenance dose of vancomycin 1000mg IV every 12 hours  Desired empiric serum trough concentration is 10 to 20 mcg/mL  Draw vancomycin trough level 60 min prior to fourth dose on 3/1/22 at approximately 08:00  Pharmacy will continue to follow and monitor vancomycin.      Please contact pharmacy at extension [phone] with any questions regarding this assessment.     Thank you for the consult,   Martine Oakley       Patient brief summary:  Emilia Melgoza is a 76 y.o. female initiated on antimicrobial therapy with IV Vancomycin for treatment of suspected bacteremia    Drug Allergies:   Review of patient's allergies indicates:   Allergen Reactions    Enoxaparin Other (See Comments) and Hives     Slurred speech per patient  Slurred speech per patient    Lisinopril Other (See Comments)     Cough    Penicillins Rash       Actual Body Weight:   72.9 kg    Renal Function:   Estimated Creatinine Clearance: 76.4 mL/min (based on SCr of 0.6 mg/dL).,     Dialysis Method (if applicable):  N/A    CBC (last 72 hours):  Recent Labs   Lab Result Units 02/26/22  0129 02/27/22  0353   WBC K/uL 6.16 7.51   Hemoglobin g/dL 11.6* 11.3*   Hematocrit % 35.5* 34.9*   Platelets K/uL 176 227   Gran % % 53.6 67.2   Lymph % % 26.8 19.8   Mono % % 11.9 10.8   Eosinophil % % 7.0 1.5   Basophil % % 0.5 0.4   Differential Method  Automated Automated       Metabolic Panel (last 72 hours):  Recent Labs   Lab Result Units 02/26/22  0129 02/27/22  0353 02/27/22  1153   Sodium mmol/L 133* 135*  --    Potassium mmol/L 4.4 4.0  --    Chloride mmol/L 98 103  --    CO2 mmol/L 24 22*  --    Glucose mg/dL 108 74  --    Glucose, UA   --   --  Negative   BUN mg/dL 16 14  --    Creatinine mg/dL 0.6 0.6  --    Albumin g/dL 3.3* 3.1*  --    Total Bilirubin mg/dL 0.4 0.7  --    Alkaline Phosphatase U/L 112 99  --     AST U/L 18 20  --    ALT U/L 13 16  --    Magnesium mg/dL 1.8  --   --        Drug levels (last 3 results):  No results for input(s): VANCOMYCINRA, VANCOMYCINPE, VANCOMYCINTR in the last 72 hours.    Microbiologic Results:  Microbiology Results (last 7 days)       Procedure Component Value Units Date/Time    Blood culture [[id number]] Collected: 02/27/22 1921    Order Status: Sent Specimen: Blood Updated: 02/27/22 1941    Blood culture [[id number]] Collected: 02/27/22 1830    Order Status: Sent Specimen: Blood Updated: 02/27/22 1841    Urine culture [[id number]] Collected: 02/27/22 1153    Order Status: No result Specimen: Urine Updated: 02/27/22 1237

## 2022-02-28 NOTE — CONSULTS
"  [facility] - Telemetry  Adult Nutrition  Consult Note    SUMMARY     Recommendations    1) Place NGT while waiting for G-tube placement  Initiate Isosource 1.5 @ 10 mL adv by 10 mL q4h until goal rate of 40 mL/hr is met, Hold for residuals > 400 mL.  mL H20 q6h, additional fluids to be determined by MD. Provides 1440 kcal, 65g P, 169g C, 1433 mL total H20    2) Pablo BID via NGT/G-Tube to assist in meeting EEN/EPN/Wound healing    3) Monitor TF tolerance.    Goals: 1) Pt to have means of nutrition initiated by RD follow up  Nutrition Goal Status: new  Communication of RD Recs:  (POC)    Assessment and Plan  Nutrition Problem  Inadequate Energy Intake    Related to (etiology):   Dislodged Gastrostomy Tube,   NPO    Signs and Symptoms (as evidenced by):   No current means of nutrition    Interventions/Recommendations (treatment strategy):  Enteral via NGT    Nutrition Diagnosis Status:   New      Reason for Assessment    Reason For Assessment: consult, new tube feeding  Diagnosis:  (Disloged Gastrostomy)  Relevant Medical History: HTN, R - Hemiparesis, Broca's Aphasia, VitD Def., CHF, CVA  General Information Comments: .    Nutrition Risk Screen    Nutrition Risk Screen: tube feeding or parenteral nutrition    Nutrition/Diet History    Spiritual, Cultural Beliefs, Church Practices, Values that Affect Care: no  Food Allergies: NKFA  Factors Affecting Nutritional Intake: NPO    Anthropometrics    Temp: 98.6 °F (37 °C)  Height: 5' 7" (170.2 cm)  Height (inches): 67 in  Weight Method: Bed Scale  Weight: 72.9 kg (160 lb 11.5 oz)  Weight (lb): 160.72 lb  Ideal Body Weight (IBW), Female: 135 lb  % Ideal Body Weight, Female (lb): 111.11 %  BMI (Calculated): 25.2  BMI Grade: 25 - 29.9 - overweight       Lab/Procedures/Meds    Pertinent Labs Reviewed: reviewed  CBC:  Recent Labs   Lab 02/28/22 0423   WBC 9.49   HGB 10.9*   HCT 34.1*        CMP:  Recent Labs   Lab 02/28/22 0423   CALCIUM 8.8   ALBUMIN 2.9* "   PROT 7.1      K 3.4*   CO2 22*      BUN 11   CREATININE 0.6   ALKPHOS 88   ALT 16   AST 26   BILITOT 0.7     Pertinent Medications Reviewed: reviewed    Scheduled Meds:   ceFEPime (MAXIPIME) IVPB  1 g Intravenous Q12H    mupirocin   Nasal BID    scopolamine  1 patch Transdermal Q3 Days    vancomycin (VANCOCIN) IVPB  1,000 mg Intravenous Q12H     Continuous Infusions:   dextrose 5 % and 0.9 % NaCl 100 mL/hr at 02/28/22 1031     PRN Meds:.acetaminophen, hydrALAZINE, sodium chloride 0.9%, Pharmacy to dose Vancomycin consult **AND** vancomycin - pharmacy to dose    Estimated/Assessed Needs    Weight Used For Calorie Calculations: 72.9 kg (160 lb 11.5 oz)  Energy Calorie Requirements (kcal): 1458  Energy Need Method: Kcal/kg (20 per bed bound, BMI 25)  Protein Requirements: 58 - 88g  Weight Used For Protein Calculations: 72.9 kg (160 lb 11.5 oz)     Estimated Fluid Requirement Method: RDA Method (or per MD)  RDA Method (mL): 1458  CHO Requirement: 182g      Nutrition Prescription Ordered    Current Diet Order: NPO  Current Nutrition Support Formula Ordered: Isosource 1.5  Current Nutrition Support Rate Ordered: 4 (ml)  Current Nutrition Support Frequency Ordered: cans / day    Evaluation of Received Nutrient/Fluid Intake    Enteral Calories (kcal): 1500  Enteral Protein (gm): 68  Other Calories (kcal): 408 (from dextrose)  % Kcal Needs: 103  % Protein Needs: 77 - 117  Energy Calories Required: not meeting needs  Protein Required: not meeting needs  Fluid Required: not meeting needs  Comments: New G-Tube will have to wait x 5 days to be placed d/t plavix  % Intake of Estimated Energy Needs: 0 - 25 %  % Meal Intake: NPO    Intake/Output - Last 3 Shifts       02/26 0700 02/27 0659 02/27 0700 02/28 0659 02/28 0700 03/01 0659    I.V. (mL/kg)       Total Intake(mL/kg)       Urine (mL/kg/hr) 400 (0.2) 400 (0.2)     Stool 0 0     Total Output 400 400     Net -400 -400            Urine Occurrence 1 x 2 x      Stool Occurrence 1 x 4 x         Nutrition Risk    Level of Risk/Frequency of Follow-up:  (1-2 x/week)       Monitor and Evaluation    Food and Nutrient Intake: enteral nutrition intake  Food and Nutrient Adminstration: enteral and parenteral nutrition administration  Anthropometric Measurements: weight, weight change, body mass index  Biochemical Data, Medical Tests and Procedures: gastrointestinal profile, electrolyte and renal panel, glucose/endocrine profile, inflammatory profile, lipid profile  Nutrition-Focused Physical Findings: overall appearance, extremities, muscles and bones, head and eyes, skin       Nutrition Follow-Up    RD Follow-up?: Yes

## 2022-03-01 LAB
ALBUMIN SERPL BCP-MCNC: 2.9 G/DL (ref 3.5–5.2)
ALP SERPL-CCNC: 86 U/L (ref 55–135)
ALT SERPL W/O P-5'-P-CCNC: 18 U/L (ref 10–44)
ANION GAP SERPL CALC-SCNC: 10 MMOL/L (ref 8–16)
AST SERPL-CCNC: 25 U/L (ref 10–40)
BACTERIA UR CULT: ABNORMAL
BILIRUB SERPL-MCNC: 0.7 MG/DL (ref 0.1–1)
BUN SERPL-MCNC: 9 MG/DL (ref 8–23)
CALCIUM SERPL-MCNC: 8.7 MG/DL (ref 8.7–10.5)
CHLORIDE SERPL-SCNC: 104 MMOL/L (ref 95–110)
CO2 SERPL-SCNC: 22 MMOL/L (ref 23–29)
CREAT SERPL-MCNC: 0.6 MG/DL (ref 0.5–1.4)
EST. GFR  (AFRICAN AMERICAN): >60 ML/MIN/1.73 M^2
EST. GFR  (NON AFRICAN AMERICAN): >60 ML/MIN/1.73 M^2
GLUCOSE SERPL-MCNC: 90 MG/DL (ref 70–110)
POTASSIUM SERPL-SCNC: 3.4 MMOL/L (ref 3.5–5.1)
PROT SERPL-MCNC: 7 G/DL (ref 6–8.4)
SODIUM SERPL-SCNC: 136 MMOL/L (ref 136–145)
VANCOMYCIN TROUGH SERPL-MCNC: 15.1 UG/ML (ref 10–22)

## 2022-03-01 PROCEDURE — 25000003 PHARM REV CODE 250: Performed by: PHYSICIAN ASSISTANT

## 2022-03-01 PROCEDURE — 80053 COMPREHEN METABOLIC PANEL: CPT | Performed by: NURSE PRACTITIONER

## 2022-03-01 PROCEDURE — 80202 ASSAY OF VANCOMYCIN: CPT | Performed by: HOSPITALIST

## 2022-03-01 PROCEDURE — 36415 COLL VENOUS BLD VENIPUNCTURE: CPT | Performed by: HOSPITALIST

## 2022-03-01 PROCEDURE — 21400001 HC TELEMETRY ROOM

## 2022-03-01 PROCEDURE — 36415 COLL VENOUS BLD VENIPUNCTURE: CPT | Performed by: NURSE PRACTITIONER

## 2022-03-01 PROCEDURE — 63600175 PHARM REV CODE 636 W HCPCS: Performed by: PHYSICIAN ASSISTANT

## 2022-03-01 RX ORDER — VANCOMYCIN HCL IN 5 % DEXTROSE 1G/250ML
1000 PLASTIC BAG, INJECTION (ML) INTRAVENOUS
Status: DISCONTINUED | OUTPATIENT
Start: 2022-03-03 | End: 2022-03-04 | Stop reason: HOSPADM

## 2022-03-01 RX ADMIN — VANCOMYCIN HYDROCHLORIDE 1000 MG: 1 INJECTION, POWDER, LYOPHILIZED, FOR SOLUTION INTRAVENOUS at 12:03

## 2022-03-01 RX ADMIN — MUPIROCIN: 20 OINTMENT TOPICAL at 09:03

## 2022-03-01 RX ADMIN — MUPIROCIN: 20 OINTMENT TOPICAL at 10:03

## 2022-03-01 RX ADMIN — CEFEPIME HYDROCHLORIDE 1 G: 1 INJECTION, SOLUTION INTRAVENOUS at 09:03

## 2022-03-01 RX ADMIN — CEFEPIME HYDROCHLORIDE 1 G: 1 INJECTION, SOLUTION INTRAVENOUS at 10:03

## 2022-03-01 NOTE — PROGRESS NOTES
"[facility] Medicine  Progress Note    Patient Name: Emilia Melgoza  MRN: [id number]  Patient Class: IP- Inpatient   Admission Date: 2/25/2022  Length of Stay: 2 days  Attending Physician: Darion Simpson MD  Primary Care Provider: Lolita Portillo MD        Subjective:     Principal Problem:Dislodged gastrostomy tube        HPI:  76 y.o. female, with a PMHx of CVA, Alzheimer's dementia, HTN, who presents to the ED from private residence with accidental PEG tube displacement occurring PTA today. Daughter reports patient's PEG tube was accidentally displaced today after the patient pulled the tube out. She is unsure how long the tube has been displaced. Patient last had her PEG tube replaced with a 16 Fr tube 1 week ago, on 2/16/22. She has pulled out her tube numerous times in the past. No other exacerbating or alleviating factors. Patient is currently at her baseline mental status.      Overview/Hospital Course:  Placed in observation for dislodgement of PEG. GI attempted bedside replacement of PEG tube however the insertion site had scarring/gransulation tissue. IV hydralazine prn for elevated blood pressure. Hold ASA/Plavix (last dose am 2/25). Nonverbal, non interactive. Contracted, bed bound. PEG  replacement today today 2/28/22. Wound care consult for open old PEG site. IVF until able to use PEG. Nutrition consult (home Jevity 100 cc/hr? X 12 hrs and water flush 100 cc per hr).     Fever 100.8 on 2/27 down with tylenol. Low grade this am 2/28. CT head no acute process (ordered as Daughter felt upper lips was dropping to one side ). No leukocytosis and VS stable. CXR "Suspected mild interstitial and alveolar infiltrates superimposed on diminished depth of inspiration". Continue vanc/cefepine . Urine and blood culture pending. Will treat for suspect aspiration given increase of oral secretion and suctioning per both Daughters. Scopolamine patch to help with oral " secretions.        Interval History:  No new issues     Review of Systems   Unable to perform ROS: Patient nonverbal     Review of Systems   Unable to perform ROS: Patient nonverbal   Objective:     Vital Signs (Most Recent):  Temp: 98.1 °F (36.7 °C) (03/01/22 0828)  Pulse: 79 (03/01/22 0828)  Resp: 18 (03/01/22 0828)  BP: (!) 150/65 (03/01/22 0828)  SpO2: 100 % (03/01/22 0828)   Vital Signs (24h Range):  Temp:  [98.1 °F (36.7 °C)-99.8 °F (37.7 °C)] 98.1 °F (36.7 °C)  Pulse:  [] 79  Resp:  [18-20] 18  SpO2:  [98 %-100 %] 100 %  BP: (150-179)/(65-84) 150/65     Weight: 72.9 kg (160 lb 11.5 oz)  Body mass index is 25.17 kg/m².    Intake/Output Summary (Last 24 hours) at 3/1/2022 1008  Last data filed at 3/1/2022 0521  Gross per 24 hour   Intake 596.2 ml   Output --   Net 596.2 ml      Physical Exam  Constitutional:       Appearance: She is ill-appearing.      Comments: Eyes open. Non verbal, non interactive. Right arm contracted. Bedbound.   Less oral secretion this am   Pulmonary:      Effort: Pulmonary effort is normal.      Breath sounds: Normal breath sounds.   Abdominal:      Palpations: Abdomen is soft.      Comments: LUQ old peg incision site with granulation tissue. No signs of infection   Skin:     General: Skin is warm and dry.      Comments: BLE in EHOB       Significant Labs: All pertinent labs within the past 24 hours have been reviewed.  BMP:   Recent Labs   Lab 03/01/22  0352   GLU 90      K 3.4*      CO2 22*   BUN 9   CREATININE 0.6   CALCIUM 8.7     CBC:   Recent Labs   Lab 02/28/22  0423   WBC 9.49   HGB 10.9*   HCT 34.1*          Significant Imaging:       Assessment/Plan:      * Dislodged gastrostomy tube  Patient was just here a week ago in the ED with dislodged peg tube. Daughter brought her back again tonight. ED unsuccessful with replacement  GI attempted bedside replacement of PEG tube however the insertion site had scarring/gransulation tissue.  Wound care consult for  "old PEG site   Last ASA/plavix 2/25 am  PEG placement 3/2/22 with EGD by GI    Hypokalemia  IV replacement until PEG placement      Goals of care, counseling/discussion  Advance Care Planning   Rivas [place] wished  Not to discuss CODE status at this time. Remains full code. Daughter Rena will discuss with family CODE status.        Hyponatremia  Resolved with IV.   Continue IVF until PEG Placement    Fever; suspect aspiration   Fever 100.8 on 2/27 down with tylenol. Low grade this am 2/28.  No leukocytosis and VS stable.   CXR "Suspected mild interstitial and alveolar infiltrates superimposed on diminished depth of inspiration".   Suspect aspiration given increase of oral secretion and suctioning per both Daughters Rena/Julissa. Scopolamine patch to help with oral secretions.  Continue vanc/cefepine   Follow up urine and blood culture.     Broca's aphasia  At baseline  Repeat CT no intracranial abnormalities      Hemiparesis affecting right side as late effect of cerebrovascular accident  Bedbound and cared by Family   Baseline      Essential hypertension  Resume meds once peg tube replaced   Hydralazine prn      Moderate malnutrition- stable.     VTE Risk Mitigation (From admission, onward)         Ordered     Reason for No Pharmacological VTE Prophylaxis  Once        Question:  Reasons:  Answer:  Physician Provided (leave comment)  Comment:  allergies to heparin and lovenox    02/26/22 0329     IP VTE HIGH RISK PATIENT  Once         02/26/22 0329     Place sequential compression device  Until discontinued         02/26/22 0329                Discharge Planning   ANA MARÍA:      Code Status: Full Code   Is the patient medically ready for discharge?:     Reason for patient still in hospital (select all that apply):   Discharge Plan A: Home with family        EGD and PEG tube placement on 3/2.             Darion Babin MD  Department of Hospital Medicine   [facility]    "

## 2022-03-01 NOTE — PLAN OF CARE
Problem: Adult Inpatient Plan of Care  Goal: Plan of Care Review  Outcome: Ongoing, Progressing  Goal: Absence of Hospital-Acquired Illness or Injury  Outcome: Ongoing, Progressing     Problem: Skin Injury Risk Increased  Goal: Skin Health and Integrity  Outcome: Ongoing, Progressing     Problem: Aspiration (Enteral Nutrition)  Goal: Absence of Aspiration Signs and Symptoms  Outcome: Ongoing, Progressing     Problem: Device-Related Complication Risk (Enteral Nutrition)  Goal: Safe, Effective Therapy Delivery  Outcome: Ongoing, Progressing     Problem: Infection  Goal: Absence of Infection Signs and Symptoms  Outcome: Ongoing, Progressing

## 2022-03-01 NOTE — SUBJECTIVE & OBJECTIVE
Interval History:  No new issues     Review of Systems   Unable to perform ROS: Patient nonverbal     Review of Systems   Unable to perform ROS: Patient nonverbal   Objective:     Vital Signs (Most Recent):  Temp: 98.1 °F (36.7 °C) (03/01/22 0828)  Pulse: 79 (03/01/22 0828)  Resp: 18 (03/01/22 0828)  BP: (!) 150/65 (03/01/22 0828)  SpO2: 100 % (03/01/22 0828)   Vital Signs (24h Range):  Temp:  [98.1 °F (36.7 °C)-99.8 °F (37.7 °C)] 98.1 °F (36.7 °C)  Pulse:  [] 79  Resp:  [18-20] 18  SpO2:  [98 %-100 %] 100 %  BP: (150-179)/(65-84) 150/65     Weight: 72.9 kg (160 lb 11.5 oz)  Body mass index is 25.17 kg/m².    Intake/Output Summary (Last 24 hours) at 3/1/2022 1008  Last data filed at 3/1/2022 0521  Gross per 24 hour   Intake 596.2 ml   Output --   Net 596.2 ml      Physical Exam  Constitutional:       Appearance: She is ill-appearing.      Comments: Eyes open. Non verbal, non interactive. Right arm contracted. Bedbound.   Less oral secretion this am   Pulmonary:      Effort: Pulmonary effort is normal.      Breath sounds: Normal breath sounds.   Abdominal:      Palpations: Abdomen is soft.      Comments: LUQ old peg incision site with granulation tissue. No signs of infection   Skin:     General: Skin is warm and dry.      Comments: BLE in EHOB       Significant Labs: All pertinent labs within the past 24 hours have been reviewed.  BMP:   Recent Labs   Lab 03/01/22  0352   GLU 90      K 3.4*      CO2 22*   BUN 9   CREATININE 0.6   CALCIUM 8.7     CBC:   Recent Labs   Lab 02/28/22  0423   WBC 9.49   HGB 10.9*   HCT 34.1*          Significant Imaging:

## 2022-03-01 NOTE — ASSESSMENT & PLAN NOTE
Patient was just here a week ago in the ED with dislodged peg tube. Daughter brought her back again tonight. ED unsuccessful with replacement  GI attempted bedside replacement of PEG tube however the insertion site had scarring/gransulation tissue.  Wound care consult for old PEG site   Last ASA/plavix 2/25 am  PEG placement 3/2/22 with EGD by GI

## 2022-03-01 NOTE — NURSING
Report received from off going nurse, MILY Mcginnis. Patient AA. No signs of distress noted. Call light in reach. Bed low and locked. Will continue plan of care.

## 2022-03-01 NOTE — TREATMENT PLAN
Gastroenterology Treatment Plan:    Plan for EGD with PEG tube placement tomorrow.  Turn tube feeds off at midnight.  Antibiotics for UTI per primary team.    Diaz Damon MD

## 2022-03-01 NOTE — PLAN OF CARE
Problem: Skin Injury Risk Increased  Goal: Skin Health and Integrity  Intervention: Optimize Skin Protection  Flowsheets (Taken 3/1/2022 1745)  Head of Bed (HOB) Positioning: HOB at 45 degrees     Problem: Aspiration (Enteral Nutrition)  Goal: Absence of Aspiration Signs and Symptoms  Intervention: Minimize Aspiration Risk  Flowsheets (Taken 3/1/2022 1745)  Oral Care: suction provided

## 2022-03-01 NOTE — PROGRESS NOTES
Pharmacokinetic Assessment Follow Up: IV Vancomycin    Vancomycin serum concentration assessment(s):    The trough level was drawn correctly and can be used to guide therapy at this time. The measurement is within the desired definitive target range of 10 to 20 mcg/mL.    Vancomycin Regimen Plan:    Continue regimen to Vancomycin 1000 mg IV every 12 hours with next serum trough concentration measured at 1130 prior to 4th dose on 3-3-2022    Drug levels (last 3 results):  Recent Labs   Lab Result Units 03/01/22  1052   Vancomycin-Trough ug/mL 15.1       Pharmacy will continue to follow and monitor vancomycin.    Please contact pharmacy at extension [phone] for questions regarding this assessment.    Thank you for the consult,   Genie Vila       Patient brief summary:  Emilia Melgoza is a 76 y.o. female initiated on antimicrobial therapy with IV Vancomycin for treatment of bacteremia    The patient's current regimen is Vancomycin 1gm ivob q12h.    Drug Allergies:   Review of patient's allergies indicates:   Allergen Reactions    Enoxaparin Other (See Comments) and Hives     Slurred speech per patient  Slurred speech per patient    Lisinopril Other (See Comments)     Cough    Penicillins Rash       Actual Body Weight:   72.9 kg    Renal Function:   Estimated Creatinine Clearance: 76.4 mL/min (based on SCr of 0.6 mg/dL).,     Dialysis Method (if applicable):  N/A    CBC (last 72 hours):  Recent Labs   Lab Result Units 02/27/22  0353 02/28/22  0423   WBC K/uL 7.51 9.49   Hemoglobin g/dL 11.3* 10.9*   Hematocrit % 34.9* 34.1*   Platelets K/uL 227 212   Gran % % 67.2 73.3*   Lymph % % 19.8 14.2*   Mono % % 10.8 10.5   Eosinophil % % 1.5 1.5   Basophil % % 0.4 0.3   Differential Method  Automated Automated       Metabolic Panel (last 72 hours):  Recent Labs   Lab Result Units 02/27/22  0353 02/27/22  1153 02/28/22  0423 03/01/22  0352   Sodium mmol/L 135*  --  137 136   Potassium mmol/L 4.0  --  3.4* 3.4*    Chloride mmol/L 103  --  106 104   CO2 mmol/L 22*  --  22* 22*   Glucose mg/dL 74  --  129* 90   Glucose, UA   --  Negative  --   --    BUN mg/dL 14  --  11 9   Creatinine mg/dL 0.6  --  0.6 0.6   Albumin g/dL 3.1*  --  2.9* 2.9*   Total Bilirubin mg/dL 0.7  --  0.7 0.7   Alkaline Phosphatase U/L 99  --  88 86   AST U/L 20  --  26 25   ALT U/L 16  --  16 18       Vancomycin Administrations:  vancomycin given in the last 96 hours                     vancomycin in dextrose 5 % 1 gram/250 mL IVPB 1,000 mg (mg) 1,000 mg New Bag 03/01/22 1222      Restarted  0042     1,000 mg New Bag  0021      Restarted 02/28/22 1207      Restarted  1151     1,000 mg New Bag  1147    vancomycin 1.25 g in dextrose 5% 250 mL IVPB (ready to mix) (mg) 1,250 mg New Bag 02/27/22 2112                    Microbiologic Results:  Microbiology Results (last 7 days)       Procedure Component Value Units Date/Time    Urine culture [[id number]]  (Abnormal)  (Susceptibility) Collected: 02/27/22 1153    Order Status: Completed Specimen: Urine Updated: 03/01/22 0721     Urine Culture, Routine PROTEUS VULGARIS  >100,000 cfu/ml      Narrative:      Specimen Source->Urine    Blood culture [[id number]] Collected: 02/27/22 1921    Order Status: Completed Specimen: Blood Updated: 02/28/22 2103     Blood Culture, Routine No Growth to date      No Growth to date    Blood culture [[id number]] Collected: 02/27/22 1830    Order Status: Completed Specimen: Blood Updated: 02/28/22 2103     Blood Culture, Routine No Growth to date      No Growth to date

## 2022-03-01 NOTE — PLAN OF CARE
Problem: Adult Inpatient Plan of Care  Goal: Plan of Care Review  Outcome: Ongoing, Progressing  Goal: Patient-Specific Goal (Individualized)  Outcome: Ongoing, Progressing  Goal: Absence of Hospital-Acquired Illness or Injury  Outcome: Ongoing, Progressing  Goal: Optimal Comfort and Wellbeing  Outcome: Ongoing, Progressing  Goal: Readiness for Transition of Care  Outcome: Ongoing, Progressing     Problem: Skin Injury Risk Increased  Goal: Skin Health and Integrity  Outcome: Ongoing, Progressing     Problem: Fall Injury Risk  Goal: Absence of Fall and Fall-Related Injury  Outcome: Ongoing, Progressing     Problem: Aspiration (Enteral Nutrition)  Goal: Absence of Aspiration Signs and Symptoms  Outcome: Ongoing, Progressing     Problem: Device-Related Complication Risk (Enteral Nutrition)  Goal: Safe, Effective Therapy Delivery  Outcome: Ongoing, Progressing     Problem: Feeding Intolerance (Enteral Nutrition)  Goal: Feeding Tolerance  Outcome: Ongoing, Progressing

## 2022-03-02 ENCOUNTER — ANESTHESIA (OUTPATIENT)
Dept: ENDOSCOPY | Facility: HOSPITAL | Age: 77
DRG: 920 | End: 2022-03-02
Payer: MEDICARE

## 2022-03-02 ENCOUNTER — ANESTHESIA EVENT (OUTPATIENT)
Dept: ENDOSCOPY | Facility: HOSPITAL | Age: 77
DRG: 920 | End: 2022-03-02
Payer: MEDICARE

## 2022-03-02 LAB — POCT GLUCOSE: 103 MG/DL (ref 70–110)

## 2022-03-02 PROCEDURE — C1769 GUIDE WIRE: HCPCS | Performed by: INTERNAL MEDICINE

## 2022-03-02 PROCEDURE — 37000009 HC ANESTHESIA EA ADD 15 MINS: Performed by: INTERNAL MEDICINE

## 2022-03-02 PROCEDURE — 25000003 PHARM REV CODE 250: Performed by: STUDENT IN AN ORGANIZED HEALTH CARE EDUCATION/TRAINING PROGRAM

## 2022-03-02 PROCEDURE — 43246 EGD PLACE GASTROSTOMY TUBE: CPT | Performed by: INTERNAL MEDICINE

## 2022-03-02 PROCEDURE — 25000003 PHARM REV CODE 250: Performed by: NURSE PRACTITIONER

## 2022-03-02 PROCEDURE — 43246 EGD PLACE GASTROSTOMY TUBE: CPT | Mod: ,,, | Performed by: INTERNAL MEDICINE

## 2022-03-02 PROCEDURE — 21400001 HC TELEMETRY ROOM

## 2022-03-02 PROCEDURE — 63600175 PHARM REV CODE 636 W HCPCS: Performed by: NURSE PRACTITIONER

## 2022-03-02 PROCEDURE — 27201018 HC KIT, PEG (ANY): Performed by: INTERNAL MEDICINE

## 2022-03-02 PROCEDURE — D9220A PRA ANESTHESIA: ICD-10-PCS | Mod: ANES,,, | Performed by: ANESTHESIOLOGY

## 2022-03-02 PROCEDURE — D9220A PRA ANESTHESIA: Mod: ANES,,, | Performed by: ANESTHESIOLOGY

## 2022-03-02 PROCEDURE — 63600175 PHARM REV CODE 636 W HCPCS

## 2022-03-02 PROCEDURE — D9220A PRA ANESTHESIA: Mod: CRNA,,, | Performed by: STUDENT IN AN ORGANIZED HEALTH CARE EDUCATION/TRAINING PROGRAM

## 2022-03-02 PROCEDURE — D9220A PRA ANESTHESIA: ICD-10-PCS | Mod: CRNA,,, | Performed by: STUDENT IN AN ORGANIZED HEALTH CARE EDUCATION/TRAINING PROGRAM

## 2022-03-02 PROCEDURE — 37000008 HC ANESTHESIA 1ST 15 MINUTES: Performed by: INTERNAL MEDICINE

## 2022-03-02 PROCEDURE — 63600175 PHARM REV CODE 636 W HCPCS: Performed by: PHYSICIAN ASSISTANT

## 2022-03-02 PROCEDURE — 63600175 PHARM REV CODE 636 W HCPCS: Performed by: STUDENT IN AN ORGANIZED HEALTH CARE EDUCATION/TRAINING PROGRAM

## 2022-03-02 PROCEDURE — 43246 PR EGD, FLEX, W/PLCMT, GASTROSTOMY TUBE: ICD-10-PCS | Mod: ,,, | Performed by: INTERNAL MEDICINE

## 2022-03-02 RX ORDER — LIDOCAINE HYDROCHLORIDE 20 MG/ML
INJECTION, SOLUTION EPIDURAL; INFILTRATION; INTRACAUDAL; PERINEURAL
Status: DISPENSED
Start: 2022-03-02 | End: 2022-03-03

## 2022-03-02 RX ORDER — PROPOFOL 10 MG/ML
VIAL (ML) INTRAVENOUS
Status: DISCONTINUED | OUTPATIENT
Start: 2022-03-02 | End: 2022-03-02

## 2022-03-02 RX ORDER — PROPOFOL 10 MG/ML
INJECTION, EMULSION INTRAVENOUS
Status: DISPENSED
Start: 2022-03-02 | End: 2022-03-03

## 2022-03-02 RX ORDER — LIDOCAINE HYDROCHLORIDE 20 MG/ML
INJECTION INTRAVENOUS
Status: DISCONTINUED | OUTPATIENT
Start: 2022-03-02 | End: 2022-03-03 | Stop reason: HOSPADM

## 2022-03-02 RX ORDER — HYDRALAZINE HYDROCHLORIDE 20 MG/ML
INJECTION INTRAMUSCULAR; INTRAVENOUS
Status: COMPLETED
Start: 2022-03-02 | End: 2022-03-02

## 2022-03-02 RX ADMIN — HYDRALAZINE HYDROCHLORIDE 10 MG: 20 INJECTION, SOLUTION INTRAMUSCULAR; INTRAVENOUS at 02:03

## 2022-03-02 RX ADMIN — SCOPALAMINE 1 PATCH: 1 PATCH, EXTENDED RELEASE TRANSDERMAL at 04:03

## 2022-03-02 RX ADMIN — LIDOCAINE HYDROCHLORIDE 100 MG: 20 INJECTION, SOLUTION INTRAVENOUS at 12:03

## 2022-03-02 RX ADMIN — CEFEPIME HYDROCHLORIDE 1 G: 1 INJECTION, SOLUTION INTRAVENOUS at 07:03

## 2022-03-02 RX ADMIN — PROPOFOL 20 MG: 10 INJECTION, EMULSION INTRAVENOUS at 12:03

## 2022-03-02 RX ADMIN — HYDRALAZINE HYDROCHLORIDE 10 MG: 20 INJECTION, SOLUTION INTRAMUSCULAR; INTRAVENOUS at 07:03

## 2022-03-02 RX ADMIN — PROPOFOL 70 MG: 10 INJECTION, EMULSION INTRAVENOUS at 12:03

## 2022-03-02 RX ADMIN — DEXTROSE AND SODIUM CHLORIDE: 5; .9 INJECTION, SOLUTION INTRAVENOUS at 04:03

## 2022-03-02 RX ADMIN — DEXTROSE AND SODIUM CHLORIDE: 5; .9 INJECTION, SOLUTION INTRAVENOUS at 01:03

## 2022-03-02 RX ADMIN — CEFEPIME HYDROCHLORIDE 1 G: 1 INJECTION, SOLUTION INTRAVENOUS at 09:03

## 2022-03-02 RX ADMIN — SODIUM CHLORIDE: 0.9 INJECTION, SOLUTION INTRAVENOUS at 12:03

## 2022-03-02 RX ADMIN — PROPOFOL 30 MG: 10 INJECTION, EMULSION INTRAVENOUS at 12:03

## 2022-03-02 RX ADMIN — ACETAMINOPHEN 650 MG: 650 SUPPOSITORY RECTAL at 08:03

## 2022-03-02 NOTE — TRANSFER OF CARE
"Anesthesia Transfer of Care Note    Patient: Emilia Melgoza    Procedure(s) Performed: Procedure(s) (LRB):  EGD (ESOPHAGOGASTRODUODENOSCOPY) (N/A)    Patient location: GI    Anesthesia Type: general    Transport from OR: Transported from OR on room air with adequate spontaneous ventilation    Post pain: adequate analgesia    Post assessment: no apparent anesthetic complications and tolerated procedure well    Post vital signs: stable    Level of consciousness: responds to stimulation    Nausea/Vomiting: no nausea/vomiting    Complications: none    Transfer of care protocol was followed      Last vitals:   Visit Vitals  BP (!) 115/59   Pulse 92   Temp 36.3 °C (97.3 °F) (Oral)   Resp 18   Ht 5' 7" (1.702 m)   Wt 70.2 kg (154 lb 12.2 oz)   SpO2 96%   Breastfeeding No   BMI 24.24 kg/m²     "

## 2022-03-02 NOTE — PLAN OF CARE
Repositioned frequently. Hutchison catheter aided in keeping patient clean and dry. Patients daughter remained at bedside throughout the night. Stable hours  Problem: Skin Injury Risk Increased  Goal: Skin Health and Integrity  Outcome: Ongoing, Progressing  Intervention: Optimize Skin Protection  Flowsheets (Taken 3/2/2022 6917)  Pressure Reduction Techniques:   frequent weight shift encouraged   weight shift assistance provided  Pressure Reduction Devices:   foam padding utilized   heel offloading device utilized   positioning supports utilized   pressure-redistributing mattress utilized  Skin Protection:   adhesive use limited   incontinence pads utilized   pouching devices used   skin sealant/moisture barrier applied   tubing/devices free from skin contact  Head of Bed (HOB) Positioning: HOB at 20-30 degrees

## 2022-03-02 NOTE — OR NURSING
1315 pt had episode of airway obstruction with copious oral and nasal secretions, high pitched noises from airway and sats falling to 70's, ambu at bedside, 02 non rebreather placed on with 100% , airway suctioned and Dr Sutton at bedside  1330 pt breathing with no obstruction. Pt relaxing and restful

## 2022-03-02 NOTE — ANESTHESIA PREPROCEDURE EVALUATION
03/02/2022    Pre-operative evaluation for Procedure(s) (LRB):  EGD (ESOPHAGOGASTRODUODENOSCOPY) (N/A)    Emilia Melgoza is a 76 y.o. female     Patient Active Problem List   Diagnosis    DDD (degenerative disc disease), lumbar    Overactive bladder    Vitamin D deficiency disease    Chronic diastolic congestive heart failure    Cerebral microvascular disease    Aphasia, late effect of cerebrovascular disease    Pre-diabetes    Essential hypertension    Meningioma    Constipation    Gait instability    Hemiparesis affecting right side as late effect of cerebrovascular accident    Chronic hypokalemia    Anxiety    MCI (mild cognitive impairment)    Pleurisy    Broca's aphasia    Dysarthria    Cerebral infarction    Fatigue    Upper respiratory tract infection    Right shoulder pain    Lethargy    Tachycardia    Mobility impaired    Vitamin D deficiency    Asymptomatic menopausal state     Acute encephalopathy    Acute respiratory failure with hypoxia    Acute arterial ischemic stroke, multifocal, posterior circulation    Normocytic anemia    Fever; suspect aspiration     Functional quadriplegia    Complication of Hutchison catheter    Aortic atherosclerosis    Senile purpura    Atherosclerosis of both carotid arteries    PEG (percutaneous endoscopic gastrostomy) status    Dislodged gastrostomy tube    Hyponatremia    Goals of care, counseling/discussion    Hypokalemia       Review of patient's allergies indicates:   Allergen Reactions    Enoxaparin Other (See Comments) and Hives     Slurred speech per patient  Slurred speech per patient    Lisinopril Other (See Comments)     Cough    Penicillins Rash       No current facility-administered medications on file prior to encounter.     Current Outpatient Medications on File Prior to Encounter   Medication Sig Dispense Refill    acetaminophen (TYLENOL) 650 mg/20.3 mL Soln 20.3 mLs (650 mg total) by Per G Tube route every 6  (six) hours as needed for Temperature greater than (temp >101). 5 mL 0    amLODIPine (NORVASC) 5 MG tablet Take 5 mg by mouth 2 (two) times daily.      ascorbic acid, vitamin C, (VITAMIN C) 1000 MG tablet Take 1,000 mg by mouth once daily.      atorvastatin (LIPITOR) 40 MG tablet 1 tablet (40 mg total) by Per G Tube route every evening. 90 tablet 3    cetirizine (ZYRTEC) 10 MG tablet Take 10 mg by mouth once daily.      clopidogreL (PLAVIX) 75 mg tablet 1 tablet (75 mg total) by Per G Tube route once daily. 90 tablet 3    melatonin 10 mg Cap Take by mouth.      oxybutynin (DITROPAN-XL) 5 MG TR24 Take 1 tablet (5 mg total) by mouth once daily. 90 tablet 3    vitamin D (VITAMIN D3) 1000 units Tab Take 1,000 Units by mouth once daily.      aspirin 81 MG Chew Take 1 tablet (81 mg total) by mouth once daily. 90 tablet 3    azithromycin (ZITHROMAX Z-TALON) 250 MG tablet Take 2 pills day 1, then 1 pill day 2-5. Administer via PEG. 6 tablet 0    hydrocodone-acetaminophen (HYCET) solution 7.5-325 mg/15mL 15 mLs by Per G Tube route every 4 (four) hours as needed. 30 mL 0       Past Surgical History:   Procedure Laterality Date    bladder mesh      BREAST BIOPSY Left     CATARACT EXTRACTION W/  INTRAOCULAR LENS IMPLANT Right 10/13/2014    Dr Crystal    CATARACT EXTRACTION W/  INTRAOCULAR LENS IMPLANT Left 11/10/2014    Dr Crystal    HEMORRHOID SURGERY      HYSTERECTOMY      INCONTINENCE SURGERY      OOPHORECTOMY      Right Rotator Cuff Repair      TRACHEOTOMY N/A 1/2/2020    Procedure: TRACHEOTOMY;  Surgeon: Marco Shine III, MD;  Location: [facility];  Service: General;  Laterality: N/A;     VITALS  Vitals:    03/02/22 0526   BP: (!) 145/66   Pulse: 85   Resp: 18   Temp: 37.6 °C (99.7 °F)         CBC:   Recent Labs     02/28/22 0423   WBC 9.49   RBC 4.02   HGB 10.9*   HCT 34.1*      MCV 85   MCH 27.1   MCHC 32.0       CMP:   Recent Labs     02/28/22 0423 03/01/22  0352    136   K 3.4* 3.4*   CL  106 104   CO2 22* 22*   BUN 11 9   CREATININE 0.6 0.6   * 90   CALCIUM 8.8 8.7   ALBUMIN 2.9* 2.9*   PROT 7.1 7.0   ALKPHOS 88 86   ALT 16 18   AST 26 25   BILITOT 0.7 0.7           Pre-op Assessment    I have reviewed the Patient Summary Reports.       I have reviewed the Medications.     Review of Systems  Anesthesia Hx:  No previous Anesthesia  History of prior surgery of interest to airway management or planning: Denies Family Hx of Anesthesia complications.   Denies Personal Hx of Anesthesia complications.   Social:  Non-Smoker    Hematology/Oncology:  Hematology Normal   Oncology Normal   Hematology Comments:      EENT/Dental:EENT/Dental Normal   Cardiovascular:   Exercise tolerance: poor Hypertension CHF hyperlipidemia ECG has been reviewed.    Pulmonary:  Pulmonary Normal    Renal/:  Renal/ Normal     Hepatic/GI:  Hepatic/GI Normal Dysphagia for PEG   Musculoskeletal:   Arthritis     Neurological:   CVA, residual symptoms    Endocrine:  Endocrine Normal    Dermatological:  Skin Normal    Psych:  Psychiatric Normal           Physical Exam  General: Well nourished, Cooperative and Alert    Airway:  Mallampati: II   Mouth Opening: Normal    Dental:  Intact    Chest/Lungs:  Normal Respiratory Rate, Clear to auscultation    Heart:  Rate: Normal  Rhythm: Regular Rhythm    Abdomen:  Normal        Anesthesia Plan  Type of Anesthesia, risks & benefits discussed:    Anesthesia Type: MAC, Gen Natural Airway  Intra-op Monitoring Plan: Standard ASA Monitors  ASA Score: 3    Ready For Surgery From Anesthesia Perspective.     .

## 2022-03-02 NOTE — NURSING
Patient awake, alert resting comfortably. No signs of distress observed. bed low and locked. Call light in reach. Report given to oncoming nurse, MILY Navarro. 12hour chart check complete. Will continue plan of care.

## 2022-03-02 NOTE — PLAN OF CARE
03/02/22 1614   Medicare Message   Important Message from Medicare regarding Discharge Appeal Rights Given to patient/caregiver;Explained to patient/caregiver;Other (comments)  (Explained IMM to the daughter. Daughter expressed understanding. Copy mailed to address on file. [phone])   Date IMM was signed 03/02/22   Time IMM was signed [id number]

## 2022-03-02 NOTE — OR NURSING
Pt awake, opens eyes and follows withjackelyn klein, attempts to speak, purposeful left arm and hand, occ cough, clears airway with cough, BP acceptable at 166/74 after hydralazine per Dr Sutton, abd dressing dry and intact with abd binder on, report called to maria alejandra rangel, daughter at bedside, pt recognizes daughter and smiles. Pt returned to room per stretcher.

## 2022-03-02 NOTE — SUBJECTIVE & OBJECTIVE
Interval History: No new issues     Review of Systems   Unable to perform ROS: Patient nonverbal   Objective:     Vital Signs (Most Recent):  Temp: 98.9 °F (37.2 °C) (03/02/22 0810)  Pulse: 79 (03/02/22 0810)  Resp: 18 (03/02/22 0810)  BP: 138/70 (03/02/22 0810)  SpO2: (!) 92 % (03/02/22 0810)   Vital Signs (24h Range):  Temp:  [98.3 °F (36.8 °C)-99.7 °F (37.6 °C)] 98.9 °F (37.2 °C)  Pulse:  [68-85] 79  Resp:  [18] 18  SpO2:  [92 %-100 %] 92 %  BP: (138-164)/(66-72) 138/70     Weight: 70.2 kg (154 lb 12.2 oz)  Body mass index is 24.24 kg/m².    Intake/Output Summary (Last 24 hours) at 3/2/2022 0958  Last data filed at 3/2/2022 0543  Gross per 24 hour   Intake --   Output 1050 ml   Net -1050 ml      Physical Exam  Constitutional:       Appearance: She is ill-appearing.      Comments: Eyes open. Non verbal, non interactive. Right arm contracted. Bedbound.   Less oral secretion this am   Pulmonary:      Effort: Pulmonary effort is normal.      Breath sounds: Normal breath sounds.   Abdominal:      Palpations: Abdomen is soft.      Comments: LUQ old peg incision site with granulation tissue. No signs of infection   Skin:     General: Skin is warm and dry.      Comments: BLE in EHOB       Significant Labs: All pertinent labs within the past 24 hours have been reviewed.  BMP:   Recent Labs   Lab 03/01/22  0352   GLU 90      K 3.4*      CO2 22*   BUN 9   CREATININE 0.6   CALCIUM 8.7     CBC: No results for input(s): WBC, HGB, HCT, PLT in the last 48 hours.    Significant Imaging:

## 2022-03-02 NOTE — OR NURSING
MD unable to go through old PEG Site; new site to LUQobtained and marked. Cut made using sterile technique

## 2022-03-02 NOTE — NURSING
Received bedside handoff from [place]. Discussed plan of care, pain management and safety measures with patient and her daughter. The patient is unable to verbalize but appears to understand some of the discussion. Patients daughter would repeat what I had discussed . Patient appears to be resting comfortably at present time.

## 2022-03-02 NOTE — PROGRESS NOTES
Vancomycin therapy reviewed.   No changes in renal function or dose. Awaiting vanc trough due 3/3 @ 11:30

## 2022-03-02 NOTE — PROGRESS NOTES
"[facility] Medicine  Progress Note    Patient Name: Emilia Melgoza  MRN: [id number]  Patient Class: IP- Inpatient   Admission Date: 2/25/2022  Length of Stay: 3 days  Attending Physician: Darion Simpson MD  Primary Care Provider: Lolita Portillo MD        Subjective:     Principal Problem:Dislodged gastrostomy tube        HPI:  76 y.o. female, with a PMHx of CVA, Alzheimer's dementia, HTN, who presents to the ED from private residence with accidental PEG tube displacement occurring PTA today. Daughter reports patient's PEG tube was accidentally displaced today after the patient pulled the tube out. She is unsure how long the tube has been displaced. Patient last had her PEG tube replaced with a 16 Fr tube 1 week ago, on 2/16/22. She has pulled out her tube numerous times in the past. No other exacerbating or alleviating factors. Patient is currently at her baseline mental status.      Overview/Hospital Course:  Placed in observation for dislodgement of PEG. GI attempted bedside replacement of PEG tube however the insertion site had scarring/gransulation tissue. IV hydralazine prn for elevated blood pressure. Hold ASA/Plavix (last dose am 2/25). Nonverbal, non interactive. Contracted, bed bound. PEG  replacement today today 2/28/22. Wound care consult for open old PEG site. IVF until able to use PEG. Nutrition consult (home Jevity 100 cc/hr? X 12 hrs and water flush 100 cc per hr).     Fever 100.8 on 2/27 down with tylenol. Low grade this am 2/28. CT head no acute process (ordered as Daughter felt upper lips was dropping to one side ). No leukocytosis and VS stable. CXR "Suspected mild interstitial and alveolar infiltrates superimposed on diminished depth of inspiration". Continue vanc/cefepine . Urine and blood culture pending. Will treat for suspect aspiration given increase of oral secretion and suctioning per both Daughters. Scopolamine patch to help with oral secretions. GI was " consulted. Patient went for EGD and new PEG tube placement on 3/2/22.        Interval History: No new issues     Review of Systems   Unable to perform ROS: Patient nonverbal   Objective:     Vital Signs (Most Recent):  Temp: 98.9 °F (37.2 °C) (03/02/22 0810)  Pulse: 79 (03/02/22 0810)  Resp: 18 (03/02/22 0810)  BP: 138/70 (03/02/22 0810)  SpO2: (!) 92 % (03/02/22 0810)   Vital Signs (24h Range):  Temp:  [98.3 °F (36.8 °C)-99.7 °F (37.6 °C)] 98.9 °F (37.2 °C)  Pulse:  [68-85] 79  Resp:  [18] 18  SpO2:  [92 %-100 %] 92 %  BP: (138-164)/(66-72) 138/70     Weight: 70.2 kg (154 lb 12.2 oz)  Body mass index is 24.24 kg/m².    Intake/Output Summary (Last 24 hours) at 3/2/2022 0958  Last data filed at 3/2/2022 0543  Gross per 24 hour   Intake --   Output 1050 ml   Net -1050 ml      Physical Exam  Constitutional:       Appearance: She is ill-appearing.      Comments: Eyes open. Non verbal, non interactive. Right arm contracted. Bedbound.   Less oral secretion this am   Pulmonary:      Effort: Pulmonary effort is normal.      Breath sounds: Normal breath sounds.   Abdominal:      Palpations: Abdomen is soft.      Comments: LUQ old peg incision site with granulation tissue. No signs of infection   Skin:     General: Skin is warm and dry.      Comments: BLE in EHOB       Significant Labs: All pertinent labs within the past 24 hours have been reviewed.  BMP:   Recent Labs   Lab 03/01/22  0352   GLU 90      K 3.4*      CO2 22*   BUN 9   CREATININE 0.6   CALCIUM 8.7     CBC: No results for input(s): WBC, HGB, HCT, PLT in the last 48 hours.    Significant Imaging:       Assessment/Plan:      * Dislodged gastrostomy tube  Patient was just here a week ago in the ED with dislodged peg tube. Daughter brought her back again tonight. ED unsuccessful with replacement  GI attempted bedside replacement of PEG tube however the insertion site had scarring/gransulation tissue.  Wound care consult for old PEG site   Last ASA/plavix  "2/25 am  PEG placement 3/2/22 with EGD by GI    Hypokalemia  IV replacement until PEG placement      Goals of care, counseling/discussion  Advance Care Planning   Daughter Rena wished  Not to discuss CODE status at this time. Remains full code. Daughter Rena will discuss with family CODE status.        Hyponatremia  Resolved with IV.   Continue IVF until PEG Placement    Fever; suspect aspiration   Fever 100.8 on 2/27 down with tylenol. Low grade this am 2/28.  No leukocytosis and VS stable.   CXR "Suspected mild interstitial and alveolar infiltrates superimposed on diminished depth of inspiration".   Suspect aspiration given increase of oral secretion and suctioning per both Daughters Areviagustina/Julissa. Scopolamine patch to help with oral secretions.  Continue vanc/cefepine   Follow up urine and blood culture.     Broca's aphasia  At baseline  Repeat CT no intracranial abnormalities      Hemiparesis affecting right side as late effect of cerebrovascular accident  Bedbound and cared by Family   Baseline      Essential hypertension  Resume meds once peg tube replaced   Hydralazine prn        VTE Risk Mitigation (From admission, onward)         Ordered     Reason for No Pharmacological VTE Prophylaxis  Once        Question:  Reasons:  Answer:  Physician Provided (leave comment)  Comment:  allergies to heparin and lovenox    02/26/22 0329     IP VTE HIGH RISK PATIENT  Once         02/26/22 0329     Place sequential compression device  Until discontinued         02/26/22 0329                Discharge Planning   ANA MARÍA:      Code Status: Full Code   Is the patient medically ready for discharge?:     Reason for patient still in hospital (select all that apply): Patient unstable  Discharge Plan A: Home with family                  Darion Babin MD  Department of [place] [facility]   [facility]    "

## 2022-03-02 NOTE — PROVATION PATIENT INSTRUCTIONS
Discharge Summary/Instructions after an Endoscopic Procedure  Patient Name: Emilia Melgoza  Patient MRN: [id number]  Patient YOB: 1945 Wednesday, March 2, 2022  Ottoniel Granger MD  Dear patient,  As a result of recent federal legislation (The Federal Cures Act), you may   receive lab or pathology results from your procedure in your [facility]   account before your physician is able to contact you. Your physician or   their representative will relay the results to you with their   recommendations at their soonest availability.  Thank you,  RESTRICTIONS:  During your procedure today, you received medications for sedation.  These   medications may affect your judgment, balance and coordination.  Therefore,   for 24 hours, you have the following restrictions:   - DO NOT drive a car, operate machinery, make legal/financial decisions,   sign important papers or drink alcohol.    ACTIVITY:  Today: no heavy lifting, straining or running due to procedural   sedation/anesthesia.  The following day: return to full activity including work.  DIET:  Eat and drink normally unless instructed otherwise.     TREATMENT FOR COMMON SIDE EFFECTS:  - Mild abdominal pain, nausea, belching, bloating or excessive gas:  rest,   eat lightly and use a heating pad.  - Sore Throat: treat with throat lozenges and/or gargle with warm salt   water.  - Because air was used during the procedure, expelling large amounts of air   from your rectum or belching is normal.  - If a bowel prep was taken, you may not have a bowel movement for 1-3 days.    This is normal.  SYMPTOMS TO WATCH FOR AND REPORT TO YOUR PHYSICIAN:  1. Abdominal pain or bloating, other than gas cramps.  2. Chest pain.  3. Back pain.  4. Signs of infection such as: chills or fever occurring within 24 hours   after the procedure.  5. Rectal bleeding, which would show as bright red, maroon, or black stools.   (A tablespoon of blood from the rectum is not serious, especially if    hemorrhoids are present.)  6. Vomiting.  7. Weakness or dizziness.  GO DIRECTLY TO THE NEAREST EMERGENCY ROOM IF YOU HAVE ANY OF THE FOLLOWING:      Difficulty breathing              Chills and/or fever over 101 F   Persistent vomiting and/or vomiting blood   Severe abdominal pain   Severe chest pain   Black, tarry stools   Bleeding- more than one tablespoon   Any other symptom or condition that you feel may need urgent attention  Your doctor recommends these additional instructions:  If any biopsies were taken, your doctors clinic will contact you in 1 to 2   weeks with any results.  - Return patient to hospital [facility] for ongoing care.   - NPO.   - Please follow the post-PEG recommendations including: Nutrition consult   for formula and volume, advance food and medications per primary care   provider, external bolster snug to abdominal wall, change dressing once per   day, may use PEG today for meds and water, may use PEG tomorrow for   feedings and antibiotic ointment to site.   - Resume Plavix (clopidogrel) at prior dose tomorrow.  For questions, problems or results please call your physician - Ottoniel Granger MD at Work:  [phone].  [facility] can be reached at [phone]     IF A COMPLICATION OR EMERGENCY SITUATION ARISES AND YOU ARE UNABLE TO REACH   YOUR PHYSICIAN - GO DIRECTLY TO THE EMERGENCY ROOM.  Ottoniel Granger MD  3/2/2022 12:54:50 PM  This report has been verified and signed electronically.  Dear patient,  As a result of recent federal legislation (The Federal Cures Act), you may   receive lab or pathology results from your procedure in your [facility]   account before your physician is able to contact you. Your physician or   their representative will relay the results to you with their   recommendations at their soonest availability.  Thank you,  PROVATION

## 2022-03-02 NOTE — NURSING
Patient returned to unit from scheduled procedure. Patient awake and alert.  No apparent distress noted.  Dressing to abdomen clean, dry, and intact.  Abdominal binder also in place.

## 2022-03-02 NOTE — PLAN OF CARE
[facility] - Telemetry  Discharge Reassessment    Primary Care Provider: Lolita Portillo MD    Expected Discharge Date:     SW spoke with patient's daughter, Rena about discharge planning.  Rena informed SW that patient had home health 2 years ago. Rena stated that as soon as patient started to get better, they ended services.  SW inquired if she would like home health again. Rena said yes. SW inquired about patient's help at home. Rena stated that she and her siblings take turns staying with patient at her house. Rena stated that they do everything for patient. Rena stated that they have been trying to find additional resources to help out. SW gave daughter contact info for [facility] to inquire about PCA services. SW also explained services that [facility] offers. SW informed daughter that she will bring pamphlet to room tomorrow.     Reassessment (most recent)       Discharge Reassessment - 03/02/22 1616          Discharge Reassessment    Assessment Type Discharge Planning Reassessment (P)      Did the patient's condition or plan change since previous assessment? No (P)      Discharge Plan discussed with: Caregiver;Adult children (P)      Name(s) and Number(s) [phone] (P)      Communicated ANA MARÍA with patient/caregiver Date not available/Unable to determine (P)      Discharge Plan A Home Health (P)      Discharge Plan B Home Health (P)      DME Needed Upon Discharge  none (P)      Discharge Barriers Identified None (P)      Why the patient remains in the hospital Requires continued medical care (P)         Post-Acute Status    Post-Acute Authorization Home Health (P)      Home Health Status Pending medical clearance/testing (P)      Coverage Medicare AB (P)      Discharge Delays Post-Acute Set-up (P)

## 2022-03-03 ENCOUNTER — PATIENT MESSAGE (OUTPATIENT)
Dept: PRIMARY CARE CLINIC | Facility: CLINIC | Age: 77
End: 2022-03-03
Payer: MEDICARE

## 2022-03-03 PROBLEM — T85.528A DISLODGED GASTROSTOMY TUBE: Status: RESOLVED | Noted: 2022-02-26 | Resolved: 2022-03-03

## 2022-03-03 PROBLEM — E87.6 HYPOKALEMIA: Status: RESOLVED | Noted: 2022-02-28 | Resolved: 2022-03-03

## 2022-03-03 PROBLEM — R50.9 FEVER: Status: RESOLVED | Noted: 2020-01-04 | Resolved: 2022-03-03

## 2022-03-03 PROBLEM — E87.1 HYPONATREMIA: Status: RESOLVED | Noted: 2022-02-26 | Resolved: 2022-03-03

## 2022-03-03 PROBLEM — Z71.89 GOALS OF CARE, COUNSELING/DISCUSSION: Status: RESOLVED | Noted: 2022-02-28 | Resolved: 2022-03-03

## 2022-03-03 LAB
ANION GAP SERPL CALC-SCNC: 9 MMOL/L (ref 8–16)
BACTERIA BLD CULT: NORMAL
BACTERIA BLD CULT: NORMAL
BUN SERPL-MCNC: 10 MG/DL (ref 8–23)
CALCIUM SERPL-MCNC: 8.8 MG/DL (ref 8.7–10.5)
CHLORIDE SERPL-SCNC: 104 MMOL/L (ref 95–110)
CO2 SERPL-SCNC: 22 MMOL/L (ref 23–29)
CREAT SERPL-MCNC: 0.6 MG/DL (ref 0.5–1.4)
EST. GFR  (AFRICAN AMERICAN): >60 ML/MIN/1.73 M^2
EST. GFR  (NON AFRICAN AMERICAN): >60 ML/MIN/1.73 M^2
GLUCOSE SERPL-MCNC: 100 MG/DL (ref 70–110)
POTASSIUM SERPL-SCNC: 3.4 MMOL/L (ref 3.5–5.1)
SODIUM SERPL-SCNC: 135 MMOL/L (ref 136–145)

## 2022-03-03 PROCEDURE — 63600175 PHARM REV CODE 636 W HCPCS: Performed by: NURSE PRACTITIONER

## 2022-03-03 PROCEDURE — 63600175 PHARM REV CODE 636 W HCPCS: Performed by: INTERNAL MEDICINE

## 2022-03-03 PROCEDURE — 99232 PR SUBSEQUENT HOSPITAL CARE,LEVL II: ICD-10-PCS | Mod: ,,, | Performed by: STUDENT IN AN ORGANIZED HEALTH CARE EDUCATION/TRAINING PROGRAM

## 2022-03-03 PROCEDURE — 36415 COLL VENOUS BLD VENIPUNCTURE: CPT | Performed by: INTERNAL MEDICINE

## 2022-03-03 PROCEDURE — 80048 BASIC METABOLIC PNL TOTAL CA: CPT | Performed by: INTERNAL MEDICINE

## 2022-03-03 PROCEDURE — 99232 SBSQ HOSP IP/OBS MODERATE 35: CPT | Mod: ,,, | Performed by: STUDENT IN AN ORGANIZED HEALTH CARE EDUCATION/TRAINING PROGRAM

## 2022-03-03 PROCEDURE — 21400001 HC TELEMETRY ROOM

## 2022-03-03 PROCEDURE — 63600175 PHARM REV CODE 636 W HCPCS: Performed by: PHYSICIAN ASSISTANT

## 2022-03-03 PROCEDURE — 25000003 PHARM REV CODE 250: Performed by: INTERNAL MEDICINE

## 2022-03-03 RX ADMIN — DEXTROSE AND SODIUM CHLORIDE: 5; .9 INJECTION, SOLUTION INTRAVENOUS at 05:03

## 2022-03-03 RX ADMIN — VANCOMYCIN HYDROCHLORIDE 1000 MG: 1 INJECTION, POWDER, LYOPHILIZED, FOR SOLUTION INTRAVENOUS at 01:03

## 2022-03-03 RX ADMIN — CEFEPIME HYDROCHLORIDE 1 G: 1 INJECTION, SOLUTION INTRAVENOUS at 10:03

## 2022-03-03 RX ADMIN — MUPIROCIN: 20 OINTMENT TOPICAL at 10:03

## 2022-03-03 NOTE — DISCHARGE SUMMARY
"[facility] Medicine  Discharge Summary      Patient Name: Emilia Melgoza  MRN: [id number]  Patient Class: IP- Inpatient  Admission Date: 2/25/2022  Hospital Length of Stay: 4 days  Discharge Date and Time:  03/03/2022 10:26 AM  Attending Physician: Darion Simpson MD   Discharging Provider: Draion Simpson MD  Primary Care Provider: Lolita Portillo MD      HPI:   76 y.o. female, with a PMHx of CVA, Alzheimer's dementia, HTN, who presents to the ED from private residence with accidental PEG tube displacement occurring PTA today. Daughter reports patient's PEG tube was accidentally displaced today after the patient pulled the tube out. She is unsure how long the tube has been displaced. Patient last had her PEG tube replaced with a 16 Fr tube 1 week ago, on 2/16/22. She has pulled out her tube numerous times in the past. No other exacerbating or alleviating factors. Patient is currently at her baseline mental status.      Procedure(s) (LRB):  EGD (ESOPHAGOGASTRODUODENOSCOPY) (N/A)      Hospital Course:   Placed in observation for dislodgement of PEG. GI attempted bedside replacement of PEG tube however the insertion site had scarring/gransulation tissue. IV hydralazine prn for elevated blood pressure. Hold ASA/Plavix (last dose am 2/25). Nonverbal, non interactive. Contracted, bed bound. PEG  replacement today today 2/28/22. Wound care consult for open old PEG site. IVF until able to use PEG. Nutrition consult (home Jevity 100 cc/hr? X 12 hrs and water flush 100 cc per hr).     Fever 100.8 on 2/27 down with tylenol. Low grade this am 2/28. CT head no acute process (ordered as Daughter felt upper lips was dropping to one side ). No leukocytosis and VS stable. CXR "Suspected mild interstitial and alveolar infiltrates superimposed on diminished depth of inspiration". Continue vanc/cefepine . Urine and blood culture pending. Will treat for suspect aspiration given increase of oral " "secretion and suctioning per both Daughters. Scopolamine patch to help with oral secretions. GI was consulted. Patient went for EGD and new PEG tube placement on 3/2/22.  Patient was discharged to home on 3/3.  Activity- bed bound. Resume PEG tube feeds. Follow up with PCP in one week          Goals of Care Treatment Preferences:  Code Status: Full Code      Consults:   Consults (From admission, onward)        Status Ordering Provider     Pharmacy to dose Vancomycin consult  Once        Provider:  (Not yet assigned)   "And" Linked Group Details    Acknowledged [address]     Inpatient consult to Registered Dietitian/Nutritionist  Once        Provider:  (Not yet assigned)    Completed MAYA MATTHEWS     Inpatient consult to Gastroenterology  Once        Provider:  Diaz Damon MD    Completed LILY BROWNE new Assessment & Plan notes have been filed under this hospital service since the last note was generated.  Service: Hospital Medicine    Final Active Diagnoses:    Diagnosis Date Noted POA    Broca's aphasia [R47.01] 08/02/2018 Yes     Chronic    Hemiparesis affecting right side as late effect of cerebrovascular accident [I69.351] 10/06/2016 Not Applicable     Chronic    Essential hypertension [I10] 02/24/2016 Yes     Chronic      Problems Resolved During this Admission:    Diagnosis Date Noted Date Resolved POA    PRINCIPAL PROBLEM:  Dislodged gastrostomy tube [T85.528A] 02/26/2022 03/03/2022 Yes    Goals of care, counseling/discussion [Z71.89] 02/28/2022 03/03/2022 Not Applicable    Hypokalemia [E87.6] 02/28/2022 03/03/2022 Yes    Hyponatremia [E87.1] 02/26/2022 03/03/2022 Yes    Fever; suspect aspiration  [R50.9] 01/04/2020 03/03/2022 Yes       Discharged Condition: good    Disposition: Home or Self Care    Follow Up:   Follow-up Information     Lolita Portillo MD Follow up in 1 week(s).    Specialty: Internal Medicine  Contact information:  [address] [facility] [address]  [phone]                       Patient Instructions:   No discharge procedures on file.    Significant Diagnostic Studies:     Pending Diagnostic Studies:     None         Medications:  Reconciled Home Medications:      Medication List      CONTINUE taking these medications    acetaminophen 650 mg/20.3 mL Soln  Commonly known as: TYLENOL  20.3 mLs (650 mg total) by Per G Tube route every 6 (six) hours as needed for Temperature greater than (temp >101).     amLODIPine 5 MG tablet  Commonly known as: NORVASC  Take 5 mg by mouth 2 (two) times daily.     aspirin 81 MG Chew  Take 1 tablet (81 mg total) by mouth once daily.     atorvastatin 40 MG tablet  Commonly known as: LIPITOR  1 tablet (40 mg total) by Per G Tube route every evening.     azithromycin 250 MG tablet  Commonly known as: ZITHROMAX Z-TALON  Take 2 pills day 1, then 1 pill day 2-5. Administer via PEG.     cetirizine 10 MG tablet  Commonly known as: ZYRTEC  Take 10 mg by mouth once daily.     clopidogreL 75 mg tablet  Commonly known as: PLAVIX  1 tablet (75 mg total) by Per G Tube route once daily.     hydrocodone-apap 7.5-325 MG/15 ML oral solution  Commonly known as: HYCET  15 mLs by Per G Tube route every 4 (four) hours as needed.     melatonin 10 mg Cap  Take by mouth.     oxybutynin 5 MG Tr24  Commonly known as: DITROPAN-XL  Take 1 tablet (5 mg total) by mouth once daily.     VITAMIN C 1000 MG tablet  Generic drug: ascorbic acid (vitamin C)  Take 1,000 mg by mouth once daily.     vitamin D 1000 units Tab  Commonly known as: VITAMIN D3  Take 1,000 Units by mouth once daily.            Indwelling Lines/Drains at time of discharge:   Lines/Drains/Airways     Drain  Duration                Gastrostomy/Enterostomy 12/31/19 1330 Percutaneous endoscopic gastrostomy (PEG) LUQ feeding 792 days         Urethral Catheter 03/01/22 1300 Straight-tip 24 Fr. 1 day         Gastrostomy/Enterostomy 03/02/22 1245 Percutaneous endoscopic gastrostomy  (PEG) LUQ feeding <1 day          Airway  Duration                Surgical Airway 01/02/20 0919 Shiley Cuffed 791 days                Time spent on the discharge of patient:  > 35  minutes         Darion Babin MD  Department of Hospital Medicine  [facility]

## 2022-03-03 NOTE — PLAN OF CARE
Problem: Adult Inpatient Plan of Care  Goal: Plan of Care Review  Outcome: Ongoing, Progressing  Flowsheets (Taken 3/3/2022 0624)  Plan of Care Reviewed With:   patient   daughter  Goal: Patient-Specific Goal (Individualized)  Outcome: Ongoing, Progressing  Goal: Absence of Hospital-Acquired Illness or Injury  Outcome: Ongoing, Progressing  Intervention: Identify and Manage Fall Risk  Flowsheets (Taken 3/3/2022 0624)  Safety Promotion/Fall Prevention: side rails raised x 2  Intervention: Prevent Skin Injury  Flowsheets (Taken 3/3/2022 0624)  Body Position: weight shifting  Skin Protection:   adhesive use limited   incontinence pads utilized   pouching devices used   skin sealant/moisture barrier applied   tubing/devices free from skin contact  Intervention: Prevent and Manage VTE (Venous Thromboembolism) Risk  Flowsheets (Taken 3/3/2022 0624)  Activity Management: Rolling - L1  Intervention: Prevent Infection  Flowsheets (Taken 3/3/2022 0624)  Infection Prevention: environmental surveillance performed  Goal: Optimal Comfort and Wellbeing  Outcome: Ongoing, Progressing  Goal: Readiness for Transition of Care  Outcome: Ongoing, Progressing

## 2022-03-03 NOTE — SUBJECTIVE & OBJECTIVE
Interval History:  No new issues     Review of Systems   Unable to perform ROS: Patient nonverbal   Objective:     Vital Signs (Most Recent):  Temp: 99.9 °F (37.7 °C) (03/03/22 0904)  Pulse: 100 (03/03/22 0904)  Resp: 18 (03/03/22 0904)  BP: 134/60 (03/03/22 0904)  SpO2: 99 % (03/03/22 0904) Vital Signs (24h Range):  Temp:  [97.3 °F (36.3 °C)-100.3 °F (37.9 °C)] 99.9 °F (37.7 °C)  Pulse:  [] 100  Resp:  [17-25] 18  SpO2:  [93 %-100 %] 99 %  BP: (108-204)/(55-88) 134/60     Weight: 70.2 kg (154 lb 12.2 oz)  Body mass index is 24.24 kg/m².    Intake/Output Summary (Last 24 hours) at 3/3/2022 1022  Last data filed at 3/3/2022 0532  Gross per 24 hour   Intake 505.54 ml   Output 1100 ml   Net -594.46 ml      Physical Exam  Constitutional:       Appearance: She is ill-appearing.      Comments: Eyes open. Non verbal, non interactive. Right arm contracted. Bedbound.   Less oral secretion this am   Pulmonary:      Effort: Pulmonary effort is normal.      Breath sounds: Normal breath sounds.   Abdominal:      Palpations: Abdomen is soft.      Comments: LUQ old peg incision site with granulation tissue. No signs of infection   Skin:     General: Skin is warm and dry.      Comments: BLE in EHOB       Significant Labs: All pertinent labs within the past 24 hours have been reviewed.  BMP:   Recent Labs   Lab 03/03/22  0339      *   K 3.4*      CO2 22*   BUN 10   CREATININE 0.6   CALCIUM 8.8     CBC: No results for input(s): WBC, HGB, HCT, PLT in the last 48 hours.    Significant Imaging:

## 2022-03-03 NOTE — PLAN OF CARE
HERMINIO sent referral to [facility].        03/03/22 2264   Post-Acute Status   Post-Acute Authorization Home Health   Home Health Status Pending post-acute provider review/more information requested   Coverage Mediare A B   Hospital Resources/Appts/Education Provided Appointments scheduled and added to AVS;Appointments scheduled by Navigator/Coordinator   Discharge Delays (!) Post-Acute Set-up   Discharge Plan   Discharge Plan A Home Health   Discharge Plan B Home Health

## 2022-03-03 NOTE — PLAN OF CARE
HERMINIO unable to schedule follow up appointment with PCP. HERMINIO sent in basket message to Dr. Portlilo's staff.

## 2022-03-03 NOTE — PROGRESS NOTES
GI Treatment Plan    Emilia Melgoza is a 76 y.o. female admitted to hospital 2/25/2022 (Hospital Day: 7) due to Dislodged gastrostomy tube.     Interval History  - PEG placed on 3/2  - No complaints this morning    Objective  Temp:  [97.3 °F (36.3 °C)-100.3 °F (37.9 °C)] 99.9 °F (37.7 °C) (03/03 0904)  Pulse:  [] 100 (03/03 0904)  BP: (108-204)/(55-88) 134/60 (03/03 0904)  Resp:  [17-25] 18 (03/03 0904)  SpO2:  [93 %-100 %] 99 % (03/03 0904)    General: Alert, Oriented x3, no distress  Abdomen: Normoactive bowel sounds. Non-distended. Normal tympany. Soft. Non-tender. No peritoneal signs. PEG clean, dry, and non-tender    Laboratory    Computed MELD-Na score unavailable. Necessary lab results were not found in the last year.  Computed MELD score unavailable. Necessary lab results were not found in the last year.    Recent Labs   Lab 02/26/22  0129 02/27/22  0353 02/28/22  0423   HGB 11.6* 11.3* 10.9*       Lab Results   Component Value Date    WBC 9.49 02/28/2022    HGB 10.9 (L) 02/28/2022    HCT 34.1 (L) 02/28/2022    MCV 85 02/28/2022     02/28/2022       Lab Results   Component Value Date     (L) 03/03/2022    K 3.4 (L) 03/03/2022     03/03/2022    CO2 22 (L) 03/03/2022    BUN 10 03/03/2022    CREATININE 0.6 03/03/2022    CALCIUM 8.8 03/03/2022    ANIONGAP 9 03/03/2022    ESTGFRAFRICA >60 03/03/2022    EGFRNONAA >60 03/03/2022       Lab Results   Component Value Date    ALT 18 03/01/2022    AST 25 03/01/2022    ALKPHOS 86 03/01/2022    BILITOT 0.7 03/01/2022       Lab Results   Component Value Date    INR 1.0 12/29/2019    INR 1.1 12/28/2019    INR 1.2 12/26/2019       Plan  - OK to use PEG tube  - Plan of care was discussed with primary team.  - We will continue to follow.    Thank you for involving us in the care of Emilia Melgoza. Please call with any additional questions, concerns or changes in the patient's clinical status.    Kp Menezes MD

## 2022-03-03 NOTE — PLAN OF CARE
[place] - Telemetry  Discharge Final Note    Primary Care Provider: Lolita Portillo MD    Expected Discharge Date: 3/3/2022    All needs met. SW informed daughterRena that PCP's office will call to schedule follow up appointment. HERMINIO informed that [facility] will provide home health services. SW informed daughter that [facility] pamphlet has been emailed to her and contact info for [facility] has been added as well. HERMINIO informed that transportation will be requested for 12:22 pm via ambulance. HERMINIO notified Ramin via secure chat that patient is ready for discharge from case management standpoint.     ADT 30 order placed for Van Transportation.  Requested  time: 12:22 pm (Ambulance)  If transportation does not arrive at ETA time nurse will be instructed to follow protocol for transportation below:   How can I get in touch directly with dispatch, if needed?                 Non-emergent dispatch: [phone]      +++NURSING:  If Van does not arrive at requested time please call the above Non Emergent Dispatcher.  If issue not resolved please escalate to your charge nurse for further instructions.        Final Discharge Note (most recent)       Final Note - 03/03/22 1125          Final Note    Assessment Type Final Discharge Note (P)      Anticipated Discharge Disposition Home-Health Care Svc (P)      What phone number can be called within the next 1-3 days to see how you are doing after discharge? [phone] (P)      Hospital Resources/Appts/Education Provided -- (P)    Clinic will schedule appointment       Post-Acute Status    Post-Acute Authorization Home Health (P)      Home Health Status Set-up Complete/Auth obtained (P)      Coverage [employer] A B (P)      Discharge Delays None known at this time (P)                      Important Message from Medicare  Important Message from Medicare regarding Discharge Appeal Rights: Given to patient/caregiver, Explained to patient/caregiver, Other (comments)  (Explained IMM to the daughter. Daughter expressed understanding. Copy mailed to address on file. [phone] [id number] [phone])     Date IMM was signed: 03/02/22  Time IMM was signed: 1611    Contact Info       Lolita Portillo MD   Specialty: Internal Medicine   Relationship: PCP - General    [address]  [facility] [address]   Phone: [phone]       Next Steps: Follow up in 1 week(s)    Instructions: Clinic will call to schedule follow up appointment with paitent. [facility] [place]   Specialty: Home Health Services    [address]  [address]  [facility] [place] [facility] [address]   Phone: [phone]       Next Steps: Follow up    Instructions: Home Health will see patient Sunday.

## 2022-03-03 NOTE — PLAN OF CARE
Nurse, Ramin informed SW that patient is not ready for discharge. Ramin explained that patient received new peg tube and it has not been used yet. HERMINIO cancelled transportation.     HERMINIO contacted daughterRena to inform that patient will be discharging closer to 6:00 pm. HERMINIO informed daughter that patient will discharge after new peg tube has been used. HERMINIO explained that patient will be monitored to make sure everything works well.     HERMINIO notified nurse Ramin that if patient leaves after 6, reach out to Joshua Montilla in [facility] via secure chat to let him know that patient needs an ambulance. HERMINIO also gave nurse phone number to [facility].

## 2022-03-03 NOTE — PLAN OF CARE
[facility] Telemetry      HOME HEALTH ORDERS  FACE TO FACE ENCOUNTER    Patient Name: Emilia Melgoza  YOB: 1945    PCP: Lolita Portillo MD   [facility] [address] [facility] [address]  PCP Phone Number: [phone]  PCP Fax: [phone]    Encounter Date: 2/25/22    Admit to Home Health    Diagnoses: Stroke    Active Hospital Problems    Diagnosis  POA    Broca's aphasia [R47.01]  Yes     Chronic    Hemiparesis affecting right side as late effect of cerebrovascular accident [I69.351]  Not Applicable     Chronic    Essential hypertension [I10]  Yes     Chronic      Resolved Hospital Problems    Diagnosis Date Resolved POA    *Dislodged gastrostomy tube [T85.528A] 03/03/2022 Yes     Priority: 1 - High    Goals of care, counseling/discussion [Z71.89] 03/03/2022 Not Applicable    Hypokalemia [E87.6] 03/03/2022 Yes    Hyponatremia [E87.1] 03/03/2022 Yes    Fever; suspect aspiration  [R50.9] 03/03/2022 Yes       Follow Up Appointments:  No future appointments.    Allergies:  Review of patient's allergies indicates:   Allergen Reactions    Enoxaparin Other (See Comments) and Hives     Slurred speech per patient  Slurred speech per patient    Lisinopril Other (See Comments)     Cough    Penicillins Rash       Medications: Review discharge medications with patient and family and provide education.    Current Facility-Administered Medications   Medication Dose Route Frequency Provider Last Rate Last Admin    acetaminophen suppository 650 mg  650 mg Rectal Q6H PRN Thuy Garcia NP   650 mg at 03/02/22 2004    cefepime in dextrose 5 % 1 gram/50 mL IVPB 1 g  1 g Intravenous Q12H De Carter PA-C 100 mL/hr at 03/03/22 1010 1 g at 03/03/22 1010    dextrose 5 % and 0.9 % NaCl infusion   Intravenous Continuous Tuhy Garcia  mL/hr at 03/03/22 0532 New Bag at 03/03/22 0532    hydrALAZINE injection 10 mg  10 mg Intravenous Q6H PRN Thuy Garcia NP   10 mg  at 03/02/22 1948    mupirocin 2 % ointment   Nasal BID Chuy Aldana MD   Given at 03/03/22 1018    scopolamine 1.3-1.5 mg (1 mg over 3 days) 1 patch  1 patch Transdermal Q3 Days Thuy Garcia NP   1 patch at 03/02/22 1652    sodium chloride 0.9% flush 10 mL  10 mL Intravenous Q12H PRN Jaleesa Mcbride NP        vancomycin - pharmacy to dose   Intravenous pharmacy to manage frequency De Carter PA-C        vancomycin in dextrose 5 % 1 gram/250 mL IVPB 1,000 mg  1,000 mg Intravenous Q12H Darion Simpson MD   Stopped at 03/03/22 0237     Facility-Administered Medications Ordered in Other Encounters   Medication Dose Route Frequency Provider Last Rate Last Admin    LIDOcaine (cardiac) injection   Intravenous PRN Katerina BROWER Do, CRNA   100 mg at 03/02/22 1229     Current Discharge Medication List      CONTINUE these medications which have NOT CHANGED    Details   acetaminophen (TYLENOL) 650 mg/20.3 mL Soln 20.3 mLs (650 mg total) by Per G Tube route every 6 (six) hours as needed for Temperature greater than (temp >101).  Qty: 5 mL, Refills: 0      amLODIPine (NORVASC) 5 MG tablet Take 5 mg by mouth 2 (two) times daily.      ascorbic acid, vitamin C, (VITAMIN C) 1000 MG tablet Take 1,000 mg by mouth once daily.      atorvastatin (LIPITOR) 40 MG tablet 1 tablet (40 mg total) by Per G Tube route every evening.  Qty: 90 tablet, Refills: 3    Associated Diagnoses: Atherosclerosis of both carotid arteries      cetirizine (ZYRTEC) 10 MG tablet Take 10 mg by mouth once daily.      clopidogreL (PLAVIX) 75 mg tablet 1 tablet (75 mg total) by Per G Tube route once daily.  Qty: 90 tablet, Refills: 3      melatonin 10 mg Cap Take by mouth.      oxybutynin (DITROPAN-XL) 5 MG TR24 Take 1 tablet (5 mg total) by mouth once daily.  Qty: 90 tablet, Refills: 3    Associated Diagnoses: Complication of Hutchison catheter, initial encounter      vitamin D (VITAMIN D3) 1000 units Tab Take 1,000 Units by mouth once daily.       aspirin 81 MG Chew Take 1 tablet (81 mg total) by mouth once daily.  Qty: 90 tablet, Refills: 3      azithromycin (ZITHROMAX Z-TALON) 250 MG tablet Take 2 pills day 1, then 1 pill day 2-5. Administer via PEG.  Qty: 6 tablet, Refills: 0    Associated Diagnoses: Upper respiratory tract infection, unspecified type      hydrocodone-acetaminophen (HYCET) solution 7.5-325 mg/15mL 15 mLs by Per G Tube route every 4 (four) hours as needed.  Qty: 30 mL, Refills: 0    Comments: Quantity prescribed more than 7 day supply? Yes, quantity medically necessary               I have seen and examined this patient within the last 30 days. My clinical findings that support the need for the home health skilled services and home bound status are the following:no   Medical restrictions requiring assistance of another human to leave home due to  Newly placed G-tube/ostomy.     Diet:   Resume tube feeds    Referrals/ Consults   to evaluate for community resources/long-range planning.  Aide to provide assistance with personal care, ADLs, and vital signs.   Skilled nurse   Aide to help with activities of daily living     Activities:   Bed bound     Nursing:   Agency to admit patient within 24 hours of hospital discharge unless specified on physician order or at patient request    SN to complete comprehensive assessment including routine vital signs. Instruct on disease process and s/s of complications to report to MD. Review/verify medication list sent home with the patient at time of discharge  and instruct patient/caregiver as needed. Frequency may be adjusted depending on start of care date.     Skilled nurse to perform up to 3 visits PRN for symptoms related to diagnosis    Notify MD if SBP > 160 or < 90; DBP > 90 or < 50; HR > 120 or < 50; Temp > 101; O2 < 88%; Other:     Ok to schedule additional visits based on staff availability and patient request on consecutive days within the home health episode.    When multiple  disciplines ordered:    Start of Care occurs on Sunday - Wednesday schedule remaining discipline evaluations as ordered on separate consecutive days following the start of care.    Thursday SOC -schedule subsequent evaluations Friday and Monday the following week.     Friday - Saturday SOC - schedule subsequent discipline evaluations on consecutive days starting Monday of the following week.    For all post-discharge communication and subsequent orders please contact patient's primary care physician.   Miscellaneous   PEG Care:  Instruct patient/caregiver to clean site.  Monitor skin integrity.        I certify that this patient is confined to her home and needs intermittent skilled nursing care.

## 2022-03-03 NOTE — PROGRESS NOTES
"[facility] Medicine  Progress Note    Patient Name: Emilia Melgoza  MRN: [id number]  Patient Class: IP- Inpatient   Admission Date: 2/25/2022  Length of Stay: 4 days  Attending Physician: Darion Simpson MD  Primary Care Provider: Lolita Portillo MD        Subjective:     Principal Problem:Dislodged gastrostomy tube        HPI:  76 y.o. female, with a PMHx of CVA, Alzheimer's dementia, HTN, who presents to the ED from private residence with accidental PEG tube displacement occurring PTA today. Daughter reports patient's PEG tube was accidentally displaced today after the patient pulled the tube out. She is unsure how long the tube has been displaced. Patient last had her PEG tube replaced with a 16 Fr tube 1 week ago, on 2/16/22. She has pulled out her tube numerous times in the past. No other exacerbating or alleviating factors. Patient is currently at her baseline mental status.      Overview/Hospital Course:  Placed in observation for dislodgement of PEG. GI attempted bedside replacement of PEG tube however the insertion site had scarring/gransulation tissue. IV hydralazine prn for elevated blood pressure. Hold ASA/Plavix (last dose am 2/25). Nonverbal, non interactive. Contracted, bed bound. PEG  replacement today today 2/28/22. Wound care consult for open old PEG site. IVF until able to use PEG. Nutrition consult (home Jevity 100 cc/hr? X 12 hrs and water flush 100 cc per hr).     Fever 100.8 on 2/27 down with tylenol. Low grade this am 2/28. CT head no acute process (ordered as Daughter felt upper lips was dropping to one side ). No leukocytosis and VS stable. CXR "Suspected mild interstitial and alveolar infiltrates superimposed on diminished depth of inspiration". Continue vanc/cefepine . Urine and blood culture pending. Will treat for suspect aspiration given increase of oral secretion and suctioning per both Daughters. Scopolamine patch to help with oral secretions. GI was " consulted. Patient went for EGD and new PEG tube placement on 3/2/22.  Patient was discharged to home on 3/3.  Activity- bed bound. Resume PEG tube feeds. Follow up with PCP in one week         Interval History:  No new issues     Review of Systems   Unable to perform ROS: Patient nonverbal   Objective:     Vital Signs (Most Recent):  Temp: 99.9 °F (37.7 °C) (03/03/22 0904)  Pulse: 100 (03/03/22 0904)  Resp: 18 (03/03/22 0904)  BP: 134/60 (03/03/22 0904)  SpO2: 99 % (03/03/22 0904) Vital Signs (24h Range):  Temp:  [97.3 °F (36.3 °C)-100.3 °F (37.9 °C)] 99.9 °F (37.7 °C)  Pulse:  [] 100  Resp:  [17-25] 18  SpO2:  [93 %-100 %] 99 %  BP: (108-204)/(55-88) 134/60     Weight: 70.2 kg (154 lb 12.2 oz)  Body mass index is 24.24 kg/m².    Intake/Output Summary (Last 24 hours) at 3/3/2022 1022  Last data filed at 3/3/2022 0532  Gross per 24 hour   Intake 505.54 ml   Output 1100 ml   Net -594.46 ml      Physical Exam  Constitutional:       Appearance: She is ill-appearing.      Comments: Eyes open. Non verbal, non interactive. Right arm contracted. Bedbound.   Less oral secretion this am   Pulmonary:      Effort: Pulmonary effort is normal.      Breath sounds: Normal breath sounds.   Abdominal:      Palpations: Abdomen is soft.      Comments: LUQ old peg incision site with granulation tissue. No signs of infection   Skin:     General: Skin is warm and dry.      Comments: BLE in EHOB       Significant Labs: All pertinent labs within the past 24 hours have been reviewed.  BMP:   Recent Labs   Lab 03/03/22  0339      *   K 3.4*      CO2 22*   BUN 10   CREATININE 0.6   CALCIUM 8.8     CBC: No results for input(s): WBC, HGB, HCT, PLT in the last 48 hours.    Significant Imaging:       Assessment/Plan:      * Dislodged gastrostomy tube  Patient was just here a week ago in the ED with dislodged peg tube. Daughter brought her back again tonight. ED unsuccessful with replacement  GI attempted bedside  "replacement of PEG tube however the insertion site had scarring/gransulation tissue.  Wound care consult for old PEG site   Last ASA/plavix 2/25 am  PEG placement 3/2/22 with EGD by GI    Hypokalemia  IV replacement until PEG placement      Goals of care, counseling/discussion  Advance Care Planning   [place] wished  Not to discuss CODE status at this time. Remains full code. Daughter Rena will discuss with family CODE status.        Hyponatremia  Resolved with IV.   Continue IVF until PEG Placement    Fever; suspect aspiration   Fever 100.8 on 2/27 down with tylenol. Low grade this am 2/28.  No leukocytosis and VS stable.   CXR "Suspected mild interstitial and alveolar infiltrates superimposed on diminished depth of inspiration".   Suspect aspiration given increase of oral secretion and suctioning per both Daughters Rena/Julissa. Scopolamine patch to help with oral secretions.  Continue vanc/cefepine   Follow up urine and blood culture.     Broca's aphasia  At baseline  Repeat CT no intracranial abnormalities      Hemiparesis affecting right side as late effect of cerebrovascular accident  Bedbound and cared by Family   Baseline      Essential hypertension  Resume meds once peg tube replaced   Hydralazine prn        VTE Risk Mitigation (From admission, onward)         Ordered     Reason for No Pharmacological VTE Prophylaxis  Once        Question:  Reasons:  Answer:  Physician Provided (leave comment)  Comment:  allergies to heparin and lovenox    02/26/22 0329     IP VTE HIGH RISK PATIENT  Once         02/26/22 0329     Place sequential compression device  Until discontinued         02/26/22 0329                Discharge Planning   ANA MARÍA:      Code Status: Full Code   Is the patient medically ready for discharge?:     Reason for patient still in hospital (select all that apply): Patient unstable  Discharge Plan A: Home Health   Discharge Delays: (!) Post-Acute Set-up      Will dc to home           Darion ELIAS" MD Talya  Department of Hospital Medicine   [facility] - Telemetry

## 2022-03-04 ENCOUNTER — PATIENT MESSAGE (OUTPATIENT)
Dept: PRIMARY CARE CLINIC | Facility: CLINIC | Age: 77
End: 2022-03-04
Payer: MEDICARE

## 2022-03-04 ENCOUNTER — TELEPHONE (OUTPATIENT)
Dept: PRIMARY CARE CLINIC | Facility: CLINIC | Age: 77
End: 2022-03-04
Payer: MEDICARE

## 2022-03-04 VITALS
RESPIRATION RATE: 18 BRPM | SYSTOLIC BLOOD PRESSURE: 154 MMHG | DIASTOLIC BLOOD PRESSURE: 73 MMHG | HEART RATE: 104 BPM | TEMPERATURE: 100 F | WEIGHT: 154.75 LBS | BODY MASS INDEX: 24.29 KG/M2 | HEIGHT: 67 IN | OXYGEN SATURATION: 99 %

## 2022-03-04 DIAGNOSIS — I63.539 ACUTE ISCHEMIC MULTIFOCAL POSTERIOR CIRCULATION STROKE, UNSPECIFIED LATERALITY: Primary | ICD-10-CM

## 2022-03-04 NOTE — PHYSICIAN QUERY
"PT Name: Emilia Melgoza  MR #: [id number]     Documentation Clarification      CDS/: Vivian Garcia               Contact information: [email or web address]    This form is a permanent document in the medical record.     Query Date: March 4, 2022    By submitting this query, we are merely seeking further clarification of documentation. Please utilize your independent clinical judgment when addressing the question(s) below.    The Medical Record reflects the following:    Clinical Findings Location in Medical Records   Moderate malnutrition- stable    HPI:  76 y.o. female, with a PMHx of CVA, Alzheimer's dementia, HTN, who presents to the ED from private residence with accidental PEG tube displacement occurring PTA today. Daughter reports patient's PEG tube was accidentally displaced today after the patient pulled the tube out. She is unsure how long the tube has been displaced. Patient last had her PEG tube replaced with a 16 Fr tube 1 week ago, on 2/16/22. She has pulled out her tube numerous times in the past. No other exacerbating or alleviating factors. Patient is currently at her baseline mental status.    IVF until able to use PEG. Nutrition consult (home Jevity 100 cc/hr? X 12 hrs and water flush 100 cc per hr).     Non verbal, non interactive. Right arm contracted. Bedbound.    Hospital Medicine PN 03/01   Recommendations:  1) Place NGT while waiting for G-tube placement  Initiate Isosource 1.5 @ 10 mL adv by 10 mL q4h until goal rate of 40 mL/hr is met, Hold for residuals > 400 mL.  mL H20 q6h, additional fluids to be determined by MD. Provides 1440 kcal, 65g P, 169g C, 1433 mL total H20  2) Pablo BID via NGT/G-Tube to assist in meeting EEN/EPN/Wound healing    Nutrition Problem:  Inadequate Energy Intake  Related to (etiology):  Dislodged Gastrostomy Tube, NPO    Height: 5' 7" (170.2 cm)  Weight Method: Bed Scale  Weight: 72.9 kg (160 lb 11.5 oz)  BMI (Calculated): " 25.2    Estimated/Assessed Needs:  Energy Calorie Requirements (kcal): 1458  Protein Requirements: 58 - 88g  Weight Used For Protein Calculations: 72.9 kg (160 lb 11.5 oz)  RDA Method (mL): 1458  CHO Requirement: 182g    New G-Tube will have to wait x 5 days to be placed d/t plavix     Dietitian Consult 02/28   The daughter states the patient has relied on PEG tube for feeding for at least the past 2-3 years since her stroke. She rarely takes any food by mouth.  EGD in 2019 with placement of a 20 Fr Endovive tube.     Negative for weight loss  General Appearance: well-appearing, NAD   GI consult 02/26       AND / ASPEN Clinical Characteristics (October 2011)  A minimum of two characteristics is recommended for diagnosing either moderate or severe malnutrition   Mild Malnutrition Moderate Malnutrition Severe Malnutrition   Energy Intake from p.o., TF or TPN. < 75% intake of estimated energy needs for less than 7 days < 75% intake of estimated energy needs for greater than 7 days < 50% intake of estimated energy needs for > 5 days   Weight Loss 1-2% in 1 month  5% in 3 months  7.5% in 6 months  10% in 1 year 1-2 % in 1 week  5% in 1 month  7.5% in 3 months  10% in 6 months  20% in 1 year > 2% in 1 week  > 5% in 1 month  > 7.5% in 3 months  > 10% in 6 months  > 20% in 1 year   Physical Findings     None *Mild subcutaneous fat and/or muscle loss  *Mild fluid accumulation  *Stage II decubitus  *Surgical wound or non-healing wound *Mod/severe subcutaneous fat and/or muscle loss  *Mod/severe fluid accumulation  *Stage III or IV decubitus  *Non-healing surgical wound                                                                              Provider, please clarify the diagnosis of __Moderate Malnutrition__:      [  x ] Diagnosis is confirmed and additional clinical support/decision-making indicators for the diagnosis include (please specify):_____low albumen________________________     [   ] Above stated diagnosis is not  confirmed and/or it has been ruled out     [   ] Above stated diagnosis is not confirmed and/or it has been ruled out, other diagnosis ruled in (please specify):    [   ] Mild Malnutrition    [   ] Malnutrition unspecified    [   ] Other nutritional diagnosis, please specify:  __________________     [   ] Other clarification (please specify): ___________________     [  ] Clinically undetermined

## 2022-03-04 NOTE — PHYSICIAN QUERY
"PT Name: Emilia Melgoza  MR #: [id number]     DOCUMENTATION CLARIFICATION     CDS/: Vivian Garcia               Contact information: [email or web address]  This form is a permanent document in the medical record.    Query Date: March 4, 2022    By submitting this query, we are merely seeking further clarification of documentation.  Please utilize your independent clinical judgment when addressing the question(s) below.  The Medical Record contains the following:   Indicators   Supporting Clinical Findings Location in Medical Record    Pneumonia documented     x Chest X-Ray/CT Scan CXR "Suspected mild interstitial and alveolar infiltrates superimposed on diminished depth of inspiration".    Hospital Medicine PN 03/03, CXR 02/27    PaO2    PaCO2     O2 sat     x WBC No leukocytosis and VS stable    WBC: 6.16 - 7.51 - 9.49 [facility] 03/03    Labs, CBC 02/26 - 02/28      Vital Signs      Cultures      x Treatment  Continue vanc/cefepine   Will treat for suspect aspiration given increase of oral secretion and suctioning per both Daughters. Scopolamine patch to help with oral secretions.   [facility] Medicine PN 03/03    Supplemental O2      Dysphagia/Swallow study     x Other Fever; suspect aspiration   Fever 100.8 on 2/27 down with tylenol. Low grade this am 2/28   [facility] Medicine PN 03/03       Provider, please provide the diagnosis related to the above clinical indicators:    [   ] Aspiration Pneumonia/Pneumonitis   [   ] Other respiratory diagnosis (please specify): _________   [   ] Aspiration Pneumonia ruled out   [ x ] Clinically undetermined         Please document in your progress notes daily for the duration of treatment, until resolved, and include in your discharge summary.     Form No. [id number]                                                                                                                                                                    "

## 2022-03-04 NOTE — PHYSICIAN QUERY
PT Name: Emilia Melgoza  MR #: [id number]     Documentation Clarification      CDS/: Vivian Garcia               Contact information: [email or web address]    This form is a permanent document in the medical record.     Query Date: March 4, 2022    By submitting this query, we are merely seeking further clarification of documentation. Please utilize your independent clinical judgment when addressing the question(s) below.    The Medical Record reflects the following:    Supporting Clinical Findings Location in Medical Record      02/27/22 11:53   Specimen UA Urine, Catheterized   Color, UA [place]     Appearance, UA Cloudy     PH, UA >8.0    NITRITE UA Negative   Leukocytes, UA 1    WBC, UA 23    Bacteria, UA Occasional       Urinalysis 02/27   Proteus vulgaris  > 100,000 cfu/mL Urine Culture 02/27   Patient was admitted from home with espino cath that was put in by her primary care physician Nurse's Note 02/27   Antibiotics for UTI per primary team. GI Treatment Plan 03/01   Continue vanc/cefepine . Urine and blood culture pending Hospital Medicine PN 02/28                                                                            Provider, please provide diagnosis or diagnoses associated with above clinical findings.    [  x ] UTI due to espino catheter ruled in   [   ] UTI not related to espino catheter ruled in   [   ] UTI unknown relationship to espino catheter ruled in   [   ] UTI ruled out   [   ] Other clarification or diagnosis, please specify: ____________   [  ] Clinically undetermined                                                                                                           Present on admission (POA) status:   [   ] Yes (Y)                          [  ] Clinically Undetermined (W)  [   ] No (N)                            [   ] Documentation insufficient to determine if condition is POA (U)

## 2022-03-04 NOTE — TELEPHONE ENCOUNTER
Hospice referral signed, please fax with to [facility]. Also needs last hospital DC summary and face sheet.    Thanks,  KJ

## 2022-03-04 NOTE — TELEPHONE ENCOUNTER
Pt was on [facility] and went to the hospital to have her feed tube replace pending referred please reorder

## 2022-03-04 NOTE — TELEPHONE ENCOUNTER
----- Message from Jeny Barboza sent at 3/3/2022 10:06 AM CST -----  Regarding: Patient outreach - hospice status  Introduction:   Hello, My name is Jeny Barboza and I am a volunteer calling on behalf of Dr. FUAD Portillo and the [facility] clinic at the [facility]. I am checking on your safety and health:    Talked to [facility] on phone.  Discussed   - Patient is currently admitted to [facility] for reinsertion of a dislodged gastrotomy tube. She is still a patient at [facility] and will be reenrolled there upon discharge     Appointment type: N/A    Acuity of needs for appt: N/A    If patient does not want a follow-up appointment please ask them if they need anything, and we can call them in a few months to check on them again.

## 2022-03-04 NOTE — TELEPHONE ENCOUNTER
----- Message from Lakshmi Shea LMSW sent at 3/3/2022 10:50 AM CST -----  Regarding: Hospital F/U  Contact: Lakshmi Shea LMSW  Good morning, patient is discharging and is in need of a hospital follow up within a week.    Thanks

## 2022-03-10 ENCOUNTER — OFFICE VISIT (OUTPATIENT)
Dept: PRIMARY CARE CLINIC | Facility: CLINIC | Age: 77
End: 2022-03-10
Payer: MEDICARE

## 2022-03-10 DIAGNOSIS — D32.9 MENINGIOMA: ICD-10-CM

## 2022-03-10 DIAGNOSIS — I50.32 CHRONIC DIASTOLIC CONGESTIVE HEART FAILURE: ICD-10-CM

## 2022-03-10 DIAGNOSIS — Z93.1 PEG (PERCUTANEOUS ENDOSCOPIC GASTROSTOMY) STATUS: Primary | ICD-10-CM

## 2022-03-10 DIAGNOSIS — Z43.1 PEG (PERCUTANEOUS ENDOSCOPIC GASTROSTOMY) ADJUSTMENT/REPLACEMENT/REMOVAL: ICD-10-CM

## 2022-03-10 DIAGNOSIS — I70.0 AORTIC ATHEROSCLEROSIS: ICD-10-CM

## 2022-03-10 DIAGNOSIS — Z89.422 ACQUIRED ABSENCE OF OTHER LEFT TOE(S): ICD-10-CM

## 2022-03-10 DIAGNOSIS — T83.9XXA COMPLICATION OF FOLEY CATHETER, INITIAL ENCOUNTER: ICD-10-CM

## 2022-03-10 DIAGNOSIS — J42 CHRONIC BRONCHITIS, UNSPECIFIED CHRONIC BRONCHITIS TYPE: ICD-10-CM

## 2022-03-10 DIAGNOSIS — R53.2 FUNCTIONAL QUADRIPLEGIA: ICD-10-CM

## 2022-03-10 DIAGNOSIS — E11.40 TYPE 2 DIABETES MELLITUS WITH DIABETIC NEUROPATHY, WITHOUT LONG-TERM CURRENT USE OF INSULIN: ICD-10-CM

## 2022-03-10 DIAGNOSIS — D69.2 SENILE PURPURA: ICD-10-CM

## 2022-03-10 PROCEDURE — 99215 OFFICE O/P EST HI 40 MIN: CPT | Mod: 95,GW,, | Performed by: INTERNAL MEDICINE

## 2022-03-10 PROCEDURE — 99215 PR OFFICE/OUTPT VISIT, EST, LEVL V, 40-54 MIN: ICD-10-PCS | Mod: 95,GW,, | Performed by: INTERNAL MEDICINE

## 2022-03-10 NOTE — PROGRESS NOTES
"[facility] Primary Care Provider Telemedicine Appointment      The patient location is:  Patient Home   The chief complaint leading to consultation is: routine care  Total time spent with patient: 20 minutes    Visit type: Virtual visit with synchronous audio only and video  Each patient to whom he or she provides medical services by telemedicine is:  (1) informed of the relationship between the physician and patient and the respective role of any other health care provider with respect to management of the patient; and (2) notified that he or she may decline to receive medical services by telemedicine and may withdraw from such care at any time.      Subjective:      Patient ID: Emilia Melgoza is a 76 y.o. female w/ pmhx of quadriplegia, encephalopathy, hypoxia, failure to thrive, s/p PEG tube placement.    Chief Complaint: F/u    Prior to this visit, patient's last encounter with PCP was 09/25/2020. She dis-enrolled from hospice for recent hospital admit for PEG tube replacement. She is currently re-enrolled with [facility].    Daughter, Tiffanie, states everything is "going ok". Came home Friday 03/04/2022 from hospital, had feeding replaced. In recovery, BP became hypertensive in the 200s, took time to bring back to baseline. Not taking bp measurement at home. Also ran low grade fever, received abx in hospital. Not discharged with abx. 99.4 temp at home today.      Daughter says patient is not taking as much food as she was before. Says there have been a few cother hanges in patient's functional status but "can't think of them right now". Has been coughing more lately. Still happy with care they are receiving from home hospice. Nurse comes out twice a week, CNA comes out three times a week. Feeding tube no longer getting clogged since having it replaced. Received tube in early 2020.     Advance Care Planning       Advance Care Planning      Patient is enrolled in hospice and family is enjoying the " support. They elect to continue this.         Past Surgical History:   Procedure Laterality Date    bladder mesh      BREAST BIOPSY Left     CATARACT EXTRACTION W/  INTRAOCULAR LENS IMPLANT Right 10/13/2014    Dr Crystal    CATARACT EXTRACTION W/  INTRAOCULAR LENS IMPLANT Left 11/10/2014    Dr Crystal    ESOPHAGOGASTRODUODENOSCOPY N/A 3/2/2022    Procedure: EGD (ESOPHAGOGASTRODUODENOSCOPY);  Surgeon: Ottoniel Granger MD;  Location: [facility] ENDO;  Service: Endoscopy;  Laterality: N/A;    HEMORRHOID SURGERY      HYSTERECTOMY      INCONTINENCE SURGERY      OOPHORECTOMY      Right Rotator Cuff Repair      TRACHEOTOMY N/A 1/2/2020    Procedure: TRACHEOTOMY;  Surgeon: Marco Shine III, MD;  Location: [facility] OR;  Service: General;  Laterality: N/A;       Past Medical History:   Diagnosis Date    Allergy     DDD (degenerative disc disease), lumbar     Chronic LBP and intermittent RLE radicular pain x 10 yrs    Hypertension     Overactive bladder     Senile cataract, unspecified - Both Eyes 6/11/2013    Stroke     right sided weakness       Review of Systems   Constitutional: Positive for appetite change. Negative for activity change, fever and unexpected weight change.   Neurological: Positive for speech difficulty.   Psychiatric/Behavioral: Positive for confusion.       Objective:   There were no vitals taken for this visit.    Physical Exam  Pulmonary:      Effort: Pulmonary effort is normal.   Skin:     Findings: Bruising present.   Psychiatric:      Comments: confused         Lab Results   Component Value Date    WBC 9.49 02/28/2022    HGB 10.9 (L) 02/28/2022    HCT 34.1 (L) 02/28/2022     02/28/2022    CHOL 164 09/14/2019    TRIG 95 09/14/2019    HDL 73 09/14/2019    ALT 18 03/01/2022    AST 25 03/01/2022     (L) 03/03/2022    K 3.4 (L) 03/03/2022     03/03/2022    CREATININE 0.6 03/03/2022    BUN 10 03/03/2022    CO2 22 (L) 03/03/2022    TSH 0.460 07/02/2020    INR 1.0 12/29/2019     "HGBA1C 5.6 12/26/2019         Assessment:   76 y.o. female with multiple co-morbid illnesses here to continue work-up of chronic issues notably encephalopathy, failure to thrive.     Plan:     Problem List Items Addressed This Visit        Pulmonary    Chronic bronchitis, unspecified chronic bronchitis type     Uses O2 PRN with exertion, is bedbound  · Continue supportive care for this              Cardiac/Vascular    Chronic diastolic congestive heart failure     Echo results in 12/2019: "Normal left ventricular systolic function. The estimated ejection fraction is 65%. Concentric left ventricular hypertrophy. Left ventricular diastolic dysfunction."  · Supportive care             Aortic atherosclerosis       Renal/    Complication of Espino catheter     Chronic indwelling espino, with frequent UTIs and bladder spasms  · Continue oxybutinin              Hematology    Senile purpura     Ecchymoses and purpura on UE bilaterally  · Supportive care              Oncology    Meningioma     Stable on last MRI in 2019  · Supportive care              Endocrine    Type 2 diabetes mellitus with diabetic neuropathy, without long-term current use of insulin     Historical A1c >6.3 (prior to 2013), now is 5.6 with dietary changes, and homebound status  · No longer prudent to monitor              GI    PEG (percutaneous endoscopic gastrostomy) status - Primary     PEG placed during last hospital admit for stroke treatment. Replaced in 3/2022  · Continue supportive care  · Admit to hospice           PEG (percutaneous endoscopic gastrostomy) adjustment/replacement/removal     PEG complication warranting recent hospitalization in 3/2022 for replacment  · Patient re-enrolled in hospice  · Supportive care              Orthopedic    Acquired absence of other left toe(s)     S/P amputation  · Supportive care              Other    Functional quadriplegia     Bedbound following CVA in 12/2019  · Re-enrolled in hospice           "       Health Maintenance       Date Due Completion Date    Hepatitis C Screening Never done ---    COVID-19 Vaccine (1) Never done ---    Shingles Vaccine (2 of 3) 08/27/2013 7/2/2013    DEXA Scan 03/02/2019 3/2/2016    Override on 6/15/2012: Done    Influenza Vaccine (1) 09/01/2021 12/1/2017    TETANUS VACCINE 10/06/2026 10/6/2016          Follow up in 3 months.  30 minutes spent with this patient today, more than half of that in counseling on the following issues: routine care.    Saeid Garzon MS4  Lolita Portillo MD/MPH  [facility]  [phone]

## 2022-03-13 ENCOUNTER — TELEPHONE (OUTPATIENT)
Dept: ADMINISTRATIVE | Facility: CLINIC | Age: 77
End: 2022-03-13
Payer: MEDICARE

## 2022-03-13 PROBLEM — J42 CHRONIC BRONCHITIS, UNSPECIFIED CHRONIC BRONCHITIS TYPE: Status: ACTIVE | Noted: 2022-03-13

## 2022-03-13 PROBLEM — E11.40 TYPE 2 DIABETES MELLITUS WITH DIABETIC NEUROPATHY, WITHOUT LONG-TERM CURRENT USE OF INSULIN: Status: ACTIVE | Noted: 2022-03-13

## 2022-03-13 PROBLEM — Z43.1 PEG (PERCUTANEOUS ENDOSCOPIC GASTROSTOMY) ADJUSTMENT/REPLACEMENT/REMOVAL: Status: ACTIVE | Noted: 2022-03-13

## 2022-03-13 PROBLEM — Z89.422 ACQUIRED ABSENCE OF OTHER LEFT TOE(S): Status: ACTIVE | Noted: 2022-03-13

## 2022-03-13 NOTE — ASSESSMENT & PLAN NOTE
PEG placed during last hospital admit for stroke treatment. Replaced in 3/2022  · Continue supportive care  · Admit to hospice

## 2022-03-13 NOTE — ASSESSMENT & PLAN NOTE
Historical A1c >6.3 (prior to 2013), now is 5.6 with dietary changes, and homebound status  · No longer prudent to monitor

## 2022-03-13 NOTE — ASSESSMENT & PLAN NOTE
PEG complication warranting recent hospitalization in 3/2022 for replacment  · Patient re-enrolled in hospice  · Supportive care

## 2022-03-15 ENCOUNTER — TELEPHONE (OUTPATIENT)
Dept: PRIMARY CARE CLINIC | Facility: CLINIC | Age: 77
End: 2022-03-15
Payer: MEDICARE

## 2022-03-15 DIAGNOSIS — Z59.9 FINANCIAL DIFFICULTY: Primary | ICD-10-CM

## 2022-03-15 SDOH — SOCIAL DETERMINANTS OF HEALTH (SDOH): PROBLEM RELATED TO HOUSING AND ECONOMIC CIRCUMSTANCES, UNSPECIFIED: Z59.9

## 2022-03-15 NOTE — TELEPHONE ENCOUNTER
Please let daughter know that if she is having any difficulty with paying for her mom's hospital stay, she should talk to the hospice agency. We also have a financial assistance person in primary care named Nina Costello. He could possibly help too. Referral to [facility] placed too, in order to see what we can do to support this family.    Thanks,  Lolita Portillo MD/MPH  [facility]  [phone] spectralink     CC: Dominguez Costello

## 2022-03-15 NOTE — TELEPHONE ENCOUNTER
----- Message from Unique Blank LPN sent at 3/13/2022 10:38 AM CDT -----  Regarding: Social assistance  Pt responded to pt discharge escalation message, nurse spoke to daughter who states they are having trouble with bill pay. Please place referral to SW to assist pt with any programs that may be available.    Unique

## 2022-03-15 NOTE — TELEPHONE ENCOUNTER
----- Message from Latoya Calvin MA sent at 3/14/2022  9:43 AM CDT -----  Regarding: FW: Social assistance  PLEASE ADVISE   ----- Message -----  From: Unique Blank LPN  Sent: 3/13/2022  10:41 AM CDT  To: [facility] Staff  Subject: Social assistance                                Pt responded to pt discharge escalation message, nurse spoke to daughter who states they are having trouble with bill pay. Please place referral to SW to assist pt with any programs that may be available.    Unique

## 2022-03-28 ENCOUNTER — OUTPATIENT CASE MANAGEMENT (OUTPATIENT)
Dept: ADMINISTRATIVE | Facility: OTHER | Age: 77
End: 2022-03-28
Payer: MEDICARE

## 2022-03-28 NOTE — PROGRESS NOTES
HERMINIO received a referral on the above patient. Per chart review patient currently being followed by [facility]. HERMINIO sent an e-mail to supervisor Jose Antonio. Per email patient will be assessed by [facility] team. HERMINIO will close case as needs will be met by others.

## 2022-03-31 ENCOUNTER — EXTERNAL CHRONIC CARE MANAGEMENT (OUTPATIENT)
Dept: PRIMARY CARE CLINIC | Facility: CLINIC | Age: 77
End: 2022-03-31
Payer: MEDICARE

## 2022-03-31 PROCEDURE — 99490 PR CHRONIC CARE MGMT, 1ST 20 MIN: ICD-10-PCS | Mod: S$PBB,GW,, | Performed by: INTERNAL MEDICINE

## 2022-03-31 PROCEDURE — 99490 CHRNC CARE MGMT STAFF 1ST 20: CPT | Mod: PBBFAC | Performed by: INTERNAL MEDICINE

## 2022-03-31 PROCEDURE — 99490 CHRNC CARE MGMT STAFF 1ST 20: CPT | Mod: S$PBB,GW,, | Performed by: INTERNAL MEDICINE

## 2022-04-05 NOTE — TELEPHONE ENCOUNTER
[facility] Please call daughter and see if they are enrolled. I signed the order on 3/11/22.    Thanks,  KJ

## 2022-04-08 ENCOUNTER — TELEPHONE (OUTPATIENT)
Dept: PRIMARY CARE CLINIC | Facility: CLINIC | Age: 77
End: 2022-04-08
Payer: MEDICARE

## 2022-04-08 NOTE — TELEPHONE ENCOUNTER
Spoke to pt daughter and she confirmed that her mother is with hospice severice and at this time her mother is doing well and they are in no need of anything from kj

## 2022-04-30 ENCOUNTER — EXTERNAL CHRONIC CARE MANAGEMENT (OUTPATIENT)
Dept: PRIMARY CARE CLINIC | Facility: CLINIC | Age: 77
End: 2022-04-30
Payer: MEDICARE

## 2022-04-30 PROCEDURE — 99490 CHRNC CARE MGMT STAFF 1ST 20: CPT | Mod: PBBFAC | Performed by: INTERNAL MEDICINE

## 2022-04-30 PROCEDURE — 99490 PR CHRONIC CARE MGMT, 1ST 20 MIN: ICD-10-PCS | Mod: S$PBB,GW,, | Performed by: INTERNAL MEDICINE

## 2022-04-30 PROCEDURE — 99490 CHRNC CARE MGMT STAFF 1ST 20: CPT | Mod: S$PBB,GW,, | Performed by: INTERNAL MEDICINE

## 2022-05-31 ENCOUNTER — EXTERNAL CHRONIC CARE MANAGEMENT (OUTPATIENT)
Dept: PRIMARY CARE CLINIC | Facility: CLINIC | Age: 77
End: 2022-05-31
Payer: MEDICARE

## 2022-05-31 PROCEDURE — 99490 CHRNC CARE MGMT STAFF 1ST 20: CPT | Mod: S$PBB,GW,, | Performed by: INTERNAL MEDICINE

## 2022-05-31 PROCEDURE — 99490 CHRNC CARE MGMT STAFF 1ST 20: CPT | Mod: PBBFAC | Performed by: INTERNAL MEDICINE

## 2022-05-31 PROCEDURE — 99490 PR CHRONIC CARE MGMT, 1ST 20 MIN: ICD-10-PCS | Mod: S$PBB,GW,, | Performed by: INTERNAL MEDICINE

## 2022-06-30 ENCOUNTER — EXTERNAL CHRONIC CARE MANAGEMENT (OUTPATIENT)
Dept: PRIMARY CARE CLINIC | Facility: CLINIC | Age: 77
End: 2022-06-30
Payer: MEDICARE

## 2022-06-30 PROCEDURE — 99439 CHRNC CARE MGMT STAF EA ADDL: CPT | Mod: S$PBB,GW,ICN, | Performed by: INTERNAL MEDICINE

## 2022-06-30 PROCEDURE — 99490 CHRNC CARE MGMT STAFF 1ST 20: CPT | Mod: S$PBB,GW,ICN, | Performed by: INTERNAL MEDICINE

## 2022-06-30 PROCEDURE — 99490 CHRNC CARE MGMT STAFF 1ST 20: CPT | Mod: PBBFAC,25 | Performed by: INTERNAL MEDICINE

## 2022-06-30 PROCEDURE — 99439 PR CHRONIC CARE MGMT, EA ADDTL 20 MIN: ICD-10-PCS | Mod: S$PBB,GW,ICN, | Performed by: INTERNAL MEDICINE

## 2022-06-30 PROCEDURE — 99439 CHRNC CARE MGMT STAF EA ADDL: CPT | Mod: PBBFAC | Performed by: INTERNAL MEDICINE

## 2022-06-30 PROCEDURE — 99490 PR CHRONIC CARE MGMT, 1ST 20 MIN: ICD-10-PCS | Mod: S$PBB,GW,ICN, | Performed by: INTERNAL MEDICINE

## 2022-07-31 ENCOUNTER — EXTERNAL CHRONIC CARE MANAGEMENT (OUTPATIENT)
Dept: PRIMARY CARE CLINIC | Facility: CLINIC | Age: 77
End: 2022-07-31
Payer: MEDICARE

## 2022-07-31 PROCEDURE — 99439 CHRNC CARE MGMT STAF EA ADDL: CPT | Mod: PBBFAC,27 | Performed by: INTERNAL MEDICINE

## 2022-07-31 PROCEDURE — 99490 PR CHRONIC CARE MGMT, 1ST 20 MIN: ICD-10-PCS | Mod: S$PBB,GW,ICN, | Performed by: INTERNAL MEDICINE

## 2022-07-31 PROCEDURE — 99439 CHRNC CARE MGMT STAF EA ADDL: CPT | Mod: S$PBB,GW,ICN, | Performed by: INTERNAL MEDICINE

## 2022-07-31 PROCEDURE — 99490 CHRNC CARE MGMT STAFF 1ST 20: CPT | Mod: S$PBB,GW,ICN, | Performed by: INTERNAL MEDICINE

## 2022-07-31 PROCEDURE — 99439 PR CHRONIC CARE MGMT, EA ADDTL 20 MIN: ICD-10-PCS | Mod: S$PBB,GW,ICN, | Performed by: INTERNAL MEDICINE

## 2022-07-31 PROCEDURE — 99490 CHRNC CARE MGMT STAFF 1ST 20: CPT | Mod: PBBFAC,25 | Performed by: INTERNAL MEDICINE

## 2022-08-19 ENCOUNTER — TELEPHONE (OUTPATIENT)
Dept: PRIMARY CARE CLINIC | Facility: CLINIC | Age: 77
End: 2022-08-19
Payer: MEDICARE

## 2022-08-19 DIAGNOSIS — T78.40XA ALLERGY, INITIAL ENCOUNTER: Primary | ICD-10-CM

## 2022-08-19 RX ORDER — FLUTICASONE PROPIONATE 50 MCG
1 SPRAY, SUSPENSION (ML) NASAL DAILY
Qty: 16 G | Refills: 3 | Status: SHIPPED | OUTPATIENT
Start: 2022-08-19

## 2022-08-19 NOTE — TELEPHONE ENCOUNTER
Called nd spoke to patient's daughter, Leonides.  Daughter stated that patient continues to have allergy symptoms and coughing,  but that she has not been using Flonase. Nasal spray.    Informed daughter that Dr. Portillo has sent prescription to [facility] in [place].  Daughter verbalizes understanding and states will  prescription.

## 2022-08-19 NOTE — TELEPHONE ENCOUNTER
Please call patient's daughter regarding her allergy symptoms. Is she using flonase nasal spray? That may help. I sent some to [facility] pharmacy in [place].    Thanks,  KJ Rachel Pallas Kathy Jo Carstarphen, MD  Phone Number: [phone]     Pt Name: Emilia Melgoza   Pt : 1945   Pt MRN: [id number]   Phone #: [phone]     Spoke with patient's daughter Leonides this afternoon. Leonides stated patient has been having coughing and sneezing for a few weeks and is still taking cough medicine. Johnniey stated she thinks it is allergy-related and has tried switching patient from Zyrtec to Allegra with no change to symptoms. Leonides asked that I pass this info along and also see if you have any further recommendations. Please reach out to the patient's daughter if needed. You can also call me and let me know if you have any further questions or concerns.     Thanks,   Rachel Pallas, LPN   [facility]   [phone]

## 2022-08-31 ENCOUNTER — EXTERNAL CHRONIC CARE MANAGEMENT (OUTPATIENT)
Dept: PRIMARY CARE CLINIC | Facility: CLINIC | Age: 77
End: 2022-08-31
Payer: COMMERCIAL

## 2022-08-31 PROCEDURE — 99439 PR CHRONIC CARE MGMT, EA ADDTL 20 MIN: ICD-10-PCS | Mod: S$PBB,GW,, | Performed by: INTERNAL MEDICINE

## 2022-08-31 PROCEDURE — 99490 CHRNC CARE MGMT STAFF 1ST 20: CPT | Mod: PBBFAC,25 | Performed by: INTERNAL MEDICINE

## 2022-08-31 PROCEDURE — 99490 CHRNC CARE MGMT STAFF 1ST 20: CPT | Mod: S$PBB,GW,, | Performed by: INTERNAL MEDICINE

## 2022-08-31 PROCEDURE — 99439 CHRNC CARE MGMT STAF EA ADDL: CPT | Mod: PBBFAC | Performed by: INTERNAL MEDICINE

## 2022-08-31 PROCEDURE — 99439 CHRNC CARE MGMT STAF EA ADDL: CPT | Mod: S$PBB,GW,, | Performed by: INTERNAL MEDICINE

## 2022-08-31 PROCEDURE — 99490 PR CHRONIC CARE MGMT, 1ST 20 MIN: ICD-10-PCS | Mod: S$PBB,GW,, | Performed by: INTERNAL MEDICINE

## 2022-09-09 ENCOUNTER — TELEPHONE (OUTPATIENT)
Dept: PRIMARY CARE CLINIC | Facility: CLINIC | Age: 77
End: 2022-09-09

## 2022-09-09 DIAGNOSIS — I65.23 ATHEROSCLEROSIS OF BOTH CAROTID ARTERIES: ICD-10-CM

## 2022-09-09 RX ORDER — ATORVASTATIN CALCIUM 40 MG/1
40 TABLET, FILM COATED ORAL NIGHTLY
Qty: 90 TABLET | Refills: 3 | Status: SHIPPED | OUTPATIENT
Start: 2022-09-09 | End: 2023-11-06

## 2022-09-09 NOTE — TELEPHONE ENCOUNTER
Atorvastatin sent to [facility]. KJ Rachel Pallas Kathy Jo Carstarphen, MD  Phone Number: [phone]     Pt Name: Emilia Melgoza   Pt : 1945   Pt MRN: [id number]   Phone #: [phone]     Spoke with patient's daughter Reevy this afternoon. Reevy stated patient is needing refills for atorvastatin, and the pharmacy was unable to fill without a new order. Please reach out to the patient's daughter if needed. You can also call me and let me know if you have any further questions or concerns.     Thanks,   Rachel Pallas, LPN   [facility]   [phone]

## 2022-09-30 ENCOUNTER — EXTERNAL CHRONIC CARE MANAGEMENT (OUTPATIENT)
Dept: PRIMARY CARE CLINIC | Facility: CLINIC | Age: 77
End: 2022-09-30
Payer: MEDICARE

## 2022-09-30 PROCEDURE — 99439 CHRNC CARE MGMT STAF EA ADDL: CPT | Mod: S$PBB,GW,ICN, | Performed by: INTERNAL MEDICINE

## 2022-09-30 PROCEDURE — 99439 PR CHRONIC CARE MGMT, EA ADDTL 20 MIN: ICD-10-PCS | Mod: S$PBB,GW,ICN, | Performed by: INTERNAL MEDICINE

## 2022-09-30 PROCEDURE — 99490 CHRNC CARE MGMT STAFF 1ST 20: CPT | Mod: PBBFAC | Performed by: INTERNAL MEDICINE

## 2022-09-30 PROCEDURE — 99490 CHRNC CARE MGMT STAFF 1ST 20: CPT | Mod: S$PBB,GW,ICN, | Performed by: INTERNAL MEDICINE

## 2022-09-30 PROCEDURE — 99490 PR CHRONIC CARE MGMT, 1ST 20 MIN: ICD-10-PCS | Mod: S$PBB,GW,ICN, | Performed by: INTERNAL MEDICINE

## 2022-09-30 PROCEDURE — 99439 CHRNC CARE MGMT STAF EA ADDL: CPT | Mod: PBBFAC | Performed by: INTERNAL MEDICINE

## 2022-10-31 ENCOUNTER — EXTERNAL CHRONIC CARE MANAGEMENT (OUTPATIENT)
Dept: PRIMARY CARE CLINIC | Facility: CLINIC | Age: 77
End: 2022-10-31
Payer: MEDICARE

## 2022-10-31 PROCEDURE — 99490 CHRNC CARE MGMT STAFF 1ST 20: CPT | Mod: PBBFAC | Performed by: INTERNAL MEDICINE

## 2022-11-30 ENCOUNTER — EXTERNAL CHRONIC CARE MANAGEMENT (OUTPATIENT)
Dept: PRIMARY CARE CLINIC | Facility: CLINIC | Age: 77
End: 2022-11-30
Payer: MEDICARE

## 2022-12-31 ENCOUNTER — EXTERNAL CHRONIC CARE MANAGEMENT (OUTPATIENT)
Dept: PRIMARY CARE CLINIC | Facility: CLINIC | Age: 77
End: 2022-12-31
Payer: MEDICARE

## 2022-12-31 PROCEDURE — 99490 CHRNC CARE MGMT STAFF 1ST 20: CPT | Mod: S$PBB,GW,, | Performed by: INTERNAL MEDICINE

## 2022-12-31 PROCEDURE — 99490 PR CHRONIC CARE MGMT, 1ST 20 MIN: ICD-10-PCS | Mod: S$PBB,GW,, | Performed by: INTERNAL MEDICINE

## 2023-01-31 ENCOUNTER — EXTERNAL CHRONIC CARE MANAGEMENT (OUTPATIENT)
Dept: PRIMARY CARE CLINIC | Facility: CLINIC | Age: 78
End: 2023-01-31
Payer: OTHER MISCELLANEOUS

## 2023-01-31 PROCEDURE — 99439 PR CHRONIC CARE MGMT, EA ADDTL 20 MIN: ICD-10-PCS | Mod: GW,S$PBB,, | Performed by: INTERNAL MEDICINE

## 2023-01-31 PROCEDURE — 99490 CHRNC CARE MGMT STAFF 1ST 20: CPT | Mod: GW,S$PBB,, | Performed by: INTERNAL MEDICINE

## 2023-01-31 PROCEDURE — 99439 CHRNC CARE MGMT STAF EA ADDL: CPT | Mod: GW,S$PBB,, | Performed by: INTERNAL MEDICINE

## 2023-01-31 PROCEDURE — 99490 PR CHRONIC CARE MGMT, 1ST 20 MIN: ICD-10-PCS | Mod: GW,S$PBB,, | Performed by: INTERNAL MEDICINE

## 2023-01-31 PROCEDURE — 99439 CHRNC CARE MGMT STAF EA ADDL: CPT | Mod: PBBFAC,27 | Performed by: INTERNAL MEDICINE

## 2023-01-31 PROCEDURE — 99490 CHRNC CARE MGMT STAFF 1ST 20: CPT | Mod: PBBFAC,25 | Performed by: INTERNAL MEDICINE

## 2023-02-16 ENCOUNTER — HOSPITAL ENCOUNTER (EMERGENCY)
Facility: HOSPITAL | Age: 78
Discharge: HOME | End: 2023-02-16
Attending: EMERGENCY MEDICINE
Payer: MEDICARE

## 2023-02-16 VITALS
DIASTOLIC BLOOD PRESSURE: 73 MMHG | HEART RATE: 72 BPM | OXYGEN SATURATION: 98 % | SYSTOLIC BLOOD PRESSURE: 129 MMHG | RESPIRATION RATE: 18 BRPM | TEMPERATURE: 99 F

## 2023-02-16 DIAGNOSIS — Z09 STATUS POST GASTROSTOMY TUBE PLACEMENT, FOLLOW-UP EXAM: Primary | ICD-10-CM

## 2023-02-16 PROCEDURE — 43762 RPLC GTUBE NO REVJ TRC: CPT

## 2023-02-16 PROCEDURE — 25500020 PHARM REV CODE 255: Performed by: EMERGENCY MEDICINE

## 2023-02-16 PROCEDURE — 99284 EMERGENCY DEPT VISIT MOD MDM: CPT | Mod: 25

## 2023-02-16 RX ADMIN — DIATRIZOATE MEGLUMINE AND DIATRIZOATE SODIUM 120 ML: 660; 100 LIQUID ORAL; RECTAL at 11:02

## 2023-02-16 NOTE — ED PROVIDER NOTES
Encounter Date: 2/16/2023    SCRIBE #1 NOTE: I, Sondra May, am scribing for, and in the presence of,  Fabian Linder Jr., MD. I have scribed the following portions of the note - Other sections scribed: HPI,ROS.     History     Chief Complaint   Patient presents with    tube     Pt presents to ED via [facility] for peg tube replacement   Pt is non verbal pt does respond to voice      CC: peg tube replacement    HPI: This is a 77 y.o.female patient, with a PMHx of Allergy, DDD, HTN, and Stroke ( right-sided weakness), presenting to the ED via [facility] for further evaluation of peg tube replacement. Patient's peg tube fell out PTA. History is limited due to: patient is nonverbal. Patient is allergic to Enoxaparin, Lisinopril and Penicillin.        The history is provided by the EMS personnel. The history is limited by the condition of the patient (patient is nonverbal).   Review of patient's allergies indicates:   Allergen Reactions    Enoxaparin Other (See Comments) and Hives     Slurred speech per patient  Slurred speech per patient    Lisinopril Other (See Comments)     Cough    Penicillins Rash     Past Medical History:   Diagnosis Date    Allergy     DDD (degenerative disc disease), lumbar     Chronic LBP and intermittent RLE radicular pain x 10 yrs    Hypertension     Overactive bladder     Senile cataract, unspecified - Both Eyes 6/11/2013    Stroke     right sided weakness     Past Surgical History:   Procedure Laterality Date    bladder mesh      BREAST BIOPSY Left     CATARACT EXTRACTION W/  INTRAOCULAR LENS IMPLANT Right 10/13/2014    Dr Crystal    CATARACT EXTRACTION W/  INTRAOCULAR LENS IMPLANT Left 11/10/2014    Dr Crystal    ESOPHAGOGASTRODUODENOSCOPY N/A 3/2/2022    Procedure: EGD (ESOPHAGOGASTRODUODENOSCOPY);  Surgeon: Ottoniel Granger MD;  Location: [facility];  Service: Endoscopy;  Laterality: N/A;    HEMORRHOID SURGERY      HYSTERECTOMY      INCONTINENCE SURGERY      OOPHORECTOMY      Right Rotator Cuff  Repair      TRACHEOTOMY N/A 2020    Procedure: TRACHEOTOMY;  Surgeon: Marco Shine III, MD;  Location: [facility];  Service: General;  Laterality: N/A;     Family History   Problem Relation Age of Onset    Hypertension Mother     Breast cancer Mother     Dementia Mother     Colon cancer Father     Hypertension Father     Rhinitis Daughter     Rhinitis Daughter     No Known Problems Sister     No Known Problems Brother     No Known Problems Maternal Aunt     No Known Problems Maternal Uncle     No Known Problems Paternal Aunt     No Known Problems Paternal Uncle     No Known Problems Maternal Grandmother     No Known Problems Maternal Grandfather     No Known Problems Paternal Grandmother     No Known Problems Paternal Grandfather     Amblyopia Neg Hx     Blindness Neg Hx     Cancer Neg Hx     Cataracts Neg Hx     Diabetes Neg Hx     Glaucoma Neg Hx     Macular degeneration Neg Hx     Retinal detachment Neg Hx     Strabismus Neg Hx     Stroke Neg Hx     Thyroid disease Neg Hx      Social History     Tobacco Use    Smoking status: Former     Types: Cigarettes     Quit date: 1992     Years since quittin.1    Smokeless tobacco: Never   Substance Use Topics    Alcohol use: No     Alcohol/week: 0.0 standard drinks    Drug use: No     Review of Systems   Unable to perform ROS: Patient nonverbal     Physical Exam     Initial Vitals [23 0758]   BP Pulse Resp Temp SpO2   118/75 70 18 98.7 °F (37.1 °C) 96 %      MAP       --         Physical Exam    Nursing note and vitals reviewed.  Constitutional: She appears well-developed and well-nourished. She is not diaphoretic. No distress.   HENT:   Head: Normocephalic and atraumatic.   Right Ear: External ear normal.   Left Ear: External ear normal.   Nose: Nose normal.   Mouth/Throat: Oropharynx is clear and moist.   Eyes: Conjunctivae and EOM are normal. Pupils are equal, round, and reactive to light. Right eye exhibits no discharge. Left eye exhibits no  discharge. No scleral icterus.   Neck: Neck supple.   Normal range of motion.  Cardiovascular:  Normal rate, regular rhythm, normal heart sounds and intact distal pulses.           No murmur heard.  Pulmonary/Chest: Breath sounds normal. No respiratory distress. She has no wheezes. She has no rhonchi. She has no rales.   Abdominal: Abdomen is soft. Bowel sounds are normal. She exhibits no distension and no mass. There is no abdominal tenderness.   Abdomen is soft and nondistended, without any objective signs of discomfort with palpation.  Bowel sounds are normal.  There is a stoma with small amount of blood around the edge. There is no rebound and no guarding.   Musculoskeletal:         General: No tenderness or edema. Normal range of motion.      Cervical back: Normal range of motion and neck supple.     Neurological: She is alert.   Patient is alert.  She is nonverbal.  She is contracted.   Skin: Skin is warm and dry. No rash noted. No erythema. No pallor.   Psychiatric: She has a normal mood and affect. Her behavior is normal. Judgment and thought content normal.       ED Course   Procedures  Labs Reviewed - No data to display       Imaging Results              XR NG/OG tube placement check, non-radiologist performed (Final result)  Result time 02/16/23 11:33:31   Procedure changed from XR Small Bowel Follow Through     Final result by Josué Ames MD (02/16/23 11:33:31)                   Impression:      Contrast opacifies the stomach and duodenum suggesting intraluminal positioning of percutaneous gastrostomy tube.      Electronically signed by: Josué Ames MD  Date:    02/16/2023  Time:    11:33               Narrative:    EXAMINATION:  XR NG/OG TUBE PLACEMENT CHECK, NON-RADIOLOGIST PERFORMED    CLINICAL HISTORY:  contrast study for PEG tube replacement;    TECHNIQUE:  One view of the abdomen after administration of water-soluble contrast via the patient's existing feeding  tube.    COMPARISON:  Abdominal radiograph, 02/16/2023.    FINDINGS:  Percutaneous gastrostomy tube is present.  Contrast opacifies the stomach and duodenum.  Bowel gas pattern is stable.  Moderate stool burden in the colon.  Bones are stable.                                       XR NG/OG tube placement check, non-radiologist performed (Final result)  Result time 02/16/23 10:22:37      Final result by Jose E Anderson MD (02/16/23 10:22:37)                   Impression:      As above.      Electronically signed by: Jose E Anderson MD  Date:    02/16/2023  Time:    10:22               Narrative:    EXAMINATION:  XR NG/OG TUBE PLACEMENT CHECK, NON-RADIOLOGIST PERFORMED    CLINICAL HISTORY:  PEG tube replacement;    TECHNIQUE:  AP abdomen x-ray for NG/OG tube check.    COMPARISON:  07/22/2021    FINDINGS:  There is no NG/OG tube visualized in the abdomen.  Percutaneous gastrostomy catheter identified.  Bowel gas pattern is nonspecific.  Copious stool seen within the rectum.  No evidence for pneumoperitoneum.  Note made of degenerative changes of the lower lumbar spine.                                    X-Rays:   Independently Interpreted Readings:   Other Readings:  X-ray with contrast reveals good placement of PEG tube.  Medications   diatrizoate meglumineand-diatrizoate sodium (GASTROVIEW) solution 120 mL (120 mLs Per J Tube Given 2/16/23 1101)     Medical Decision Making:   History:   Old Medical Records: I decided to obtain old medical records.  ED Management:  This is the emergent evaluation of a 77-year-old female presents emergency department for evaluation of PEG tube that fell out prior to arrival.  Examined the patient reveals stoma with no hemorrhage.  Largest size replacement tube was 20 F as confirmed by discussion with Gastroenterology.  This was replaced and confirmed to be in position by x-ray with contrast injection.  Patient is stable for discharge.        Scribe Attestation:   Scribe #1: I performed  the above scribed service and the documentation accurately describes the services I performed. I attest to the accuracy of the note.                   Clinical Impression:   Final diagnoses:  [Z09] Status post gastrostomy tube placement, follow-up exam (Primary)        ED Disposition Condition    Discharge Stable          ED Prescriptions    None       Follow-up Information    None         I, Fabian Linder MD, personally performed the services described in this documentation. All medical record entries made by the scribe were at my direction and in my presence. I have reviewed the chart and agree that the record reflects my personal performance and is accurate and complete.       Fabian Linder Jr., MD  02/16/23 [id number]

## 2023-02-28 ENCOUNTER — EXTERNAL CHRONIC CARE MANAGEMENT (OUTPATIENT)
Dept: PRIMARY CARE CLINIC | Facility: CLINIC | Age: 78
End: 2023-02-28
Payer: MEDICARE

## 2023-02-28 PROCEDURE — 99490 PR CHRONIC CARE MGMT, 1ST 20 MIN: ICD-10-PCS | Mod: GW,S$PBB,, | Performed by: INTERNAL MEDICINE

## 2023-02-28 PROCEDURE — 99439 CHRNC CARE MGMT STAF EA ADDL: CPT | Mod: GW,S$PBB,, | Performed by: INTERNAL MEDICINE

## 2023-02-28 PROCEDURE — 99439 CHRNC CARE MGMT STAF EA ADDL: CPT | Mod: PBBFAC,27 | Performed by: INTERNAL MEDICINE

## 2023-02-28 PROCEDURE — 99490 CHRNC CARE MGMT STAFF 1ST 20: CPT | Mod: PBBFAC | Performed by: INTERNAL MEDICINE

## 2023-02-28 PROCEDURE — 99490 CHRNC CARE MGMT STAFF 1ST 20: CPT | Mod: GW,S$PBB,, | Performed by: INTERNAL MEDICINE

## 2023-02-28 PROCEDURE — 99439 PR CHRONIC CARE MGMT, EA ADDTL 20 MIN: ICD-10-PCS | Mod: GW,S$PBB,, | Performed by: INTERNAL MEDICINE

## 2023-03-07 ENCOUNTER — TELEPHONE (OUTPATIENT)
Dept: PRIMARY CARE CLINIC | Facility: CLINIC | Age: 78
End: 2023-03-07
Payer: MEDICARE

## 2023-03-07 NOTE — TELEPHONE ENCOUNTER
----- Message from Donna Britt sent at 3/7/2023 12:14 PM CST -----  Contact: Krysta Aguilar  Type:  Sooner Appointment Request      Name of Caller:Krysta Aguilar  When is the first available appointment?Private scheduled  Would the patient rather a call back or a response via [facility]? Call back  Best Call Back Number:[phone]  Additional Information: Please assist

## 2023-03-07 NOTE — TELEPHONE ENCOUNTER
----- Message from Donna Britt sent at 3/7/2023 12:14 PM CST -----  Contact: Krysta Aguilar  Type:  Sooner Appointment Request      Name of Caller:Krysta Aguilar  When is the first available appointment?Private scheduled  Would the patient rather a call back or a response via [facility]? Call back  Best Call Back Number:[phone]  Additional Information: Please assist

## 2023-03-24 ENCOUNTER — OFFICE VISIT (OUTPATIENT)
Dept: PRIMARY CARE CLINIC | Facility: CLINIC | Age: 78
End: 2023-03-24
Payer: MEDICARE

## 2023-03-24 ENCOUNTER — TELEPHONE (OUTPATIENT)
Dept: PRIMARY CARE CLINIC | Facility: CLINIC | Age: 78
End: 2023-03-24

## 2023-03-24 DIAGNOSIS — G93.40 ACUTE ENCEPHALOPATHY: Primary | ICD-10-CM

## 2023-03-24 PROCEDURE — 99499 UNLISTED E&M SERVICE: CPT | Mod: 95,,, | Performed by: INTERNAL MEDICINE

## 2023-03-24 PROCEDURE — 99499 NO LOS: ICD-10-PCS | Mod: 95,,, | Performed by: INTERNAL MEDICINE

## 2023-03-24 NOTE — PROGRESS NOTES
"[facility] Primary [facility] Provider Telemedicine Appointment  Shared Note: Ama Mckeon (MD Trainee) & Dr Lolita Portillo (Attending)    The patient location is:  Patient Home   The chief complaint leading to consultation is: routine follow up  Total time spent with patient: 40 minutes    Visit type: Virtual visit with synchronous audio only and video  Each patient to whom he or she provides medical services by telemedicine is:  (1) informed of the relationship between the physician and patient and the respective role of any other health care provider with respect to management of the patient; and (2) notified that he or she may decline to receive medical services by telemedicine and may withdraw from such care at any time.      Subjective:      Patient ID: Emilia Melgoza is a 77 y.o. female 76 y.o. female w/ pmhx of quadriplegia, encephalopathy, hypoxia, failure to thrive, s/p PEG tube placement.    Chief Complaint: hospice    Prior to this visit, patient's last encounter with PCP was 3/10/2022.    Spoke with Ms Duran, daughter, as patient is nonverbal and bed bound. Sisters and niece also help with care. She would be happy to speak with social work about the Care Ecosystem study.    Recent ED visit for PEG tube replacement. Everything has been ok since then "as much as it can be ok".   Allergies have been acting up. She has been giving mother zyrtec for a while. Will try allegra that was called into pharmacy previously by Dr Portillo 2/17/2023. Cough that comes and goes, suction secretions with that. Sometimes she has difficulty swallowing. Doesn't eat much by mouth, has a feeding tube. No change in bowels. No changes in urination.  She is up and down with mental status.     Still happy with care they are receiving from home hospice. Nurse comes out twice a week, CNA comes out three times a week.     No bed sores.     She has nightmares about 4 times/week, sleep is off and on. Breathing can be erratic. " Sometimes will be on oxygen. Patient's daughter has noticed that the need for oxygen is always at night. Discussed ensuring patient is situated comfortably in bed.    Advance Care Planning   Patient is enrolled in hospice and family is enjoying the support. They elect to continue this.    Past Surgical History:   Procedure Laterality Date    bladder mesh      BREAST BIOPSY Left     CATARACT EXTRACTION W/  INTRAOCULAR LENS IMPLANT Right 10/13/2014    Dr Crystal    CATARACT EXTRACTION W/  INTRAOCULAR LENS IMPLANT Left 11/10/2014    Dr Crystal    ESOPHAGOGASTRODUODENOSCOPY N/A 3/2/2022    Procedure: EGD (ESOPHAGOGASTRODUODENOSCOPY);  Surgeon: Ottoniel Granger MD;  Location: [facility];  Service: Endoscopy;  Laterality: N/A;    HEMORRHOID SURGERY      HYSTERECTOMY      INCONTINENCE SURGERY      OOPHORECTOMY      Right Rotator Cuff Repair      TRACHEOTOMY N/A 1/2/2020    Procedure: TRACHEOTOMY;  Surgeon: Marco Shine III, MD;  Location: [facility] OR;  Service: General;  Laterality: N/A;       Past Medical History:   Diagnosis Date    Allergy     DDD (degenerative disc disease), lumbar     Chronic LBP and intermittent RLE radicular pain x 10 yrs    Hypertension     Overactive bladder     Senile cataract, unspecified - Both Eyes 6/11/2013    Stroke     right sided weakness       Review of Systems   Constitutional:  Positive for appetite change.        Seems to be taking a little less in PEG tube   HENT:  Positive for postnasal drip.    Respiratory:  Positive for cough.    Cardiovascular:  Negative for leg swelling.   Gastrointestinal: Negative.  Negative for abdominal pain, anal bleeding, blood in stool, constipation, diarrhea, nausea and vomiting.   Endocrine: Negative.  Negative for polydipsia and polyuria.   Genitourinary: Negative.    Musculoskeletal: Negative.    Skin: Negative.    Allergic/Immunologic: Positive for environmental allergies. Negative for food allergies and immunocompromised state.   Neurological: Negative.   "  Hematological: Negative.    Psychiatric/Behavioral:  Positive for sleep disturbance.         Nightmares >4 nights/week     Objective:   There were no vitals taken for this visit.    Physical Exam  Vitals reviewed: in bed, bed bound.   HENT:      Head: Normocephalic.   Pulmonary:      Effort: Pulmonary effort is normal.      Comments: Non productive cough  Abdominal:      Comments: PEG tube present, site non erythematous   Musculoskeletal:         General: No swelling or signs of injury.   Skin:     Findings: No bruising.   Neurological:      Mental Status: She is alert. Mental status is at baseline.      Comments: nonverbal       Lab Results   Component Value Date    WBC 9.49 02/28/2022    HGB 10.9 (L) 02/28/2022    HCT 34.1 (L) 02/28/2022     02/28/2022    CHOL 164 09/14/2019    TRIG 95 09/14/2019    HDL 73 09/14/2019    ALT 18 03/01/2022    AST 25 03/01/2022     (L) 03/03/2022    K 3.4 (L) 03/03/2022     03/03/2022    CREATININE 0.6 03/03/2022    BUN 10 03/03/2022    CO2 22 (L) 03/03/2022    TSH 0.460 07/02/2020    INR 1.0 12/29/2019    HGBA1C 5.6 12/26/2019         Assessment:   77 y.o. female with multiple co-morbid illnesses here to continue work-up of chronic issues.     Plan:     Pulmonary      Chronic bronchitis, unspecified chronic bronchitis type       Uses O2 PRN at night, is bedbound  Continue supportive care for this                   Cardiac/Vascular     Chronic diastolic congestive heart failure       Echo results in 12/2019: "Normal left ventricular systolic function. The estimated ejection fraction is 65%. Concentric left ventricular hypertrophy. Left ventricular diastolic dysfunction."  Supportive care                 Aortic atherosclerosis                           Hematology     Senile purpura       Ecchymoses and purpura on UE bilaterally  Supportive care                   Oncology     Meningioma       Stable on last MRI in 2019  Supportive care                   Endocrine "     Type 2 diabetes mellitus with diabetic neuropathy, without long-term current use of insulin       Historical A1c >6.3 (prior to 2013), now is 5.6 with dietary changes, and homebound status  No longer prudent to monitor                   GI     PEG (percutaneous endoscopic gastrostomy) status - Primary       PEG placed for stroke treatment. Replaced in 3/2022  Recent ED presentation for PEG replacement  Admit to hospice                          Orthopedic     Acquired absence of other left toe(s)       S/P amputation  Supportive care                   Other     Functional quadriplegia       Bedbound following CVA in 12/2019  Re-enrolled in hospice        Allergies  - environmental  - daughter will  Allegra prescription previously sent by Dr. Portillo 2/17/2023      Health Maintenance         Date Due Completion Date    Hepatitis C Screening Never done ---    COVID-19 Vaccine (1) Never done ---    Shingles Vaccine (2 of 3) 08/27/2013 7/2/2013    DEXA Scan 03/02/2020 3/2/2016    Override on 6/15/2012: Done    Influenza Vaccine (1) 09/01/2022 11/1/2019    TETANUS VACCINE 10/06/2026 10/6/2016            No follow-ups on file. 20min spent with this patient today, issues addressed: hospice, recent ed presentation, environmental allergies, night time disturbances    Lolita Portillo MD/MPH  [facility]  [phone]

## 2023-03-31 ENCOUNTER — EXTERNAL CHRONIC CARE MANAGEMENT (OUTPATIENT)
Dept: PRIMARY CARE CLINIC | Facility: CLINIC | Age: 78
End: 2023-03-31
Payer: MEDICARE

## 2023-03-31 PROCEDURE — 99490 CHRNC CARE MGMT STAFF 1ST 20: CPT | Mod: PBBFAC | Performed by: INTERNAL MEDICINE

## 2023-03-31 PROCEDURE — 99490 PR CHRONIC CARE MGMT, 1ST 20 MIN: ICD-10-PCS | Mod: GW,S$PBB,, | Performed by: INTERNAL MEDICINE

## 2023-03-31 PROCEDURE — 99490 CHRNC CARE MGMT STAFF 1ST 20: CPT | Mod: GW,S$PBB,, | Performed by: INTERNAL MEDICINE

## 2023-03-31 PROCEDURE — 99439 CHRNC CARE MGMT STAF EA ADDL: CPT | Mod: GW,S$PBB,, | Performed by: INTERNAL MEDICINE

## 2023-03-31 PROCEDURE — 99439 CHRNC CARE MGMT STAF EA ADDL: CPT | Mod: PBBFAC,27 | Performed by: INTERNAL MEDICINE

## 2023-03-31 PROCEDURE — 99439 PR CHRONIC CARE MGMT, EA ADDTL 20 MIN: ICD-10-PCS | Mod: GW,S$PBB,, | Performed by: INTERNAL MEDICINE

## 2023-04-30 ENCOUNTER — EXTERNAL CHRONIC CARE MANAGEMENT (OUTPATIENT)
Dept: PRIMARY CARE CLINIC | Facility: CLINIC | Age: 78
End: 2023-04-30
Payer: MEDICARE

## 2023-04-30 PROCEDURE — 99490 CHRNC CARE MGMT STAFF 1ST 20: CPT | Mod: S$PBB,GW,, | Performed by: INTERNAL MEDICINE

## 2023-04-30 PROCEDURE — 99490 PR CHRONIC CARE MGMT, 1ST 20 MIN: ICD-10-PCS | Mod: S$PBB,GW,, | Performed by: INTERNAL MEDICINE

## 2023-04-30 PROCEDURE — 99490 CHRNC CARE MGMT STAFF 1ST 20: CPT | Mod: PBBFAC | Performed by: INTERNAL MEDICINE

## 2023-05-30 NOTE — NURSING
13 weeks 5 day multip.  Prenatal labs were reviewed.  She will need a rubella booster before discharge after the baby is born.  Her first trimester US was consistent with her due date.  Carrier testing and genetic testing were negative.  She knows it showed a girl.  She has some round ligament pain on the left side.  If it were to worsen she knows she should head into the ER.  She denies constipation.  Follow up in 4 weeks.   Patient admitted with espino, 24fr. Espino cath came out on previous shift, new espino inserted size 24fr, same size patient originally was admitted with. Patient tolerated fine, urine returned noted.

## 2023-05-31 ENCOUNTER — EXTERNAL CHRONIC CARE MANAGEMENT (OUTPATIENT)
Dept: PRIMARY CARE CLINIC | Facility: CLINIC | Age: 78
End: 2023-05-31
Payer: OTHER MISCELLANEOUS

## 2023-05-31 PROCEDURE — 99490 CHRNC CARE MGMT STAFF 1ST 20: CPT | Mod: S$PBB,,, | Performed by: INTERNAL MEDICINE

## 2023-05-31 PROCEDURE — 99490 CHRNC CARE MGMT STAFF 1ST 20: CPT | Mod: PBBFAC | Performed by: INTERNAL MEDICINE

## 2023-05-31 PROCEDURE — 99490 PR CHRONIC CARE MGMT, 1ST 20 MIN: ICD-10-PCS | Mod: S$PBB,,, | Performed by: INTERNAL MEDICINE

## 2023-06-07 ENCOUNTER — OFFICE VISIT (OUTPATIENT)
Dept: PRIMARY CARE CLINIC | Facility: CLINIC | Age: 78
End: 2023-06-07
Payer: MEDICARE

## 2023-06-07 DIAGNOSIS — I69.920 APHASIA, LATE EFFECT OF CEREBROVASCULAR DISEASE: ICD-10-CM

## 2023-06-07 PROCEDURE — 99441 PR PHYSICIAN TELEPHONE EVALUATION 5-10 MIN: ICD-10-PCS | Mod: GW,95,, | Performed by: INTERNAL MEDICINE

## 2023-06-07 PROCEDURE — 99441 PR PHYSICIAN TELEPHONE EVALUATION 5-10 MIN: CPT | Mod: GW,95,, | Performed by: INTERNAL MEDICINE

## 2023-06-07 NOTE — PROGRESS NOTES
Established Patient - Audio Only Telehealth Visit with [facility] Provider  Shared Note: Brenda Andujar (NP student) & Dr Lolita Portillo (Attending)     The patient location is: HOME  The chief complaint leading to consultation is: routine care  Visit type: Virtual visit with audio only (telephone)     The reason for the audio only service rather than synchronous audio and video virtual visit was related to technical difficulties or patient preference/necessity.     Each patient to whom I provide medical services by telemedicine is:  (1) informed of the relationship between the physician and patient and the respective role of any other health care provider with respect to management of the patient; and (2) notified that they may decline to receive medical services by telemedicine and may withdraw from such care at any time. Patient verbally consented to receive this service via voice-only telephone call.       Subjective:      Patient ID: Emilia Melgoza is a 77 y.o. female 76 y.o. female w/ pmhx of quadriplegia, encephalopathy, hypoxia, failure to thrive, s/p PEG tube placement.    Prior to this visit, patient's last encounter with PCP was 3/24/2023.    Chief Complaint: Hospice follow-up    Prior to this visit, patient's last encounter with PCP was 3/10/2022.     Spoke with Ms Duran, daughter, as patient is nonverbal and bed bound. The patient's sisters and niece continue to help with patient care.     Patient required ED evaluation on 02/16/2023 due to dislodged PEG tube. Daughter reports no other issues surrounding PEG tube since this visit. Patient has required no other ER visits.     Daughter reports patient with increasing coughing over the past 3-4 weeks that is productive of clear mucous. States that she has tried to give her mother cough syrup through her PEG tube without any improvement in symptoms. Suctioning does help clear the secretions. Discussed continuation of suctioning for secretion  "management and ensuring that patient is positioned appropriately to assist in maintaining airway. Daughter reports that patient mentation varies from day to day; reports some days are more "quiet" than others but overall feels that her mentation is unchanged since last telephone visit. S    Sleep disturbance has improved somewhat but daughter reports patient "cries at night sometimes" or "has bad dreams". These episodes are occurring 3-4 x per week.    Primary nutrition remains via PEG tube as patient is unable to tolerate anything by mouth. Denies changes in urinary or bowel habits.     Denies skin breakdown or wounds. Daughter reports "skin is thin and I can feel her pelvic bone now." Daughter endorses, " we are very careful when we move her and we are trying to keep her from getting sores."    Oxygen use has decreased at this time, reports supplemental oxygen is still needed at night which "seems to make her feel better if she is breathing heavy." Reinforced importance of maintain proper positioning of patient in bed to maintain airway.     In regards to patient allergies, daughter reports symptoms are mostly controlled and administers daily Allegra dose via her peg tube.     Daughter remains satisfied with hospice care that patient is receiving. Reports patient gets a bath 3x/ week with CNA. Nurse recently cut back to coming only once per week. States, "it is such a big help." Denies any current needs at this time. Instructed to contact provider if any issues or concerns arises.         Advance Care Planning   Patient is enrolled in hospice and family is enjoying the support. They elect to continue this care.        4Ms for Medical Decision-Making in Older Adults    Last Completed EAWV: None    MOBILITY:  Get Up and Go:  Get Up and Go 11/14/2014   Average 25.67     Activities of Daily Living:  No flowsheet data found.  Whisper Test:  No flowsheet data found.  Disability Status:  Disability Status 10/13/2014 "   Are you deaf or do you have serious difficulty hearing? No   Are you blind or do you have serious difficulty seeing, even when wearing glasses? No   Because of a physical, mental, or emotional condition, do you have serious difficulty concentrating, remembering, or making decisions? Yes   Do you have serious difficulty walking or climbing stairs? No   Do you have difficulty dressing or bathing? No     Nutrition Screening:  No flowsheet data found. Screening Score: 0-7 Malnourished, 8-11 At Risk, 12-14 Normal    MENTATION:   Depression Patient Health Questionnaire:  Depression Patient Health Questionnaire 3/24/2023   Over the last two weeks how often have you been bothered by little interest or pleasure in doing things (No Data)   Over the last two weeks how often have you been bothered by feeling down, depressed or hopeless (No Data)   PHQ-2 Total Score -   PHQ-9 Total Score -     Has Dementia Dx: No    Cognitive Function Screening:  No flowsheet data found.  Cognitive Function Screening Total - Less than 4 = Abnormal,  Greater than or equal to 4 = Normal    MEDICATIONS:  High Risk Medications:  Total Active Medications: 1  hydrocodone-apap 7.5-325 MG/15 ML - 7.5-325 mg/15 mL    WHAT MATTERS MOST:  Advance Care Planning   ACP Status:   Patient has had an ACP conversation  Living Will: No  Power of : No  LaPOST: No    What is most important right now is to focus on spending time at home and comfort and QOL     Accordingly, we have decided that the best plan to meet the patient's goals includes enrolling in hospice care      What matters most to patient today is: getting support                 Social History     Socioeconomic History    Marital status:     Number of children: 3   Tobacco Use    Smoking status: Former     Types: Cigarettes     Quit date: 1992     Years since quittin.4    Smokeless tobacco: Never   Substance and Sexual Activity    Alcohol use: No     Alcohol/week: 0.0  standard drinks    Drug use: No    Sexual activity: Not Currently   Social History Narrative    Retired       Review of Systems   Constitutional:  Positive for activity change. Negative for chills and fever.   HENT:  Positive for postnasal drip.    Respiratory:  Positive for cough.    Gastrointestinal:  Negative for constipation, diarrhea, nausea and vomiting.   Allergic/Immunologic: Positive for environmental allergies.   Neurological:  Positive for weakness.   Psychiatric/Behavioral:  Positive for sleep disturbance.      Objective:     Lab Results   Component Value Date    WBC 9.49 02/28/2022    HGB 10.9 (L) 02/28/2022    HCT 34.1 (L) 02/28/2022     02/28/2022    CHOL 164 09/14/2019    TRIG 95 09/14/2019    HDL 73 09/14/2019    ALT 18 03/01/2022    AST 25 03/01/2022     (L) 03/03/2022    K 3.4 (L) 03/03/2022     03/03/2022    CREATININE 0.6 03/03/2022    BUN 10 03/03/2022    CO2 22 (L) 03/03/2022    TSH 0.460 07/02/2020    INR 1.0 12/29/2019    HGBA1C 5.6 12/26/2019         Assessment:   77 y.o. female with multiple co-morbid illnesses here to continue work-up of chronic issues.     Plan:     Problem List Items Addressed This Visit          Neuro    Aphasia, late effect of cerebrovascular disease     Patient bedbound and aphasic  Continue hospice support            Health Maintenance         Date Due Completion Date    Hepatitis C Screening Never done ---    COVID-19 Vaccine (1) Never done ---    Shingles Vaccine (2 of 3) 08/27/2013 7/2/2013    DEXA Scan 03/02/2020 3/2/2016    Override on 6/15/2012: Done    Influenza Vaccine (Season Ended) 09/01/2023 11/1/2019    TETANUS VACCINE 10/06/2026 10/6/2016            No follow-ups on file. . Thirty minutes spent with this patient today in a non-face to face encounter.    Lolita Portillo MD/MPH  Internal Medicine  [facility]  Spectra [phone]    This service was not originating from a related E/M service  provided within the previous 7 days nor will  to an E/M service or procedure within the next 24 hours or my soonest available appointment.  Prevailing standard of care was able to be met in this audio-only visit.

## 2023-06-30 ENCOUNTER — EXTERNAL CHRONIC CARE MANAGEMENT (OUTPATIENT)
Dept: PRIMARY CARE CLINIC | Facility: CLINIC | Age: 78
End: 2023-06-30
Payer: OTHER MISCELLANEOUS

## 2023-06-30 PROCEDURE — 99490 CHRNC CARE MGMT STAFF 1ST 20: CPT | Mod: PBBFAC | Performed by: INTERNAL MEDICINE

## 2023-06-30 PROCEDURE — 99490 PR CHRONIC CARE MGMT, 1ST 20 MIN: ICD-10-PCS | Mod: S$PBB,GW,, | Performed by: INTERNAL MEDICINE

## 2023-06-30 PROCEDURE — 99490 CHRNC CARE MGMT STAFF 1ST 20: CPT | Mod: S$PBB,GW,, | Performed by: INTERNAL MEDICINE

## 2023-07-02 NOTE — PROGRESS NOTES
Occupational Therapy Daily Note - Hand, Wrist, and/or Elbow Condition      Date: 2018    Name: Emilia Melgoza  YOB: 1945  Chart Number: [id number]  Referring Physician: Raisa Dudley MD  Diagnosis:   1. Weakness as late effect of cerebrovascular accident (CVA)      2. Coordination impairment         ICD 10:   I69.351 (ICD-10-CM) - Hemiparesis affecting right side as late effect of cerebrovascular accident   G31.84 (ICD-10-CM) - MCI (mild cognitive impairment)         Involved Side: right  Hand Dominance: ambidextrous   Date of Onset:   Date of Injury/Surgery:  RTC repair right shoulder   Surgical Procedure: No recent surgery's   Mechanism of Injury: Stroke  Additional Info: Pt is accompanied by daughter who provided PMH  Date of Return to MD:    Prior Functional Status: Prior to stroke full. Current status limited  Occupation: retired  Employer: retired   Job Duties/Responsibilities: NA  Current Work Status: NA  Home Environment: Pt lives with  she has five children who all take care of mother  Leisure/Social Activities: Playing computer, going outside   Cultural/Spiritual: no barriers  Barriers to Learning: significant aphasia, gait difficulty, praxis   Hearing/Vision Loss: WNL      Visit #: . Visits  on 18.     Subjective     Pt nods to confirm she is feeling well    Pain Report (severity and location)  Current: 0 out of 10  With activity: 0 out of 10   Report of Functional Deficits/Chief Complaints: Pt reports no pain     Objective   Observation: pt has guarded posture with right UE      Treatment   PROM stretching to GH joint in supine on high low table to pt patricia.   Side lying external rotation against gravity. +scapular mobilizations Grade 2  Scapular mobilizations to patient patricia. Supine  Scapular retractions x 20 red thera band  Wand overhead 2 lb x 20    Towel slide stretch to GH joint hand to Loosen GH joint into flexion  Lifting 2 lb ball to hoop  and drop with involved UE  Corner stretch x 20 second hold x 2  Arm extensions with green thera-band  Reaching overhead into shelf x 20 with weighs 2-5 lbs   Big red ball rolling on table for GH flexion stretch x 3min  Red thera-band external rotation in seated, chest press seated, horizontal abduction adduction x 10 each   Charges   Start Time: 10:10 am   End Time: 10:50 am  Total Time: 40 group     Initial eval-[id number]    Fluidotherapy-[id number]     Paraffin-17184     Moist Hot Pack-[id number]    Ultrasound-84803     Therapeutic Exercise-97110  x25   Therapeutic Activity-13495     Manual Therapy-97140 X 15 min         Assessment   Patient is a 72 y.o. year old female with a diagnosis of CVA with right side affected. Overall PROM continues to improve. Pt reported no pain during therex or stretching. The patient appears to have good accuracy and decreased delay with involved UE during functional reach. Posture addressed through chin tucks and depressions to improve excessive elevation.     Plan      Cont. skilled occupational therapy services 1x/week for 8-12 weeks from 7/27/2018 to 9/27/18.   ADD fine motor activities next session.        Therapist: ANGEL Feliz, OTR/L      I certify the need for these services furnished under this plan of treatment and while under my care      Communicate Risk of Fall with Harm to all staff/Reinforce activity limits and safety measures with patient and family/Tailored Fall Risk Interventions/Visual Cue: Yellow wristband and red socks/Bed in lowest position, wheels locked, appropriate side rails in place/Call bell, personal items and telephone in reach/Instruct patient to call for assistance before getting out of bed or chair/Non-slip footwear when patient is out of bed/[facility] to call system/Physically safe environment - no spills, clutter or unnecessary equipment/Purposeful Proactive Rounding/Room/bathroom lighting operational, light cord in reach

## 2023-07-31 ENCOUNTER — EXTERNAL CHRONIC CARE MANAGEMENT (OUTPATIENT)
Dept: PRIMARY CARE CLINIC | Facility: CLINIC | Age: 78
End: 2023-07-31
Payer: OTHER MISCELLANEOUS

## 2023-07-31 PROCEDURE — 99490 CHRNC CARE MGMT STAFF 1ST 20: CPT | Mod: PBBFAC | Performed by: INTERNAL MEDICINE

## 2023-07-31 PROCEDURE — 99490 PR CHRONIC CARE MGMT, 1ST 20 MIN: ICD-10-PCS | Mod: S$PBB,GV,, | Performed by: INTERNAL MEDICINE

## 2023-07-31 PROCEDURE — 99490 CHRNC CARE MGMT STAFF 1ST 20: CPT | Mod: S$PBB,GV,, | Performed by: INTERNAL MEDICINE

## 2023-08-31 ENCOUNTER — EXTERNAL CHRONIC CARE MANAGEMENT (OUTPATIENT)
Dept: PRIMARY CARE CLINIC | Facility: CLINIC | Age: 78
End: 2023-08-31
Payer: MEDICARE

## 2023-08-31 PROCEDURE — 99490 CHRNC CARE MGMT STAFF 1ST 20: CPT | Mod: PBBFAC | Performed by: INTERNAL MEDICINE

## 2023-08-31 PROCEDURE — 99490 PR CHRONIC CARE MGMT, 1ST 20 MIN: ICD-10-PCS | Mod: S$PBB,GW,, | Performed by: INTERNAL MEDICINE

## 2023-08-31 PROCEDURE — 99490 CHRNC CARE MGMT STAFF 1ST 20: CPT | Mod: S$PBB,GW,, | Performed by: INTERNAL MEDICINE

## 2023-09-30 ENCOUNTER — EXTERNAL CHRONIC CARE MANAGEMENT (OUTPATIENT)
Dept: PRIMARY CARE CLINIC | Facility: CLINIC | Age: 78
End: 2023-09-30
Payer: MEDICARE

## 2023-09-30 PROCEDURE — 99439 PR CHRONIC CARE MGMT, EA ADDTL 20 MIN: ICD-10-PCS | Mod: S$PBB,GV,, | Performed by: INTERNAL MEDICINE

## 2023-09-30 PROCEDURE — 99490 PR CHRONIC CARE MGMT, 1ST 20 MIN: ICD-10-PCS | Mod: S$PBB,GV,, | Performed by: INTERNAL MEDICINE

## 2023-09-30 PROCEDURE — 99490 CHRNC CARE MGMT STAFF 1ST 20: CPT | Mod: S$PBB,GV,, | Performed by: INTERNAL MEDICINE

## 2023-09-30 PROCEDURE — 99439 CHRNC CARE MGMT STAF EA ADDL: CPT | Mod: S$PBB,GV,, | Performed by: INTERNAL MEDICINE

## 2023-09-30 PROCEDURE — 99490 CHRNC CARE MGMT STAFF 1ST 20: CPT | Mod: PBBFAC,25 | Performed by: INTERNAL MEDICINE

## 2023-09-30 PROCEDURE — 99439 CHRNC CARE MGMT STAF EA ADDL: CPT | Mod: PBBFAC,27 | Performed by: INTERNAL MEDICINE

## 2023-10-31 ENCOUNTER — EXTERNAL CHRONIC CARE MANAGEMENT (OUTPATIENT)
Dept: PRIMARY CARE CLINIC | Facility: CLINIC | Age: 78
End: 2023-10-31
Payer: OTHER MISCELLANEOUS

## 2023-10-31 PROCEDURE — 99439 PR CHRONIC CARE MGMT, EA ADDTL 20 MIN: ICD-10-PCS | Mod: S$PBB,GW,, | Performed by: INTERNAL MEDICINE

## 2023-10-31 PROCEDURE — 99490 CHRNC CARE MGMT STAFF 1ST 20: CPT | Mod: PBBFAC,25 | Performed by: INTERNAL MEDICINE

## 2023-10-31 PROCEDURE — 99439 CHRNC CARE MGMT STAF EA ADDL: CPT | Mod: S$PBB,GW,, | Performed by: INTERNAL MEDICINE

## 2023-10-31 PROCEDURE — 99490 PR CHRONIC CARE MGMT, 1ST 20 MIN: ICD-10-PCS | Mod: S$PBB,GW,, | Performed by: INTERNAL MEDICINE

## 2023-10-31 PROCEDURE — 99490 CHRNC CARE MGMT STAFF 1ST 20: CPT | Mod: S$PBB,GW,, | Performed by: INTERNAL MEDICINE

## 2023-10-31 PROCEDURE — 99439 CHRNC CARE MGMT STAF EA ADDL: CPT | Mod: PBBFAC,27 | Performed by: INTERNAL MEDICINE

## 2023-11-05 DIAGNOSIS — I65.23 ATHEROSCLEROSIS OF BOTH CAROTID ARTERIES: ICD-10-CM

## 2023-11-06 RX ORDER — ATORVASTATIN CALCIUM 40 MG/1
40 TABLET, FILM COATED ORAL NIGHTLY
Qty: 90 TABLET | Refills: 3 | Status: SHIPPED | OUTPATIENT
Start: 2023-11-06

## 2023-11-30 ENCOUNTER — EXTERNAL CHRONIC CARE MANAGEMENT (OUTPATIENT)
Dept: PRIMARY CARE CLINIC | Facility: CLINIC | Age: 78
End: 2023-11-30
Payer: MEDICARE

## 2023-11-30 PROCEDURE — 99490 CHRNC CARE MGMT STAFF 1ST 20: CPT | Mod: PBBFAC,25 | Performed by: INTERNAL MEDICINE

## 2023-11-30 PROCEDURE — 99439 CHRNC CARE MGMT STAF EA ADDL: CPT | Mod: PBBFAC | Performed by: INTERNAL MEDICINE

## 2023-11-30 PROCEDURE — 99439 PR CHRONIC CARE MGMT, EA ADDTL 20 MIN: ICD-10-PCS | Mod: GW,S$PBB,, | Performed by: INTERNAL MEDICINE

## 2023-11-30 PROCEDURE — 99439 CHRNC CARE MGMT STAF EA ADDL: CPT | Mod: GW,S$PBB,, | Performed by: INTERNAL MEDICINE

## 2023-11-30 PROCEDURE — 99490 PR CHRONIC CARE MGMT, 1ST 20 MIN: ICD-10-PCS | Mod: GW,S$PBB,, | Performed by: INTERNAL MEDICINE

## 2023-11-30 PROCEDURE — 99490 CHRNC CARE MGMT STAFF 1ST 20: CPT | Mod: GW,S$PBB,, | Performed by: INTERNAL MEDICINE

## 2023-12-12 NOTE — PROGRESS NOTES
Advance Directives:   Living Will: No    LaPOST: No    Medical Power of : No    Goals of Care: What is most important right now is to focus on spending time at home, comfort and QOL . Accordingly, we have decided that the best plan to meet the patient's goals includes enrolling in hospice care.

## 2023-12-20 ENCOUNTER — TELEPHONE (OUTPATIENT)
Dept: PRIMARY CARE CLINIC | Facility: CLINIC | Age: 78
End: 2023-12-20

## 2023-12-20 ENCOUNTER — OFFICE VISIT (OUTPATIENT)
Dept: PRIMARY CARE CLINIC | Facility: CLINIC | Age: 78
End: 2023-12-20
Payer: MEDICARE

## 2023-12-20 DIAGNOSIS — I69.351 HEMIPARESIS AFFECTING RIGHT SIDE AS LATE EFFECT OF CEREBROVASCULAR ACCIDENT: Chronic | ICD-10-CM

## 2023-12-20 DIAGNOSIS — G93.40 ACUTE ENCEPHALOPATHY: ICD-10-CM

## 2023-12-20 DIAGNOSIS — I50.32 CHRONIC DIASTOLIC CONGESTIVE HEART FAILURE: Primary | ICD-10-CM

## 2023-12-20 PROCEDURE — 99443 PR PHYSICIAN TELEPHONE EVALUATION 21-30 MIN: ICD-10-PCS | Mod: 95,GW,, | Performed by: INTERNAL MEDICINE

## 2023-12-20 PROCEDURE — 99443 PR PHYSICIAN TELEPHONE EVALUATION 21-30 MIN: CPT | Mod: 95,GW,, | Performed by: INTERNAL MEDICINE

## 2023-12-20 NOTE — PROGRESS NOTES
Established Patient - Audio Only Telehealth Visit with [facility] Provider  Dr Lolita Portillo (Attending)     The patient location is: HOME  The chief complaint leading to consultation is: routine care  Visit type: Virtual visit with audio only (telephone)     The reason for the audio only service rather than synchronous audio and video virtual visit was related to technical difficulties or patient preference/necessity.     Each patient to whom I provide medical services by telemedicine is:  (1) informed of the relationship between the physician and patient and the respective role of any other health care provider with respect to management of the patient; and (2) notified that they may decline to receive medical services by telemedicine and may withdraw from such care at any time. Patient verbally consented to receive this service via voice-only telephone call.       Subjective:      Patient ID: Emilia Melgoza is a 78 y.o. female.    78 y.o. female with multiple co-morbid illnesses here to continue work-up of chronic issues. Prior to this visit, patient's last encounter with PCP was Visit date not found. She was on [facility] until 12/13/23, then was discharged.    Patient has been on hospice for the past year. Daughter reports that she has remained bed bound, and her tube feed needs have decreased over this time. Her mentation remains poor, and she is unable to communicate her needs.    Daughter expresses that HH referred her to hospice over 1 year ago during a HH admit meeting. Patient is unable to perform any self-care tasks, can not hold her head up, is dependent on PEG feeds for nutrition. Daughter is unable to care for patient.    She still has equipment for hospice services from [facility]. She does not have wounds but is bedbound, and has lost a significant amount of weight over the past year. Daughter is concerned that losing her current low air loss mattress will cause wounds to  develop.    Family in need of:    - hospital bed  - chandler lyft  - O2  - low air loss mattress  - sitter services (does not have medicaid, only has medicare)    Has PEG tube. Will run out of supplies soon.    [facility] care coordination used to call. They called daughter last week, and are who got in touch with this clinic to inform PCP of patient losing hospice services, and the many needs she has.    Ms Baltazar with [facility] told daughter they would call me.    Plan:     Problem List Items Addressed This Visit          Neuro    Hemiparesis affecting right side as late effect of cerebrovascular accident (Chronic)    Relevant Orders    [facility]    Ambulatory referral/consult to Hospice    Acute encephalopathy    Relevant Orders    Ambulatory referral/consult to Hospice       Cardiac/Vascular    Chronic diastolic congestive heart failure - Primary    Relevant Orders    Ambulatory [facility] - Medical & Palliative    Ambulatory referral/consult to Hospice       Health Maintenance         Date Due Completion Date    Hepatitis C Screening Never done ---    COVID-19 Vaccine (1) Never done ---    RSV Vaccine (Age 60+ and Pregnant patients) (1 - 1-dose 60+ series) Never done ---    Shingles Vaccine (2 of 3) 2013    DEXA Scan 2020 3/2/2016    Override on 6/15/2012: Done    Influenza Vaccine (1) 2023    TETANUS VACCINE 10/06/2026 10/6/2016                  Social History     Socioeconomic History    Marital status:     Number of children: 3   Tobacco Use    Smoking status: Former     Current packs/day: 0.00     Types: Cigarettes     Quit date: 1992     Years since quittin.9    Smokeless tobacco: Never   Substance and Sexual Activity    Alcohol use: No     Alcohol/week: 0.0 standard drinks of alcohol    Drug use: No    Sexual activity: Not Currently   Social History Narrative     Retired     Social Determinants of Health     Financial Resource Strain: Medium Risk (6/19/2020)    Overall Financial Resource Strain (CARDIA)     Difficulty of Paying Living Expenses: Somewhat hard   Food Insecurity: Food Insecurity Present (6/19/2020)    Hunger Vital Sign     Worried About Running Out of Food in the Last Year: Sometimes true     Ran Out of Food in the Last Year: Sometimes true   Transportation Needs: No Transportation Needs (6/19/2020)    PRAPARE - Transportation     Lack of Transportation (Medical): No     Lack of Transportation (Non-Medical): No   Physical Activity: Inactive (6/19/2020)    Exercise Vital Sign     Days of Exercise per Week: 0 days     Minutes of Exercise per Session: 0 min   Stress: Unknown (6/19/2020)    [employer] [place] [employer] - Occupational Stress Questionnaire     Feeling of Stress : Patient declined   Social Connections: Unknown (6/19/2020)    Social Connection and Isolation Panel [NHANES]     Marital Status:        Objective:     Lab Results   Component Value Date    WBC 9.49 02/28/2022    HGB 10.9 (L) 02/28/2022    HCT 34.1 (L) 02/28/2022     02/28/2022    CHOL 164 09/14/2019    TRIG 95 09/14/2019    HDL 73 09/14/2019    ALT 18 03/01/2022    AST 25 03/01/2022     (L) 03/03/2022    K 3.4 (L) 03/03/2022     03/03/2022    CREATININE 0.6 03/03/2022    BUN 10 03/03/2022    CO2 22 (L) 03/03/2022    TSH 0.460 07/02/2020    INR 1.0 12/29/2019    HGBA1C 5.6 12/26/2019       4Ms for Medical Decision-Making in Older Adults    Last Completed EAWV: None    MOBILITY:  Get Up and Go:      11/14/2014    12:25 PM 11/15/2013    12:00 PM   Get Up and Go   Trial 1 19 seconds 11 seconds   Trial 2 27 seconds    Trial 3 31 seconds    Average 25.67      Activities of Daily Living:       No data to display              Whisper Test:       No data to display              Disability Status:      10/13/2014     6:41 AM   Disability Status   Are you deaf or  do you have serious difficulty hearing? N   Are you blind or do you have serious difficulty seeing, even when wearing glasses? N   Because of a physical, mental, or emotional condition, do you have serious difficulty concentrating, remembering, or making decisions? Y   Do you have serious difficulty walking or climbing stairs? N   Do you have difficulty dressing or bathing? N     Nutrition Screening:       No data to display             Screening Score: 0-7 Malnourished, 8-11 At Risk, 12-14 Normal    MENTATION:   Depression Patient Health Questionnaire:      6/11/2020    12:14 PM   Depression Patient Health Questionnaire   Over the last two weeks how often have you been bothered by little interest or pleasure in doing things Several days   Over the last two weeks how often have you been bothered by feeling down, depressed or hopeless Several days   PHQ-2 Total Score 2     Has Dementia Dx: No    Cognitive Function Screening:       No data to display              Cognitive Function Screening Total - Less than 4 = Abnormal,  Greater than or equal to 4 = Normal    MEDICATIONS:  High Risk Medications:  Total Active Medications: 1  hydrocodone-apap 7.5-325 MG/15 ML - 7.5-325 mg/15 mL    WHAT MATTERS MOST:  Advance Care Planning   ACP Status:   Patient has had an ACP conversation  Living Will: No  Power of : No  LaPOST: No    What is most important right now is to focus on spending time at homecomfort and QOL     Accordingly, we have decided that the best plan to meet the patient's goals includes enrolling in hospice care      What matters most to patient today is: getting re-enrolled in hospice           Follow up in about 1 week (around 12/27/2023). 30 minutes spent with this patient today in a non-face to face encounter.    Lolita Portillo MD/MPH  Internal Medicine  [facility] [phone]    This service was not originating from a related E/M service provided  within the previous 7 days nor will  to an E/M service or procedure within the next 24 hours or my soonest available appointment.  Prevailing standard of care was able to be met in this audio-only visit.

## 2023-12-20 NOTE — TELEPHONE ENCOUNTER
HERMINIO faxed hospice referral for the pt with [facility] and scheduled her a 1 week audio f/u with FU[facility]

## 2023-12-21 ENCOUNTER — TELEPHONE (OUTPATIENT)
Dept: PRIMARY CARE CLINIC | Facility: CLINIC | Age: 78
End: 2023-12-21
Payer: MEDICARE

## 2023-12-21 ENCOUNTER — PES CALL (OUTPATIENT)
Dept: HOME HEALTH SERVICES | Facility: CLINIC | Age: 78
End: 2023-12-21
Payer: MEDICARE

## 2023-12-27 ENCOUNTER — OFFICE VISIT (OUTPATIENT)
Dept: PRIMARY CARE CLINIC | Facility: CLINIC | Age: 78
End: 2023-12-27
Payer: MEDICARE

## 2023-12-27 DIAGNOSIS — G93.40 ACUTE ENCEPHALOPATHY: ICD-10-CM

## 2023-12-27 PROCEDURE — 99441 PR PHYSICIAN TELEPHONE EVALUATION 5-10 MIN: CPT | Mod: 95,GW,, | Performed by: INTERNAL MEDICINE

## 2023-12-27 PROCEDURE — 99441 PR PHYSICIAN TELEPHONE EVALUATION 5-10 MIN: ICD-10-PCS | Mod: 95,GW,, | Performed by: INTERNAL MEDICINE

## 2023-12-27 NOTE — PROGRESS NOTES
Established Patient - Audio Only Telehealth Visit     The patient location is: home  The chief complaint leading to consultation is: hospice transitioning  Visit type: Virtual visit with audio only (telephone)  Total time spent with patient: 10min       The reason for the audio only service rather than synchronous audio and video virtual visit was related to technical difficulties or patient preference/necessity.     Each patient to whom I provide medical services by telemedicine is:  (1) informed of the relationship between the physician and patient and the respective role of any other health care provider with respect to management of the patient; and (2) notified that they may decline to receive medical services by telemedicine and may withdraw from such care at any time. Patient verbally consented to receive this service via voice-only telephone call.       HPI:    SW NOTE:   SW called and talked to the daughter On today. I sent the Hospice referral to [facility] on last week and the daughter stated that they were able to meet with the family and admit her she believes on the Thursday before Luis Manuel. The daughter also mentioned that her mom has some good and bad day and that she is extremely thankful to Dr. MERIDA and staff and that she loves the work that we do. The daughter stated that she will call us with any questions but as of now, everything is ok    MD NOTE:  Patient was referred to [facility] after being discharged from [facility]. [facility] took this patient and family is grateful.    Assessment and plan:      Problem List Items Addressed This Visit          Neuro    Acute encephalopathy     Encephalopathy following stroke, nonverbal and bedbound  Patient is declining and hospice appropriate  Continue services with [facility]               This service was not originating from a related E/M service provided within the previous 7 days nor will  to an E/M service or procedure within the next 24  hours or my soonest available appointment.  Prevailing standard of care was able to be met in this audio-only visit.      Lolita Portillo MD/MPH  [facility]  [phone] spectralink

## 2023-12-27 NOTE — ASSESSMENT & PLAN NOTE
Encephalopathy following stroke, nonverbal and bedbound  · Patient is declining and hospice appropriate  · Continue services with [facility]

## 2023-12-31 ENCOUNTER — EXTERNAL CHRONIC CARE MANAGEMENT (OUTPATIENT)
Dept: PRIMARY CARE CLINIC | Facility: CLINIC | Age: 78
End: 2023-12-31
Payer: MEDICARE

## 2023-12-31 PROCEDURE — 99490 CHRNC CARE MGMT STAFF 1ST 20: CPT | Mod: PBBFAC | Performed by: INTERNAL MEDICINE

## 2023-12-31 PROCEDURE — 99490 CHRNC CARE MGMT STAFF 1ST 20: CPT | Mod: GW,S$PBB,, | Performed by: INTERNAL MEDICINE

## 2024-01-04 ENCOUNTER — TELEPHONE (OUTPATIENT)
Dept: PRIMARY CARE CLINIC | Facility: CLINIC | Age: 79
End: 2024-01-04
Payer: MEDICARE

## 2024-01-10 ENCOUNTER — CARE AT HOME (OUTPATIENT)
Dept: HOME HEALTH SERVICES | Facility: CLINIC | Age: 79
End: 2024-01-10
Payer: MEDICARE

## 2024-01-10 DIAGNOSIS — I69.351 HEMIPARESIS AFFECTING RIGHT SIDE AS LATE EFFECT OF CEREBROVASCULAR ACCIDENT: Chronic | ICD-10-CM

## 2024-01-10 DIAGNOSIS — I50.32 CHRONIC DIASTOLIC CONGESTIVE HEART FAILURE: ICD-10-CM

## 2024-01-10 PROCEDURE — 99347 HOME/RES VST EST SF MDM 20: CPT | Mod: GW,S$GLB,, | Performed by: NURSE PRACTITIONER

## 2024-01-11 DIAGNOSIS — Z00.00 ENCOUNTER FOR MEDICARE ANNUAL WELLNESS EXAM: ICD-10-CM

## 2024-01-16 NOTE — PROGRESS NOTES
DOS: 1/10/24    Upon arrival, there was condition as to whether the patient was on hospice or not.  After further clarification, it was indicated that the patient remains on hospice.  Primary care doctor aware.  I will now sign off.      Rikki Judice, AG-ACNP [facility]

## 2024-01-31 ENCOUNTER — EXTERNAL CHRONIC CARE MANAGEMENT (OUTPATIENT)
Dept: PRIMARY CARE CLINIC | Facility: CLINIC | Age: 79
End: 2024-01-31
Payer: MEDICARE

## 2024-01-31 PROCEDURE — 99490 CHRNC CARE MGMT STAFF 1ST 20: CPT | Mod: S$PBB,GW,, | Performed by: INTERNAL MEDICINE

## 2024-01-31 PROCEDURE — 99490 CHRNC CARE MGMT STAFF 1ST 20: CPT | Mod: PBBFAC | Performed by: INTERNAL MEDICINE

## 2024-02-29 ENCOUNTER — EXTERNAL CHRONIC CARE MANAGEMENT (OUTPATIENT)
Dept: PRIMARY CARE CLINIC | Facility: CLINIC | Age: 79
End: 2024-02-29
Payer: MEDICARE

## 2024-02-29 PROCEDURE — 99490 CHRNC CARE MGMT STAFF 1ST 20: CPT | Mod: PBBFAC | Performed by: INTERNAL MEDICINE

## 2024-02-29 PROCEDURE — 99490 CHRNC CARE MGMT STAFF 1ST 20: CPT | Mod: S$PBB,GW,, | Performed by: INTERNAL MEDICINE

## 2024-03-31 ENCOUNTER — EXTERNAL CHRONIC CARE MANAGEMENT (OUTPATIENT)
Dept: PRIMARY CARE CLINIC | Facility: CLINIC | Age: 79
End: 2024-03-31
Payer: MEDICARE

## 2024-03-31 PROCEDURE — 99490 CHRNC CARE MGMT STAFF 1ST 20: CPT | Mod: PBBFAC | Performed by: INTERNAL MEDICINE

## 2024-03-31 PROCEDURE — 99490 CHRNC CARE MGMT STAFF 1ST 20: CPT | Mod: S$PBB,GW,, | Performed by: INTERNAL MEDICINE

## 2024-04-30 ENCOUNTER — EXTERNAL CHRONIC CARE MANAGEMENT (OUTPATIENT)
Dept: PRIMARY CARE CLINIC | Facility: CLINIC | Age: 79
End: 2024-04-30
Payer: MEDICARE

## 2024-04-30 PROCEDURE — 99490 CHRNC CARE MGMT STAFF 1ST 20: CPT | Mod: S$PBB,GW,, | Performed by: INTERNAL MEDICINE

## 2024-04-30 PROCEDURE — 99490 CHRNC CARE MGMT STAFF 1ST 20: CPT | Mod: PBBFAC | Performed by: INTERNAL MEDICINE

## 2024-05-31 ENCOUNTER — EXTERNAL CHRONIC CARE MANAGEMENT (OUTPATIENT)
Dept: PRIMARY CARE CLINIC | Facility: CLINIC | Age: 79
End: 2024-05-31
Payer: MEDICARE

## 2024-05-31 PROCEDURE — 99490 CHRNC CARE MGMT STAFF 1ST 20: CPT | Mod: S$PBB,GW,, | Performed by: INTERNAL MEDICINE

## 2024-05-31 PROCEDURE — 99490 CHRNC CARE MGMT STAFF 1ST 20: CPT | Mod: PBBFAC | Performed by: INTERNAL MEDICINE

## 2024-06-06 ENCOUNTER — HOSPITAL ENCOUNTER (OUTPATIENT)
Facility: HOSPITAL | Age: 79
Discharge: HOME | End: 2024-06-07
Attending: EMERGENCY MEDICINE | Admitting: STUDENT IN AN ORGANIZED HEALTH CARE EDUCATION/TRAINING PROGRAM
Payer: MEDICARE

## 2024-06-06 DIAGNOSIS — R07.9 CHEST PAIN: ICD-10-CM

## 2024-06-06 DIAGNOSIS — Z43.1 PEG (PERCUTANEOUS ENDOSCOPIC GASTROSTOMY) ADJUSTMENT/REPLACEMENT/REMOVAL: Primary | ICD-10-CM

## 2024-06-06 PROBLEM — J96.01 ACUTE RESPIRATORY FAILURE WITH HYPOXIA: Status: RESOLVED | Noted: 2019-12-26 | Resolved: 2024-06-06

## 2024-06-06 PROBLEM — Z78.0 ASYMPTOMATIC MENOPAUSAL STATE: Status: RESOLVED | Noted: 2019-12-10 | Resolved: 2024-06-06

## 2024-06-06 PROBLEM — I69.30 HISTORY OF ISCHEMIC CEREBROVASCULAR ACCIDENT (CVA) WITH RESIDUAL DEFICIT: Chronic | Status: ACTIVE | Noted: 2019-09-10

## 2024-06-06 PROBLEM — R00.0 TACHYCARDIA: Status: RESOLVED | Noted: 2019-12-10 | Resolved: 2024-06-06

## 2024-06-06 PROBLEM — M25.511 RIGHT SHOULDER PAIN: Status: RESOLVED | Noted: 2019-12-10 | Resolved: 2024-06-06

## 2024-06-06 PROBLEM — R09.1 PLEURISY: Status: RESOLVED | Noted: 2018-07-06 | Resolved: 2024-06-06

## 2024-06-06 PROBLEM — E11.40 TYPE 2 DIABETES MELLITUS WITH DIABETIC NEUROPATHY, WITHOUT LONG-TERM CURRENT USE OF INSULIN: Chronic | Status: ACTIVE | Noted: 2022-03-13

## 2024-06-06 PROBLEM — G93.40 ACUTE ENCEPHALOPATHY: Status: RESOLVED | Noted: 2019-12-17 | Resolved: 2024-06-06

## 2024-06-06 PROBLEM — J06.9 UPPER RESPIRATORY TRACT INFECTION: Status: RESOLVED | Noted: 2019-09-10 | Resolved: 2024-06-06

## 2024-06-06 LAB
ALBUMIN SERPL BCP-MCNC: 3.3 G/DL (ref 3.5–5.2)
ALP SERPL-CCNC: 90 U/L (ref 55–135)
ALT SERPL W/O P-5'-P-CCNC: 13 U/L (ref 10–44)
ANION GAP SERPL CALC-SCNC: 13 MMOL/L (ref 8–16)
AST SERPL-CCNC: 21 U/L (ref 10–40)
BASOPHILS # BLD AUTO: 0.02 K/UL (ref 0–0.2)
BASOPHILS NFR BLD: 0.3 % (ref 0–1.9)
BILIRUB SERPL-MCNC: 0.7 MG/DL (ref 0.1–1)
BUN SERPL-MCNC: 20 MG/DL (ref 8–23)
CALCIUM SERPL-MCNC: 9.2 MG/DL (ref 8.7–10.5)
CHLORIDE SERPL-SCNC: 110 MMOL/L (ref 95–110)
CO2 SERPL-SCNC: 23 MMOL/L (ref 23–29)
CREAT SERPL-MCNC: 0.6 MG/DL (ref 0.5–1.4)
DIFFERENTIAL METHOD BLD: ABNORMAL
EOSINOPHIL # BLD AUTO: 0.1 K/UL (ref 0–0.5)
EOSINOPHIL NFR BLD: 0.9 % (ref 0–8)
ERYTHROCYTE [DISTWIDTH] IN BLOOD BY AUTOMATED COUNT: 16.4 % (ref 11.5–14.5)
EST. GFR  (NO RACE VARIABLE): >60 ML/MIN/1.73 M^2
GLUCOSE SERPL-MCNC: 61 MG/DL (ref 70–110)
HCT VFR BLD AUTO: 37.2 % (ref 37–48.5)
HGB BLD-MCNC: 11.9 G/DL (ref 12–16)
IMM GRANULOCYTES # BLD AUTO: 0.02 K/UL (ref 0–0.04)
IMM GRANULOCYTES NFR BLD AUTO: 0.3 % (ref 0–0.5)
LYMPHOCYTES # BLD AUTO: 1.8 K/UL (ref 1–4.8)
LYMPHOCYTES NFR BLD: 31.4 % (ref 18–48)
MCH RBC QN AUTO: 27.8 PG (ref 27–31)
MCHC RBC AUTO-ENTMCNC: 32 G/DL (ref 32–36)
MCV RBC AUTO: 87 FL (ref 82–98)
MONOCYTES # BLD AUTO: 0.4 K/UL (ref 0.3–1)
MONOCYTES NFR BLD: 7.3 % (ref 4–15)
NEUTROPHILS # BLD AUTO: 3.4 K/UL (ref 1.8–7.7)
NEUTROPHILS NFR BLD: 59.8 % (ref 38–73)
NRBC BLD-RTO: 0 /100 WBC
PLATELET # BLD AUTO: 153 K/UL (ref 150–450)
PMV BLD AUTO: 11.9 FL (ref 9.2–12.9)
POCT GLUCOSE: 79 MG/DL (ref 70–110)
POTASSIUM SERPL-SCNC: 4 MMOL/L (ref 3.5–5.1)
PROT SERPL-MCNC: 7.7 G/DL (ref 6–8.4)
RBC # BLD AUTO: 4.28 M/UL (ref 4–5.4)
SODIUM SERPL-SCNC: 146 MMOL/L (ref 136–145)
WBC # BLD AUTO: 5.76 K/UL (ref 3.9–12.7)

## 2024-06-06 PROCEDURE — 85025 COMPLETE CBC W/AUTO DIFF WBC: CPT | Performed by: NURSE PRACTITIONER

## 2024-06-06 PROCEDURE — 25000003 PHARM REV CODE 250: Performed by: HOSPITALIST

## 2024-06-06 PROCEDURE — G0378 HOSPITAL OBSERVATION PER HR: HCPCS

## 2024-06-06 PROCEDURE — 80053 COMPREHEN METABOLIC PANEL: CPT | Performed by: EMERGENCY MEDICINE

## 2024-06-06 PROCEDURE — 63600175 PHARM REV CODE 636 W HCPCS: Performed by: HOSPITALIST

## 2024-06-06 PROCEDURE — 82962 GLUCOSE BLOOD TEST: CPT

## 2024-06-06 PROCEDURE — 96366 THER/PROPH/DIAG IV INF ADDON: CPT

## 2024-06-06 PROCEDURE — 96367 TX/PROPH/DG ADDL SEQ IV INF: CPT

## 2024-06-06 PROCEDURE — 99285 EMERGENCY DEPT VISIT HI MDM: CPT | Mod: 25

## 2024-06-06 PROCEDURE — 99284 EMERGENCY DEPT VISIT MOD MDM: CPT | Mod: GW,,, | Performed by: NURSE PRACTITIONER

## 2024-06-06 PROCEDURE — 96365 THER/PROPH/DIAG IV INF INIT: CPT

## 2024-06-06 RX ORDER — DOXYCYCLINE HYCLATE 100 MG
100 TABLET ORAL 2 TIMES DAILY
COMMUNITY
Start: 2024-06-05

## 2024-06-06 RX ORDER — IBUPROFEN 200 MG
16 TABLET ORAL
Status: DISCONTINUED | OUTPATIENT
Start: 2024-06-06 | End: 2024-06-07 | Stop reason: HOSPADM

## 2024-06-06 RX ORDER — ALBUTEROL SULFATE 0.63 MG/3ML
SOLUTION RESPIRATORY (INHALATION)
COMMUNITY
Start: 2024-05-06

## 2024-06-06 RX ORDER — PROMETHAZINE HYDROCHLORIDE 6.25 MG/5ML
SYRUP ORAL
COMMUNITY
Start: 2024-05-31

## 2024-06-06 RX ORDER — DEXTROSE MONOHYDRATE, SODIUM CHLORIDE, AND POTASSIUM CHLORIDE 50; 1.49; 4.5 G/1000ML; G/1000ML; G/1000ML
INJECTION, SOLUTION INTRAVENOUS CONTINUOUS
Status: DISCONTINUED | OUTPATIENT
Start: 2024-06-06 | End: 2024-06-07 | Stop reason: HOSPADM

## 2024-06-06 RX ORDER — MORPHINE SULFATE 20 MG/ML
SOLUTION ORAL
COMMUNITY
Start: 2023-12-22

## 2024-06-06 RX ORDER — NALOXONE HCL 0.4 MG/ML
0.02 VIAL (ML) INJECTION
Status: DISCONTINUED | OUTPATIENT
Start: 2024-06-06 | End: 2024-06-07 | Stop reason: HOSPADM

## 2024-06-06 RX ORDER — SODIUM CHLORIDE 0.9 % (FLUSH) 0.9 %
10 SYRINGE (ML) INJECTION EVERY 8 HOURS PRN
Status: DISCONTINUED | OUTPATIENT
Start: 2024-06-06 | End: 2024-06-07 | Stop reason: HOSPADM

## 2024-06-06 RX ORDER — OXYBUTYNIN CHLORIDE 5 MG/1
5 TABLET ORAL
COMMUNITY
Start: 2024-06-03

## 2024-06-06 RX ORDER — IBUPROFEN 200 MG
24 TABLET ORAL
Status: DISCONTINUED | OUTPATIENT
Start: 2024-06-06 | End: 2024-06-07 | Stop reason: HOSPADM

## 2024-06-06 RX ORDER — LORAZEPAM 2 MG/ML
CONCENTRATE ORAL
COMMUNITY
Start: 2023-12-22

## 2024-06-06 RX ORDER — IPRATROPIUM BROMIDE AND ALBUTEROL SULFATE 2.5; .5 MG/3ML; MG/3ML
3 SOLUTION RESPIRATORY (INHALATION) EVERY 4 HOURS PRN
Status: DISCONTINUED | OUTPATIENT
Start: 2024-06-06 | End: 2024-06-07 | Stop reason: HOSPADM

## 2024-06-06 RX ORDER — GLUCAGON 1 MG
1 KIT INJECTION
Status: DISCONTINUED | OUTPATIENT
Start: 2024-06-06 | End: 2024-06-07 | Stop reason: HOSPADM

## 2024-06-06 RX ORDER — ONDANSETRON HYDROCHLORIDE 2 MG/ML
4 INJECTION, SOLUTION INTRAVENOUS EVERY 8 HOURS PRN
Status: DISCONTINUED | OUTPATIENT
Start: 2024-06-06 | End: 2024-06-07 | Stop reason: HOSPADM

## 2024-06-06 RX ORDER — PROCHLORPERAZINE EDISYLATE 5 MG/ML
5 INJECTION INTRAMUSCULAR; INTRAVENOUS EVERY 6 HOURS PRN
Status: DISCONTINUED | OUTPATIENT
Start: 2024-06-06 | End: 2024-06-07 | Stop reason: HOSPADM

## 2024-06-06 RX ADMIN — DEXTROSE MONOHYDRATE 125 ML: 100 INJECTION, SOLUTION INTRAVENOUS at 02:06

## 2024-06-06 RX ADMIN — DEXTROSE, SODIUM CHLORIDE, AND POTASSIUM CHLORIDE: 5; .45; .15 INJECTION INTRAVENOUS at 07:06

## 2024-06-06 NOTE — SUBJECTIVE & OBJECTIVE
Past Medical History:   Diagnosis Date    Allergy     DDD (degenerative disc disease), lumbar     Chronic LBP and intermittent RLE radicular pain x 10 yrs    Hypertension     Overactive bladder     Senile cataract, unspecified - Both Eyes 2013    Stroke     right sided weakness       Past Surgical History:   Procedure Laterality Date    bladder mesh      BREAST BIOPSY Left     CATARACT EXTRACTION W/  INTRAOCULAR LENS IMPLANT Right 10/13/2014    Dr Crystal    CATARACT EXTRACTION W/  INTRAOCULAR LENS IMPLANT Left 11/10/2014    Dr Crystal    ESOPHAGOGASTRODUODENOSCOPY N/A 3/2/2022    Procedure: EGD (ESOPHAGOGASTRODUODENOSCOPY);  Surgeon: Ottoniel Granger MD;  Location: [facility] ENDO;  Service: Endoscopy;  Laterality: N/A;    HEMORRHOID SURGERY      HYSTERECTOMY      INCONTINENCE SURGERY      OOPHORECTOMY      Right Rotator Cuff Repair      TRACHEOTOMY N/A 2020    Procedure: TRACHEOTOMY;  Surgeon: Marco Shine III, MD;  Location: [facility] OR;  Service: General;  Laterality: N/A;       Review of patient's allergies indicates:   Allergen Reactions    Enoxaparin Other (See Comments) and Hives     Slurred speech per patient  Slurred speech per patient    Lisinopril Other (See Comments)     Cough    Penicillins Rash       No current facility-administered medications on file prior to encounter.     Current Outpatient Medications on File Prior to Encounter   Medication Sig    acetaminophen (TYLENOL) 650 mg/20.3 mL Soln 20.3 mLs (650 mg total) by Per G Tube route every 6 (six) hours as needed for Temperature greater than (temp >101).    albuterol (ACCUNEB) 0.63 mg/3 mL Nebu SMARTSI Vial(s) Via Nebulizer Twice Daily PRN    amLODIPine (NORVASC) 5 MG tablet Take 5 mg by mouth 2 (two) times daily.    atorvastatin (LIPITOR) 40 MG tablet TAKE 1 TABLET BY MOUTH ONCE DAILY IN THE EVENING    clopidogreL (PLAVIX) 75 mg tablet 1 tablet (75 mg total) by Per G Tube route once daily.    doxycycline (VIBRA-TABS) 100 MG tablet Take 100 mg  by mouth 2 (two) times daily.    LORazepam (ATIVAN) 2 mg/mL Conc SMARTSI.25 Milliliter(s) By Mouth Every 2 Hours PRN    melatonin 10 mg Cap Take 1 tablet by mouth every evening.    morphine 100 mg/5 mL (20 mg/mL) concentrated solution SMARTSI.25 Milliliter(s) By Mouth Every 2 Hours PRN    oxybutynin (DITROPAN) 5 MG Tab 5 mg by Per G Tube route.    promethazine (PHENERGAN) 6.25 mg/5 mL syrup SMARTSI Milliliter(s) Gastro Tube 3 Times Daily    vitamin D (VITAMIN D3) 1000 units Tab Take 1,000 Units by mouth once daily.    aspirin 81 MG Chew Take 1 tablet (81 mg total) by mouth once daily.    fexofenadine (ALLEGRA ALLERGY) 180 MG tablet Take 1 tablet (180 mg total) by mouth once daily.    fluticasone propionate (FLONASE) 50 mcg/actuation nasal spray 1 spray (50 mcg total) by Each Nostril route once daily.    [DISCONTINUED] ascorbic acid, vitamin C, (VITAMIN C) 1000 MG tablet Take 1,000 mg by mouth once daily.    [DISCONTINUED] azithromycin (ZITHROMAX Z-TALON) 250 MG tablet Take 2 pills day 1, then 1 pill day 2-5. Administer via PEG.    [DISCONTINUED] hydrocodone-acetaminophen (HYCET) solution 7.5-325 mg/15mL 15 mLs by Per G Tube route every 4 (four) hours as needed.    [DISCONTINUED] oxybutynin (DITROPAN-XL) 5 MG TR24 Take 1 tablet (5 mg total) by mouth once daily.     Family History       Problem Relation (Age of Onset)    Breast cancer Mother    Colon cancer Father    Dementia Mother    Hypertension Mother, Father    No Known Problems Sister, Brother, Maternal Aunt, Maternal Uncle, Paternal Aunt, Paternal Uncle, Maternal Grandmother, Maternal Grandfather, Paternal Grandmother, Paternal Grandfather    Rhinitis Daughter, Daughter          Tobacco Use    Smoking status: Former     Current packs/day: 0.00     Types: Cigarettes     Quit date: 1992     Years since quittin.4    Smokeless tobacco: Never   Substance and Sexual Activity    Alcohol use: No     Alcohol/week: 0.0 standard drinks of alcohol    Drug  use: No    Sexual activity: Not Currently     Review of Systems   Unable to perform ROS: Patient nonverbal     Objective:     Vital Signs (Most Recent):  Temp: 97.6 °F (36.4 °C) (06/06/24 0736)  Pulse: 91 (06/06/24 1137)  Resp: 16 (06/06/24 1137)  BP: 116/84 (06/06/24 1137)  SpO2: 99 % (06/06/24 1137) Vital Signs (24h Range):  Temp:  [97.6 °F (36.4 °C)] 97.6 °F (36.4 °C)  Pulse:  [91-96] 91  Resp:  [14-16] 16  SpO2:  [99 %] 99 %  BP: (116-130)/(80-84) 116/84     Weight: 54.4 kg (120 lb)  Body mass index is 21.95 kg/m².     Physical Exam  Vitals and nursing note reviewed.   Constitutional:       General: She is not in acute distress.     Appearance: She is well-developed.   HENT:      Head: Normocephalic and atraumatic.      Right Ear: External ear normal.      Left Ear: External ear normal.      Nose: Nose normal.   Eyes:      Conjunctiva/sclera: Conjunctivae normal.   Cardiovascular:      Rate and Rhythm: Normal rate and regular rhythm.   Pulmonary:      Effort: Pulmonary effort is normal. No respiratory distress.   Abdominal:      General: There is no distension.      Palpations: Abdomen is soft.   Skin:     General: Skin is warm and dry.   Neurological:      Mental Status: She is alert.                Significant Labs: All pertinent labs within the past 24 hours have been reviewed.    Significant Imaging: I have reviewed all pertinent imaging results/findings within the past 24 hours.

## 2024-06-06 NOTE — PLAN OF CARE
[facility] - Emergency Dept  Discharge Assessment    Primary Care Provider: Lolita Portillo MD   Case Management Assessment     PCP: See above (patient on hospice per daughter, Julissa.  She did not know the name of the hospice provider)  Pharmacy: [facility]    Patient Arrived From: family  Existing Help at Home: famil    Barriers to Discharge: none    Discharge Plan:    A. Home and resume hospice   B. home with family        Discharge Assessment (most recent)       BRIEF DISCHARGE ASSESSMENT - 06/06/24 2341          Discharge Planning    Assessment Type Discharge Planning Brief Assessment     Resource/Environmental Concerns none     Support Systems Children;Family members     Assistance Needed Total assist     Equipment Currently Used at Home wheelchair;oxygen;hospital bed;lift device;suction machine;nebulizer     Current Living Arrangements home     Care Facility Name n/a     Patient/Family Anticipates Transition to home with family     Patient/Family Anticipated Services at Transition hospice care   Daughter at the bedside not certain of the name of the hospice provider.    DME Needed Upon Discharge  none     Discharge Plan A Hospice/home     Discharge Plan B Home with family

## 2024-06-06 NOTE — HPI
78 y.o. female with hypertension, DM2, and history of CVA with residual deficits aphasia, dysphagia, and hemiparesis resulting in significant debility presents from home due to dislodged PEG tube.  Patient's daughter reports that peg tube was noted to be dislodged this morning.  Unclear duration, may have occurred at some point overnight last night.  Initial PEG was placed in 2020 and has been replaced once in 2022 after dislodgement.  Patient is nonverbal and receives all nutrition and medication via PEG.  Last dose of Plavix was yesterday morning, 06/05/2024.  History otherwise limited.  In the ED, gastroenterology was consulted with plan for PEG replacement tomorrow.  Placed in observation.

## 2024-06-06 NOTE — Clinical Note
Diagnosis: PEG (percutaneous endoscopic gastrostomy) adjustment/replacement/removal [[id number]]   Future Attending Provider: NOÉ HERNANDEZ [[id number]]   Special Needs:: No Special Needs [1]

## 2024-06-06 NOTE — ASSESSMENT & PLAN NOTE
"Patient's FSGs are uncontrolled due to hypoglycemia on current medication regimen.  Last A1c reviewed-   Lab Results   Component Value Date    HGBA1C 5.6 12/26/2019     Most recent fingerstick glucose reviewed- No results for input(s): "POCTGLUCOSE" in the last 24 hours.  Current correctional scale   n/a  Hold for now due to hypoglycemia  anti-hyperglycemic dose as follows-   Antihyperglycemics (From admission, onward)      None          Hold Oral hypoglycemics while patient is in the hospital.  "

## 2024-06-06 NOTE — ASSESSMENT & PLAN NOTE
Patient noted to have a percutaneous endoscopic gastrostomy tube that has been dislodged. GI consulted, plan is to replaced tomorrow due to plavix timing.  NPO for now.

## 2024-06-06 NOTE — CONSULTS
[facility] Consultation Note    Reason for consult: PEG tube replacement    PCP:   Lolita Portillo       LOS: 0        Initial History of Present Illness (HPI):  This is a 78 y.o. female consulted to GI service for PEG tube replacement. PMH of multiple chronic illness, DM2, PEG dependent, stroke on anticoagulation and aphasia. Patient is non verbal. Daughter at bedside with history. Daughter report patient PEG became dislodged sometime overnight or this am. She reports peg placed in 2020 and replaced in 2022 after dislodgement. She reports patient receives all nutrition and medication through peg. Reports last dose of plavix on yesterday am.         Medical History:  has a past medical history of Allergy, DDD (degenerative disc disease), lumbar, Hypertension, Overactive bladder, Senile cataract, unspecified - Both Eyes (6/11/2013), and Stroke.    Surgical History:  has a past surgical history that includes Hysterectomy; Incontinence surgery; Right Rotator Cuff Repair; Hemorrhoid surgery; Cataract extraction w/  intraocular lens implant (Right, 10/13/2014); Cataract extraction w/  intraocular lens implant (Left, 11/10/2014); Oophorectomy; Breast biopsy (Left); bladder mesh; Tracheotomy (N/A, 1/2/2020); and Esophagogastroduodenoscopy (N/A, 3/2/2022).      Objective Findings:    Vital Signs:  Temp:  [97.6 °F (36.4 °C)]   Pulse:  [96]   Resp:  [14]   BP: (130)/(80)   SpO2:  [99 %]   Body mass index is 21.95 kg/m².      Physical Exam  Vitals and nursing note reviewed.   Constitutional:       Appearance: Normal appearance.   HENT:      Head: Normocephalic.   Pulmonary:      Effort: Pulmonary effort is normal.   Abdominal:      General: Bowel sounds are normal.      Palpations: Abdomen is soft.   Skin:     General: Skin is warm and dry.   Neurological:      Mental Status: She is alert.   Psychiatric:      Comments: Non verbal               Labs:  Lab Results   Component Value Date    WBC 9.49  02/28/2022    HGB 10.9 (L) 02/28/2022    HCT 34.1 (L) 02/28/2022     02/28/2022    CHOL 164 09/14/2019    TRIG 95 09/14/2019    HDL 73 09/14/2019    ALT 18 03/01/2022    AST 25 03/01/2022     (L) 03/03/2022    K 3.4 (L) 03/03/2022     03/03/2022    CREATININE 0.6 03/03/2022    BUN 10 03/03/2022    CO2 22 (L) 03/03/2022    TSH 0.460 07/02/2020    INR 1.0 12/29/2019    HGBA1C 5.6 12/26/2019             Imaging:       Endoscopy: 3/2022 EGD- Benign-appearing esophageal stenosis.                          - Normal stomach.                          - Normal examined duodenum.                          - An endoscopically removable PEG placement was                          successfully completed.                          - No specimens collected.   Placement of an endoscopically removable PEG was successfully completed. The  external bumper was at the 4.0 cm marking on the tube.  20 F PEG placed.   I have independently reviewed and interpreted the imaging above           PEG dislodged  Plan/ Recommendations:  1.  Patient with dislodged peg, exhibits no pain at this time. Peg is primary source of nutrition and medication. Daughter denies any po intake, last dose of plavix on yesterday. Peg opening seemingly closed. Plan for peg replacement tomorrow to allow time for anticoagulant excretion. Rec iv fluids for hydration.        Thank you so much for allowing us to participate in the care of Emilia Melgoza . Please contact us if you have any additional questions.    Naida Rice NP  Gastroenterology  [facility]

## 2024-06-06 NOTE — ED PROVIDER NOTES
Encounter Date: 6/6/2024    SCRIBE #1 NOTE: I, Byron Ny, am scribing for, and in the presence of,  Harjit Bustillo PA-C. Other sections scribed: HPI, ROS.       History     Chief Complaint   Patient presents with    peg tube problem     Pt eleazar MITCHELL today from home due to her PEG tube being displaced upon waking this am. Pt is alert at this time.      CC: Peg tube problem    HPI: History is obtained from independent historian: EMS personnel, and pt's daughter. History is limited due to the pt being nonverbal. This is a 78 y.o. F who has a PMHx of HTN, and Stroke who presents to the ED via EMS transportation for emergent evaluation of peg tube found dislodged by the pt's daughter this morning. The pt's daughter is unsure how long the peg tube has been dislodged.    The history is provided by the EMS personnel and a relative. No  was used.     Review of patient's allergies indicates:   Allergen Reactions    Enoxaparin Other (See Comments) and Hives     Slurred speech per patient  Slurred speech per patient    Lisinopril Other (See Comments)     Cough    Penicillins Rash     Past Medical History:   Diagnosis Date    Allergy     DDD (degenerative disc disease), lumbar     Chronic LBP and intermittent RLE radicular pain x 10 yrs    Hypertension     Overactive bladder     Senile cataract, unspecified - Both Eyes 6/11/2013    Stroke     right sided weakness     Past Surgical History:   Procedure Laterality Date    bladder mesh      BREAST BIOPSY Left     CATARACT EXTRACTION W/  INTRAOCULAR LENS IMPLANT Right 10/13/2014    Dr Crystal    CATARACT EXTRACTION W/  INTRAOCULAR LENS IMPLANT Left 11/10/2014    Dr Crystal    ESOPHAGOGASTRODUODENOSCOPY N/A 3/2/2022    Procedure: EGD (ESOPHAGOGASTRODUODENOSCOPY);  Surgeon: Ottoniel Granger MD;  Location: [facility];  Service: Endoscopy;  Laterality: N/A;    HEMORRHOID SURGERY      HYSTERECTOMY      INCONTINENCE SURGERY      OOPHORECTOMY      Right Rotator Cuff Repair       TRACHEOTOMY N/A 2020    Procedure: TRACHEOTOMY;  Surgeon: Marco Shine III, MD;  Location: [facility] OR;  Service: General;  Laterality: N/A;     Family History   Problem Relation Name Age of Onset    Hypertension Mother      Breast cancer Mother      Dementia Mother      Colon cancer Father      Hypertension Father      Rhinitis Daughter      Rhinitis Daughter      No Known Problems Sister      No Known Problems Brother      No Known Problems Maternal Aunt      No Known Problems Maternal Uncle      No Known Problems Paternal Aunt      No Known Problems Paternal Uncle      No Known Problems Maternal Grandmother      No Known Problems Maternal Grandfather      No Known Problems Paternal Grandmother      No Known Problems Paternal Grandfather      Amblyopia Neg Hx      Blindness Neg Hx      Cancer Neg Hx      Cataracts Neg Hx      Diabetes Neg Hx      Glaucoma Neg Hx      Macular degeneration Neg Hx      Retinal detachment Neg Hx      Strabismus Neg Hx      Stroke Neg Hx      Thyroid disease Neg Hx       Social History     Tobacco Use    Smoking status: Former     Current packs/day: 0.00     Types: Cigarettes     Quit date: 1992     Years since quittin.4    Smokeless tobacco: Never   Substance Use Topics    Alcohol use: No     Alcohol/week: 0.0 standard drinks of alcohol    Drug use: No     Review of Systems   Unable to perform ROS: Patient nonverbal       Physical Exam     Initial Vitals [24 0736]   BP Pulse Resp Temp SpO2   130/80 96 14 97.6 °F (36.4 °C) 99 %      MAP       --         Physical Exam    Nursing note and vitals reviewed.  Constitutional: She is not diaphoretic. No distress.   Non-verbal. Contractures. Cachectic.    HENT:   Head: Atraumatic.   Right Ear: External ear normal.   Left Ear: External ear normal.   Eyes: Conjunctivae and EOM are normal.   Neck: No tracheal deviation present.   Normal range of motion.  Cardiovascular:  Normal rate and regular rhythm.            Pulmonary/Chest: No accessory muscle usage or stridor. No tachypnea. No respiratory distress.   Abdominal:      20 [place]uv[place] G-tube lying on top of patient's abdomen in a Ziploc bag. 2 separate G-tube sites.  Most midline site appears to be the more recent insertion site.  Appears closed.  No drainage.  Nontender.   Musculoskeletal:         General: No edema. Normal range of motion.      Cervical back: Normal range of motion.     Neurological: She is alert and oriented to person, place, and time. She displays no tremor. She displays no seizure activity. Coordination and gait normal.   Skin: Skin is intact. Capillary refill takes less than 2 seconds. No rash noted. No erythema.         ED Course   Procedures  Labs Reviewed   CBC W/ AUTO DIFFERENTIAL - Abnormal; Notable for the following components:       Result Value    Hemoglobin 11.9 (*)     RDW 16.4 (*)     All other components within normal limits   COMPREHENSIVE METABOLIC PANEL - Abnormal; Notable for the following components:    Sodium 146 (*)     Glucose 61 (*)     Albumin 3.3 (*)     All other components within normal limits          Imaging Results              X-Ray Chest AP Portable (Final result)  Result time 06/06/24 09:32:49      Final result by Harry Vines MD (06/06/24 09:32:49)                   Impression:      No acute findings.      Electronically signed by: Harry Vines MD  Date:    06/06/2024  Time:    09:32               Narrative:    EXAMINATION:  XR CHEST AP PORTABLE    CLINICAL HISTORY:  pre op;    TECHNIQUE:  Single frontal view of the chest was performed.    COMPARISON:  02/27/2022    FINDINGS:  Mildly enlarged cardiac silhouette.The lungs are grossly clear.  No pneumothorax.No pleural effusions.                                       Medications - No data to display  Medical Decision Making    G-tube dislodged for unknown period of time.  Unsuccessful replacement in the ED. Will need replacement by GI.  She is otherwise well-appearing.   Scheduled for placement tomorrow.  Will place in observation until then.    Amount and/or Complexity of Data Reviewed  Independent Historian: EMS     Details: See HPI  Labs: ordered.            Scribe Attestation:   Scribe #1: I performed the above scribed service and the documentation accurately describes the services I performed. I attest to the accuracy of the note.                           I, Harjit Bustillo PA-C, personally performed the services described in this documentation. All medical record entries made by the scribe were at my direction and in my presence. I have reviewed the chart and agree that the record reflects my personal performance and is accurate and complete.      Clinical Impression:  Final diagnoses:  [Z43.1] PEG (percutaneous endoscopic gastrostomy) adjustment/replacement/removal (Primary)          ED Disposition Condition    Observation Stable                Harjit Bustillo PA-C  06/06/24 2305

## 2024-06-06 NOTE — ASSESSMENT & PLAN NOTE
Chronic, controlled. Latest blood pressure and vitals reviewed-     Temp:  [97.6 °F (36.4 °C)]   Pulse:  [91-96]   Resp:  [14-16]   BP: (116-130)/(80-84)   SpO2:  [99 %] .   Home meds for hypertension were reviewed and noted below.   Hypertension Medications               amLODIPine (NORVASC) 5 MG tablet Take 5 mg by mouth 2 (two) times daily.            While in the hospital, will manage blood pressure as follows; Adjust home antihypertensive regimen as follows- hold until PEG replaced    Will utilize p.r.n. blood pressure medication only if patient's blood pressure greater than 180/110 and she develops symptoms such as worsening chest pain or shortness of breath.

## 2024-06-06 NOTE — ED NOTES
Patient placed on waffle mattress, meplix sacral dressing applied, and external female versette catheter placed.  Patient tolerated well.

## 2024-06-07 ENCOUNTER — ANESTHESIA EVENT (OUTPATIENT)
Dept: ENDOSCOPY | Facility: HOSPITAL | Age: 79
End: 2024-06-07
Payer: MEDICARE

## 2024-06-07 ENCOUNTER — ANESTHESIA (OUTPATIENT)
Dept: ENDOSCOPY | Facility: HOSPITAL | Age: 79
End: 2024-06-07
Payer: MEDICARE

## 2024-06-07 VITALS
DIASTOLIC BLOOD PRESSURE: 71 MMHG | OXYGEN SATURATION: 96 % | HEART RATE: 80 BPM | WEIGHT: 120 LBS | HEIGHT: 62 IN | SYSTOLIC BLOOD PRESSURE: 139 MMHG | BODY MASS INDEX: 22.08 KG/M2 | TEMPERATURE: 98 F | RESPIRATION RATE: 18 BRPM

## 2024-06-07 PROCEDURE — 25000003 PHARM REV CODE 250: Performed by: STUDENT IN AN ORGANIZED HEALTH CARE EDUCATION/TRAINING PROGRAM

## 2024-06-07 PROCEDURE — D9220A PRA ANESTHESIA: Mod: ANES,,, | Performed by: ANESTHESIOLOGY

## 2024-06-07 PROCEDURE — 37000008 HC ANESTHESIA 1ST 15 MINUTES: Performed by: STUDENT IN AN ORGANIZED HEALTH CARE EDUCATION/TRAINING PROGRAM

## 2024-06-07 PROCEDURE — 37000009 HC ANESTHESIA EA ADD 15 MINS: Performed by: STUDENT IN AN ORGANIZED HEALTH CARE EDUCATION/TRAINING PROGRAM

## 2024-06-07 PROCEDURE — 43246 EGD PLACE GASTROSTOMY TUBE: CPT | Performed by: STUDENT IN AN ORGANIZED HEALTH CARE EDUCATION/TRAINING PROGRAM

## 2024-06-07 PROCEDURE — 63600175 PHARM REV CODE 636 W HCPCS: Mod: JZ,JG | Performed by: STUDENT IN AN ORGANIZED HEALTH CARE EDUCATION/TRAINING PROGRAM

## 2024-06-07 PROCEDURE — G0378 HOSPITAL OBSERVATION PER HR: HCPCS

## 2024-06-07 PROCEDURE — 43246 EGD PLACE GASTROSTOMY TUBE: CPT | Mod: ,,, | Performed by: STUDENT IN AN ORGANIZED HEALTH CARE EDUCATION/TRAINING PROGRAM

## 2024-06-07 PROCEDURE — 63600175 PHARM REV CODE 636 W HCPCS: Performed by: HOSPITALIST

## 2024-06-07 PROCEDURE — D9220A PRA ANESTHESIA: Mod: CRNA,,, | Performed by: STUDENT IN AN ORGANIZED HEALTH CARE EDUCATION/TRAINING PROGRAM

## 2024-06-07 PROCEDURE — 63600175 PHARM REV CODE 636 W HCPCS: Performed by: STUDENT IN AN ORGANIZED HEALTH CARE EDUCATION/TRAINING PROGRAM

## 2024-06-07 PROCEDURE — 27201018 HC KIT, PEG (ANY): Performed by: STUDENT IN AN ORGANIZED HEALTH CARE EDUCATION/TRAINING PROGRAM

## 2024-06-07 PROCEDURE — 96366 THER/PROPH/DIAG IV INF ADDON: CPT

## 2024-06-07 RX ORDER — CLINDAMYCIN PHOSPHATE 900 MG/50ML
900 INJECTION, SOLUTION INTRAVENOUS ONCE
Status: COMPLETED | OUTPATIENT
Start: 2024-06-07 | End: 2024-06-07

## 2024-06-07 RX ORDER — PROPOFOL 10 MG/ML
VIAL (ML) INTRAVENOUS
Status: DISCONTINUED | OUTPATIENT
Start: 2024-06-07 | End: 2024-06-07

## 2024-06-07 RX ORDER — LIDOCAINE HYDROCHLORIDE 20 MG/ML
INJECTION INTRAVENOUS
Status: DISCONTINUED | OUTPATIENT
Start: 2024-06-07 | End: 2024-06-07

## 2024-06-07 RX ORDER — CLINDAMYCIN PHOSPHATE 150 MG/ML
900 INJECTION, SOLUTION INTRAVENOUS ONCE
Status: DISCONTINUED | OUTPATIENT
Start: 2024-06-07 | End: 2024-06-07

## 2024-06-07 RX ORDER — PHENYLEPHRINE HYDROCHLORIDE 10 MG/ML
INJECTION INTRAVENOUS
Status: DISCONTINUED | OUTPATIENT
Start: 2024-06-07 | End: 2024-06-07

## 2024-06-07 RX ADMIN — LIDOCAINE HYDROCHLORIDE 100 MG: 20 INJECTION, SOLUTION INTRAVENOUS at 11:06

## 2024-06-07 RX ADMIN — PROPOFOL 20 MG: 10 INJECTION, EMULSION INTRAVENOUS at 11:06

## 2024-06-07 RX ADMIN — PHENYLEPHRINE HYDROCHLORIDE 200 MCG: 10 INJECTION INTRAVENOUS at 12:06

## 2024-06-07 RX ADMIN — PHENYLEPHRINE HYDROCHLORIDE 100 MCG: 10 INJECTION INTRAVENOUS at 11:06

## 2024-06-07 RX ADMIN — PROPOFOL 40 MG: 10 INJECTION, EMULSION INTRAVENOUS at 11:06

## 2024-06-07 RX ADMIN — SODIUM CHLORIDE: 0.9 INJECTION, SOLUTION INTRAVENOUS at 11:06

## 2024-06-07 RX ADMIN — DEXTROSE, SODIUM CHLORIDE, AND POTASSIUM CHLORIDE: 5; .45; .15 INJECTION INTRAVENOUS at 04:06

## 2024-06-07 RX ADMIN — CLINDAMYCIN PHOSPHATE 900 MG: 900 INJECTION, SOLUTION INTRAVENOUS at 11:06

## 2024-06-07 NOTE — PROGRESS NOTES
[facility]  Nurses Note -- 4 Eyes      6/7/2024       Skin assessed on: Q Shift      [x] No Pressure Injuries Present    []Prevention Measures Documented    [] Yes LDA  for Pressure Injury Previously documented     [] Yes New Pressure Injury Discovered   [] LDA for New Pressure Injury Added      Attending RN:  Ramin Flower RN     Second RN:  María Elena Ambrocio RN

## 2024-06-07 NOTE — DISCHARGE SUMMARY
[facility] Medicine  Discharge Summary      Patient Name: Emilia Melgoaz  MRN: [id number]  [facility]: [id number]  Patient Class: OP- Observation  Admission Date: 6/6/2024  Hospital Length of Stay: 0 days  Discharge Date and Time:  06/07/2024 12:28 PM  Attending Physician: Naren Mendez MD   Discharging Provider: Naren Mendez MD  Primary Care Provider: Lolita Portillo MD    Primary Care Team: Networked reference to record PCT     HPI:   78 y.o. female with hypertension, DM2, and history of CVA with residual deficits aphasia, dysphagia, and hemiparesis resulting in significant debility presents from home due to dislodged PEG tube.  Patient's daughter reports that peg tube was noted to be dislodged this morning.  Unclear duration, may have occurred at some point overnight last night.  Initial PEG was placed in 2020 and has been replaced once in 2022 after dislodgement.  Patient is nonverbal and receives all nutrition and medication via PEG.  Last dose of Plavix was yesterday morning, 06/05/2024.  History otherwise limited.  In the ED, gastroenterology was consulted with plan for PEG replacement tomorrow.  Placed in observation.    Procedure(s) (LRB):  EGD (ESOPHAGOGASTRODUODENOSCOPY) (N/A)      Hospital Course:   PEG tube dislodgement, replace today 6/7. Can d/c afterward if no complication        ok for meds and water via peg today; ok for feedings to resume tomorrow 6/8/24        Thank you for trusting [facility] and me with your care.  We are honored that you entrusted us with your healthcare needs. Your satisfaction is very important to us and we hope you have been very pleased with your experience at [facility]. After your discharge you may receive a survey asking you to rate your hospital experience- Please help us by completing this survey. We hope that you have received the very best care possible during your hospitalization at [facility], as your  satisfaction is our top priority.     Let me know if there is anything more I can do!!                Naren Mendez MD        Medical Director        Section Head of         Board-Certified IM Attending    Goals of Care Treatment Preferences:  Code Status: Full Code      Consults:   Consults (From admission, onward)          Status Ordering Provider     Inpatient consult to Gastroenterology  Once        Provider:  Bethel Melchor MD    Completed LNIDA KUHN            No new Assessment & Plan notes have been filed under this hospital service since the last note was generated.  Service: Hospital Medicine    Final Active Diagnoses:    Diagnosis Date Noted POA    PRINCIPAL PROBLEM:  PEG (percutaneous endoscopic gastrostomy) adjustment/replacement/removal [Z43.1] 03/13/2022 Not Applicable    Type 2 diabetes mellitus with diabetic neuropathy, without long-term current use of insulin [E11.40] 03/13/2022 Yes     Chronic    History of ischemic cerebrovascular accident (CVA) with residual deficit [I69.30] 09/10/2019 Not Applicable     Chronic    Essential hypertension [I10] 02/24/2016 Yes     Chronic      Problems Resolved During this Admission:       Discharged Condition: stable    Disposition: Home or Self Care    Follow Up:   Follow-up Information       Lolita Portillo MD .    Specialty: Internal Medicine  Contact information:  [address]  [place] [address]  [phone]                           Patient Instructions:   No discharge procedures on file.    Significant Diagnostic Studies:   Recent Results (from the past 100 hour(s))   CBC auto differential    Collection Time: 06/06/24  9:35 AM   Result Value Ref Range    WBC 5.76 3.90 - 12.70 K/uL    RBC 4.28 4.00 - 5.40 M/uL    Hemoglobin 11.9 (L) 12.0 - 16.0 g/dL    Hematocrit 37.2 37.0 - 48.5 %    MCV 87 82 - 98 fL    MCH 27.8 27.0 - 31.0 pg    MCHC 32.0 32.0 - 36.0 g/dL    RDW 16.4 (H) 11.5 - 14.5 %    Platelets 153 150 - 450 K/uL    MPV 11.9 9.2 -  12.9 fL    Immature Granulocytes 0.3 0.0 - 0.5 %    Gran # (ANC) 3.4 1.8 - 7.7 K/uL    Immature Grans (Abs) 0.02 0.00 - 0.04 K/uL    Lymph # 1.8 1.0 - 4.8 K/uL    Mono # 0.4 0.3 - 1.0 K/uL    Eos # 0.1 0.0 - 0.5 K/uL    Baso # 0.02 0.00 - 0.20 K/uL    nRBC 0 0 /100 WBC    Gran % 59.8 38.0 - 73.0 %    Lymph % 31.4 18.0 - 48.0 %    Mono % 7.3 4.0 - 15.0 %    Eosinophil % 0.9 0.0 - 8.0 %    Basophil % 0.3 0.0 - 1.9 %    Differential Method Automated    Comprehensive metabolic panel    Collection Time: 06/06/24 12:22 PM   Result Value Ref Range    Sodium 146 (H) 136 - 145 mmol/L    Potassium 4.0 3.5 - 5.1 mmol/L    Chloride 110 95 - 110 mmol/L    CO2 23 23 - 29 mmol/L    Glucose 61 (L) 70 - 110 mg/dL    BUN 20 8 - 23 mg/dL    Creatinine 0.6 0.5 - 1.4 mg/dL    Calcium 9.2 8.7 - 10.5 mg/dL    Total Protein 7.7 6.0 - 8.4 g/dL    Albumin 3.3 (L) 3.5 - 5.2 g/dL    Total Bilirubin 0.7 0.1 - 1.0 mg/dL    Alkaline Phosphatase 90 55 - 135 U/L    AST 21 10 - 40 U/L    ALT 13 10 - 44 U/L    eGFR >60 >60 mL/min/1.73 m^2    Anion Gap 13 8 - 16 mmol/L   POCT glucose    Collection Time: 06/06/24  4:58 PM   Result Value Ref Range    POCT Glucose 79 70 - 110 mg/dL       Microbiology Results (last 7 days)       ** No results found for the last 168 hours. **            Imaging Results              X-Ray Chest AP Portable (Final result)  Result time 06/06/24 09:32:49      Final result by Hrary Vines MD (06/06/24 09:32:49)                   Impression:      No acute findings.      Electronically signed by: Harry Vines MD  Date:    06/06/2024  Time:    09:32               Narrative:    EXAMINATION:  XR CHEST AP PORTABLE    CLINICAL HISTORY:  pre op;    TECHNIQUE:  Single frontal view of the chest was performed.    COMPARISON:  02/27/2022    FINDINGS:  Mildly enlarged cardiac silhouette.The lungs are grossly clear.  No pneumothorax.No pleural effusions.                                          Pending Diagnostic Studies:       None            Medications:  Reconciled Home Medications:      Medication List        CONTINUE taking these medications      acetaminophen 650 mg/20.3 mL Soln  Commonly known as: TYLENOL  20.3 mLs (650 mg total) by Per G Tube route every 6 (six) hours as needed for Temperature greater than (temp >101).     albuterol 0.63 mg/3 mL Nebu  Commonly known as: ACCUNEB  SMARTSI Vial(s) Via Nebulizer Twice Daily PRN     amLODIPine 5 MG tablet  Commonly known as: NORVASC  Take 5 mg by mouth 2 (two) times daily.     atorvastatin 40 MG tablet  Commonly known as: LIPITOR  TAKE 1 TABLET BY MOUTH ONCE DAILY IN THE EVENING     clopidogreL 75 mg tablet  Commonly known as: PLAVIX  1 tablet (75 mg total) by Per G Tube route once daily.     doxycycline 100 MG tablet  Commonly known as: VIBRA-TABS  Take 100 mg by mouth 2 (two) times daily.     fexofenadine 180 MG tablet  Commonly known as: ALLEGRA ALLERGY  Take 1 tablet (180 mg total) by mouth once daily.     fluticasone propionate 50 mcg/actuation nasal spray  Commonly known as: FLONASE  1 spray (50 mcg total) by Each Nostril route once daily.     LORazepam 2 mg/mL Conc  Commonly known as: ATIVAN  SMARTSI.25 Milliliter(s) By Mouth Every 2 Hours PRN     melatonin 10 mg Cap  Take 1 tablet by mouth every evening.     morphine 100 mg/5 mL (20 mg/mL) concentrated solution  SMARTSI.25 Milliliter(s) By Mouth Every 2 Hours PRN     oxybutynin 5 MG Tab  Commonly known as: DITROPAN  5 mg by Per G Tube route.     promethazine 6.25 mg/5 mL syrup  Commonly known as: PHENERGAN  SMARTSI Milliliter(s) Gastro Tube 3 Times Daily     vitamin D 1000 units Tab  Commonly known as: VITAMIN D3  Take 1,000 Units by mouth once daily.            STOP taking these medications      aspirin 81 MG Chew              Indwelling Lines/Drains at time of discharge:   Lines/Drains/Airways       Drain  Duration             Female External Urinary Catheter w/ Suction 24 0815 1 day                    Time spent on the  discharge of patient: 35 minutes         Naren Mendez MD  Department of Hospital Medicine  [facility] - Observation

## 2024-06-07 NOTE — PROGRESS NOTES
GI PLAN OF CARE NOTE    S/p PEG replacement, ok to d/c from GI standpoint. Ok for meds and water via peg today. Start feedings tomorrow.    Naida Rice NP

## 2024-06-07 NOTE — DISCHARGE INSTRUCTIONS
ok for meds and water via peg today; ok for feedings to resume tomorrow 6/8/24      Thank you for trusting [facility] and me with your care.  We are honored that you entrusted us with your healthcare needs. Your satisfaction is very important to us and we hope you have been very pleased with your experience at [facility]. After your discharge you may receive a survey asking you to rate your hospital experience- Please help us by completing this survey. We hope that you have received the very best care possible during your hospitalization at [facility], as your satisfaction is our top priority.    Let me know if there is anything more I can do!!              Naren Mendez MD        Medical Director        Section Head of         Board-Certified IM Attending      PATIENT GENERAL DISCHARGE INFORMATION   Things that YOU are responsible for to Manage Your Care At Home:  1. Getting your prescriptions filled.  2. Taking you medications as directed. (DO NOT MISS ANY DOSES!)  3. Going to your follow-up doctor appointments.                 *This is important because it allows the doctor to monitor your progress and make changes.      If no one calls you for your appointments, please call ([phone]) and reschedule this appointment.   After discharge, if you need assistance, you can call [facility] for 24/7 assistance at [phone]  If you are experience any signs or symptoms, Call your doctor or Call [phone] and come to your nearest Emergency Room.    You should receive a call from [facility] within 48-72 hours to help manage your care after discharge.   Please try to make sure that you answer your phone for this important phone call.

## 2024-06-07 NOTE — ANESTHESIA POSTPROCEDURE EVALUATION
Anesthesia Post Evaluation    Patient: Emilia Melgoza    Procedure(s) Performed: Procedure(s) (LRB):  EGD (ESOPHAGOGASTRODUODENOSCOPY) (N/A)    Final Anesthesia Type: general      Patient location during evaluation: GI PACU  Patient participation: Yes- Able to Participate  Level of consciousness: awake and alert and oriented  Post-procedure vital signs: reviewed and stable  Pain management: adequate  Airway patency: patent    PONV status at discharge: No PONV  Anesthetic complications: no      Cardiovascular status: blood pressure returned to baseline and hemodynamically stable  Respiratory status: unassisted, spontaneous ventilation and room air  Hydration status: euvolemic  Follow-up not needed.              Vitals Value Taken Time   /71 06/07/24 1317   Temp 36.9 °C (98.4 °F) 06/07/24 1317   Pulse 80 06/07/24 1317   Resp 18 06/07/24 1317   SpO2 98 % 06/07/24 1208         Event Time   Out of Recovery 06/07/2024 12:40:00         Pain/Becca Score: Pain Rating Prior to Med Admin: 0 (6/7/2024  5:40 AM)  Pain Rating Post Med Admin: 0 (6/7/2024  5:40 AM)  Becca Score: 10 (6/7/2024 12:23 PM)

## 2024-06-07 NOTE — NURSING
Received handoff from nurse Yocasta RN.  She reported that patient was admitted for Peg tube place and will be discharge tomorrow.  D5 SNF 0.45% NaCl with KCL 20 Latosha infusing at 100 ml/hr.  Patient is alert and nonverbal with daughter at bedside.

## 2024-06-07 NOTE — ANESTHESIA PREPROCEDURE EVALUATION
06/07/2024    Pre-operative evaluation for Procedure(s) (LRB):  EGD (ESOPHAGOGASTRODUODENOSCOPY) (N/A)    Emilia Melgoza is a 78 y.o. female     Patient Active Problem List   Diagnosis    DDD (degenerative disc disease), lumbar    Overactive bladder    Chronic diastolic congestive heart failure    Cerebral microvascular disease    Aphasia, late effect of cerebrovascular disease    Essential hypertension    Meningioma    Constipation    Gait instability    Hemiparesis affecting right side as late effect of cerebrovascular accident    Anxiety    MCI (mild cognitive impairment)    Broca's aphasia    Dysarthria    History of ischemic cerebrovascular accident (CVA) with residual deficit    Fatigue    Lethargy    Mobility impaired    Vitamin D deficiency    Acute arterial ischemic stroke, multifocal, posterior circulation    Normocytic anemia    Functional quadriplegia    Complication of Hutchison catheter    Aortic atherosclerosis    Senile purpura    Atherosclerosis of both carotid arteries    PEG (percutaneous endoscopic gastrostomy) status    Type 2 diabetes mellitus with diabetic neuropathy, without long-term current use of insulin    Chronic bronchitis, unspecified chronic bronchitis type    Acquired absence of other left toe(s)    PEG (percutaneous endoscopic gastrostomy) adjustment/replacement/removal       Review of patient's allergies indicates:   Allergen Reactions    Enoxaparin Other (See Comments) and Hives     Slurred speech per patient  Slurred speech per patient    Lisinopril Other (See Comments)     Cough    Penicillins Rash       No current facility-administered medications on file prior to encounter.     Current Outpatient Medications on File Prior to Encounter   Medication Sig Dispense Refill    acetaminophen (TYLENOL) 650 mg/20.3 mL Soln 20.3 mLs (650 mg total) by Per G Tube route every 6 (six)  hours as needed for Temperature greater than (temp >101). 5 mL 0    albuterol (ACCUNEB) 0.63 mg/3 mL Nebu SMARTSI Vial(s) Via Nebulizer Twice Daily PRN      amLODIPine (NORVASC) 5 MG tablet Take 5 mg by mouth 2 (two) times daily.      atorvastatin (LIPITOR) 40 MG tablet TAKE 1 TABLET BY MOUTH ONCE DAILY IN THE EVENING 90 tablet 3    clopidogreL (PLAVIX) 75 mg tablet 1 tablet (75 mg total) by Per G Tube route once daily. 90 tablet 3    doxycycline (VIBRA-TABS) 100 MG tablet Take 100 mg by mouth 2 (two) times daily.      LORazepam (ATIVAN) 2 mg/mL Conc SMARTSI.25 Milliliter(s) By Mouth Every 2 Hours PRN      melatonin 10 mg Cap Take 1 tablet by mouth every evening.      morphine 100 mg/5 mL (20 mg/mL) concentrated solution SMARTSI.25 Milliliter(s) By Mouth Every 2 Hours PRN      oxybutynin (DITROPAN) 5 MG Tab 5 mg by Per G Tube route.      promethazine (PHENERGAN) 6.25 mg/5 mL syrup SMARTSI Milliliter(s) Gastro Tube 3 Times Daily      vitamin D (VITAMIN D3) 1000 units Tab Take 1,000 Units by mouth once daily.      aspirin 81 MG Chew Take 1 tablet (81 mg total) by mouth once daily. 90 tablet 3    fexofenadine (ALLEGRA ALLERGY) 180 MG tablet Take 1 tablet (180 mg total) by mouth once daily. 90 tablet 3    fluticasone propionate (FLONASE) 50 mcg/actuation nasal spray 1 spray (50 mcg total) by Each Nostril route once daily. 16 g 3     LABS  CBC:   Recent Labs     24  0935   WBC 5.76   RBC 4.28   HGB 11.9*   HCT 37.2      MCV 87   MCH 27.8   MCHC 32.0       CMP:   Recent Labs     24  1222   *   K 4.0      CO2 23   BUN 20   CREATININE 0.6   GLU 61*   CALCIUM 9.2   ALBUMIN 3.3*   PROT 7.7   ALKPHOS 90   ALT 13   AST 21   BILITOT 0.7         Pre-op Assessment    I have reviewed the Patient Summary Reports.    I have reviewed the NPO Status.   I have reviewed the Medications.     Review of Systems  Anesthesia Hx:  No previous Anesthesia   History of prior surgery of interest to airway  management or planning:          Denies Family Hx of Anesthesia complications.    Denies Personal Hx of Anesthesia complications.                    Social:  Non-Smoker       Hematology/Oncology:  Hematology Normal   Oncology Normal                Hematology Comments:                      EENT/Dental:  EENT/Dental Normal           Cardiovascular:  Exercise tolerance: poor   Hypertension       CHF    hyperlipidemia   ECG has been reviewed.                          Pulmonary:  Pulmonary Normal                       Renal/:  Renal/ Normal                 Hepatic/GI:  Hepatic/GI Normal       Dysphagia for PEG          Musculoskeletal:  Arthritis               Neurological:   CVA, residual symptoms                                    Endocrine:  Diabetes, type 2           Dermatological:  Skin Normal    Psych:  Psychiatric Normal                    Physical Exam  General: Somnolent    Airway:  Mouth Opening: Normal  TM Distance: Normal  Tongue: Normal    Chest/Lungs:  Normal Respiratory Rate    Heart:  Rate: Normal  Rhythm: Regular Rhythm  Sounds: Normal        Anesthesia Plan  Type of Anesthesia, risks & benefits discussed:    Anesthesia Type: Gen Natural Airway  Intra-op Monitoring Plan: Standard ASA Monitors  Induction:  IV  Informed Consent: Informed consent signed with the Patient representative and all parties understand the risks and agree with anesthesia plan.  All questions answered.   ASA Score: 3    Ready For Surgery From Anesthesia Perspective.     .

## 2024-06-07 NOTE — NURSING
[facility]  Nurses Note -- 4 Eyes      6/6/2024       Skin assessed on: Admit      [x] No Pressure Injuries Present    [x]Prevention Measures Documented    [] Yes LDA  for Pressure Injury Previously documented     [] Yes New Pressure Injury Discovered   [] LDA for New Pressure Injury Added      Attending RN:  María Elena Ambrocio, RN     Second eyes:  Rashida HASKINS, PCT

## 2024-06-07 NOTE — PROVATION PATIENT INSTRUCTIONS
Discharge Summary/Instructions after an Endoscopic Procedure  Patient Name: Emilia Melgoza  Patient MRN: [id number]  Patient YOB: 1945 Friday, June 7, 2024  Kp Menezes MD  Dear patient,  As a result of recent federal legislation (The Federal Cures Act), you may   receive lab or pathology results from your procedure in your [facility]   account before your physician is able to contact you. Your physician or   their representative will relay the results to you with their   recommendations at their soonest availability.  Thank you,  RESTRICTIONS:  During your procedure today, you received medications for sedation.  These   medications may affect your judgment, balance and coordination.  Therefore,   for 24 hours, you have the following restrictions:   - DO NOT drive a car, operate machinery, make legal/financial decisions,   sign important papers or drink alcohol.    ACTIVITY:  Today: no heavy lifting, straining or running due to procedural   sedation/anesthesia.  The following day: return to full activity including work.  DIET:  Eat and drink normally unless instructed otherwise.     TREATMENT FOR COMMON SIDE EFFECTS:  - Mild abdominal pain, nausea, belching, bloating or excessive gas:  rest,   eat lightly and use a heating pad.  - Sore Throat: treat with throat lozenges and/or gargle with warm salt   water.  - Because air was used during the procedure, expelling large amounts of air   from your rectum or belching is normal.  - If a bowel prep was taken, you may not have a bowel movement for 1-3 days.    This is normal.  SYMPTOMS TO WATCH FOR AND REPORT TO YOUR PHYSICIAN:  1. Abdominal pain or bloating, other than gas cramps.  2. Chest pain.  3. Back pain.  4. Signs of infection such as: chills or fever occurring within 24 hours   after the procedure.  5. Rectal bleeding, which would show as bright red, maroon, or black stools.   (A tablespoon of blood from the rectum is not serious, especially if    hemorrhoids are present.)  6. Vomiting.  7. Weakness or dizziness.  GO DIRECTLY TO THE NEAREST EMERGENCY ROOM IF YOU HAVE ANY OF THE FOLLOWING:      Difficulty breathing              Chills and/or fever over 101 F   Persistent vomiting and/or vomiting blood   Severe abdominal pain   Severe chest pain   Black, tarry stools   Bleeding- more than one tablespoon   Any other symptom or condition that you feel may need urgent attention  Your doctor recommends these additional instructions:  If any biopsies were taken, your doctors clinic will contact you in 1 to 2   weeks with any results.  - Return patient to hospital [facility] for ongoing care.   - The findings and recommendations were discussed with the patient.   - Patient has a contact number available for emergencies.  The signs and   symptoms of potential delayed complications were discussed with the   patient.  Return to normal activities tomorrow.  Written discharge   instructions were provided to the patient.   - Please follow the post-PEG recommendations including: may use PEG today   for meds and water and may use PEG tomorrow for feedings.  For questions, problems or results please call your physician - Kp Menezes MD at Work:  [phone].  [facility] can be reached at [phone]     IF A COMPLICATION OR EMERGENCY SITUATION ARISES AND YOU ARE UNABLE TO REACH   YOUR PHYSICIAN - GO DIRECTLY TO THE EMERGENCY ROOM.  Kp Menezes MD  6/7/2024 12:34:21 PM  This report has been verified and signed electronically.  Dear patient,  As a result of recent federal legislation (The Federal Cures Act), you may   receive lab or pathology results from your procedure in your [facility]   account before your physician is able to contact you. Your physician or   their representative will relay the results to you with their   recommendations at their soonest availability.  Thank you,  PROVATION

## 2024-06-07 NOTE — H&P
[facility] Medicine  History & Physical    Patient Name: Emilia Melgoza  MRN: [id number]  Patient Class: OP- Observation  Admission Date: 6/6/2024  Attending Physician: Jose Tobar MD   Primary Care Provider: Lolita Portillo MD         Patient information was obtained from patient, past medical records, and ER records.     Subjective:     Principal Problem:PEG (percutaneous endoscopic gastrostomy) adjustment/replacement/removal    Chief Complaint:   Chief Complaint   Patient presents with    peg tube problem     Pt eleazar MITCHELL today from home due to her PEG tube being displaced upon waking this am. Pt is alert at this time.         HPI: 78 y.o. female with hypertension, DM2, and history of CVA with residual deficits aphasia, dysphagia, and hemiparesis resulting in significant debility presents from home due to dislodged PEG tube.  Patient's daughter reports that peg tube was noted to be dislodged this morning.  Unclear duration, may have occurred at some point overnight last night.  Initial PEG was placed in 2020 and has been replaced once in 2022 after dislodgement.  Patient is nonverbal and receives all nutrition and medication via PEG.  Last dose of Plavix was yesterday morning, 06/05/2024.  History otherwise limited.  In the ED, gastroenterology was consulted with plan for PEG replacement tomorrow.  Placed in observation.    Past Medical History:   Diagnosis Date    Allergy     DDD (degenerative disc disease), lumbar     Chronic LBP and intermittent RLE radicular pain x 10 yrs    Hypertension     Overactive bladder     Senile cataract, unspecified - Both Eyes 6/11/2013    Stroke     right sided weakness       Past Surgical History:   Procedure Laterality Date    bladder mesh      BREAST BIOPSY Left     CATARACT EXTRACTION W/  INTRAOCULAR LENS IMPLANT Right 10/13/2014    Dr Crystal    CATARACT EXTRACTION W/  INTRAOCULAR LENS IMPLANT Left 11/10/2014    Dr Crystal     ESOPHAGOGASTRODUODENOSCOPY N/A 3/2/2022    Procedure: EGD (ESOPHAGOGASTRODUODENOSCOPY);  Surgeon: Ottoniel Granger MD;  Location: [facility] ENDO;  Service: Endoscopy;  Laterality: N/A;    HEMORRHOID SURGERY      HYSTERECTOMY      INCONTINENCE SURGERY      OOPHORECTOMY      Right Rotator Cuff Repair      TRACHEOTOMY N/A 2020    Procedure: TRACHEOTOMY;  Surgeon: Marco Shine III, MD;  Location: [facility] OR;  Service: General;  Laterality: N/A;       Review of patient's allergies indicates:   Allergen Reactions    Enoxaparin Other (See Comments) and Hives     Slurred speech per patient  Slurred speech per patient    Lisinopril Other (See Comments)     Cough    Penicillins Rash       No current facility-administered medications on file prior to encounter.     Current Outpatient Medications on File Prior to Encounter   Medication Sig    acetaminophen (TYLENOL) 650 mg/20.3 mL Soln 20.3 mLs (650 mg total) by Per G Tube route every 6 (six) hours as needed for Temperature greater than (temp >101).    albuterol (ACCUNEB) 0.63 mg/3 mL Nebu SMARTSI Vial(s) Via Nebulizer Twice Daily PRN    amLODIPine (NORVASC) 5 MG tablet Take 5 mg by mouth 2 (two) times daily.    atorvastatin (LIPITOR) 40 MG tablet TAKE 1 TABLET BY MOUTH ONCE DAILY IN THE EVENING    clopidogreL (PLAVIX) 75 mg tablet 1 tablet (75 mg total) by Per G Tube route once daily.    doxycycline (VIBRA-TABS) 100 MG tablet Take 100 mg by mouth 2 (two) times daily.    LORazepam (ATIVAN) 2 mg/mL Conc SMARTSI.25 Milliliter(s) By Mouth Every 2 Hours PRN    melatonin 10 mg Cap Take 1 tablet by mouth every evening.    morphine 100 mg/5 mL (20 mg/mL) concentrated solution SMARTSI.25 Milliliter(s) By Mouth Every 2 Hours PRN    oxybutynin (DITROPAN) 5 MG Tab 5 mg by Per G Tube route.    promethazine (PHENERGAN) 6.25 mg/5 mL syrup SMARTSI Milliliter(s) Gastro Tube 3 Times Daily    vitamin D (VITAMIN D3) 1000 units Tab Take 1,000 Units by mouth once daily.    aspirin 81  MG Chew Take 1 tablet (81 mg total) by mouth once daily.    fexofenadine (ALLEGRA ALLERGY) 180 MG tablet Take 1 tablet (180 mg total) by mouth once daily.    fluticasone propionate (FLONASE) 50 mcg/actuation nasal spray 1 spray (50 mcg total) by Each Nostril route once daily.    [DISCONTINUED] ascorbic acid, vitamin C, (VITAMIN C) 1000 MG tablet Take 1,000 mg by mouth once daily.    [DISCONTINUED] azithromycin (ZITHROMAX Z-TALON) 250 MG tablet Take 2 pills day 1, then 1 pill day 2-5. Administer via PEG.    [DISCONTINUED] hydrocodone-acetaminophen (HYCET) solution 7.5-325 mg/15mL 15 mLs by Per G Tube route every 4 (four) hours as needed.    [DISCONTINUED] oxybutynin (DITROPAN-XL) 5 MG TR24 Take 1 tablet (5 mg total) by mouth once daily.     Family History       Problem Relation (Age of Onset)    Breast cancer Mother    Colon cancer Father    Dementia Mother    Hypertension Mother, Father    No Known Problems Sister, Brother, Maternal Aunt, Maternal Uncle, Paternal Aunt, Paternal Uncle, Maternal Grandmother, Maternal Grandfather, Paternal Grandmother, Paternal Grandfather    Rhinitis Daughter, Daughter          Tobacco Use    Smoking status: Former     Current packs/day: 0.00     Types: Cigarettes     Quit date: 1992     Years since quittin.4    Smokeless tobacco: Never   Substance and Sexual Activity    Alcohol use: No     Alcohol/week: 0.0 standard drinks of alcohol    Drug use: No    Sexual activity: Not Currently     Review of Systems   Unable to perform ROS: Patient nonverbal     Objective:     Vital Signs (Most Recent):  Temp: 97.6 °F (36.4 °C) (24 0736)  Pulse: 91 (24 1137)  Resp: 16 (24 1137)  BP: 116/84 (24 1137)  SpO2: 99 % (24 1137) Vital Signs (24h Range):  Temp:  [97.6 °F (36.4 °C)] 97.6 °F (36.4 °C)  Pulse:  [91-96] 91  Resp:  [14-16] 16  SpO2:  [99 %] 99 %  BP: (116-130)/(80-84) 116/84     Weight: 54.4 kg (120 lb)  Body mass index is 21.95 kg/m².     Physical  "Exam  Vitals and nursing note reviewed.   Constitutional:       General: She is not in acute distress.     Appearance: She is well-developed.   HENT:      Head: Normocephalic and atraumatic.      Right Ear: External ear normal.      Left Ear: External ear normal.      Nose: Nose normal.   Eyes:      Conjunctiva/sclera: Conjunctivae normal.   Cardiovascular:      Rate and Rhythm: Normal rate and regular rhythm.   Pulmonary:      Effort: Pulmonary effort is normal. No respiratory distress.   Abdominal:      General: There is no distension.      Palpations: Abdomen is soft.   Skin:     General: Skin is warm and dry.   Neurological:      Mental Status: She is alert.                Significant Labs: All pertinent labs within the past 24 hours have been reviewed.    Significant Imaging: I have reviewed all pertinent imaging results/findings within the past 24 hours.  Assessment/Plan:     * PEG (percutaneous endoscopic gastrostomy) adjustment/replacement/removal  Patient noted to have a percutaneous endoscopic gastrostomy tube that has been dislodged. GI consulted, plan is to replaced tomorrow due to plavix timing.  NPO for now.    Type 2 diabetes mellitus with diabetic neuropathy, without long-term current use of insulin  Patient's FSGs are uncontrolled due to hypoglycemia on current medication regimen.  Last A1c reviewed-   Lab Results   Component Value Date    HGBA1C 5.6 12/26/2019     Most recent fingerstick glucose reviewed- No results for input(s): "POCTGLUCOSE" in the last 24 hours.  Current correctional scale   n/a  Hold for now due to hypoglycemia  anti-hyperglycemic dose as follows-   Antihyperglycemics (From admission, onward)      None          Hold Oral hypoglycemics while patient is in the hospital.    History of ischemic cerebrovascular accident (CVA) with residual deficit  Hold ASA/plavix/statin until PEG replaced    Essential hypertension  Chronic, controlled. Latest blood pressure and vitals reviewed- "     Temp:  [97.6 °F (36.4 °C)]   Pulse:  [91-96]   Resp:  [14-16]   BP: (116-130)/(80-84)   SpO2:  [99 %] .   Home meds for hypertension were reviewed and noted below.   Hypertension Medications               amLODIPine (NORVASC) 5 MG tablet Take 5 mg by mouth 2 (two) times daily.            While in the hospital, will manage blood pressure as follows; Adjust home antihypertensive regimen as follows- hold until PEG replaced    Will utilize p.r.n. blood pressure medication only if patient's blood pressure greater than 180/110 and she develops symptoms such as worsening chest pain or shortness of breath.      VTE Risk Mitigation (From admission, onward)           Ordered     IP VTE HIGH RISK PATIENT  Once         06/06/24 1411     Place sequential compression device  Until discontinued         06/06/24 1411     Reason for No Pharmacological VTE Prophylaxis  Once        Question:  Reasons:  Answer:  Risk of Bleeding    06/06/24 1411                         On 06/06/2024, patient should be placed in hospital observation services under my care in collaboration with Jose Tobar MD.      AdmissionCare    Guideline: General Observation, Observation    Based on the indications selected for the patient, the bed status of Observation was determined to be MET    The following indications were selected as present at the time of evaluation of the patient:      - Clinical care needed is not appropriate for lower level of care (ie, discharge to outpatient setting not appropriate).   - Clinical care (eg, testing, monitoring, or treatment) needed beyond usual emergency department time frame (eg, 3 to 4 hours)    - Gastroenterologic condition or finding (eg, suspected ileus or obstruction, fecal impaction, constipation, gastroparesis, appendicitis or other abdominal infection, peptic ulcer disease without suspected bleeding, hepatitis)    AdmissionCare documentation entered by: Roge Ruffin    [facility], 28th edition, Copyright ©  2024 [facility] [employer] [facility] All Rights Reserved.  [id number]:23:35-05:00    Francisco Tee Jr., APRN, AGALovering [facility] State Hospital-BC  Hospitalist - Department of Hospital Medicine  [facility] [address] [place] [address]  Office #: [phone]; Pager #: [phone]

## 2024-06-07 NOTE — TRANSFER OF CARE
"Anesthesia Transfer of Care Note    Patient: Emilia Melgoza    Procedure(s) Performed: Procedure(s) (LRB):  EGD (ESOPHAGOGASTRODUODENOSCOPY) (N/A)    Patient location: GI    Anesthesia Type: general    Post pain: adequate analgesia    Post assessment: no apparent anesthetic complications and tolerated procedure well    Post vital signs: stable    Level of consciousness: sedated and responds to stimulation    Nausea/Vomiting: no nausea/vomiting    Complications: none    Transfer of care protocol was followed      Last vitals: Visit Vitals  BP (!) 140/70   Pulse 74   Temp 36 °C (96.8 °F) (Skin)   Resp 18   Ht 5' 2" (1.575 m)   Wt 54.4 kg (120 lb)   SpO2 98%   Breastfeeding No   BMI 21.95 kg/m²     "

## 2024-06-07 NOTE — PROGRESS NOTES
[facility] Medicine  Progress Note    Patient Name: Emilia Melgoza  MRN: [id number]  Patient Class: OP- Observation   Admission Date: 6/6/2024  Length of Stay: 0 days  Attending Physician: Naren Mendez MD  Primary Care Provider: Lolita Portillo MD        Subjective:     Principal Problem:PEG (percutaneous endoscopic gastrostomy) adjustment/replacement/removal        HPI:  78 y.o. female with hypertension, DM2, and history of CVA with residual deficits aphasia, dysphagia, and hemiparesis resulting in significant debility presents from home due to dislodged PEG tube.  Patient's daughter reports that peg tube was noted to be dislodged this morning.  Unclear duration, may have occurred at some point overnight last night.  Initial PEG was placed in 2020 and has been replaced once in 2022 after dislodgement.  Patient is nonverbal and receives all nutrition and medication via PEG.  Last dose of Plavix was yesterday morning, 06/05/2024.  History otherwise limited.  In the ED, gastroenterology was consulted with plan for PEG replacement tomorrow.  Placed in observation.    Overview/Hospital Course:  PEG tube dislodgement, replace today 6/7. Can d/c afterward if no complication     Interval History:  NAEON.  No new issues.         ROS:  Nonverbal      Vitals:    06/06/24 2044 06/06/24 2318 06/07/24 0433 06/07/24 0746   BP: (!) 116/56 126/66 (!) 140/66 (!) 144/69   BP Location: Left arm Left arm Left arm    Patient Position: Lying Lying Lying    Pulse: 86 75 75 76   Resp: 16 20 16 19   Temp: 98.4 °F (36.9 °C) 98.1 °F (36.7 °C) 98.6 °F (37 °C) 98.3 °F (36.8 °C)   TempSrc: Axillary Axillary Axillary    SpO2: 99% 100% 100% 98%   Weight:       Height:              Body mass index is 21.95 kg/m².      PHYSICAL EXAM:  GENERAL APPEARANCE: alert and cooperative, and appears to be in no acute distress.  HEENT:     HEAD: NC/AT     EYES: PERRL, EOMI.  Vision is grossly intact.  NECK: Neck supple,  non-tender without LAD, masses or thyromegaly.  CARDIAC: There is no peripheral edema, cyanosis or pallor.   LUNGS: Clear to auscultation and percussion without rales or wheezing  ABDOMEN: Non-distended. No guarding.  MSK: No joint erythema or tenderness.   EXTREMITIES: No significant deformity or joint abnormality. No edema.   NEUROLOGICAL: CN II-XII grossly intact.   SKIN: Skin normal color, texture and turgor with no lesions or eruptions.  PSYCHIATRIC: Oriented. No tangential speech. No Hyperactive features.        Recent Results (from the past 24 hour(s))   Comprehensive metabolic panel    Collection Time: 06/06/24 12:22 PM   Result Value Ref Range    Sodium 146 (H) 136 - 145 mmol/L    Potassium 4.0 3.5 - 5.1 mmol/L    Chloride 110 95 - 110 mmol/L    CO2 23 23 - 29 mmol/L    Glucose 61 (L) 70 - 110 mg/dL    BUN 20 8 - 23 mg/dL    Creatinine 0.6 0.5 - 1.4 mg/dL    Calcium 9.2 8.7 - 10.5 mg/dL    Total Protein 7.7 6.0 - 8.4 g/dL    Albumin 3.3 (L) 3.5 - 5.2 g/dL    Total Bilirubin 0.7 0.1 - 1.0 mg/dL    Alkaline Phosphatase 90 55 - 135 U/L    AST 21 10 - 40 U/L    ALT 13 10 - 44 U/L    eGFR >60 >60 mL/min/1.73 m^2    Anion Gap 13 8 - 16 mmol/L   POCT glucose    Collection Time: 06/06/24  4:58 PM   Result Value Ref Range    POCT Glucose 79 70 - 110 mg/dL       Microbiology Results (last 7 days)       ** No results found for the last 168 hours. **             Imaging Results              X-Ray Chest AP Portable (Final result)  Result time 06/06/24 09:32:49      Final result by Harry Vines MD (06/06/24 09:32:49)                   Impression:      No acute findings.      Electronically signed by: Harry Vines MD  Date:    06/06/2024  Time:    09:32               Narrative:    EXAMINATION:  XR CHEST AP PORTABLE    CLINICAL HISTORY:  pre op;    TECHNIQUE:  Single frontal view of the chest was performed.    COMPARISON:  02/27/2022    FINDINGS:  Mildly enlarged cardiac silhouette.The lungs are grossly clear.  No  "pneumothorax.No pleural effusions.                                             Assessment/Plan:      * PEG (percutaneous endoscopic gastrostomy) adjustment/replacement/removal  Patient noted to have a percutaneous endoscopic gastrostomy tube that has been dislodged. GI consulted, plan is to replaced tomorrow due to plavix timing.  NPO for now.    Type 2 diabetes mellitus with diabetic neuropathy, without long-term current use of insulin  Patient's FSGs are uncontrolled due to hypoglycemia on current medication regimen.  Last A1c reviewed-   Lab Results   Component Value Date    HGBA1C 5.6 12/26/2019     Most recent fingerstick glucose reviewed- No results for input(s): "POCTGLUCOSE" in the last 24 hours.  Current correctional scale   n/a  Hold for now due to hypoglycemia  anti-hyperglycemic dose as follows-   Antihyperglycemics (From admission, onward)      None          Hold Oral hypoglycemics while patient is in the hospital.    History of ischemic cerebrovascular accident (CVA) with residual deficit  Hold ASA/plavix/statin until PEG replaced    Essential hypertension  Chronic, controlled. Latest blood pressure and vitals reviewed-     Temp:  [97.6 °F (36.4 °C)]   Pulse:  [91-96]   Resp:  [14-16]   BP: (116-130)/(80-84)   SpO2:  [99 %] .   Home meds for hypertension were reviewed and noted below.   Hypertension Medications               amLODIPine (NORVASC) 5 MG tablet Take 5 mg by mouth 2 (two) times daily.            While in the hospital, will manage blood pressure as follows; Adjust home antihypertensive regimen as follows- hold until PEG replaced    Will utilize p.r.n. blood pressure medication only if patient's blood pressure greater than 180/110 and she develops symptoms such as worsening chest pain or shortness of breath.      VTE Risk Mitigation (From admission, onward)           Ordered     IP VTE HIGH RISK PATIENT  Once         06/06/24 1411     Place sequential compression device  Until discontinued    "      06/06/24 1411     Reason for No Pharmacological VTE Prophylaxis  Once        Question:  Reasons:  Answer:  Risk of Bleeding    06/06/24 1411                    Discharge Planning   ANA MARÍA: 6/7/2024     Code Status: Full Code   Is the patient medically ready for discharge?:     Reason for patient still in hospital (select all that apply): Patient trending condition and Treatment  Discharge Plan A: Hospice/home                  Naren Mendez MD  Department of [facility] Medicine   [facility]

## 2024-06-07 NOTE — NURSING TRANSFER
Nursing Transfer Note      6/6/2024   8:35 PM    Nurse giving handoff:Jackeline DAVIS RN  Nurse receiving handoff:María Elena HINES RN    Reason patient is being transferred: PEG tube replacement    Transfer From: ED To: 333    Transfer via stretcher    Transfer with belongings    Transported by transport personnel    Transfer Vital Signs: see flowsheets    Order for Tele Monitor? No    Additional Lines: PIV; purewick    4eyes on Skin: yes    Medicines sent: IV fluids    Any special needs or follow-up needed: Gastroenterology consult    Patient belongings transferred with patient: Yes    Chart send with patient: Yes    Notified: daughter at bedside    Patient reassessed at: 2035    Upon arrival to floor, patient pulled from stretcher to bed with 2 person assist. Vital signs obtained and can be found in flow sheets with complete patient assessment. Skin dry and intact with a 22g LH PIV noted saline locked. No non-verbal signs of pain and patient in NAD. Bed alarm maintained and patients daughter instructed to inform the nurse if anything is needed. Patient stable and is continually being monitored.    
07-Sep-2021 20:34

## 2024-06-07 NOTE — PLAN OF CARE
Problem: Adult Inpatient Plan of Care  Goal: Plan of Care Review  Flowsheets (Taken 6/6/2024 2343)  Plan of Care Reviewed With:   patient   child  Goal: Absence of Hospital-Acquired Illness or Injury  Intervention: Identify and Manage Fall Risk  Flowsheets (Taken 6/6/2024 2343)  Safety Promotion/Fall Prevention:   Fall Risk reviewed with patient/family   lighting adjusted   medications reviewed   room near unit station   side rails raised x 2  Intervention: Prevent Skin Injury  Flowsheets (Taken 6/6/2024 2343)  Body Position: turned  Goal: Optimal Comfort and Wellbeing  Intervention: Monitor Pain and Promote Comfort  Flowsheets (Taken 6/6/2024 2343)  Pain Management Interventions: pain management plan reviewed with patient/caregiver  Intervention: Provide Person-Centered Care  Flowsheets (Taken 6/6/2024 2343)  Trust Relationship/Rapport:   care explained   questions answered   questions encouraged   choices provided   thoughts/feelings acknowledged     Problem: Enteral Nutrition  Goal: Absence of Aspiration Signs and Symptoms  Intervention: Minimize Aspiration Risk  Flowsheets (Taken 6/6/2024 2343)  Head of Bed (HOB) Positioning: HOB at 30-45 degrees

## 2024-06-07 NOTE — PLAN OF CARE
06/07/24 1227   Final Note   Assessment Type Final Discharge Note   Anticipated Discharge Disposition Home   Hospital Resources/Appts/Education Provided Post-Acute resouces added to AVS   Post-Acute Status   Post-Acute Authorization Other   Other Status No Post-Acute Service Needs     Pts nurse Ramin notified that the pt is clear for d/c from CM standpoint    ADT 30 order placed for Stretcher Transportation.  Requested  time: 2:30pm  If transportation does not arrive at ETA time nurse will be instructed to follow protocol for transportation below:  How can I get in touch directly with dispatch, if needed?                 Non-emergent (stretcher): [phone]  option 6     ++NURSING:  If Stretcher does not arrive at requested time please call the above Non Emergent Dispatcher.  If issue not resolved please escalate to your charge nurse for further instructions.

## 2024-06-07 NOTE — OR NURSING
PROCEDURE AND RECOVERY COMPLETED; PEG PLACE WITHOUT DIFFICULTIES; PATIENT OPENING EYES SPONTANEOUSLY; NOTED WITH EXPRESSIVE APHASIA; TRINY CARE GIVEN; CHANGED INCONTINENT PAD; ABDOMINAL BINDER PLACED; REPORT CALLED TO [facility]ILY [facility] ; PATIENT READY FOR TRANSPORT.

## 2024-06-07 NOTE — PLAN OF CARE
Call placed to pts sharifa EDGEvette to advise of discharge and confirm address on facesheet correct and someone will be home to receive the pt.  Carmen confirmed address and that someone will be home when pt arrives.  Pts nurse Faustin notified that transportation will be requested for  for 2pm to transport to home via ambulance.

## 2024-06-11 ENCOUNTER — PATIENT OUTREACH (OUTPATIENT)
Dept: ADMINISTRATIVE | Facility: CLINIC | Age: 79
End: 2024-06-11
Payer: MEDICARE

## 2024-06-11 NOTE — PROGRESS NOTES
C3 nurse spoke with Emiliaaddis Melgoza, patient's daughter, Rena, for a TCC post hospital discharge follow up call. The patient's daughter Rena, is in agreement with patient's scheduled [facility] appointment with [facility], Rhiannon Russo NP, on 6/18/2024 at 0800. Physical address and contact number confirmed.            The patient is Stable - Low risk of patient condition declining or worsening      Problem: Pain - Standard  Goal: Alleviation of pain or a reduction in pain to the patient’s comfort goal  Outcome: Progressing  Note: Roxicodone 10mg given PRN per MAR for c/o back pain with adequate relief. Pt sleeps good. Will continue to monitor.     Problem: Bowel Elimination  Goal: Patient will participate in bowel management program  Outcome: Progressing  Note: Pt continent of bowel. LBM 6/1/21. On scheduled senna tabs. Will give MOM in am.      Problem: Diabetes Management  Goal: Patient's ability to maintain appropriate glucose levels will be maintained or improve  Outcome: Progressing  Note: HS [id number]. Humulin R 2units given per sliding scale per MAR. HS snack provided and consumed. Will continue to monitor.

## 2024-06-18 ENCOUNTER — OFFICE VISIT (OUTPATIENT)
Dept: HOME HEALTH SERVICES | Facility: CLINIC | Age: 79
End: 2024-06-18
Payer: MEDICARE

## 2024-06-18 DIAGNOSIS — F01.C11 SEVERE VASCULAR DEMENTIA WITH AGITATION: ICD-10-CM

## 2024-06-18 DIAGNOSIS — I10 ESSENTIAL HYPERTENSION: Chronic | ICD-10-CM

## 2024-06-18 DIAGNOSIS — R53.2 FUNCTIONAL QUADRIPLEGIA: ICD-10-CM

## 2024-06-18 DIAGNOSIS — J42 CHRONIC BRONCHITIS, UNSPECIFIED CHRONIC BRONCHITIS TYPE: ICD-10-CM

## 2024-06-18 DIAGNOSIS — Z43.1 PEG (PERCUTANEOUS ENDOSCOPIC GASTROSTOMY) ADJUSTMENT/REPLACEMENT/REMOVAL: ICD-10-CM

## 2024-06-18 DIAGNOSIS — E44.0 MODERATE PROTEIN-CALORIE MALNUTRITION: ICD-10-CM

## 2024-06-18 DIAGNOSIS — Z89.422 ACQUIRED ABSENCE OF OTHER LEFT TOE(S): ICD-10-CM

## 2024-06-18 DIAGNOSIS — I69.351 HEMIPARESIS AFFECTING RIGHT SIDE AS LATE EFFECT OF CEREBROVASCULAR ACCIDENT: Primary | Chronic | ICD-10-CM

## 2024-06-18 DIAGNOSIS — D69.2 SENILE PURPURA: ICD-10-CM

## 2024-06-18 NOTE — ASSESSMENT & PLAN NOTE
Oriented to self  Agitation at times, Ativan in use for episodes  Previously on hospice  Bed bound  Supportive care

## 2024-06-18 NOTE — PROGRESS NOTES
[facility]  Transitional Care Management (TCM) Home Visit    Encounter Provider: Rhiannon Russo   PCP: Lolita Portillo MD  Consult Requested By: No ref. provider found  Admit Date: 6/6/24   IP Discharge Date: 6/7/24  Hospital Length of Stay: 1 day  Days since discharge (from IP or SNF): 11  [facility] Contact Note: 6/11/24  Hospital Diagnosis: Peg displacement    HISTORY OF PRESENT ILLNESS      Patient ID: Emilia Melgoza is a 78 y.o. female was recently admitted to the hospital, this is their TCM encounter.    Hospital Course Synopsis:  78 y.o. female with hypertension, DM2, and history of CVA with residual deficits aphasia, dysphagia, and hemiparesis resulting in significant debility presents from home due to dislodged PEG tube.  Patient's daughter reports that peg tube was noted to be dislodged this morning.  Unclear duration, may have occurred at some point overnight last night.  Initial PEG was placed in 2020 and has been replaced once in 2022 after dislodgement.  Patient is nonverbal and receives all nutrition and medication via PEG.  Last dose of Plavix was yesterday morning, 06/05/2024.  History otherwise limited.  In the ED, gastroenterology was consulted with plan for PEG replacement tomorrow.  Placed in observation.     Procedure(s) (LRB):  EGD (ESOPHAGOGASTRODUODENOSCOPY) (N/A)       Hospital Course:   PEG tube dislodgement, replace today 6/7.    Today: She is found in her hospital bed with her daughter present. She is awake and makes eye contact, attempts to speak. Daughter reports she is very alert today and that is not always the case. She required admit for replacement for dislodged PEG tube. Continuous feeding in place during visit. Family reports PEG working well.  Pt is non-verbal, incontinent, bed bound and max assist for all care.  Daughter reports she has returned to her usual state of health  She does get agitated at times and requires medication to assist. Denies any  current skin breakdown, daily BMs      DECISION MAKING TODAY       Assessment & Plan:  1. Hemiparesis affecting right side as late effect of cerebrovascular accident  Assessment & Plan:  Bedbound since 2019  Supportive care by Family   Baseline  Monitor        2. Functional quadriplegia  Overview:  Bedbound following CVA in 12/2019    Assessment & Plan:  Bedbound following CVA in 12/2019  Max assist for all care  Cannot reposition herself in bed  Turn and reposition frequently        3. Chronic bronchitis, unspecified chronic bronchitis type  Overview:  Uses O2 PRN with exertion, is bedbound    Assessment & Plan:  Uses O2 PRN with exertion, is bedbound  Continue supportive care for this      4. Essential hypertension  Assessment & Plan:  Chronic and controlled  Continue Norvasc  Monitor      5. PEG (percutaneous endoscopic gastrostomy) adjustment/replacement/removal  Overview:  PEG complication warranting recent hospitalization in 6/2024 for replacment    Assessment & Plan:  Patient noted to have a percutaneous endoscopic gastrostomy tube that had been dislodged. GI replaced  Working well today  Infusing tube feeding      6. Moderate protein-calorie malnutrition  Assessment & Plan:  Albumin reduced  TF in place via PEG  Monitor      7. Senile purpura  Overview:  Ecchymoses and purpura on UE bilaterally    Assessment & Plan:  Ecchymoses and purpura on UE bilaterally  Supportive care      8. Acquired absence of other left toe(s)  Overview:  S/P amputation    Assessment & Plan:  S/P amputation  Supportive care      9. Severe vascular dementia with agitation  Assessment & Plan:  Oriented to self  Agitation at times, Ativan in use for episodes  Previously on hospice  Bed bound  Supportive care           Medication List on Discharge:     Medication List            Accurate as of June 18, 2024  3:55 PM. If you have any questions, ask your nurse or doctor.                CONTINUE taking these medications      acetaminophen  650 mg/20.3 mL Soln  Commonly known as: TYLENOL  20.3 mLs (650 mg total) by Per G Tube route every 6 (six) hours as needed for Temperature greater than (temp >101).     albuterol 0.63 mg/3 mL Nebu  Commonly known as: ACCUNEB  SMARTSI Vial(s) Via Nebulizer Twice Daily PRN     amLODIPine 5 MG tablet  Commonly known as: NORVASC  Take 5 mg by mouth 2 (two) times daily.     atorvastatin 40 MG tablet  Commonly known as: LIPITOR  TAKE 1 TABLET BY MOUTH ONCE DAILY IN THE EVENING     clopidogreL 75 mg tablet  Commonly known as: PLAVIX  1 tablet (75 mg total) by Per G Tube route once daily.     doxycycline 100 MG tablet  Commonly known as: VIBRA-TABS  Take 100 mg by mouth 2 (two) times daily.     fexofenadine 180 MG tablet  Commonly known as: ALLEGRA ALLERGY  Take 1 tablet (180 mg total) by mouth once daily.     fluticasone propionate 50 mcg/actuation nasal spray  Commonly known as: FLONASE  1 spray (50 mcg total) by Each Nostril route once daily.     LORazepam 2 mg/mL Conc  Commonly known as: ATIVAN  SMARTSI.25 Milliliter(s) By Mouth Every 2 Hours PRN     melatonin 10 mg Cap  Take 1 tablet by mouth every evening.     morphine 100 mg/5 mL (20 mg/mL) concentrated solution  SMARTSI.25 Milliliter(s) By Mouth Every 2 Hours PRN     oxybutynin 5 MG Tab  Commonly known as: DITROPAN  5 mg by Per G Tube route.     promethazine 6.25 mg/5 mL syrup  Commonly known as: PHENERGAN  SMARTSI Milliliter(s) Gastro Tube 3 Times Daily     vitamin D 1000 units Tab  Commonly known as: VITAMIN D3  Take 1,000 Units by mouth once daily.              Medication Reconciliation:  Were medications changed on discharge? No  Were medications in the home? Yes  Is the patient taking the medications as directed? Yes  Does the patient understand the medications and changes? Yes  Does updated med list accurately reflects meds patient is currently taking? Yes    ENVIRONMENT OF CARE      Family and/or Caregiver present at visit?  Yes  Name of  Caregiver:   History provided by: caregiver    Advance Care Planning   Advanced Care Planning Status:  Patient has had an ACP conversation  Living Will: No  Power of : No  [place]: No    Does Caregiver have HCPoA: Yes  Changes today: none  Is patient hospice appropriate: Yes  (If needed, use PPS <30 or FAST score >7)  Was referral to hospice placed: No, previously on hospice, discharged for failure to decline       Impression upon entering the home:  Physical Dwelling: single family home   Appearance of home environment: cleaniness: clean  Functional Status: max assistance  Mobility: bedbound  Nutritional access: adequate intake and access  Home Health: Yes, HH Agency      DME/Supplies: hospital bed and oxygen     Diagnostic tests reviewed/disposition: No diagnosic tests pending after this hospitalization.  Disease/illness education:  PEG  Establishment or re-establishment of referral orders for community resources: No other necessary community resources.   Discussion with other health care providers: No discussion with other health care providers necessary.   Does patient have a PCP at [facility]? Yes   Repatriation plan with PCP? [facility] at Home reason: mobility   Does patient have an ostomy (ileostomy, colostomy, suprapubic catheter, nephrostomy tube, tracheostomy, PEG tube, pleurex catheter, cholecystostomy, etc)? Yes. Is it on problem list?yes  Were BPAs reviewed? Yes    Social History     Socioeconomic History    Marital status:     Number of children: 3   Tobacco Use    Smoking status: Former     Current packs/day: 0.00     Types: Cigarettes     Quit date: 1992     Years since quittin.4    Smokeless tobacco: Never   Substance and Sexual Activity    Alcohol use: No     Alcohol/week: 0.0 standard drinks of alcohol    Drug use: No    Sexual activity: Not Currently   Social History Narrative    Retired     Social Determinants of Health     Financial Resource Strain: Medium Risk (2020)    Overall  Financial Resource Strain (CARDIA)     Difficulty of Paying Living Expenses: Somewhat hard   Food Insecurity: Food Insecurity Present (6/19/2020)    Hunger Vital Sign     Worried About Running Out of Food in the Last Year: Sometimes true     Ran Out of Food in the Last Year: Sometimes true   Transportation Needs: No Transportation Needs (6/19/2020)    PRAPARE - Transportation     Lack of Transportation (Medical): No     Lack of Transportation (Non-Medical): No   Physical Activity: Inactive (6/19/2020)    Exercise Vital Sign     Days of Exercise per Week: 0 days     Minutes of Exercise per Session: 0 min   Stress: Unknown (6/19/2020)    [employer] - Occupational Stress Questionnaire     Feeling of Stress : Patient declined       OBJECTIVE:     Vital Signs:  There were no vitals filed for this visit.    Review of Systems   Unable to perform ROS: Patient nonverbal       Physical Exam:  Physical Exam  Vitals reviewed.   Constitutional:       General: She is not in acute distress.     Appearance: She is well-developed. She is ill-appearing.   HENT:      Head: Normocephalic and atraumatic.      Nose: Nose normal.      Mouth/Throat:      Mouth: Mucous membranes are dry.   Eyes:      Pupils: Pupils are equal, round, and reactive to light.   Cardiovascular:      Rate and Rhythm: Normal rate and regular rhythm.      Heart sounds: Normal heart sounds.   Pulmonary:      Effort: Pulmonary effort is normal.      Breath sounds: Normal breath sounds.   Abdominal:      General: Bowel sounds are normal.      Palpations: Abdomen is soft.      Comments: PEG in place, stoma intact.   Musculoskeletal:      Cervical back: Normal range of motion and neck supple.      Comments: Contractures of extremities   Skin:     General: Skin is warm and dry.   Neurological:      Mental Status: She is alert. Mental status is at baseline. She is disoriented.      Cranial Nerves: No cranial nerve deficit.          INSTRUCTIONS FOR PATIENT:     Scheduled Follow-up, Appts Reviewed with Modifications if Needed: Yes  No future appointments.      Signature: Rhiannon Russo NP    Transition of Care Visit:  I have reviewed and updated the history and problem list.  I have reconciled the medication list.  I have discussed the hospitalization and current medical issues, prognosis and plans with the patient/family.

## 2024-06-18 NOTE — ASSESSMENT & PLAN NOTE
Bedbound following CVA in 12/2019  Max assist for all care  Cannot reposition herself in bed  Turn and reposition frequently

## 2024-06-18 NOTE — ASSESSMENT & PLAN NOTE
Patient noted to have a percutaneous endoscopic gastrostomy tube that had been dislodged. GI replaced  Working well today  Infusing tube feeding

## 2024-06-26 DIAGNOSIS — T78.40XA ALLERGY, INITIAL ENCOUNTER: ICD-10-CM

## 2024-06-27 RX ORDER — MINERAL OIL
180 ENEMA (ML) RECTAL DAILY
Qty: 90 TABLET | Refills: 3 | Status: SHIPPED | OUTPATIENT
Start: 2024-06-27 | End: 2025-06-27

## 2024-06-30 ENCOUNTER — EXTERNAL CHRONIC CARE MANAGEMENT (OUTPATIENT)
Dept: PRIMARY CARE CLINIC | Facility: CLINIC | Age: 79
End: 2024-06-30
Payer: MEDICARE

## 2024-06-30 PROCEDURE — 99490 CHRNC CARE MGMT STAFF 1ST 20: CPT | Mod: PBBFAC | Performed by: INTERNAL MEDICINE

## 2024-06-30 PROCEDURE — 99439 CHRNC CARE MGMT STAF EA ADDL: CPT | Mod: PBBFAC | Performed by: INTERNAL MEDICINE

## 2024-07-31 ENCOUNTER — EXTERNAL CHRONIC CARE MANAGEMENT (OUTPATIENT)
Dept: PRIMARY CARE CLINIC | Facility: CLINIC | Age: 79
End: 2024-07-31
Payer: MEDICARE

## 2024-07-31 PROCEDURE — 99490 CHRNC CARE MGMT STAFF 1ST 20: CPT | Mod: S$PBB,GW,ICN, | Performed by: INTERNAL MEDICINE

## 2024-07-31 PROCEDURE — 99490 CHRNC CARE MGMT STAFF 1ST 20: CPT | Mod: PBBFAC | Performed by: INTERNAL MEDICINE

## 2024-08-19 NOTE — TELEPHONE ENCOUNTER
Pre-Procedure Instructions:         VenaSeal  You are having a Venaseal. One or more of your veins will be closed with glue.    Insurance  Precertification and/or referral authorization may be required by your insurance company.  We will call your insurance company to verify benefits for the medically necessary part of your procedure.    Your Current Medications and Allergies  Are you on blood thinner medications? (Aspirin, Plavix, Coumadin, Eliquis, Xarelto) Please discuss this with your surgeon.  Are you sensitive to latex or adhesives used for fake fingernails? Please let us know!     Your Health  If you have a change in your health before the procedure, contact our office immediately.  (For example: cold symptoms, cough, urinary tract infection, fever, flu symptoms.)  A pre-procedure physical is not required.    Note  It is sometimes necessary to adjust the procedure schedule due to emergencies. We greatly appreciate your flexibility and understanding in this matter.    ____________________________________________________________________________      Check List:  The Morning of Your Procedure  ___1.  Please do not apply anything on your leg(s) or shave the day of your procedure.  ___2.  You may take your normal medications the day of your procedure.  ___3.  It is recommended you eat a light breakfast or lunch on the day of your procedure.  ___4.  Wear comfortable loose-fitting clothing and wide-fitting shoes (i.e. tennis shoes, slip-ons).  ___5.  Please arrive at our clinic at the specified time given by the nurse.  ___6.  You will sign an affirmation of informed consent.        The Day of Your Procedure:         VenaSeal  In the Exam Room  A nurse will bring you back to an exam room with your family member or friend. This is when your informed consent will be signed.  You will be asked to remove everything from the waist down, including undergarments. You will then put on a hospital gown or shorts and blue  ----- Message from Katerina Short sent at 6/14/2018 12:30 PM CDT -----  Contact: Daughter [phone]  She received a letter stating it was time for her annual mammogram but there is no order in the system for this. Please place one in Epic and contact the pt daughter back to schedule.   jack.  Your surgeon will come in to answer any questions and prashant the appropriate leg(s) with a marker.  You will be taken to the restroom to empty your bladder before going into the procedure room.    In the Procedure Room  You will be escorted to the procedure room. You will lie on a procedure table covered with a sheet or blanket.  A nurse will put a blood pressure cuff on your arm and a pulse/oxygen monitor on a finger. Your vital signs will be monitored every 15 minutes.  Your gown will be pulled up slightly and the groin exposed for a short period of time. The surgeon's assistant will clean your foot, leg, and groin with an antibacterial solution. We will get you covered up as quickly as possible!  Sterile towels and blue drapes will be used to cover you and the table. You will be asked to keep your hands under the blue drapes during the procedure.    The Procedure  The surgeon will visualize your veins with an ultrasound machine. He or she will then numb your skin and access the vein. A catheter is passed up the vein and positioned with ultrasound guidance. The table will then be tipped head down.  Once the catheter is in the correct position, droplets of glue are deposited into the vein to make the vein walls collapse and stick together stopping blood flow through the vein. These droplets of glue are deposited in segments, closing each segment as the catheter is withdrawn.    Post-Procedure  Once the procedure is done, your leg will be washed with warm water and dried. You will have one or more small bandages covering your incisions.  You will be offered something to drink and a light snack.  You will rest with your leg(s) elevated for approximately 30 minutes. Your friend or family member may join you.   For your safety, you will be accompanied to your car by a staff member.      Post-Procedure Instructions:         VenaSeal    First 72 hours:   Ibuprofen: If tolerated, take ibuprofen to reduce  inflammation whether you have pain or not.                      Take 2 tablets (200mg each) after every meal and at bedtime with a snack.     Aspirin: Take one 325mg aspirin tablet daily to reduce blood clot risk.  **If you are currently taking a blood thinner medication (Aspirin, Plavix, Coumadin, Eliquis, Xarelto), please remain taking UNLESS the surgeon has stated otherwise. You WOULD NOT need to take the Ibuprofen and Aspirin for the first 72 hours after in addition to what you are normally taking.**    Bandage(s):  Keep your bandage(s) on and dry for 48 hours.    Activities:    Resume normal activities as tolerated.  Bathing: Do not take baths, sit in a hot tub, or go swimming for one week. You may shower the day after the procedure.  Medications:  You may continue to take your normal medications including aspirin and ibuprofen.    Call Us If:    You see any areas on your leg that are red and angry in appearance.  You notice any drainage that is milky or cloudy in appearance or that has a foul odor.  You run a temperature of 100.5 or greater.    Post-Op Day Three:  You will have a follow up appointment 2-4 days post-procedure. At this appointment, you will have an ultrasound and we will check your incisions.             Pre-Procedure Instructions:                                        VNUS Closure   You are having a VNUS Closure. One or more of your veins will be closed with radiofrequency heating.    Insurance  Precertification and/or referral authorization may be required by your insurance company.  We will call your insurance company to verify benefits for the medically necessary part of your procedure.    Your Current Medications and Allergies  Are you on blood thinner medications? (Aspirin, Plavix, Coumadin, Eliquis, Xarelto) Please discuss this with your surgeon.  Are you sensitive to latex or adhesives used for fake fingernails? Please let us know!     Driving Escort and   Please arrange to  have a trusted adult (18 years old or older) drive you to and from the clinic.  For your safety, we recommend you have a trusted adult to stay with you until the next morning.    Your Health  If you have a change in your health before the procedure, contact our office immediately.  (For example: cold symptoms, cough, urinary tract infection, fever, flu symptoms.)  A pre-procedure physical is not required.    Note  It is sometimes necessary to adjust the procedure schedule due to emergencies. We greatly appreciate your flexibility and understanding in this matter.  ____________________________________________________________________________________  Check List:  The Morning of Your Procedure  ___1.  Please do not apply anything on your leg(s) or shave the day of your procedure.  ___2.  You may take your normal medications the day of your procedure.  ___3.  It is recommended you eat a light breakfast or lunch on the day of your procedure.  ___4.  Wear comfortable loose-fitting clothing and wide-fitting shoes (i.e. tennis shoes, slip-ons).  ___5.  Please arrive at our clinic at the specified time given by the nurse.  ___6.  You will sign an affirmation of informed consent.  ___7.  Bring your pre-procedure sedation medication (lorazepam and clonidine) with you to the clinic.              One hour before your procedure, you will be instructed to take these medications. The lorazepam              (Ativan) lowers anxiety and sedates you; the clonidine makes the lorazepam more effective. Everyone's              body processes these medications differently. Therefore, reactions to these medications vary. Some              people stay awake and some people sleep through the whole procedure. You may not remember              everything about the procedure or the day. You do not want to make any big decisions for the rest of the              day.         The Day of Your Procedure:                  VNUS Closure  In the Exam  Room  A nurse will bring you back to an exam room with your family member or friend. This is when your informed consent will be signed, and you will take your pre-procedure medications.  You will be asked to remove everything from the waist down, including undergarments. You will then put on a hospital gown or shorts and blue booties.  Your surgeon will come in to answer any questions and prashant the appropriate leg(s) with a marker.  You will be taken to the restroom to empty your bladder before going into the procedure room.    In the Procedure Room  You will be escorted to the procedure room. You will lie on a procedure table covered with a sheet or blanket.  A nurse will put a blood pressure cuff on your arm and a pulse/oxygen monitor on a finger. Your vital signs will be monitored every 15 minutes.  Your gown will be pulled up slightly and the groin exposed for a short period of time. The surgeon's assistant will clean your foot, leg, and groin with an antibacterial solution. We will get you covered up as quickly as possible!  Sterile towels and blue drapes will be used to cover you and the table. You will be asked to keep your hands under the blue drapes during the procedure.    The Procedure  The surgeon will visualize your veins with an ultrasound machine. He or she will then numb your skin and access the vein. A catheter is passed up the vein and positioned with ultrasound guidance. The table will then be tipped head down.  Once the catheter is in the correct position, medication will be injected to numb your leg. You will feel some needle sticks and may feel discomfort as the medication goes in. Once this is done, you should not experience significant discomfort. But if you do, please let us know and more numbing medication can be injected. As the catheter sends out heat, the vein closes off and the catheter is withdrawn.    Post-Procedure  Once the procedure is done, your leg will be washed with warm water  and dried. You will have one or more small bandages covering your incisions. Your compression hose will be put on or an ACE wrap from your toes to groin. If the ACE wrap feels too tight or binds, please elevate your leg and loosen it.  You will be offered something to drink and a light snack.  You will rest with your leg(s) elevated for approximately 30 minutes. Your friend or family member may join you.   For your safety, you will be accompanied to your car by a staff member.      Post-Procedure Instructions:                          VNUS Closure    Post-Op Day Zero - The Day of Your Procedure:  1. Medication for Pain Control and Inflammation Control   - The numbing medication injected during your procedure will last for several hours. The pre-procedure    tablets may make you very sleepy and you might not remember everything from the procedure or from    the day. This will usually wear off by the next day.   - Ibuprofen:  If tolerated, take ibuprofen (e.g., Advil) to reduce inflammation whether or not you have    pain. For three days, take two tablets (200mg each) with every meal and at bedtime with a snack. If    you are not able to take Ibuprofen, Tylenol is another option.   - You may resume taking any medications you were taking before your procedure.  2. Activity   You may be up as tolerated when the sedation wears off. Elevate if possible when not walking.  3. Bandages   - You will have one or more small bandages covering the incision site(s) where we accessed the               vein(s). Keep your bandages on and dry for 48 hours. Compression hose should be worn continuously               over the bandages for the first 24 hours.  4. Incisions   - Bleeding: You may see some incision sites that are oozing through the bandages. This is not unusual    and can be managed with Rest, Ice, Compression and Elevation (RICE). Apply ice and firm pressure    directly to the site that is bleeding and rest with your leg(s)  elevated above your heart for 20-30               minutes.    Post-Op Day One:  1. Medication   - Ibuprofen:  Continue the same as the Day of Your Procedure.  2. Activity   - Walk as tolerated. Resume your normal daily walking activities. If it hurts, stop. We encourage you to               walk. Elevate if possible when not walking.  3. Bandages and Compression   - After 24 hours, you may remove your compression hose to take a shower. Please keep your               bandage(s) on and intact. You may want to cover your bandage(s) to ensure it remains dry during your               shower. Reapply your compression hose after your shower and wear during waking hours only.  4. Driving   - You may resume driving when you can do so safely.    Post-Op Day Two:   1. Medication  - Ibuprofen:  Continue the same as the Day of Your Procedure.  2.  Activity   - Walk as tolerated. Elevate if possible when not walking.  3. Bandages and Compression  - You may remove your bandage after 48 hours. Continue wearing your compression hose during waking hours only for a total of seven days following your procedure.  4. Incisions   - Your leg(s) may be bruised at or near incision site(s) and possibly have numb spots. This is normal.   5. Call Us If:   - You see any areas on your leg that are red and angry in appearance.   - You notice any drainage that is milky or cloudy in appearance or that has a foul odor.   - You run a temperature of 100.5 or greater.      Post-Op Day Three:  You will have a follow up appointment 2-4 days post-procedure. At this appointment, you will have an ultrasound and we will check your incisions.        The Two Weeks Following Your Procedure  1.  Skin Care              - Do not use any lotions, creams, or powders on your incision(s) for 14 days or until the incisions have               healed.              - Do not soak in a bathtub, hot tub or go swimming for 14 days or until your incisions have healed.  2.   Medications   - You may use ibuprofen or acetaminophen (e.g., Tylenol) as needed for pain or discomfort.  3.  Activity   - Do not lift over 25 pounds. After about two weeks you may resume exercise such as aerobics,               running, tennis or weightlifting. Use your common sense and ease back into your exercise routine              slowly.   - You may feel a cord-like tightness along the inside of your leg. Gentle stretching can be helpful.  4. Compression Hose   - Your doctor may instruct you to wear compression for longer than seven days; please    follow your doctor's instructions. As a comfort measure, you may choose to wear compression for    longer than required.  5.  Travel   - Do not fly in an airplane for 14 days after your procedure.  If you have a long car trip planned within    two to three weeks following your procedure, stop and walk for a few minutes every two hours.    Periodic ankle pumps during the ride may be helpful.    Six Week Appointment  - At your six-week appointment, you will see your surgeon for an exam and evaluation. This office visit               will be scheduled when you return for post-op day three return appointment.     Return to Work  1.  If you work outside the home, you may return to work in a few days depending on the extent of your    procedure, how you tolerate it, and the type of work you perform.  2.  Paperwork: If your employer requires paperwork or you would like a letter written to your employer, please    let us know. We will complete disability type forms at no charge. Please allow five business days for    forms to be completed.

## 2024-08-26 ENCOUNTER — OFFICE VISIT (OUTPATIENT)
Dept: PRIMARY CARE CLINIC | Facility: CLINIC | Age: 79
End: 2024-08-26
Payer: MEDICARE

## 2024-08-26 DIAGNOSIS — I69.30 HISTORY OF ISCHEMIC CEREBROVASCULAR ACCIDENT (CVA) WITH RESIDUAL DEFICIT: Primary | Chronic | ICD-10-CM

## 2024-08-26 DIAGNOSIS — Z93.1 PEG (PERCUTANEOUS ENDOSCOPIC GASTROSTOMY) STATUS: ICD-10-CM

## 2024-08-26 DIAGNOSIS — Z74.09 MOBILITY IMPAIRED: Chronic | ICD-10-CM

## 2024-08-26 NOTE — PROGRESS NOTES
[facility] Physician Telemedicine Appointment  Danilo Childs MD      The patient location is:  Patient Home   The chief complaint leading to consultation is: f/u  Total time spent with patient: 10 min    Visit type: Virtual visit with synchronous audio only and video  Each patient to whom he or she provides medical services by telemedicine is:  (1) informed of the relationship between the physician and patient and the respective role of any other health care provider with respect to management of the patient; and (2) notified that he or she may decline to receive medical services by telemedicine and may withdraw from such care at any time.      Subjective:      Patient ID: Emilia Melgoza is a 79 y.o. female.    Chief Complaint: No chief complaint on file.    Prior to this visit, patient's last encounter with PCP was Visit date not found.    Pt seen via virtual visit w/ daughter.  Hospitalized 2 m/a for PEG dislodgement; was replaced and no issues since.  On continuous tube feeds.  Some HH still coming out periodically.  No wounds.  Has low air-loss mattress.  Daughter gets her into WC a few times a week.  Denies any needs at this time.        4Ms for Medical Decision-Making in Older Adults    Last Completed EAWV: None    MOBILITY:  Get Up and Go:      11/14/2014    12:25 PM 11/15/2013    12:00 PM   Get Up and Go   Trial 1 19 seconds 11 seconds   Trial 2 27 seconds    Trial 3 31 seconds    Average 25.67      Activities of Daily Living:       No data to display              Whisper Test:       No data to display              Disability Status:      10/13/2014     6:41 AM   Disability Status   Are you deaf or do you have serious difficulty hearing? N   Are you blind or do you have serious difficulty seeing, even when wearing glasses? N   Because of a physical, mental, or emotional condition, do you have serious difficulty concentrating, remembering, or making decisions? Y   Do you have serious  difficulty walking or climbing stairs? N   Do you have difficulty dressing or bathing? N     Nutrition Screening:       No data to display             Screening Score: 0-7 Malnourished, 8-11 At Risk, 12-14 Normal        MENTATION:   Depression Patient Health Questionnaire:      2020    12:14 PM   Depression Patient Health Questionnaire   Over the last two weeks how often have you been bothered by little interest or pleasure in doing things Several days   Over the last two weeks how often have you been bothered by feeling down, depressed or hopeless Several days   PHQ-2 Total Score 2     Has Dementia Dx: Yes    Cognitive Function Screening:       No data to display              Cognitive Function Screening Total - Less than 4 = Abnormal,  Greater than or equal to 4 = Normal        MEDICATIONS:  High Risk Medications:  Total Active Medications: 0  This patient does not have an active medication from one of the medication groupers.    WHAT MATTERS MOST:  Advance Care Planning   ACP Status:   Patient has had an ACP conversation  Living Will: No  Power of : No  [place]: No            Social History     Socioeconomic History    Marital status:     Number of children: 3   Tobacco Use    Smoking status: Former     Current packs/day: 0.00     Types: Cigarettes     Quit date: 1992     Years since quittin.6    Smokeless tobacco: Never   Substance and Sexual Activity    Alcohol use: No     Alcohol/week: 0.0 standard drinks of alcohol    Drug use: No    Sexual activity: Not Currently   Social History Narrative    Retired     Social Determinants of Health     Financial Resource Strain: High Risk (2024)    Overall Financial Resource Strain (CARDIA)     Difficulty of Paying Living Expenses: Hard   Food Insecurity: Food Insecurity Present (2024)    Hunger Vital Sign     Worried About Running Out of Food in the Last Year: Sometimes true     Ran Out of Food in the Last Year: Sometimes true    Transportation Needs: No Transportation Needs (6/19/2020)    PRAPARE - Transportation     Lack of Transportation (Medical): No     Lack of Transportation (Non-Medical): No   Physical Activity: Inactive (8/23/2024)    Exercise Vital Sign     Days of Exercise per Week: 0 days     Minutes of Exercise per Session: 10 min   Stress: Stress Concern Present (8/23/2024)    [employer] [place] [employer] - Occupational Stress Questionnaire     Feeling of Stress : To some extent   Housing Stability: High Risk (8/23/2024)    Housing Stability Vital Sign     Unable to Pay for Housing in the Last Year: Yes       Past Surgical History:   Procedure Laterality Date    bladder mesh      BREAST BIOPSY Left     CATARACT EXTRACTION W/  INTRAOCULAR LENS IMPLANT Right 10/13/2014    Dr Crytsal    CATARACT EXTRACTION W/  INTRAOCULAR LENS IMPLANT Left 11/10/2014    Dr Crystal    ESOPHAGOGASTRODUODENOSCOPY N/A 3/2/2022    Procedure: EGD (ESOPHAGOGASTRODUODENOSCOPY);  Surgeon: Ottoniel Granger MD;  Location: [facility] ENDO;  Service: Endoscopy;  Laterality: N/A;    ESOPHAGOGASTRODUODENOSCOPY N/A 6/7/2024    Procedure: EGD (ESOPHAGOGASTRODUODENOSCOPY);  Surgeon: Kp Menezes MD;  Location: [facility];  Service: Endoscopy;  Laterality: N/A;    HEMORRHOID SURGERY      HYSTERECTOMY      INCONTINENCE SURGERY      OOPHORECTOMY      Right Rotator Cuff Repair      TRACHEOTOMY N/A 1/2/2020    Procedure: TRACHEOTOMY;  Surgeon: Marco Shine III, MD;  Location: [facility];  Service: General;  Laterality: N/A;       Past Medical History:   Diagnosis Date    Allergy     DDD (degenerative disc disease), lumbar     Chronic LBP and intermittent RLE radicular pain x 10 yrs    Hypertension     Overactive bladder     Senile cataract, unspecified - Both Eyes 6/11/2013    Stroke     right sided weakness       Review of Systems   Unable to perform ROS: Patient nonverbal       Objective:   There were no vitals taken for this visit.    Physical  Exam  Constitutional:       General: She is not in acute distress.     Appearance: Normal appearance.      Comments: Pt seen via virtual visit.   Neurological:      Mental Status: She is alert.      Comments: Pt nonverbal, but looks around and orients to speaker.  BUE flexor posturing.  On continuous tube feeds.         Lab Results   Component Value Date    WBC 5.76 06/06/2024    HGB 11.9 (L) 06/06/2024    HCT 37.2 06/06/2024     06/06/2024    CHOL 164 09/14/2019    TRIG 95 09/14/2019    HDL 73 09/14/2019    ALT 13 06/06/2024    AST 21 06/06/2024     (H) 06/06/2024    K 4.0 06/06/2024     06/06/2024    CREATININE 0.6 06/06/2024    BUN 20 06/06/2024    CO2 23 06/06/2024    TSH 0.460 07/02/2020    INR 1.0 12/29/2019    HGBA1C 5.6 12/26/2019         Assessment:   79 y.o. female with multiple co-morbid illnesses seen virtually for f/u.     Plan:     Problem List Items Addressed This Visit       History of ischemic cerebrovascular accident (CVA) with residual deficit - Primary (Chronic)     With aphasia, dysphagia, and hemiplegia.  Clinical course has been stable for past few years; continue comfort-focused primary care.         Mobility impaired (Chronic)     Patient is chronically bed-bound due to hemiplegia from prior strokes, so has been following-up primarily via virtual visit.  She is being seen by a [facility] NP as well.         PEG (percutaneous endoscopic gastrostomy) status     Chronically dependent on PEG for feeding due to severe dysphagia from prior stroke.              Health Maintenance         Date Due Completion Date    Diabetes Urine Screening Never done ---    RSV Vaccine (Age 60+ and Pregnant patients) (1 - 1-dose 60+ series) Never done ---    Shingles Vaccine (2 of 3) 08/27/2013 7/2/2013    Eye Exam 03/02/2017 3/2/2016    DEXA Scan 03/02/2020 3/2/2016    Override on 6/15/2012: Done    Hemoglobin A1c 06/26/2020 12/26/2019    COVID-19 Vaccine (3 - 2023-24 season) 09/01/2023  7/5/2021    Influenza Vaccine (1) 09/01/2024 11/1/2019    TETANUS VACCINE 10/06/2026 10/6/2016            Follow up in about 6 months (around 2/26/2025) for Virtual Visit. .    Danilo Childs MD   [facility], [facility], and [facility]    This note was partially dictated using voice recognition software and although actively proofread during transcription, it is possible some errors in transcription may have been overlooked.

## 2024-08-26 NOTE — ASSESSMENT & PLAN NOTE
With aphasia, dysphagia, and hemiplegia.  Clinical course has been stable for past few years; continue comfort-focused primary care.

## 2024-08-26 NOTE — ASSESSMENT & PLAN NOTE
Patient is chronically bed-bound due to hemiplegia from prior strokes, so has been following-up primarily via virtual visit.  She is being seen by a [facility] NP as well.

## 2024-08-31 ENCOUNTER — EXTERNAL CHRONIC CARE MANAGEMENT (OUTPATIENT)
Dept: PRIMARY CARE CLINIC | Facility: CLINIC | Age: 79
End: 2024-08-31
Payer: MEDICARE

## 2024-08-31 PROCEDURE — 99439 CHRNC CARE MGMT STAF EA ADDL: CPT | Mod: PBBFAC | Performed by: INTERNAL MEDICINE

## 2024-08-31 PROCEDURE — 99439 CHRNC CARE MGMT STAF EA ADDL: CPT | Mod: S$PBB,GW,, | Performed by: INTERNAL MEDICINE

## 2024-08-31 PROCEDURE — 99490 CHRNC CARE MGMT STAFF 1ST 20: CPT | Mod: S$PBB,GW,, | Performed by: INTERNAL MEDICINE

## 2024-08-31 PROCEDURE — 99490 CHRNC CARE MGMT STAFF 1ST 20: CPT | Mod: PBBFAC | Performed by: INTERNAL MEDICINE

## 2024-09-24 ENCOUNTER — TELEPHONE (OUTPATIENT)
Dept: HOME HEALTH SERVICES | Facility: CLINIC | Age: 79
End: 2024-09-24

## 2024-09-24 NOTE — TELEPHONE ENCOUNTER
Daughter reports mother is stable, in usual state of health  Recent visit with PCP  No need for @home visit  Rescheduled for 2 next month

## 2024-09-30 ENCOUNTER — EXTERNAL CHRONIC CARE MANAGEMENT (OUTPATIENT)
Dept: PRIMARY CARE CLINIC | Facility: CLINIC | Age: 79
End: 2024-09-30
Payer: MEDICARE

## 2024-09-30 PROCEDURE — 99439 CHRNC CARE MGMT STAF EA ADDL: CPT | Mod: PBBFAC | Performed by: INTERNAL MEDICINE

## 2024-09-30 PROCEDURE — 99490 CHRNC CARE MGMT STAFF 1ST 20: CPT | Mod: S$PBB,GW,, | Performed by: INTERNAL MEDICINE

## 2024-09-30 PROCEDURE — 99439 CHRNC CARE MGMT STAF EA ADDL: CPT | Mod: S$PBB,GW,, | Performed by: INTERNAL MEDICINE

## 2024-09-30 PROCEDURE — 99490 CHRNC CARE MGMT STAFF 1ST 20: CPT | Mod: PBBFAC | Performed by: INTERNAL MEDICINE

## 2024-10-31 ENCOUNTER — EXTERNAL CHRONIC CARE MANAGEMENT (OUTPATIENT)
Dept: PRIMARY CARE CLINIC | Facility: CLINIC | Age: 79
End: 2024-10-31
Payer: MEDICARE

## 2024-10-31 PROCEDURE — 99490 CHRNC CARE MGMT STAFF 1ST 20: CPT | Mod: GW,S$PBB,, | Performed by: INTERNAL MEDICINE

## 2024-10-31 PROCEDURE — 99439 CHRNC CARE MGMT STAF EA ADDL: CPT | Mod: PBBFAC | Performed by: INTERNAL MEDICINE

## 2024-10-31 PROCEDURE — 99490 CHRNC CARE MGMT STAFF 1ST 20: CPT | Mod: PBBFAC | Performed by: INTERNAL MEDICINE

## 2024-10-31 PROCEDURE — 99439 CHRNC CARE MGMT STAF EA ADDL: CPT | Mod: GW,S$PBB,, | Performed by: INTERNAL MEDICINE

## 2024-11-30 ENCOUNTER — EXTERNAL CHRONIC CARE MANAGEMENT (OUTPATIENT)
Dept: PRIMARY CARE CLINIC | Facility: CLINIC | Age: 79
End: 2024-11-30
Payer: MEDICARE

## 2024-11-30 PROCEDURE — 99490 CHRNC CARE MGMT STAFF 1ST 20: CPT | Mod: PBBFAC | Performed by: INTERNAL MEDICINE

## 2024-11-30 PROCEDURE — 99490 CHRNC CARE MGMT STAFF 1ST 20: CPT | Mod: GW,S$PBB,, | Performed by: INTERNAL MEDICINE

## 2024-12-31 ENCOUNTER — EXTERNAL CHRONIC CARE MANAGEMENT (OUTPATIENT)
Dept: PRIMARY CARE CLINIC | Facility: CLINIC | Age: 79
End: 2024-12-31
Payer: MEDICARE

## 2024-12-31 PROCEDURE — 99439 CHRNC CARE MGMT STAF EA ADDL: CPT | Mod: PBBFAC | Performed by: INTERNAL MEDICINE

## 2024-12-31 PROCEDURE — 99490 CHRNC CARE MGMT STAFF 1ST 20: CPT | Mod: PBBFAC | Performed by: INTERNAL MEDICINE

## 2025-01-14 DIAGNOSIS — Z00.00 ENCOUNTER FOR MEDICARE ANNUAL WELLNESS EXAM: ICD-10-CM

## 2025-01-16 ENCOUNTER — TELEPHONE (OUTPATIENT)
Dept: HOME HEALTH SERVICES | Facility: CLINIC | Age: 80
End: 2025-01-16

## 2025-01-16 ENCOUNTER — CARE AT HOME (OUTPATIENT)
Dept: HOME HEALTH SERVICES | Facility: CLINIC | Age: 80
End: 2025-01-16
Payer: MEDICARE

## 2025-01-16 VITALS
DIASTOLIC BLOOD PRESSURE: 62 MMHG | HEART RATE: 70 BPM | RESPIRATION RATE: 16 BRPM | OXYGEN SATURATION: 98 % | SYSTOLIC BLOOD PRESSURE: 104 MMHG | TEMPERATURE: 98 F

## 2025-01-16 DIAGNOSIS — Z93.1 PEG (PERCUTANEOUS ENDOSCOPIC GASTROSTOMY) STATUS: ICD-10-CM

## 2025-01-16 DIAGNOSIS — Z89.422 ACQUIRED ABSENCE OF OTHER LEFT TOE(S): ICD-10-CM

## 2025-01-16 DIAGNOSIS — J42 CHRONIC BRONCHITIS, UNSPECIFIED CHRONIC BRONCHITIS TYPE: ICD-10-CM

## 2025-01-16 DIAGNOSIS — F01.C11 SEVERE VASCULAR DEMENTIA WITH AGITATION: Primary | ICD-10-CM

## 2025-01-16 DIAGNOSIS — D32.9 MENINGIOMA: ICD-10-CM

## 2025-01-16 DIAGNOSIS — D69.2 SENILE PURPURA: ICD-10-CM

## 2025-01-16 DIAGNOSIS — E44.0 MODERATE PROTEIN-CALORIE MALNUTRITION: ICD-10-CM

## 2025-01-16 DIAGNOSIS — I50.32 CHRONIC DIASTOLIC CONGESTIVE HEART FAILURE: ICD-10-CM

## 2025-01-16 DIAGNOSIS — R53.2 FUNCTIONAL QUADRIPLEGIA: ICD-10-CM

## 2025-01-16 DIAGNOSIS — I10 ESSENTIAL HYPERTENSION: Primary | Chronic | ICD-10-CM

## 2025-01-16 DIAGNOSIS — I70.0 AORTIC ATHEROSCLEROSIS: ICD-10-CM

## 2025-01-16 PROCEDURE — 99350 HOME/RES VST EST HIGH MDM 60: CPT | Mod: GW,S$GLB,, | Performed by: NURSE PRACTITIONER

## 2025-01-16 NOTE — ASSESSMENT & PLAN NOTE
Albumin reduced previously  Muscle atrophy  TF in place via PEG at 55cc/hr  Labs collected today  Monitor

## 2025-01-16 NOTE — PROGRESS NOTES
"[facility]  Medical Chronic Care Home Visit    Encounter Provider: Rhiannon Russo   PCP: Lolita Portillo MD  Consult Requested By: No ref. provider found    HISTORY OF PRESENT ILLNESS      Patient ID: Emilia Melgoza is a 79 y.o. female is being seen in the home due to physical debility that presents a taxing effort to leave the home, to mitigate high risk of hospital readmission and/or due to the limited availability of reliable or safe options for transportation to the point of access to the provider. Prior to treatment on this visit the chart was reviewed and patient verbal consent was obtained.    Chronic medical conditions synopsis:    Pt is being seen in her home today for follow up.  She is bedbound, non-verbal and cared for by her daughter  HH nurse is present today   Daughter reports she has 2 decubuti which are not healing as expected. She has some concerns that her nutrition is not adequate. See problem list    DECISION MAKING TODAY       Assessment & Plan:  1. Severe vascular dementia with agitation  Assessment & Plan:  Non verbal, max assist for all care  Agitation at times, Ativan in use for episodes  Bed bound  Supportive care      2. Functional quadriplegia  Overview:  Bedbound following CVA in 12/2019    Assessment & Plan:  Bedbound following CVA in 12/2019  Max assist for all care  Cannot reposition herself in bed  Turn and reposition frequently        3. Chronic bronchitis, unspecified chronic bronchitis type  Overview:  Uses O2 PRN with exertion, is bedbound    Assessment & Plan:  Uses O2 PRN with exertion, is bedbound  Continue supportive care for this      4. Chronic diastolic congestive heart failure  Overview:  Echo results in 12/2019: "Normal left ventricular systolic function. The estimated ejection fraction is 65%. Concentric left ventricular hypertrophy. Left ventricular diastolic dysfunction."    Assessment & Plan:  Echo results in 12/2019: "Normal left ventricular " "systolic function. The estimated ejection fraction is 65%. Concentric left ventricular hypertrophy. Left ventricular diastolic dysfunction."  Supportive care        5. Senile purpura  Overview:  Ecchymoses and purpura on UE bilaterally    Assessment & Plan:  Ecchymoses and purpura on UE bilaterally  Supportive care      6. Moderate protein-calorie malnutrition  Assessment & Plan:  Albumin reduced previously  Muscle atrophy  TF in place via PEG at 55cc/hr  Labs collected today  Monitor      7. Meningioma  Overview:  Stable on last MRI in 2019    Assessment & Plan:  Stable on last MRI in 2019  Supportive care      8. PEG (percutaneous endoscopic gastrostomy) status  Overview:  PEG placed during last hospital admit for stroke treatment    >>OVERVIEW FOR PEG (PERCUTANEOUS ENDOSCOPIC GASTROSTOMY) ADJUSTMENT/REPLACEMENT/REMOVAL WRITTEN ON 6/18/2024  3:51 PM BY CAREY PARADA NP    PEG complication warranting recent hospitalization in 6/2024 for replacment    Assessment & Plan:  Chronically dependent on PEG for feeding due to severe dysphagia from prior stroke.        9. Acquired absence of other left toe(s)  Overview:  S/P amputation    Assessment & Plan:  S/P amputation  Supportive care      10. Aortic atherosclerosis  Overview:  On CT abdomen in 12/2019: "There is some calcified plaque in the aorta"     Assessment & Plan:  On CT abdomen in 12/2019: "There is some calcified plaque in the aorta"   Continue statin and supportive care             ENVIRONMENT OF CARE      Family and/or Caregiver present at visit?  Yes  Name of Caregiver:   History provided by: caregiver    4Ms for Medical Decision-Making in Older Adults    Last Completed EAWV:  None    MEDICATIONS:  High Risk Medications:  Total Active Medications: 0  This patient does not have an active medication from one of the medication groupers.    MOBILITY:  Activities of Daily Living:       No data to display              Fall Risk:      1/16/2025     8:00 AM " 6/15/2020     2:00 PM 6/10/2020     1:02 PM   Fall Risk Assessment - Outpatient   Mobility Status Stretcher Stretcher Ambulatory w/ assistance   Number of falls 0 0 0   Identified as fall risk True True False     Disability Status:      10/13/2014     6:41 AM   Disability Status   Are you deaf or do you have serious difficulty hearing? N   Are you blind or do you have serious difficulty seeing, even when wearing glasses? N   Because of a physical, mental, or emotional condition, do you have serious difficulty concentrating, remembering, or making decisions? Y   Do you have serious difficulty walking or climbing stairs? N   Do you have difficulty dressing or bathing? N     Nutrition Screening:       No data to display             Screening Score: 0-7 Malnourished, 8-11 At Risk, 12-14 Normal  Get Up and Go:      11/14/2014    12:25 PM 11/15/2013    12:00 PM   Get Up and Go   Trial 1 19 seconds 11 seconds   Trial 2 27 seconds    Trial 3 31 seconds    Average 25.67      Whisper Test:       No data to display                    MENTATION:   Has Dementia Dx: Yes  Has Anxiety Dx: Yes    Depression Patient Health Questionnaire:      3/24/2023    11:11 AM   Depression Patient Health Questionnaire   Over the last two weeks how often have you been bothered by little interest or pleasure in doing things --   Over the last two weeks how often have you been bothered by feeling down, depressed or hopeless --     Cognitive Function Screening:       No data to display              Cognitive Function Screening Total - Less than 4 = Abnormal,  Greater than or equal to 4 = Normal        WHAT MATTERS MOST:  Advance Care Planning   ACP Status:   Patient has had an ACP conversation  Living Will: No  Power of : No  LaPOST: No    What is most important right now is to focus on comfort and QOL     Accordingly, we have decided that the best plan to meet the patient's goals includes pivot to comfort-focused care        Is patient  hospice appropriate: Yes  (If needed, use PPS <30 or FAST score >7)  Was referral to hospice placed: No    Impression upon entering the home:  Physical Dwelling: single family home   Appearance of home environment: cleaniness: clean  Functional Status: max assistance  Mobility: bedbound  Nutritional access:  PEG feeding in place  Home Health: Yes, HH Agency [facility]    DME/Supplies: hospital bed       Does patient have a PCP at [place]? Yes   Repatriation plan with PCP? [facility] reason: mobility   Does patient have an ostomy (ileostomy, colostomy, suprapubic catheter, nephrostomy tube, tracheostomy, PEG tube, pleurex catheter, cholecystostomy, etc)? Yes. Is it on problem list?yes  Were BPAs reviewed? Yes    Social History     Socioeconomic History    Marital status:     Number of children: 3   Tobacco Use    Smoking status: Former     Current packs/day: 0.00     Types: Cigarettes     Quit date: 1992     Years since quittin.0    Smokeless tobacco: Never   Substance and Sexual Activity    Alcohol use: No     Alcohol/week: 0.0 standard drinks of alcohol    Drug use: No    Sexual activity: Not Currently   Social History Narrative    Retired     Social Drivers of Health     Financial Resource Strain: High Risk (2024)    Overall Financial Resource Strain (CARDIA)     Difficulty of Paying Living Expenses: Hard   Food Insecurity: Food Insecurity Present (2024)    Hunger Vital Sign     Worried About Running Out of Food in the Last Year: Sometimes true     Ran Out of Food in the Last Year: Sometimes true   Transportation Needs: No Transportation Needs (2020)    PRAPARE - Transportation     Lack of Transportation (Medical): No     Lack of Transportation (Non-Medical): No   Physical Activity: Inactive (2024)    Exercise Vital Sign     Days of Exercise per Week: 0 days     Minutes of Exercise per Session: 10 min   Stress: Stress Concern Present (2024)    [employer] [place] [employer] - Occupational Stress Questionnaire     Feeling of Stress : To some extent   Housing Stability: High Risk (8/23/2024)    Housing Stability Vital Sign     Unable to Pay for Housing in the Last Year: Yes         OBJECTIVE:     Vital Signs:  Vitals:    01/16/25 1043   BP: 104/62   Pulse: 70   Resp: 16   Temp: 97.7 °F (36.5 °C)       Review of Systems   Unable to perform ROS: Dementia       Physical Exam:  Physical Exam  Vitals reviewed.   Constitutional:       General: She is not in acute distress.     Appearance: She is well-developed. She is ill-appearing.   HENT:      Head: Normocephalic and atraumatic.      Comments: Temporal wasting     Nose: Nose normal.      Mouth/Throat:      Mouth: Mucous membranes are dry.   Eyes:      Pupils: Pupils are equal, round, and reactive to light.   Cardiovascular:      Rate and Rhythm: Normal rate and regular rhythm.      Heart sounds: Normal heart sounds.   Pulmonary:      Effort: Pulmonary effort is normal.      Breath sounds: Normal breath sounds.   Abdominal:      General: Bowel sounds are normal.      Palpations: Abdomen is soft.      Comments: Concave, PEG intact   Musculoskeletal:      Cervical back: Normal range of motion and neck supple.      Comments: Contractures to extremities, Muscle atrophy   Skin:     General: Skin is warm and dry.      Comments: Sacral ulcers with D/I dressing   Neurological:      Mental Status: She is alert. Mental status is at baseline. She is disoriented.      Cranial Nerves: No cranial nerve deficit.   Psychiatric:      Comments: Non-verbal, withdraws to pain         INSTRUCTIONS FOR PATIENT:     Scheduled Follow-up, Appts Reviewed with Modifications if Needed: Yes  Future Appointments   Date Time Provider Department Center   2/26/2025 10:20 AM Margot Gonzalez DNP [facility] MED CLN [facility]         Current Outpatient Medications:     acetaminophen (TYLENOL) 650 mg/20.3 mL Soln, 20.3 mLs (650 mg total) by Per G Tube route every 6 (six)  hours as needed for Temperature greater than (temp >101)., Disp: 5 mL, Rfl: 0    albuterol (ACCUNEB) 0.63 mg/3 mL Nebu, SMARTSI Vial(s) Via Nebulizer Twice Daily PRN, Disp: , Rfl:     amLODIPine (NORVASC) 5 MG tablet, Take 5 mg by mouth 2 (two) times daily., Disp: , Rfl:     atorvastatin (LIPITOR) 40 MG tablet, TAKE 1 TABLET BY MOUTH ONCE DAILY IN THE EVENING, Disp: 90 tablet, Rfl: 3    clopidogreL (PLAVIX) 75 mg tablet, 1 tablet (75 mg total) by Per G Tube route once daily., Disp: 90 tablet, Rfl: 3    doxycycline (VIBRA-TABS) 100 MG tablet, Take 100 mg by mouth 2 (two) times daily., Disp: , Rfl:     fexofenadine (ALLEGRA ALLERGY) 180 MG tablet, Take 1 tablet (180 mg total) by mouth once daily., Disp: 90 tablet, Rfl: 3    fluticasone propionate (FLONASE) 50 mcg/actuation nasal spray, 1 spray (50 mcg total) by Each Nostril route once daily., Disp: 16 g, Rfl: 3    LORazepam (ATIVAN) 2 mg/mL Conc, SMARTSI.25 Milliliter(s) By Mouth Every 2 Hours PRN, Disp: , Rfl:     melatonin 10 mg Cap, Take 1 tablet by mouth every evening., Disp: , Rfl:     morphine 100 mg/5 mL (20 mg/mL) concentrated solution, SMARTSI.25 Milliliter(s) By Mouth Every 2 Hours PRN, Disp: , Rfl:     oxybutynin (DITROPAN) 5 MG Tab, 5 mg by Per G Tube route., Disp: , Rfl:     promethazine (PHENERGAN) 6.25 mg/5 mL syrup, SMARTSI Milliliter(s) Gastro Tube 3 Times Daily, Disp: , Rfl:     vitamin D (VITAMIN D3) 1000 units Tab, Take 1,000 Units by mouth once daily., Disp: , Rfl:     Medication Reconciliation:  Were medications changed during this appointment? No  Were medications in the home? Yes  Is the patient taking the medications as directed? Yes  Does the patient/caregiver understand the medications and changes? Yes  Does updated med list accurately reflects meds patient is currently taking? Yes      Signature: Rhiannon A Rafaela, NP

## 2025-01-16 NOTE — ASSESSMENT & PLAN NOTE
"Echo results in 12/2019: "Normal left ventricular systolic function. The estimated ejection fraction is 65%. Concentric left ventricular hypertrophy. Left ventricular diastolic dysfunction."  Supportive care    "

## 2025-01-16 NOTE — ASSESSMENT & PLAN NOTE
Non verbal, max assist for all care  Agitation at times, Ativan in use for episodes  Bed bound  Supportive care

## 2025-01-16 NOTE — ASSESSMENT & PLAN NOTE
"On CT abdomen in 12/2019: "There is some calcified plaque in the aorta"   Continue statin and supportive care  "

## 2025-01-17 ENCOUNTER — TELEPHONE (OUTPATIENT)
Dept: HOME HEALTH SERVICES | Facility: CLINIC | Age: 80
End: 2025-01-17
Payer: MEDICARE

## 2025-01-17 NOTE — TELEPHONE ENCOUNTER
Spoke with patient's daughter per NP request.  States that patient is currently taking generic tube feeding for Jevity.  It is Osmolite.  States that patient is having inconsistent bowel movements and has skin breakdown on her coccyx area.  She feels that it might be from the tube feeding changes.  Notified NP.

## 2025-01-27 DIAGNOSIS — R53.2 FUNCTIONAL QUADRIPLEGIA: Primary | ICD-10-CM

## 2025-01-27 NOTE — PROGRESS NOTES
Rec'd message from  :    Mckayla Dawn, Lolita Barnett MD  Good Afternoon,    I spoke with Ms. Melgoza's daughter, Leonides, today for a monthly wellness check. She stated that Ms. Melgoza has developed 2 decubitus ulcers on her coccyx. She has been using supplies from the drug store and OTC triple antibiotic ointment. There are several family members caring for Ms. Melgoza and she states they are each using a different method of cleaning/dressing the wounds. Each time one comes near to closing, it reopens. Would it be possible to get a wound care nurse to visit the home for recommendations and to provide some education for the family so the care can be consistent?    Thank you kindly,    Mckayla Dawn LPN Care Coordinator  [phone] ext. [phone]  [email or web address]     orders for wound care requests placed and will be faxed to agency.

## 2025-01-31 ENCOUNTER — EXTERNAL CHRONIC CARE MANAGEMENT (OUTPATIENT)
Dept: PRIMARY CARE CLINIC | Facility: CLINIC | Age: 80
End: 2025-01-31
Payer: MEDICARE

## 2025-01-31 PROCEDURE — 99490 CHRNC CARE MGMT STAFF 1ST 20: CPT | Mod: S$PBB,GW,ICN, | Performed by: INTERNAL MEDICINE

## 2025-01-31 PROCEDURE — 99490 CHRNC CARE MGMT STAFF 1ST 20: CPT | Mod: PBBFAC | Performed by: INTERNAL MEDICINE

## 2025-02-07 ENCOUNTER — TELEPHONE (OUTPATIENT)
Dept: PRIMARY CARE CLINIC | Facility: CLINIC | Age: 80
End: 2025-02-07
Payer: MEDICARE

## 2025-02-07 NOTE — TELEPHONE ENCOUNTER
HERMINIO spoke to Luisa one of the  at [facility] and I faxed those subsequent wound care orders to them at [phone]. If anything needs changing, she will contact me.

## 2025-02-26 ENCOUNTER — PATIENT MESSAGE (OUTPATIENT)
Dept: PRIMARY CARE CLINIC | Facility: CLINIC | Age: 80
End: 2025-02-26

## 2025-02-26 ENCOUNTER — OFFICE VISIT (OUTPATIENT)
Dept: PRIMARY CARE CLINIC | Facility: CLINIC | Age: 80
End: 2025-02-26
Payer: MEDICARE

## 2025-02-26 DIAGNOSIS — E44.0 MODERATE PROTEIN-CALORIE MALNUTRITION: ICD-10-CM

## 2025-02-26 DIAGNOSIS — F01.C11 SEVERE VASCULAR DEMENTIA WITH AGITATION: Primary | ICD-10-CM

## 2025-02-26 DIAGNOSIS — L89.150 PRESSURE INJURY OF SACRAL REGION, UNSTAGEABLE: ICD-10-CM

## 2025-02-26 NOTE — PROGRESS NOTES
[facility] Primary Care Provider Telemedicine Appointment  Shared Note: Margot Gonzalez (MS4) & Dr Lolita Portillo (Attending)    The patient location is:  Patient Home   The chief complaint leading to consultation is: chronic condition review  Total time spent with patient: 20    Visit type: Virtual visit with synchronous audio only and video  Each patient to whom he or she provides medical services by telemedicine is:  (1) informed of the relationship between the physician and patient and the respective role of any other health care provider with respect to management of the patient; and (2) notified that he or she may decline to receive medical services by telemedicine and may withdraw from such care at any time.      Subjective:      Patient ID: Emilia Melgoza is a 79 y.o. female.    Chief Complaint: No chief complaint on file.    Prior to this visit, patient's last encounter with PCP was Visit date not found.    Patient visit completed with daughter at bedside.  Patient is nonverbal  Doing bedsores, has the air fill mattress, bedsores re at each side of tailbone.  Using CaAg dressing, neosporin with band aids. Using calmoseptine.  Getting up to wheelchair every once in a whuile.  Sores are open and some drainage.  No fevers or chills. Is having fever blisters. Tube feedng change has seemed to be the impetus for lots of changes.  Using tylenol for pain.  Dosing reviewed with family.      HTN  - Currently taking: norvasc 5 mg       HLD  - Currently taking: atorvastatin 40 mg  - Last lipid panel was on 9/14/2019  The ASCVD Risk score (Carlitos VALDOVINOS, et al., 2019) failed to calculate for the following reasons:    The 2019 ASCVD risk score is only valid for ages 40 to 79    Risk score cannot be calculated because patient has a medical history suggesting prior/existing ASCVD       Asthma/COPD  - Currently taking: albuterol nebs      Care Gaps:  -     Medications: Does not have pill packs    FU in 2 weeks to  specifically check on wound care orders      4Ms for Medical Decision-Making in Older Adults    Last Completed EAWV:  None    MEDICATIONS:  High Risk Medications:  Total Active Medications: 0  This patient does not have an active medication from one of the medication groupers.    MOBILITY:  Activities of Daily Living:       No data to display              Fall Risk:      1/16/2025     8:00 AM 6/15/2020     2:00 PM 6/10/2020     1:02 PM   Fall Risk Assessment - Outpatient   Mobility Status Stretcher Stretcher Ambulatory w/ assistance   Number of falls 0 0 0   Identified as fall risk True True False     Disability Status:      10/13/2014     6:41 AM   Disability Status   Are you deaf or do you have serious difficulty hearing? N   Are you blind or do you have serious difficulty seeing, even when wearing glasses? N   Because of a physical, mental, or emotional condition, do you have serious difficulty concentrating, remembering, or making decisions? Y    Do you have serious difficulty walking or climbing stairs? N   Do you have difficulty dressing or bathing? N       Data saved with a previous flowsheet row definition     Nutrition Screening:       No data to display             Screening Score: 0-7 Malnourished, 8-11 At Risk, 12-14 Normal  Get Up and Go:      11/14/2014    12:25 PM 11/15/2013    12:00 PM   Get Up and Go   Trial 1 19 seconds 11 seconds   Trial 2 27 seconds    Trial 3 31 seconds    Average 25.67      Whisper Test:       No data to display                    MENTATION:   Has Dementia Dx: Yes  Has Anxiety Dx: Yes    Depression Patient Health Questionnaire:      3/24/2023    11:11 AM   Depression Patient Health Questionnaire   Over the last two weeks how often have you been bothered by little interest or pleasure in doing things --    Over the last two weeks how often have you been bothered by feeling down, depressed or hopeless --       Data saved with a previous flowsheet row definition     Cognitive  Function Screening:       No data to display              Cognitive Function Screening Total - Less than 4 = Abnormal,  Greater than or equal to 4 = Normal        WHAT MATTERS MOST:  Advance Care Planning   ACP Status:   Patient has had an ACP conversation  Living Will: No  Power of : No  LaPOST: No    What is most important right now is to focus on comfort and QOL     Accordingly, we have decided that the best plan to meet the patient's goals includes pivot to comfort-focused care      What matters most to patient today is: wound care                 Social History[1]    Past Surgical History:   Procedure Laterality Date    bladder mesh      BREAST BIOPSY Left     CATARACT EXTRACTION W/  INTRAOCULAR LENS IMPLANT Right 10/13/2014    Dr Crystal    CATARACT EXTRACTION W/  INTRAOCULAR LENS IMPLANT Left 11/10/2014    Dr Crystal    ESOPHAGOGASTRODUODENOSCOPY N/A 3/2/2022    Procedure: EGD (ESOPHAGOGASTRODUODENOSCOPY);  Surgeon: Ottoniel Granger MD;  Location: [facility];  Service: Endoscopy;  Laterality: N/A;    ESOPHAGOGASTRODUODENOSCOPY N/A 6/7/2024    Procedure: EGD (ESOPHAGOGASTRODUODENOSCOPY);  Surgeon: Kp Menezes MD;  Location: [facility];  Service: Endoscopy;  Laterality: N/A;    HEMORRHOID SURGERY      HYSTERECTOMY      INCONTINENCE SURGERY      OOPHORECTOMY      Right Rotator Cuff Repair      TRACHEOTOMY N/A 1/2/2020    Procedure: TRACHEOTOMY;  Surgeon: Marco Shine III, MD;  Location: [facility];  Service: General;  Laterality: N/A;       Past Medical History:   Diagnosis Date    Allergy     DDD (degenerative disc disease), lumbar     Chronic LBP and intermittent RLE radicular pain x 10 yrs    Hypertension     Overactive bladder     Senile cataract, unspecified - Both Eyes 6/11/2013    Stroke     right sided weakness       Review of Systems   Unable to perform ROS: Dementia       Objective:   There were no vitals taken for this visit.    Physical exam limited due to virtual visit.  Patient awake and  alert, no distress noted      Lab Results   Component Value Date    WBC 6.09 01/16/2025    HGB 9.6 (L) 01/16/2025    HCT 31.1 (L) 01/16/2025     01/16/2025    CHOL 164 09/14/2019    TRIG 95 09/14/2019    HDL 73 09/14/2019    ALT 8 (L) 01/16/2025    AST 22 01/16/2025     (L) 01/16/2025    K 4.5 01/16/2025     01/16/2025    CREATININE 0.6 01/16/2025    BUN 12 01/16/2025    CO2 25 01/16/2025    TSH 0.460 07/02/2020    INR 1.0 12/29/2019    HGBA1C 5.6 12/26/2019         Assessment:   79 y.o. female with multiple co-morbid illnesses here to continue work-up of chronic issues.     Plan:   1. Severe vascular dementia with agitation  Overview:  Non verbal      Assessment & Plan:  Bedbound  Full care        2. Moderate protein-calorie malnutrition  Overview:  Muscle wasting noted on previous exams  TF infusing    Assessment & Plan:  Continue current tube feedings  Monitor      3. Pressure injury of sacral region, unstageable  Overview:  No images available at this time- family will be sending  Family requesting wound care help so a plan of care may be created for all caregivers to use    Assessment & Plan:  Order placed 2/7/25, will follow up on order  Await images  Continue offloading of pressure points           Health Maintenance         Date Due Completion Date    Diabetes Urine Screening Never done ---    Shingles Vaccine (2 of 3) 08/27/2013 7/2/2013    Diabetic Eye Exam 03/02/2017 3/2/2016    RSV Vaccine (Age 60+ and Pregnant patients) (1 - 1-dose 75+ series) Never done ---    DEXA Scan 03/02/2020 3/2/2016    Override on 6/15/2012: Done    Hemoglobin A1c 06/26/2020 12/26/2019    Influenza Vaccine (1) 09/01/2024 11/1/2019    COVID-19 Vaccine (3 - 2024-25 season) 09/01/2024 7/5/2021    TETANUS VACCINE 10/06/2026 10/6/2016            No follow-ups on file. . 40min spent with this patient today, issues addressed: wound care    Lolita Portillo MD/MPH  [facility]  [phone]         [1]   Social History  Socioeconomic History    Marital status:     Number of children: 3   Tobacco Use    Smoking status: Former     Current packs/day: 0.00     Types: Cigarettes     Quit date: 1992     Years since quittin.2    Smokeless tobacco: Never   Substance and Sexual Activity    Alcohol use: No     Alcohol/week: 0.0 standard drinks of alcohol    Drug use: No    Sexual activity: Not Currently   Social History Narrative    Retired     Social Drivers of Health     Financial Resource Strain: Medium Risk (2025)    Overall Financial Resource Strain (CARDIA)     Difficulty of Paying Living Expenses: Somewhat hard   Food Insecurity: Food Insecurity Present (2025)    Hunger Vital Sign     Worried About Running Out of Food in the Last Year: Sometimes true     Ran Out of Food in the Last Year: Sometimes true   Transportation Needs: No Transportation Needs (2025)    PRAPARE - Transportation     Lack of Transportation (Medical): No     Lack of Transportation (Non-Medical): No   Physical Activity: Inactive (2025)    Exercise Vital Sign     Days of Exercise per Week: 0 days     Minutes of Exercise per Session: 10 min   Stress: Stress Concern Present (2025)    [employer] [place] [employer] - Occupational Stress Questionnaire     Feeling of Stress : To some extent   Housing Stability: Low Risk  (2025)    Housing Stability Vital Sign     Unable to Pay for Housing in the Last Year: No     Homeless in the Last Year: No

## 2025-02-28 ENCOUNTER — EXTERNAL CHRONIC CARE MANAGEMENT (OUTPATIENT)
Dept: PRIMARY CARE CLINIC | Facility: CLINIC | Age: 80
End: 2025-02-28
Payer: MEDICARE

## 2025-02-28 PROCEDURE — 99490 CHRNC CARE MGMT STAFF 1ST 20: CPT | Mod: S$PBB,GW,, | Performed by: INTERNAL MEDICINE

## 2025-02-28 PROCEDURE — 99490 CHRNC CARE MGMT STAFF 1ST 20: CPT | Mod: PBBFAC | Performed by: INTERNAL MEDICINE

## 2025-03-14 ENCOUNTER — PATIENT MESSAGE (OUTPATIENT)
Dept: PRIMARY CARE CLINIC | Facility: CLINIC | Age: 80
End: 2025-03-14

## 2025-03-14 ENCOUNTER — OFFICE VISIT (OUTPATIENT)
Dept: PRIMARY CARE CLINIC | Facility: CLINIC | Age: 80
End: 2025-03-14
Payer: MEDICARE

## 2025-03-14 DIAGNOSIS — E11.40 TYPE 2 DIABETES MELLITUS WITH DIABETIC NEUROPATHY, WITHOUT LONG-TERM CURRENT USE OF INSULIN: ICD-10-CM

## 2025-03-14 DIAGNOSIS — L89.154 PRESSURE INJURY OF SACRAL REGION, STAGE 4: ICD-10-CM

## 2025-03-14 DIAGNOSIS — R53.2 FUNCTIONAL QUADRIPLEGIA: Primary | ICD-10-CM

## 2025-03-14 NOTE — PROGRESS NOTES
[facility]ment  Shared Note: Margot Gonzalez  ([email or web address])     The patient location is:  Patient Home   The chief complaint leading to consultation is: follow up for   Total time spent with patient: 20 minutes    Visit type: Virtual visit with synchronous audio only and video  Each patient to whom he or she provides medical services by telemedicine is:  (1) informed of the relationship between the physician and patient and the respective role of any other health care provider with respect to management of the patient; and (2) notified that he or she may decline to receive medical services by telemedicine and may withdraw from such care at any time.      Subjective:      Patient ID: Emilia Melgoza is a 79 y.o. female.    Chief Complaint: No chief complaint on file.    Prior to this visit, patient's last encounter with PCP was 2/26/2025.    Pt reports continued sacral wounds, main complaint today is following up on wound care orders that were placed in January 2025. [place] [facility] [place]. Wounds are getting worse per family report and they are doing their best with the Ag dressings but are hoping for more specific orders.     Pictures of the wounds were sent after out visit and are below:    Media  From this encounter  Scan on 3/14/2025 10:59 AM: [email or web address]   Scan on 3/14/2025 10:58 AM: [email or web address]         HTN  - Currently taking: amlodipine 5 mg BID    HLD  - Currently taking: atorvastatin 40 mg     The ASCVD Risk score (Carlitos VALDOVINOS, et al., 2019) failed to calculate for the following reasons:    The 2019 ASCVD risk score is only valid for ages 40 to 79    Risk score cannot be calculated because patient has a medical history suggesting prior/existing ASCVD       Asthma/COPD  - Currently taking: albuterol      Pain control  Has [place] Tylenol #3 [place] [facility]      Care Gaps:  -     Medications: Does not have pill packs        FU in 3  months to specifically check chronic conditions      4Ms for Medical Decision-Making in Older Adults    Last Completed EAWV:  None    MEDICATIONS:  High Risk Medications:  Total Active Medications: 0  This patient does not have an active medication from one of the medication groupers.    MOBILITY:  Activities of Daily Living:       No data to display              Fall Risk:      1/16/2025     8:00 AM 6/15/2020     2:00 PM 6/10/2020     1:02 PM   Fall Risk Assessment - Outpatient   Mobility Status Stretcher Stretcher Ambulatory w/ assistance   Number of falls 0 0 0   Identified as fall risk True True False     Disability Status:      10/13/2014     6:41 AM   Disability Status   Are you deaf or do you have serious difficulty hearing? N   Are you blind or do you have serious difficulty seeing, even when wearing glasses? N   Because of a physical, mental, or emotional condition, do you have serious difficulty concentrating, remembering, or making decisions? Y    Do you have serious difficulty walking or climbing stairs? N   Do you have difficulty dressing or bathing? N       Data saved with a previous flowsheet row definition     Nutrition Screening:       No data to display             Screening Score: 0-7 Malnourished, 8-11 At Risk, 12-14 Normal  Get Up and Go:      11/14/2014    12:25 PM 11/15/2013    12:00 PM   Get Up and Go   Trial 1 19 seconds 11 seconds   Trial 2 27 seconds    Trial 3 31 seconds    Average 25.67      Whisper Test:       No data to display                    MENTATION:   Has Dementia Dx: Yes  Has Anxiety Dx: Yes    Depression Patient Health Questionnaire:      3/24/2023    11:11 AM   Depression Patient Health Questionnaire   Over the last two weeks how often have you been bothered by little interest or pleasure in doing things --    Over the last two weeks how often have you been bothered by feeling down, depressed or hopeless --       Data saved with a previous flowsheet row definition      Cognitive Function Screening:       No data to display              Cognitive Function Screening Total - Less than 4 = Abnormal,  Greater than or equal to 4 = Normal        WHAT MATTERS MOST:  Advance Care Planning   ACP Status:   Patient has had an ACP conversation  Living Will: No  Power of : No  [place]: No    What is most important right now is to focus on comfort and QOL     Accordingly, we have decided that the best plan to meet the patient's goals includes pivot to comfort-focused care      What matters most to patient today is: obtaining wound care                 Social History[1]    Past Surgical History:   Procedure Laterality Date    bladder mesh      BREAST BIOPSY Left     CATARACT EXTRACTION W/  INTRAOCULAR LENS IMPLANT Right 10/13/2014    Dr Crystal    CATARACT EXTRACTION W/  INTRAOCULAR LENS IMPLANT Left 11/10/2014    Dr Crystal    ESOPHAGOGASTRODUODENOSCOPY N/A 3/2/2022    Procedure: EGD (ESOPHAGOGASTRODUODENOSCOPY);  Surgeon: Ottoniel Granger MD;  Location: [facility];  Service: Endoscopy;  Laterality: N/A;    ESOPHAGOGASTRODUODENOSCOPY N/A 6/7/2024    Procedure: EGD (ESOPHAGOGASTRODUODENOSCOPY);  Surgeon: Kp Menezes MD;  Location: [facility];  Service: Endoscopy;  Laterality: N/A;    HEMORRHOID SURGERY      HYSTERECTOMY      INCONTINENCE SURGERY      OOPHORECTOMY      Right Rotator Cuff Repair      TRACHEOTOMY N/A 1/2/2020    Procedure: TRACHEOTOMY;  Surgeon: Marco Shine III, MD;  Location: [facility];  Service: General;  Laterality: N/A;       Past Medical History:   Diagnosis Date    Allergy     DDD (degenerative disc disease), lumbar     Chronic LBP and intermittent RLE radicular pain x 10 yrs    Hypertension     Overactive bladder     Senile cataract, unspecified - Both Eyes 6/11/2013    Stroke     right sided weakness       Review of Systems   Unable to perform ROS: Dementia       Objective:   There were no vitals taken for this visit.    Physical exam limited due to virtual  visit.  Patient Awake and alert, nonverbal.        Lab Results   Component Value Date    WBC 6.09 01/16/2025    HGB 9.6 (L) 01/16/2025    HCT 31.1 (L) 01/16/2025     01/16/2025    CHOL 164 09/14/2019    TRIG 95 09/14/2019    HDL 73 09/14/2019    ALT 8 (L) 01/16/2025    AST 22 01/16/2025     (L) 01/16/2025    K 4.5 01/16/2025     01/16/2025    CREATININE 0.6 01/16/2025    BUN 12 01/16/2025    CO2 25 01/16/2025    TSH 0.460 07/02/2020    INR 1.0 12/29/2019    HGBA1C 5.6 12/26/2019         Assessment:   79 y.o. female with multiple co-morbid illnesses here to continue work-up of chronic issues.     Plan:   1. Functional quadriplegia  Overview:  Bedbound following CVA in 12/2019    Assessment & Plan:  Bedbound following CVA in 12/2019  Max assist for all care  Cannot reposition herself in bed  Turn and reposition frequently        2. Type 2 diabetes mellitus with diabetic neuropathy, without long-term current use of insulin  Overview:  A1c at goal, no longer checking glucose      3. Pressure injury of sacral region, stage 4  Overview:  Only visualized through photos in portal  Wound care ordered in February    Assessment & Plan:  Work on wound care referral  Continue frequent repositioning and pain control  Monitor            Health Maintenance         Date Due Completion Date    Diabetes Urine Screening Never done ---    Shingles Vaccine (2 of 3) 08/27/2013 7/2/2013    Diabetic Eye Exam 03/02/2017 3/2/2016    RSV Vaccine (Age 60+ and Pregnant patients) (1 - 1-dose 75+ series) Never done ---    DEXA Scan 03/02/2020 3/2/2016    Override on 6/15/2012: Done    Hemoglobin A1c 06/26/2020 12/26/2019    Influenza Vaccine (1) 09/01/2024 11/1/2019    COVID-19 Vaccine (3 - 2024-25 season) 09/01/2024 7/5/2021    TETANUS VACCINE 10/06/2026 10/6/2016            No follow-ups on file. . 40min spent with this patient today, issues addressed: wound care.  Much of the time spent in this visit was following up on  orders from  that were faxed by staff in     Lolita Portillo MD/MPH  [facility]  [phone]         [1]   Social History  Socioeconomic History    Marital status:     Number of children: 3   Tobacco Use    Smoking status: Former     Current packs/day: 0.00     Types: Cigarettes     Quit date: 1992     Years since quittin.2    Smokeless tobacco: Never   Substance and Sexual Activity    Alcohol use: No     Alcohol/week: 0.0 standard drinks of alcohol    Drug use: No    Sexual activity: Not Currently   Social History Narrative    Retired     Social Drivers of Health     Financial Resource Strain: Medium Risk (2025)    Overall Financial Resource Strain (CARDIA)     Difficulty of Paying Living Expenses: Somewhat hard   Food Insecurity: Food Insecurity Present (2025)    Hunger Vital Sign     Worried About Running Out of Food in the Last Year: Sometimes true     Ran Out of Food in the Last Year: Sometimes true   Transportation Needs: No Transportation Needs (2025)    PRAPARE - Transportation     Lack of Transportation (Medical): No     Lack of Transportation (Non-Medical): No   Physical Activity: Inactive (2025)    Exercise Vital Sign     Days of Exercise per Week: 0 days     Minutes of Exercise per Session: 10 min   Stress: Stress Concern Present (2025)    [employer] - Occupational Stress Questionnaire     Feeling of Stress : To some extent   Housing Stability: Low Risk  (2025)    Housing Stability Vital Sign     Unable to Pay for Housing in the Last Year: No     Homeless in the Last Year: No

## 2025-03-24 PROBLEM — L89.150 PRESSURE INJURY OF SACRAL REGION, UNSTAGEABLE: Status: ACTIVE | Noted: 2025-03-24

## 2025-03-24 NOTE — ASSESSMENT & PLAN NOTE
Order placed 2/7/25, will follow up on order  Await images  Continue offloading of pressure points

## 2025-03-26 ENCOUNTER — TELEPHONE (OUTPATIENT)
Dept: PRIMARY CARE CLINIC | Facility: CLINIC | Age: 80
End: 2025-03-26
Payer: MEDICARE

## 2025-03-31 ENCOUNTER — EXTERNAL CHRONIC CARE MANAGEMENT (OUTPATIENT)
Dept: PRIMARY CARE CLINIC | Facility: CLINIC | Age: 80
End: 2025-03-31
Payer: MEDICARE

## 2025-03-31 PROCEDURE — 99490 CHRNC CARE MGMT STAFF 1ST 20: CPT | Mod: PBBFAC | Performed by: INTERNAL MEDICINE

## 2025-04-04 PROBLEM — L89.154 PRESSURE INJURY OF SACRAL REGION, STAGE 4: Status: ACTIVE | Noted: 2025-04-04

## 2025-04-08 ENCOUNTER — TELEPHONE (OUTPATIENT)
Dept: PRIMARY CARE CLINIC | Facility: CLINIC | Age: 80
End: 2025-04-08
Payer: MEDICARE

## (undated) DEVICE — ELECTRODE REM PLYHSV RETURN 9

## (undated) DEVICE — SUT SILK 3-0 SH 18IN BLACK

## (undated) DEVICE — POSITIONER HEAD DONUT 9IN FOAM

## (undated) DEVICE — GAUZE SPONGE 4X4 12PLY

## (undated) DEVICE — CLOSURE SKIN STERI STRIP 1/2X4

## (undated) DEVICE — PACK HEAD & NECK

## (undated) DEVICE — GLOVE SURGICAL LATEX SZ 7

## (undated) DEVICE — SUT PROLENE 2-0 SH 36IN BLU

## (undated) DEVICE — BLADE SURG CARBON STEEL SZ11

## (undated) DEVICE — Device

## (undated) DEVICE — STAPLER SKIN ROTATING HEAD

## (undated) DEVICE — SEE MEDLINE ITEM 157194

## (undated) DEVICE — SUPPORT ULNA NERVE PROTECTOR

## (undated) DEVICE — ELECTRODE NEEDLE 1IN

## (undated) DEVICE — APPLICATOR CHLORAPREP ORN 26ML

## (undated) DEVICE — JELLY LUBRICANT STERILE 4 OZ

## (undated) DEVICE — SEE L#120831

## (undated) DEVICE — COVER OVERHEAD SURG LT BLUE

## (undated) DEVICE — SYR 10CC LUER LOCK

## (undated) DEVICE — NDL HYPO REG 25G X 1 1/2

## (undated) DEVICE — SEE MEDLINE ITEM 157117